# Patient Record
Sex: MALE | Race: WHITE | NOT HISPANIC OR LATINO | Employment: OTHER | URBAN - NONMETROPOLITAN AREA
[De-identification: names, ages, dates, MRNs, and addresses within clinical notes are randomized per-mention and may not be internally consistent; named-entity substitution may affect disease eponyms.]

---

## 2017-10-04 ENCOUNTER — OFFICE VISIT (OUTPATIENT)
Dept: UROLOGY | Facility: CLINIC | Age: 82
End: 2017-10-04

## 2017-10-04 VITALS
TEMPERATURE: 96.8 F | SYSTOLIC BLOOD PRESSURE: 110 MMHG | DIASTOLIC BLOOD PRESSURE: 86 MMHG | BODY MASS INDEX: 27.28 KG/M2 | WEIGHT: 159.8 LBS | HEIGHT: 64 IN

## 2017-10-04 DIAGNOSIS — N40.1 BPH WITH URINARY OBSTRUCTION: ICD-10-CM

## 2017-10-04 DIAGNOSIS — N13.8 BPH WITH URINARY OBSTRUCTION: ICD-10-CM

## 2017-10-04 DIAGNOSIS — C67.9 MALIGNANT NEOPLASM OF URINARY BLADDER, UNSPECIFIED SITE (HCC): Primary | ICD-10-CM

## 2017-10-04 LAB
BILIRUB BLD-MCNC: NEGATIVE MG/DL
CLARITY, POC: CLEAR
COLOR UR: YELLOW
GLUCOSE UR STRIP-MCNC: NEGATIVE MG/DL
KETONES UR QL: NEGATIVE
LEUKOCYTE EST, POC: NEGATIVE
NITRITE UR-MCNC: NEGATIVE MG/ML
PH UR: 6 [PH] (ref 5–8)
PROT UR STRIP-MCNC: NEGATIVE MG/DL
RBC # UR STRIP: NEGATIVE /UL
SP GR UR: 1.01 (ref 1–1.03)
UROBILINOGEN UR QL: NORMAL

## 2017-10-04 PROCEDURE — 81003 URINALYSIS AUTO W/O SCOPE: CPT | Performed by: UROLOGY

## 2017-10-04 PROCEDURE — 88112 CYTOPATH CELL ENHANCE TECH: CPT | Performed by: UROLOGY

## 2017-10-04 PROCEDURE — 99204 OFFICE O/P NEW MOD 45 MIN: CPT | Performed by: UROLOGY

## 2017-10-04 RX ORDER — ATORVASTATIN CALCIUM 20 MG/1
20 TABLET, FILM COATED ORAL DAILY
COMMUNITY
End: 2018-05-25 | Stop reason: SDUPTHER

## 2017-10-04 RX ORDER — IRBESARTAN 300 MG/1
300 TABLET ORAL NIGHTLY
COMMUNITY
End: 2017-11-22 | Stop reason: SDUPTHER

## 2017-10-04 RX ORDER — MELATONIN
1000 DAILY
COMMUNITY
End: 2022-11-15

## 2017-10-04 RX ORDER — PANTOPRAZOLE SODIUM 40 MG/1
40 TABLET, DELAYED RELEASE ORAL 2 TIMES DAILY
COMMUNITY
End: 2022-11-15

## 2017-10-04 RX ORDER — AMLODIPINE BESYLATE 5 MG/1
5 TABLET ORAL DAILY
COMMUNITY
End: 2017-11-22 | Stop reason: DRUGHIGH

## 2017-10-04 RX ORDER — ASPIRIN 81 MG/1
81 TABLET ORAL DAILY
COMMUNITY
End: 2017-11-22 | Stop reason: SDUPTHER

## 2017-10-04 RX ORDER — CLOPIDOGREL BISULFATE 75 MG/1
75 TABLET ORAL DAILY
COMMUNITY
End: 2017-11-22 | Stop reason: SDUPTHER

## 2017-10-04 RX ORDER — ACETAMINOPHEN 500 MG
500 TABLET ORAL EVERY 6 HOURS PRN
COMMUNITY

## 2017-10-04 NOTE — PROGRESS NOTES
Subjective    Mr. Curry is 87 y.o. male    Chief Complaint: Bladder Cancer    History of Present Illness     Bladder Cancer  Patient is here for evaluation and treatment of persistent bladder cancer. The tumor was diagnosed incidentally. TURBT done January 1978 .Pathology revealed unknown UNK - TX, Any N, Any M<br>UNK - Any T, NX, Any M<br>UNK - Any T, Any N, MX  .  Adjuvant treatment does includes Induction.  Risk factors for bladder cancer include smoking. Current urinary symptoms include irritative symptoms including frequency, nocturia. Patient denies obstructive symptoms including incomplete emptying, intermittency, weak stream, straining, postvoid dribbling.   Last surveillance cystoscopy done 1  year.  Last upper tract imaging done 1year.      The following portions of the patient's history were reviewed and updated as appropriate: allergies, current medications, past family history, past medical history, past social history, past surgical history and problem list.    Review of Systems   Constitutional: Negative for appetite change and fever.   HENT: Negative for hearing loss and sore throat.    Eyes: Negative for pain and redness.   Respiratory: Negative for cough and shortness of breath.    Cardiovascular: Negative for chest pain and leg swelling.   Gastrointestinal: Negative for anal bleeding, nausea and vomiting.   Endocrine: Negative for cold intolerance and heat intolerance.   Genitourinary: Negative for dysuria, flank pain and hematuria.   Musculoskeletal: Negative for joint swelling and myalgias.   Skin: Negative for color change and rash.   Allergic/Immunologic: Negative for immunocompromised state.   Neurological: Negative for dizziness and speech difficulty.   Hematological: Negative for adenopathy. Does not bruise/bleed easily.   Psychiatric/Behavioral: Negative for dysphoric mood and suicidal ideas.         Current Outpatient Prescriptions:   •  acetaminophen (TYLENOL) 500 MG tablet, Take 500 mg  "by mouth Every 6 (Six) Hours As Needed for Mild Pain ., Disp: , Rfl:   •  amLODIPine (NORVASC) 5 MG tablet, Take 5 mg by mouth Daily., Disp: , Rfl:   •  aspirin 81 MG EC tablet, Take 81 mg by mouth Daily., Disp: , Rfl:   •  atorvastatin (LIPITOR) 20 MG tablet, Take 20 mg by mouth Daily., Disp: , Rfl:   •  cholecalciferol (VITAMIN D3) 1000 units tablet, Take 1,000 Units by mouth Daily., Disp: , Rfl:   •  clopidogrel (PLAVIX) 75 MG tablet, Take 75 mg by mouth Daily., Disp: , Rfl:   •  irbesartan (AVAPRO) 300 MG tablet, Take 300 mg by mouth Every Night., Disp: , Rfl:   •  pantoprazole (PROTONIX) 40 MG EC tablet, Take 40 mg by mouth Daily., Disp: , Rfl:     Past Medical History:   Diagnosis Date   • Cancer     bladder cancer   • Hyperlipidemia    • Hypertension        Past Surgical History:   Procedure Laterality Date   • BLADDER SURGERY     • CARDIAC CATHETERIZATION     • CYSTOSCOPY     • HERNIA REPAIR         Social History     Social History   • Marital status:      Spouse name: N/A   • Number of children: N/A   • Years of education: N/A     Social History Main Topics   • Smoking status: Former Smoker   • Smokeless tobacco: Never Used   • Alcohol use Yes      Comment: occ wine   • Drug use: None   • Sexual activity: Not Asked     Other Topics Concern   • None     Social History Narrative   • None       Family History   Problem Relation Age of Onset   • No Known Problems Father    • No Known Problems Mother        Objective    /86  Temp 96.8 °F (36 °C)  Ht 64\" (162.6 cm)  Wt 159 lb 12.8 oz (72.5 kg)  BMI 27.43 kg/m2    Physical Exam   Constitutional: He is oriented to person, place, and time. He appears well-developed and well-nourished. No distress.   HENT:   Nose: Nose normal.   Neck: Normal range of motion. Neck supple. No tracheal deviation present. No thyromegaly present.   Cardiovascular: Normal rate, regular rhythm and intact distal pulses.    No significant edema or tenderness  "   Pulmonary/Chest: Breath sounds normal. No accessory muscle usage. No respiratory distress.   Abdominal: Soft. Bowel sounds are normal. He exhibits no distension, no ascites and no mass. There is no hepatosplenomegaly. There is no tenderness. There is no rebound, no guarding and no CVA tenderness. No hernia.   Stool specimen is not indicated for my portion of the exam   Genitourinary: Testes normal and penis normal. Rectal exam shows no mass, no tenderness, anal tone normal and guaiac negative stool. Tender: no nodules. Right testis shows no mass, no swelling and no tenderness. Left testis shows no mass, no swelling and no tenderness. No penile tenderness (no lesion or deformities).   Genitourinary Comments:  The urethral meatus normal in position without evidence of stricture. Epididymis without mass or tenderness. Vas Deferens is palpably normal.Anus and perineum without mass or tenderness. The seminal vesicle are without mass or enlargement. The prostate is approximately 20 ml. It is Symmetric, with a Soft consistency. There are no nodules present. . The seminal vesicles are Not palpable due to the size of the prostate.     Lymphadenopathy:     He has no cervical adenopathy. No inguinal adenopathy noted on the right or left side.        Right: No inguinal adenopathy present.        Left: No inguinal adenopathy present.   Neurological: He is alert and oriented to person, place, and time.   Skin: Skin is warm and dry. No rash noted. He is not diaphoretic. No pallor.   Psychiatric: He has a normal mood and affect. His behavior is normal.   Vitals reviewed.          Results for orders placed or performed in visit on 10/04/17   POC Urinalysis Dipstick, Automated   Result Value Ref Range    Color Yellow Yellow, Straw, Dark Yellow, Leslie    Clarity, UA Clear Clear    Glucose, UA Negative Negative, 1000 mg/dL (3+) mg/dL    Bilirubin Negative Negative    Ketones, UA Negative Negative    Specific Gravity  1.015 1.005 -  1.030    Blood, UA Negative Negative    pH, Urine 6.0 5.0 - 8.0    Protein, POC Negative Negative mg/dL    Urobilinogen, UA Normal Normal    Leukocytes Negative Negative    Nitrite, UA Negative Negative     Assessment and Plan    Diagnoses and all orders for this visit:    Malignant neoplasm of urinary bladder, unspecified site  -     POC Urinalysis Dipstick, Automated  -     Non-gynecologic Cytology - Body Fluid, Urine, Clean Catch  -     US Renal Bilateral; Future    BPH with urinary obstruction        Reviewed records from Dr. Bush.  Patient has a history of bladder cancer initially diagnosed back in 1978 by Dr. Hunter.  Most recently had a recurrence in 2007 and with induction BCG.  He been without evidence of disease since then.  He has minimal voiding symptoms.  I will have him return for cystoscopic examination as well as renal ultrasound.  We will send today's urine for cytology.

## 2017-10-08 LAB
CYTO UR: NORMAL
LAB AP CASE REPORT: NORMAL
Lab: NORMAL
PATH REPORT.FINAL DX SPEC: NORMAL
PATH REPORT.GROSS SPEC: NORMAL

## 2017-10-18 ENCOUNTER — PROCEDURE VISIT (OUTPATIENT)
Dept: UROLOGY | Facility: CLINIC | Age: 82
End: 2017-10-18

## 2017-10-18 ENCOUNTER — HOSPITAL ENCOUNTER (OUTPATIENT)
Dept: ULTRASOUND IMAGING | Facility: HOSPITAL | Age: 82
Discharge: HOME | End: 2017-10-18
Attending: UROLOGY | Admitting: UROLOGY

## 2017-10-18 DIAGNOSIS — C67.9 MALIGNANT NEOPLASM OF URINARY BLADDER, UNSPECIFIED SITE (HCC): Primary | ICD-10-CM

## 2017-10-18 DIAGNOSIS — C67.9 MALIGNANT NEOPLASM OF URINARY BLADDER, UNSPECIFIED SITE (HCC): ICD-10-CM

## 2017-10-18 LAB
BILIRUB BLD-MCNC: NEGATIVE MG/DL
CLARITY, POC: CLEAR
COLOR UR: YELLOW
GLUCOSE UR STRIP-MCNC: NEGATIVE MG/DL
KETONES UR QL: NEGATIVE
LEUKOCYTE EST, POC: NEGATIVE
NITRITE UR-MCNC: NEGATIVE MG/ML
PH UR: 6 [PH] (ref 5–8)
PROT UR STRIP-MCNC: NEGATIVE MG/DL
RBC # UR STRIP: NEGATIVE /UL
SP GR UR: 1.02 (ref 1–1.03)
UROBILINOGEN UR QL: NORMAL

## 2017-10-18 PROCEDURE — 76775 US EXAM ABDO BACK WALL LIM: CPT

## 2017-10-18 PROCEDURE — 52000 CYSTOURETHROSCOPY: CPT | Performed by: UROLOGY

## 2017-10-18 PROCEDURE — 81003 URINALYSIS AUTO W/O SCOPE: CPT | Performed by: UROLOGY

## 2017-10-18 NOTE — PROGRESS NOTES
Pre- operative diagnosis:  Bladder cancer surveillance    Post operative diagnosis:  Same    Procedure:  The patient was prepped and draped in a normal sterile fashion.  The urethra was anesthetized with 2% lidocaine jelly.  A flexible cystoscope was introduced per urethra.      Urethra:  Normal    Bladder:  There is no evidence of a stone, foreign body or mass within the bladder.  The bladder is minimally trabeculated.  The bladder neck is without contracture.    Ureteral orifices:  Normal position bilaterally and Clear efflux bilaterally    Prostate:  lateral lobe hypertrophy    Patient tolerated the procedure well    Complications: none    Blood loss: minimal    Follow up:    Routine follow up    Repeat renal ultrasound reviewed and negative.  Cystoscopic examination negative.  He will follow-up next year for his bladder cancer surveillance.

## 2017-11-22 RX ORDER — IRBESARTAN 300 MG/1
300 TABLET ORAL DAILY
COMMUNITY

## 2017-11-22 RX ORDER — AMLODIPINE BESYLATE 5 MG/1
5 TABLET ORAL DAILY
COMMUNITY
End: 2017-11-22 | Stop reason: SDUPTHER

## 2017-11-22 RX ORDER — ASPIRIN 81 MG/1
81 TABLET, CHEWABLE ORAL DAILY
COMMUNITY
Start: 2015-12-30 | End: 2021-11-10

## 2017-11-22 RX ORDER — OXYCODONE AND ACETAMINOPHEN 10; 325 MG/1; MG/1
1 TABLET ORAL EVERY 6 HOURS PRN
COMMUNITY
Start: 2015-12-30 | End: 2018-09-11

## 2017-11-22 RX ORDER — LANSOPRAZOLE 15 MG/1
15 CAPSULE, DELAYED RELEASE ORAL DAILY
COMMUNITY
Start: 2016-04-21 | End: 2018-09-11

## 2017-11-22 RX ORDER — AMLODIPINE BESYLATE 10 MG/1
10 TABLET ORAL DAILY
Refills: 3 | COMMUNITY
Start: 2017-10-02

## 2017-11-22 RX ORDER — ATORVASTATIN CALCIUM 20 MG/1
20 TABLET, FILM COATED ORAL DAILY
COMMUNITY

## 2017-11-22 RX ORDER — M-VIT,TX,IRON,MINS/CALC/FOLIC 27MG-0.4MG
1 TABLET ORAL DAILY
COMMUNITY
End: 2018-09-11

## 2017-11-22 RX ORDER — GABAPENTIN 100 MG/1
100 CAPSULE ORAL NIGHTLY
COMMUNITY
End: 2018-09-11

## 2017-11-22 RX ORDER — CLOPIDOGREL BISULFATE 75 MG/1
75 TABLET ORAL DAILY
COMMUNITY
Start: 2016-04-18

## 2017-11-27 ENCOUNTER — OFFICE VISIT (OUTPATIENT)
Dept: CARDIOLOGY | Facility: CLINIC | Age: 82
End: 2017-11-27

## 2017-11-27 VITALS
HEART RATE: 74 BPM | WEIGHT: 159 LBS | BODY MASS INDEX: 27.14 KG/M2 | DIASTOLIC BLOOD PRESSURE: 76 MMHG | HEIGHT: 64 IN | SYSTOLIC BLOOD PRESSURE: 140 MMHG

## 2017-11-27 DIAGNOSIS — I50.22 CHRONIC SYSTOLIC CONGESTIVE HEART FAILURE (HCC): ICD-10-CM

## 2017-11-27 DIAGNOSIS — E78.2 MIXED HYPERLIPIDEMIA: ICD-10-CM

## 2017-11-27 DIAGNOSIS — I25.119 CORONARY ARTERY DISEASE INVOLVING NATIVE CORONARY ARTERY OF NATIVE HEART WITH ANGINA PECTORIS (HCC): Primary | ICD-10-CM

## 2017-11-27 DIAGNOSIS — I10 ESSENTIAL HYPERTENSION: ICD-10-CM

## 2017-11-27 PROCEDURE — 99214 OFFICE O/P EST MOD 30 MIN: CPT | Performed by: INTERNAL MEDICINE

## 2017-11-27 PROCEDURE — 93000 ELECTROCARDIOGRAM COMPLETE: CPT | Performed by: INTERNAL MEDICINE

## 2017-11-27 RX ORDER — CLONIDINE HYDROCHLORIDE 0.1 MG/1
0.1 TABLET ORAL 2 TIMES DAILY
COMMUNITY
End: 2018-09-11

## 2017-11-27 RX ORDER — SPIRONOLACTONE AND HYDROCHLOROTHIAZIDE 25; 25 MG/1; MG/1
1 TABLET ORAL DAILY
COMMUNITY
End: 2018-09-11

## 2017-11-27 NOTE — PROGRESS NOTES
Referring Provider: Everardo Jackson MD    Reason for Follow-up Visit: CAD    Subjective .   Chief Complaint:   Chief Complaint   Patient presents with   • Follow-up     yearly Dr. Rinney wanted him rechecked because he had complained of some problems with irregular heart beat and sob with some chest pressure.   • Coronary Artery Disease     had some pressure in chest when went out to get mail and had sob with it also.   • Shortness of Breath     with exertion   • Palpitations     felt irregular and could not breath.   • Hypertension     goes up and down.       History of present illness:  Wyatt Curry is a 87 y.o. yo male with history of CAD, s/p stent placement about 10 yrs ago. Was admitted last yr with chest pain but workup was negative. Had one episode of CP last month.       History  Past Medical History:   Diagnosis Date   • Bladder cancer    • CAD (coronary artery disease)    • Cancer     bladder cancer   • GERD (gastroesophageal reflux disease)    • Hyperlipidemia    • Hypertension    • Irregular heart beat    ,   Past Surgical History:   Procedure Laterality Date   • BLADDER SURGERY     • CARDIAC CATHETERIZATION     • CORONARY ANGIOPLASTY WITH STENT PLACEMENT     • CYSTOSCOPY     • HERNIA REPAIR     • SHOULDER SURGERY     • TRANSURETHRAL RESECTION OF BLADDER TUMOR     ,   Family History   Problem Relation Age of Onset   • Heart disease Father    • Heart attack Father    • Tuberculosis Mother    • Heart disease Brother    • Heart attack Brother    • Heart disease Brother    • Diabetes Sister    • Hypertension Sister    • Hyperlipidemia Sister    • Heart disease Sister    • Alcohol abuse Sister    ,   Social History   Substance Use Topics   • Smoking status: Former Smoker     Types: Cigarettes     Start date: 1948     Quit date: 1975   • Smokeless tobacco: Never Used   • Alcohol use 0.6 oz/week     1 Glasses of wine per week      Comment: occ wine   ,     Medications  Current Outpatient Prescriptions    Medication Sig Dispense Refill   • acetaminophen (TYLENOL) 500 MG tablet Take 500 mg by mouth Every 6 (Six) Hours As Needed for Mild Pain .     • amLODIPine (NORVASC) 10 MG tablet Take 10 mg by mouth Daily.  3   • aspirin 81 MG chewable tablet Chew 81 mg Daily.     • atorvastatin (LIPITOR) 20 MG tablet Take 20 mg by mouth Daily.     • atorvastatin (LIPITOR) 20 MG tablet Take 20 mg by mouth Daily.     • cholecalciferol (VITAMIN D3) 1000 units tablet Take 1,000 Units by mouth Daily.     • CloNIDine (CATAPRES) 0.1 MG tablet Take 0.1 mg by mouth 2 (Two) Times a Day.     • clopidogrel (PLAVIX) 75 MG tablet Take 75 mg by mouth Daily.     • irbesartan (AVAPRO) 300 MG tablet Take 300 mg by mouth Every Night.     • pantoprazole (PROTONIX) 40 MG EC tablet Take 40 mg by mouth Daily.     • spironolactone-hydrochlorothiazide (ALDACTAZIDE) 25-25 MG tablet Take 1 tablet by mouth Daily.     • therapeutic multivitamin-minerals (THERAGRAN-M) tablet Take 1 tablet by mouth Daily.     • gabapentin (NEURONTIN) 100 MG capsule Take 100 mg by mouth Every Night.     • lansoprazole (PREVACID) 15 MG capsule Take 15 mg by mouth Daily.     • oxyCODONE-acetaminophen (PERCOCET)  MG per tablet Take 1 tablet by mouth Every 6 (Six) Hours As Needed.       No current facility-administered medications for this visit.        Allergies:  Review of patient's allergies indicates no known allergies.    Review of Systems  Review of Systems   HENT: Negative for nosebleeds.    Cardiovascular: Negative for chest pain, claudication, dyspnea on exertion, irregular heartbeat, leg swelling, near-syncope, orthopnea, palpitations, paroxysmal nocturnal dyspnea and syncope.   Respiratory: Negative for cough, hemoptysis and shortness of breath.    Gastrointestinal: Negative for dysphagia, hematemesis and melena.   Genitourinary: Negative for hematuria.   All other systems reviewed and are negative.      Objective     Physical Exam:  /76 (BP Location: Left  "arm, Patient Position: Sitting, Cuff Size: Adult)  Pulse 74  Ht 64\" (162.6 cm)  Wt 159 lb (72.1 kg)  BMI 27.29 kg/m2  Physical Exam   Constitutional: He is oriented to person, place, and time. He appears well-nourished. No distress.   HENT:   Head: Normocephalic.   Eyes: No scleral icterus.   Neck: Normal range of motion. Neck supple.   Cardiovascular: Normal rate and regular rhythm.  Exam reveals gallop and S3. Exam reveals no friction rub.    No murmur heard.  Pulmonary/Chest: Effort normal and breath sounds normal. No respiratory distress. He has no wheezes. He has no rales.   Abdominal: Soft. Bowel sounds are normal. He exhibits no distension. There is no tenderness.   Musculoskeletal: He exhibits no edema.   Neurological: He is alert and oriented to person, place, and time.   Skin: Skin is warm and dry. He is not diaphoretic. No erythema.   Psychiatric: He has a normal mood and affect. His behavior is normal.       Results Review:    ECG 12 Lead  Date/Time: 11/27/2017 11:28 AM  Performed by: MARY PAYTON  Authorized by: MARY PAYTON   Comparison: compared with previous ECG   Similar to previous ECG  Rhythm: sinus rhythm  Conduction: left bundle branch block  Clinical impression: abnormal ECG            Procedure visit on 10/18/2017   Component Date Value Ref Range Status   • Color 10/18/2017 Yellow  Yellow, Straw, Dark Yellow, Leslie Final   • Clarity, UA 10/18/2017 Clear  Clear Final   • Glucose, UA 10/18/2017 Negative  Negative, 1000 mg/dL (3+) mg/dL Final   • Bilirubin 10/18/2017 Negative  Negative Final   • Ketones, UA 10/18/2017 Negative  Negative Final   • Specific Gravity  10/18/2017 1.020  1.005 - 1.030 Final   • Blood, UA 10/18/2017 Negative  Negative Final   • pH, Urine 10/18/2017 6.0  5.0 - 8.0 Final   • Protein, POC 10/18/2017 Negative  Negative mg/dL Final   • Urobilinogen, UA 10/18/2017 Normal  Normal Final   • Leukocytes 10/18/2017 Negative  Negative Final   • Nitrite, UA 10/18/2017 Negative "  Negative Final       Assessment/Plan   Wyatt was seen today for follow-up, coronary artery disease, shortness of breath, palpitations and hypertension.    Diagnoses and all orders for this visit:    Coronary artery disease involving native coronary artery of native heart with angina pectoris, has chronic stable angina    Essential hypertension, adequate control    Mixed hyperlipidemia, Patient's statin therapy is followed by PCP.    Chronic systolic congestive heart failure, due to LBBB. Well compensated    Other orders  -     ECG 12 Lead

## 2018-05-25 ENCOUNTER — OFFICE VISIT (OUTPATIENT)
Dept: CARDIOLOGY | Facility: CLINIC | Age: 83
End: 2018-05-25

## 2018-05-25 VITALS
OXYGEN SATURATION: 96 % | WEIGHT: 157.6 LBS | DIASTOLIC BLOOD PRESSURE: 69 MMHG | HEART RATE: 73 BPM | HEIGHT: 64 IN | BODY MASS INDEX: 26.91 KG/M2 | SYSTOLIC BLOOD PRESSURE: 140 MMHG

## 2018-05-25 DIAGNOSIS — E78.2 MIXED HYPERLIPIDEMIA: ICD-10-CM

## 2018-05-25 DIAGNOSIS — I10 ESSENTIAL HYPERTENSION: ICD-10-CM

## 2018-05-25 DIAGNOSIS — I25.118 CORONARY ARTERY DISEASE OF NATIVE ARTERY OF NATIVE HEART WITH STABLE ANGINA PECTORIS (HCC): Primary | ICD-10-CM

## 2018-05-25 DIAGNOSIS — I44.7 LEFT BUNDLE BRANCH BLOCK: ICD-10-CM

## 2018-05-25 PROCEDURE — 93000 ELECTROCARDIOGRAM COMPLETE: CPT | Performed by: NURSE PRACTITIONER

## 2018-05-25 PROCEDURE — 99213 OFFICE O/P EST LOW 20 MIN: CPT | Performed by: NURSE PRACTITIONER

## 2018-05-25 NOTE — PROGRESS NOTES
"    Subjective:     Encounter Date:05/25/2018      Patient ID: Wyatt Curry is a 88 y.o. male.    Chief Complaint:  The patient reports he is feeling well overall. He remains active- he walks approximately 25 minutes on the treadmill several days per week. He admits some increase in fatigue and states he has had to slow down some, but he denies any drastic changes in this stamina. He denies chest pain, shortness of breath, weight gain, syncope, or pre syncope. He had one instance of abdominal burning while on the treadmill and states it was relieved with rest and antacids. He occasionally has bilateral hand numbness while walking on the treadmill- this is relieved with rest and is similar to his previous angina- it is not increasing significantly in frequency or duration. He admits very mild lower extremity edema at times.         The following portions of the patient's history were reviewed and updated as appropriate: allergies, current medications, past family history, past medical history, past social history, past surgical history and problem list.  /69 (BP Location: Right arm, Patient Position: Sitting, Cuff Size: Adult)   Pulse 73   Ht 162.6 cm (64\")   Wt 71.5 kg (157 lb 9.6 oz)   SpO2 96%   BMI 27.05 kg/m²   No Known Allergies    Current Outpatient Prescriptions:   •  acetaminophen (TYLENOL) 500 MG tablet, Take 500 mg by mouth Every 6 (Six) Hours As Needed for Mild Pain ., Disp: , Rfl:   •  amLODIPine (NORVASC) 10 MG tablet, Take 10 mg by mouth Daily., Disp: , Rfl: 3  •  aspirin 81 MG chewable tablet, Chew 81 mg Daily., Disp: , Rfl:   •  atorvastatin (LIPITOR) 20 MG tablet, Take 20 mg by mouth Daily., Disp: , Rfl:   •  cholecalciferol (VITAMIN D3) 1000 units tablet, Take 1,000 Units by mouth Daily., Disp: , Rfl:   •  clopidogrel (PLAVIX) 75 MG tablet, Take 75 mg by mouth Daily., Disp: , Rfl:   •  irbesartan (AVAPRO) 300 MG tablet, Take 300 mg by mouth Every Night., Disp: , Rfl:   •  pantoprazole " (PROTONIX) 40 MG EC tablet, Take 40 mg by mouth Daily., Disp: , Rfl:   •  CloNIDine (CATAPRES) 0.1 MG tablet, Take 0.1 mg by mouth 2 (Two) Times a Day., Disp: , Rfl:   •  gabapentin (NEURONTIN) 100 MG capsule, Take 100 mg by mouth Every Night., Disp: , Rfl:   •  lansoprazole (PREVACID) 15 MG capsule, Take 15 mg by mouth Daily., Disp: , Rfl:   •  oxyCODONE-acetaminophen (PERCOCET)  MG per tablet, Take 1 tablet by mouth Every 6 (Six) Hours As Needed., Disp: , Rfl:   •  spironolactone-hydrochlorothiazide (ALDACTAZIDE) 25-25 MG tablet, Take 1 tablet by mouth Daily., Disp: , Rfl:   •  therapeutic multivitamin-minerals (THERAGRAN-M) tablet, Take 1 tablet by mouth Daily., Disp: , Rfl:   Past Medical History:   Diagnosis Date   • Bladder cancer    • CAD (coronary artery disease)    • Cancer     bladder cancer   • GERD (gastroesophageal reflux disease)    • Hyperlipidemia    • Hypertension    • Irregular heart beat      Past Surgical History:   Procedure Laterality Date   • BLADDER SURGERY     • CARDIAC CATHETERIZATION     • CORONARY ANGIOPLASTY WITH STENT PLACEMENT     • CYSTOSCOPY     • HERNIA REPAIR     • SHOULDER SURGERY     • TRANSURETHRAL RESECTION OF BLADDER TUMOR       Social History     Social History   • Marital status:      Spouse name: N/A   • Number of children: N/A   • Years of education: N/A     Occupational History   • Not on file.     Social History Main Topics   • Smoking status: Former Smoker     Types: Cigarettes     Start date: 1948     Quit date: 1975   • Smokeless tobacco: Never Used   • Alcohol use 0.6 oz/week     1 Glasses of wine per week      Comment: occ wine   • Drug use: No   • Sexual activity: Defer     Other Topics Concern   • Not on file     Social History Narrative   • No narrative on file     Family History   Problem Relation Age of Onset   • Heart disease Father    • Heart attack Father    • Tuberculosis Mother    • Heart disease Brother    • Heart attack Brother    • Heart  disease Brother    • Diabetes Sister    • Hypertension Sister    • Hyperlipidemia Sister    • Heart disease Sister    • Alcohol abuse Sister        Review of Systems   Constitution: Positive for malaise/fatigue. Negative for chills, diaphoresis, fever and weakness.   HENT: Negative for nosebleeds.    Eyes: Negative for visual disturbance.   Cardiovascular: Positive for leg swelling (occasional, mild ). Negative for chest pain, claudication, cyanosis, dyspnea on exertion, irregular heartbeat, near-syncope, orthopnea, palpitations, paroxysmal nocturnal dyspnea and syncope.        Exertional hand numbness    Respiratory: Negative for cough, hemoptysis, shortness of breath, sputum production and wheezing.    Hematologic/Lymphatic: Negative for bleeding problem.   Skin: Negative for color change and flushing.   Musculoskeletal: Negative for falls.   Gastrointestinal: Positive for heartburn. Negative for bloating, abdominal pain, hematemesis, hematochezia, melena, nausea and vomiting.   Genitourinary: Negative for hematuria.   Neurological: Negative for dizziness and light-headedness.   Psychiatric/Behavioral: Negative for altered mental status.         ECG 12 Lead  Date/Time: 5/25/2018 1:38 PM  Performed by: PERLA SANDS  Authorized by: PERLA SANDS   Comparison: compared with previous ECG from 11/27/2017  Similar to previous ECG  Rhythm: sinus rhythm  Conduction: complete LBBB               Objective:     Physical Exam   Constitutional: He is oriented to person, place, and time. He appears well-developed and well-nourished. No distress.   HENT:   Head: Normocephalic and atraumatic.   Eyes: Pupils are equal, round, and reactive to light.   Neck: Normal range of motion. Neck supple. No JVD present. No thyromegaly present.   Cardiovascular: Normal rate, regular rhythm, normal heart sounds and intact distal pulses.  Exam reveals no gallop and no friction rub.    No murmur heard.  Pulmonary/Chest: Effort normal and breath  sounds normal. No respiratory distress. He has no wheezes. He has no rales. He exhibits no tenderness.   Abdominal: Soft. Bowel sounds are normal. He exhibits no distension. There is no tenderness.   Musculoskeletal: Normal range of motion. He exhibits no edema.   Neurological: He is alert and oriented to person, place, and time. No cranial nerve deficit.   Skin: Skin is warm and dry. He is not diaphoretic.   Psychiatric: He has a normal mood and affect. His behavior is normal.       Lab Review:       Assessment:          Diagnosis Plan   1. Coronary artery disease of native artery of native heart with stable angina pectoris  Stable. See notes in HPI. Continue current medical therapy as listed above.   Most recent cath 2008- non obstructive CAD and NL LVEF. Hx of PCI approx. 15 yr ago.   9/2016 negative nuclear stress, and LVEF 45-50% by echo. No s/s of CHF.  Stamina is good.    2. Left bundle branch block  Chronic     3. Essential hypertension  Controlled     4. Mixed hyperlipidemia  Followed by pcp, continue statin           Plan:       As noted above  Follow up 6 months, sooner with new or worsening symptoms or concerns.

## 2018-08-10 ENCOUNTER — TELEPHONE (OUTPATIENT)
Dept: CARDIOLOGY | Facility: CLINIC | Age: 83
End: 2018-08-10

## 2018-08-10 NOTE — TELEPHONE ENCOUNTER
Dr Jackson office called to see if patient has upcoming heart cath scheduled because he has been having recurring angina. Does not have appt to see you until 11/26/18. Do you want him seen sooner? Please advise.

## 2018-08-13 ENCOUNTER — OFFICE VISIT (OUTPATIENT)
Dept: CARDIOLOGY | Facility: CLINIC | Age: 83
End: 2018-08-13

## 2018-08-13 VITALS
OXYGEN SATURATION: 98 % | HEIGHT: 64 IN | SYSTOLIC BLOOD PRESSURE: 135 MMHG | DIASTOLIC BLOOD PRESSURE: 70 MMHG | BODY MASS INDEX: 26.29 KG/M2 | HEART RATE: 62 BPM | WEIGHT: 154 LBS | RESPIRATION RATE: 18 BRPM

## 2018-08-13 DIAGNOSIS — I44.7 LEFT BUNDLE BRANCH BLOCK: ICD-10-CM

## 2018-08-13 DIAGNOSIS — I25.118 CORONARY ARTERY DISEASE OF NATIVE ARTERY OF NATIVE HEART WITH STABLE ANGINA PECTORIS (HCC): Primary | ICD-10-CM

## 2018-08-13 DIAGNOSIS — I10 ESSENTIAL HYPERTENSION: ICD-10-CM

## 2018-08-13 DIAGNOSIS — I51.9 LEFT VENTRICULAR SYSTOLIC DYSFUNCTION: ICD-10-CM

## 2018-08-13 DIAGNOSIS — I34.0 NON-RHEUMATIC MITRAL REGURGITATION: ICD-10-CM

## 2018-08-13 DIAGNOSIS — E78.2 MIXED HYPERLIPIDEMIA: ICD-10-CM

## 2018-08-13 DIAGNOSIS — R06.02 SHORTNESS OF BREATH: ICD-10-CM

## 2018-08-13 PROBLEM — I51.89 LEFT VENTRICULAR SYSTOLIC DYSFUNCTION: Status: ACTIVE | Noted: 2018-08-13

## 2018-08-13 PROBLEM — R53.83 OTHER FATIGUE: Status: ACTIVE | Noted: 2018-08-13

## 2018-08-13 PROCEDURE — 93000 ELECTROCARDIOGRAM COMPLETE: CPT | Performed by: NURSE PRACTITIONER

## 2018-08-13 PROCEDURE — 99214 OFFICE O/P EST MOD 30 MIN: CPT | Performed by: NURSE PRACTITIONER

## 2018-08-13 RX ORDER — ISOSORBIDE MONONITRATE 60 MG/1
60 TABLET, EXTENDED RELEASE ORAL EVERY MORNING
Qty: 90 TABLET | Refills: 3 | Status: SHIPPED | OUTPATIENT
Start: 2018-08-13 | End: 2018-09-11 | Stop reason: SINTOL

## 2018-08-13 NOTE — PATIENT INSTRUCTIONS

## 2018-08-13 NOTE — PROGRESS NOTES
"    Subjective:     Encounter Date:08/13/2018      Patient ID: Wyatt Curry is a 88 y.o. male with a history of coronary artery disease with remote stents to LCx system.  Most recent PCI was 2006 \"Y\" crush stent to LCx and OM. He also has a history of mitral valve regurgitation, hypertension, hyperlipidemia, GERD, and chronic left bundle branch block.  He presents today for follow up of symptoms left arm numbness and fatigue.    Chief Complaint: Fatigue  Fatigue   This is a new problem. The current episode started more than 1 month ago. The problem occurs daily. The problem has been gradually worsening. Associated symptoms include fatigue. Pertinent negatives include no abdominal pain, chest pain, coughing, diaphoresis, headaches, joint swelling, myalgias, nausea or vomiting. The symptoms are aggravated by exertion. He has tried rest for the symptoms. The treatment provided moderate relief.   Coronary Artery Disease   Presents for follow-up visit. Symptoms include shortness of breath. Pertinent negatives include no chest pain, chest pressure, chest tightness, dizziness, leg swelling, muscle weakness, palpitations or weight gain. The symptoms have been stable (with new onset fatigue of concern). Compliance with diet is good. Compliance with exercise is good. Compliance with medications is good.   Hypertension   This is a chronic problem. The current episode started more than 1 year ago. The problem is unchanged. The problem is controlled. Associated symptoms include malaise/fatigue and shortness of breath. Pertinent negatives include no chest pain, headaches, orthopnea, palpitations, peripheral edema, PND or sweats. Risk factors for coronary artery disease include dyslipidemia and male gender. Current antihypertension treatment includes diuretics, angiotensin blockers, central alpha agonists and calcium channel blockers.       Has been walking on a treadmill and been having to slow his pace quite a bit as well as " "his length of exercise by half.  He also notes that he has been having numbness in his hands and arms that he \"can shake out\" as well as neck pain and pain between shoulder blades. He reports previous anginal equivalent with heavy arms and some chest tightness. This isn't exactly the same as previous but was concerning to his PCP and he was advised to get a sooner follow up.  He denies shortness of breath, edema, palpitation, syncope, or presyncope.    The following portions of the patient's history were reviewed and updated as appropriate: allergies, current medications, past family history, past medical history, past social history and past surgical history.     No Known Allergies      Current Outpatient Prescriptions:   •  acetaminophen (TYLENOL) 500 MG tablet, Take 500 mg by mouth Every 6 (Six) Hours As Needed for Mild Pain ., Disp: , Rfl:   •  amLODIPine (NORVASC) 10 MG tablet, Take 10 mg by mouth Daily., Disp: , Rfl: 3  •  aspirin 81 MG chewable tablet, Chew 81 mg Daily., Disp: , Rfl:   •  atorvastatin (LIPITOR) 20 MG tablet, Take 20 mg by mouth Daily., Disp: , Rfl:   •  cholecalciferol (VITAMIN D3) 1000 units tablet, Take 1,000 Units by mouth Daily., Disp: , Rfl:   •  CloNIDine (CATAPRES) 0.1 MG tablet, Take 0.1 mg by mouth 2 (Two) Times a Day., Disp: , Rfl:   •  clopidogrel (PLAVIX) 75 MG tablet, Take 75 mg by mouth Daily., Disp: , Rfl:   •  gabapentin (NEURONTIN) 100 MG capsule, Take 100 mg by mouth Every Night., Disp: , Rfl:   •  irbesartan (AVAPRO) 300 MG tablet, Take 300 mg by mouth Every Night., Disp: , Rfl:   •  lansoprazole (PREVACID) 15 MG capsule, Take 15 mg by mouth Daily., Disp: , Rfl:   •  oxyCODONE-acetaminophen (PERCOCET)  MG per tablet, Take 1 tablet by mouth Every 6 (Six) Hours As Needed., Disp: , Rfl:   •  pantoprazole (PROTONIX) 40 MG EC tablet, Take 40 mg by mouth Daily., Disp: , Rfl:   •  spironolactone-hydrochlorothiazide (ALDACTAZIDE) 25-25 MG tablet, Take 1 tablet by mouth " Daily., Disp: , Rfl:   •  therapeutic multivitamin-minerals (THERAGRAN-M) tablet, Take 1 tablet by mouth Daily., Disp: , Rfl:   •  isosorbide mononitrate (IMDUR) 60 MG 24 hr tablet, Take 1 tablet by mouth Every Morning., Disp: 90 tablet, Rfl: 3    Past Medical History:   Diagnosis Date   • Bladder cancer (CMS/HCC)    • CAD (coronary artery disease)    • Cancer (CMS/HCC)     bladder cancer   • GERD (gastroesophageal reflux disease)    • Hyperlipidemia    • Hypertension    • Irregular heart beat      Family History   Problem Relation Age of Onset   • Heart disease Father    • Heart attack Father    • Tuberculosis Mother    • Heart disease Brother    • Heart attack Brother    • Heart disease Brother    • Diabetes Sister    • Hypertension Sister    • Hyperlipidemia Sister    • Heart disease Sister    • Alcohol abuse Sister      Social History     Social History   • Marital status:      Spouse name: N/A   • Number of children: N/A   • Years of education: N/A     Occupational History   • Not on file.     Social History Main Topics   • Smoking status: Former Smoker     Types: Cigarettes     Start date: 1948     Quit date: 1975   • Smokeless tobacco: Never Used   • Alcohol use 0.6 oz/week     1 Glasses of wine per week      Comment: occ wine   • Drug use: No   • Sexual activity: Defer     Other Topics Concern   • Not on file     Social History Narrative   • No narrative on file     Past Surgical History:   Procedure Laterality Date   • BLADDER SURGERY     • CARDIAC CATHETERIZATION     • CORONARY ANGIOPLASTY WITH STENT PLACEMENT     • CYSTOSCOPY     • HERNIA REPAIR     • SHOULDER SURGERY     • TRANSURETHRAL RESECTION OF BLADDER TUMOR         Review of Systems   Constitution: Positive for fatigue and malaise/fatigue. Negative for diaphoresis and weight gain.   Eyes: Negative for visual disturbance.   Cardiovascular: Positive for dyspnea on exertion. Negative for chest pain, leg swelling, orthopnea, palpitations and  paroxysmal nocturnal dyspnea.   Respiratory: Positive for shortness of breath. Negative for chest tightness, cough and wheezing.    Hematologic/Lymphatic: Negative for bleeding problem. Bruises/bleeds easily.   Skin: Negative for suspicious lesions.   Musculoskeletal: Negative for falls, joint swelling, muscle weakness and myalgias.   Gastrointestinal: Negative for abdominal pain, constipation, diarrhea, nausea and vomiting.   Neurological: Negative for dizziness, headaches and light-headedness.   Psychiatric/Behavioral: The patient is not nervous/anxious.    All other systems reviewed and are negative.        ECG 12 Lead  Date/Time: 8/13/2018 2:13 PM  Performed by: DELISA MATIAS  Authorized by: DELISA MATIAS   Comparison: compared with previous ECG from 5/25/2018  Similar to previous ECG  Rhythm: sinus rhythm  Rate: normal  BPM: 62  Conduction: left bundle branch block  ST Segments: ST segments normal  Clinical impression: abnormal ECG          Vitals:    08/13/18 1332   BP: 135/70   Pulse: 62   Resp: 18   SpO2: 98%     1    08/13/18  1332   Weight: 69.9 kg (154 lb)          Objective:     Physical Exam   Constitutional: He is oriented to person, place, and time. Vital signs are normal. He appears well-developed and well-nourished. He is cooperative. No distress.   HENT:   Head: Normocephalic and atraumatic.   Mouth/Throat: No oropharyngeal exudate.   Eyes: Conjunctivae are normal. Right eye exhibits no discharge. Left eye exhibits no discharge.   Neck: Normal range of motion. Neck supple. No thyromegaly present.   Cardiovascular: Normal rate, regular rhythm, normal heart sounds and intact distal pulses.  Exam reveals no gallop and no friction rub.    No murmur heard.  Pulmonary/Chest: Effort normal and breath sounds normal. No respiratory distress. He has no wheezes. He has no rhonchi. He has no rales. He exhibits no tenderness.   Abdominal: Soft. Normal appearance. He exhibits no distension. There is no  tenderness.   Musculoskeletal: Normal range of motion. He exhibits no edema, tenderness or deformity.   Neurological: He is alert and oriented to person, place, and time.   Skin: Skin is warm, dry and intact. No rash noted. He is not diaphoretic. No erythema. No pallor.   Psychiatric: He has a normal mood and affect. His speech is normal and behavior is normal. His mood appears not anxious. His affect is not angry. He does not exhibit a depressed mood.   Vitals reviewed.      Lab Review:       Assessment:          Diagnosis Plan   1. Coronary artery disease of native artery of native heart with stable angina pectoris (CMS/HCC)  Adult Transthoracic Echo Complete W/ Cont if Necessary Per Protocol   2. Essential hypertension     3. Mixed hyperlipidemia     4. Left bundle branch block     5. Left ventricular systolic dysfunction  Adult Transthoracic Echo Complete W/ Cont if Necessary Per Protocol   6. Non-rheumatic mitral regurgitation  Adult Transthoracic Echo Complete W/ Cont if Necessary Per Protocol   7. BMI 27.0-27.9,adult     8. Shortness of breath  Adult Transthoracic Echo Complete W/ Cont if Necessary Per Protocol          Plan:         - CAD: no chest pain at present. Notes to have increase in fatigue decrease in exercise tolerance and stamina during his usual treadmill routine. Hx of stents last PCI 2006. On DAPT    - HTN: controlled.     - HLD: on statin therapy; managed per PCP.    - LBBB: chronic    - Systolic dysfunction: mildly decreased on echo in 2016 45-50%    - MR: noted on previous echo.    - SOB: dyspnea with exercise as well as decreased exercise tolerance. Check 2 D Echo start Imdur 60 mg daily.     Follow up in 4 weeks after initiation of Imdur and echo is complete.

## 2018-08-20 ENCOUNTER — HOSPITAL ENCOUNTER (OUTPATIENT)
Dept: CARDIOLOGY | Facility: HOSPITAL | Age: 83
Discharge: HOME | End: 2018-08-20
Admitting: NURSE PRACTITIONER

## 2018-08-20 VITALS
HEIGHT: 64 IN | SYSTOLIC BLOOD PRESSURE: 135 MMHG | DIASTOLIC BLOOD PRESSURE: 69 MMHG | BODY MASS INDEX: 26.31 KG/M2 | WEIGHT: 154.1 LBS

## 2018-08-20 DIAGNOSIS — I25.118 CORONARY ARTERY DISEASE OF NATIVE ARTERY OF NATIVE HEART WITH STABLE ANGINA PECTORIS (HCC): ICD-10-CM

## 2018-08-20 DIAGNOSIS — R06.02 SHORTNESS OF BREATH: ICD-10-CM

## 2018-08-20 DIAGNOSIS — I51.9 LEFT VENTRICULAR SYSTOLIC DYSFUNCTION: ICD-10-CM

## 2018-08-20 DIAGNOSIS — I34.0 NON-RHEUMATIC MITRAL REGURGITATION: ICD-10-CM

## 2018-08-20 LAB
BH CV ECHO MEAS - AO MAX PG (FULL): 6.9 MMHG
BH CV ECHO MEAS - AO MAX PG: 9.2 MMHG
BH CV ECHO MEAS - AO MEAN PG (FULL): 4 MMHG
BH CV ECHO MEAS - AO MEAN PG: 5 MMHG
BH CV ECHO MEAS - AO ROOT AREA (BSA CORRECTED): 1.8
BH CV ECHO MEAS - AO ROOT AREA: 7.5 CM^2
BH CV ECHO MEAS - AO ROOT DIAM: 3.1 CM
BH CV ECHO MEAS - AO V2 MAX: 152 CM/SEC
BH CV ECHO MEAS - AO V2 MEAN: 99.6 CM/SEC
BH CV ECHO MEAS - AO V2 VTI: 29 CM
BH CV ECHO MEAS - AVA(I,A): 1.7 CM^2
BH CV ECHO MEAS - AVA(I,D): 1.7 CM^2
BH CV ECHO MEAS - AVA(V,A): 1.3 CM^2
BH CV ECHO MEAS - AVA(V,D): 1.3 CM^2
BH CV ECHO MEAS - BSA(HAYCOCK): 1.8 M^2
BH CV ECHO MEAS - BSA: 1.8 M^2
BH CV ECHO MEAS - BZI_BMI: 26.4 KILOGRAMS/M^2
BH CV ECHO MEAS - BZI_METRIC_HEIGHT: 162.6 CM
BH CV ECHO MEAS - BZI_METRIC_WEIGHT: 69.9 KG
BH CV ECHO MEAS - EDV(CUBED): 74.1 ML
BH CV ECHO MEAS - EDV(MOD-SP4): 94 ML
BH CV ECHO MEAS - EDV(TEICH): 78.6 ML
BH CV ECHO MEAS - EF(CUBED): 62.1 %
BH CV ECHO MEAS - EF(MOD-SP4): 52.2 %
BH CV ECHO MEAS - EF(TEICH): 54 %
BH CV ECHO MEAS - ESV(CUBED): 28.1 ML
BH CV ECHO MEAS - ESV(MOD-SP4): 44.9 ML
BH CV ECHO MEAS - ESV(TEICH): 36.2 ML
BH CV ECHO MEAS - FS: 27.6 %
BH CV ECHO MEAS - IVS/LVPW: 1.3
BH CV ECHO MEAS - IVSD: 1.3 CM
BH CV ECHO MEAS - LA DIMENSION: 3.8 CM
BH CV ECHO MEAS - LA/AO: 1.2
BH CV ECHO MEAS - LAT PEAK E' VEL: 3.5 CM/SEC
BH CV ECHO MEAS - LV DIASTOLIC VOL/BSA (35-75): 53.7 ML/M^2
BH CV ECHO MEAS - LV MASS(C)D: 161.4 GRAMS
BH CV ECHO MEAS - LV MASS(C)DI: 92.2 GRAMS/M^2
BH CV ECHO MEAS - LV MAX PG: 2.3 MMHG
BH CV ECHO MEAS - LV MEAN PG: 1 MMHG
BH CV ECHO MEAS - LV SYSTOLIC VOL/BSA (12-30): 25.7 ML/M^2
BH CV ECHO MEAS - LV V1 MAX: 75.8 CM/SEC
BH CV ECHO MEAS - LV V1 MEAN: 46.7 CM/SEC
BH CV ECHO MEAS - LV V1 VTI: 19.7 CM
BH CV ECHO MEAS - LVIDD: 4.2 CM
BH CV ECHO MEAS - LVIDS: 3 CM
BH CV ECHO MEAS - LVLD AP4: 7.1 CM
BH CV ECHO MEAS - LVLS AP4: 6.2 CM
BH CV ECHO MEAS - LVOT AREA (M): 2.5 CM^2
BH CV ECHO MEAS - LVOT AREA: 2.5 CM^2
BH CV ECHO MEAS - LVOT DIAM: 1.8 CM
BH CV ECHO MEAS - LVPWD: 0.99 CM
BH CV ECHO MEAS - MED PEAK E' VEL: 4.21 CM/SEC
BH CV ECHO MEAS - MR MAX PG: 135.5 MMHG
BH CV ECHO MEAS - MR MAX VEL: 582 CM/SEC
BH CV ECHO MEAS - MV A MAX VEL: 134 CM/SEC
BH CV ECHO MEAS - MV DEC TIME: 0.47 SEC
BH CV ECHO MEAS - MV E MAX VEL: 63.1 CM/SEC
BH CV ECHO MEAS - MV E/A: 0.47
BH CV ECHO MEAS - RAP SYSTOLE: 5 MMHG
BH CV ECHO MEAS - RVSP: 31.4 MMHG
BH CV ECHO MEAS - SI(AO): 125 ML/M^2
BH CV ECHO MEAS - SI(CUBED): 26.3 ML/M^2
BH CV ECHO MEAS - SI(LVOT): 28.6 ML/M^2
BH CV ECHO MEAS - SI(MOD-SP4): 28.1 ML/M^2
BH CV ECHO MEAS - SI(TEICH): 24.2 ML/M^2
BH CV ECHO MEAS - SV(AO): 218.9 ML
BH CV ECHO MEAS - SV(CUBED): 46 ML
BH CV ECHO MEAS - SV(LVOT): 50.1 ML
BH CV ECHO MEAS - SV(MOD-SP4): 49.1 ML
BH CV ECHO MEAS - SV(TEICH): 42.4 ML
BH CV ECHO MEAS - TR MAX VEL: 257 CM/SEC
BH CV ECHO MEASUREMENTS AVERAGE E/E' RATIO: 16.37
LEFT ATRIUM VOLUME INDEX: 27.2 ML/M2
LEFT ATRIUM VOLUME: 47.6 CM3
LV EF 2D ECHO EST: 55 %

## 2018-08-20 PROCEDURE — 93306 TTE W/DOPPLER COMPLETE: CPT | Performed by: INTERNAL MEDICINE

## 2018-08-20 PROCEDURE — 93306 TTE W/DOPPLER COMPLETE: CPT

## 2018-08-20 PROCEDURE — 25010000002 PERFLUTREN 6.52 MG/ML SUSPENSION: Performed by: NURSE PRACTITIONER

## 2018-08-20 RX ADMIN — PERFLUTREN: 6.52 INJECTION, SUSPENSION INTRAVENOUS at 08:51

## 2018-09-11 ENCOUNTER — OFFICE VISIT (OUTPATIENT)
Dept: CARDIOLOGY | Facility: CLINIC | Age: 83
End: 2018-09-11

## 2018-09-11 VITALS
DIASTOLIC BLOOD PRESSURE: 58 MMHG | BODY MASS INDEX: 26.53 KG/M2 | HEART RATE: 58 BPM | OXYGEN SATURATION: 99 % | HEIGHT: 64 IN | WEIGHT: 155.4 LBS | SYSTOLIC BLOOD PRESSURE: 124 MMHG

## 2018-09-11 DIAGNOSIS — E78.2 MIXED HYPERLIPIDEMIA: ICD-10-CM

## 2018-09-11 DIAGNOSIS — I10 ESSENTIAL HYPERTENSION: ICD-10-CM

## 2018-09-11 DIAGNOSIS — I51.9 LEFT VENTRICULAR DIASTOLIC DYSFUNCTION: ICD-10-CM

## 2018-09-11 DIAGNOSIS — I25.110 CORONARY ARTERY DISEASE INVOLVING NATIVE CORONARY ARTERY OF NATIVE HEART WITH UNSTABLE ANGINA PECTORIS (HCC): Primary | ICD-10-CM

## 2018-09-11 DIAGNOSIS — E66.3 OVERWEIGHT (BMI 25.0-29.9): ICD-10-CM

## 2018-09-11 DIAGNOSIS — I44.7 LEFT BUNDLE BRANCH BLOCK: ICD-10-CM

## 2018-09-11 DIAGNOSIS — Z95.5 HISTORY OF CORONARY ARTERY STENT PLACEMENT: ICD-10-CM

## 2018-09-11 DIAGNOSIS — I34.0 NON-RHEUMATIC MITRAL REGURGITATION: ICD-10-CM

## 2018-09-11 PROCEDURE — 99214 OFFICE O/P EST MOD 30 MIN: CPT | Performed by: NURSE PRACTITIONER

## 2018-09-11 RX ORDER — RANOLAZINE 500 MG/1
500 TABLET, EXTENDED RELEASE ORAL 2 TIMES DAILY
Qty: 60 TABLET | Refills: 0 | COMMUNITY
Start: 2018-09-11 | End: 2018-10-09 | Stop reason: SDUPTHER

## 2018-09-11 NOTE — PROGRESS NOTES
Subjective:     Encounter Date:09/11/2018      Patient ID: Wyatt Curry is a 88 y.o. male. He presents today for one month follow up of unstable angina. He has a history of coronary artery disease s/p multiple stents remotely with the most recent being to the left circumflex artery in 2006, hypertension and hyperlipidemia. He continues to complain of decreased stamina over the last six months, reporting that he could previously walk 3 miles on the treadmill and now can only walk for 30 minutes. He reports exertional bilateral arm numbness and tingling and bilateral shoulder tightness when on the treadmill. This is relieved with rest. He denies shortness of breath, palpitations, dizziness, syncope, orthopnea, PND or swelling. 2D echo 8/20/18 reviewed, revealing normal left ventricular systolic function with ejection fraction of 55%, grade 1 diastolic dysfunction, mild to moderate mitral valve regurgitation, mild concentric hypertrophy and apical hypokinesis. He was started on Imdur at last visit but was unable to tolerate due to weakness and hypotension.     Chief Complaint: Decreased stamina, bilateral arm numbness/tingling/weakness and bilateral shoulder tightness  Coronary Artery Disease   Presents for follow-up visit. Pertinent negatives include no chest pain, chest pressure, chest tightness, dizziness, leg swelling, muscle weakness, palpitations, shortness of breath or weight gain. Risk factors include hyperlipidemia. The symptoms have been stable. Compliance with diet is variable. Compliance with exercise is variable. Compliance with medications is good.   Hypertension   This is a chronic problem. The current episode started more than 1 year ago. The problem is controlled. Associated symptoms include malaise/fatigue. Pertinent negatives include no anxiety, blurred vision, chest pain, headaches, neck pain, orthopnea, palpitations, peripheral edema, PND, shortness of breath or sweats. Current  antihypertension treatment includes calcium channel blockers and angiotensin blockers. The current treatment provides significant improvement. Hypertensive end-organ damage includes CAD/MI.   Hyperlipidemia   This is a chronic problem. The current episode started more than 1 year ago. The problem is controlled. Pertinent negatives include no chest pain or shortness of breath. Risk factors for coronary artery disease include dyslipidemia, hypertension and male sex.       The following portions of the patient's history were reviewed and updated as appropriate: allergies, current medications, past family history, past medical history, past social history, past surgical history and problem list.     No Known Allergies    Current Outpatient Prescriptions:   •  acetaminophen (TYLENOL) 500 MG tablet, Take 500 mg by mouth Every 6 (Six) Hours As Needed for Mild Pain ., Disp: , Rfl:   •  amLODIPine (NORVASC) 10 MG tablet, Take 10 mg by mouth Daily., Disp: , Rfl: 3  •  atorvastatin (LIPITOR) 20 MG tablet, Take 20 mg by mouth Daily., Disp: , Rfl:   •  cholecalciferol (VITAMIN D3) 1000 units tablet, Take 1,000 Units by mouth Daily., Disp: , Rfl:   •  clopidogrel (PLAVIX) 75 MG tablet, Take 75 mg by mouth Daily., Disp: , Rfl:   •  irbesartan (AVAPRO) 300 MG tablet, Take 300 mg by mouth Every Night., Disp: , Rfl:   •  pantoprazole (PROTONIX) 40 MG EC tablet, Take 40 mg by mouth Daily., Disp: , Rfl:   •  aspirin 81 MG chewable tablet, Chew 81 mg Daily., Disp: , Rfl:   •  ranolazine (RANEXA) 500 MG 12 hr tablet, Take 1 tablet by mouth 2 (Two) Times a Day., Disp: 60 tablet, Rfl: 0  Past Medical History:   Diagnosis Date   • Bladder cancer (CMS/HCC)    • CAD (coronary artery disease)    • Cancer (CMS/HCC)     bladder cancer   • GERD (gastroesophageal reflux disease)    • Hyperlipidemia    • Hypertension    • Irregular heart beat      Past Surgical History:   Procedure Laterality Date   • BLADDER SURGERY     • CARDIAC CATHETERIZATION      • CORONARY ANGIOPLASTY WITH STENT PLACEMENT     • CYSTOSCOPY     • HERNIA REPAIR     • SHOULDER SURGERY     • TRANSURETHRAL RESECTION OF BLADDER TUMOR       Family History   Problem Relation Age of Onset   • Heart disease Father    • Heart attack Father    • Tuberculosis Mother    • Heart disease Brother    • Heart attack Brother    • Heart disease Brother    • Diabetes Sister    • Hypertension Sister    • Hyperlipidemia Sister    • Heart disease Sister    • Alcohol abuse Sister      Social History     Social History   • Marital status:      Spouse name: N/A   • Number of children: N/A   • Years of education: N/A     Occupational History   • Not on file.     Social History Main Topics   • Smoking status: Former Smoker     Types: Cigarettes     Start date: 1948     Quit date: 1975   • Smokeless tobacco: Never Used   • Alcohol use 0.6 oz/week     1 Glasses of wine per week      Comment: occ wine   • Drug use: No   • Sexual activity: Defer     Other Topics Concern   • Not on file     Social History Narrative   • No narrative on file         Review of Systems   Constitution: Positive for malaise/fatigue. Negative for chills, decreased appetite, fever, weakness, night sweats, weight gain and weight loss.   HENT: Negative for nosebleeds.    Eyes: Negative for blurred vision and visual disturbance.   Cardiovascular: Negative for chest pain, dyspnea on exertion, leg swelling, near-syncope, orthopnea, palpitations, paroxysmal nocturnal dyspnea and syncope.   Respiratory: Negative for chest tightness, cough, hemoptysis, shortness of breath, snoring and wheezing.    Endocrine: Negative for cold intolerance and heat intolerance.   Hematologic/Lymphatic: Does not bruise/bleed easily.   Skin: Negative for rash.   Musculoskeletal: Negative for back pain, falls, muscle weakness and neck pain.   Gastrointestinal: Negative for abdominal pain, change in bowel habit, constipation, diarrhea, dysphagia, heartburn, nausea and  "vomiting.   Genitourinary: Negative for hematuria.   Neurological: Negative for dizziness, headaches and light-headedness.   Psychiatric/Behavioral: Negative for altered mental status.   Allergic/Immunologic: Negative for persistent infections.       Procedures  /58   Pulse 58   Ht 162.6 cm (64\")   Wt 70.5 kg (155 lb 6.4 oz)   SpO2 99%   BMI 26.67 kg/m²        Objective:     Physical Exam   Constitutional: He is oriented to person, place, and time. Vital signs are normal. He appears well-developed and well-nourished. No distress.   HENT:   Head: Normocephalic and atraumatic.   Right Ear: External ear normal.   Left Ear: External ear normal.   Eyes: Pupils are equal, round, and reactive to light. Conjunctivae are normal. Right eye exhibits no discharge. Left eye exhibits no discharge.   Neck: Normal range of motion. Neck supple. No JVD present. Carotid bruit is not present. No thyromegaly present.   Cardiovascular: Normal rate, regular rhythm and intact distal pulses.  PMI is not displaced.  Exam reveals no gallop, no friction rub and no decreased pulses.    Murmur heard.  High-pitched blowing holosystolic murmur is present with a grade of 1/6  at the apex  Pulses:       Radial pulses are 2+ on the right side, and 2+ on the left side.        Dorsalis pedis pulses are 2+ on the right side, and 2+ on the left side.        Posterior tibial pulses are 2+ on the right side, and 2+ on the left side.   Pulmonary/Chest: Effort normal and breath sounds normal. No respiratory distress. He has no decreased breath sounds. He has no wheezes. He has no rhonchi. He has no rales. He exhibits no tenderness.   Abdominal: Soft. Bowel sounds are normal. He exhibits no distension. There is no tenderness.   Musculoskeletal: Normal range of motion. He exhibits no edema.   Neurological: He is alert and oriented to person, place, and time.   Skin: Skin is warm and dry. No rash noted. He is not diaphoretic. No erythema. No pallor. "   Psychiatric: He has a normal mood and affect. His behavior is normal. Judgment and thought content normal.   Vitals reviewed.        Assessment:          Diagnosis Plan   1. Coronary artery disease involving native coronary artery of native heart with unstable angina pectoris (CMS/[facility])  Stress Test With Myocardial Perfusion (1 Day)   2. History of coronary artery stent placement     3. Essential hypertension     4. Mixed hyperlipidemia     5. Left ventricular diastolic dysfunction     6. Left bundle branch block     7. Non-rheumatic mitral regurgitation     8. Overweight (BMI 25.0-29.9)            Plan:       1. Start Ranexa 500 mg by mouth twice daily. Continue other medications as previously prescribed.  2. Report any worsening symptoms.  3. Report any signs of bleeding.  4. Continue heart healthy diet and regular exercise as tolerated.   5. Follow up with PCP for blood pressure and cholesterol management and routine lab work.  6. Follow up with mid-level provider in one month, or sooner if needed. Consider increasing Ranexa if tolerated. Will need prescription of maintenance dose sent to pharmacy.   7. Lexiscan.

## 2018-09-11 NOTE — PATIENT INSTRUCTIONS
BMI for Adults  Body mass index (BMI) is a number that is calculated from a person's weight and height. In most adults, the number is used to find how much of an adult's weight is made up of fat. BMI is not as accurate as a direct measure of body fat.  How is BMI calculated?  BMI is calculated by dividing weight in kilograms by height in meters squared. It can also be calculated by dividing weight in pounds by height in inches squared, then multiplying the resulting number by 703. Charts are available to help you find your BMI quickly and easily without doing this calculation.  How is BMI interpreted?  Health care professionals use BMI charts to identify whether an adult is underweight, at a normal weight, or overweight based on the following guidelines:  · Underweight: BMI less than 18.5.  · Normal weight: BMI between 18.5 and 24.9.  · Overweight: BMI between 25 and 29.9.  · Obese: BMI of 30 and above.    BMI is usually interpreted the same for males and females.  Weight includes both fat and muscle, so someone with a muscular build, such as an athlete, may have a BMI that is higher than 24.9. In cases like these, BMI may not accurately depict body fat. To determine if excess body fat is the cause of a BMI of 25 or higher, further assessments may need to be done by a health care provider.  Why is BMI a useful tool?  BMI is used to identify a possible weight problem that may be related to a medical problem or may increase the risk for medical problems. BMI can also be used to promote changes to reach a healthy weight.  This information is not intended to replace advice given to you by your health care provider. Make sure you discuss any questions you have with your health care provider.  Document Released: 08/29/2005 Document Revised: 04/27/2017 Document Reviewed: 05/15/2015  [employer] Interactive Patient Education © 2018 [employer]    Heart-Healthy Eating Plan  Many factors influence your heart health, including  "eating and exercise habits. Heart (coronary) risk increases with abnormal blood fat (lipid) levels. Heart-healthy meal planning includes limiting unhealthy fats, increasing healthy fats, and making other small dietary changes. This includes maintaining a healthy body weight to help keep lipid levels within a normal range.  What is my plan?  Your health care provider recommends that you:  · Get no more than _________% of the total calories in your daily diet from fat.  · Limit your intake of saturated fat to less than _________% of your total calories each day.  · Limit the amount of cholesterol in your diet to less than _________ mg per day.    What types of fat should I choose?  · Choose healthy fats more often. Choose monounsaturated and polyunsaturated fats, such as olive oil and canola oil, flaxseeds, walnuts, almonds, and seeds.  · Eat more omega-3 fats. Good choices include salmon, mackerel, sardines, tuna, flaxseed oil, and ground flaxseeds. Aim to eat fish at least two times each week.  · Limit saturated fats. Saturated fats are primarily found in animal products, such as meats, butter, and cream. Plant sources of saturated fats include palm oil, palm kernel oil, and coconut oil.  · Avoid foods with partially hydrogenated oils in them. These contain trans fats. Examples of foods that contain trans fats are stick margarine, some tub margarines, cookies, crackers, and other baked goods.  What general guidelines do I need to follow?  · Check food labels carefully to identify foods with trans fats or high amounts of saturated fat.  · Fill one half of your plate with vegetables and green salads. Eat 4-5 servings of vegetables per day. A serving of vegetables equals 1 cup of raw leafy vegetables, ½ cup of raw or cooked cut-up vegetables, or ½ cup of vegetable juice.  · Fill one fourth of your plate with whole grains. Look for the word \"whole\" as the first word in the ingredient list.  · Fill one fourth of your " plate with lean protein foods.  · Eat 4-5 servings of fruit per day. A serving of fruit equals one medium whole fruit, ¼ cup of dried fruit, ½ cup of fresh, frozen, or canned fruit, or ½ cup of 100% fruit juice.  · Eat more foods that contain soluble fiber. Examples of foods that contain this type of fiber are apples, broccoli, carrots, beans, peas, and barley. Aim to get 20-30 g of fiber per day.  · Eat more home-cooked food and less restaurant, buffet, and fast food.  · Limit or avoid alcohol.  · Limit foods that are high in starch and sugar.  · Avoid fried foods.  · Cook foods by using methods other than frying. Baking, boiling, grilling, and broiling are all great options. Other fat-reducing suggestions include:  ? Removing the skin from poultry.  ? Removing all visible fats from meats.  ? Skimming the fat off of stews, soups, and gravies before serving them.  ? Steaming vegetables in water or broth.  · Lose weight if you are overweight. Losing just 5-10% of your initial body weight can help your overall health and prevent diseases such as diabetes and heart disease.  · Increase your consumption of nuts, legumes, and seeds to 4-5 servings per week. One serving of dried beans or legumes equals ½ cup after being cooked, one serving of nuts equals 1½ ounces, and one serving of seeds equals ½ ounce or 1 tablespoon.  · You may need to monitor your salt (sodium) intake, especially if you have high blood pressure. Talk with your health care provider or dietitian to get more information about reducing sodium.  What foods can I eat?  Grains    Breads, including Ukrainian, white, bismark, wheat, raisin, rye, oatmeal, and Italian. Tortillas that are neither fried nor made with lard or trans fat. Low-fat rolls, including hotdog and hamburger buns and English muffins. Biscuits. Muffins. Waffles. Pancakes. Light popcorn. Whole-grain cereals. Flatbread. [place] toast. Pretzels. Breadsticks. Rusks. Low-fat snacks and crackers,  including oyster, saltine, matzo, tristin, animal, and rye. Rice and pasta, including brown rice and those that are made with whole wheat.  Vegetables  All vegetables.  Fruits  All fruits, but limit coconut.  Meats and Other Protein Sources  Lean, well-trimmed beef, veal, pork, and lamb. Chicken and turkey without skin. All fish and shellfish. Wild duck, rabbit, pheasant, and venison. Egg whites or low-cholesterol egg substitutes. Dried beans, peas, lentils, and tofu. Seeds and most nuts.  Dairy  Low-fat or nonfat cheeses, including ricotta, string, and mozzarella. Skim or 1% milk that is liquid, powdered, or evaporated. Buttermilk that is made with low-fat milk. Nonfat or low-fat yogurt.  Beverages  Mineral water. Diet carbonated beverages.  Sweets and Desserts  Sherbets and fruit ices. Honey, jam, marmalade, jelly, and syrups. Meringues and gelatins. Pure sugar candy, such as hard candy, jelly beans, gumdrops, mints, marshmallows, and small amounts of dark chocolate. Eddie food cake.  Eat all sweets and desserts in moderation.  Fats and Oils  Nonhydrogenated (trans-free) margarines. Vegetable oils, including soybean, sesame, sunflower, olive, peanut, safflower, corn, canola, and cottonseed. Salad dressings or mayonnaise that are made with a vegetable oil. Limit added fats and oils that you use for cooking, baking, salads, and as spreads.  Other  Cocoa powder. Coffee and tea. All seasonings and condiments.  The items listed above may not be a complete list of recommended foods or beverages. Contact your dietitian for more options.  What foods are not recommended?  Grains  Breads that are made with saturated or trans fats, oils, or whole milk. Croissants. Butter rolls. Cheese breads. Sweet rolls. Donuts. Buttered popcorn. Chow mein noodles. High-fat crackers, such as cheese or butter crackers.  Meats and Other Protein Sources  Fatty meats, such as hotdogs, short ribs, sausage, spareribs, lopez, ribeye roast or steak,  and mutton. High-fat deli meats, such as salami and bologna. Caviar. Domestic duck and goose. Organ meats, such as kidney, liver, sweetbreads, brains, gizzard, chitterlings, and heart.  Dairy  Cream, sour cream, cream cheese, and creamed cottage cheese. Whole milk cheeses, including blue (leif), Bernard Dave, Brie, Tor, American, Havarti, Swiss, cheddar, Camembert, and [place]. Whole or 2% milk that is liquid, evaporated, or condensed. Whole buttermilk. Cream sauce or high-fat cheese sauce. Yogurt that is made from whole milk.  Beverages  Regular sodas and drinks with added sugar.  Sweets and Desserts  Frosting. Pudding. Cookies. Cakes other than jhoan food cake. Candy that has milk chocolate or white chocolate, hydrogenated fat, butter, coconut, or unknown ingredients. Buttered syrups. Full-fat ice cream or ice cream drinks.  Fats and Oils  Gravy that has suet, meat fat, or shortening. Cocoa butter, hydrogenated oils, palm oil, coconut oil, palm kernel oil. These can often be found in baked products, candy, fried foods, nondairy creamers, and whipped toppings. Solid fats and shortenings, including lopez fat, salt pork, lard, and butter. Nondairy cream substitutes, such as coffee creamers and sour cream substitutes. Salad dressings that are made of unknown oils, cheese, or sour cream.  The items listed above may not be a complete list of foods and beverages to avoid. Contact your dietitian for more information.  This information is not intended to replace advice given to you by your health care provider. Make sure you discuss any questions you have with your health care provider.  Document Released: 09/26/2009 Document Revised: 07/07/2017 Document Reviewed: 06/11/2015  [employer] Interactive Patient Education © 2018 [employer]    Exercising to Lose Weight  Exercising can help you to lose weight. In order to lose weight through exercise, you need to do vigorous-intensity exercise. You can tell that you are  exercising with vigorous intensity if you are breathing very hard and fast and cannot hold a conversation while exercising.  Moderate-intensity exercise helps to maintain your current weight. You can tell that you are exercising at a moderate level if you have a higher heart rate and faster breathing, but you are still able to hold a conversation.  How often should I exercise?  Choose an activity that you enjoy and set realistic goals. Your health care provider can help you to make an activity plan that works for you. Exercise regularly as directed by your health care provider. This may include:  · Doing resistance training twice each week, such as:  ? Push-ups.  ? Sit-ups.  ? Lifting weights.  ? Using resistance bands.  · Doing a given intensity of exercise for a given amount of time. Choose from these options:  ? 150 minutes of moderate-intensity exercise every week.  ? 75 minutes of vigorous-intensity exercise every week.  ? A mix of moderate-intensity and vigorous-intensity exercise every week.    Children, pregnant women, people who are out of shape, people who are overweight, and older adults may need to consult a health care provider for individual recommendations. If you have any sort of medical condition, be sure to consult your health care provider before starting a new exercise program.  What are some activities that can help me to lose weight?  · Walking at a rate of at least 4.5 miles an hour.  · Jogging or running at a rate of 5 miles per hour.  · Biking at a rate of at least 10 miles per hour.  · Lap swimming.  · Roller-skating or in-line skating.  · Cross-country skiing.  · Vigorous competitive sports, such as football, basketball, and soccer.  · Jumping rope.  · Aerobic dancing.  How can I be more active in my day-to-day activities?  · Use the stairs instead of the elevator.  · Take a walk during your lunch break.  · If you drive, park your car farther away from work or school.  · If you take public  transportation, get off one stop early and walk the rest of the way.  · Make all of your phone calls while standing up and walking around.  · Get up, stretch, and walk around every 30 minutes throughout the day.  What guidelines should I follow while exercising?  · Do not exercise so much that you hurt yourself, feel dizzy, or get very short of breath.  · Consult your health care provider prior to starting a new exercise program.  · Wear comfortable clothes and shoes with good support.  · Drink plenty of water while you exercise to prevent dehydration or heat stroke. Body water is lost during exercise and must be replaced.  · Work out until you breathe faster and your heart beats faster.  This information is not intended to replace advice given to you by your health care provider. Make sure you discuss any questions you have with your health care provider.  Document Released: 01/20/2012 Document Revised: 05/25/2017 Document Reviewed: 05/21/2015  [employer] Interactive Patient Education © 2018 [employer]

## 2018-09-13 ENCOUNTER — HOSPITAL ENCOUNTER (OUTPATIENT)
Dept: CARDIOLOGY | Facility: HOSPITAL | Age: 83
Discharge: HOME | End: 2018-09-13

## 2018-09-13 VITALS
HEART RATE: 60 BPM | SYSTOLIC BLOOD PRESSURE: 153 MMHG | DIASTOLIC BLOOD PRESSURE: 60 MMHG | WEIGHT: 155.42 LBS | HEIGHT: 64 IN | BODY MASS INDEX: 26.53 KG/M2

## 2018-09-13 DIAGNOSIS — I25.110 CORONARY ARTERY DISEASE INVOLVING NATIVE CORONARY ARTERY OF NATIVE HEART WITH UNSTABLE ANGINA PECTORIS (HCC): ICD-10-CM

## 2018-09-13 LAB
BH CV STRESS BP STAGE 1: NORMAL
BH CV STRESS COMMENTS STAGE 1: NORMAL
BH CV STRESS DOSE REGADENOSON STAGE 1: 0.4
BH CV STRESS DURATION MIN STAGE 1: 0
BH CV STRESS DURATION SEC STAGE 1: 10
BH CV STRESS HR STAGE 1: 72
BH CV STRESS PROTOCOL 1: NORMAL
BH CV STRESS RECOVERY BP: NORMAL MMHG
BH CV STRESS RECOVERY HR: 66 BPM
BH CV STRESS STAGE 1: 1
LV EF NUC BP: 42 %
MAXIMAL PREDICTED HEART RATE: 132 BPM
PERCENT MAX PREDICTED HR: 54.55 %
STRESS BASELINE BP: NORMAL MMHG
STRESS BASELINE HR: 60 BPM
STRESS PERCENT HR: 64 %
STRESS POST EXERCISE DUR SEC: 10 SEC
STRESS POST PEAK BP: NORMAL MMHG
STRESS POST PEAK HR: 72 BPM
STRESS TARGET HR: 112 BPM

## 2018-09-13 PROCEDURE — 78452 HT MUSCLE IMAGE SPECT MULT: CPT

## 2018-09-13 PROCEDURE — 0 TECHNETIUM SESTAMIBI: Performed by: NURSE PRACTITIONER

## 2018-09-13 PROCEDURE — 93018 CV STRESS TEST I&R ONLY: CPT | Performed by: INTERNAL MEDICINE

## 2018-09-13 PROCEDURE — 93017 CV STRESS TEST TRACING ONLY: CPT

## 2018-09-13 PROCEDURE — A9500 TC99M SESTAMIBI: HCPCS | Performed by: NURSE PRACTITIONER

## 2018-09-13 PROCEDURE — 25010000002 REGADENOSON 0.4 MG/5ML SOLUTION: Performed by: INTERNAL MEDICINE

## 2018-09-13 PROCEDURE — 78452 HT MUSCLE IMAGE SPECT MULT: CPT | Performed by: INTERNAL MEDICINE

## 2018-09-13 RX ADMIN — TECHNETIUM TC 99M SESTAMIBI 1 DOSE: 1 INJECTION INTRAVENOUS at 07:10

## 2018-09-13 RX ADMIN — TECHNETIUM TC 99M SESTAMIBI 1 DOSE: 1 INJECTION INTRAVENOUS at 08:20

## 2018-09-13 RX ADMIN — REGADENOSON 0.4 MG: 0.08 INJECTION, SOLUTION INTRAVENOUS at 08:17

## 2018-10-09 ENCOUNTER — OFFICE VISIT (OUTPATIENT)
Dept: CARDIOLOGY | Facility: CLINIC | Age: 83
End: 2018-10-09

## 2018-10-09 VITALS
SYSTOLIC BLOOD PRESSURE: 124 MMHG | DIASTOLIC BLOOD PRESSURE: 56 MMHG | WEIGHT: 153 LBS | HEART RATE: 60 BPM | OXYGEN SATURATION: 98 % | HEIGHT: 64 IN | BODY MASS INDEX: 26.12 KG/M2

## 2018-10-09 DIAGNOSIS — I10 ESSENTIAL HYPERTENSION: ICD-10-CM

## 2018-10-09 DIAGNOSIS — I34.0 NON-RHEUMATIC MITRAL REGURGITATION: ICD-10-CM

## 2018-10-09 DIAGNOSIS — I44.7 LEFT BUNDLE BRANCH BLOCK: ICD-10-CM

## 2018-10-09 DIAGNOSIS — I25.10 CORONARY ARTERY DISEASE INVOLVING NATIVE CORONARY ARTERY OF NATIVE HEART WITHOUT ANGINA PECTORIS: Primary | ICD-10-CM

## 2018-10-09 DIAGNOSIS — E78.2 MIXED HYPERLIPIDEMIA: ICD-10-CM

## 2018-10-09 PROCEDURE — 99214 OFFICE O/P EST MOD 30 MIN: CPT | Performed by: NURSE PRACTITIONER

## 2018-10-09 RX ORDER — RANOLAZINE 1000 MG/1
1000 TABLET, EXTENDED RELEASE ORAL 2 TIMES DAILY
Qty: 90 TABLET | Refills: 3 | COMMUNITY
Start: 2018-10-09 | End: 2018-11-26

## 2018-10-09 NOTE — PATIENT INSTRUCTIONS
"DASH Eating Plan  DASH stands for \"Dietary Approaches to Stop Hypertension.\" The DASH eating plan is a healthy eating plan that has been shown to reduce high blood pressure (hypertension). It may also reduce your risk for type 2 diabetes, heart disease, and stroke. The DASH eating plan may also help with weight loss.  What are tips for following this plan?  General guidelines  · Avoid eating more than 2,300 mg (milligrams) of salt (sodium) a day. If you have hypertension, you may need to reduce your sodium intake to 1,500 mg a day.  · Limit alcohol intake to no more than 1 drink a day for nonpregnant women and 2 drinks a day for men. One drink equals 12 oz of beer, 5 oz of wine, or 1½ oz of hard liquor.  · Work with your health care provider to maintain a healthy body weight or to lose weight. Ask what an ideal weight is for you.  · Get at least 30 minutes of exercise that causes your heart to beat faster (aerobic exercise) most days of the week. Activities may include walking, swimming, or biking.  · Work with your health care provider or diet and nutrition specialist (dietitian) to adjust your eating plan to your individual calorie needs.  Reading food labels  · Check food labels for the amount of sodium per serving. Choose foods with less than 5 percent of the Daily Value of sodium. Generally, foods with less than 300 mg of sodium per serving fit into this eating plan.  · To find whole grains, look for the word \"whole\" as the first word in the ingredient list.  Shopping  · Buy products labeled as \"low-sodium\" or \"no salt added.\"  · Buy fresh foods. Avoid canned foods and premade or frozen meals.  Cooking  · Avoid adding salt when cooking. Use salt-free seasonings or herbs instead of table salt or sea salt. Check with your health care provider or pharmacist before using salt substitutes.  · Do not bradley foods. Cook foods using healthy methods such as baking, boiling, grilling, and broiling instead.  · Cook with " heart-healthy oils, such as olive, canola, soybean, or sunflower oil.  Meal planning    · Eat a balanced diet that includes:  ? 5 or more servings of fruits and vegetables each day. At each meal, try to fill half of your plate with fruits and vegetables.  ? Up to 6-8 servings of whole grains each day.  ? Less than 6 oz of lean meat, poultry, or fish each day. A 3-oz serving of meat is about the same size as a deck of cards. One egg equals 1 oz.  ? 2 servings of low-fat dairy each day.  ? A serving of nuts, seeds, or beans 5 times each week.  ? Heart-healthy fats. Healthy fats called Omega-3 fatty acids are found in foods such as flaxseeds and coldwater fish, like sardines, salmon, and mackerel.  · Limit how much you eat of the following:  ? Canned or prepackaged foods.  ? Food that is high in trans fat, such as fried foods.  ? Food that is high in saturated fat, such as fatty meat.  ? Sweets, desserts, sugary drinks, and other foods with added sugar.  ? Full-fat dairy products.  · Do not salt foods before eating.  · Try to eat at least 2 vegetarian meals each week.  · Eat more home-cooked food and less restaurant, buffet, and fast food.  · When eating at a restaurant, ask that your food be prepared with less salt or no salt, if possible.  What foods are recommended?  The items listed may not be a complete list. Talk with your dietitian about what dietary choices are best for you.  Grains  Whole-grain or whole-wheat bread. Whole-grain or whole-wheat pasta. Brown rice. Oatmeal. Quinoa. Bulgur. Whole-grain and low-sodium cereals. Geno bread. Low-fat, low-sodium crackers. Whole-wheat flour tortillas.  Vegetables  Fresh or frozen vegetables (raw, steamed, roasted, or grilled). Low-sodium or reduced-sodium tomato and vegetable juice. Low-sodium or reduced-sodium tomato sauce and tomato paste. Low-sodium or reduced-sodium canned vegetables.  Fruits  All fresh, dried, or frozen fruit. Canned fruit in natural juice (without  added sugar).  Meat and other protein foods  Skinless chicken or turkey. Ground chicken or turkey. Pork with fat trimmed off. Fish and seafood. Egg whites. Dried beans, peas, or lentils. Unsalted nuts, nut butters, and seeds. Unsalted canned beans. Lean cuts of beef with fat trimmed off. Low-sodium, lean deli meat.  Dairy  Low-fat (1%) or fat-free (skim) milk. Fat-free, low-fat, or reduced-fat cheeses. Nonfat, low-sodium ricotta or cottage cheese. Low-fat or nonfat yogurt. Low-fat, low-sodium cheese.  Fats and oils  Soft margarine without trans fats. Vegetable oil. Low-fat, reduced-fat, or light mayonnaise and salad dressings (reduced-sodium). Canola, safflower, olive, soybean, and sunflower oils. Avocado.  Seasoning and other foods  Herbs. Spices. Seasoning mixes without salt. Unsalted popcorn and pretzels. Fat-free sweets.  What foods are not recommended?  The items listed may not be a complete list. Talk with your dietitian about what dietary choices are best for you.  Grains  Baked goods made with fat, such as croissants, muffins, or some breads. Dry pasta or rice meal packs.  Vegetables  Creamed or fried vegetables. Vegetables in a cheese sauce. Regular canned vegetables (not low-sodium or reduced-sodium). Regular canned tomato sauce and paste (not low-sodium or reduced-sodium). Regular tomato and vegetable juice (not low-sodium or reduced-sodium). Pickles. Olives.  Fruits  Canned fruit in a light or heavy syrup. Fried fruit. Fruit in cream or butter sauce.  Meat and other protein foods  Fatty cuts of meat. Ribs. Fried meat. Kelly. Sausage. Bologna and other processed lunch meats. Salami. Fatback. Hotdogs. Bratwurst. Salted nuts and seeds. Canned beans with added salt. Canned or smoked fish. Whole eggs or egg yolks. Chicken or turkey with skin.  Dairy  Whole or 2% milk, cream, and half-and-half. Whole or full-fat cream cheese. Whole-fat or sweetened yogurt. Full-fat cheese. Nondairy creamers. Whipped toppings.  Processed cheese and cheese spreads.  Fats and oils  Butter. Stick margarine. Lard. Shortening. Ghee. Kelly fat. Tropical oils, such as coconut, palm kernel, or palm oil.  Seasoning and other foods  Salted popcorn and pretzels. Onion salt, garlic salt, seasoned salt, table salt, and sea salt. Worcestershire sauce. Tartar sauce. Barbecue sauce. Teriyaki sauce. Soy sauce, including reduced-sodium. Steak sauce. Canned and packaged gravies. Fish sauce. Oyster sauce. Cocktail sauce. Horseradish that you find on the shelf. Ketchup. Mustard. Meat flavorings and tenderizers. Bouillon cubes. Hot sauce and Tabasco sauce. Premade or packaged marinades. Premade or packaged taco seasonings. Relishes. Regular salad dressings.  Where to find more information:  · National Heart, Lung, and [employer] [place]: www.nhlbi.nih.gov  · American Heart Association: www.heart.org  Summary  · The DASH eating plan is a healthy eating plan that has been shown to reduce high blood pressure (hypertension). It may also reduce your risk for type 2 diabetes, heart disease, and stroke.  · With the DASH eating plan, you should limit salt (sodium) intake to 2,300 mg a day. If you have hypertension, you may need to reduce your sodium intake to 1,500 mg a day.  · When on the DASH eating plan, aim to eat more fresh fruits and vegetables, whole grains, lean proteins, low-fat dairy, and heart-healthy fats.  · Work with your health care provider or diet and nutrition specialist (dietitian) to adjust your eating plan to your individual calorie needs.  This information is not intended to replace advice given to you by your health care provider. Make sure you discuss any questions you have with your health care provider.  Document Released: 12/06/2012 Document Revised: 12/11/2017 Document Reviewed: 12/11/2017  [employer] Interactive Patient Education © 2018 [employer]

## 2018-10-09 NOTE — PROGRESS NOTES
"    Subjective:     Encounter Date:10/09/2018      Patient ID: Wyatt Curry is a 88 y.o. male.    Chief Complaint:  The patient reports he is feeling well overall. He states he had one episode 4 days ago while he was working outside with his arms above his head; he states he became weak, diaphoretic, and short of breath. The episode lasted for about 1 hour and resolved with rest. He states this is not similar to his previous angina. He had not had any episodes prior to or since that time. He denies chest pain, edema, palpitations, orthopnea, PND, syncope or pre syncope. Stamina is good. He remains active. He states he walks 2-3 miles or 30-60 minutes at least 3 times per week without symptoms. He is compliant with his medications and doing well since starting Ranexa last month. He reports he had intolerance to imdur- low blood pressure and headaches.         The following portions of the patient's history were reviewed and updated as appropriate: allergies, current medications, past family history, past medical history, past social history, past surgical history and problem list.  /56   Pulse 60   Ht 162.6 cm (64\")   Wt 69.4 kg (153 lb)   SpO2 98%   BMI 26.26 kg/m²   No Known Allergies    Current Outpatient Prescriptions:   •  acetaminophen (TYLENOL) 500 MG tablet, Take 500 mg by mouth Every 6 (Six) Hours As Needed for Mild Pain ., Disp: , Rfl:   •  amLODIPine (NORVASC) 10 MG tablet, Take 10 mg by mouth Daily., Disp: , Rfl: 3  •  aspirin 81 MG chewable tablet, Chew 81 mg Daily., Disp: , Rfl:   •  atorvastatin (LIPITOR) 20 MG tablet, Take 20 mg by mouth Daily., Disp: , Rfl:   •  cholecalciferol (VITAMIN D3) 1000 units tablet, Take 1,000 Units by mouth Daily., Disp: , Rfl:   •  clopidogrel (PLAVIX) 75 MG tablet, Take 75 mg by mouth Daily., Disp: , Rfl:   •  irbesartan (AVAPRO) 300 MG tablet, Take 300 mg by mouth Every Night., Disp: , Rfl:   •  pantoprazole (PROTONIX) 40 MG EC tablet, Take 40 mg by mouth " Daily., Disp: , Rfl:   •  ranolazine (RANEXA) 1000 MG 12 hr tablet, Take 1 tablet by mouth 2 (Two) Times a Day., Disp: 90 tablet, Rfl: 3  Past Medical History:   Diagnosis Date   • Bladder cancer (CMS/HCC)    • CAD (coronary artery disease)    • Cancer (CMS/HCC)     bladder cancer   • GERD (gastroesophageal reflux disease)    • Hyperlipidemia    • Hypertension    • Irregular heart beat      Past Surgical History:   Procedure Laterality Date   • BLADDER SURGERY     • CARDIAC CATHETERIZATION     • CORONARY ANGIOPLASTY WITH STENT PLACEMENT     • CYSTOSCOPY     • HERNIA REPAIR     • SHOULDER SURGERY     • TRANSURETHRAL RESECTION OF BLADDER TUMOR       Social History     Social History   • Marital status:      Spouse name: N/A   • Number of children: N/A   • Years of education: N/A     Occupational History   • Not on file.     Social History Main Topics   • Smoking status: Former Smoker     Types: Cigarettes     Start date: 1948     Quit date: 1975   • Smokeless tobacco: Never Used   • Alcohol use 0.6 oz/week     1 Glasses of wine per week      Comment: occ wine   • Drug use: No   • Sexual activity: Defer     Other Topics Concern   • Not on file     Social History Narrative   • No narrative on file     Family History   Problem Relation Age of Onset   • Heart disease Father    • Heart attack Father    • Tuberculosis Mother    • Heart disease Brother    • Heart attack Brother    • Heart disease Brother    • Diabetes Sister    • Hypertension Sister    • Hyperlipidemia Sister    • Heart disease Sister    • Alcohol abuse Sister        Review of Systems   Constitution: Negative for chills, diaphoresis, fever and weakness.   HENT: Negative for nosebleeds.    Eyes: Negative for visual disturbance.   Cardiovascular: Negative for chest pain, claudication, cyanosis, dyspnea on exertion, irregular heartbeat, leg swelling, near-syncope, orthopnea, palpitations, paroxysmal nocturnal dyspnea and syncope.        See notes in HPI  regarding episode 4 days ago   Respiratory: Negative for cough, hemoptysis, shortness of breath, sputum production and wheezing.    Hematologic/Lymphatic: Negative for bleeding problem.   Skin: Negative for color change and flushing.   Musculoskeletal: Negative for falls.   Gastrointestinal: Negative for bloating, abdominal pain, hematemesis, hematochezia, melena, nausea and vomiting.   Genitourinary: Negative for hematuria.   Neurological: Negative for dizziness and light-headedness.   Psychiatric/Behavioral: Negative for altered mental status.       Procedures       Objective:     Physical Exam   Constitutional: He is oriented to person, place, and time. He appears well-developed and well-nourished. No distress.   HENT:   Head: Normocephalic and atraumatic.   Eyes: Pupils are equal, round, and reactive to light.   Neck: Normal range of motion. Neck supple. No JVD present. No thyromegaly present.   Cardiovascular: Normal rate, regular rhythm, normal heart sounds and intact distal pulses.  Exam reveals no gallop and no friction rub.    No murmur heard.  Pulmonary/Chest: Effort normal and breath sounds normal. No respiratory distress. He has no wheezes. He has no rales. He exhibits no tenderness.   Abdominal: Soft. Bowel sounds are normal. He exhibits no distension. There is no tenderness.   Musculoskeletal: Normal range of motion. He exhibits no edema.   Neurological: He is alert and oriented to person, place, and time. No cranial nerve deficit.   Skin: Skin is warm and dry. He is not diaphoretic.   Psychiatric: He has a normal mood and affect. His behavior is normal.       Lab Review:       Assessment:          Diagnosis Plan   1. Coronary artery disease involving native coronary artery of native heart without angina pectoris    Stable. He is doing well on Ranexa- up titrate to 1000 mg BID. Intolerant to imdur  9/2018 nuclear stress negative for ischemia  8/2018 echo- LVEF 55%, mild LVH, apical hypokinesis, mild to  moderate MR   Most recent cath 2008- non obstructive CAD and NL LVEF. Hx of PCI approx. 15 yr ago.   Stamina is good      2. Left bundle branch block    Chronic    3. Essential hypertension    Controlled    4. Mixed hyperlipidemia    Controlled/LDL 58 per lipid panel 1 year ago, obtain more recent copy of lipid panel     5. Non-rheumatic mitral regurgitation  Mild to moderate per 8/2018 echo      Patient's Body mass index is 26.26 kg/m². BMI is above normal parameters. Recommendations include: exercise counseling and nutrition counseling.     Plan:       As noted above  Increase Ranexa to 1000 mg BID  Continue ASA, plavix, atorvastatin, avapro, norvasc, PRN SL NTG   Keep appt in 6 weeks to assess response to increased dose of Ranexa

## 2018-11-12 ENCOUNTER — PROCEDURE VISIT (OUTPATIENT)
Dept: UROLOGY | Facility: CLINIC | Age: 83
End: 2018-11-12

## 2018-11-12 DIAGNOSIS — C67.9 MALIGNANT NEOPLASM OF URINARY BLADDER, UNSPECIFIED SITE (HCC): Primary | ICD-10-CM

## 2018-11-12 DIAGNOSIS — N40.1 BPH WITH URINARY OBSTRUCTION: ICD-10-CM

## 2018-11-12 DIAGNOSIS — N13.8 BPH WITH URINARY OBSTRUCTION: ICD-10-CM

## 2018-11-12 LAB
BILIRUB BLD-MCNC: NEGATIVE MG/DL
CLARITY, POC: CLEAR
COLOR UR: YELLOW
GLUCOSE UR STRIP-MCNC: NEGATIVE MG/DL
KETONES UR QL: NEGATIVE
LEUKOCYTE EST, POC: NEGATIVE
NITRITE UR-MCNC: NEGATIVE MG/ML
PH UR: 7 [PH] (ref 5–8)
PROT UR STRIP-MCNC: NEGATIVE MG/DL
RBC # UR STRIP: NEGATIVE /UL
SP GR UR: 1.01 (ref 1–1.03)
UROBILINOGEN UR QL: NORMAL

## 2018-11-12 PROCEDURE — 52000 CYSTOURETHROSCOPY: CPT | Performed by: UROLOGY

## 2018-11-12 PROCEDURE — 81003 URINALYSIS AUTO W/O SCOPE: CPT | Performed by: UROLOGY

## 2018-11-12 PROCEDURE — 88112 CYTOPATH CELL ENHANCE TECH: CPT | Performed by: UROLOGY

## 2018-11-12 NOTE — PROGRESS NOTES
Pre- operative diagnosis:  Bladder cancer surveillance    Post operative diagnosis:  No recurrent bladder tumor    Procedure:  The patient was prepped and draped in a normal sterile fashion.  The urethra was anesthetized with 2% lidocaine jelly.  A flexible cystoscope was introduced per urethra.      Urethra:  Normal    Bladder:  heavy trabeculation    Ureteral orifices:  Normal position bilaterally and Clear efflux bilaterally    Prostate:  lateral lobe hypertrophy    Patient tolerated the procedure well    Complications: none    Blood loss: minimal    Follow up:    Routine follow up      Patient has a history of bladder cancer initially diagnosed back in 1978 by Dr. Hunter.  Most recently had a recurrence in 2007 and with induction BCG for carcinoma in situ..  He been without evidence of disease since then.  Surveillance cystoscopy negative today.  Send urine for cytology follow-up in 1 year with repeat cystoscopy as well as renal ultrasound.

## 2018-11-18 LAB
CYTO UR: NORMAL
LAB AP CASE REPORT: NORMAL
PATH REPORT.FINAL DX SPEC: NORMAL
PATH REPORT.GROSS SPEC: NORMAL

## 2018-11-26 ENCOUNTER — OFFICE VISIT (OUTPATIENT)
Dept: CARDIOLOGY | Facility: CLINIC | Age: 83
End: 2018-11-26

## 2018-11-26 VITALS
WEIGHT: 156 LBS | SYSTOLIC BLOOD PRESSURE: 140 MMHG | DIASTOLIC BLOOD PRESSURE: 68 MMHG | BODY MASS INDEX: 26.63 KG/M2 | OXYGEN SATURATION: 98 % | HEART RATE: 64 BPM | HEIGHT: 64 IN

## 2018-11-26 DIAGNOSIS — I25.10 CORONARY ARTERY DISEASE INVOLVING NATIVE CORONARY ARTERY OF NATIVE HEART WITHOUT ANGINA PECTORIS: Primary | ICD-10-CM

## 2018-11-26 DIAGNOSIS — E78.2 MIXED HYPERLIPIDEMIA: ICD-10-CM

## 2018-11-26 DIAGNOSIS — I44.7 LEFT BUNDLE BRANCH BLOCK: ICD-10-CM

## 2018-11-26 DIAGNOSIS — I10 ESSENTIAL HYPERTENSION: ICD-10-CM

## 2018-11-26 PROCEDURE — 99213 OFFICE O/P EST LOW 20 MIN: CPT | Performed by: INTERNAL MEDICINE

## 2018-11-26 NOTE — PROGRESS NOTES
Referring Provider: Everardo Jackson MD    Reason for Follow-up Visit: CAD    Subjective .   Chief Complaint:   Chief Complaint   Patient presents with   • Follow-up     6 week fu from NP appt.  pt started Renexa.   • Coronary Artery Disease     pt has no complaints today.  he walks on a tredmill for 30 min and does good.  he is complaining that the Renexa caused dizziness so he discontinued it and tried to resatart it and it made him dizzy again so he stopped it the last couple of days.   • Hyperlipidemia     pt had labs done  at PCP 10/2018 LDL 68       History of present illness:  Wyatt Curry is a 88 y.o. yo male with history of CAD, s/p stent placement in the past. Had a negative stress test in September. Has not tolerated IMDUR or Ranexa due to perceived side effects. Denies any CP or STEVENSON.        History  Past Medical History:   Diagnosis Date   • Bladder cancer (CMS/HCC)    • CAD (coronary artery disease)    • Cancer (CMS/HCC)     bladder cancer   • GERD (gastroesophageal reflux disease)    • Hyperlipidemia    • Hypertension    • Irregular heart beat    ,   Past Surgical History:   Procedure Laterality Date   • BLADDER SURGERY     • CARDIAC CATHETERIZATION     • CORONARY ANGIOPLASTY WITH STENT PLACEMENT     • CYSTOSCOPY     • HERNIA REPAIR     • SHOULDER SURGERY     • TRANSURETHRAL RESECTION OF BLADDER TUMOR     ,   Family History   Problem Relation Age of Onset   • Heart disease Father    • Heart attack Father    • Tuberculosis Mother    • Heart disease Brother    • Heart attack Brother    • Heart disease Brother    • Diabetes Sister    • Hypertension Sister    • Hyperlipidemia Sister    • Heart disease Sister    • Alcohol abuse Sister    ,   Social History     Tobacco Use   • Smoking status: Former Smoker     Types: Cigarettes     Start date:      Last attempt to quit: 1975     Years since quittin.9   • Smokeless tobacco: Never Used   Substance Use Topics   • Alcohol use: Yes      "Alcohol/week: 0.6 oz     Types: 1 Glasses of wine per week     Comment: occ wine   • Drug use: No   ,     Medications  Current Outpatient Medications   Medication Sig Dispense Refill   • acetaminophen (TYLENOL) 500 MG tablet Take 500 mg by mouth Every 6 (Six) Hours As Needed for Mild Pain .     • amLODIPine (NORVASC) 10 MG tablet Take 10 mg by mouth Daily.  3   • aspirin 81 MG chewable tablet Chew 81 mg Daily.     • atorvastatin (LIPITOR) 20 MG tablet Take 20 mg by mouth Daily.     • cholecalciferol (VITAMIN D3) 1000 units tablet Take 1,000 Units by mouth Daily.     • clopidogrel (PLAVIX) 75 MG tablet Take 75 mg by mouth Daily.     • irbesartan (AVAPRO) 300 MG tablet Take 300 mg by mouth Every Night.     • Magnesium 400 MG capsule Take  by mouth Daily.     • pantoprazole (PROTONIX) 40 MG EC tablet Take 40 mg by mouth Daily.     • ranolazine (RANEXA) 1000 MG 12 hr tablet Take 1 tablet by mouth 2 (Two) Times a Day. 90 tablet 3     No current facility-administered medications for this visit.        Allergies:  Patient has no known allergies.    Review of Systems  Review of Systems   HENT: Negative for nosebleeds.    Cardiovascular: Negative for chest pain, claudication, dyspnea on exertion, irregular heartbeat, leg swelling, near-syncope, orthopnea, palpitations, paroxysmal nocturnal dyspnea and syncope.   Respiratory: Negative for cough, hemoptysis and shortness of breath.    Gastrointestinal: Negative for dysphagia, hematemesis and melena.   Genitourinary: Negative for hematuria.   All other systems reviewed and are negative.      Objective     Physical Exam:  /68 (BP Location: Left arm, Patient Position: Sitting, Cuff Size: Adult)   Pulse 64   Ht 162.6 cm (64\")   Wt 70.8 kg (156 lb)   SpO2 98%   BMI 26.78 kg/m²   Physical Exam   Constitutional: He is oriented to person, place, and time. He appears well-nourished. No distress.   HENT:   Head: Normocephalic.   Eyes: No scleral icterus.   Neck: Normal range " of motion. Neck supple.   Cardiovascular: Normal rate, regular rhythm and normal heart sounds. Exam reveals no gallop and no friction rub.   No murmur heard.  Pulmonary/Chest: Effort normal and breath sounds normal. No respiratory distress. He has no wheezes. He has no rales.   Abdominal: Soft. Bowel sounds are normal. He exhibits no distension. There is no tenderness.   Musculoskeletal: He exhibits no edema.   Neurological: He is alert and oriented to person, place, and time.   Skin: Skin is warm and dry. He is not diaphoretic. No erythema.   Psychiatric: He has a normal mood and affect. His behavior is normal.       Results Review:  Procedures    Procedure visit on 11/12/2018   Component Date Value Ref Range Status   • Color 11/12/2018 Yellow  Yellow, Straw, Dark Yellow, Leslie Final   • Clarity, UA 11/12/2018 Clear  Clear Final   • Glucose, UA 11/12/2018 Negative  Negative, 1000 mg/dL (3+) mg/dL Final   • Bilirubin 11/12/2018 Negative  Negative Final   • Ketones, UA 11/12/2018 Negative  Negative Final   • Specific Gravity  11/12/2018 1.015  1.005 - 1.030 Final   • Blood, UA 11/12/2018 Negative  Negative Final   • pH, Urine 11/12/2018 7.0  5.0 - 8.0 Final   • Protein, POC 11/12/2018 Negative  Negative mg/dL Final   • Urobilinogen, UA 11/12/2018 Normal  Normal Final   • Nitrite, UA 11/12/2018 Negative  Negative Final   • Leukocytes 11/12/2018 Negative  Negative Final   • Case Report 11/12/2018    Final                    Value:Non-gynecologic Cytology                          Case: [id number]                                  Authorizing Provider:  Justin Howard MD  Collected:           11/12/2018 10:15 AM          Ordering Location:     [facility]     Received:            11/13/2018 09:57 AM                                 [facility]                                                                Pathologist:           Katya Castaneda MD                                                          Specimen:    Urine, Clean Catch, Voided urine                                                          • Final Diagnosis 11/12/2018    Final                    Value:This result contains rich text formatting which cannot be displayed here.   • Gross Description 11/12/2018    Final                    Value:This result contains rich text formatting which cannot be displayed here.   • Microscopic Description 11/12/2018    Final                    Value:This result contains rich text formatting which cannot be displayed here.       Assessment/Plan   Wyatt was seen today for follow-up, coronary artery disease and hyperlipidemia.    Diagnoses and all orders for this visit:    Coronary artery disease involving native coronary artery of native heart without angina pectoris, The patient denies chest pain, shortness of breath, dyspnea on exertion, orthopnea, PND, edema. There is no evidence of ongoing ischemia. No indication for imdur or ranexa at present    Left bundle branch block, chronic    Essential hypertension, borderline control    Mixed hyperlipidemia, Patient's statin therapy is followed by PCP.        Patient's Body mass index is 26.78 kg/m². BMI is within normal parameters. No follow-up required.

## 2019-02-05 ENCOUNTER — OFFICE VISIT (OUTPATIENT)
Dept: PULMONOLOGY | Facility: CLINIC | Age: 84
End: 2019-02-05

## 2019-02-05 VITALS
OXYGEN SATURATION: 97 % | HEIGHT: 64 IN | SYSTOLIC BLOOD PRESSURE: 146 MMHG | HEART RATE: 56 BPM | BODY MASS INDEX: 26.63 KG/M2 | WEIGHT: 156 LBS | DIASTOLIC BLOOD PRESSURE: 70 MMHG

## 2019-02-05 DIAGNOSIS — J40 BRONCHITIS: Primary | ICD-10-CM

## 2019-02-05 PROCEDURE — 99213 OFFICE O/P EST LOW 20 MIN: CPT | Performed by: INTERNAL MEDICINE

## 2019-02-05 NOTE — PROGRESS NOTES
Subjective   Wyatt Curry is a 88 y.o. male.     Chief Complaint   Patient presents with   • Cough     Bronchitis      No My Sticky Note on file.    History of Present Illness   The patient is doing well from the standpoint of his bronchitis.  He is having no acute respiratory tract complaints at this time.  He and his wife have made a point of trying to stay in as much as possible this winter to avoid ruy a viral illness such as the flu.  He is having no significant problems with cough at this time.    Medical/Family/Social History   has a past medical history of Bladder cancer (CMS/HCC), CAD (coronary artery disease), Cancer (CMS/HCC), GERD (gastroesophageal reflux disease), Hyperlipidemia, Hypertension, and Irregular heart beat.   has a past surgical history that includes Hernia repair; Cystoscopy; Bladder surgery; Cardiac catheterization; Coronary angioplasty with stent; Transurethral resection of bladder tumor; and Shoulder surgery.  family history includes Alcohol abuse in his sister; Diabetes in his sister; Heart attack in his brother and father; Heart disease in his brother, brother, father, and sister; Hyperlipidemia in his sister; Hypertension in his sister; Tuberculosis in his mother.   reports that he quit smoking about 44 years ago. His smoking use included cigarettes. He started smoking about 71 years ago. he has never used smokeless tobacco. He reports that he drinks about 0.6 oz of alcohol per week. He reports that he does not use drugs.  No Known Allergies  Medications    Current Outpatient Medications:   •  acetaminophen (TYLENOL) 500 MG tablet, Take 500 mg by mouth Every 6 (Six) Hours As Needed for Mild Pain ., Disp: , Rfl:   •  amLODIPine (NORVASC) 10 MG tablet, Take 10 mg by mouth Daily., Disp: , Rfl: 3  •  aspirin 81 MG chewable tablet, Chew 81 mg Daily., Disp: , Rfl:   •  atorvastatin (LIPITOR) 20 MG tablet, Take 20 mg by mouth Daily., Disp: , Rfl:   •  cholecalciferol (VITAMIN D3)  "1000 units tablet, Take 1,000 Units by mouth Daily., Disp: , Rfl:   •  clopidogrel (PLAVIX) 75 MG tablet, Take 75 mg by mouth Daily., Disp: , Rfl:   •  irbesartan (AVAPRO) 300 MG tablet, Take 300 mg by mouth Every Night., Disp: , Rfl:   •  LYRICA 50 MG capsule, Take 50 mg by mouth every night at bedtime., Disp: , Rfl: 5  •  Magnesium 400 MG capsule, Take  by mouth Daily., Disp: , Rfl:   •  pantoprazole (PROTONIX) 40 MG EC tablet, Take 40 mg by mouth Daily., Disp: , Rfl:     Review of Systems   Constitutional: Negative for chills and fever.   HENT: Negative for congestion.    Eyes: Negative for visual disturbance.   Respiratory: Negative for cough and shortness of breath.    Cardiovascular: Negative for chest pain.   Gastrointestinal: Negative for diarrhea, nausea and vomiting.   Genitourinary: Negative for difficulty urinating.   Musculoskeletal: Negative for arthralgias.   Skin: Negative for rash.   Neurological: Negative for dizziness and speech difficulty.   Hematological: Negative for adenopathy.   Psychiatric/Behavioral: The patient is not nervous/anxious.      ------------------------------------  Objective   /70   Pulse 56   Ht 162.6 cm (64\")   Wt 70.8 kg (156 lb)   SpO2 97% Comment: RA  BMI 26.78 kg/m²   Physical Exam   Constitutional: He is oriented to person, place, and time. He appears well-developed and well-nourished.   HENT:   Head: Normocephalic.   Eyes: Pupils are equal, round, and reactive to light.   Neck: Normal range of motion.   Cardiovascular: Normal rate and regular rhythm.   Pulmonary/Chest: Effort normal and breath sounds normal.   Abdominal: Soft.   Musculoskeletal: Normal range of motion.   Lymphadenopathy:   No adenopathy is palpated.   Neurological: He is alert and oriented to person, place, and time.   Skin: Skin is warm and dry.   Psychiatric: He has a normal mood and affect.           Pulmonary Functions Testing Results:  No results found for: FEV1, FVC, ULL1GOH, TLC, DLCO "     Assessment/Plan   Wyatt was seen today for cough.    Diagnoses and all orders for this visit:    Bronchitis      Patient's Body mass index is 26.78 kg/m². BMI is within normal parameters. No follow-up required..      Patient is doing quite well from a pulmonary standpoint.  We will get a flow volume loop on his return visit in 6 months and if that remains stable we probably do not need to do any follow-up pulmonary functions in the future unless he has new respiratory symptomatology.

## 2019-02-05 NOTE — PATIENT INSTRUCTIONS
The patient will continue his current regimen.  He will return in 6 months for a follow-up with a flow volume loop.

## 2019-08-06 ENCOUNTER — OFFICE VISIT (OUTPATIENT)
Dept: PULMONOLOGY | Facility: CLINIC | Age: 84
End: 2019-08-06

## 2019-08-06 VITALS
HEIGHT: 64 IN | OXYGEN SATURATION: 98 % | WEIGHT: 156 LBS | SYSTOLIC BLOOD PRESSURE: 130 MMHG | HEART RATE: 68 BPM | DIASTOLIC BLOOD PRESSURE: 80 MMHG | BODY MASS INDEX: 26.63 KG/M2

## 2019-08-06 DIAGNOSIS — J40 BRONCHITIS: Primary | ICD-10-CM

## 2019-08-06 LAB
FEV1/FVC: 76.3 %
FEV1: NORMAL LITERS
FVC VOL RESPIRATORY: NORMAL LITERS

## 2019-08-06 PROCEDURE — 99212 OFFICE O/P EST SF 10 MIN: CPT | Performed by: INTERNAL MEDICINE

## 2019-08-06 PROCEDURE — 94010 BREATHING CAPACITY TEST: CPT | Performed by: INTERNAL MEDICINE

## 2019-08-06 NOTE — PROGRESS NOTES
Subjective   Wyatt Curry is a 89 y.o. male.     Chief Complaint   Patient presents with   • F/u of Bronchitis      No My Sticky Note on file.    History of Present Illness   The patient returns today for follow-up to include a flow volume loop.  He is doing well from a pulmonary standpoint with no acute respiratory tract complaints.    Medical/Family/Social History   has a past medical history of Bladder cancer (CMS/HCC), CAD (coronary artery disease), Cancer (CMS/HCC), GERD (gastroesophageal reflux disease), Hyperlipidemia, Hypertension, and Irregular heart beat.   has a past surgical history that includes Hernia repair; Cystoscopy; Bladder surgery; Cardiac catheterization; Coronary angioplasty with stent; Transurethral resection of bladder tumor; and Shoulder surgery.  family history includes Alcohol abuse in his sister; Diabetes in his sister; Heart attack in his brother and father; Heart disease in his brother, brother, father, and sister; Hyperlipidemia in his sister; Hypertension in his sister; Tuberculosis in his mother.   reports that he quit smoking about 44 years ago. His smoking use included cigarettes. He started smoking about 71 years ago. He has a 30.00 pack-year smoking history. He has never used smokeless tobacco. He reports that he drinks about 0.6 oz of alcohol per week. He reports that he does not use drugs.  No Known Allergies  Medications    Current Outpatient Medications:   •  acetaminophen (TYLENOL) 500 MG tablet, Take 500 mg by mouth Every 6 (Six) Hours As Needed for Mild Pain ., Disp: , Rfl:   •  amLODIPine (NORVASC) 10 MG tablet, Take 10 mg by mouth Daily., Disp: , Rfl: 3  •  aspirin 81 MG chewable tablet, Chew 81 mg Daily., Disp: , Rfl:   •  atorvastatin (LIPITOR) 20 MG tablet, Take 20 mg by mouth Daily., Disp: , Rfl:   •  cholecalciferol (VITAMIN D3) 1000 units tablet, Take 1,000 Units by mouth Daily., Disp: , Rfl:   •  clopidogrel (PLAVIX) 75 MG tablet, Take 75 mg by mouth Daily.,  "Disp: , Rfl:   •  irbesartan (AVAPRO) 300 MG tablet, Take 300 mg by mouth Every Night., Disp: , Rfl:   •  LYRICA 50 MG capsule, Take 50 mg by mouth every night at bedtime., Disp: , Rfl: 5  •  Magnesium 400 MG capsule, Take  by mouth Daily., Disp: , Rfl:   •  pantoprazole (PROTONIX) 40 MG EC tablet, Take 40 mg by mouth Daily., Disp: , Rfl:     Review of Systems  Constitutional: Negative for chills and fever.   HENT: Negative for congestion.    Eyes: Negative for visual disturbance.   Respiratory: Negative for cough and shortness of breath.    Cardiovascular: Negative for chest pain.   Gastrointestinal: Negative for diarrhea, nausea and vomiting.   Genitourinary: Negative for difficulty urinating.   Musculoskeletal: Negative for arthralgias.   Skin: Negative for rash.   Neurological: Negative for dizziness and speech difficulty.   Hematological: Negative for adenopathy.   Psychiatric/Behavioral: The patient is not nervous/anxious.    Objective   /80   Pulse 68   Ht 162.6 cm (64\")   Wt 70.8 kg (156 lb)   SpO2 98% Comment: Ra  BMI 26.78 kg/m²   Physical Exam  Constitutional: He is oriented to person, place, and time. He appears well-developed and well-nourished.   HENT:   Head: Normocephalic.   Eyes: Pupils are equal, round, and reactive to light.   Neck: Normal range of motion.   Cardiovascular: Normal rate and regular rhythm.   Pulmonary/Chest: Effort normal and breath sounds normal.   Abdominal: Soft.   Musculoskeletal: Normal range of motion.   Lymphadenopathy:   No adenopathy is palpated.   Neurological: He is alert and oriented to person, place, and time.   Skin: Skin is warm and dry.   Psychiatric: He has a normal mood and affect.     Pulmonary Functions Testing Results:  FEV1   Date Value Ref Range Status   08/06/2019 112% liters Final     FVC   Date Value Ref Range Status   08/06/2019 109% liters Final     FEV1/FVC   Date Value Ref Range Status   08/06/2019 76.30 % Final      My interpretation of " PFT:  1.  Spirometry is within normal limits.  2.  There has been slight worsening of spirometry compared to studies done in August of last year but the studies remain within normal limits.  Assessment/Plan   Wyatt was seen today for f/u of bronchitis.    Diagnoses and all orders for this visit:    Bronchitis  -     Pulmonary Function Test      Patient's Body mass index is 26.78 kg/m². BMI is within normal parameters. No follow-up required..    I will see the patient back in 6 months.  Again he is stable from a pulmonary standpoint clinically and his pulmonary function studies although showing a slight decline remain within normal limits.

## 2019-08-06 NOTE — PATIENT INSTRUCTIONS
The patient pulmonary function study today remain normal although it dropped slightly but not significant from previous.  He is stable clinically from a pulmonary standpoint I will see him back in 6 months.

## 2019-11-05 ENCOUNTER — HOSPITAL ENCOUNTER (OUTPATIENT)
Dept: ULTRASOUND IMAGING | Facility: HOSPITAL | Age: 84
Discharge: HOME | End: 2019-11-05
Admitting: UROLOGY

## 2019-11-05 DIAGNOSIS — C67.9 MALIGNANT NEOPLASM OF URINARY BLADDER, UNSPECIFIED SITE (HCC): ICD-10-CM

## 2019-11-05 PROCEDURE — 76775 US EXAM ABDO BACK WALL LIM: CPT

## 2019-12-09 ENCOUNTER — HOSPITAL ENCOUNTER (EMERGENCY)
Facility: HOSPITAL | Age: 84
Discharge: HOME | End: 2019-12-09
Attending: EMERGENCY MEDICINE | Admitting: EMERGENCY MEDICINE

## 2019-12-09 ENCOUNTER — APPOINTMENT (OUTPATIENT)
Dept: CT IMAGING | Facility: HOSPITAL | Age: 84
End: 2019-12-09

## 2019-12-09 ENCOUNTER — APPOINTMENT (OUTPATIENT)
Dept: GENERAL RADIOLOGY | Facility: HOSPITAL | Age: 84
End: 2019-12-09

## 2019-12-09 VITALS
RESPIRATION RATE: 20 BRPM | WEIGHT: 155 LBS | HEIGHT: 64 IN | SYSTOLIC BLOOD PRESSURE: 145 MMHG | HEART RATE: 95 BPM | DIASTOLIC BLOOD PRESSURE: 53 MMHG | TEMPERATURE: 97.8 F | BODY MASS INDEX: 26.46 KG/M2 | OXYGEN SATURATION: 98 %

## 2019-12-09 DIAGNOSIS — R59.0 MEDIASTINAL LYMPHADENOPATHY: ICD-10-CM

## 2019-12-09 DIAGNOSIS — R07.9 CHEST PAIN, UNSPECIFIED TYPE: Primary | ICD-10-CM

## 2019-12-09 DIAGNOSIS — K55.1 SUPERIOR MESENTERIC ARTERY STENOSIS (HCC): ICD-10-CM

## 2019-12-09 DIAGNOSIS — R91.1 PULMONARY NODULE: ICD-10-CM

## 2019-12-09 DIAGNOSIS — R06.00 DYSPNEA, UNSPECIFIED TYPE: ICD-10-CM

## 2019-12-09 LAB
ALBUMIN SERPL-MCNC: 4.3 G/DL (ref 3.5–5.2)
ALBUMIN/GLOB SERPL: 1.3 G/DL
ALP SERPL-CCNC: 71 U/L (ref 39–117)
ALT SERPL W P-5'-P-CCNC: 11 U/L (ref 1–41)
ANION GAP SERPL CALCULATED.3IONS-SCNC: 13 MMOL/L (ref 5–15)
AST SERPL-CCNC: 21 U/L (ref 1–40)
BASOPHILS # BLD AUTO: 0.02 10*3/MM3 (ref 0–0.2)
BASOPHILS NFR BLD AUTO: 0.2 % (ref 0–1.5)
BILIRUB SERPL-MCNC: 0.5 MG/DL (ref 0.2–1.2)
BUN BLD-MCNC: 16 MG/DL (ref 8–23)
BUN/CREAT SERPL: 27.1 (ref 7–25)
CALCIUM SPEC-SCNC: 10 MG/DL (ref 8.6–10.5)
CHLORIDE SERPL-SCNC: 100 MMOL/L (ref 98–107)
CO2 SERPL-SCNC: 27 MMOL/L (ref 22–29)
CREAT BLD-MCNC: 0.59 MG/DL (ref 0.76–1.27)
DEPRECATED RDW RBC AUTO: 43.8 FL (ref 37–54)
EOSINOPHIL # BLD AUTO: 0.08 10*3/MM3 (ref 0–0.4)
EOSINOPHIL NFR BLD AUTO: 0.8 % (ref 0.3–6.2)
ERYTHROCYTE [DISTWIDTH] IN BLOOD BY AUTOMATED COUNT: 13.3 % (ref 12.3–15.4)
GFR SERPL CREATININE-BSD FRML MDRD: 129 ML/MIN/1.73
GLOBULIN UR ELPH-MCNC: 3.2 GM/DL
GLUCOSE BLD-MCNC: 131 MG/DL (ref 65–99)
HCT VFR BLD AUTO: 41 % (ref 37.5–51)
HGB BLD-MCNC: 13.5 G/DL (ref 13–17.7)
HOLD SPECIMEN: NORMAL
HOLD SPECIMEN: NORMAL
IMM GRANULOCYTES # BLD AUTO: 0.03 10*3/MM3 (ref 0–0.05)
IMM GRANULOCYTES NFR BLD AUTO: 0.3 % (ref 0–0.5)
LYMPHOCYTES # BLD AUTO: 0.81 10*3/MM3 (ref 0.7–3.1)
LYMPHOCYTES NFR BLD AUTO: 7.8 % (ref 19.6–45.3)
MCH RBC QN AUTO: 29.7 PG (ref 26.6–33)
MCHC RBC AUTO-ENTMCNC: 32.9 G/DL (ref 31.5–35.7)
MCV RBC AUTO: 90.1 FL (ref 79–97)
MONOCYTES # BLD AUTO: 0.77 10*3/MM3 (ref 0.1–0.9)
MONOCYTES NFR BLD AUTO: 7.4 % (ref 5–12)
NEUTROPHILS # BLD AUTO: 8.72 10*3/MM3 (ref 1.7–7)
NEUTROPHILS NFR BLD AUTO: 83.5 % (ref 42.7–76)
NRBC BLD AUTO-RTO: 0 /100 WBC (ref 0–0.2)
NT-PROBNP SERPL-MCNC: 662.7 PG/ML (ref 5–1800)
PLATELET # BLD AUTO: 174 10*3/MM3 (ref 140–450)
PMV BLD AUTO: 10.5 FL (ref 6–12)
POTASSIUM BLD-SCNC: 4 MMOL/L (ref 3.5–5.2)
PROT SERPL-MCNC: 7.5 G/DL (ref 6–8.5)
RBC # BLD AUTO: 4.55 10*6/MM3 (ref 4.14–5.8)
SODIUM BLD-SCNC: 140 MMOL/L (ref 136–145)
TROPONIN T SERPL-MCNC: <0.01 NG/ML (ref 0–0.03)
TROPONIN T SERPL-MCNC: <0.01 NG/ML (ref 0–0.03)
WBC NRBC COR # BLD: 10.43 10*3/MM3 (ref 3.4–10.8)
WHOLE BLOOD HOLD SPECIMEN: NORMAL
WHOLE BLOOD HOLD SPECIMEN: NORMAL

## 2019-12-09 PROCEDURE — 36415 COLL VENOUS BLD VENIPUNCTURE: CPT

## 2019-12-09 PROCEDURE — 0 IOPAMIDOL PER 1 ML: Performed by: EMERGENCY MEDICINE

## 2019-12-09 PROCEDURE — 93005 ELECTROCARDIOGRAM TRACING: CPT

## 2019-12-09 PROCEDURE — 71275 CT ANGIOGRAPHY CHEST: CPT

## 2019-12-09 PROCEDURE — 83880 ASSAY OF NATRIURETIC PEPTIDE: CPT | Performed by: EMERGENCY MEDICINE

## 2019-12-09 PROCEDURE — 96374 THER/PROPH/DIAG INJ IV PUSH: CPT

## 2019-12-09 PROCEDURE — 85025 COMPLETE CBC W/AUTO DIFF WBC: CPT | Performed by: EMERGENCY MEDICINE

## 2019-12-09 PROCEDURE — 93010 ELECTROCARDIOGRAM REPORT: CPT | Performed by: INTERNAL MEDICINE

## 2019-12-09 PROCEDURE — 71045 X-RAY EXAM CHEST 1 VIEW: CPT

## 2019-12-09 PROCEDURE — 99283 EMERGENCY DEPT VISIT LOW MDM: CPT | Performed by: NURSE PRACTITIONER

## 2019-12-09 PROCEDURE — 80053 COMPREHEN METABOLIC PANEL: CPT | Performed by: EMERGENCY MEDICINE

## 2019-12-09 PROCEDURE — 25010000002 ONDANSETRON PER 1 MG: Performed by: EMERGENCY MEDICINE

## 2019-12-09 PROCEDURE — 93005 ELECTROCARDIOGRAM TRACING: CPT | Performed by: EMERGENCY MEDICINE

## 2019-12-09 PROCEDURE — 99284 EMERGENCY DEPT VISIT MOD MDM: CPT

## 2019-12-09 PROCEDURE — 84484 ASSAY OF TROPONIN QUANT: CPT | Performed by: EMERGENCY MEDICINE

## 2019-12-09 RX ORDER — SODIUM CHLORIDE 0.9 % (FLUSH) 0.9 %
10 SYRINGE (ML) INJECTION AS NEEDED
Status: DISCONTINUED | OUTPATIENT
Start: 2019-12-09 | End: 2019-12-09 | Stop reason: HOSPADM

## 2019-12-09 RX ORDER — ONDANSETRON 2 MG/ML
4 INJECTION INTRAMUSCULAR; INTRAVENOUS ONCE
Status: COMPLETED | OUTPATIENT
Start: 2019-12-09 | End: 2019-12-09

## 2019-12-09 RX ORDER — NITROGLYCERIN 0.4 MG/1
0.4 TABLET SUBLINGUAL
Status: DISCONTINUED | OUTPATIENT
Start: 2019-12-09 | End: 2019-12-09 | Stop reason: HOSPADM

## 2019-12-09 RX ORDER — CARVEDILOL 3.12 MG/1
3.12 TABLET ORAL 2 TIMES DAILY
Qty: 60 TABLET | Refills: 11 | Status: SHIPPED | OUTPATIENT
Start: 2019-12-09 | End: 2020-01-10 | Stop reason: SDUPTHER

## 2019-12-09 RX ORDER — ASPIRIN 81 MG/1
324 TABLET, CHEWABLE ORAL ONCE
Status: COMPLETED | OUTPATIENT
Start: 2019-12-09 | End: 2019-12-09

## 2019-12-09 RX ADMIN — NITROGLYCERIN 0.4 MG: 0.4 TABLET SUBLINGUAL at 05:44

## 2019-12-09 RX ADMIN — ASPIRIN 81 MG 324 MG: 81 TABLET ORAL at 05:44

## 2019-12-09 RX ADMIN — ONDANSETRON HYDROCHLORIDE 4 MG: 2 SOLUTION INTRAMUSCULAR; INTRAVENOUS at 10:11

## 2019-12-09 RX ADMIN — IOPAMIDOL 86 ML: 755 INJECTION, SOLUTION INTRAVENOUS at 08:02

## 2019-12-09 NOTE — ED PROVIDER NOTES
Subjective   History of Present Illness    Review of Systems    Past Medical History:   Diagnosis Date   • Bladder cancer (CMS/HCC)    • CAD (coronary artery disease)    • Cancer (CMS/HCC)     bladder cancer   • GERD (gastroesophageal reflux disease)    • Hyperlipidemia    • Hypertension    • Irregular heart beat        No Known Allergies    Past Surgical History:   Procedure Laterality Date   • BLADDER SURGERY     • CARDIAC CATHETERIZATION     • CORONARY ANGIOPLASTY WITH STENT PLACEMENT     • CYSTOSCOPY     • HERNIA REPAIR     • SHOULDER SURGERY     • TRANSURETHRAL RESECTION OF BLADDER TUMOR         Family History   Problem Relation Age of Onset   • Heart disease Father    • Heart attack Father    • Tuberculosis Mother    • Heart disease Brother    • Heart attack Brother    • Heart disease Brother    • Diabetes Sister    • Hypertension Sister    • Hyperlipidemia Sister    • Heart disease Sister    • Alcohol abuse Sister        Social History     Socioeconomic History   • Marital status:      Spouse name: Not on file   • Number of children: Not on file   • Years of education: Not on file   • Highest education level: Not on file   Tobacco Use   • Smoking status: Former Smoker     Packs/day: 1.00     Years: 30.00     Pack years: 30.00     Types: Cigarettes     Start date:      Last attempt to quit:      Years since quittin.9   • Smokeless tobacco: Never Used   Substance and Sexual Activity   • Alcohol use: Yes     Alcohol/week: 1.0 standard drinks     Types: 1 Glasses of wine per week     Frequency: Monthly or less     Comment: occ wine   • Drug use: No   • Sexual activity: Defer           Objective   Physical Exam    Procedures           ED Course  ED Course as of Dec 09 1101   Mon Dec 09, 2019   0746 Endorsed to Dr. Daniel    []   0842 1. No CT evidence of pulmonary embolus. Dilated pulmonary arteries.  2. Atheromatous calcification of the thoracic aorta and coronary  arteries.  Cardiomegaly.  3. Mediastinal lymphadenopathy. A right paratracheal lymph node is  enlarged by size criteria measuring 2.1 cm in short axis diameter. There  is a slightly enlarged left paratracheal lymph node.  4. There is a new 3 mm right upper lobe nodule on image 33 of series 5.  A stable 4 mm right upper lobe nodule is also noted. Follow-up for the  new nodule recommended in 12 months and high risk patients according to  Fleischner Society.  5. Dependent atelectasis.  6. Small liver cyst. High-grade stenosis at the origin of the superior  mesenteric artery.  This CT findings were discussed with the patient at length and needs follow-up and all of these pulmonary nodules lymphadenopathy and stenosis of the mesenteric artery    [TS]   0842 Not having abdominal pain at this time given the chest pain have called cardiology come and consult on him    [TS]   1056 I have discussed the CT scan findings with the patient and she needs to follow-up with the primary MD for further evaluation of these including mediastinal lymphadenopathy the pulmonary nodules and the mesenteric artery stenosis case discussed cardiology came and saw the patient adjusted her medications and they were discharging the patient home with a follow-up with a cardiologist down the road the patient at this time is awake and alert denies any distress denies shortness of breath is will be discharged home    [TS]   1057  The patient's symptoms are now better.  Patient is not having pain.  No chest pain, difficulty breathing, nausea, vomiting or palpitations.  No anxiety or dizziness.  Vital signs have been reviewed and appear to be correct.  Physical exam findings are improved.  Alert.  Oriented X3 .  No acute distress.  Breath sounds normal.  No respiratory distress, decreased breath sounds or wheezes.  Normal heart rate and rhythm.  Heart sounds normal.  .  Abdomen soft and nontender.  No abdominal tenderness or guarding or rebound tenderness.  Skin  warm and dry.  No cyanosis or diaphoresis.  Oriented X 3.  Not anxious.  No alteration in mental status or weakness.            [TS]   1057 Risks and benefits of treatments given and alternative treatment options discussed with patient/family. I answered all the questions in simple, plain language, and there was voiced understanding and agreement with plan of care. There were no further questions. Differential diagnosis discussed. Patient/family was advised that the practice of medicine is not always an exact science, and sometimes tests, physical exam, or history may not show the underlying conditions with certainty. Additionally, the condition may change or show itself later after initial presentation. There was also expressed understanding and agreement with this limitation of emergency medicine practice. Patient/family was asked to return to ED if any problem or issues or if condition worsens or does not improved. Patient/family agreed to follow up with PCP/specialist as advised, or return to ED if unable to see a provider in a timely fashion for continued symptoms.     [TS]      ED Course User Index  [JH] Kevin Bell MD  [TS] Adolph Daniel MD                      No data recorded                        MDM    Final diagnoses:   Chest pain, unspecified type   Dyspnea, unspecified type   Pulmonary nodule   Mediastinal lymphadenopathy   Superior mesenteric artery stenosis (CMS/HCC)              Adolph Daniel MD  12/09/19 1105

## 2019-12-09 NOTE — CONSULTS
[facility] HEART GROUP CONSULT NOTE    Referring Provider: No ref. provider found    Reason for Consultation: chest pain    Chief complaint:   Chief Complaint   Patient presents with   • Chest Pain   • Shortness of Breath       Subjective      History of present illness:  Wyatt Curry is a 89 y.o. male with history of coronary artery disease s/p stent placement approximately 15 yrs ago, hypertension, hyperlipidemia, LBBB, and non-rheumatic mitral regurgitation. He presented to the ER with complaints of chest pressure that woke him from a sleep around 2am. He checked his BP and noted it to be 189/105. He took a nitro and noted moderate relief of his chest pressure. He had a low risk lexiscan in September of 2018. He had non-obstructive CAD on his most recent cath in '08. He states he had associated nausea and shortness of breath. He has remained nauseated since arrival to the ER with emesis x3. He no longer has chest pressure. His BP has ranged from 150-180. EKG is unchanged from previous. Cardiac enzymes were negative. BNP is normal.     History  Past Medical History:   Diagnosis Date   • Bladder cancer (CMS/HCC)    • CAD (coronary artery disease)    • Cancer (CMS/HCC)     bladder cancer   • GERD (gastroesophageal reflux disease)    • Hyperlipidemia    • Hypertension    • Irregular heart beat    ,   Past Surgical History:   Procedure Laterality Date   • BLADDER SURGERY     • CARDIAC CATHETERIZATION     • CORONARY ANGIOPLASTY WITH STENT PLACEMENT     • CYSTOSCOPY     • HERNIA REPAIR     • SHOULDER SURGERY     • TRANSURETHRAL RESECTION OF BLADDER TUMOR     ,   Family History   Problem Relation Age of Onset   • Heart disease Father    • Heart attack Father    • Tuberculosis Mother    • Heart disease Brother    • Heart attack Brother    • Heart disease Brother    • Diabetes Sister    • Hypertension Sister    • Hyperlipidemia Sister    • Heart disease Sister    • Alcohol abuse Sister    ,   Social History      Tobacco Use   • Smoking status: Former Smoker     Packs/day: 1.00     Years: 30.00     Pack years: 30.00     Types: Cigarettes     Start date:      Last attempt to quit: 1975     Years since quittin.9   • Smokeless tobacco: Never Used   Substance Use Topics   • Alcohol use: Yes     Alcohol/week: 1.0 standard drinks     Types: 1 Glasses of wine per week     Frequency: Monthly or less     Comment: occ wine   • Drug use: No   ,     Medications    Prior to Admission medications    Medication Sig Start Date End Date Taking? Authorizing Provider   acetaminophen (TYLENOL) 500 MG tablet Take 500 mg by mouth Every 6 (Six) Hours As Needed for Mild Pain .    Manan Govea MD   amLODIPine (NORVASC) 10 MG tablet Take 10 mg by mouth Daily. 10/2/17   Manan Govea MD   aspirin 81 MG chewable tablet Chew 81 mg Daily. 12/30/15   aMnan Govea MD   atorvastatin (LIPITOR) 20 MG tablet Take 20 mg by mouth Daily.    Manan Govea MD   carvedilol (COREG) 3.125 MG tablet Take 1 tablet by mouth 2 (Two) Times a Day. 19   Bell Lawson APRN   cholecalciferol (VITAMIN D3) 1000 units tablet Take 1,000 Units by mouth Daily.    Manan Govea MD   clopidogrel (PLAVIX) 75 MG tablet Take 75 mg by mouth Daily. 16   Manan Govea MD   irbesartan (AVAPRO) 300 MG tablet Take 300 mg by mouth Every Night.    Manan Govea MD   LYRICA 50 MG capsule Take 50 mg by mouth every night at bedtime. 19   Manan Govea MD   Magnesium 400 MG capsule Take  by mouth Daily.    Manan Govea MD   pantoprazole (PROTONIX) 40 MG EC tablet Take 40 mg by mouth Daily.    Manan Govea MD       Current Facility-Administered Medications   Medication Dose Route Frequency Provider Last Rate Last Dose   • nitroglycerin (NITROSTAT) SL tablet 0.4 mg  0.4 mg Sublingual Q5 Min PRN Kevin Bell MD   0.4 mg at 19 0544   • ondansetron (ZOFRAN) injection 4 mg  4  mg Intravenous Once Adolph Daniel MD       • sodium chloride 0.9 % flush 10 mL  10 mL Intravenous PRN Kevin Bell MD         Current Outpatient Medications   Medication Sig Dispense Refill   • acetaminophen (TYLENOL) 500 MG tablet Take 500 mg by mouth Every 6 (Six) Hours As Needed for Mild Pain .     • amLODIPine (NORVASC) 10 MG tablet Take 10 mg by mouth Daily.  3   • aspirin 81 MG chewable tablet Chew 81 mg Daily.     • atorvastatin (LIPITOR) 20 MG tablet Take 20 mg by mouth Daily.     • carvedilol (COREG) 3.125 MG tablet Take 1 tablet by mouth 2 (Two) Times a Day. 60 tablet 11   • cholecalciferol (VITAMIN D3) 1000 units tablet Take 1,000 Units by mouth Daily.     • clopidogrel (PLAVIX) 75 MG tablet Take 75 mg by mouth Daily.     • irbesartan (AVAPRO) 300 MG tablet Take 300 mg by mouth Every Night.     • LYRICA 50 MG capsule Take 50 mg by mouth every night at bedtime.  5   • Magnesium 400 MG capsule Take  by mouth Daily.     • pantoprazole (PROTONIX) 40 MG EC tablet Take 40 mg by mouth Daily.         Allergies:  Patient has no known allergies.    Review of Systems  Review of Systems   Constitutional: Negative for chills, diaphoresis, fatigue and unexpected weight change.   Respiratory: Positive for chest tightness. Negative for cough, shortness of breath and wheezing.    Cardiovascular: Negative for chest pain, palpitations and leg swelling.   Gastrointestinal: Positive for nausea and vomiting. Negative for diarrhea.   Neurological: Negative for dizziness, syncope and light-headedness.       Objective     Physical Exam:  Patient Vitals for the past 24 hrs:   BP Temp Temp src Pulse Resp SpO2 Height Weight   12/09/19 0823 156/66 -- -- 84 16 95 % -- --   12/09/19 0745 152/70 -- -- 83 -- 94 % -- --   12/09/19 0716 -- -- -- 89 -- 96 % -- --   12/09/19 0701 -- -- -- 75 -- 95 % -- --   12/09/19 0700 145/67 -- -- -- -- -- -- --   12/09/19 0658 -- -- -- 75 -- 95 % -- --   12/09/19 0648 141/68 -- -- 78 -- 96 % --  "--   12/09/19 0630 154/70 -- -- 79 -- 94 % -- --   12/09/19 0618 -- -- -- 81 -- 94 % -- --   12/09/19 0615 144/74 -- -- -- -- -- -- --   12/09/19 0602 -- -- -- 83 -- 96 % -- --   12/09/19 0600 151/77 -- -- -- -- -- -- --   12/09/19 0545 (!) 181/78 -- -- -- -- -- -- --   12/09/19 0544 146/72 -- -- 86 -- -- -- --   12/09/19 0530 146/72 -- -- 83 -- 96 % -- --   12/09/19 0519 -- 97.7 °F (36.5 °C) Oral -- -- -- -- --   12/09/19 0515 178/71 -- -- 89 -- 96 % -- --   12/09/19 0511 167/73 -- -- 77 19 95 % 162.6 cm (64\") 70.3 kg (155 lb)     Physical Exam   Constitutional: He is oriented to person, place, and time. He appears well-developed and well-nourished. No distress.   HENT:   Head: Normocephalic.   Mouth/Throat: No oropharyngeal exudate.   Eyes: Pupils are equal, round, and reactive to light. Conjunctivae and EOM are normal. No scleral icterus.   Neck: Normal range of motion. Neck supple.   Cardiovascular: Normal rate, regular rhythm, normal heart sounds and intact distal pulses.   No murmur heard.  Pulmonary/Chest: Effort normal and breath sounds normal. No respiratory distress. He has no wheezes. He has no rales. He exhibits no tenderness.   Abdominal: Soft. Bowel sounds are normal. He exhibits no distension. There is no tenderness.   Musculoskeletal: Normal range of motion. He exhibits no edema.   Neurological: He is alert and oriented to person, place, and time. He has normal reflexes.   Skin: Skin is warm and dry. No rash noted. He is not diaphoretic. No erythema. No pallor.   Psychiatric: He has a normal mood and affect. His behavior is normal.   Vitals reviewed.      Results Review:   I reviewed the patient's new clinical results.    Lab Results (last 72 hours)     Procedure Component Value Units Date/Time    Troponin [[id number]]  (Normal) Collected:  12/09/19 0824    Specimen:  Blood Updated:  12/09/19 0900     Troponin T <0.010 ng/mL     Narrative:       Troponin T Reference Range:  <= 0.03 ng/mL-   Negative " for AMI  >0.03 ng/mL-     Abnormal for myocardial necrosis.  Clinicians would have to utilize clinical acumen, EKG, Troponin and serial changes to determine if it is an Acute Myocardial Infarction or myocardial injury due to an underlying chronic condition.     BNP [[id number]]  (Normal) Collected:  12/09/19 0514    Specimen:  Blood Updated:  12/09/19 0617     proBNP 662.7 pg/mL     Narrative:       Among patients with dyspnea, NT-proBNP is highly sensitive for the detection of acute congestive heart failure. In addition NT-proBNP of <300 pg/ml effectively rules out acute congestive heart failure with 99% negative predictive value.      [place] Draw [[id number]] Collected:  12/09/19 0514    Specimen:  Blood Updated:  12/09/19 0615    Narrative:       The following orders were created for panel order [place] Draw.  Procedure                               Abnormality         Status                     ---------                               -----------         ------                     Light Blue Top[[id number]]                                   Final result               Green Top (Gel)[[id number]]                                  Final result               Lavender Top[[id number]]                                     Final result               Red Top[[id number]]                                          Final result                 Please view results for these tests on the individual orders.    Light Blue Top [[id number]] Collected:  12/09/19 0514    Specimen:  Blood Updated:  12/09/19 0615     Extra Tube hold for add-on     Comment: Auto resulted       Green Top (Gel) [[id number]] Collected:  12/09/19 0514    Specimen:  Blood Updated:  12/09/19 0615     Extra Tube Hold for add-ons.     Comment: Auto resulted.       Lavender Top [[id number]] Collected:  12/09/19 0514    Specimen:  Blood Updated:  12/09/19 0615     Extra Tube hold for add-on     Comment: Auto resulted       Red Top [[id number]] Collected:  12/09/19 0514     Specimen:  Blood Updated:  12/09/19 0615     Extra Tube Hold for add-ons.     Comment: Auto resulted.       Comprehensive Metabolic Panel [[id number]]  (Abnormal) Collected:  12/09/19 0514    Specimen:  Blood Updated:  12/09/19 0549     Glucose 131 mg/dL      BUN 16 mg/dL      Creatinine 0.59 mg/dL      Sodium 140 mmol/L      Potassium 4.0 mmol/L      Chloride 100 mmol/L      CO2 27.0 mmol/L      Calcium 10.0 mg/dL      Total Protein 7.5 g/dL      Albumin 4.30 g/dL      ALT (SGPT) 11 U/L      AST (SGOT) 21 U/L      Comment: Specimen hemolyzed.  Results may be affected.        Alkaline Phosphatase 71 U/L      Total Bilirubin 0.5 mg/dL      eGFR Non African Amer 129 mL/min/1.73      Globulin 3.2 gm/dL      A/G Ratio 1.3 g/dL      BUN/Creatinine Ratio 27.1     Anion Gap 13.0 mmol/L     Narrative:       GFR Normal >60  Chronic Kidney Disease <60  Kidney Failure <15      Troponin [[id number]]  (Normal) Collected:  12/09/19 0514    Specimen:  Blood Updated:  12/09/19 0548     Troponin T <0.010 ng/mL     Narrative:       Troponin T Reference Range:  <= 0.03 ng/mL-   Negative for AMI  >0.03 ng/mL-     Abnormal for myocardial necrosis.  Clinicians would have to utilize clinical acumen, EKG, Troponin and serial changes to determine if it is an Acute Myocardial Infarction or myocardial injury due to an underlying chronic condition.     CBC & Differential [[id number]] Collected:  12/09/19 0514    Specimen:  Blood Updated:  12/09/19 0527    Narrative:       The following orders were created for panel order CBC & Differential.  Procedure                               Abnormality         Status                     ---------                               -----------         ------                     CBC Auto Differential[[id number]]        Abnormal            Final result                 Please view results for these tests on the individual orders.    CBC Auto Differential [[id number]]  (Abnormal) Collected:  12/09/19 0514    Specimen:   Blood Updated:  12/09/19 0527     WBC 10.43 10*3/mm3      RBC 4.55 10*6/mm3      Hemoglobin 13.5 g/dL      Hematocrit 41.0 %      MCV 90.1 fL      MCH 29.7 pg      MCHC 32.9 g/dL      RDW 13.3 %      RDW-SD 43.8 fl      MPV 10.5 fL      Platelets 174 10*3/mm3      Neutrophil % 83.5 %      Lymphocyte % 7.8 %      Monocyte % 7.4 %      Eosinophil % 0.8 %      Basophil % 0.2 %      Immature Grans % 0.3 %      Neutrophils, Absolute 8.72 10*3/mm3      Lymphocytes, Absolute 0.81 10*3/mm3      Monocytes, Absolute 0.77 10*3/mm3      Eosinophils, Absolute 0.08 10*3/mm3      Basophils, Absolute 0.02 10*3/mm3      Immature Grans, Absolute 0.03 10*3/mm3      nRBC 0.0 /100 WBC           Lab Results   Component Value Date    ECHOEFEST 55 08/20/2018       Imaging Results (Last 72 Hours)     Procedure Component Value Units Date/Time    CT Angiogram Chest [[id number]] Collected:  12/09/19 0826     Updated:  12/09/19 0840    Narrative:       EXAMINATION:  CT ANGIOGRAM CHEST-  12/9/2019 7:59 AM CST     HISTORY: Evaluate for pulmonary embolus. There is a history of cancer.  R07.9-Chest pain, unspecified.     COMPARISON : 09/03/2008.     DLP: 244 mGy-cm. Automated dosage control was utilized.     TECHNIQUE: CT angio was performed of the chest with contrast. Coronal,  sagittal and 3-D reconstruction were performed.     MEDIASTINUM, HEART AND VASCULAR STRUCTURES: There is atheromatous  calcification of the thoracic aorta and coronary arteries. The main  pulmonary artery segment is dilated measuring 3.8 cm. There is no CT  evidence of pulmonary embolus. There is mild cardiomegaly. There is no  pericardial effusion. A right paratracheal lymph node has a short axis  diameter of 2.1 cm. In the left paratracheal lymph node has a short axis  diameter of 1.1 cm. There is a small hiatal hernia.     LUNGS: There is apical scarring. There is a 4 mm nodule in the right  lung apex on image 23 of series 5 that is stable compared to the prior  study.  There is a new 3 mm right upper lobe nodule on image 33 of series  5. No other noncalcified nodules are appreciated. There is no dense  consolidation. There is dependent atelectasis.     UPPER ABDOMEN: There is an 8 mm cyst in the left hepatic lobe of the  liver on image 140 of series 4. There is high-grade stenosis at the  origin of the superior mesenteric artery. There is plaque without  significant stenosis at the origin of the celiac plexus.     BONES: There are degenerative changes of the spine. There is scoliosis.  There is mild T12 compression deformity that appears chronic in age.       Impression:       1. No CT evidence of pulmonary embolus. Dilated pulmonary arteries.  2. Atheromatous calcification of the thoracic aorta and coronary  arteries. Cardiomegaly.  3. Mediastinal lymphadenopathy. A right paratracheal lymph node is  enlarged by size criteria measuring 2.1 cm in short axis diameter. There  is a slightly enlarged left paratracheal lymph node.  4. There is a new 3 mm right upper lobe nodule on image 33 of series 5.  A stable 4 mm right upper lobe nodule is also noted. Follow-up for the  new nodule recommended in 12 months and high risk patients according to  Fleischner Society.  5. Dependent atelectasis.  6. Small liver cyst. High-grade stenosis at the origin of the superior  mesenteric artery.     The full report of this exam was immediately signed and available to the  emergency room. The patient is currently in the emergency room.    This report was finalized on 12/09/2019 08:37 by Dr. Vic Mcneal MD.    XR Chest 1 View [[id number]] Collected:  12/09/19 0737     Updated:  12/09/19 0741    Narrative:       Frontal supine radiograph of the chest 12/9/2019 5:19 AM CST     History: Chest Pain Triage Protocol     Comparison: None a 2016      Findings:   Patient is rotated. Scattered fibrotic changes throughout both lungs.  Small calcified left basilar granuloma. No consolidation. No  pneumothorax or  pleural effusion. Cardiac mediastinal silhouette and  pulmonary vasculature are unremarkable. No displaced fracture  identified. Prior left reverse total shoulder arthroplasty.         Impression:       Impression:   1. After accounting for the patient rotation, the chest radiograph  appears stable. No visualized infiltrate or pulmonary edema.        This report was finalized on 12/09/2019 07:38 by Dr Dionicio Tang, .        Assessment/Plan       Coronary artery disease of native artery of native heart with stable angina pectoris (CMS/HCC)    Essential hypertension      Plan:   -ok to d/c home.   -start Coreg 3.125mg BID for HTN control. Likely the cause of chest pressure. Will control BP prior to any further ischemic workup.   -continue home medications including Norvasc 10mg, Avapro 300mg. Monitor BP and bring log in at follow up.   -keep f/u with Dr. Moseley in January.     Further orders per Dr. Moseley    Thank you for asking us to follow this patient with you.     LARRY Simon  12/09/19  9:26 AM    Please note this cardiology consultation note is the result of a face to face consultation with the patient, in addition to reviewing medical records at length by myself, LARRY Garcia      Time: approximately 45 minutes

## 2019-12-09 NOTE — ED PROVIDER NOTES
Subjective   88 y/o male with history of CAD patient of Dr. 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 He denies fevers or chills, falls, trauma, nausea, vomiting or diarrhea. He denies cough, LE pain or swelling or history of PE/DVT. He arrives in NAD.         Family, social and past history reviewed as below, prior documentation of H and Ps and other documentation are reviewed:    Past Medical History:  No date: Bladder cancer (CMS/HCC)  No date: CAD (coronary artery disease)  No date: Cancer (CMS/HCC)      Comment:  bladder cancer  No date: GERD (gastroesophageal reflux disease)  No date: Hyperlipidemia  No date: Hypertension  No date: Irregular heart beat    Past Surgical History:  No date: BLADDER SURGERY  No date: CARDIAC CATHETERIZATION  No date: CORONARY ANGIOPLASTY WITH STENT PLACEMENT  No date: CYSTOSCOPY  No date: HERNIA REPAIR  No date: SHOULDER SURGERY  No date: TRANSURETHRAL RESECTION OF BLADDER TUMOR    Social History    Socioeconomic History      Marital status:       Spouse name: Not on file      Number of children: Not on file      Years of education: Not on file      Highest education level: Not on file    Tobacco Use      Smoking status: Former Smoker        Packs/day: 1.00        Years: 30.00        Pack years: 30        Types: Cigarettes        Start date:         Quit date:         Years since quittin.9      Smokeless tobacco: Never Used    Substance and Sexual Activity      Alcohol use: Yes        Alcohol/week: 1.0 standard drinks        Types: 1 Glasses of wine per week        Frequency: Monthly or less        Comment: occ wine      Drug use: No      Sexual activity: Defer      Family history: reviewed and noncontributory           Review of Systems   All other systems reviewed and are  negative.      Past Medical History:   Diagnosis Date   • Bladder cancer (CMS/HCC)    • CAD (coronary artery disease)    • Cancer (CMS/HCC)     bladder cancer   • GERD (gastroesophageal reflux disease)    • Hyperlipidemia    • Hypertension    • Irregular heart beat        No Known Allergies    Past Surgical History:   Procedure Laterality Date   • BLADDER SURGERY     • CARDIAC CATHETERIZATION     • CORONARY ANGIOPLASTY WITH STENT PLACEMENT     • CYSTOSCOPY     • HERNIA REPAIR     • SHOULDER SURGERY     • TRANSURETHRAL RESECTION OF BLADDER TUMOR         Family History   Problem Relation Age of Onset   • Heart disease Father    • Heart attack Father    • Tuberculosis Mother    • Heart disease Brother    • Heart attack Brother    • Heart disease Brother    • Diabetes Sister    • Hypertension Sister    • Hyperlipidemia Sister    • Heart disease Sister    • Alcohol abuse Sister        Social History     Socioeconomic History   • Marital status:      Spouse name: Not on file   • Number of children: Not on file   • Years of education: Not on file   • Highest education level: Not on file   Tobacco Use   • Smoking status: Former Smoker     Packs/day: 1.00     Years: 30.00     Pack years: 30.00     Types: Cigarettes     Start date:      Last attempt to quit:      Years since quittin.9   • Smokeless tobacco: Never Used   Substance and Sexual Activity   • Alcohol use: Yes     Alcohol/week: 1.0 standard drinks     Types: 1 Glasses of wine per week     Frequency: Monthly or less     Comment: occ wine   • Drug use: No   • Sexual activity: Defer           Objective   Physical Exam   Constitutional: He appears well-developed and well-nourished.   HENT:   Head: Normocephalic.   Eyes: Pupils are equal, round, and reactive to light.   Neck: Normal range of motion. Neck supple. No JVD present.   Cardiovascular: Normal rate, regular rhythm, intact distal pulses and normal pulses.   Pulmonary/Chest: Effort normal and  breath sounds normal. He has no decreased breath sounds. He has no wheezes. He has no rhonchi. He has no rales.   Abdominal: Soft. Bowel sounds are normal. He exhibits no distension and no mass. There is no guarding.   Musculoskeletal:        Right lower leg: Normal. He exhibits no edema.        Left lower leg: Normal. He exhibits no edema.   Neurological: He is alert.   Skin: Skin is warm and dry. Capillary refill takes less than 2 seconds.   Psychiatric: He has a normal mood and affect.   Vitals reviewed.      ECG 12 Lead    Date/Time: 12/9/2019 5:10 AM  Performed by: Kevin Bell MD  Authorized by: Kevin Bell MD   Interpreted by physician  Rhythm: sinus rhythm  Rate: normal  BPM: 90  QRS axis: normal  Conduction: left bundle branch block                 ED Course  ED Course as of Dec 10 0621   Mon Dec 09, 2019   0746 Endorsed to Dr. Daniel    [JH]   0842 1. No CT evidence of pulmonary embolus. Dilated pulmonary arteries.  2. Atheromatous calcification of the thoracic aorta and coronary  arteries. Cardiomegaly.  3. Mediastinal lymphadenopathy. A right paratracheal lymph node is  enlarged by size criteria measuring 2.1 cm in short axis diameter. There  is a slightly enlarged left paratracheal lymph node.  4. There is a new 3 mm right upper lobe nodule on image 33 of series 5.  A stable 4 mm right upper lobe nodule is also noted. Follow-up for the  new nodule recommended in 12 months and high risk patients according to  Fleischner Society.  5. Dependent atelectasis.  6. Small liver cyst. High-grade stenosis at the origin of the superior  mesenteric artery.  This CT findings were discussed with the patient at length and needs follow-up and all of these pulmonary nodules lymphadenopathy and stenosis of the mesenteric artery    [TS]   0842 Not having abdominal pain at this time given the chest pain have called cardiology come and consult on him    [TS]   1056 I have discussed the CT scan findings  with the patient and she needs to follow-up with the primary MD for further evaluation of these including mediastinal lymphadenopathy the pulmonary nodules and the mesenteric artery stenosis case discussed cardiology came and saw the patient adjusted her medications and they were discharging the patient home with a follow-up with a cardiologist down the road the patient at this time is awake and alert denies any distress denies shortness of breath is will be discharged home    [TS]   1057  The patient's symptoms are now better.  Patient is not having pain.  No chest pain, difficulty breathing, nausea, vomiting or palpitations.  No anxiety or dizziness.  Vital signs have been reviewed and appear to be correct.  Physical exam findings are improved.  Alert.  Oriented X3 .  No acute distress.  Breath sounds normal.  No respiratory distress, decreased breath sounds or wheezes.  Normal heart rate and rhythm.  Heart sounds normal.  .  Abdomen soft and nontender.  No abdominal tenderness or guarding or rebound tenderness.  Skin warm and dry.  No cyanosis or diaphoresis.  Oriented X 3.  Not anxious.  No alteration in mental status or weakness.            [TS]   1057 Risks and benefits of treatments given and alternative treatment options discussed with patient/family. I answered all the questions in simple, plain language, and there was voiced understanding and agreement with plan of care. There were no further questions. Differential diagnosis discussed. Patient/family was advised that the practice of medicine is not always an exact science, and sometimes tests, physical exam, or history may not show the underlying conditions with certainty. Additionally, the condition may change or show itself later after initial presentation. There was also expressed understanding and agreement with this limitation of emergency medicine practice. Patient/family was asked to return to ED if any problem or issues or if condition worsens or  does not improved. Patient/family agreed to follow up with PCP/specialist as advised, or return to ED if unable to see a provider in a timely fashion for continued symptoms.     [TS]      ED Course User Index  [JH] Kevin Bell MD  [TS] Adolph Daniel MD      CT Angiogram Chest   Final Result   1. No CT evidence of pulmonary embolus. Dilated pulmonary arteries.   2. Atheromatous calcification of the thoracic aorta and coronary   arteries. Cardiomegaly.   3. Mediastinal lymphadenopathy. A right paratracheal lymph node is   enlarged by size criteria measuring 2.1 cm in short axis diameter. There   is a slightly enlarged left paratracheal lymph node.   4. There is a new 3 mm right upper lobe nodule on image 33 of series 5.   A stable 4 mm right upper lobe nodule is also noted. Follow-up for the   new nodule recommended in 12 months and high risk patients according to   Fleischner Society.   5. Dependent atelectasis.   6. Small liver cyst. High-grade stenosis at the origin of the superior   mesenteric artery.       The full report of this exam was immediately signed and available to the   emergency room. The patient is currently in the emergency room.     This report was finalized on 12/09/2019 08:37 by Dr. Vic Mcneal MD.      XR Chest 1 View   Final Result   Impression:    1. After accounting for the patient rotation, the chest radiograph   appears stable. No visualized infiltrate or pulmonary edema.           This report was finalized on 12/09/2019 07:38 by Dr Dionicio Tang, .        Labs Reviewed   COMPREHENSIVE METABOLIC PANEL - Abnormal; Notable for the following components:       Result Value    Glucose 131 (*)     Creatinine 0.59 (*)     BUN/Creatinine Ratio 27.1 (*)     All other components within normal limits    Narrative:     GFR Normal >60  Chronic Kidney Disease <60  Kidney Failure <15     CBC WITH AUTO DIFFERENTIAL - Abnormal; Notable for the following components:    Neutrophil % 83.5 (*)      Lymphocyte % 7.8 (*)     Neutrophils, Absolute 8.72 (*)     All other components within normal limits   TROPONIN (IN-HOUSE) - Normal    Narrative:     Troponin T Reference Range:  <= 0.03 ng/mL-   Negative for AMI  >0.03 ng/mL-     Abnormal for myocardial necrosis.  Clinicians would have to utilize clinical acumen, EKG, Troponin and serial changes to determine if it is an Acute Myocardial Infarction or myocardial injury due to an underlying chronic condition.    TROPONIN (IN-HOUSE) - Normal    Narrative:     Troponin T Reference Range:  <= 0.03 ng/mL-   Negative for AMI  >0.03 ng/mL-     Abnormal for myocardial necrosis.  Clinicians would have to utilize clinical acumen, EKG, Troponin and serial changes to determine if it is an Acute Myocardial Infarction or myocardial injury due to an underlying chronic condition.    BNP (IN-HOUSE) - Normal    Narrative:     Among patients with dyspnea, NT-proBNP is highly sensitive for the detection of acute congestive heart failure. In addition NT-proBNP of <300 pg/ml effectively rules out acute congestive heart failure with 99% negative predictive value.     RAINBOW DRAW    Narrative:     The following orders were created for panel order [place] Draw.  Procedure                               Abnormality         Status                     ---------                               -----------         ------                     Light Blue Top[[id number]]                                   Final result               Green Top (Gel)[[id number]]                                  Final result               Lavender Top[[id number]]                                     Final result               Red Top[[id number]]                                          Final result                 Please view results for these tests on the individual orders.   CBC AND DIFFERENTIAL    Narrative:     The following orders were created for panel order CBC & Differential.  Procedure                                Abnormality         Status                     ---------                               -----------         ------                     CBC Auto Differential[[id number]]        Abnormal            Final result                 Please view results for these tests on the individual orders.   LIGHT BLUE TOP   GREEN TOP   LAVENDER TOP   RED TOP                     No data recorded                        MDM    Final diagnoses:   Chest pain, unspecified type   Dyspnea, unspecified type   Pulmonary nodule   Mediastinal lymphadenopathy   Superior mesenteric artery stenosis (CMS/HCC)              Kevin Bell MD  12/09/19 0746       Kevin Bell MD  12/10/19 0621

## 2020-01-10 ENCOUNTER — OFFICE VISIT (OUTPATIENT)
Dept: CARDIOLOGY | Facility: CLINIC | Age: 85
End: 2020-01-10

## 2020-01-10 VITALS
HEART RATE: 67 BPM | HEIGHT: 64 IN | DIASTOLIC BLOOD PRESSURE: 80 MMHG | SYSTOLIC BLOOD PRESSURE: 150 MMHG | WEIGHT: 156 LBS | OXYGEN SATURATION: 96 % | BODY MASS INDEX: 26.63 KG/M2

## 2020-01-10 DIAGNOSIS — I10 ESSENTIAL HYPERTENSION: ICD-10-CM

## 2020-01-10 DIAGNOSIS — I44.7 LEFT BUNDLE BRANCH BLOCK: ICD-10-CM

## 2020-01-10 DIAGNOSIS — E78.2 MIXED HYPERLIPIDEMIA: ICD-10-CM

## 2020-01-10 DIAGNOSIS — I25.118 CORONARY ARTERY DISEASE OF NATIVE ARTERY OF NATIVE HEART WITH STABLE ANGINA PECTORIS (HCC): Primary | ICD-10-CM

## 2020-01-10 PROCEDURE — 99214 OFFICE O/P EST MOD 30 MIN: CPT | Performed by: INTERNAL MEDICINE

## 2020-01-10 PROCEDURE — 93000 ELECTROCARDIOGRAM COMPLETE: CPT | Performed by: INTERNAL MEDICINE

## 2020-01-10 RX ORDER — CARVEDILOL 3.12 MG/1
6.25 TABLET ORAL 2 TIMES DAILY
Qty: 180 TABLET | Refills: 3 | Status: SHIPPED | OUTPATIENT
Start: 2020-01-10 | End: 2020-03-02 | Stop reason: SDUPTHER

## 2020-01-10 NOTE — PROGRESS NOTES
Referring Provider: Everardo Jackson MD    Reason for Follow-up Visit: CAD    Subjective .   Chief Complaint:   Chief Complaint   Patient presents with   • Follow-up     yearly   • Coronary Artery Disease     pt had to go to the ER on 12/9/19 with chest pain.  pt states he woke up around 2 am and went to the ER.  the notes state it was BP related and added Coreg.   • Hypertension     BP is checked at home and pt brought a list of readings which show his BP goes up and down.   • Hyperlipidemia     last lab done at PCP LDL was 59 pt is on Lipitor 20.  pt also exercises at the gym 3 days a week.   • Fatigue     pt states his stamina has gotten lower since the episode on 12/9.  he complains of coughing and belching more.       History of present illness:  Wyatt Curry is a 89 y.o. yo male with history of CAD, s/p stent placement in the past, recently had an episode of chest pain that was felt to be noncardiac. He has had no further chest pain.       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   • GERD (gastroesophageal reflux disease)    • Hyperlipidemia    • Hypertension    • Irregular heart beat    ,   Past Surgical History:   Procedure Laterality Date   • BLADDER SURGERY     • CARDIAC CATHETERIZATION     • CORONARY ANGIOPLASTY WITH STENT PLACEMENT     • CYSTOSCOPY     • HERNIA REPAIR     • SHOULDER SURGERY     • SKIN BIOPSY      lip biopsy   • TRANSURETHRAL RESECTION OF BLADDER TUMOR     ,   Family History   Problem Relation Age of Onset   • Heart disease Father    • Heart attack Father    • Tuberculosis Mother    • Heart disease Brother    • Heart attack Brother    • Heart disease Brother    • Diabetes Sister    • Hypertension Sister    • Hyperlipidemia Sister    • Heart disease Sister    • Alcohol abuse Sister    ,   Social History     Tobacco Use   • Smoking  status: Former Smoker     Packs/day: 1.00     Years: 30.00     Pack years: 30.00     Types: Cigarettes     Start date:      Last attempt to quit: 1975     Years since quittin.0   • Smokeless tobacco: Never Used   Substance Use Topics   • Alcohol use: Yes     Alcohol/week: 1.0 standard drinks     Types: 1 Glasses of wine per week     Frequency: Monthly or less     Comment: occ wine   • Drug use: Never   ,     Medications  Current Outpatient Medications   Medication Sig Dispense Refill   • acetaminophen (TYLENOL) 500 MG tablet Take 500 mg by mouth Every 6 (Six) Hours As Needed for Mild Pain .     • amLODIPine (NORVASC) 10 MG tablet Take 10 mg by mouth Daily.  3   • aspirin 81 MG chewable tablet Chew 81 mg Daily.     • atorvastatin (LIPITOR) 20 MG tablet Take 20 mg by mouth Daily.     • carvedilol (COREG) 3.125 MG tablet Take 1 tablet by mouth 2 (Two) Times a Day. 60 tablet 11   • cholecalciferol (VITAMIN D3) 1000 units tablet Take 1,000 Units by mouth Daily.     • clopidogrel (PLAVIX) 75 MG tablet Take 75 mg by mouth Daily.     • irbesartan (AVAPRO) 300 MG tablet Take 300 mg by mouth Every Night.     • Magnesium 400 MG capsule Take  by mouth Daily.     • pantoprazole (PROTONIX) 40 MG EC tablet Take 40 mg by mouth 2 (Two) Times a Day.     • LYRICA 50 MG capsule Take 50 mg by mouth every night at bedtime.  5     No current facility-administered medications for this visit.        Allergies:  Lyrica [pregabalin]    Review of Systems  Review of Systems   HENT: Negative for nosebleeds.    Cardiovascular: Negative for chest pain, claudication, dyspnea on exertion, irregular heartbeat, leg swelling, near-syncope, orthopnea, palpitations, paroxysmal nocturnal dyspnea and syncope.   Respiratory: Negative for cough, hemoptysis and shortness of breath.    Gastrointestinal: Negative for dysphagia, hematemesis and melena.   Genitourinary: Negative for hematuria.   All other systems reviewed and are negative.      Objective  "    Physical Exam:  /80   Pulse 67   Ht 162.6 cm (64\")   Wt 70.8 kg (156 lb)   SpO2 96%   BMI 26.78 kg/m²   Physical Exam    Results Review:    ECG 12 Lead  Date/Time: 1/10/2020 8:40 AM  Performed by: Emigdio Moseley MD  Authorized by: Emigdio Moseley MD   Comparison: compared with previous ECG   Similar to previous ECG  Rhythm: sinus rhythm  Rate: normal  Conduction: left bundle branch block  QRS axis: normal    Clinical impression: abnormal EKG            Admission on 12/09/2019, Discharged on 12/09/2019   Component Date Value Ref Range Status   • Glucose 12/09/2019 131* 65 - 99 mg/dL Final   • BUN 12/09/2019 16  8 - 23 mg/dL Final   • Creatinine 12/09/2019 0.59* 0.76 - 1.27 mg/dL Final   • Sodium 12/09/2019 140  136 - 145 mmol/L Final   • Potassium 12/09/2019 4.0  3.5 - 5.2 mmol/L Final   • Chloride 12/09/2019 100  98 - 107 mmol/L Final   • CO2 12/09/2019 27.0  22.0 - 29.0 mmol/L Final   • Calcium 12/09/2019 10.0  8.6 - 10.5 mg/dL Final   • Total Protein 12/09/2019 7.5  6.0 - 8.5 g/dL Final   • Albumin 12/09/2019 4.30  3.50 - 5.20 g/dL Final   • ALT (SGPT) 12/09/2019 11  1 - 41 U/L Final   • AST (SGOT) 12/09/2019 21  1 - 40 U/L Final    Specimen hemolyzed.  Results may be affected.   • Alkaline Phosphatase 12/09/2019 71  39 - 117 U/L Final   • Total Bilirubin 12/09/2019 0.5  0.2 - 1.2 mg/dL Final   • eGFR Non African Amer 12/09/2019 129  >60 mL/min/1.73 Final   • Globulin 12/09/2019 3.2  gm/dL Final   • A/G Ratio 12/09/2019 1.3  g/dL Final   • BUN/Creatinine Ratio 12/09/2019 27.1* 7.0 - 25.0 Final   • Anion Gap 12/09/2019 13.0  5.0 - 15.0 mmol/L Final   • Troponin T 12/09/2019 <0.010  0.000 - 0.030 ng/mL Final   • Troponin T 12/09/2019 <0.010  0.000 - 0.030 ng/mL Final   • Extra Tube 12/09/2019 hold for add-on   Final    Auto resulted   • Extra Tube 12/09/2019 Hold for add-ons.   Final    Auto resulted.   • Extra Tube 12/09/2019 hold for add-on   Final    Auto resulted   • Extra Tube 12/09/2019 Hold for " add-ons.   Final    Auto resulted.   • WBC 12/09/2019 10.43  3.40 - 10.80 10*3/mm3 Final   • RBC 12/09/2019 4.55  4.14 - 5.80 10*6/mm3 Final   • Hemoglobin 12/09/2019 13.5  13.0 - 17.7 g/dL Final   • Hematocrit 12/09/2019 41.0  37.5 - 51.0 % Final   • MCV 12/09/2019 90.1  79.0 - 97.0 fL Final   • MCH 12/09/2019 29.7  26.6 - 33.0 pg Final   • MCHC 12/09/2019 32.9  31.5 - 35.7 g/dL Final   • RDW 12/09/2019 13.3  12.3 - 15.4 % Final   • RDW-SD 12/09/2019 43.8  37.0 - 54.0 fl Final   • MPV 12/09/2019 10.5  6.0 - 12.0 fL Final   • Platelets 12/09/2019 174  140 - 450 10*3/mm3 Final   • Neutrophil % 12/09/2019 83.5* 42.7 - 76.0 % Final   • Lymphocyte % 12/09/2019 7.8* 19.6 - 45.3 % Final   • Monocyte % 12/09/2019 7.4  5.0 - 12.0 % Final   • Eosinophil % 12/09/2019 0.8  0.3 - 6.2 % Final   • Basophil % 12/09/2019 0.2  0.0 - 1.5 % Final   • Immature Grans % 12/09/2019 0.3  0.0 - 0.5 % Final   • Neutrophils, Absolute 12/09/2019 8.72* 1.70 - 7.00 10*3/mm3 Final   • Lymphocytes, Absolute 12/09/2019 0.81  0.70 - 3.10 10*3/mm3 Final   • Monocytes, Absolute 12/09/2019 0.77  0.10 - 0.90 10*3/mm3 Final   • Eosinophils, Absolute 12/09/2019 0.08  0.00 - 0.40 10*3/mm3 Final   • Basophils, Absolute 12/09/2019 0.02  0.00 - 0.20 10*3/mm3 Final   • Immature Grans, Absolute 12/09/2019 0.03  0.00 - 0.05 10*3/mm3 Final   • nRBC 12/09/2019 0.0  0.0 - 0.2 /100 WBC Final   • proBNP 12/09/2019 662.7  5.0-1,800.0 pg/mL Final       Assessment/Plan   Wyatt was seen today for follow-up, coronary artery disease, hypertension, hyperlipidemia and fatigue.    Diagnoses and all orders for this visit:    Chest pain, resolved but complains of a cough. Symptoms may be due to reflux. May need a GI evaluation    Coronary artery disease of native artery of native heart with stable angina pectoris (CMS/HCC), The patient denies chest pain, shortness of breath, dyspnea on exertion, orthopnea, PND, edema. There is no evidence of ongoing ischemia    Left bundle  branch block, chronic and unchanged    Essential hypertension, running high. Will increase coreg to 6.25 mg bid    Mixed hyperlipidemia, Patient's statin therapy is followed by PCP.      Other orders  -     ECG 12 Lead        Patient's Body mass index is 26.78 kg/m². BMI is within normal parameters. No follow-up required..

## 2020-01-27 ENCOUNTER — PROCEDURE VISIT (OUTPATIENT)
Dept: UROLOGY | Facility: CLINIC | Age: 85
End: 2020-01-27

## 2020-01-27 DIAGNOSIS — C67.9 MALIGNANT NEOPLASM OF URINARY BLADDER, UNSPECIFIED SITE (HCC): Primary | ICD-10-CM

## 2020-01-27 PROCEDURE — 52000 CYSTOURETHROSCOPY: CPT | Performed by: UROLOGY

## 2020-01-27 PROCEDURE — 81003 URINALYSIS AUTO W/O SCOPE: CPT | Performed by: UROLOGY

## 2020-01-27 NOTE — PROGRESS NOTES
Pre- operative diagnosis:  Bladder cancer surveillance    Post operative diagnosis:  No recurrent bladder tumor    Procedure:  The patient was prepped and draped in a normal sterile fashion.  The urethra was anesthetized with 2% lidocaine jelly.  A flexible cystoscope was introduced per urethra.      Urethra:  Normal    Bladder:  heavy trabeculation    Ureteral orifices:  Normal position bilaterally and Clear efflux bilaterally    Prostate:  lateral lobe hypertrophy    Patient tolerated the procedure well    Complications: none    Blood loss: minimal    Follow up:    Routine follow up      Patient has a history of bladder cancer initially diagnosed back in 1978 by Dr. Hunter.  Most recently had a recurrence in 2007 and with induction BCG for carcinoma in situ..  He been without evidence of disease since then.  Surveillance cystoscopy negative today.    He did not have renal ultrasound today we will obtain that at next appointment.

## 2020-02-04 ENCOUNTER — OFFICE VISIT (OUTPATIENT)
Dept: PULMONOLOGY | Facility: CLINIC | Age: 85
End: 2020-02-04

## 2020-02-04 VITALS
DIASTOLIC BLOOD PRESSURE: 70 MMHG | HEIGHT: 64 IN | HEART RATE: 61 BPM | OXYGEN SATURATION: 96 % | WEIGHT: 155 LBS | BODY MASS INDEX: 26.46 KG/M2 | SYSTOLIC BLOOD PRESSURE: 128 MMHG

## 2020-02-04 DIAGNOSIS — J40 BRONCHITIS: ICD-10-CM

## 2020-02-04 DIAGNOSIS — R91.1 LUNG NODULE: Primary | ICD-10-CM

## 2020-02-04 DIAGNOSIS — R59.0 MEDIASTINAL ADENOPATHY: ICD-10-CM

## 2020-02-04 PROCEDURE — 99214 OFFICE O/P EST MOD 30 MIN: CPT | Performed by: INTERNAL MEDICINE

## 2020-02-04 NOTE — PATIENT INSTRUCTIONS
The patient did have recent evaluation regarding chest pain with a negative cardiac work-up.  I suspect back there may been a GI etiology but he did have a CT pulmonary angiogram which did show some small lung nodules but also some adenopathy particular in the right paratracheal location.  I am going to get a PET scan and then see him back in a few weeks to go over the results the above.  If the PET scan is not covered we will try to get a short-term follow-up CT next month which would be about 3 months from the prior CT.

## 2020-02-05 NOTE — PROGRESS NOTES
Subjective   Wyatt Curry is a 89 y.o. male.     Chief Complaint   Patient presents with   • Bronchitis      No My Sticky Note on file.    History of Present Illness   The patient had been seen in the emergency room at [facility] in early December with chest pain.  He describes a substernal pain that also went up into his neck and the back of his head and down his arms.  He also had a lot of problems with belching at the time.  He has had cardiac evaluation Dr. Moseley and is not felt that there was a cardiac etiology for his problems and is quite possible these symptoms may be GI in origin.  As part of his work-up however in the emergency room he had a CT pulmonary angiogram which was negative for pulmonary embolus but did show some lung nodules including a new lung nodule that was not present on scans done in 2008.  This was a fairly small nodule but the concern was that there is also an associated right paratracheal lymph node.  In this regard I think a PET scan would be reasonable schedule at [facility] [place] and see him back in a few weeks.  If for any reason is not covered we will try to do a short-term follow-up CT.  Again he had a lot of GI symptoms with his pain.  These have significantly improved.  He does have some back pain however and does have evidence of a mild compression deformity at T12 which is chronic.    Medical/Family/Social History   has a past medical history of Bladder cancer (CMS/HCC), CAD (coronary artery disease), Cancer (CMS/HCC), Carcinoma in situ of lip, GERD (gastroesophageal reflux disease), Hyperlipidemia, Hypertension, and Irregular heart beat.   has a past surgical history that includes Hernia repair; Cystoscopy; Bladder surgery; Cardiac catheterization; Coronary angioplasty with stent; Transurethral resection of bladder tumor; Shoulder surgery; and Skin biopsy.  family history includes Alcohol abuse in his sister; Diabetes in his sister; Heart attack in his brother and father; Heart  "disease in his brother, brother, father, and sister; Hyperlipidemia in his sister; Hypertension in his sister; Tuberculosis in his mother.   reports that he quit smoking about 45 years ago. His smoking use included cigarettes. He started smoking about 72 years ago. He has a 30.00 pack-year smoking history. He has never used smokeless tobacco. He reports that he drinks about 1.0 standard drinks of alcohol per week. He reports that he does not use drugs.  Allergies   Allergen Reactions   • Lyrica [Pregabalin] Mental Status Change     Made pt feel \"crazy\"     Medications    Current Outpatient Medications:   •  acetaminophen (TYLENOL) 500 MG tablet, Take 500 mg by mouth Every 6 (Six) Hours As Needed for Mild Pain ., Disp: , Rfl:   •  amLODIPine (NORVASC) 10 MG tablet, Take 10 mg by mouth Daily., Disp: , Rfl: 3  •  aspirin 81 MG chewable tablet, Chew 81 mg Daily., Disp: , Rfl:   •  atorvastatin (LIPITOR) 20 MG tablet, Take 20 mg by mouth Daily., Disp: , Rfl:   •  carvedilol (COREG) 3.125 MG tablet, Take 2 tablets by mouth 2 (Two) Times a Day., Disp: 180 tablet, Rfl: 3  •  cholecalciferol (VITAMIN D3) 1000 units tablet, Take 1,000 Units by mouth Daily., Disp: , Rfl:   •  clopidogrel (PLAVIX) 75 MG tablet, Take 75 mg by mouth Daily., Disp: , Rfl:   •  irbesartan (AVAPRO) 300 MG tablet, Take 300 mg by mouth Every Night., Disp: , Rfl:   •  Magnesium 400 MG capsule, Take  by mouth Daily., Disp: , Rfl:   •  pantoprazole (PROTONIX) 40 MG EC tablet, Take 40 mg by mouth 2 (Two) Times a Day., Disp: , Rfl:     Review of Systems   Constitutional: Negative for chills and fever.   HENT: Negative for congestion.    Eyes: Negative for visual disturbance.   Respiratory: Negative for cough and shortness of breath.    Cardiovascular: Positive for chest pain.   Gastrointestinal:        Again he had a lot of problems with belching when he had his substernal chest pain.   Genitourinary: Negative for difficulty urinating.   Musculoskeletal: " "Negative for arthralgias.   Skin: Negative for rash.   Neurological: Negative for dizziness and speech difficulty.   Hematological: Negative for adenopathy.   Psychiatric/Behavioral: The patient is not nervous/anxious.      ------------------------------------  Objective   /70   Pulse 61   Ht 162.6 cm (64\")   Wt 70.3 kg (155 lb)   SpO2 96% Comment: RA  BMI 26.61 kg/m²   Physical Exam   Constitutional: He is oriented to person, place, and time. He appears well-developed and well-nourished.   HENT:   Head: Normocephalic and atraumatic.   Eyes: Pupils are equal, round, and reactive to light. EOM are normal.   Neck: Normal range of motion. Neck supple.   Cardiovascular: Normal rate, regular rhythm and normal heart sounds.   Pulmonary/Chest: Effort normal and breath sounds normal.   Abdominal: Soft.   Musculoskeletal: Normal range of motion.   Neurological: He is alert and oriented to person, place, and time.   Skin: Skin is warm and dry.   He has a small excoriation on the right side of his forehead.   Psychiatric: He has a normal mood and affect.   Nursing note and vitals reviewed.              Pulmonary Functions Testing Results:  PFT Values        Some values may be hidden. Unless noted otherwise, only the newest values recorded on each date are displayed.         Old Values PFT Results 8/6/19   %   FEV1 112%   FEV1/FVC 76.30      Pre Drug PFT Results 8/6/19   No data to display.      Post Drug PFT Results 8/6/19   No data to display.      Other Tests PFT Results 8/6/19   No data to display.               Assessment/Plan   Wyatt was seen today for bronchitis.    Diagnoses and all orders for this visit:    Lung nodule  -     NM Pet Skull Base To Mid Thigh; Future    Mediastinal adenopathy    Bronchitis      Patient's Body mass index is 26.61 kg/m². BMI is within normal parameters. No follow-up required..      We will again try to get an outpatient PET scan and then see him back in about 2 weeks ago " results the above and determine need for any further work-up versus just serial imaging studies.

## 2020-02-10 ENCOUNTER — HOSPITAL ENCOUNTER (OUTPATIENT)
Dept: CT IMAGING | Facility: HOSPITAL | Age: 85
Discharge: HOME | End: 2020-02-10
Admitting: INTERNAL MEDICINE

## 2020-02-10 ENCOUNTER — HOSPITAL ENCOUNTER (OUTPATIENT)
Dept: CT IMAGING | Facility: HOSPITAL | Age: 85
Discharge: HOME | End: 2020-02-10

## 2020-02-10 DIAGNOSIS — R91.1 LUNG NODULE: ICD-10-CM

## 2020-02-10 PROCEDURE — A9552 F18 FDG: HCPCS | Performed by: INTERNAL MEDICINE

## 2020-02-10 PROCEDURE — 78815 PET IMAGE W/CT SKULL-THIGH: CPT

## 2020-02-10 PROCEDURE — 0 FLUDEOXYGLUCOSE F18 SOLUTION: Performed by: INTERNAL MEDICINE

## 2020-02-10 RX ADMIN — FLUDEOXYGLUCOSE F18 1 DOSE: 300 INJECTION INTRAVENOUS at 09:29

## 2020-02-20 ENCOUNTER — OFFICE VISIT (OUTPATIENT)
Dept: PULMONOLOGY | Facility: CLINIC | Age: 85
End: 2020-02-20

## 2020-02-20 VITALS
BODY MASS INDEX: 25.78 KG/M2 | HEART RATE: 62 BPM | HEIGHT: 64 IN | SYSTOLIC BLOOD PRESSURE: 120 MMHG | WEIGHT: 151 LBS | OXYGEN SATURATION: 97 % | DIASTOLIC BLOOD PRESSURE: 60 MMHG

## 2020-02-20 DIAGNOSIS — R94.8 ABNORMAL POSITRON EMISSION TOMOGRAPHY (PET) SCAN: ICD-10-CM

## 2020-02-20 DIAGNOSIS — R59.0 MEDIASTINAL ADENOPATHY: ICD-10-CM

## 2020-02-20 DIAGNOSIS — R05.9 COUGH: ICD-10-CM

## 2020-02-20 DIAGNOSIS — R91.1 LUNG NODULE: Primary | ICD-10-CM

## 2020-02-20 PROCEDURE — 99214 OFFICE O/P EST MOD 30 MIN: CPT | Performed by: INTERNAL MEDICINE

## 2020-02-20 NOTE — PATIENT INSTRUCTIONS
I did go over the patient's PET scan results with him.  We had called him the results by phone last week.  Because the activity in the pituitary he may need further evaluation but I told him I will call Dr. Jackson and let him coordinate this.  He is scheduled see Dr. Hurt for an eye exam early next week and certainly he could assess the patient regarding any possible visual field issues if there is a pituitary abnormality.  Otherwise I will see him back in 6 months with a flow volume loop.

## 2020-02-20 NOTE — PROGRESS NOTES
Subjective   Wyatt Curry is a 89 y.o. male.     Chief Complaint   Patient presents with   • Lung Nodule      No My Sticky Note on file.    History of Present Illness   The patient's PET scan did not show any significant activity within the thorax.  He did have some activity in the pituitary area.  I am unsure of the significance of this but I will contact Dr. Jackson and defer further evaluation of this to him possibly to include neuroimaging studies or possibly even referral to neurology or neurosurgery.  The patient is scheduled to see Dr. Max regarding his ophthalmologic status early next week and certainly Dr. Max can see if there is any evidence of any visual field defects that could be associated with a pituitary lesion.  The patient does not complain of anything regarding his vision except sometimes it takes him a bit to focus which she attributes to previous cataract surgery.  He does have some cough and associated belching.  He is on acid blockers and just recently increased his dose from his once a day to twice a day.  I told him I think his cough is very likely related to reflux.  Otherwise I told him we will just see him back in several months but I will defer any additional work-up regarding his possible pituitary abnormalities to Dr. Jackson.    Medical/Family/Social History   has a past medical history of Bladder cancer (CMS/HCC), CAD (coronary artery disease), Cancer (CMS/HCC) (1975), Carcinoma in situ of lip, GERD (gastroesophageal reflux disease), Hyperlipidemia, Hypertension, and Irregular heart beat.   has a past surgical history that includes Hernia repair; Cystoscopy; Bladder surgery; Cardiac catheterization; Coronary angioplasty with stent; Transurethral resection of bladder tumor; Shoulder surgery; and Skin biopsy.  family history includes Alcohol abuse in his sister; Diabetes in his sister; Heart attack in his brother and father; Heart disease in his brother, brother, father, and sister;  "Hyperlipidemia in his sister; Hypertension in his sister; Tuberculosis in his mother.   reports that he quit smoking about 45 years ago. His smoking use included cigarettes. He started smoking about 72 years ago. He has a 30.00 pack-year smoking history. He has never used smokeless tobacco. He reports that he drinks about 1.0 standard drinks of alcohol per week. He reports that he does not use drugs.  Allergies   Allergen Reactions   • Lyrica [Pregabalin] Mental Status Change     Made pt feel \"crazy\"     Medications    Current Outpatient Medications:   •  acetaminophen (TYLENOL) 500 MG tablet, Take 500 mg by mouth Every 6 (Six) Hours As Needed for Mild Pain ., Disp: , Rfl:   •  amLODIPine (NORVASC) 10 MG tablet, Take 10 mg by mouth Daily., Disp: , Rfl: 3  •  aspirin 81 MG chewable tablet, Chew 81 mg Daily., Disp: , Rfl:   •  atorvastatin (LIPITOR) 20 MG tablet, Take 20 mg by mouth Daily., Disp: , Rfl:   •  carvedilol (COREG) 3.125 MG tablet, Take 2 tablets by mouth 2 (Two) Times a Day., Disp: 180 tablet, Rfl: 3  •  cholecalciferol (VITAMIN D3) 1000 units tablet, Take 1,000 Units by mouth Daily., Disp: , Rfl:   •  clopidogrel (PLAVIX) 75 MG tablet, Take 75 mg by mouth Daily., Disp: , Rfl:   •  irbesartan (AVAPRO) 300 MG tablet, Take 300 mg by mouth Every Night., Disp: , Rfl:   •  Magnesium 400 MG capsule, Take  by mouth Daily., Disp: , Rfl:   •  pantoprazole (PROTONIX) 40 MG EC tablet, Take 40 mg by mouth 2 (Two) Times a Day., Disp: , Rfl:     Review of Systems   Constitutional: Negative for chills and fever.   HENT: Negative for congestion.    Eyes: Negative for visual disturbance.        His only ophthalmologic complaint is that sometimes it takes him a bit of time to focus and he attributes a lot of this to his previous cataract surgery.   Respiratory: Positive for cough. Negative for shortness of breath.    Cardiovascular: Positive for chest pain.   Gastrointestinal: Positive for GERD and indigestion. Negative " "for diarrhea, nausea and vomiting.        He does complain of belching.   Genitourinary: Negative for difficulty urinating.   Musculoskeletal: Negative for arthralgias.   Skin: Negative for rash.   Neurological: Negative for dizziness and speech difficulty.   Psychiatric/Behavioral: The patient is not nervous/anxious.      ------------------------------------  Objective   /60   Pulse 62   Ht 162.6 cm (64\")   Wt 68.5 kg (151 lb)   SpO2 97% Comment: RA  BMI 25.92 kg/m²   Physical Exam   Constitutional: He is oriented to person, place, and time. He appears well-developed and well-nourished.   HENT:   Head: Normocephalic and atraumatic.   Eyes: Pupils are equal, round, and reactive to light. EOM are normal.   Neck: Normal range of motion. Neck supple.   Cardiovascular: Normal rate, regular rhythm and normal heart sounds.   Pulmonary/Chest: Effort normal and breath sounds normal.   Abdominal: Soft.   Musculoskeletal: Normal range of motion.   Neurological: He is alert and oriented to person, place, and time.   Skin: Skin is warm and dry.   Psychiatric: He has a normal mood and affect.   Nursing note and vitals reviewed.    Nm Pet Skull Base To Mid Thigh    Result Date: 2/10/2020  Impression: 1. Focal uptake in the pituitary fossa with an SUV max of 4.7. Pituitary tumor would be in the differential. Consider follow-up pituitary protocol MRI. 2. Mild uptake in hilar and mediastinal lymph nodes is felt to be postinflammatory/granulomatous. 3. No abnormal uptake in the 3 mm right upper lobe. New pulmonary nodule. This is below the limits of resolution for PET imaging. Follow-up CT in December 2020 recommended. 4. Atheromatous disease of the aortoiliac vessels and coronary arteries. Cardiomegaly. Dilated pulmonary arteries. 5. Bladder wall thickening is probably an artifact of underdistention. Acute and chronic inflammation are in the differential. 6. Diverticulosis of the colon. Small fat-containing right inguinal " hernia. 7. Uptake in the shoulder joints bilaterally is likely inflammatory. There has been prior left shoulder arthroplasty. There are degenerative changes of the right shoulder. This report was finalized on 02/10/2020 11:50 by Dr. Vic Mcneal MD.           Pulmonary Functions Testing Results:  PFT Values        Some values may be hidden. Unless noted otherwise, only the newest values recorded on each date are displayed.         Old Values PFT Results 8/6/19   %   FEV1 112%   FEV1/FVC 76.30      Pre Drug PFT Results 8/6/19   No data to display.      Post Drug PFT Results 8/6/19   No data to display.      Other Tests PFT Results 8/6/19   No data to display.                 Assessment/Plan   Wyatt was seen today for lung nodule.    Diagnoses and all orders for this visit:    Lung nodule    Mediastinal adenopathy    Cough    Abnormal positron emission tomography (PET) scan      Patient's Body mass index is 25.92 kg/m². BMI is within normal parameters. No follow-up required..      Again, based on the patient's PET scan the lung nodule and adenopathy are most likely to old granulomatous disease and we will just follow those with serial imaging studies.  I do think his cough relates to acid reflux and he is treating this.  Again will defer further evaluation of his abnormalities on his PET scan involving the pituitary fossa to his primary care physician.  I will obtain a flow volume loop on return visit in 6 months.  I did provide him a copy of his PET scan report to take to his ophthalmology appointment.

## 2020-02-25 ENCOUNTER — TRANSCRIBE ORDERS (OUTPATIENT)
Dept: ADMINISTRATIVE | Facility: HOSPITAL | Age: 85
End: 2020-02-25

## 2020-02-26 ENCOUNTER — TRANSCRIBE ORDERS (OUTPATIENT)
Dept: ADMINISTRATIVE | Facility: HOSPITAL | Age: 85
End: 2020-02-26

## 2020-02-26 DIAGNOSIS — R93.0 ABNORMAL FINDINGS ON DIAGNOSTIC IMAGING OF SKULL AND HEAD, NOT ELSEWHERE CLASSIFIED: Primary | ICD-10-CM

## 2020-02-26 NOTE — PROGRESS NOTES
Name:  Wyatt Curry  YOB: 1930  Location: [facility]  Location Address: [address] [place] [address], [facility] [address], [address] [place] [address]  Location Phone: [phone]    Chief Complaint  Chief Complaint   Patient presents with   • Skin Lesion     left upper lip       History of Present Illness  The patient  is a 89 y.o. male who is referred by Yessica Nickerson MD for preoperative evaluation. He complains of a lesion of the left upper cutaneous lip present for >5 month(s). It has been  biopsied.  Pathology demonstrated a basal cell carcinoma     I have personally reviewed the information imported into the chart during this visit.      I have personally reviewed the review of systems.                       Past Medical History:   Diagnosis Date   • Bladder cancer (CMS/HCC)    • CAD (coronary artery disease)    • Cancer (CMS/HCC)     bladder cancer   • 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   • GERD (gastroesophageal reflux disease)    • Hyperlipidemia    • Hypertension    • Irregular heart beat        Past Surgical History:   Procedure Laterality Date   • BLADDER SURGERY     • CARDIAC CATHETERIZATION     • CORONARY ANGIOPLASTY WITH STENT PLACEMENT     • CYSTOSCOPY     • HERNIA REPAIR     • SHOULDER SURGERY     • SKIN BIOPSY      lip biopsy   • TRANSURETHRAL RESECTION OF BLADDER TUMOR           Current Outpatient Medications:   •  acetaminophen (TYLENOL) 500 MG tablet, Take 500 mg by mouth Every 6 (Six) Hours As Needed for Mild Pain ., Disp: , Rfl:   •  amLODIPine (NORVASC) 10 MG tablet, Take 10 mg by mouth Daily., Disp: , Rfl: 3  •  aspirin 81 MG chewable tablet, Chew 81 mg Daily., Disp: , Rfl:   •  atorvastatin (LIPITOR) 20 MG tablet, Take 20 mg by mouth Daily., Disp: , Rfl:   •  carvedilol (COREG) 3.125 MG tablet, Take 2 tablets by mouth 2 (Two) Times a Day., Disp: 180 tablet, Rfl: 3  •  cholecalciferol (VITAMIN D3) 1000 units tablet, Take 1,000 Units by mouth Daily., Disp: , Rfl:   •   clopidogrel (PLAVIX) 75 MG tablet, Take 75 mg by mouth Daily., Disp: , Rfl:   •  irbesartan (AVAPRO) 300 MG tablet, Take 300 mg by mouth Every Night., Disp: , Rfl:   •  Magnesium 400 MG capsule, Take  by mouth Daily., Disp: , Rfl:   •  pantoprazole (PROTONIX) 40 MG EC tablet, Take 40 mg by mouth 2 (Two) Times a Day., Disp: , Rfl:     Lyrica [pregabalin]    Family History   Problem Relation Age of Onset   • Heart disease Father    • Heart attack Father    • Tuberculosis Mother    • Heart disease Brother    • Heart attack Brother    • Heart disease Brother    • Diabetes Sister    • Hypertension Sister    • Hyperlipidemia Sister    • Heart disease Sister    • Alcohol abuse Sister        Social History     Socioeconomic History   • Marital status:      Spouse name: Not on file   • Number of children: Not on file   • Years of education: Not on file   • Highest education level: Not on file   Tobacco Use   • Smoking status: Former Smoker     Packs/day: 1.00     Years: 30.00     Pack years: 30.00     Types: Cigarettes     Start date:      Last attempt to quit:      Years since quittin.1   • Smokeless tobacco: Never Used   Substance and Sexual Activity   • Alcohol use: Yes     Alcohol/week: 1.0 standard drinks     Types: 1 Glasses of wine per week     Frequency: Monthly or less     Comment: occ wine   • Drug use: Never   • Sexual activity: Defer       Review of Systems   Constitutional: Negative.    HENT: Negative.    Eyes: Negative.    Respiratory: Negative.    Cardiovascular: Negative.    Gastrointestinal: Negative.    Endocrine: Negative.    Genitourinary: Negative.    Musculoskeletal: Negative.    Skin:         lesion of the left upper cutaneous lip    Allergic/Immunologic: Negative.    Neurological: Negative.    Hematological: Negative.    Psychiatric/Behavioral: Negative.        Vitals:    20 1335   BP: 136/61   Pulse: 65   Temp: 96.3 °F (35.7 °C)       Objective     Physical  Exam  CONSTITUTIONAL: well nourished, well-developed, alert, oriented, in no acute distress     COMMUNICATION AND VOICE: able to communicate normally, normal voice quality    HEAD: normocephalic, no lesions, atraumatic, no tenderness, no masses     FACE: appearance normal, no lesions, no tenderness, no deformities, facial motion symmetric    EYES: ocular motility normal, eyelids normal, orbits normal, no proptosis, conjunctiva normal , pupils equal, round     EARS:  Hearing: hearing to conversational voice intact bilaterally   External Ears: normal bilaterally, no lesions    NOSE:  External Nose: external nasal structure normal, no tenderness on palpation, no nasal discharge, no lesions, no evidence of trauma, nostrils patent     ORAL:  Lips: Lower lip without lesion, 6 mm RPP left oral commissure lateral aspect of upper lip near nasolabial fold    NECK:  Inspection and Palpation: neck appearance normal, no masses or tenderness    CHEST/RESPIRATORY: normal respiratory effort     CARDIOVASCULAR: no cyanosis or edema     NEUROLOGICAL/PSYCHIATRIC: oriented to time, place and person, mood normal, affect appropriate, CN II-XII intact grossly      Assessment/Plan     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 with frozen section and possible flap or graft (Left)  Orders Placed This Encounter   Procedures   • XR Chest 2 View     Standing Status:   Future     Standing Expiration Date:   2/26/2021     Order Specific Question:   Reason for Exam:     Answer:   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 with frozen section and possible flap or graft   • Comprehensive Metabolic Panel     Standing Status:   Future     Standing Expiration Date:   2/26/2021   • Follow Anesthesia Guidelines / Standing Orders     Standing Status:   Future   • Obtain Informed Consent     EXCISION LESION with possible flap or graft-     Order Specific Question:   Informed Consent Given For      "Answer:   EXCISION LESION with possible flap or graft-   • ECG 12 Lead     Standing Status:   Future     Standing Expiration Date:   2/26/2021     Order Specific Question:   Reason for Exam:     Answer:   EXCISION LESION   • CBC and Differential     Standing Status:   Future     Standing Expiration Date:   2/26/2021     Order Specific Question:   Manual Differential     Answer:   No     Return for postop.       Patient Instructions   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 with frozen section and possible flap or graft      The risks, benefits and options of the procedure were explained to the patient. The possiblity of a persistent cosmetic defect as well as a \"flap\" or a graft to close the defect was discussed. The patient was instructed to stop all aspirin, NSAIDs and VIT E etc.  If appropriate, clearance with primary MD or specialist will be obtained preoperatively.  The patient was scheduled for a LOCAL/MAC Anesthesia with a frozen section for margin control.    For instructions on the proper use, care and disposal of controled substances, ask you doctor or refer to the [place] Board of Medicine website: http://kbml.[place].gov/hb1/Pages/Considerations-For-Patient-Education.aspx            "

## 2020-02-27 ENCOUNTER — OFFICE VISIT (OUTPATIENT)
Dept: OTOLARYNGOLOGY | Facility: CLINIC | Age: 85
End: 2020-02-27

## 2020-02-27 ENCOUNTER — TELEPHONE (OUTPATIENT)
Dept: OTOLARYNGOLOGY | Facility: CLINIC | Age: 85
End: 2020-02-27

## 2020-02-27 VITALS
WEIGHT: 157 LBS | SYSTOLIC BLOOD PRESSURE: 136 MMHG | HEIGHT: 64 IN | BODY MASS INDEX: 26.8 KG/M2 | TEMPERATURE: 96.3 F | DIASTOLIC BLOOD PRESSURE: 61 MMHG | HEART RATE: 65 BPM

## 2020-02-27 DIAGNOSIS — C44.319 BASAL CELL CARCINOMA (BCC) OF SKIN OF OTHER PART OF FACE: Primary | ICD-10-CM

## 2020-02-27 PROCEDURE — 99202 OFFICE O/P NEW SF 15 MIN: CPT | Performed by: OTOLARYNGOLOGY

## 2020-02-27 NOTE — PATIENT INSTRUCTIONS
"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 with frozen section and possible flap or graft      The risks, benefits and options of the procedure were explained to the patient. The possiblity of a persistent cosmetic defect as well as a \"flap\" or a graft to close the defect was discussed. The patient was instructed to stop all aspirin, NSAIDs and VIT E etc.  If appropriate, clearance with primary MD or specialist will be obtained preoperatively.  The patient was scheduled for a LOCAL/MAC Anesthesia with a frozen section for margin control.    For instructions on the proper use, care and disposal of controled substances, ask you doctor or refer to the [place] Board of Medicine website: http://kbml.[place].gov/hb1/Pages/Considerations-For-Patient-Education.aspx  "

## 2020-02-28 PROBLEM — C44.91 BASAL CELL CARCINOMA: Status: ACTIVE | Noted: 2020-02-28

## 2020-03-02 ENCOUNTER — APPOINTMENT (OUTPATIENT)
Dept: PREADMISSION TESTING | Facility: HOSPITAL | Age: 85
End: 2020-03-02

## 2020-03-02 ENCOUNTER — HOSPITAL ENCOUNTER (OUTPATIENT)
Dept: MRI IMAGING | Facility: HOSPITAL | Age: 85
Discharge: HOME | End: 2020-03-02
Admitting: FAMILY MEDICINE

## 2020-03-02 ENCOUNTER — HOSPITAL ENCOUNTER (OUTPATIENT)
Dept: GENERAL RADIOLOGY | Facility: HOSPITAL | Age: 85
Discharge: HOME | End: 2020-03-02

## 2020-03-02 VITALS
RESPIRATION RATE: 16 BRPM | BODY MASS INDEX: 27.89 KG/M2 | OXYGEN SATURATION: 94 % | SYSTOLIC BLOOD PRESSURE: 161 MMHG | HEIGHT: 63 IN | HEART RATE: 76 BPM | DIASTOLIC BLOOD PRESSURE: 66 MMHG | WEIGHT: 157.41 LBS

## 2020-03-02 DIAGNOSIS — C44.319 BASAL CELL CARCINOMA (BCC) OF SKIN OF OTHER PART OF FACE: ICD-10-CM

## 2020-03-02 DIAGNOSIS — R93.0 ABNORMAL FINDINGS ON DIAGNOSTIC IMAGING OF SKULL AND HEAD, NOT ELSEWHERE CLASSIFIED: ICD-10-CM

## 2020-03-02 LAB
ALBUMIN SERPL-MCNC: 4.5 G/DL (ref 3.5–5.2)
ALBUMIN/GLOB SERPL: 1.4 G/DL
ALP SERPL-CCNC: 64 U/L (ref 39–117)
ALT SERPL W P-5'-P-CCNC: 11 U/L (ref 1–41)
ANION GAP SERPL CALCULATED.3IONS-SCNC: 11 MMOL/L (ref 5–15)
AST SERPL-CCNC: 19 U/L (ref 1–40)
BASOPHILS # BLD AUTO: 0.03 10*3/MM3 (ref 0–0.2)
BASOPHILS NFR BLD AUTO: 0.7 % (ref 0–1.5)
BILIRUB SERPL-MCNC: 0.5 MG/DL (ref 0.2–1.2)
BUN BLD-MCNC: 13 MG/DL (ref 8–23)
BUN/CREAT SERPL: 21.3 (ref 7–25)
CALCIUM SPEC-SCNC: 9.8 MG/DL (ref 8.6–10.5)
CHLORIDE SERPL-SCNC: 101 MMOL/L (ref 98–107)
CO2 SERPL-SCNC: 30 MMOL/L (ref 22–29)
CREAT BLD-MCNC: 0.61 MG/DL (ref 0.76–1.27)
CREAT BLDA-MCNC: 0.8 MG/DL (ref 0.6–1.3)
DEPRECATED RDW RBC AUTO: 46.1 FL (ref 37–54)
EOSINOPHIL # BLD AUTO: 0.09 10*3/MM3 (ref 0–0.4)
EOSINOPHIL NFR BLD AUTO: 2 % (ref 0.3–6.2)
ERYTHROCYTE [DISTWIDTH] IN BLOOD BY AUTOMATED COUNT: 13.7 % (ref 12.3–15.4)
GFR SERPL CREATININE-BSD FRML MDRD: 124 ML/MIN/1.73
GLOBULIN UR ELPH-MCNC: 3.2 GM/DL
GLUCOSE BLD-MCNC: 113 MG/DL (ref 65–99)
HCT VFR BLD AUTO: 43.5 % (ref 37.5–51)
HGB BLD-MCNC: 14.2 G/DL (ref 13–17.7)
IMM GRANULOCYTES # BLD AUTO: 0.01 10*3/MM3 (ref 0–0.05)
IMM GRANULOCYTES NFR BLD AUTO: 0.2 % (ref 0–0.5)
LYMPHOCYTES # BLD AUTO: 1.38 10*3/MM3 (ref 0.7–3.1)
LYMPHOCYTES NFR BLD AUTO: 30.9 % (ref 19.6–45.3)
MCH RBC QN AUTO: 30.7 PG (ref 26.6–33)
MCHC RBC AUTO-ENTMCNC: 32.6 G/DL (ref 31.5–35.7)
MCV RBC AUTO: 94 FL (ref 79–97)
MONOCYTES # BLD AUTO: 0.57 10*3/MM3 (ref 0.1–0.9)
MONOCYTES NFR BLD AUTO: 12.8 % (ref 5–12)
NEUTROPHILS # BLD AUTO: 2.38 10*3/MM3 (ref 1.7–7)
NEUTROPHILS NFR BLD AUTO: 53.4 % (ref 42.7–76)
NRBC BLD AUTO-RTO: 0 /100 WBC (ref 0–0.2)
PLATELET # BLD AUTO: 142 10*3/MM3 (ref 140–450)
PMV BLD AUTO: 10.1 FL (ref 6–12)
POTASSIUM BLD-SCNC: 4 MMOL/L (ref 3.5–5.2)
PROT SERPL-MCNC: 7.7 G/DL (ref 6–8.5)
RBC # BLD AUTO: 4.63 10*6/MM3 (ref 4.14–5.8)
SODIUM BLD-SCNC: 142 MMOL/L (ref 136–145)
WBC NRBC COR # BLD: 4.46 10*3/MM3 (ref 3.4–10.8)

## 2020-03-02 PROCEDURE — 70553 MRI BRAIN STEM W/O & W/DYE: CPT

## 2020-03-02 PROCEDURE — A9577 INJ MULTIHANCE: HCPCS | Performed by: FAMILY MEDICINE

## 2020-03-02 PROCEDURE — 0 GADOBENATE DIMEGLUMINE 529 MG/ML SOLUTION: Performed by: FAMILY MEDICINE

## 2020-03-02 PROCEDURE — 80053 COMPREHEN METABOLIC PANEL: CPT | Performed by: OTOLARYNGOLOGY

## 2020-03-02 PROCEDURE — 93010 ELECTROCARDIOGRAM REPORT: CPT | Performed by: INTERNAL MEDICINE

## 2020-03-02 PROCEDURE — 85025 COMPLETE CBC W/AUTO DIFF WBC: CPT | Performed by: OTOLARYNGOLOGY

## 2020-03-02 PROCEDURE — 82565 ASSAY OF CREATININE: CPT

## 2020-03-02 PROCEDURE — 93005 ELECTROCARDIOGRAM TRACING: CPT

## 2020-03-02 PROCEDURE — 71046 X-RAY EXAM CHEST 2 VIEWS: CPT

## 2020-03-02 PROCEDURE — 36415 COLL VENOUS BLD VENIPUNCTURE: CPT

## 2020-03-02 RX ORDER — CARVEDILOL 3.12 MG/1
6.25 TABLET ORAL 2 TIMES DAILY WITH MEALS
Status: ON HOLD | COMMUNITY
End: 2020-03-09

## 2020-03-02 RX ORDER — CARVEDILOL 6.25 MG/1
6.25 TABLET ORAL 2 TIMES DAILY
Qty: 180 TABLET | Refills: 4 | Status: SHIPPED | OUTPATIENT
Start: 2020-03-02 | End: 2021-11-10

## 2020-03-02 RX ADMIN — GADOBENATE DIMEGLUMINE 14 ML: 529 INJECTION, SOLUTION INTRAVENOUS at 08:49

## 2020-03-02 NOTE — DISCHARGE INSTRUCTIONS
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  Following surgery it is unrealistic to expect you will not have pain.   Pain is how our bodies let us know that something is wrong or cautions us to be careful.  That said, our goal is to make your pain tolerable.    Methods we may use to treat your pain include (oral or IV medications, PCAs, epidurals, nerve blocks, etc.)   While some procedures require IV pain medications for a short time after surgery, transitioning to pain medications by mouth allows for better management of pain.   Your nurse will encourage you to take oral pain medications whenever possible.  IV medications work almost immediately, but only last a short while.  Taking medications by mouth allows for a more constant level of medication in your blood stream for a longer period of time.      Once your pain is out of control it is harder to get back under control.  It is important you are aware when your next dose of pain medication is due.  If you are admitted, your nurse may write the time of your next dose on the white board in your room to help you remember.      We are interested in your pain and encourage you to inform us about aggravating factors during your visit.   Many times a simple repositioning every few hours can make a big difference.    If your physician says it is okay, do not let your  pain prevent you from getting out of bed. Be sure to call your nurse for assistance prior to getting up so you do not fall.      Before surgery, please decide your tolerable pain goal.  These faces help describe the pain ratings we use on a 0-10 scale.   Be prepared to tell us your goal and whether or not you take pain or anxiety medications at home.      BEFORE YOU COME TO THE HOSPITAL  (Pre-op instructions)  • Do not eat, drink, smoke or chew gum after midnight the night before surgery.  This also includes no mints.  • Morning of surgery take only the medicines you have been instructed with a sip of water unless otherwise instructed  by your physician.  • Do not shave, wear makeup or dark nail polish.  • Remove all jewelry including rings.  • Leave anything you consider valuable at home.  • Leave your suitcase in the car until after your surgery.  • Bring the following with you if applicable:  o Picture ID and insurance, Medicare or Medicaid cards  o Co-pay/deductible required by insurance (cash, check, credit card)  o Copy of advance directive, living will or power-of- documents if not brought to PAT  o CPAP or BIPAP mask and tubing  o Relaxation aids ( book, magazine), etc.  o Hearing aids                                 ON THE DAY OF SURGERY  · On the day of surgery check in at registration located at the main entrance of the hospital.   ? You will be registered and given a beeper with instructions where to wait in the main lobby.  ? When your beeper lights up and vibrates a member of the Outpatient Surgery staff will meet you at the double doors under the stair steps and escort you to your preoperative room.   · You may have cloth compression devices placed on your legs. These help to prevent blood clots and reduce swelling in your legs.  · An IV may be inserted into one of your veins.  · In the operating room, you may be given one or more of the following:  ? A medicine to help you relax  "(sedative).  ? A medicine to numb the area (local anesthetic).  ? A medicine to make you fall asleep (general anesthetic).  ? A medicine that is injected into an area of your body to numb everything below the injection site (regional anesthetic).  · Your surgical site will be marked or identified.  · You may be given an antibiotic through your IV to help prevent infection.  Contact a health care provider if you:  · Develop a fever of more than 100.4°F (38°C) or other feelings of illness during the 48 hours before your surgery.  · Have symptoms that get worse.  Have questions or concerns about your surgery    General Anesthesia/Surgery, Adult  General anesthesia is the use of medicines to make a person \"go to sleep\" (unconscious) for a medical procedure. General anesthesia must be used for certain procedures, and is often recommended for procedures that:  · Last a long time.  · Require you to be still or in an unusual position.  · Are major and can cause blood loss.  The medicines used for general anesthesia are called general anesthetics. As well as making you unconscious for a certain amount of time, these medicines:  · Prevent pain.  · Control your blood pressure.  · Relax your muscles.  Tell a health care provider about:  · Any allergies you have.  · All medicines you are taking, including vitamins, herbs, eye drops, creams, and over-the-counter medicines.  · Any problems you or family members have had with anesthetic medicines.  · Types of anesthetics you have had in the past.  · Any blood disorders you have.  · Any surgeries you have had.  · Any medical conditions you have.  · Any recent upper respiratory, chest, or ear infections.  · Any history of:  ? Heart or lung conditions, such as heart failure, sleep apnea, asthma, or chronic obstructive pulmonary disease (COPD).  ?  service.  ? Depression or anxiety.  · Any tobacco or drug use, including marijuana or alcohol use.  · Whether you are pregnant or " may be pregnant.  What are the risks?  Generally, this is a safe procedure. However, problems may occur, including:  · Allergic reaction.  · Lung and heart problems.  · Inhaling food or liquid from the stomach into the lungs (aspiration).  · Nerve injury.  · Air in the bloodstream, which can lead to stroke.  · Extreme agitation or confusion (delirium) when you wake up from the anesthetic.  · Waking up during your procedure and being unable to move. This is rare.  These problems are more likely to develop if you are having a major surgery or if you have an advanced or serious medical condition. You can prevent some of these complications by answering all of your health care provider's questions thoroughly and by following all instructions before your procedure.  General anesthesia can cause side effects, including:  · Nausea or vomiting.  · A sore throat from the breathing tube.  · Hoarseness.  · Wheezing or coughing.  · Shaking chills.  · Tiredness.  · Body aches.  · Anxiety.  · Sleepiness or drowsiness.  · Confusion or agitation.  RISKS AND COMPLICATIONS OF SURGERY  Your health care provider will discuss possible risks and complications with you before surgery. Common risks and complications include:    · Problems due to the use of anesthetics.  · Blood loss and replacement (does not apply to minor surgical procedures).  · Temporary increase in pain due to surgery.  · Uncorrected pain or problems that the surgery was meant to correct.  · Infection.  · New damage.    What happens before the procedure?    Medicines  Ask your health care provider about:  · Changing or stopping your regular medicines. This is especially important if you are taking diabetes medicines or blood thinners.  · Taking medicines such as aspirin and ibuprofen. These medicines can thin your blood. Do not take these medicines unless your health care provider tells you to take them.  · Taking over-the-counter medicines, vitamins, herbs, and  supplements. Do not take these during the week before your procedure unless your health care provider approves them.  General instructions  · Starting 3-6 weeks before the procedure, do not use any products that contain nicotine or tobacco, such as cigarettes and e-cigarettes. If you need help quitting, ask your health care provider.  · If you brush your teeth on the morning of the procedure, make sure to spit out all of the toothpaste.  · Tell your health care provider if you become ill or develop a cold, cough, or fever.  · If instructed by your health care provider, bring your sleep apnea device with you on the day of your surgery (if applicable).  · Ask your health care provider if you will be going home the same day, the following day, or after a longer hospital stay.  ? Plan to have someone take you home from the hospital or clinic.  ? Plan to have a responsible adult care for you for at least 24 hours after you leave the hospital or clinic. This is important.  What happens during the procedure?  · You will be given anesthetics through both of the following:  ? A mask placed over your nose and mouth.  ? An IV in one of your veins.  · You may receive a medicine to help you relax (sedative).  · After you are unconscious, a breathing tube may be inserted down your throat to help you breathe. This will be removed before you wake up.  · An anesthesia specialist will stay with you throughout your procedure. He or she will:  ? Keep you comfortable and safe by continuing to give you medicines and adjusting the amount of medicine that you get.  ? Monitor your blood pressure, pulse, and oxygen levels to make sure that the anesthetics do not cause any problems.  The procedure may vary among health care providers and hospitals.  What happens after the procedure?  · Your blood pressure, temperature, heart rate, breathing rate, and blood oxygen level will be monitored until the medicines you were given have worn off.  · You  will wake up in a recovery area. You may wake up slowly.  · If you feel anxious or agitated, you may be given medicine to help you calm down.  · If you will be going home the same day, your health care provider may check to make sure you can walk, drink, and urinate.  · Your health care provider will treat any pain or side effects you have before you go home.  · Do not drive for 24 hours if you were given a sedative.  Summary  · General anesthesia is used to keep you still and prevent pain during a procedure.  · It is important to tell your healthcare provider about your medical history and any surgeries you have had, and previous experience with anesthesia.  · Follow your healthcare provider’s instructions about when to stop eating, drinking, or taking certain medicines before your procedure.  · Plan to have someone take you home from the hospital or clinic.  This information is not intended to replace advice given to you by your health care provider. Make sure you discuss any questions you have with your health care provider.  Document Released: 03/26/2009 Document Revised: 08/03/2018 Document Reviewed: 08/03/2018  [employer] Interactive Patient Education © 2019 [employer]      Fall Prevention in Hospitals, Adult  As a hospital patient, your condition and the treatments you receive can increase your risk for falls. Some additional risk factors for falls in a hospital include:  · Being in an unfamiliar environment.  · Being on bed rest.  · Your surgery.  · Taking certain medicines.  · Your tubing requirements, such as intravenous (IV) therapy or catheters.  It is important that you learn how to decrease fall risks while at the hospital. Below are important tips that can help prevent falls.  SAFETY TIPS FOR PREVENTING FALLS  Talk about your risk of falling.  · Ask your health care provider why you are at risk for falling. Is it your medicine, illness, tubing placement, or something else?  · Make a plan with your  health care provider to keep you safe from falls.  · Ask your health care provider or pharmacist about side effects of your medicines. Some medicines can make you dizzy or affect your coordination.  Ask for help.  · Ask for help before getting out of bed. You may need to press your call button.  · Ask for assistance in getting safely to the toilet.  · Ask for a walker or cane to be put at your bedside. Ask that most of the side rails on your bed be placed up before your health care provider leaves the room.  · Ask family or friends to sit with you.  · Ask for things that are out of your reach, such as your glasses, hearing aids, telephone, bedside table, or call button.  Follow these tips to avoid falling:  · Stay lying or seated, rather than standing, while waiting for help.  · Wear rubber-soled slippers or shoes whenever you walk in the hospital.  · Avoid quick, sudden movements.  ¨ Change positions slowly.  ¨ Sit on the side of your bed before standing.  ¨ Stand up slowly and wait before you start to walk.  · Let your health care provider know if there is a spill on the floor.  · Pay careful attention to the medical equipment, electrical cords, and tubes around you.  · When you need help, use your call button by your bed or in the bathroom. Wait for one of your health care providers to help you.  · If you feel dizzy or unsure of your footing, return to bed and wait for assistance.  · Avoid being distracted by the TV, telephone, or another person in your room.  · Do not lean or support yourself on rolling objects, such as IV poles or bedside tables.     This information is not intended to replace advice given to you by your health care provider. Make sure you discuss any questions you have with your health care provider.     Document Released: 12/15/2001 Document Revised: 01/08/2016 Document Reviewed: 08/25/2013  [employer] Interactive Patient Education ©2016 [employer]            PATIENT/FAMILY/RESPONSIBLE PARTY  VERBALIZES UNDERSTANDING OF ABOVE EDUCATION.  COPY OF PAIN SCALE GIVEN AND REVIEWED WITH VERBALIZED UNDERSTANDING.

## 2020-03-09 ENCOUNTER — ANESTHESIA EVENT (OUTPATIENT)
Dept: PERIOP | Facility: HOSPITAL | Age: 85
End: 2020-03-09

## 2020-03-09 ENCOUNTER — HOSPITAL ENCOUNTER (OUTPATIENT)
Facility: HOSPITAL | Age: 85
Setting detail: HOSPITAL OUTPATIENT SURGERY
Discharge: HOME | End: 2020-03-09
Attending: OTOLARYNGOLOGY | Admitting: OTOLARYNGOLOGY

## 2020-03-09 ENCOUNTER — ANESTHESIA (OUTPATIENT)
Dept: PERIOP | Facility: HOSPITAL | Age: 85
End: 2020-03-09

## 2020-03-09 VITALS
DIASTOLIC BLOOD PRESSURE: 64 MMHG | HEART RATE: 75 BPM | TEMPERATURE: 97.2 F | RESPIRATION RATE: 18 BRPM | SYSTOLIC BLOOD PRESSURE: 137 MMHG | OXYGEN SATURATION: 93 %

## 2020-03-09 DIAGNOSIS — C44.310 BASAL CELL CARCINOMA (BCC) OF SKIN OF FACE, UNSPECIFIED PART OF FACE: Primary | ICD-10-CM

## 2020-03-09 DIAGNOSIS — C44.319 BASAL CELL CARCINOMA (BCC) OF SKIN OF OTHER PART OF FACE: ICD-10-CM

## 2020-03-09 PROCEDURE — 25010000002 FENTANYL CITRATE (PF) 100 MCG/2ML SOLUTION: Performed by: NURSE ANESTHETIST, CERTIFIED REGISTERED

## 2020-03-09 PROCEDURE — 25010000002 PROPOFOL 10 MG/ML EMULSION: Performed by: NURSE ANESTHETIST, CERTIFIED REGISTERED

## 2020-03-09 PROCEDURE — 88305 TISSUE EXAM BY PATHOLOGIST: CPT | Performed by: OTOLARYNGOLOGY

## 2020-03-09 PROCEDURE — 11642 EXC F/E/E/N/L MAL+MRG 1.1-2: CPT | Performed by: OTOLARYNGOLOGY

## 2020-03-09 PROCEDURE — 88331 PATH CONSLTJ SURG 1 BLK 1SPC: CPT | Performed by: PATHOLOGY

## 2020-03-09 PROCEDURE — 13152 CMPLX RPR E/N/E/L 2.6-7.5 CM: CPT | Performed by: OTOLARYNGOLOGY

## 2020-03-09 PROCEDURE — 88332 PATH CONSLTJ SURG EA ADD BLK: CPT | Performed by: PATHOLOGY

## 2020-03-09 RX ORDER — SODIUM CHLORIDE, SODIUM LACTATE, POTASSIUM CHLORIDE, CALCIUM CHLORIDE 600; 310; 30; 20 MG/100ML; MG/100ML; MG/100ML; MG/100ML
9 INJECTION, SOLUTION INTRAVENOUS CONTINUOUS
Status: DISCONTINUED | OUTPATIENT
Start: 2020-03-09 | End: 2020-03-09 | Stop reason: HOSPADM

## 2020-03-09 RX ORDER — SODIUM CHLORIDE, SODIUM LACTATE, POTASSIUM CHLORIDE, CALCIUM CHLORIDE 600; 310; 30; 20 MG/100ML; MG/100ML; MG/100ML; MG/100ML
1000 INJECTION, SOLUTION INTRAVENOUS CONTINUOUS
Status: DISCONTINUED | OUTPATIENT
Start: 2020-03-09 | End: 2020-03-09 | Stop reason: HOSPADM

## 2020-03-09 RX ORDER — LIDOCAINE HYDROCHLORIDE AND EPINEPHRINE 10; 10 MG/ML; UG/ML
INJECTION, SOLUTION INFILTRATION; PERINEURAL AS NEEDED
Status: DISCONTINUED | OUTPATIENT
Start: 2020-03-09 | End: 2020-03-09 | Stop reason: HOSPADM

## 2020-03-09 RX ORDER — SODIUM CHLORIDE 0.9 % (FLUSH) 0.9 %
10 SYRINGE (ML) INJECTION AS NEEDED
Status: DISCONTINUED | OUTPATIENT
Start: 2020-03-09 | End: 2020-03-09 | Stop reason: HOSPADM

## 2020-03-09 RX ORDER — LABETALOL HYDROCHLORIDE 5 MG/ML
5 INJECTION, SOLUTION INTRAVENOUS
Status: DISCONTINUED | OUTPATIENT
Start: 2020-03-09 | End: 2020-03-09 | Stop reason: HOSPADM

## 2020-03-09 RX ORDER — LIDOCAINE HYDROCHLORIDE 10 MG/ML
0.5 INJECTION, SOLUTION EPIDURAL; INFILTRATION; INTRACAUDAL; PERINEURAL ONCE AS NEEDED
Status: DISCONTINUED | OUTPATIENT
Start: 2020-03-09 | End: 2020-03-09 | Stop reason: HOSPADM

## 2020-03-09 RX ORDER — HYDROCODONE BITARTRATE AND ACETAMINOPHEN 5; 325 MG/1; MG/1
1 TABLET ORAL EVERY 4 HOURS PRN
Qty: 4 TABLET | Refills: 0 | Status: SHIPPED | OUTPATIENT
Start: 2020-03-09 | End: 2020-11-06

## 2020-03-09 RX ORDER — SODIUM CHLORIDE 0.9 % (FLUSH) 0.9 %
3 SYRINGE (ML) INJECTION AS NEEDED
Status: DISCONTINUED | OUTPATIENT
Start: 2020-03-09 | End: 2020-03-09 | Stop reason: HOSPADM

## 2020-03-09 RX ORDER — NALOXONE HCL 0.4 MG/ML
0.4 VIAL (ML) INJECTION AS NEEDED
Status: DISCONTINUED | OUTPATIENT
Start: 2020-03-09 | End: 2020-03-09 | Stop reason: HOSPADM

## 2020-03-09 RX ORDER — DEXTROSE MONOHYDRATE 25 G/50ML
12.5 INJECTION, SOLUTION INTRAVENOUS AS NEEDED
Status: DISCONTINUED | OUTPATIENT
Start: 2020-03-09 | End: 2020-03-09 | Stop reason: HOSPADM

## 2020-03-09 RX ORDER — FENTANYL CITRATE 50 UG/ML
INJECTION, SOLUTION INTRAMUSCULAR; INTRAVENOUS AS NEEDED
Status: DISCONTINUED | OUTPATIENT
Start: 2020-03-09 | End: 2020-03-09 | Stop reason: SURG

## 2020-03-09 RX ORDER — HYDROCODONE BITARTRATE AND ACETAMINOPHEN 5; 325 MG/1; MG/1
1 TABLET ORAL ONCE AS NEEDED
Status: DISCONTINUED | OUTPATIENT
Start: 2020-03-09 | End: 2020-03-09 | Stop reason: HOSPADM

## 2020-03-09 RX ORDER — IBUPROFEN 600 MG/1
600 TABLET ORAL ONCE AS NEEDED
Status: DISCONTINUED | OUTPATIENT
Start: 2020-03-09 | End: 2020-03-09 | Stop reason: HOSPADM

## 2020-03-09 RX ORDER — SODIUM CHLORIDE 0.9 % (FLUSH) 0.9 %
10 SYRINGE (ML) INJECTION EVERY 12 HOURS SCHEDULED
Status: DISCONTINUED | OUTPATIENT
Start: 2020-03-09 | End: 2020-03-09 | Stop reason: HOSPADM

## 2020-03-09 RX ORDER — FENTANYL CITRATE 50 UG/ML
25 INJECTION, SOLUTION INTRAMUSCULAR; INTRAVENOUS
Status: DISCONTINUED | OUTPATIENT
Start: 2020-03-09 | End: 2020-03-09 | Stop reason: HOSPADM

## 2020-03-09 RX ORDER — OXYCODONE AND ACETAMINOPHEN 7.5; 325 MG/1; MG/1
2 TABLET ORAL EVERY 4 HOURS PRN
Status: DISCONTINUED | OUTPATIENT
Start: 2020-03-09 | End: 2020-03-09 | Stop reason: HOSPADM

## 2020-03-09 RX ORDER — MAGNESIUM HYDROXIDE 1200 MG/15ML
LIQUID ORAL AS NEEDED
Status: DISCONTINUED | OUTPATIENT
Start: 2020-03-09 | End: 2020-03-09 | Stop reason: HOSPADM

## 2020-03-09 RX ORDER — FLUMAZENIL 0.1 MG/ML
0.2 INJECTION INTRAVENOUS AS NEEDED
Status: DISCONTINUED | OUTPATIENT
Start: 2020-03-09 | End: 2020-03-09 | Stop reason: HOSPADM

## 2020-03-09 RX ORDER — ONDANSETRON 4 MG/1
4 TABLET, FILM COATED ORAL ONCE AS NEEDED
Status: DISCONTINUED | OUTPATIENT
Start: 2020-03-09 | End: 2020-03-09 | Stop reason: HOSPADM

## 2020-03-09 RX ORDER — PROPOFOL 10 MG/ML
VIAL (ML) INTRAVENOUS AS NEEDED
Status: DISCONTINUED | OUTPATIENT
Start: 2020-03-09 | End: 2020-03-09 | Stop reason: SURG

## 2020-03-09 RX ORDER — IPRATROPIUM BROMIDE AND ALBUTEROL SULFATE 2.5; .5 MG/3ML; MG/3ML
3 SOLUTION RESPIRATORY (INHALATION) ONCE AS NEEDED
Status: DISCONTINUED | OUTPATIENT
Start: 2020-03-09 | End: 2020-03-09 | Stop reason: HOSPADM

## 2020-03-09 RX ORDER — ONDANSETRON 2 MG/ML
4 INJECTION INTRAMUSCULAR; INTRAVENOUS ONCE AS NEEDED
Status: DISCONTINUED | OUTPATIENT
Start: 2020-03-09 | End: 2020-03-09 | Stop reason: HOSPADM

## 2020-03-09 RX ORDER — OXYCODONE AND ACETAMINOPHEN 10; 325 MG/1; MG/1
1 TABLET ORAL ONCE AS NEEDED
Status: DISCONTINUED | OUTPATIENT
Start: 2020-03-09 | End: 2020-03-09 | Stop reason: HOSPADM

## 2020-03-09 RX ADMIN — FENTANYL CITRATE 100 MCG: 50 INJECTION, SOLUTION INTRAMUSCULAR; INTRAVENOUS at 12:34

## 2020-03-09 RX ADMIN — PROPOFOL 50 MG: 10 INJECTION, EMULSION INTRAVENOUS at 12:34

## 2020-03-09 RX ADMIN — SODIUM CHLORIDE, POTASSIUM CHLORIDE, SODIUM LACTATE AND CALCIUM CHLORIDE 1000 ML: 600; 310; 30; 20 INJECTION, SOLUTION INTRAVENOUS at 08:53

## 2020-03-09 NOTE — ANESTHESIA POSTPROCEDURE EVALUATION
Patient: Wyatt Curry    Procedure Summary     Date:  03/09/20 Room / Location:   [facility]  [facility]    Anesthesia Start:  1230 Anesthesia Stop:  1333    Procedures:       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 with frozen section and possible flap or graft (Left )      POSSIBLE FLAP (Left )      OR GRAFT (Left ) Diagnosis:       Basal cell carcinoma (BCC) of skin of other part of face      (Basal cell carcinoma (BCC) of skin of other part of face [C44.319])    Surgeon:  Brijesh Nickerson MD Provider:  Shana Payan CRNA    Anesthesia Type:  MAC ASA Status:  3          Anesthesia Type: MAC    Vitals  No vitals data found for the desired time range.          Post Anesthesia Care and Evaluation    Patient location during evaluation: PHASE II  Patient participation: complete - patient participated  Level of consciousness: awake and alert  Pain management: adequate  Airway patency: patent  Anesthetic complications: No anesthetic complications    Cardiovascular status: acceptable  Respiratory status: acceptable  Hydration status: acceptable    Comments: Blood pressure 159/75, pulse 65, temperature 96.8 °F (36 °C), temperature source Temporal, resp. rate 16, SpO2 94 %.    Pt discharged from PACU based on juanpablo score >8

## 2020-03-09 NOTE — OP NOTE
OPERATIVE NOTE  3/9/2020    NAME: Wyatt Curry    YOB: 1930  MRN: [id number]    PRE-OPERATIVE DIAGNOSIS:    Basal cell carcinoma (BCC) of skin of other part of face [C44.319]    POST-OPERATIVE DIAGNOSIS:   Post-Op Diagnosis Codes:     * Basal cell carcinoma (BCC) of skin of other part of face [C44.319]    PROCEDURE PERFORMED:   Excision of basal cell carcinoma of the left upper lip/nasolabial fold with complex closure    SURGEON:   Brijesh Nickerson MD    ASSISTANT(S):   None    ANESTHESIA:   MAC and Local Anesthesia with 1% Xylocaine with Epinephrine 1:100,000    INDICATIONS: The patient is a 89 y.o. male with Basal cell carcinoma (BCC) of skin of other part of face [C44.319]    PROCEDURE:  The patient was brought to the operating room, given MAC and Local Anesthesia with 1% Xylocaine with Epinephrine 1:100,000, and prepped and draped in the usual manner.     Approximately 8mL 1% Xylocaine with epinephrine was injected in the planned excision site in the left nasolabial fold.  Excision was accomplished with a #15 blade in an elliptical fashion without difficulty.  The excision was approximately 3.6 cm  x 1.3 cm.  The lesion was approximately 1.0 cm x 0.8 cm.  The margin was 2.5 mm. 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    Extensive and wide undermining was performed with curved iris scissors and double prong skin hooks.  This was performed in order to facilitate closure and to preserve normal anatomic relationships.  Minimal bleeding was encountered which was controlled with electrocautery and low settings.  The incision was reapproximated utilizing interrupted 4-0 Monocryl subcutaneously and interrupted 5-0 nylon to reapproximate the epidermis.  Bactroban ointment was applied and the procedure terminated.    The patient tolerated the  procedure well without complications and was transported to the postanesthesia care unit in stable condition.    SPECIMENS:  A: Basal cell carcinoma of the left upper lip/nasolabial fold    COMPLICATIONS: NONE    ESTIMATED BLOOD LOSS:  Minimal    Brijesh Nickerson MD  3/9/2020

## 2020-03-09 NOTE — DISCHARGE INSTRUCTIONS
YOUR NEXT PAIN MEDICATION IS DUE Anytime__________        Moderate Conscious Sedation, Adult, Care After  Refer to this sheet in the next few weeks. These instructions provide you with information on caring for yourself after your procedure. Your health care provider may also give you more specific instructions. Your treatment has been planned according to current medical practices, but problems sometimes occur. Call your health care provider if you have any problems or questions after your procedure.  WHAT TO EXPECT AFTER THE PROCEDURE    After your procedure:  · You may feel sleepy, clumsy, and have poor balance for several hours.  · Vomiting may occur if you eat too soon after the procedure.  HOME CARE INSTRUCTIONS  · Do not participate in any activities where you could become injured for at least 24 hours. Do not:  ¨ Drive.  ¨ Swim.  ¨ Ride a bicycle.  ¨ Operate heavy machinery.  ¨ Cook.  ¨ Use power tools.  ¨ Climb ladders.  ¨ Work from a high place.  · Do not make important decisions or sign legal documents until you are improved.  · If you vomit, drink water, juice, or soup when you can drink without vomiting. Make sure you have little or no nausea before eating solid foods.  · Only take over-the-counter or prescription medicines for pain, discomfort, or fever as directed by your health care provider.  · Make sure you and your family fully understand everything about the medicines given to you, including what side effects may occur.  · You should not drink alcohol, take sleeping pills, or take medicines that cause drowsiness for at least 24 hours.  · If you smoke, do not smoke without supervision.  · If you are feeling better, you may resume normal activities 24 hours after you were sedated.  · Keep all appointments with your health care provider.  SEEK MEDICAL CARE IF:  · Your skin is pale or bluish in color.  · You continue to feel nauseous or vomit.  · Your pain is getting worse and is not helped by  medicine.  · You have bleeding or swelling.  · You are still sleepy or feeling clumsy after 24 hours.  SEEK IMMEDIATE MEDICAL CARE IF:  · You develop a rash.  · You have difficulty breathing.  · You develop any type of allergic problem.  · You have a fever.  MAKE SURE YOU:  · Understand these instructions.  · Will watch your condition.  · Will get help right away if you are not doing well or get worse.     This information is not intended to replace advice given to you by your health care provider. Make sure you discuss any questions you have with your health care provider.     Document Released: 10/08/2014 Document Revised: 01/08/2016 Document Reviewed: 10/08/2014  [employer] Interactive Patient Education ©2016 [employer]         CALL YOUR PHYSICIAN IF YOU EXPERIENCE  INCREASED PAIN NOT HELPED BY YOUR PAIN MEDICATION.        Fall Prevention in the Home      Falls can cause injuries. They can happen to people of all ages. There are many things you can do to make your home safe and to help prevent falls.    WHAT CAN I DO ON THE OUTSIDE OF MY HOME?  · Regularly fix the edges of walkways and driveways and fix any cracks.  · Remove anything that might make you trip as you walk through a door, such as a raised step or threshold.  · Trim any bushes or trees on the path to your home.  · Use bright outdoor lighting.  · Clear any walking paths of anything that might make someone trip, such as rocks or tools.  · Regularly check to see if handrails are loose or broken. Make sure that both sides of any steps have handrails.  · Any raised decks and porches should have guardrails on the edges.  · Have any leaves, snow, or ice cleared regularly.  · Use sand or salt on walking paths during winter.  · Clean up any spills in your garage right away. This includes oil or grease spills.  WHAT CAN I DO IN THE BATHROOM?    · Use night lights.  · Install grab bars by the toilet and in the tub and shower. Do not use towel bars as grab  bars.  · Use non-skid mats or decals in the tub or shower.  · If you need to sit down in the shower, use a plastic, non-slip stool.  · Keep the floor dry. Clean up any water that spills on the floor as soon as it happens.  · Remove soap buildup in the tub or shower regularly.  · Attach bath mats securely with double-sided non-slip rug tape.  · Do not have throw rugs and other things on the floor that can make you trip.  WHAT CAN I DO IN THE BEDROOM?  · Use night lights.  · Make sure that you have a light by your bed that is easy to reach.  · Do not use any sheets or blankets that are too big for your bed. They should not hang down onto the floor.  · Have a firm chair that has side arms. You can use this for support while you get dressed.  · Do not have throw rugs and other things on the floor that can make you trip.  WHAT CAN I DO IN THE KITCHEN?  · Clean up any spills right away.  · Avoid walking on wet floors.  · Keep items that you use a lot in easy-to-reach places.  · If you need to reach something above you, use a strong step stool that has a grab bar.  · Keep electrical cords out of the way.  · Do not use floor polish or wax that makes floors slippery. If you must use wax, use non-skid floor wax.  · Do not have throw rugs and other things on the floor that can make you trip.  WHAT CAN I DO WITH MY STAIRS?  · Do not leave any items on the stairs.  · Make sure that there are handrails on both sides of the stairs and use them. Fix handrails that are broken or loose. Make sure that handrails are as long as the stairways.  · Check any carpeting to make sure that it is firmly attached to the stairs. Fix any carpet that is loose or worn.  · Avoid having throw rugs at the top or bottom of the stairs. If you do have throw rugs, attach them to the floor with carpet tape.  · Make sure that you have a light switch at the top of the stairs and the bottom of the stairs. If you do not have them, ask someone to add them for  you.  WHAT ELSE CAN I DO TO HELP PREVENT FALLS?  · Wear shoes that:  ¨ Do not have high heels.  ¨ Have rubber bottoms.  ¨ Are comfortable and fit you well.  ¨ Are closed at the toe. Do not wear sandals.  · If you use a stepladder:  ¨ Make sure that it is fully opened. Do not climb a closed stepladder.  ¨ Make sure that both sides of the stepladder are locked into place.  ¨ Ask someone to hold it for you, if possible.  · Clearly lonnie and make sure that you can see:  ¨ Any grab bars or handrails.  ¨ First and last steps.  ¨ Where the edge of each step is.  · Use tools that help you move around (mobility aids) if they are needed. These include:  ¨ Canes.  ¨ Walkers.  ¨ Scooters.  ¨ Crutches.  · Turn on the lights when you go into a dark area. Replace any light bulbs as soon as they burn out.  · Set up your furniture so you have a clear path. Avoid moving your furniture around.  · If any of your floors are uneven, fix them.  · If there are any pets around you, be aware of where they are.  · Review your medicines with your doctor. Some medicines can make you feel dizzy. This can increase your chance of falling.  Ask your doctor what other things that you can do to help prevent falls.     This information is not intended to replace advice given to you by your health care provider. Make sure you discuss any questions you have with your health care provider.     Document Released: 10/14/2010 Document Revised: 05/03/2016 Document Reviewed: 01/22/2016  [employer] Interactive Patient Education ©2016 [employer]

## 2020-03-09 NOTE — ANESTHESIA PREPROCEDURE EVALUATION
Anesthesia Evaluation     Patient summary reviewed   no history of anesthetic complications:  NPO Solid Status: > 8 hours             Airway   Mallampati: II  TM distance: >3 FB  Neck ROM: full  Dental    (+) upper dentures and lower dentures    Pulmonary    (-) COPD, asthma, sleep apnea, not a smoker  Cardiovascular   Exercise tolerance: good (4-7 METS)    ECG reviewed  Patient on routine beta blocker and Beta blocker given within 24 hours of surgery    (+) hypertension, CAD, cardiac stents (2006) hyperlipidemia,   (-) pacemaker, past MI, angina      Neuro/Psych  (-) seizures, TIA, CVA  GI/Hepatic/Renal/Endo    (+)  GERD,    (-) liver disease, no renal disease, diabetes    Musculoskeletal     Abdominal    Substance History      OB/GYN          Other                        Anesthesia Plan    ASA 3     MAC       Anesthetic plan, all risks, benefits, and alternatives have been provided, discussed and informed consent has been obtained with: patient.

## 2020-03-19 ENCOUNTER — OFFICE VISIT (OUTPATIENT)
Dept: OTOLARYNGOLOGY | Facility: CLINIC | Age: 85
End: 2020-03-19

## 2020-03-19 DIAGNOSIS — Z48.02 VISIT FOR SUTURE REMOVAL: Primary | ICD-10-CM

## 2020-03-19 PROCEDURE — 99024 POSTOP FOLLOW-UP VISIT: CPT | Performed by: OTOLARYNGOLOGY

## 2020-03-19 NOTE — PROGRESS NOTES
Arik Harris MA   History Of Present Illness:  Wyatt Curry is a 89 y.o. male who presents for suture/staple removal after surgery.    Physical exam:  Sutures/staples removed, wound healing appropriately without overt infection or inflammation        Assessment:  Postoperative Examination Following Surgery V67.00    Plan:  call for wound redness, swelling or breakdown   Wound care instructions given     Arik Harris MA  3/19/2020   10:24

## 2020-11-05 ENCOUNTER — TELEPHONE (OUTPATIENT)
Dept: CARDIOLOGY | Facility: CLINIC | Age: 85
End: 2020-11-05

## 2020-11-05 NOTE — PROGRESS NOTES
"    Subjective:     Encounter Date:11/06/2020      Patient ID: Wyatt Curry is a 89 y.o. male with a history of CAD s/p THAIS in remote past, chronic LBBB, MR, HTN, and HLD.    Chief Complaint: follow up  Coronary Artery Disease  Presents for follow-up visit. Pertinent negatives include no chest pain, dizziness, leg swelling, palpitations, shortness of breath or weight gain. Risk factors include hyperlipidemia. The symptoms have been stable.   Hypertension  This is a chronic problem. The current episode started more than 1 year ago. The problem has been rapidly improving since onset. The problem is controlled. Pertinent negatives include no chest pain, malaise/fatigue, orthopnea, palpitations, PND or shortness of breath.   Hyperlipidemia  This is a chronic problem. The current episode started more than 1 year ago. Pertinent negatives include no chest pain or shortness of breath.     Patient presents today for a routine follow up. Patient has a history of CAD with stent placement in the remote past. Most recent cath in 2008 revealed non-obstructive CAD with normal LVEF. He had a lexiscan completed in 2018 that was noted to be low risk for ischemia. He reports he has been well. He notes occasional ankle swelling if he sits too long. He monitors his BP at home with reported SBP 130s. He denies chest pain, dyspnea, and palpitations.     The following portions of the patient's history were reviewed and updated as appropriate: allergies, current medications, past family history, past medical history, past social history, past surgical history and problem list.    Allergies   Allergen Reactions   • Lyrica [Pregabalin] Mental Status Change     Made pt feel \"crazy\"       Current Outpatient Medications:   •  acetaminophen (TYLENOL) 500 MG tablet, Take 500 mg by mouth Every 6 (Six) Hours As Needed for Mild Pain ., Disp: , Rfl:   •  amLODIPine (NORVASC) 10 MG tablet, Take 10 mg by mouth Daily., Disp: , Rfl: 3  •  aspirin 81 MG " chewable tablet, Chew 81 mg Daily., Disp: , Rfl:   •  atorvastatin (LIPITOR) 20 MG tablet, Take 20 mg by mouth Daily., Disp: , Rfl:   •  cholecalciferol (VITAMIN D3) 1000 units tablet, Take 1,000 Units by mouth Daily., Disp: , Rfl:   •  clopidogrel (PLAVIX) 75 MG tablet, Take 75 mg by mouth Daily., Disp: , Rfl:   •  irbesartan (AVAPRO) 300 MG tablet, Take 300 mg by mouth Daily., Disp: , Rfl:   •  Magnesium 400 MG capsule, Take  by mouth Daily., Disp: , Rfl:   •  pantoprazole (PROTONIX) 40 MG EC tablet, Take 40 mg by mouth 2 (Two) Times a Day., Disp: , Rfl:   •  carvedilol (COREG) 6.25 MG tablet, Take 1 tablet by mouth 2 (Two) Times a Day., Disp: 180 tablet, Rfl: 4  Past Medical History:   Diagnosis Date   • Arthritis    • Bladder cancer (CMS/HCC)    • Bronchitis    • CAD (coronary artery disease)    • Cancer (CMS/HCC)     bladder cancer   • 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   • GERD (gastroesophageal reflux disease)    • Hyperlipidemia    • Hypertension    • Irregular heart beat    • Lung nodules        Social History     Socioeconomic History   • Marital status:      Spouse name: Not on file   • Number of children: Not on file   • Years of education: Not on file   • Highest education level: Not on file   Tobacco Use   • Smoking status: Former Smoker     Packs/day: 1.00     Years: 30.00     Pack years: 30.00     Types: Cigarettes     Start date:      Quit date:      Years since quittin.8   • Smokeless tobacco: Never Used   Substance and Sexual Activity   • Alcohol use: Yes     Alcohol/week: 1.0 standard drinks     Types: 1 Glasses of wine per week     Frequency: Monthly or less     Comment: occ wine   • Drug use: Not Currently   • Sexual activity: Defer       Review of Systems   Constitution: Negative for malaise/fatigue, weight gain and weight loss.   Cardiovascular: Negative for chest pain, dyspnea on exertion, irregular heartbeat, leg swelling, near-syncope, orthopnea, palpitations,  paroxysmal nocturnal dyspnea and syncope.   Respiratory: Negative for cough, shortness of breath, sleep disturbances due to breathing, sputum production and wheezing.    Skin: Negative for dry skin, flushing, itching and rash.   Gastrointestinal: Negative for hematemesis and hematochezia.   Neurological: Negative for dizziness, light-headedness, loss of balance and weakness.   All other systems reviewed and are negative.         Objective:     Vitals signs reviewed.   Constitutional:       General: Not in acute distress.     Appearance: Well-developed. Not diaphoretic.   Eyes:      General: No scleral icterus.     Conjunctiva/sclera: Conjunctivae normal.      Pupils: Pupils are equal, round, and reactive to light.   HENT:      Head: Normocephalic.    Mouth/Throat:      Pharynx: No oropharyngeal exudate.   Neck:      Musculoskeletal: Normal range of motion and neck supple.   Pulmonary:      Effort: Pulmonary effort is normal. No respiratory distress.      Breath sounds: Normal breath sounds. No wheezing. No rales.   Chest:      Chest wall: Not tender to palpatation.   Cardiovascular:      Bradycardia present. Regular rhythm.   Pulses:     Intact distal pulses.   Edema:     Peripheral edema absent.   Abdominal:      General: Bowel sounds are normal. There is no distension.      Palpations: Abdomen is soft.      Tenderness: There is no abdominal tenderness.   Musculoskeletal: Normal range of motion.   Skin:     General: Skin is warm and dry.      Coloration: Skin is not pale.      Findings: No erythema or rash.   Neurological:      Mental Status: Alert and oriented to person, place, and time.      Deep Tendon Reflexes: Reflexes are normal and symmetric.   Psychiatric:         Behavior: Behavior normal.             ECG 12 Lead    Date/Time: 11/6/2020 1:18 PM  Performed by: Bell Lawson APRN  Authorized by: Bell Lawson APRN   Comparison: compared with previous ECG from 3/2/2020  Similar to previous ECG  Rhythm:  "sinus bradycardia  Rate: bradycardic  BPM: 57  Conduction: left bundle branch block  T inversion: I and II  QRS axis: normal  Other findings: T wave abnormality    Clinical impression: abnormal EKG          /56   Pulse 57   Ht 160 cm (63\")   Wt 69.4 kg (153 lb)   SpO2 97%   BMI 27.10 kg/m²     Lab Review:   I have reviewed previous office notes, recent labs and recent cardiac testing.       Interpretation Summary    · Left ventricular ejection fraction is moderately reduced (Calculated EF = 42%).  · Impressions are consistent with a low risk study.     Results for orders placed during the hospital encounter of 08/20/18   Adult Transthoracic Echo Complete W/ Cont if Necessary Per Protocol    Narrative · Left ventricular diastolic dysfunction (grade I a) consistent with   impaired relaxation.  · There is calcification of the aortic valve.  · Mild-to-moderate mitral valve regurgitation is present  · Left ventricular systolic function is normal. Estimated EF = 55%.  · Left ventricular wall thickness is consistent with mild concentric   hypertrophy.  · The following left ventricular wall segments are hypokinetic: apex   hypokinetic.  · Apical hypokinesis              Assessment:          Diagnosis Plan   1. Coronary artery disease of native artery of native heart with stable angina pectoris (CMS/HCC)     2. History of coronary artery stent placement     3. Left bundle branch block     4. Essential hypertension     5. Mixed hyperlipidemia            Plan:       1. CAD- stable. No clinical signs of ischemia. low risk stress echo in 2018. Continue ASA, plavix, Coreg, norvasc and statin  2. Hx coronary artery stent- remote. Continue ASA and plavix  3. LBBB- chronic  4. HTN- controlled followed by PCP   5. HLD- controlled with LDL in July at 63. Continue statin. I have discussed the importance of cholesterol control with a LDL goal of <70 as recommended by the AHA.          Follow up in 6 months or sooner if " symptoms worsen.

## 2020-11-06 ENCOUNTER — OFFICE VISIT (OUTPATIENT)
Dept: CARDIOLOGY | Facility: CLINIC | Age: 85
End: 2020-11-06

## 2020-11-06 VITALS
WEIGHT: 153 LBS | BODY MASS INDEX: 27.11 KG/M2 | DIASTOLIC BLOOD PRESSURE: 56 MMHG | OXYGEN SATURATION: 97 % | HEIGHT: 63 IN | SYSTOLIC BLOOD PRESSURE: 144 MMHG | HEART RATE: 57 BPM

## 2020-11-06 DIAGNOSIS — E78.2 MIXED HYPERLIPIDEMIA: ICD-10-CM

## 2020-11-06 DIAGNOSIS — Z95.5 HISTORY OF CORONARY ARTERY STENT PLACEMENT: ICD-10-CM

## 2020-11-06 DIAGNOSIS — I44.7 LEFT BUNDLE BRANCH BLOCK: ICD-10-CM

## 2020-11-06 DIAGNOSIS — I25.118 CORONARY ARTERY DISEASE OF NATIVE ARTERY OF NATIVE HEART WITH STABLE ANGINA PECTORIS (HCC): Primary | ICD-10-CM

## 2020-11-06 DIAGNOSIS — I10 ESSENTIAL HYPERTENSION: ICD-10-CM

## 2020-11-06 PROCEDURE — 93000 ELECTROCARDIOGRAM COMPLETE: CPT | Performed by: NURSE PRACTITIONER

## 2020-11-06 PROCEDURE — 99213 OFFICE O/P EST LOW 20 MIN: CPT | Performed by: NURSE PRACTITIONER

## 2021-01-29 ENCOUNTER — IMMUNIZATION (OUTPATIENT)
Age: 86
End: 2021-01-29
Payer: MEDICARE

## 2021-01-29 PROCEDURE — 0001A COVID-19, PFIZER VACCINE 30MCG/0.3ML DOSE: CPT | Performed by: FAMILY MEDICINE

## 2021-01-29 PROCEDURE — 91300 COVID-19, PFIZER VACCINE 30MCG/0.3ML DOSE: CPT | Performed by: FAMILY MEDICINE

## 2021-02-19 ENCOUNTER — IMMUNIZATION (OUTPATIENT)
Age: 86
End: 2021-02-19
Payer: MEDICARE

## 2021-02-19 PROCEDURE — 91300 COVID-19, PFIZER VACCINE 30MCG/0.3ML DOSE: CPT | Performed by: FAMILY MEDICINE

## 2021-02-19 PROCEDURE — 0002A COVID-19, PFIZER VACCINE 30MCG/0.3ML DOSE: CPT | Performed by: FAMILY MEDICINE

## 2021-05-10 ENCOUNTER — OFFICE VISIT (OUTPATIENT)
Dept: CARDIOLOGY | Facility: CLINIC | Age: 86
End: 2021-05-10

## 2021-05-10 VITALS
BODY MASS INDEX: 27.11 KG/M2 | WEIGHT: 153 LBS | HEART RATE: 69 BPM | SYSTOLIC BLOOD PRESSURE: 142 MMHG | HEIGHT: 63 IN | OXYGEN SATURATION: 98 % | DIASTOLIC BLOOD PRESSURE: 60 MMHG

## 2021-05-10 DIAGNOSIS — I10 ESSENTIAL HYPERTENSION: ICD-10-CM

## 2021-05-10 DIAGNOSIS — I34.0 NON-RHEUMATIC MITRAL REGURGITATION: ICD-10-CM

## 2021-05-10 DIAGNOSIS — E78.2 MIXED HYPERLIPIDEMIA: ICD-10-CM

## 2021-05-10 DIAGNOSIS — I25.10 CORONARY ARTERY DISEASE INVOLVING NATIVE CORONARY ARTERY OF NATIVE HEART WITHOUT ANGINA PECTORIS: Primary | ICD-10-CM

## 2021-05-10 DIAGNOSIS — Z95.5 HISTORY OF CORONARY ARTERY STENT PLACEMENT: ICD-10-CM

## 2021-05-10 DIAGNOSIS — I44.7 LEFT BUNDLE BRANCH BLOCK: ICD-10-CM

## 2021-05-10 PROCEDURE — 93000 ELECTROCARDIOGRAM COMPLETE: CPT | Performed by: NURSE PRACTITIONER

## 2021-05-10 PROCEDURE — 99214 OFFICE O/P EST MOD 30 MIN: CPT | Performed by: NURSE PRACTITIONER

## 2021-05-10 NOTE — PROGRESS NOTES
"    Subjective:     Encounter Date:05/10/2021      Patient ID: Wyatt Curry is a 89 y.o. male with a history of CAD s/p stenting, chronic LBBB, HTN, HLD .    Chief Complaint: follow up  Coronary Artery Disease  Presents for follow-up visit. Pertinent negatives include no chest pain, dizziness, leg swelling, palpitations, shortness of breath or weight gain. Risk factors include hyperlipidemia. The symptoms have been stable.   Hypertension  This is a chronic problem. The current episode started more than 1 year ago. The problem has been rapidly improving since onset. The problem is controlled. Pertinent negatives include no chest pain, malaise/fatigue, orthopnea, palpitations, PND or shortness of breath.   Hyperlipidemia  This is a chronic problem. The current episode started more than 1 year ago. The problem is controlled. Recent lipid tests were reviewed and are low. Pertinent negatives include no chest pain or shortness of breath.     Patient presents today for a routine follow up. Patient has a history of CAD s/p stenting x2 in approximately 2006. He reports he has been well. He notes his wife was diagnosed with cancer and he notes he has been taking care of her. He has occasional swelling that resolves after taking arthritis medication. He denies chest pain, dyspnea, palpitations, orthopnea and PND.     The following portions of the patient's history were reviewed and updated as appropriate: allergies, current medications, past family history, past medical history, past social history, past surgical history and problem list.    Allergies   Allergen Reactions   • Lyrica [Pregabalin] Mental Status Change     Made pt feel \"crazy\"       Current Outpatient Medications:   •  acetaminophen (TYLENOL) 500 MG tablet, Take 500 mg by mouth Every 6 (Six) Hours As Needed for Mild Pain ., Disp: , Rfl:   •  amLODIPine (NORVASC) 10 MG tablet, Take 10 mg by mouth Daily., Disp: , Rfl: 3  •  aspirin 81 MG chewable tablet, Chew 81 " mg Daily., Disp: , Rfl:   •  atorvastatin (LIPITOR) 20 MG tablet, Take 20 mg by mouth Daily., Disp: , Rfl:   •  carvedilol (COREG) 6.25 MG tablet, Take 1 tablet by mouth 2 (Two) Times a Day., Disp: 180 tablet, Rfl: 4  •  cholecalciferol (VITAMIN D3) 1000 units tablet, Take 1,000 Units by mouth Daily., Disp: , Rfl:   •  clopidogrel (PLAVIX) 75 MG tablet, Take 75 mg by mouth Daily., Disp: , Rfl:   •  irbesartan (AVAPRO) 300 MG tablet, Take 300 mg by mouth Daily., Disp: , Rfl:   •  Magnesium 400 MG capsule, Take  by mouth Daily., Disp: , Rfl:   •  pantoprazole (PROTONIX) 40 MG EC tablet, Take 40 mg by mouth 2 (Two) Times a Day., Disp: , Rfl:   Past Medical History:   Diagnosis Date   • Arthritis    • Bladder cancer (CMS/HCC)    • Bronchitis    • CAD (coronary artery disease)    • Cancer (CMS/HCC)     bladder cancer   • 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   • GERD (gastroesophageal reflux disease)    • Hyperlipidemia    • Hypertension    • Irregular heart beat    • Lung nodules        Social History     Socioeconomic History   • Marital status:      Spouse name: Not on file   • Number of children: Not on file   • Years of education: Not on file   • Highest education level: Not on file   Tobacco Use   • Smoking status: Former Smoker     Packs/day: 1.00     Years: 30.00     Pack years: 30.00     Types: Cigarettes     Start date:      Quit date:      Years since quittin.3   • Smokeless tobacco: Never Used   Vaping Use   • Vaping Use: Never used   Substance and Sexual Activity   • Alcohol use: Yes     Alcohol/week: 1.0 standard drinks     Types: 1 Glasses of wine per week     Comment: occ wine   • Drug use: Not Currently   • Sexual activity: Defer       Review of Systems   Constitutional: Negative for malaise/fatigue, weight gain and weight loss.   Cardiovascular: Negative for chest pain, dyspnea on exertion, irregular heartbeat, leg swelling, near-syncope, orthopnea, palpitations, paroxysmal  nocturnal dyspnea and syncope.   Respiratory: Negative for cough, shortness of breath, sleep disturbances due to breathing, sputum production and wheezing.    Skin: Negative for dry skin, flushing, itching and rash.   Gastrointestinal: Negative for hematemesis and hematochezia.   Neurological: Negative for dizziness, light-headedness, loss of balance and weakness.   All other systems reviewed and are negative.         Objective:     Vitals reviewed.   Constitutional:       General: Not in acute distress.     Appearance: Well-developed. Not diaphoretic.   Eyes:      General: No scleral icterus.     Conjunctiva/sclera: Conjunctivae normal.      Pupils: Pupils are equal, round, and reactive to light.   HENT:      Head: Normocephalic.    Mouth/Throat:      Pharynx: No oropharyngeal exudate.   Pulmonary:      Effort: Pulmonary effort is normal. No respiratory distress.      Breath sounds: Normal breath sounds. No wheezing. No rales.   Chest:      Chest wall: Not tender to palpatation.   Cardiovascular:      Normal rate. Regular rhythm.   Pulses:     Intact distal pulses.   Edema:     Peripheral edema absent.   Abdominal:      General: Bowel sounds are normal. There is no distension.      Palpations: Abdomen is soft.      Tenderness: There is no abdominal tenderness.   Musculoskeletal: Normal range of motion.      Cervical back: Normal range of motion and neck supple. Skin:     General: Skin is warm and dry.      Coloration: Skin is not pale.      Findings: No erythema or rash.   Neurological:      Mental Status: Alert and oriented to person, place, and time.      Deep Tendon Reflexes: Reflexes are normal and symmetric.   Psychiatric:         Behavior: Behavior normal.             ECG 12 Lead    Date/Time: 5/10/2021 12:15 PM  Performed by: Bell Lawson APRN  Authorized by: Bell Lwason APRN   Comparison: compared with previous ECG from 11/6/2020  Similar to previous ECG  Rhythm: sinus rhythm  Rate: normal  BPM:  "61  Conduction: left bundle branch block  T inversion: I and II  QRS axis: normal    Clinical impression: abnormal EKG          /60   Pulse 69   Ht 160 cm (62.99\")   Wt 69.4 kg (153 lb)   SpO2 98%   BMI 27.11 kg/m²     Lab Review:   I have reviewed previous office notes, recent labs and recent cardiac testing.     Lab Results   Component Value Date    CHLPL 104 (L) 09/09/2016    TRIG 74 09/09/2016    HDL 38 09/09/2016    LDL 49 09/09/2016     Results for orders placed during the hospital encounter of 08/20/18    Adult Transthoracic Echo Complete W/ Cont if Necessary Per Protocol    Interpretation Summary  · Left ventricular diastolic dysfunction (grade I a) consistent with impaired relaxation.  · There is calcification of the aortic valve.  · Mild-to-moderate mitral valve regurgitation is present  · Left ventricular systolic function is normal. Estimated EF = 55%.  · Left ventricular wall thickness is consistent with mild concentric hypertrophy.  · The following left ventricular wall segments are hypokinetic: apex hypokinetic.  · Apical hypokinesis          Assessment:          Diagnosis Plan   1. Coronary artery disease involving native coronary artery of native heart without angina pectoris     2. History of coronary artery stent placement     3. Essential hypertension     4. Mixed hyperlipidemia     5. Left bundle branch block     6. Non-rheumatic mitral regurgitation            Plan:       1. CAD- stable. No clinical signs of ongoing ischemia. Low risk nuclear stress in 2018. Continue ASA, Plavix, statin, BB, CCB and ARB.   2. Hx coronary artery stent- x2 in approximately 2006. Continue ASA and plavix.   3. HTN- controlled. Followed by PCP.   4. HLD- most recent LDL on file from 7/2020 controlled at 62. Continue statin.   5. LBBB- chronic.   6. MR- mild-moderate on echo in 2018. Will continue to monitor.       Follow up in 6 months or sooner if symptoms worsen.     I spent 30 minutes caring for Wyatt " on this date of service. This time includes time spent by me in the following activities:preparing for the visit, reviewing tests, obtaining and/or reviewing a separately obtained history, performing a medically appropriate examination and/or evaluation , documenting information in the medical record, independently interpreting results and communicating that information with the patient/family/caregiver and care coordination     Advance Care Planning   ACP discussion was held with the patient during this visit. Patient has an advance directive in EMR which is still valid.

## 2021-11-10 ENCOUNTER — OFFICE VISIT (OUTPATIENT)
Dept: CARDIOLOGY | Facility: CLINIC | Age: 86
End: 2021-11-10

## 2021-11-10 VITALS
OXYGEN SATURATION: 95 % | BODY MASS INDEX: 26.22 KG/M2 | SYSTOLIC BLOOD PRESSURE: 140 MMHG | HEART RATE: 60 BPM | DIASTOLIC BLOOD PRESSURE: 60 MMHG | WEIGHT: 148 LBS | HEIGHT: 63 IN

## 2021-11-10 DIAGNOSIS — I10 ESSENTIAL HYPERTENSION: ICD-10-CM

## 2021-11-10 DIAGNOSIS — Z95.5 HISTORY OF CORONARY ARTERY STENT PLACEMENT: ICD-10-CM

## 2021-11-10 DIAGNOSIS — I34.0 NON-RHEUMATIC MITRAL REGURGITATION: ICD-10-CM

## 2021-11-10 DIAGNOSIS — I44.7 LEFT BUNDLE BRANCH BLOCK: ICD-10-CM

## 2021-11-10 DIAGNOSIS — E78.2 MIXED HYPERLIPIDEMIA: ICD-10-CM

## 2021-11-10 DIAGNOSIS — I25.10 CORONARY ARTERY DISEASE INVOLVING NATIVE CORONARY ARTERY OF NATIVE HEART WITHOUT ANGINA PECTORIS: Primary | ICD-10-CM

## 2021-11-10 PROCEDURE — 99214 OFFICE O/P EST MOD 30 MIN: CPT | Performed by: NURSE PRACTITIONER

## 2021-11-10 PROCEDURE — 93000 ELECTROCARDIOGRAM COMPLETE: CPT | Performed by: NURSE PRACTITIONER

## 2021-11-10 RX ORDER — BENZONATATE 100 MG/1
CAPSULE ORAL
COMMUNITY

## 2021-11-10 NOTE — PROGRESS NOTES
"    Subjective:     Encounter Date:11/10/2021      Patient ID: Wyatt Curry is a 89 y.o. male with CAD s/p stenting, chronic LBBB, HTN, HLD .    Chief Complaint: \"no complaints except I cough a lot\"  Coronary Artery Disease  Presents for follow-up visit. Pertinent negatives include no chest pain, dizziness, leg swelling, palpitations, shortness of breath or weight gain. Risk factors include hyperlipidemia. The symptoms have been stable.   Hypertension  This is a chronic problem. The current episode started more than 1 year ago. The problem has been rapidly improving since onset. The problem is controlled. Pertinent negatives include no chest pain, malaise/fatigue, orthopnea, palpitations, PND or shortness of breath.   Hyperlipidemia  This is a chronic problem. The current episode started more than 1 year ago. The problem is controlled. Recent lipid tests were reviewed and are low. Pertinent negatives include no chest pain or shortness of breath.     Patient presents today for a routine follow up. Patient is followed for CAD s/p stenting x2 in approximately 2006. He reports he has been well. He notes his wife is not doing well and he continues to care for her the best he is able. He notes he has a cough that he has had for many years and is unchanged from previous. He denies chest pain, dyspnea, palpitations, orthopnea and PND.     The following portions of the patient's history were reviewed and updated as appropriate: allergies, current medications, past family history, past medical history, past social history, past surgical history and problem list.    Allergies   Allergen Reactions   • Lyrica [Pregabalin] Mental Status Change     Made pt feel \"crazy\"       Current Outpatient Medications:   •  acetaminophen (TYLENOL) 500 MG tablet, Take 500 mg by mouth Every 6 (Six) Hours As Needed for Mild Pain ., Disp: , Rfl:   •  amLODIPine (NORVASC) 10 MG tablet, Take 10 mg by mouth Daily., Disp: , Rfl: 3  •  atorvastatin " (LIPITOR) 20 MG tablet, Take 20 mg by mouth Daily., Disp: , Rfl:   •  benzonatate (TESSALON) 100 MG capsule, benzonatate 100 mg capsule  Take 1 capsule 3 times a day by oral route as needed., Disp: , Rfl:   •  cholecalciferol (VITAMIN D3) 1000 units tablet, Take 1,000 Units by mouth Daily., Disp: , Rfl:   •  clopidogrel (PLAVIX) 75 MG tablet, Take 75 mg by mouth Daily., Disp: , Rfl:   •  irbesartan (AVAPRO) 300 MG tablet, Take 300 mg by mouth Daily., Disp: , Rfl:   •  Magnesium 400 MG capsule, Take  by mouth Daily., Disp: , Rfl:   •  pantoprazole (PROTONIX) 40 MG EC tablet, Take 40 mg by mouth 2 (Two) Times a Day., Disp: , Rfl:   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   • GERD (gastroesophageal reflux disease)    • Hyperlipidemia    • Hypertension    • Irregular heart beat    • Lung nodules        Social History     Socioeconomic History   • Marital status:    Tobacco Use   • Smoking status: Former Smoker     Packs/day: 1.00     Years: 30.00     Pack years: 30.00     Types: Cigarettes     Start date:      Quit date:      Years since quittin.8   • Smokeless tobacco: Never Used   Vaping Use   • Vaping Use: Never used   Substance and Sexual Activity   • Alcohol use: Yes     Alcohol/week: 1.0 standard drink     Types: 1 Glasses of wine per week     Comment: occ wine   • Drug use: Not Currently   • Sexual activity: Defer       Review of Systems   Constitutional: Negative for malaise/fatigue, weight gain and weight loss.   Cardiovascular: Negative for chest pain, dyspnea on exertion, irregular heartbeat, leg swelling, near-syncope, orthopnea, palpitations, paroxysmal nocturnal dyspnea and syncope.   Respiratory: Negative for cough, shortness of breath, sleep disturbances due to breathing, sputum production and wheezing.    Skin:  Negative for dry skin, flushing, itching and rash.   Gastrointestinal: Negative for hematemesis and hematochezia.   Neurological: Negative for dizziness, light-headedness, loss of balance and weakness.   All other systems reviewed and are negative.         Objective:     Vitals reviewed.   Constitutional:       General: Not in acute distress.     Appearance: Well-developed. Not diaphoretic.   Eyes:      General: No scleral icterus.     Conjunctiva/sclera: Conjunctivae normal.      Pupils: Pupils are equal, round, and reactive to light.   HENT:      Head: Normocephalic.    Mouth/Throat:      Pharynx: No oropharyngeal exudate.   Pulmonary:      Effort: Pulmonary effort is normal. No respiratory distress.      Breath sounds: Normal breath sounds. No wheezing. No rales.   Chest:      Chest wall: Not tender to palpatation.   Cardiovascular:      Normal rate. Regular rhythm.   Pulses:     Intact distal pulses.   Edema:     Peripheral edema absent.   Abdominal:      General: Bowel sounds are normal. There is no distension.      Palpations: Abdomen is soft.      Tenderness: There is no abdominal tenderness.   Musculoskeletal: Normal range of motion.      Cervical back: Normal range of motion and neck supple. Skin:     General: Skin is warm and dry.      Coloration: Skin is not pale.      Findings: No erythema or rash.   Neurological:      Mental Status: Alert and oriented to person, place, and time.      Deep Tendon Reflexes: Reflexes are normal and symmetric.   Psychiatric:         Behavior: Behavior normal.             ECG 12 Lead    Date/Time: 11/10/2021 11:27 AM  Performed by: Bell Lawson APRN  Authorized by: Bell Lawson APRN   Comparison: compared with previous ECG from 5/10/2021  Similar to previous ECG  Rhythm: sinus rhythm  Rate: normal  BPM: 60  Conduction: left bundle branch block  ST Segments: ST segments normal  T Waves: T waves normal  QRS axis: normal  Other findings: T wave abnormality    Clinical  "impression: abnormal EKG          /60   Pulse 60   Ht 160 cm (63\")   Wt 67.1 kg (148 lb)   SpO2 95%   BMI 26.22 kg/m²     Lab Review:   I have reviewed previous office notes, recent labs and recent cardiac testing.     Lab Results   Component Value Date    CHLPL 104 (L) 09/09/2016    TRIG 74 09/09/2016    HDL 38 09/09/2016    LDL 49 09/09/2016     Results for orders placed during the hospital encounter of 08/20/18    Adult Transthoracic Echo Complete W/ Cont if Necessary Per Protocol    Interpretation Summary  · Left ventricular diastolic dysfunction (grade I a) consistent with impaired relaxation.  · There is calcification of the aortic valve.  · Mild-to-moderate mitral valve regurgitation is present  · Left ventricular systolic function is normal. Estimated EF = 55%.  · Left ventricular wall thickness is consistent with mild concentric hypertrophy.  · The following left ventricular wall segments are hypokinetic: apex hypokinetic.  · Apical hypokinesis          Assessment:          Diagnosis Plan   1. Coronary artery disease involving native coronary artery of native heart without angina pectoris     2. History of coronary artery stent placement     3. Essential hypertension     4. Mixed hyperlipidemia     5. Left bundle branch block     6. Non-rheumatic mitral regurgitation            Plan:       1. CAD- stable. No clinical signs of ongoing ischemia. Low risk nuclear stress in 2018. Continue Plavix, statin, BB, CCB and ARB. Ok to stop ASA as he complains of easily bruising.   2. Hx coronary artery stent- x2 in approximately 2006. Continue plavix.   3. HTN- controlled. Followed by PCP.   4. HLD- most recent LDL on file from 7/2020 controlled at 62. Continue statin. Will obtain more recent copy.   5. LBBB- chronic.   6. MR- mild-moderate on echo in 2018. Will continue to monitor.       Follow up in 6 months or sooner if symptoms worsen.     I spent 30 minutes caring for Wyatt on this date of service. " This time includes time spent by me in the following activities:preparing for the visit, reviewing tests, obtaining and/or reviewing a separately obtained history, performing a medically appropriate examination and/or evaluation , documenting information in the medical record, independently interpreting results and communicating that information with the patient/family/caregiver and care coordination

## 2022-01-12 ENCOUNTER — HOSPITAL ENCOUNTER (OUTPATIENT)
Dept: INFUSION THERAPY | Facility: HOSPITAL | Age: 87
Discharge: HOME | End: 2022-01-12
Admitting: FAMILY MEDICINE

## 2022-01-12 VITALS
SYSTOLIC BLOOD PRESSURE: 133 MMHG | RESPIRATION RATE: 18 BRPM | BODY MASS INDEX: 24.75 KG/M2 | HEIGHT: 64 IN | DIASTOLIC BLOOD PRESSURE: 52 MMHG | WEIGHT: 145 LBS | HEART RATE: 68 BPM | TEMPERATURE: 97.6 F | OXYGEN SATURATION: 100 %

## 2022-01-12 DIAGNOSIS — I25.10 CORONARY ARTERY DISEASE INVOLVING NATIVE CORONARY ARTERY OF NATIVE HEART WITHOUT ANGINA PECTORIS: ICD-10-CM

## 2022-01-12 PROCEDURE — 25010000002 SOTROVIMAB 500 MG/8ML SOLUTION 8 ML VIAL: Performed by: FAMILY MEDICINE

## 2022-01-12 PROCEDURE — M0247 HC INTRAVENOUS INFUSION SOTROVIMAB: HCPCS | Performed by: FAMILY MEDICINE

## 2022-01-12 PROCEDURE — 96413 CHEMO IV INFUSION 1 HR: CPT

## 2022-01-12 RX ORDER — METHYLPREDNISOLONE SODIUM SUCCINATE 125 MG/2ML
125 INJECTION, POWDER, LYOPHILIZED, FOR SOLUTION INTRAMUSCULAR; INTRAVENOUS ONCE AS NEEDED
Status: DISCONTINUED | OUTPATIENT
Start: 2022-01-12 | End: 2022-01-14 | Stop reason: HOSPADM

## 2022-01-12 RX ORDER — DIPHENHYDRAMINE HYDROCHLORIDE 50 MG/ML
50 INJECTION INTRAMUSCULAR; INTRAVENOUS ONCE AS NEEDED
Status: DISCONTINUED | OUTPATIENT
Start: 2022-01-12 | End: 2022-01-14 | Stop reason: HOSPADM

## 2022-01-12 RX ORDER — SODIUM CHLORIDE 9 MG/ML
30 INJECTION, SOLUTION INTRAVENOUS ONCE
Status: DISCONTINUED | OUTPATIENT
Start: 2022-01-12 | End: 2022-01-14 | Stop reason: HOSPADM

## 2022-01-12 RX ORDER — DIPHENHYDRAMINE HCL 50 MG
50 CAPSULE ORAL ONCE AS NEEDED
Status: DISCONTINUED | OUTPATIENT
Start: 2022-01-12 | End: 2022-01-14 | Stop reason: HOSPADM

## 2022-01-12 RX ORDER — EPINEPHRINE 0.3 MG/.3ML
0.3 INJECTION SUBCUTANEOUS ONCE AS NEEDED
Status: DISCONTINUED | OUTPATIENT
Start: 2022-01-12 | End: 2022-01-14 | Stop reason: HOSPADM

## 2022-01-12 RX ADMIN — SODIUM CHLORIDE 500 MG: 9 INJECTION, SOLUTION INTRAVENOUS at 13:23

## 2022-04-26 ENCOUNTER — OFFICE VISIT (OUTPATIENT)
Dept: ENT CLINIC | Age: 87
End: 2022-04-26
Payer: MEDICARE

## 2022-04-26 VITALS
SYSTOLIC BLOOD PRESSURE: 132 MMHG | DIASTOLIC BLOOD PRESSURE: 64 MMHG | BODY MASS INDEX: 25.1 KG/M2 | WEIGHT: 147 LBS | HEIGHT: 64 IN

## 2022-04-26 DIAGNOSIS — H72.92 PERFORATED TYMPANIC MEMBRANE ON EXAMINATION, LEFT: Primary | ICD-10-CM

## 2022-04-26 PROCEDURE — 99203 OFFICE O/P NEW LOW 30 MIN: CPT | Performed by: PHYSICIAN ASSISTANT

## 2022-04-26 PROCEDURE — G8427 DOCREV CUR MEDS BY ELIG CLIN: HCPCS | Performed by: PHYSICIAN ASSISTANT

## 2022-04-26 PROCEDURE — 4040F PNEUMOC VAC/ADMIN/RCVD: CPT | Performed by: PHYSICIAN ASSISTANT

## 2022-04-26 PROCEDURE — 1123F ACP DISCUSS/DSCN MKR DOCD: CPT | Performed by: PHYSICIAN ASSISTANT

## 2022-04-26 PROCEDURE — G8417 CALC BMI ABV UP PARAM F/U: HCPCS | Performed by: PHYSICIAN ASSISTANT

## 2022-04-26 PROCEDURE — 1036F TOBACCO NON-USER: CPT | Performed by: PHYSICIAN ASSISTANT

## 2022-04-26 RX ORDER — BENZONATATE 100 MG/1
100 CAPSULE ORAL 3 TIMES DAILY PRN
COMMUNITY

## 2022-04-26 RX ORDER — CEFUROXIME AXETIL 500 MG/1
500 TABLET ORAL 2 TIMES DAILY
Qty: 20 TABLET | Refills: 0 | Status: SHIPPED | OUTPATIENT
Start: 2022-04-26 | End: 2022-05-06

## 2022-04-26 RX ORDER — LANOLIN ALCOHOL/MO/W.PET/CERES
1000 CREAM (GRAM) TOPICAL DAILY
COMMUNITY

## 2022-04-26 RX ORDER — OFLOXACIN 3 MG/ML
3 SOLUTION/ DROPS OPHTHALMIC 3 TIMES DAILY
Qty: 10 ML | Refills: 1 | Status: SHIPPED | OUTPATIENT
Start: 2022-04-26

## 2022-04-26 RX ORDER — ACETAMINOPHEN 160 MG
TABLET,DISINTEGRATING ORAL
COMMUNITY

## 2022-04-26 RX ORDER — DEXLANSOPRAZOLE 60 MG/1
60 CAPSULE, DELAYED RELEASE ORAL DAILY
COMMUNITY

## 2022-04-26 RX ORDER — CARVEDILOL 6.25 MG/1
6.25 TABLET ORAL 2 TIMES DAILY WITH MEALS
COMMUNITY

## 2022-04-26 ASSESSMENT — ENCOUNTER SYMPTOMS
SINUS PAIN: 0
VOICE CHANGE: 0
SORE THROAT: 0
TROUBLE SWALLOWING: 0
FACIAL SWELLING: 0
SINUS PRESSURE: 0
RHINORRHEA: 0
PHOTOPHOBIA: 0
EYE PAIN: 0

## 2022-04-26 NOTE — PROGRESS NOTES
[facility] OTOLARYNGOLOGY/ENT  Mr. Edward Jefferson is a pleasant 80-year-old  male that was referred by Dr. Shikha Dave due to problems with a perforated left TM. Patient reported that on  he was noted to have a change in hearing to the left ear and thought that his hearing aid had . He replaced the hearing aid on a  and then noticed that the hearing was still unchanged. He also reported noticing some blood from the ear canal warranting him to see Dr. Beena Segundo. She denies any issues with dizziness or any additional problems. Allergies: Patient has no known allergies.       Current Outpatient Medications   Medication Sig Dispense Refill    carvedilol (COREG) 6.25 MG tablet Take 6.25 mg by mouth 2 times daily (with meals)      dexlansoprazole (DEXILANT) 60 MG CPDR delayed release capsule Take 60 mg by mouth daily      Magnesium 400 MG CAPS Take by mouth      benzonatate (TESSALON) 100 MG capsule Take 100 mg by mouth 3 times daily as needed for Cough      zinc 50 MG CAPS Take by mouth      Cholecalciferol (VITAMIN D3) 50 MCG (2000 UT) CAPS Take by mouth      vitamin B-12 (CYANOCOBALAMIN) 1000 MCG tablet Take 1,000 mcg by mouth daily      ofloxacin (OCUFLOX) 0.3 % solution Place 3 drops in ear(s) 3 times daily Right ear 10 mL 1    cefUROXime (CEFTIN) 500 MG tablet Take 1 tablet by mouth 2 times daily for 10 days 20 tablet 0    clopidogrel (PLAVIX) 75 MG tablet TAKE 1 TABLET BY MOUTH EVERY DAY  3    aspirin 81 MG chewable tablet Take 1 tablet by mouth daily Indications: Disease of the Arteries of the Heart 30 tablet 3    oxyCODONE-acetaminophen (PERCOCET)  MG per tablet 1-2 tabs po q4-6 hrs prn pain 60 tablet 0    ondansetron (ZOFRAN) 4 MG tablet Take 1 tablet by mouth every 8 hours as needed for Nausea or Vomiting 10 tablet 0    gabapentin (NEURONTIN) 100 MG capsule Take 100 mg by mouth nightly      amLODIPine (NORVASC) 5 MG tablet Take 5 mg by mouth daily Indications: High Blood Pressure      irbesartan (AVAPRO) 300 MG tablet Take 300 mg by mouth nightly Indications: High Blood Pressure      atorvastatin (LIPITOR) 20 MG tablet Take 20 mg by mouth nightly Indications: High Amount of Cholesterol in the Blood      Multiple Vitamins-Minerals (THERAPEUTIC MULTIVITAMIN-MINERALS) tablet Take 1 tablet by mouth nightly       No current facility-administered medications for this visit. Past Surgical History:   Procedure Laterality Date    BLADDER SURGERY      tumors, numerous surgs    CARDIAC [facility]      [facility]    HERNIA REPAIR Right     inguinal    HERNIA REPAIR Left     inguinal    REVISION OF TOTAL SHOULDER Left 2015    SHOULDER TOTAL ARTHROPLASTY REVERSE performed by Tyson Reilly MD at [facility] Left     rotator cuff    SKIN BIOPSY Left     ear       Past Medical History:   Diagnosis Date    Arthritis     CAD (coronary artery disease)     2 stents    Cancer ([address]     bladder    GERD (gastroesophageal reflux disease)     Hyperlipidemia     Hypertension     Macular degeneration disease     left eye    Neuropathy        Family History   Problem Relation Age of Onset    Early Death Mother     Tuberculosis Mother     Heart Disease Father     Diabetes Father     Heart Disease Brother     Heart Disease Other        Social History     Tobacco Use    Smoking status: Former Smoker     Packs/day: 1.00     Years: 20.00     Pack years: 20.00     Quit date: 1974     Years since quittin.3    Smokeless tobacco: Never Used   Substance Use Topics    Alcohol use: Yes     Alcohol/week: 7.0 standard drinks     Types: 7 Glasses of wine per week           REVIEW OF SYSTEMS:  all other systems reviewed and are negative  Review of Systems   Constitutional: Negative for chills and fever. HENT: Positive for hearing loss (requires Hearing aids).  Negative for congestion, dental problem, ear discharge, ear pain, facial swelling, postnasal drip, rhinorrhea, sinus pressure, sinus pain, sore throat, tinnitus, trouble swallowing and voice change. Eyes: Negative for photophobia and pain. Neurological: Negative for dizziness and headaches. Comments:     PHYSICAL EXAM:    /64   Ht 5' 4\" (1.626 m)   Wt 147 lb (66.7 kg)   BMI 25.23 kg/m²   Body mass index is 25.23 kg/m². General Appearance: well developed  and well nourished  Head/ Face: normocephalic and atraumatic  Vocal Quality: good/ normal  Ears: Right Ear: External: external ears normal Otoscopy Ear Canal: canal clear Otoscopy TM: TM's normal and TM's mobile Left Ear: External: external ears normal Otoscopy Ear Canal: Blood was started in the canal with no active bleeding noted Otoscopy TM: Patient was noted with a dull TM with a small pinpoint perforation at the 12 o'clock position of the tympanic membrane. There was no purulent drainage however clear drainage was noted from the perforation site  Hearing: diminished  Nose: nares normal and septum midline  Neck: supple and adenopathy none palpable  Thyroid: normal and nodules No    Assessment & Plan:    Problem List Items Addressed This Visit     Perforated tympanic membrane on examination, left - Primary     Perforated left TMpinpoint - with evidence of low-grade otitis media  Plan: I will place the patient on ofloxacin eardrops as well as Ceftin for 10 days. He is to follow-up in 2 weeks for reevaluation. No orders of the defined types were placed in this encounter.       Orders Placed This Encounter   Medications    ofloxacin (OCUFLOX) 0.3 % solution     Sig: Place 3 drops in ear(s) 3 times daily Right ear     Dispense:  10 mL     Refill:  1    cefUROXime (CEFTIN) 500 MG tablet     Sig: Take 1 tablet by mouth 2 times daily for 10 days     Dispense:  20 tablet     Refill:  0       Electronically signed by Gurdeep Tam PA-C on 4/26/22 at 2:53 PM CDT          Please note that this chart was generated using dragon dictation software. Although every effort was made to ensure the accuracy of this automated transcription, some errors in transcription may have occurred.

## 2022-04-26 NOTE — ASSESSMENT & PLAN NOTE
Perforated left TMpinpoint - with evidence of low-grade otitis media  Plan: I will place the patient on ofloxacin eardrops as well as Ceftin for 10 days. He is to follow-up in 2 weeks for reevaluation.

## 2022-05-10 ENCOUNTER — OFFICE VISIT (OUTPATIENT)
Dept: CARDIOLOGY | Facility: CLINIC | Age: 87
End: 2022-05-10

## 2022-05-10 VITALS
HEART RATE: 60 BPM | DIASTOLIC BLOOD PRESSURE: 64 MMHG | WEIGHT: 148 LBS | SYSTOLIC BLOOD PRESSURE: 128 MMHG | HEIGHT: 64 IN | BODY MASS INDEX: 25.27 KG/M2 | OXYGEN SATURATION: 96 %

## 2022-05-10 DIAGNOSIS — I25.10 CORONARY ARTERY DISEASE INVOLVING NATIVE CORONARY ARTERY OF NATIVE HEART WITHOUT ANGINA PECTORIS: Primary | ICD-10-CM

## 2022-05-10 DIAGNOSIS — I10 ESSENTIAL HYPERTENSION: ICD-10-CM

## 2022-05-10 DIAGNOSIS — Z95.5 HISTORY OF CORONARY ARTERY STENT PLACEMENT: ICD-10-CM

## 2022-05-10 DIAGNOSIS — E78.2 MIXED HYPERLIPIDEMIA: ICD-10-CM

## 2022-05-10 DIAGNOSIS — I34.0 NON-RHEUMATIC MITRAL REGURGITATION: ICD-10-CM

## 2022-05-10 DIAGNOSIS — I44.7 LEFT BUNDLE BRANCH BLOCK: ICD-10-CM

## 2022-05-10 PROCEDURE — 99214 OFFICE O/P EST MOD 30 MIN: CPT | Performed by: NURSE PRACTITIONER

## 2022-05-10 PROCEDURE — 93000 ELECTROCARDIOGRAM COMPLETE: CPT | Performed by: NURSE PRACTITIONER

## 2022-05-10 RX ORDER — NITROGLYCERIN 0.4 MG/1
TABLET SUBLINGUAL
COMMUNITY

## 2022-05-10 RX ORDER — DEXLANSOPRAZOLE 60 MG/1
CAPSULE, DELAYED RELEASE ORAL
COMMUNITY
End: 2023-03-16

## 2022-05-10 NOTE — PROGRESS NOTES
"    Subjective:     Encounter Date:05/10/2022      Patient ID: Wyatt Curry is a 89 y.o. male with CAD s/p stenting, chronic LBBB, HTN, HLD.    Chief Complaint: \"no complaints\"  Coronary Artery Disease  Presents for follow-up visit. Pertinent negatives include no chest pain, dizziness, leg swelling, palpitations, shortness of breath or weight gain. Risk factors include hyperlipidemia. The symptoms have been stable.   Hypertension  This is a chronic problem. The current episode started more than 1 year ago. The problem has been rapidly improving since onset. The problem is controlled. Pertinent negatives include no chest pain, malaise/fatigue, orthopnea, palpitations, PND or shortness of breath.   Hyperlipidemia  This is a chronic problem. The current episode started more than 1 year ago. The problem is controlled. Recent lipid tests were reviewed and are low. Pertinent negatives include no chest pain or shortness of breath.   Fatigue  Associated symptoms include coughing and fatigue. Pertinent negatives include no chest pain, rash or weakness.     Patient presents today for a routine follow up. Patient is followed for CAD s/p stenting x2 in approximately 2006. He had a low risk nuclear stress in 9/2018. He has a chronic LBBB. Most recent 2D echo in 8/2018 with normal EF.     Today he reports he has been well. His ASA was stopped at his last appointment due to complaints of bruising easily which he notes has improved. He notes he had issues with his BP on Sunday with  which left him feeling fatigued but also notes he had a \"big day\" that day. He does not typically have issues with his BP. He continues to complain of a nonproductive cough which he has had for many years and is unchanged from previous. He denies chest pain, dyspnea, palpitations, orthopnea and PND.     The following portions of the patient's history were reviewed and updated as appropriate: allergies, current medications, past family history, " "past medical history, past social history, past surgical history and problem list.    Allergies   Allergen Reactions   • Lyrica [Pregabalin] Mental Status Change     Made pt feel \"crazy\"       Current Outpatient Medications:   •  acetaminophen (TYLENOL) 500 MG tablet, Take 500 mg by mouth Every 6 (Six) Hours As Needed for Mild Pain ., Disp: , Rfl:   •  amLODIPine (NORVASC) 10 MG tablet, Take 10 mg by mouth Daily., Disp: , Rfl: 3  •  atorvastatin (LIPITOR) 20 MG tablet, Take 20 mg by mouth Daily., Disp: , Rfl:   •  benzonatate (TESSALON) 100 MG capsule, benzonatate 100 mg capsule  Take 1 capsule 3 times a day by oral route as needed., Disp: , Rfl:   •  cholecalciferol (VITAMIN D3) 1000 units tablet, Take 1,000 Units by mouth Daily., Disp: , Rfl:   •  clopidogrel (PLAVIX) 75 MG tablet, Take 75 mg by mouth Daily., Disp: , Rfl:   •  dexlansoprazole (DEXILANT) 60 MG capsule, Dexilant 60 mg capsule, delayed release  TAKE 1 CAPSULE BY MOUTH EVERY DAY, Disp: , Rfl:   •  irbesartan (AVAPRO) 300 MG tablet, Take 300 mg by mouth Daily., Disp: , Rfl:   •  Magnesium 400 MG capsule, Take  by mouth Daily., Disp: , Rfl:   •  nitroglycerin (NITROSTAT) 0.4 MG SL tablet, nitroglycerin 0.4 mg sublingual tablet, Disp: , Rfl:   •  pantoprazole (PROTONIX) 40 MG EC tablet, Take 40 mg by mouth 2 (Two) Times a Day., Disp: , Rfl:   •  Zinc 50 MG capsule, Take  by mouth., Disp: , Rfl:   Past Medical History:   Diagnosis Date   • Arthritis    • Bladder cancer (HCC)    • Bronchitis    • CAD (coronary artery disease)    • Cancer (HCC) 1975    bladder cancer   • 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   • GERD (gastroesophageal reflux disease)    • Hyperlipidemia    • Hypertension    • Irregular heart beat    • Lung nodules        Social History     Socioeconomic History   • Marital status:    Tobacco Use   • Smoking status: Former Smoker     Packs/day: 1.00     Years: 30.00     Pack years: 30.00     Types: Cigarettes     Start date: 1948    "  Quit date:      Years since quittin.3   • Smokeless tobacco: Never Used   Vaping Use   • Vaping Use: Never used   Substance and Sexual Activity   • Alcohol use: Yes     Alcohol/week: 1.0 standard drink     Types: 1 Glasses of wine per week     Comment: occ wine   • Drug use: Not Currently   • Sexual activity: Defer       Review of Systems   Constitutional: Positive for fatigue. Negative for malaise/fatigue, weight gain and weight loss.   Cardiovascular: Negative for chest pain, dyspnea on exertion, irregular heartbeat, leg swelling, near-syncope, orthopnea, palpitations, paroxysmal nocturnal dyspnea and syncope.   Respiratory: Positive for cough. Negative for shortness of breath, sleep disturbances due to breathing, sputum production and wheezing.    Hematologic/Lymphatic: Does not bruise/bleed easily.   Skin: Negative for dry skin, flushing, itching and rash.   Neurological: Negative for dizziness, light-headedness, loss of balance and weakness.   All other systems reviewed and are negative.         Objective:     Vitals reviewed.   Constitutional:       General: Not in acute distress.     Appearance: Well-developed. Not diaphoretic.   Eyes:      General: No scleral icterus.     Conjunctiva/sclera: Conjunctivae normal.      Pupils: Pupils are equal, round, and reactive to light.   HENT:      Head: Normocephalic.    Mouth/Throat:      Pharynx: No oropharyngeal exudate.   Pulmonary:      Effort: Pulmonary effort is normal. No respiratory distress.      Breath sounds: Normal breath sounds. No wheezing. No rales.   Chest:      Chest wall: Not tender to palpatation.   Cardiovascular:      Normal rate. Regular rhythm.      Murmurs: There is a grade 1/6 high frequency blowing holosystolic murmur at the apex.   Pulses:     Intact distal pulses.   Edema:     Peripheral edema absent.   Abdominal:      General: Bowel sounds are normal. There is no distension.      Palpations: Abdomen is soft.      Tenderness: There  "is no abdominal tenderness.   Musculoskeletal: Normal range of motion.      Cervical back: Normal range of motion and neck supple. Skin:     General: Skin is warm and dry.      Coloration: Skin is not pale.      Findings: No erythema or rash.   Neurological:      Mental Status: Alert and oriented to person, place, and time.      Deep Tendon Reflexes: Reflexes are normal and symmetric.   Psychiatric:         Behavior: Behavior normal.             ECG 12 Lead    Date/Time: 5/10/2022 9:55 AM  Performed by: Bell Lawson APRN  Authorized by: Bell Lawson APRN   Comparison: compared with previous ECG from 11/10/2021  Similar to previous ECG  Rhythm: sinus rhythm  Rate: normal  BPM: 60  Conduction: left bundle branch block  T inversion: I, II, V5, V6 and aVF  QRS axis: normal  Other findings: T wave abnormality    Clinical impression: abnormal EKG          /64   Pulse 60   Ht 162.6 cm (64\")   Wt 67.1 kg (148 lb)   SpO2 96%   BMI 25.40 kg/m²     Lab Review:   I have reviewed previous office notes, recent labs and recent cardiac testing.       Results for orders placed during the hospital encounter of 08/20/18    Adult Transthoracic Echo Complete W/ Cont if Necessary Per Protocol    Interpretation Summary  · Left ventricular diastolic dysfunction (grade I a) consistent with impaired relaxation.  · There is calcification of the aortic valve.  · Mild-to-moderate mitral valve regurgitation is present  · Left ventricular systolic function is normal. Estimated EF = 55%.  · Left ventricular wall thickness is consistent with mild concentric hypertrophy.  · The following left ventricular wall segments are hypokinetic: apex hypokinetic.  · Apical hypokinesis    Nuclear stress echo 9/2018:  Interpretation Summary    · Left ventricular ejection fraction is moderately reduced (Calculated EF = 42%).  · Impressions are consistent with a low risk study.          Assessment:          Diagnosis Plan   1. Coronary artery " "disease involving native coronary artery of native heart without angina pectoris     2. History of coronary artery stent placement     3. Essential hypertension     4. Mixed hyperlipidemia     5. Left bundle branch block     6. Non-rheumatic mitral regurgitation          Plan:       1. CAD- stable. No clinical signs of ongoing ischemia. Low risk nuclear stress in 2018. Continue Plavix, statin, BB, CCB and ARB.   2. Hx coronary artery stent- x2 in approximately 2006. Continue plavix.   3. HTN- controlled. Intermittent issues with \"hypotension\". educated if issues become more frequent then to monitor BP prior to taking medications and let either his PCP or our office know.  4. HLD- controlled with LDL of 67 on 4/21. Continue statin.  5. LBBB- chronic.   6. MR- mild-moderate on echo in 2018. Murmur present. Will possibly  repeat 2D echo next year depending on symptoms.       Follow up in 6 months or sooner if symptoms worsen.     I spent 30 minutes caring for Wyatt on this date of service. This time includes time spent by me in the following activities:preparing for the visit, reviewing tests, obtaining and/or reviewing a separately obtained history, performing a medically appropriate examination and/or evaluation , documenting information in the medical record, independently interpreting results and communicating that information with the patient/family/caregiver and care coordination         "

## 2022-07-26 ENCOUNTER — TRANSCRIBE ORDERS (OUTPATIENT)
Dept: ADMINISTRATIVE | Facility: HOSPITAL | Age: 87
End: 2022-07-26

## 2022-07-26 DIAGNOSIS — R91.8 OTHER NONSPECIFIC ABNORMAL FINDING OF LUNG FIELD: Primary | ICD-10-CM

## 2022-08-02 ENCOUNTER — HOSPITAL ENCOUNTER (OUTPATIENT)
Dept: CT IMAGING | Facility: HOSPITAL | Age: 87
Discharge: HOME | End: 2022-08-02
Admitting: FAMILY MEDICINE

## 2022-08-02 PROCEDURE — 71250 CT THORAX DX C-: CPT

## 2022-11-15 ENCOUNTER — OFFICE VISIT (OUTPATIENT)
Dept: CARDIOLOGY | Facility: CLINIC | Age: 87
End: 2022-11-15

## 2022-11-15 VITALS
OXYGEN SATURATION: 96 % | HEART RATE: 59 BPM | HEIGHT: 63 IN | DIASTOLIC BLOOD PRESSURE: 60 MMHG | WEIGHT: 147 LBS | SYSTOLIC BLOOD PRESSURE: 142 MMHG | BODY MASS INDEX: 26.05 KG/M2

## 2022-11-15 DIAGNOSIS — I34.0 NON-RHEUMATIC MITRAL REGURGITATION: ICD-10-CM

## 2022-11-15 DIAGNOSIS — I10 ESSENTIAL HYPERTENSION: ICD-10-CM

## 2022-11-15 DIAGNOSIS — E78.2 MIXED HYPERLIPIDEMIA: ICD-10-CM

## 2022-11-15 DIAGNOSIS — I25.10 CORONARY ARTERY DISEASE INVOLVING NATIVE CORONARY ARTERY OF NATIVE HEART WITHOUT ANGINA PECTORIS: Primary | ICD-10-CM

## 2022-11-15 DIAGNOSIS — I44.7 LEFT BUNDLE BRANCH BLOCK: ICD-10-CM

## 2022-11-15 PROCEDURE — 99214 OFFICE O/P EST MOD 30 MIN: CPT | Performed by: NURSE PRACTITIONER

## 2022-11-15 PROCEDURE — 93000 ELECTROCARDIOGRAM COMPLETE: CPT | Performed by: NURSE PRACTITIONER

## 2022-11-15 RX ORDER — MAGNESIUM OXIDE 400 MG/1
1 TABLET ORAL DAILY
COMMUNITY
Start: 2022-08-23

## 2022-11-15 RX ORDER — OFLOXACIN 3 MG/ML
SOLUTION/ DROPS OPHTHALMIC
COMMUNITY
End: 2023-03-31

## 2022-11-15 RX ORDER — LANOLIN ALCOHOL/MO/W.PET/CERES
1000 CREAM (GRAM) TOPICAL DAILY
COMMUNITY

## 2022-11-15 NOTE — PROGRESS NOTES
"    Subjective:     Encounter Date:11/15/2022      Patient ID: Wyatt Curry is a 89 y.o. male     Chief Complaint: \"no complaints\"  Coronary Artery Disease  Presents for follow-up visit. Pertinent negatives include no chest pain, dizziness, leg swelling, palpitations, shortness of breath or weight gain. Risk factors include hyperlipidemia. The symptoms have been stable.   Fatigue  Associated symptoms include coughing and fatigue. Pertinent negatives include no chest pain, rash or weakness.   Hypertension  This is a chronic problem. The current episode started more than 1 year ago. The problem has been rapidly improving since onset. The problem is controlled. Associated symptoms include malaise/fatigue. Pertinent negatives include no chest pain, orthopnea, palpitations, PND or shortness of breath.   Hyperlipidemia  This is a chronic problem. The current episode started more than 1 year ago. The problem is controlled. Recent lipid tests were reviewed and are low. Pertinent negatives include no chest pain or shortness of breath.     Patient presents today for a routine follow up. Patient is followed for CAD s/p stenting x2 in approximately 2006. He had a low risk nuclear stress in 9/2018 which was ordered due to concerns for stable angina with complaints of arm numbness and pain between his shoulder blades. He has a chronic LBBB. Most recent 2D echo in 8/2018 with normal EF and mild-moderate MR.     Today he reports he has been well but remains fatigued. He notes his fatigue remains unchanged. He is caring for his 87 yr old wife who is in remission with her lung cancer but continues to require care. His chronic, daytime cough, remains unchanged and occurs only when sitting in his recliner, after he takes his medications in the morning. He goes to the 2-3x times weekly without difficulty.  He denies chest pain, dyspnea, palpitations, orthopnea and PND.     The following portions of the patient's history were reviewed " "and updated as appropriate: allergies, current medications, past family history, past medical history, past social history, past surgical history and problem list.    Allergies   Allergen Reactions   • Lyrica [Pregabalin] Mental Status Change     Made pt feel \"crazy\"       Current Outpatient Medications:   •  acetaminophen (TYLENOL) 500 MG tablet, Take 500 mg by mouth Every 6 (Six) Hours As Needed for Mild Pain ., Disp: , Rfl:   •  amLODIPine (NORVASC) 10 MG tablet, Take 10 mg by mouth Daily., Disp: , Rfl: 3  •  atorvastatin (LIPITOR) 20 MG tablet, Take 20 mg by mouth Daily., Disp: , Rfl:   •  benzonatate (TESSALON) 100 MG capsule, benzonatate 100 mg capsule  Take 1 capsule 3 times a day by oral route as needed., Disp: , Rfl:   •  clopidogrel (PLAVIX) 75 MG tablet, Take 75 mg by mouth Daily., Disp: , Rfl:   •  dexlansoprazole (DEXILANT) 60 MG capsule, Dexilant 60 mg capsule, delayed release  TAKE 1 CAPSULE BY MOUTH EVERY DAY, Disp: , Rfl:   •  irbesartan (AVAPRO) 300 MG tablet, Take 300 mg by mouth Daily., Disp: , Rfl:   •  magnesium oxide (MAG-OX) 400 MG tablet, Take 1 tablet by mouth Daily., Disp: , Rfl:   •  nitroglycerin (NITROSTAT) 0.4 MG SL tablet, nitroglycerin 0.4 mg sublingual tablet, Disp: , Rfl:   •  ofloxacin (OCUFLOX) 0.3 % ophthalmic solution, ofloxacin 0.3 % eye drops  PLACE 3 DROPS IN EAR(S) 3 TIMES DAILY, Disp: , Rfl:   •  vitamin B-12 (CYANOCOBALAMIN) 500 MCG tablet, Take 1 tablet by mouth Daily., Disp: , Rfl:   Past Medical History:   Diagnosis Date   • Arthritis    • Bladder cancer (HCC)    • Bronchitis    • CAD (coronary artery disease)    • Cancer (HCC) 1975    bladder cancer   • 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   • GERD (gastroesophageal reflux disease)    • Hyperlipidemia    • Hypertension    • Irregular heart beat    • Lung nodules        Social History     Socioeconomic History   • Marital status:    Tobacco Use   • Smoking status: Former     Packs/day: 1.00     Years: 30.00 "     Pack years: 30.00     Types: Cigarettes     Start date:      Quit date:      Years since quittin.9   • Smokeless tobacco: Never   Vaping Use   • Vaping Use: Never used   Substance and Sexual Activity   • Alcohol use: Yes     Alcohol/week: 1.0 standard drink     Types: 1 Glasses of wine per week     Comment: occ wine   • Drug use: Not Currently   • Sexual activity: Defer       Review of Systems   Constitutional: Positive for fatigue and malaise/fatigue. Negative for weight gain and weight loss.   Cardiovascular: Negative for chest pain, dyspnea on exertion, irregular heartbeat, leg swelling, near-syncope, orthopnea, palpitations, paroxysmal nocturnal dyspnea and syncope.   Respiratory: Positive for cough. Negative for shortness of breath, sleep disturbances due to breathing, sputum production and wheezing.    Hematologic/Lymphatic: Does not bruise/bleed easily.   Skin: Negative for dry skin, flushing, itching and rash.   Neurological: Negative for dizziness, light-headedness, loss of balance and weakness.   All other systems reviewed and are negative.         Objective:     Vitals reviewed.   Constitutional:       General: Not in acute distress.     Appearance: Well-developed. Not diaphoretic.   Eyes:      General: No scleral icterus.     Conjunctiva/sclera: Conjunctivae normal.      Pupils: Pupils are equal, round, and reactive to light.   HENT:      Head: Normocephalic.    Mouth/Throat:      Pharynx: No oropharyngeal exudate.   Pulmonary:      Effort: Pulmonary effort is normal. No respiratory distress.      Breath sounds: Normal breath sounds. No wheezing. No rales.   Chest:      Chest wall: Not tender to palpatation.   Cardiovascular:      Normal rate. Regular rhythm.      Murmurs: There is a grade 1/6 high frequency blowing holosystolic murmur at the apex.   Pulses:     Intact distal pulses.   Edema:     Peripheral edema absent.   Abdominal:      General: Bowel sounds are normal. There is no  "distension.      Palpations: Abdomen is soft.      Tenderness: There is no abdominal tenderness.   Musculoskeletal: Normal range of motion.      Cervical back: Normal range of motion and neck supple. Skin:     General: Skin is warm and dry.      Coloration: Skin is not pale.      Findings: No erythema or rash.   Neurological:      Mental Status: Alert and oriented to person, place, and time.      Deep Tendon Reflexes: Reflexes are normal and symmetric.   Psychiatric:         Behavior: Behavior normal.           ECG 12 Lead    Date/Time: 11/15/2022 9:43 AM  Performed by: Bell Lawson APRN  Authorized by: Bell Lawson APRN   Comparison: compared with previous ECG from 5/10/2022  Similar to previous ECG  Rhythm: sinus bradycardia  Rate: bradycardic  BPM: 59  Conduction: left bundle branch block  QRS axis: normal  Other findings: T wave abnormality    Clinical impression: abnormal EKG          /60 (BP Location: Left arm, Patient Position: Sitting, Cuff Size: Adult)   Pulse 59   Ht 160 cm (63\")   Wt 66.7 kg (147 lb)   SpO2 96%   BMI 26.04 kg/m²     Lab Review:   I have reviewed previous office notes, recent labs and recent cardiac testing.       Results for orders placed during the hospital encounter of 08/20/18    Adult Transthoracic Echo Complete W/ Cont if Necessary Per Protocol    Interpretation Summary  · Left ventricular diastolic dysfunction (grade I a) consistent with impaired relaxation.  · There is calcification of the aortic valve.  · Mild-to-moderate mitral valve regurgitation is present  · Left ventricular systolic function is normal. Estimated EF = 55%.  · Left ventricular wall thickness is consistent with mild concentric hypertrophy.  · The following left ventricular wall segments are hypokinetic: apex hypokinetic.  · Apical hypokinesis    Nuclear stress echo 9/2018:  Interpretation Summary    · Left ventricular ejection fraction is moderately reduced (Calculated EF = " 42%).  · Impressions are consistent with a low risk study.          Assessment:          Diagnosis Plan   1. Coronary artery disease involving native coronary artery of native heart without angina pectoris        2. Essential hypertension        3. Mixed hyperlipidemia        4. Left bundle branch block        5. Non-rheumatic mitral regurgitation  Adult Transthoracic Echo Complete w/ Color, Spectral and Contrast if necessary per protocol           Plan:       1. CAD- stable. Hx coronary artery stent x2 with most recent being in approximately 2006. No clinical signs of ongoing ischemia. Low risk nuclear stress in 2018. Continue Plavix, statin, BB, CCB and ARB. ASA was stopped in 11/2021 due to complaints of bruising.  2. HTN- controlled. Followed by PCP  3. HLD- controlled with LDL of 67 in April. Continue statin.  4. LBBB- chronic.   5. MR- mild-moderate on echo in 2018. Murmur present. Will repeat 2D echo.       Follow up in 6 months or sooner if symptoms worsen.     I spent 30 minutes caring for Wyatt on this date of service. This time includes time spent by me in the following activities:preparing for the visit, reviewing tests, obtaining and/or reviewing a separately obtained history, performing a medically appropriate examination and/or evaluation , documenting information in the medical record, independently interpreting results and communicating that information with the patient/family/caregiver and care coordination

## 2022-12-20 ENCOUNTER — HOSPITAL ENCOUNTER (OUTPATIENT)
Dept: CARDIOLOGY | Facility: HOSPITAL | Age: 87
Discharge: HOME | End: 2022-12-20
Admitting: NURSE PRACTITIONER

## 2022-12-20 DIAGNOSIS — I34.0 NON-RHEUMATIC MITRAL REGURGITATION: ICD-10-CM

## 2022-12-20 PROCEDURE — 93306 TTE W/DOPPLER COMPLETE: CPT

## 2022-12-20 PROCEDURE — 93306 TTE W/DOPPLER COMPLETE: CPT | Performed by: INTERNAL MEDICINE

## 2022-12-20 PROCEDURE — 25010000002 PERFLUTREN PROTEIN A MICROSPH SUSPENSION: Performed by: INTERNAL MEDICINE

## 2022-12-20 RX ADMIN — HUMAN ALBUMIN MICROSPHERES AND PERFLUTREN 0.66 MG: 10; .22 INJECTION, SOLUTION INTRAVENOUS at 09:52

## 2022-12-22 LAB
BH CV ECHO MEAS - AO MAX PG: 6.6 MMHG
BH CV ECHO MEAS - AO MEAN PG: 3 MMHG
BH CV ECHO MEAS - AO ROOT DIAM: 2.7 CM
BH CV ECHO MEAS - AO V2 MAX: 128 CM/SEC
BH CV ECHO MEAS - AO V2 VTI: 24 CM
BH CV ECHO MEAS - AVA(I,D): 1.39 CM2
BH CV ECHO MEAS - EDV(CUBED): 140.6 ML
BH CV ECHO MEAS - EDV(MOD-SP2): 51 ML
BH CV ECHO MEAS - EDV(MOD-SP4): 84 ML
BH CV ECHO MEAS - EF(MOD-BP): 51 %
BH CV ECHO MEAS - EF(MOD-SP2): 52.9 %
BH CV ECHO MEAS - EF(MOD-SP4): 51.2 %
BH CV ECHO MEAS - ESV(CUBED): 68.9 ML
BH CV ECHO MEAS - ESV(MOD-SP2): 24 ML
BH CV ECHO MEAS - ESV(MOD-SP4): 41 ML
BH CV ECHO MEAS - FS: 21.2 %
BH CV ECHO MEAS - IVS/LVPW: 1.1 CM
BH CV ECHO MEAS - IVSD: 1.1 CM
BH CV ECHO MEAS - LA DIMENSION: 3.9 CM
BH CV ECHO MEAS - LAT PEAK E' VEL: 4.4 CM/SEC
BH CV ECHO MEAS - LV MASS(C)D: 207.3 GRAMS
BH CV ECHO MEAS - LV MAX PG: 1.33 MMHG
BH CV ECHO MEAS - LV MEAN PG: 1 MMHG
BH CV ECHO MEAS - LV V1 MAX: 57.6 CM/SEC
BH CV ECHO MEAS - LV V1 VTI: 14.7 CM
BH CV ECHO MEAS - LVIDD: 5.2 CM
BH CV ECHO MEAS - LVIDS: 4.1 CM
BH CV ECHO MEAS - LVOT AREA: 2.27 CM2
BH CV ECHO MEAS - LVOT DIAM: 1.7 CM
BH CV ECHO MEAS - LVPWD: 1 CM
BH CV ECHO MEAS - MED PEAK E' VEL: 10.1 CM/SEC
BH CV ECHO MEAS - MR MAX PG: 41.5 MMHG
BH CV ECHO MEAS - MR MAX VEL: 318 CM/SEC
BH CV ECHO MEAS - MV A MAX VEL: 120 CM/SEC
BH CV ECHO MEAS - MV DEC TIME: 0.43 MSEC
BH CV ECHO MEAS - MV E MAX VEL: 44.9 CM/SEC
BH CV ECHO MEAS - MV E/A: 0.37
BH CV ECHO MEAS - SV(LVOT): 33.4 ML
BH CV ECHO MEAS - SV(MOD-SP2): 27 ML
BH CV ECHO MEAS - SV(MOD-SP4): 43 ML
BH CV ECHO MEAS - TR MAX PG: 22.8 MMHG
BH CV ECHO MEAS - TR MAX VEL: 239 CM/SEC
BH CV ECHO MEASUREMENTS AVERAGE E/E' RATIO: 6.19
LEFT ATRIUM VOLUME INDEX: 30 ML/M2
LEFT ATRIUM VOLUME: 51 ML
LV EF 2D ECHO EST: 50 %
MAXIMAL PREDICTED HEART RATE: 128 BPM
STRESS TARGET HR: 109 BPM

## 2022-12-27 ENCOUNTER — TELEPHONE (OUTPATIENT)
Dept: CARDIOLOGY | Facility: CLINIC | Age: 87
End: 2022-12-27

## 2022-12-27 NOTE — TELEPHONE ENCOUNTER
----- Message from LARRY Simon sent at 12/27/2022  8:06 AM CST -----  Please let patient know his mitral valve is unchanged from previous. No change in medication or treatment at this time.

## 2023-03-15 NOTE — H&P (VIEW-ONLY)
Name:  Wyatt Curry  YOB: 1930  Location: [facility]  Location Address: [address] [place] [address], [facility], [address] [place] [address]  Location Phone: [phone]    Chief Complaint  Chief Complaint   Patient presents with   • Skin Lesion       History of Present Illness  The patient  is a 89 y.o. male who is referred by Yessica Nickerson MD for preoperative evaluation. He complains of a right temple lesion present for 6 month(s). It has not been  biopsied. Patient has a lesion on the left temple/cheek that cryotherapy was performed on but he states the lesion is still present. Patient is on Plavix.                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   • GERD (gastroesophageal reflux disease)    • Hyperlipidemia    • Hypertension    • Irregular heart beat    • Lung nodules        Past Surgical History:   Procedure Laterality Date   • BLADDER SURGERY     • CARDIAC CATHETERIZATION     • COLONOSCOPY     • CORONARY ANGIOPLASTY WITH STENT PLACEMENT      STENT X 2   • CYSTOSCOPY     • FLAP HEAD/NECK Left 3/9/2020    Procedure: POSSIBLE FLAP;  Surgeon: Brijesh Nickerson MD;  Location:  [facility];  Service: ENT;  Laterality: Left;   • HEAD/NECK LESION/CYST EXCISION Left 3/9/2020    Procedure: 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 with frozen section and possible flap or graft;  Surgeon: Brijesh Nickerson MD;  Location:  [facility];  Service: ENT;  Laterality: Left;   • HERNIA REPAIR     • SHOULDER SURGERY     • SKIN BIOPSY      lip biopsy   • SKIN FULL THICKNESS GRAFT Left 3/9/2020    Procedure: OR GRAFT;  Surgeon: Brijesh Nickerson MD;  Location:  [facility];  Service: ENT;  Laterality: Left;   • TRANSURETHRAL RESECTION OF BLADDER TUMOR           Current Outpatient Medications:    •  acetaminophen (TYLENOL) 500 MG tablet, Take 1 tablet by mouth Every 6 (Six) Hours As Needed for Mild Pain., Disp: , Rfl:   •  amLODIPine (NORVASC) 10 MG tablet, Take 1 tablet by mouth Daily., Disp: , Rfl: 3  •  atorvastatin (LIPITOR) 20 MG tablet, Take 1 tablet by mouth Daily., Disp: , Rfl:   •  benzonatate (TESSALON) 100 MG capsule, benzonatate 100 mg capsule  Take 1 capsule 3 times a day by oral route as needed., Disp: , Rfl:   •  clopidogrel (PLAVIX) 75 MG tablet, Take 1 tablet by mouth Daily., Disp: , Rfl:   •  irbesartan (AVAPRO) 300 MG tablet, Take 1 tablet by mouth Daily., Disp: , Rfl:   •  lansoprazole (PREVACID) 30 MG capsule, Take 1 capsule by mouth Daily., Disp: , Rfl:   •  magnesium oxide (MAG-OX) 400 MG tablet, Take 1 tablet by mouth Daily., Disp: , Rfl:   •  nitroglycerin (NITROSTAT) 0.4 MG SL tablet, nitroglycerin 0.4 mg sublingual tablet, Disp: , Rfl:   •  vitamin B-12 (CYANOCOBALAMIN) 500 MCG tablet, Take 1 tablet by mouth Daily., Disp: , Rfl:   •  ofloxacin (OCUFLOX) 0.3 % ophthalmic solution, ofloxacin 0.3 % eye drops  PLACE 3 DROPS IN EAR(S) 3 TIMES DAILY (Patient not taking: Reported on 3/16/2023), Disp: , Rfl:     Lyrica [pregabalin]    Family History   Problem Relation Age of Onset   • Tuberculosis Mother    • Heart disease Father    • Heart attack Father    • Diabetes Sister    • Hypertension Sister    • Hyperlipidemia Sister    • Heart disease Sister    • Alcohol abuse Sister    • Heart disease Brother    • Heart attack Brother    • Heart disease Brother        Social History     Socioeconomic History   • Marital status:    Tobacco Use   • Smoking status: Former     Packs/day: 1.00     Years: 30.00     Pack years: 30.00     Types: Cigarettes     Start date:      Quit date:      Years since quittin.2   • Smokeless tobacco: Never   Vaping Use   • Vaping Use: Never used   Substance and Sexual Activity   • Alcohol use: Yes     Alcohol/week: 1.0 standard drink     Types:  1 Glasses of wine per week     Comment: occ wine   • Drug use: Not Currently   • Sexual activity: Defer       Review of Systems   Constitutional: Negative.    HENT: Negative.    Eyes: Negative.    Respiratory: Negative.    Cardiovascular: Negative.    Gastrointestinal: Negative.    Endocrine: Negative.    Genitourinary: Negative.    Musculoskeletal: Negative.    Skin:        Right temple lesion    Left temple lesion   Allergic/Immunologic: Negative.    Neurological: Negative.    Hematological: Negative.    Psychiatric/Behavioral: Negative.        Vitals:    03/16/23 1437   BP: 140/64   Pulse: 72   Resp: 16   Temp: 97.1 °F (36.2 °C)       Objective     Physical Exam  Vitals reviewed.   Constitutional:       Appearance: Normal appearance. He is normal weight.   HENT:      Head: Normocephalic.     Neurological:      Mental Status: He is alert.       Dr. Nickerson has examined and assessed the patient and agrees with current treatment plan        Assessment & Plan   Diagnoses and all orders for this visit:    1. Neoplasm of uncertain behavior (Primary)  -     Case Request; Standing  -     Comprehensive Metabolic Panel; Future  -     CBC and Differential; Future  -     Protime-INR; Future  -     APTT; Future  -     ECG 12 Lead; Future  -     XR Chest 2 View; Future  -     Case Request    2. Anticoagulated  -     Case Request; Standing  -     Comprehensive Metabolic Panel; Future  -     CBC and Differential; Future  -     Protime-INR; Future  -     APTT; Future  -     ECG 12 Lead; Future  -     XR Chest 2 View; Future  -     Case Request    3. H/O squamous cell carcinoma of skin    4. H/O basal cell carcinoma excision    Other orders  -     Follow Anesthesia Guidelines / Protocol; Future  -     Obtain Informed Consent  -     Follow Anesthesia Guidelines / Protocol; Standing  -     Verify NPO Status; Standing  -     Obtain Informed Consent; Standing  -     SCD (Sequential Compression Device) - To Be Placed on Patient in  Pre-Op; Standing      EXCISION LESION OF RIGHT TEMPLE AND LEFT TEMPLE WITH FROZEN SECTION WITH POSSIBLE FLAP OR GRAFT (N/A)  Orders Placed This Encounter   Procedures   • XR Chest 2 View     Standing Status:   Future     Standing Expiration Date:   3/15/2024     Order Specific Question:   Reason for Exam:     Answer:   PRE OP   • Comprehensive Metabolic Panel     Standing Status:   Future     Standing Expiration Date:   3/15/2024     Order Specific Question:   Release to patient     Answer:   Routine Release   • Protime-INR     Standing Status:   Future     Standing Expiration Date:   3/15/2024   • APTT     Standing Status:   Future     Standing Expiration Date:   3/15/2024   • Obtain Informed Consent     EXCISION LESION with possible flap or graft-     Order Specific Question:   Informed Consent Given For     Answer:   EXCISION LESION with possible flap or graft-   • ECG 12 Lead     Standing Status:   Future     Standing Expiration Date:   3/15/2024     Order Specific Question:   Reason for Exam:     Answer:   EXCISION LESION   • CBC and Differential     Standing Status:   Future     Standing Expiration Date:   3/15/2024     Order Specific Question:   Manual Differential     Answer:   No     Return for post op.     Risks vs benefits of skin lesion excision discussed   Patient wishes to proceed     Patient Instructions   Discussion of skin lesion. Discussed risks, benefits, alternatives, and possible complications of excision of the skin lesion with reconstruction utilizing local tissue rearrangement, full-thickness skin grafting, or local interpolated flaps. Risks include, but are not limited too: bleeding, infection, hematoma, recurrence, need for additional procedures, flap failure, cosmetic deformity. Patient understands risks and would like to proceed with surgery.  Alternatives include doing nothing.

## 2023-03-15 NOTE — PROGRESS NOTES
Name:  Wyatt Curry  YOB: 1930  Location: [facility]  Location Address: [address] [place] [address], [facility], [address] [place] [address]  Location Phone: [phone]    Chief Complaint  Chief Complaint   Patient presents with   • Skin Lesion       History of Present Illness  The patient  is a 89 y.o. male who is referred by Yessica Nickerson MD for preoperative evaluation. He complains of a right temple lesion present for 6 month(s). It has not been  biopsied. Patient has a lesion on the left temple/cheek that cryotherapy was performed on but he states the lesion is still present. Patient is on Plavix.                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   • GERD (gastroesophageal reflux disease)    • Hyperlipidemia    • Hypertension    • Irregular heart beat    • Lung nodules        Past Surgical History:   Procedure Laterality Date   • BLADDER SURGERY     • CARDIAC CATHETERIZATION     • COLONOSCOPY     • CORONARY ANGIOPLASTY WITH STENT PLACEMENT      STENT X 2   • CYSTOSCOPY     • FLAP HEAD/NECK Left 3/9/2020    Procedure: POSSIBLE FLAP;  Surgeon: Brijesh Nickerson MD;  Location:  [facility];  Service: ENT;  Laterality: Left;   • HEAD/NECK LESION/CYST EXCISION Left 3/9/2020    Procedure: 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 with frozen section and possible flap or graft;  Surgeon: Brijesh Nickerson MD;  Location:  [facility];  Service: ENT;  Laterality: Left;   • HERNIA REPAIR     • SHOULDER SURGERY     • SKIN BIOPSY      lip biopsy   • SKIN FULL THICKNESS GRAFT Left 3/9/2020    Procedure: OR GRAFT;  Surgeon: Brijesh Nickerson MD;  Location:  [facility];  Service: ENT;  Laterality: Left;   • TRANSURETHRAL RESECTION OF BLADDER TUMOR           Current Outpatient Medications:    •  acetaminophen (TYLENOL) 500 MG tablet, Take 1 tablet by mouth Every 6 (Six) Hours As Needed for Mild Pain., Disp: , Rfl:   •  amLODIPine (NORVASC) 10 MG tablet, Take 1 tablet by mouth Daily., Disp: , Rfl: 3  •  atorvastatin (LIPITOR) 20 MG tablet, Take 1 tablet by mouth Daily., Disp: , Rfl:   •  benzonatate (TESSALON) 100 MG capsule, benzonatate 100 mg capsule  Take 1 capsule 3 times a day by oral route as needed., Disp: , Rfl:   •  clopidogrel (PLAVIX) 75 MG tablet, Take 1 tablet by mouth Daily., Disp: , Rfl:   •  irbesartan (AVAPRO) 300 MG tablet, Take 1 tablet by mouth Daily., Disp: , Rfl:   •  lansoprazole (PREVACID) 30 MG capsule, Take 1 capsule by mouth Daily., Disp: , Rfl:   •  magnesium oxide (MAG-OX) 400 MG tablet, Take 1 tablet by mouth Daily., Disp: , Rfl:   •  nitroglycerin (NITROSTAT) 0.4 MG SL tablet, nitroglycerin 0.4 mg sublingual tablet, Disp: , Rfl:   •  vitamin B-12 (CYANOCOBALAMIN) 500 MCG tablet, Take 1 tablet by mouth Daily., Disp: , Rfl:   •  ofloxacin (OCUFLOX) 0.3 % ophthalmic solution, ofloxacin 0.3 % eye drops  PLACE 3 DROPS IN EAR(S) 3 TIMES DAILY (Patient not taking: Reported on 3/16/2023), Disp: , Rfl:     Lyrica [pregabalin]    Family History   Problem Relation Age of Onset   • Tuberculosis Mother    • Heart disease Father    • Heart attack Father    • Diabetes Sister    • Hypertension Sister    • Hyperlipidemia Sister    • Heart disease Sister    • Alcohol abuse Sister    • Heart disease Brother    • Heart attack Brother    • Heart disease Brother        Social History     Socioeconomic History   • Marital status:    Tobacco Use   • Smoking status: Former     Packs/day: 1.00     Years: 30.00     Pack years: 30.00     Types: Cigarettes     Start date:      Quit date:      Years since quittin.2   • Smokeless tobacco: Never   Vaping Use   • Vaping Use: Never used   Substance and Sexual Activity   • Alcohol use: Yes     Alcohol/week: 1.0 standard drink     Types:  1 Glasses of wine per week     Comment: occ wine   • Drug use: Not Currently   • Sexual activity: Defer       Review of Systems   Constitutional: Negative.    HENT: Negative.    Eyes: Negative.    Respiratory: Negative.    Cardiovascular: Negative.    Gastrointestinal: Negative.    Endocrine: Negative.    Genitourinary: Negative.    Musculoskeletal: Negative.    Skin:        Right temple lesion    Left temple lesion   Allergic/Immunologic: Negative.    Neurological: Negative.    Hematological: Negative.    Psychiatric/Behavioral: Negative.        Vitals:    03/16/23 1437   BP: 140/64   Pulse: 72   Resp: 16   Temp: 97.1 °F (36.2 °C)       Objective     Physical Exam  Vitals reviewed.   Constitutional:       Appearance: Normal appearance. He is normal weight.   HENT:      Head: Normocephalic.     Neurological:      Mental Status: He is alert.       Dr. Nickerson has examined and assessed the patient and agrees with current treatment plan        Assessment & Plan   Diagnoses and all orders for this visit:    1. Neoplasm of uncertain behavior (Primary)  -     Case Request; Standing  -     Comprehensive Metabolic Panel; Future  -     CBC and Differential; Future  -     Protime-INR; Future  -     APTT; Future  -     ECG 12 Lead; Future  -     XR Chest 2 View; Future  -     Case Request    2. Anticoagulated  -     Case Request; Standing  -     Comprehensive Metabolic Panel; Future  -     CBC and Differential; Future  -     Protime-INR; Future  -     APTT; Future  -     ECG 12 Lead; Future  -     XR Chest 2 View; Future  -     Case Request    3. H/O squamous cell carcinoma of skin    4. H/O basal cell carcinoma excision    Other orders  -     Follow Anesthesia Guidelines / Protocol; Future  -     Obtain Informed Consent  -     Follow Anesthesia Guidelines / Protocol; Standing  -     Verify NPO Status; Standing  -     Obtain Informed Consent; Standing  -     SCD (Sequential Compression Device) - To Be Placed on Patient in  Pre-Op; Standing      EXCISION LESION OF RIGHT TEMPLE AND LEFT TEMPLE WITH FROZEN SECTION WITH POSSIBLE FLAP OR GRAFT (N/A)  Orders Placed This Encounter   Procedures   • XR Chest 2 View     Standing Status:   Future     Standing Expiration Date:   3/15/2024     Order Specific Question:   Reason for Exam:     Answer:   PRE OP   • Comprehensive Metabolic Panel     Standing Status:   Future     Standing Expiration Date:   3/15/2024     Order Specific Question:   Release to patient     Answer:   Routine Release   • Protime-INR     Standing Status:   Future     Standing Expiration Date:   3/15/2024   • APTT     Standing Status:   Future     Standing Expiration Date:   3/15/2024   • Obtain Informed Consent     EXCISION LESION with possible flap or graft-     Order Specific Question:   Informed Consent Given For     Answer:   EXCISION LESION with possible flap or graft-   • ECG 12 Lead     Standing Status:   Future     Standing Expiration Date:   3/15/2024     Order Specific Question:   Reason for Exam:     Answer:   EXCISION LESION   • CBC and Differential     Standing Status:   Future     Standing Expiration Date:   3/15/2024     Order Specific Question:   Manual Differential     Answer:   No     Return for post op.     Risks vs benefits of skin lesion excision discussed   Patient wishes to proceed     Patient Instructions   Discussion of skin lesion. Discussed risks, benefits, alternatives, and possible complications of excision of the skin lesion with reconstruction utilizing local tissue rearrangement, full-thickness skin grafting, or local interpolated flaps. Risks include, but are not limited too: bleeding, infection, hematoma, recurrence, need for additional procedures, flap failure, cosmetic deformity. Patient understands risks and would like to proceed with surgery.  Alternatives include doing nothing.

## 2023-03-16 ENCOUNTER — OFFICE VISIT (OUTPATIENT)
Dept: OTOLARYNGOLOGY | Facility: CLINIC | Age: 88
End: 2023-03-16
Payer: MEDICARE

## 2023-03-16 VITALS
WEIGHT: 153 LBS | SYSTOLIC BLOOD PRESSURE: 140 MMHG | HEIGHT: 63 IN | HEART RATE: 72 BPM | TEMPERATURE: 97.1 F | RESPIRATION RATE: 16 BRPM | BODY MASS INDEX: 27.11 KG/M2 | DIASTOLIC BLOOD PRESSURE: 64 MMHG

## 2023-03-16 DIAGNOSIS — Z85.828 H/O SQUAMOUS CELL CARCINOMA OF SKIN: ICD-10-CM

## 2023-03-16 DIAGNOSIS — D48.9 NEOPLASM OF UNCERTAIN BEHAVIOR: Primary | ICD-10-CM

## 2023-03-16 DIAGNOSIS — Z85.828 H/O BASAL CELL CARCINOMA EXCISION: ICD-10-CM

## 2023-03-16 DIAGNOSIS — Z79.01 ANTICOAGULATED: ICD-10-CM

## 2023-03-16 DIAGNOSIS — Z98.890 H/O BASAL CELL CARCINOMA EXCISION: ICD-10-CM

## 2023-03-16 PROCEDURE — 1159F MED LIST DOCD IN RCRD: CPT | Performed by: NURSE PRACTITIONER

## 2023-03-16 PROCEDURE — 1160F RVW MEDS BY RX/DR IN RCRD: CPT | Performed by: NURSE PRACTITIONER

## 2023-03-16 PROCEDURE — 99214 OFFICE O/P EST MOD 30 MIN: CPT | Performed by: NURSE PRACTITIONER

## 2023-03-16 RX ORDER — LANSOPRAZOLE 30 MG/1
30 CAPSULE, DELAYED RELEASE ORAL DAILY
Status: ON HOLD | COMMUNITY

## 2023-03-17 PROBLEM — Z79.01 ANTICOAGULATED: Status: ACTIVE | Noted: 2023-03-17

## 2023-03-17 PROBLEM — D48.9 NEOPLASM OF UNCERTAIN BEHAVIOR: Status: ACTIVE | Noted: 2023-03-17

## 2023-03-20 ENCOUNTER — TELEPHONE (OUTPATIENT)
Dept: OTOLARYNGOLOGY | Facility: CLINIC | Age: 88
End: 2023-03-20
Payer: MEDICARE

## 2023-03-20 NOTE — TELEPHONE ENCOUNTER
Patient states Dr. Jackson takes care of plavix. Katie with Dr. Jackson states patient can come off plavix 5 days prior to surgery. Patient notified.

## 2023-03-31 ENCOUNTER — PRE-ADMISSION TESTING (OUTPATIENT)
Dept: PREADMISSION TESTING | Facility: HOSPITAL | Age: 88
End: 2023-03-31
Payer: MEDICARE

## 2023-03-31 ENCOUNTER — HOSPITAL ENCOUNTER (OUTPATIENT)
Dept: GENERAL RADIOLOGY | Facility: HOSPITAL | Age: 88
Discharge: HOME | End: 2023-03-31
Payer: MEDICARE

## 2023-03-31 VITALS
RESPIRATION RATE: 16 BRPM | OXYGEN SATURATION: 94 % | HEART RATE: 66 BPM | DIASTOLIC BLOOD PRESSURE: 58 MMHG | HEIGHT: 62 IN | SYSTOLIC BLOOD PRESSURE: 155 MMHG | WEIGHT: 152.12 LBS | BODY MASS INDEX: 27.99 KG/M2

## 2023-03-31 DIAGNOSIS — Z79.01 ANTICOAGULATED: ICD-10-CM

## 2023-03-31 DIAGNOSIS — D48.9 NEOPLASM OF UNCERTAIN BEHAVIOR: ICD-10-CM

## 2023-03-31 LAB
ALBUMIN SERPL-MCNC: 4.8 G/DL (ref 3.5–5.2)
ALBUMIN/GLOB SERPL: 1.5 G/DL
ALP SERPL-CCNC: 86 U/L (ref 39–117)
ALT SERPL W P-5'-P-CCNC: 14 U/L (ref 1–41)
ANION GAP SERPL CALCULATED.3IONS-SCNC: 10 MMOL/L (ref 5–15)
APTT PPP: 28.3 SECONDS (ref 24.1–35)
AST SERPL-CCNC: 24 U/L (ref 1–40)
BASOPHILS # BLD AUTO: 0.03 10*3/MM3 (ref 0–0.2)
BASOPHILS NFR BLD AUTO: 0.7 % (ref 0–1.5)
BILIRUB SERPL-MCNC: 0.6 MG/DL (ref 0–1.2)
BUN SERPL-MCNC: 16 MG/DL (ref 8–23)
BUN/CREAT SERPL: 28.6 (ref 7–25)
CALCIUM SPEC-SCNC: 10 MG/DL (ref 8.2–9.6)
CHLORIDE SERPL-SCNC: 100 MMOL/L (ref 98–107)
CO2 SERPL-SCNC: 28 MMOL/L (ref 22–29)
CREAT SERPL-MCNC: 0.56 MG/DL (ref 0.76–1.27)
DEPRECATED RDW RBC AUTO: 48.4 FL (ref 37–54)
EGFRCR SERPLBLD CKD-EPI 2021: 92.5 ML/MIN/1.73
EOSINOPHIL # BLD AUTO: 0.1 10*3/MM3 (ref 0–0.4)
EOSINOPHIL NFR BLD AUTO: 2.4 % (ref 0.3–6.2)
ERYTHROCYTE [DISTWIDTH] IN BLOOD BY AUTOMATED COUNT: 13.7 % (ref 12.3–15.4)
GLOBULIN UR ELPH-MCNC: 3.2 GM/DL
GLUCOSE SERPL-MCNC: 115 MG/DL (ref 65–99)
HCT VFR BLD AUTO: 44.3 % (ref 37.5–51)
HGB BLD-MCNC: 13.7 G/DL (ref 13–17.7)
IMM GRANULOCYTES # BLD AUTO: 0.02 10*3/MM3 (ref 0–0.05)
IMM GRANULOCYTES NFR BLD AUTO: 0.5 % (ref 0–0.5)
INR PPP: 0.98 (ref 0.91–1.09)
LYMPHOCYTES # BLD AUTO: 1.15 10*3/MM3 (ref 0.7–3.1)
LYMPHOCYTES NFR BLD AUTO: 27.4 % (ref 19.6–45.3)
MCH RBC QN AUTO: 29.5 PG (ref 26.6–33)
MCHC RBC AUTO-ENTMCNC: 30.9 G/DL (ref 31.5–35.7)
MCV RBC AUTO: 95.5 FL (ref 79–97)
MONOCYTES # BLD AUTO: 0.69 10*3/MM3 (ref 0.1–0.9)
MONOCYTES NFR BLD AUTO: 16.4 % (ref 5–12)
NEUTROPHILS NFR BLD AUTO: 2.21 10*3/MM3 (ref 1.7–7)
NEUTROPHILS NFR BLD AUTO: 52.6 % (ref 42.7–76)
NRBC BLD AUTO-RTO: 0 /100 WBC (ref 0–0.2)
PLATELET # BLD AUTO: 160 10*3/MM3 (ref 140–450)
PMV BLD AUTO: 10.2 FL (ref 6–12)
POTASSIUM SERPL-SCNC: 4.4 MMOL/L (ref 3.5–5.2)
PROT SERPL-MCNC: 8 G/DL (ref 6–8.5)
PROTHROMBIN TIME: 13.1 SECONDS (ref 11.8–14.8)
RBC # BLD AUTO: 4.64 10*6/MM3 (ref 4.14–5.8)
SODIUM SERPL-SCNC: 138 MMOL/L (ref 136–145)
WBC NRBC COR # BLD: 4.2 10*3/MM3 (ref 3.4–10.8)

## 2023-03-31 PROCEDURE — 71046 X-RAY EXAM CHEST 2 VIEWS: CPT

## 2023-03-31 PROCEDURE — 85025 COMPLETE CBC W/AUTO DIFF WBC: CPT

## 2023-03-31 PROCEDURE — 93005 ELECTROCARDIOGRAM TRACING: CPT

## 2023-03-31 PROCEDURE — 80053 COMPREHEN METABOLIC PANEL: CPT

## 2023-03-31 PROCEDURE — 85610 PROTHROMBIN TIME: CPT

## 2023-03-31 PROCEDURE — 85730 THROMBOPLASTIN TIME PARTIAL: CPT

## 2023-03-31 PROCEDURE — 36415 COLL VENOUS BLD VENIPUNCTURE: CPT

## 2023-03-31 PROCEDURE — 93010 ELECTROCARDIOGRAM REPORT: CPT | Performed by: HOSPITALIST

## 2023-03-31 NOTE — DISCHARGE INSTRUCTIONS
Before you come to the hospital        Arrival time: AS DIRECTED BY OFFICE     YOU MAY TAKE THE FOLLOWING MEDICATION(S) THE MORNING OF SURGERY WITH A SIP OF WATER: NONE    DO NOT TAKE AVAPRO 24 HOURS PRIOR TO SURGERY           ALL OTHER HOME MEDICATION CHECK WITH YOUR PHYSICIAN (especially if   you are taking diabetes medicines or blood thinners)    Do not take any Erectile Dysfunction medications (EX: CIALIS, VIAGRA) 24 hours prior to surgery.      If you were given and instructed to use a germ- killing soap, use as directed the night before surgery and again the morning of surgery or as directed by your surgeon. (Use one-half of the bottle with each shower.)   See attached information for How to Use Chlorhexidine for Bathing if applicable.            Eating and drinking restrictions prior to scheduled arrival time    2 Hours before arrival time STOP   Drinking Clear liquids (water, apple juice-no pulp)     6 Hours before arrival time STOP   Milk or drinks that contain milk, full liquids    6 Hours before arrival time STOP   Light meals or foods, such as toast or cereal    8 Hours before arrival time STOP   Heavy foods, such as meat, fried foods, or fatty foods    (It is extremely important that you follow these guidelines to prevent delay or cancelation of your procedure)     Clear Liquids  Water and flavored water                                                                      Clear Fruit juices, such as cranberry juice and apple juice.  Black coffee (NO cream of any kind, including powdered).  Plain tea  Clear bouillon or broth.  Flavored gelatin.  Soda.  Gatorade or Powerade.  Full liquid examples  Juices that have pulp.  Frozen ice pops that contain fruit pieces.  Coffee with creamer  Milk.  Yogurt.                MANAGING PAIN AFTER SURGERY    We know you are probably wondering what your pain will be like after surgery.  Following surgery it is unrealistic to expect you will not have pain.   Pain is how  our bodies let us know that something is wrong or cautions us to be careful.  That said, our goal is to make your pain tolerable.    Methods we may use to treat your pain include (oral or IV medications, PCAs, epidurals, nerve blocks, etc.)   While some procedures require IV pain medications for a short time after surgery, transitioning to pain medications by mouth allows for better management of pain.   Your nurse will encourage you to take oral pain medications whenever possible.  IV medications work almost immediately, but only last a short while.  Taking medications by mouth allows for a more constant level of medication in your blood stream for a longer period of time.      Once your pain is out of control it is harder to get back under control.  It is important you are aware when your next dose of pain medication is due.  If you are admitted, your nurse may write the time of your next dose on the white board in your room to help you remember.      We are interested in your pain and encourage you to inform us about aggravating factors during your visit.   Many times a simple repositioning every few hours can make a big difference.    If your physician says it is okay, do not let your pain prevent you from getting out of bed. Be sure to call your nurse for assistance prior to getting up so you do not fall.      Before surgery, please decide your tolerable pain goal.  These faces help describe the pain ratings we use on a 0-10 scale.   Be prepared to tell us your goal and whether or not you take pain or anxiety medications at home.          Preparing for Surgery  Preparing for surgery is an important part of your care. It can make things go more smoothly and help you avoid complications. The steps leading up to surgery may vary among hospitals. Follow all instructions given to you by your health care providers. Ask questions if you do not understand something. Talk about any concerns that you have.  Here are some  questions to consider asking before your surgery:  If my surgery is not an emergency (is elective), when would be the best time to have the surgery?  What arrangements do I need to make for work, home, or school?  What will my recovery be like? How long will it be before I can return to normal activities?  Will I need to prepare my home? Will I need to arrange care for me or my children?  Should I expect to have pain after surgery? What are my pain management options? Are there nonmedical options that I can try for pain?  Tell a health care provider about:  Any allergies you have.  All medicines you are taking, including vitamins, herbs, eye drops, creams, and over-the-counter medicines.  Any problems you or family members have had with anesthetic medicines.  Any blood disorders you have.  Any surgeries you have had.  Any medical conditions you have.  Whether you are pregnant or may be pregnant.  What are the risks?  The risks and complications of surgery depend on the specific procedure that you have. Discuss all the risks with your health care providers before your surgery. Ask about common surgical complications, which may include:  Infection.  Bleeding or a need for blood replacement (transfusion).  Allergic reactions to medicines.  Damage to surrounding nerves, tissues, or structures.  A blood clot.  Scarring.  Failure of the surgery to correct the problem.  Follow these instructions before the procedure:  Several days or weeks before your procedure  You may have a physical exam by your primary health care provider to make sure it is safe for you to have surgery.  You may have testing. This may include a chest X-ray, blood and urine tests, electrocardiogram (ECG), or other testing.  Ask your health care provider about:  Changing or stopping your regular medicines. This is especially important if you are taking diabetes medicines or blood thinners.  Taking medicines such as aspirin and ibuprofen. These medicines  can thin your blood. Do not take these medicines unless your health care provider tells you to take them.  Taking over-the-counter medicines, vitamins, herbs, and supplements.  Do not use any products that contain nicotine or tobacco, such as cigarettes and e-cigarettes. If you need help quitting, ask your health care provider.  Avoid alcohol.  Ask your health care provider if there are exercises you can do to prepare for surgery.  Eat a healthy diet.   Plan to have someone 18 years of age or older to take you home from the hospital. We will need to verify your ride on the morning of surgery if you are being discharged home on the same day. Tell your ride to be expecting a call from the hospital prior to your procedure.   Plan to have a responsible adult care for you for at least 24 hours after you leave the hospital or clinic. This is important.  The day before your procedure  You may be given antibiotic medicine to take by mouth to help prevent infection. Take it as told by your health care provider.  You may be asked to shower with a germ-killing soap.  Follow instructions from your health care provider about eating and drinking restrictions. This includes gum, mints and hard candy.  Pack comfortable clothes according to your procedure.   The day of your procedure  You may need to take another shower with a germ-killing soap before you leave home in the morning.  With a small sip of water, take only the medicines that you are told to take.  Remove all jewelry including rings.   Leave anything you consider valuable at home except hearing aids if needed.  You do not need to bring your home medications into the hospital.   Do not wear any makeup, nail polish, powder, deodorant, lotion, hair accessories, or anything on your skin or body except your clothes.  If you will be staying in the hospital, bring a case to hold your glasses, contacts, or dentures. You may also want to bring your robe and non-skid footwear.        (Do not use denture adhesives since you will be asked to remove them during  surgery).   If you wear oxygen at home, bring it with you the day of surgery.  If instructed by your health care provider, bring your sleep apnea device with you on the day of your surgery (if this applies to you).  You may want to leave your suitcase and sleep apnea device in the car until after surgery.   Arrive at the hospital as scheduled.  Bring a friend or family member with you who can help to answer questions and be present while you meet with your health care provider.  At the hospital  When you arrive at the hospital:  Go to registration located at the main entrance of the hospital. You will be registered and given a beeper and a sticker sheet. Take the stickers to the Outpatient nurses desk and place in the black tray. This is to notify staff that you have arrived. Then return to the lobby to wait.   When your beeper lights up and vibrates proceed through the double doors, under the stairs, and a member of the Outpatient Surgery staff will escort you to your preoperative room.  You may have to wear compression sleeves. These help to prevent blood clots and reduce swelling in your legs.  An IV may be inserted into one of your veins.              In the operating room, you may be given one or more of the following:        A medicine to help you relax (sedative).        A medicine to numb the area (local anesthetic).        A medicine to make you fall asleep (general anesthetic).        A medicine that is injected into an area of your body to numb everything below the                      injection site (regional anesthetic).  You may be given an antibiotic through your IV to help prevent infection.  Your surgical site will be marked or identified.    Contact a health care provider if you:  Develop a fever of more than 100.4°F (38°C) or other feelings of illness during the 48 hours before your surgery.  Have symptoms that get  worse.  Have questions or concerns about your surgery.  Summary  Preparing for surgery can make the procedure go more smoothly and lower your risk of complications.  Before surgery, make a list of questions and concerns to discuss with your surgeon. Ask about the risks and possible complications.  In the days or weeks before your surgery, follow all instructions from your health care provider. You may need to stop smoking, avoid alcohol, follow eating restrictions, and change or stop your regular medicines.  Contact your surgeon if you develop a fever or other signs of illness during the few days before your surgery.  This information is not intended to replace advice given to you by your health care provider. Make sure you discuss any questions you have with your health care provider.  Document Revised: 12/21/2018 Document Reviewed: 10/23/2018  [employer]vier Patient Education © 2021 [employer]

## 2023-04-01 LAB
QT INTERVAL: 472 MS
QTC INTERVAL: 472 MS

## 2023-04-06 ENCOUNTER — ANESTHESIA EVENT (OUTPATIENT)
Dept: PERIOP | Facility: HOSPITAL | Age: 88
DRG: 246 | End: 2023-04-06
Payer: MEDICARE

## 2023-04-07 ENCOUNTER — ANESTHESIA (OUTPATIENT)
Dept: PERIOP | Facility: HOSPITAL | Age: 88
DRG: 246 | End: 2023-04-07
Payer: MEDICARE

## 2023-04-07 ENCOUNTER — HOSPITAL ENCOUNTER (INPATIENT)
Facility: HOSPITAL | Age: 88
LOS: 3 days | Discharge: HOME | DRG: 246 | End: 2023-04-10
Attending: OTOLARYNGOLOGY | Admitting: HOSPITALIST
Payer: MEDICARE

## 2023-04-07 DIAGNOSIS — Z79.01 ANTICOAGULATED: ICD-10-CM

## 2023-04-07 DIAGNOSIS — R77.8 ELEVATED TROPONIN: Primary | ICD-10-CM

## 2023-04-07 DIAGNOSIS — D48.9 NEOPLASM OF UNCERTAIN BEHAVIOR: ICD-10-CM

## 2023-04-07 PROBLEM — R79.89 ELEVATED TROPONIN: Status: ACTIVE | Noted: 2023-04-07

## 2023-04-07 LAB
ANION GAP SERPL CALCULATED.3IONS-SCNC: 14 MMOL/L (ref 5–15)
BASOPHILS # BLD AUTO: 0.01 10*3/MM3 (ref 0–0.2)
BASOPHILS NFR BLD AUTO: 0.2 % (ref 0–1.5)
BUN SERPL-MCNC: 14 MG/DL (ref 8–23)
BUN/CREAT SERPL: 26.9 (ref 7–25)
CALCIUM SPEC-SCNC: 9.9 MG/DL (ref 8.2–9.6)
CHLORIDE SERPL-SCNC: 101 MMOL/L (ref 98–107)
CO2 SERPL-SCNC: 24 MMOL/L (ref 22–29)
CREAT SERPL-MCNC: 0.52 MG/DL (ref 0.76–1.27)
DEPRECATED RDW RBC AUTO: 47.8 FL (ref 37–54)
EGFRCR SERPLBLD CKD-EPI 2021: 94.6 ML/MIN/1.73
EOSINOPHIL # BLD AUTO: 0 10*3/MM3 (ref 0–0.4)
EOSINOPHIL NFR BLD AUTO: 0 % (ref 0.3–6.2)
ERYTHROCYTE [DISTWIDTH] IN BLOOD BY AUTOMATED COUNT: 13.6 % (ref 12.3–15.4)
GEN 5 2HR TROPONIN T REFLEX: 40 NG/L
GLUCOSE SERPL-MCNC: 153 MG/DL (ref 65–99)
HCT VFR BLD AUTO: 43.1 % (ref 37.5–51)
HGB BLD-MCNC: 13.5 G/DL (ref 13–17.7)
IMM GRANULOCYTES # BLD AUTO: 0.02 10*3/MM3 (ref 0–0.05)
IMM GRANULOCYTES NFR BLD AUTO: 0.3 % (ref 0–0.5)
LYMPHOCYTES # BLD AUTO: 0.54 10*3/MM3 (ref 0.7–3.1)
LYMPHOCYTES NFR BLD AUTO: 9.3 % (ref 19.6–45.3)
MCH RBC QN AUTO: 29.7 PG (ref 26.6–33)
MCHC RBC AUTO-ENTMCNC: 31.3 G/DL (ref 31.5–35.7)
MCV RBC AUTO: 94.9 FL (ref 79–97)
MONOCYTES # BLD AUTO: 0.09 10*3/MM3 (ref 0.1–0.9)
MONOCYTES NFR BLD AUTO: 1.6 % (ref 5–12)
NEUTROPHILS NFR BLD AUTO: 5.13 10*3/MM3 (ref 1.7–7)
NEUTROPHILS NFR BLD AUTO: 88.6 % (ref 42.7–76)
NRBC BLD AUTO-RTO: 0 /100 WBC (ref 0–0.2)
PLATELET # BLD AUTO: 173 10*3/MM3 (ref 140–450)
PMV BLD AUTO: 10.5 FL (ref 6–12)
POTASSIUM SERPL-SCNC: 4.1 MMOL/L (ref 3.5–5.2)
RBC # BLD AUTO: 4.54 10*6/MM3 (ref 4.14–5.8)
SODIUM SERPL-SCNC: 139 MMOL/L (ref 136–145)
TROPONIN T DELTA: 18 NG/L
TROPONIN T SERPL HS-MCNC: 22 NG/L
TROPONIN T SERPL HS-MCNC: 72 NG/L
WBC NRBC COR # BLD: 5.79 10*3/MM3 (ref 3.4–10.8)

## 2023-04-07 PROCEDURE — 11441 EXC FACE-MM B9+MARG 0.6-1 CM: CPT | Performed by: OTOLARYNGOLOGY

## 2023-04-07 PROCEDURE — 0JX10ZB TRANSFER FACE SUBCUTANEOUS TISSUE AND FASCIA WITH SKIN AND SUBCUTANEOUS TISSUE, OPEN APPROACH: ICD-10-PCS | Performed by: OTOLARYNGOLOGY

## 2023-04-07 PROCEDURE — 13131 CMPLX RPR F/C/C/M/N/AX/G/H/F: CPT | Performed by: OTOLARYNGOLOGY

## 2023-04-07 PROCEDURE — 93005 ELECTROCARDIOGRAM TRACING: CPT | Performed by: HOSPITALIST

## 2023-04-07 PROCEDURE — 80048 BASIC METABOLIC PNL TOTAL CA: CPT

## 2023-04-07 PROCEDURE — 84484 ASSAY OF TROPONIN QUANT: CPT

## 2023-04-07 PROCEDURE — 93005 ELECTROCARDIOGRAM TRACING: CPT | Performed by: ANESTHESIOLOGY

## 2023-04-07 PROCEDURE — 0JB10ZZ EXCISION OF FACE SUBCUTANEOUS TISSUE AND FASCIA, OPEN APPROACH: ICD-10-PCS | Performed by: OTOLARYNGOLOGY

## 2023-04-07 PROCEDURE — 25010000002 DEXAMETHASONE PER 1 MG: Performed by: NURSE ANESTHETIST, CERTIFIED REGISTERED

## 2023-04-07 PROCEDURE — 88305 TISSUE EXAM BY PATHOLOGIST: CPT | Performed by: OTOLARYNGOLOGY

## 2023-04-07 PROCEDURE — 93010 ELECTROCARDIOGRAM REPORT: CPT | Performed by: EMERGENCY MEDICINE

## 2023-04-07 PROCEDURE — 85025 COMPLETE CBC W/AUTO DIFF WBC: CPT

## 2023-04-07 PROCEDURE — 25010000002 ONDANSETRON PER 1 MG: Performed by: NURSE ANESTHETIST, CERTIFIED REGISTERED

## 2023-04-07 PROCEDURE — 99222 1ST HOSP IP/OBS MODERATE 55: CPT | Performed by: HOSPITALIST

## 2023-04-07 PROCEDURE — 84484 ASSAY OF TROPONIN QUANT: CPT | Performed by: OTOLARYNGOLOGY

## 2023-04-07 PROCEDURE — 88331 PATH CONSLTJ SURG 1 BLK 1SPC: CPT | Performed by: PATHOLOGY

## 2023-04-07 PROCEDURE — 25010000002 DROPERIDOL PER 5 MG: Performed by: ANESTHESIOLOGY

## 2023-04-07 PROCEDURE — 93005 ELECTROCARDIOGRAM TRACING: CPT | Performed by: NURSE ANESTHETIST, CERTIFIED REGISTERED

## 2023-04-07 PROCEDURE — 14040 TIS TRNFR F/C/C/M/N/A/G/H/F: CPT | Performed by: OTOLARYNGOLOGY

## 2023-04-07 PROCEDURE — 25010000002 PROPOFOL 10 MG/ML EMULSION: Performed by: NURSE ANESTHETIST, CERTIFIED REGISTERED

## 2023-04-07 RX ORDER — PANTOPRAZOLE SODIUM 40 MG/1
40 TABLET, DELAYED RELEASE ORAL
Status: DISCONTINUED | OUTPATIENT
Start: 2023-04-08 | End: 2023-04-10 | Stop reason: HOSPADM

## 2023-04-07 RX ORDER — DROPERIDOL 2.5 MG/ML
0.62 INJECTION, SOLUTION INTRAMUSCULAR; INTRAVENOUS ONCE AS NEEDED
Status: COMPLETED | OUTPATIENT
Start: 2023-04-07 | End: 2023-04-07

## 2023-04-07 RX ORDER — DEXAMETHASONE SODIUM PHOSPHATE 4 MG/ML
INJECTION, SOLUTION INTRA-ARTICULAR; INTRALESIONAL; INTRAMUSCULAR; INTRAVENOUS; SOFT TISSUE AS NEEDED
Status: DISCONTINUED | OUTPATIENT
Start: 2023-04-07 | End: 2023-04-07 | Stop reason: SURG

## 2023-04-07 RX ORDER — SODIUM CHLORIDE 9 MG/ML
40 INJECTION, SOLUTION INTRAVENOUS AS NEEDED
Status: DISCONTINUED | OUTPATIENT
Start: 2023-04-07 | End: 2023-04-07 | Stop reason: HOSPADM

## 2023-04-07 RX ORDER — SODIUM CHLORIDE 0.9 % (FLUSH) 0.9 %
3-10 SYRINGE (ML) INJECTION AS NEEDED
Status: DISCONTINUED | OUTPATIENT
Start: 2023-04-07 | End: 2023-04-07 | Stop reason: HOSPADM

## 2023-04-07 RX ORDER — SODIUM CHLORIDE, SODIUM LACTATE, POTASSIUM CHLORIDE, CALCIUM CHLORIDE 600; 310; 30; 20 MG/100ML; MG/100ML; MG/100ML; MG/100ML
1000 INJECTION, SOLUTION INTRAVENOUS CONTINUOUS
Status: DISCONTINUED | OUTPATIENT
Start: 2023-04-07 | End: 2023-04-08

## 2023-04-07 RX ORDER — ONDANSETRON 2 MG/ML
4 INJECTION INTRAMUSCULAR; INTRAVENOUS ONCE AS NEEDED
Status: DISCONTINUED | OUTPATIENT
Start: 2023-04-07 | End: 2023-04-07 | Stop reason: HOSPADM

## 2023-04-07 RX ORDER — ASPIRIN 81 MG/1
81 TABLET, CHEWABLE ORAL ONCE
Status: COMPLETED | OUTPATIENT
Start: 2023-04-07 | End: 2023-04-07

## 2023-04-07 RX ORDER — LABETALOL HYDROCHLORIDE 5 MG/ML
5 INJECTION, SOLUTION INTRAVENOUS
Status: DISCONTINUED | OUTPATIENT
Start: 2023-04-07 | End: 2023-04-07

## 2023-04-07 RX ORDER — CLOPIDOGREL BISULFATE 75 MG/1
75 TABLET ORAL DAILY
Status: DISCONTINUED | OUTPATIENT
Start: 2023-04-08 | End: 2023-04-08 | Stop reason: SDUPTHER

## 2023-04-07 RX ORDER — CLOPIDOGREL BISULFATE 75 MG/1
75 TABLET ORAL DAILY
Status: COMPLETED | OUTPATIENT
Start: 2023-04-07 | End: 2023-04-07

## 2023-04-07 RX ORDER — SODIUM CHLORIDE 0.9 % (FLUSH) 0.9 %
10 SYRINGE (ML) INJECTION EVERY 12 HOURS SCHEDULED
Status: DISCONTINUED | OUTPATIENT
Start: 2023-04-07 | End: 2023-04-10 | Stop reason: HOSPADM

## 2023-04-07 RX ORDER — ACETAMINOPHEN 500 MG
500 TABLET ORAL EVERY 6 HOURS PRN
Status: DISCONTINUED | OUTPATIENT
Start: 2023-04-07 | End: 2023-04-10 | Stop reason: HOSPADM

## 2023-04-07 RX ORDER — HYDROCODONE BITARTRATE AND ACETAMINOPHEN 5; 325 MG/1; MG/1
1 TABLET ORAL ONCE AS NEEDED
Status: DISCONTINUED | OUTPATIENT
Start: 2023-04-07 | End: 2023-04-10 | Stop reason: HOSPADM

## 2023-04-07 RX ORDER — NALOXONE HCL 0.4 MG/ML
0.4 VIAL (ML) INJECTION AS NEEDED
Status: DISCONTINUED | OUTPATIENT
Start: 2023-04-07 | End: 2023-04-07 | Stop reason: HOSPADM

## 2023-04-07 RX ORDER — MAGNESIUM HYDROXIDE 1200 MG/15ML
LIQUID ORAL AS NEEDED
Status: DISCONTINUED | OUTPATIENT
Start: 2023-04-07 | End: 2023-04-07 | Stop reason: HOSPADM

## 2023-04-07 RX ORDER — SODIUM CHLORIDE 0.9 % (FLUSH) 0.9 %
10 SYRINGE (ML) INJECTION AS NEEDED
Status: DISCONTINUED | OUTPATIENT
Start: 2023-04-07 | End: 2023-04-10 | Stop reason: HOSPADM

## 2023-04-07 RX ORDER — SODIUM CHLORIDE, SODIUM LACTATE, POTASSIUM CHLORIDE, CALCIUM CHLORIDE 600; 310; 30; 20 MG/100ML; MG/100ML; MG/100ML; MG/100ML
50 INJECTION, SOLUTION INTRAVENOUS CONTINUOUS
Status: DISPENSED | OUTPATIENT
Start: 2023-04-07 | End: 2023-04-08

## 2023-04-07 RX ORDER — ONDANSETRON 2 MG/ML
INJECTION INTRAMUSCULAR; INTRAVENOUS AS NEEDED
Status: DISCONTINUED | OUTPATIENT
Start: 2023-04-07 | End: 2023-04-07 | Stop reason: SURG

## 2023-04-07 RX ORDER — ONDANSETRON 4 MG/1
4 TABLET, FILM COATED ORAL ONCE AS NEEDED
Status: COMPLETED | OUTPATIENT
Start: 2023-04-07 | End: 2023-04-08

## 2023-04-07 RX ORDER — FENTANYL CITRATE 50 UG/ML
25 INJECTION, SOLUTION INTRAMUSCULAR; INTRAVENOUS
Status: DISCONTINUED | OUTPATIENT
Start: 2023-04-07 | End: 2023-04-07 | Stop reason: HOSPADM

## 2023-04-07 RX ORDER — MUPIROCIN CALCIUM 20 MG/G
1 CREAM TOPICAL EVERY 12 HOURS SCHEDULED
Status: DISCONTINUED | OUTPATIENT
Start: 2023-04-07 | End: 2023-04-07 | Stop reason: SDUPTHER

## 2023-04-07 RX ORDER — LOSARTAN POTASSIUM 50 MG/1
100 TABLET ORAL
Status: DISCONTINUED | OUTPATIENT
Start: 2023-04-07 | End: 2023-04-10 | Stop reason: HOSPADM

## 2023-04-07 RX ORDER — CLOPIDOGREL BISULFATE 75 MG/1
150 TABLET ORAL DAILY
Status: COMPLETED | OUTPATIENT
Start: 2023-04-07 | End: 2023-04-07

## 2023-04-07 RX ORDER — LIDOCAINE HYDROCHLORIDE 10 MG/ML
0.5 INJECTION, SOLUTION EPIDURAL; INFILTRATION; INTRACAUDAL; PERINEURAL ONCE AS NEEDED
Status: DISCONTINUED | OUTPATIENT
Start: 2023-04-07 | End: 2023-04-07 | Stop reason: HOSPADM

## 2023-04-07 RX ORDER — CHOLECALCIFEROL (VITAMIN D3) 125 MCG
1000 CAPSULE ORAL DAILY
Status: DISCONTINUED | OUTPATIENT
Start: 2023-04-07 | End: 2023-04-10 | Stop reason: HOSPADM

## 2023-04-07 RX ORDER — FAMOTIDINE 10 MG/ML
20 INJECTION, SOLUTION INTRAVENOUS ONCE
Status: COMPLETED | OUTPATIENT
Start: 2023-04-07 | End: 2023-04-07

## 2023-04-07 RX ORDER — NITROGLYCERIN 0.4 MG/1
0.4 TABLET SUBLINGUAL
Status: DISCONTINUED | OUTPATIENT
Start: 2023-04-07 | End: 2023-04-10 | Stop reason: HOSPADM

## 2023-04-07 RX ORDER — NITROGLYCERIN 0.4 MG/1
0.4 TABLET SUBLINGUAL
Status: DISCONTINUED | OUTPATIENT
Start: 2023-04-07 | End: 2023-04-07

## 2023-04-07 RX ORDER — NITROGLYCERIN 0.4 MG/1
TABLET SUBLINGUAL
Status: COMPLETED
Start: 2023-04-07 | End: 2023-04-07

## 2023-04-07 RX ORDER — FLUMAZENIL 0.1 MG/ML
0.2 INJECTION INTRAVENOUS AS NEEDED
Status: DISCONTINUED | OUTPATIENT
Start: 2023-04-07 | End: 2023-04-07 | Stop reason: HOSPADM

## 2023-04-07 RX ORDER — SODIUM CHLORIDE 0.9 % (FLUSH) 0.9 %
3 SYRINGE (ML) INJECTION AS NEEDED
Status: DISCONTINUED | OUTPATIENT
Start: 2023-04-07 | End: 2023-04-07 | Stop reason: HOSPADM

## 2023-04-07 RX ORDER — SODIUM CHLORIDE, SODIUM LACTATE, POTASSIUM CHLORIDE, CALCIUM CHLORIDE 600; 310; 30; 20 MG/100ML; MG/100ML; MG/100ML; MG/100ML
100 INJECTION, SOLUTION INTRAVENOUS CONTINUOUS
Status: DISCONTINUED | OUTPATIENT
Start: 2023-04-07 | End: 2023-04-07

## 2023-04-07 RX ORDER — LIDOCAINE HYDROCHLORIDE AND EPINEPHRINE 10; 10 MG/ML; UG/ML
INJECTION, SOLUTION INFILTRATION; PERINEURAL AS NEEDED
Status: DISCONTINUED | OUTPATIENT
Start: 2023-04-07 | End: 2023-04-07 | Stop reason: HOSPADM

## 2023-04-07 RX ORDER — PROPOFOL 10 MG/ML
VIAL (ML) INTRAVENOUS AS NEEDED
Status: DISCONTINUED | OUTPATIENT
Start: 2023-04-07 | End: 2023-04-07 | Stop reason: SURG

## 2023-04-07 RX ORDER — SODIUM CHLORIDE 0.9 % (FLUSH) 0.9 %
3 SYRINGE (ML) INJECTION EVERY 12 HOURS SCHEDULED
Status: DISCONTINUED | OUTPATIENT
Start: 2023-04-07 | End: 2023-04-07 | Stop reason: HOSPADM

## 2023-04-07 RX ORDER — ENOXAPARIN SODIUM 100 MG/ML
40 INJECTION SUBCUTANEOUS EVERY 24 HOURS
Status: DISCONTINUED | OUTPATIENT
Start: 2023-04-07 | End: 2023-04-10 | Stop reason: HOSPADM

## 2023-04-07 RX ORDER — OXYCODONE HYDROCHLORIDE AND ACETAMINOPHEN 5; 325 MG/1; MG/1
1 TABLET ORAL ONCE AS NEEDED
Status: DISCONTINUED | OUTPATIENT
Start: 2023-04-07 | End: 2023-04-07 | Stop reason: HOSPADM

## 2023-04-07 RX ORDER — ATORVASTATIN CALCIUM 10 MG/1
20 TABLET, FILM COATED ORAL DAILY
Status: DISCONTINUED | OUTPATIENT
Start: 2023-04-07 | End: 2023-04-10

## 2023-04-07 RX ORDER — BENZONATATE 100 MG/1
100 CAPSULE ORAL 3 TIMES DAILY PRN
Status: DISCONTINUED | OUTPATIENT
Start: 2023-04-07 | End: 2023-04-10 | Stop reason: HOSPADM

## 2023-04-07 RX ORDER — AMLODIPINE BESYLATE 10 MG/1
10 TABLET ORAL DAILY
Status: DISCONTINUED | OUTPATIENT
Start: 2023-04-07 | End: 2023-04-10 | Stop reason: HOSPADM

## 2023-04-07 RX ORDER — LIDOCAINE HYDROCHLORIDE 20 MG/ML
INJECTION, SOLUTION EPIDURAL; INFILTRATION; INTRACAUDAL; PERINEURAL AS NEEDED
Status: DISCONTINUED | OUTPATIENT
Start: 2023-04-07 | End: 2023-04-07 | Stop reason: SURG

## 2023-04-07 RX ORDER — SODIUM CHLORIDE 9 MG/ML
40 INJECTION, SOLUTION INTRAVENOUS AS NEEDED
Status: DISCONTINUED | OUTPATIENT
Start: 2023-04-07 | End: 2023-04-08

## 2023-04-07 RX ADMIN — SODIUM CHLORIDE, POTASSIUM CHLORIDE, SODIUM LACTATE AND CALCIUM CHLORIDE 1000 ML: 600; 310; 30; 20 INJECTION, SOLUTION INTRAVENOUS at 09:13

## 2023-04-07 RX ADMIN — LOSARTAN POTASSIUM 100 MG: 50 TABLET, FILM COATED ORAL at 17:50

## 2023-04-07 RX ADMIN — ATORVASTATIN CALCIUM 20 MG: 10 TABLET, FILM COATED ORAL at 17:50

## 2023-04-07 RX ADMIN — DROPERIDOL 0.62 MG: 2.5 INJECTION, SOLUTION INTRAMUSCULAR; INTRAVENOUS at 13:07

## 2023-04-07 RX ADMIN — LIDOCAINE HYDROCHLORIDE 100 MG: 20 INJECTION, SOLUTION EPIDURAL; INFILTRATION; INTRACAUDAL; PERINEURAL at 10:48

## 2023-04-07 RX ADMIN — NITROGLYCERIN 0.4 MG: 0.4 TABLET SUBLINGUAL at 23:24

## 2023-04-07 RX ADMIN — MAGNESIUM GLUCONATE 500 MG ORAL TABLET 400 MG: 500 TABLET ORAL at 17:50

## 2023-04-07 RX ADMIN — Medication 10 ML: at 22:14

## 2023-04-07 RX ADMIN — PROPOFOL INJECTABLE EMULSION 130 MG: 10 INJECTION, EMULSION INTRAVENOUS at 10:48

## 2023-04-07 RX ADMIN — AMLODIPINE BESYLATE 10 MG: 10 TABLET ORAL at 17:50

## 2023-04-07 RX ADMIN — SODIUM CHLORIDE, POTASSIUM CHLORIDE, SODIUM LACTATE AND CALCIUM CHLORIDE 50 ML/HR: 600; 310; 30; 20 INJECTION, SOLUTION INTRAVENOUS at 17:52

## 2023-04-07 RX ADMIN — Medication 1000 MCG: at 17:50

## 2023-04-07 RX ADMIN — DEXAMETHASONE SODIUM PHOSPHATE 4 MG: 4 INJECTION, SOLUTION INTRA-ARTICULAR; INTRALESIONAL; INTRAMUSCULAR; INTRAVENOUS; SOFT TISSUE at 10:53

## 2023-04-07 RX ADMIN — ASPIRIN 81 MG: 81 TABLET, CHEWABLE ORAL at 12:41

## 2023-04-07 RX ADMIN — MUPIROCIN: 20 OINTMENT TOPICAL at 17:51

## 2023-04-07 RX ADMIN — CLOPIDOGREL BISULFATE 75 MG: 75 TABLET ORAL at 13:07

## 2023-04-07 RX ADMIN — CLOPIDOGREL BISULFATE 150 MG: 75 TABLET ORAL at 16:33

## 2023-04-07 RX ADMIN — FAMOTIDINE 20 MG: 10 INJECTION INTRAVENOUS at 12:42

## 2023-04-07 RX ADMIN — MUPIROCIN: 20 OINTMENT TOPICAL at 22:15

## 2023-04-07 RX ADMIN — ONDANSETRON 4 MG: 2 INJECTION INTRAMUSCULAR; INTRAVENOUS at 10:53

## 2023-04-07 RX ADMIN — CLOPIDOGREL BISULFATE 75 MG: 75 TABLET ORAL at 13:36

## 2023-04-07 RX ADMIN — NITROGLYCERIN 0.4 MG: 0.4 TABLET SUBLINGUAL at 12:29

## 2023-04-07 NOTE — NURSING NOTE
Dr. Jennings notified of return of pt chest pressure. She states she will return to PACU to evaluate pt. Pt most recent cuff pressure 157/72, heart rate 71 bpm, 12 respirations/min, and oxygen sat is 99% on 2 L per nasal cannula.

## 2023-04-07 NOTE — NURSING NOTE
Pt rating pain in chest at a 2/10 as a burning pressure. Pt given dentures to chew aspirin. Labs being drawn at this time.

## 2023-04-07 NOTE — ANESTHESIA POSTPROCEDURE EVALUATION
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        Post Anesthesia Care and Evaluation    Patient location during evaluation: PACU  Patient participation: complete - patient participated  Level of consciousness: awake and alert  Pain management: adequate  PONV Status: controlled  Cardiovascular status: acceptable and hemodynamically stable  Respiratory status: acceptable    Comments: Blood pressure 144/67, pulse 73, temperature 98.5 °F (36.9 °C), temperature source Temporal, resp. rate 19, SpO2 95 %.    Patient taken to phase II by PACU staff after meeting criteria for transfer based on Katherine score, on transfer the patient described painful burning in his chest and right arm and had an episode of emesis. PACU RNs brought patient back upstairs to PACU and alerted me of patient's complaint.     Dr. Nickerson made aware of chest pain and updated family. EKG obtained and reviewed. SL nitro improved pain. Chewable ASA given as well as loaded with plavix (two separate 75 mg doses for total 150 mg load). Supplemental 02 applied. IV pepcid given with initial resolution of pain from 5/10 to 0/10.     Patient remained HD stable and non-toxic appearing during entirety of PACU stay. Patient continued to complain  "of burning in his stomach and chest and did experience intermittent pain up to a 3/10 but resolved spontaneously without need for additional nitro. Incentive spirometry given at bedside.    Troponin resulted was mildly elevated. Called and spoke with Dr. Moseley (patient's cardiologist) and explained presentation and agreed with hospitalist admission for chest pain rule out. Review of chart showed patient had discontinued plavix as per Dr. Jackson, but had not been placed on aspirin in the interim.     Prior to floor admission, second troponin requested by cardiology and resulted at 40 with a delta of 18. The patient explained he had complete resolution of his pain and symptoms. Denied nausea or burning sensations and patient stated \"I feel like I could just go run around now.\" Bedside RN consulted hospitalist for admission to floor and subsequent care and monitoring will be managed by hospitalist team.     Appreciate cardiology and hospitalist assistance in care of patient. Further recommendations for additional anticoagulation and workup per consultants and admitting teams      "

## 2023-04-07 NOTE — NURSING NOTE
Pt denies any pain or nausea at this time. Pt denies needs. Pt timed troponin drawn and sent to lab. Pt instructed to notify nursing staff immediately if any chest pain or nausea occurs.

## 2023-04-07 NOTE — ADDENDUM NOTE
Addendum  created 04/07/23 1853 by Efrain Graham CRNA    Flowsheet accepted, Intraprocedure Event edited, Intraprocedure Staff edited

## 2023-04-07 NOTE — NURSING NOTE
"Pt states chest pain has become intermittent. Pt rates chest pain 0/10 at this time. Pt states \"it feels like I need to cough something up.\" Dr. Jenninsg and Dr. Nickerson at bedside to evaluate pt. Dr. Jennings orders incentive spirometer.  "

## 2023-04-07 NOTE — NURSING NOTE
"Pt has belched some and has HOB elevated. States he feels relief. Pt states \"I think I'm going to be okay.\" Pt verbalizes understanding to care and precautions with testing, monitoring, and admission. Pt agreeable to care. Dr. Jennings states Dr. Nickerson to update pt family.  "

## 2023-04-07 NOTE — H&P
[facility] -  History and Physical     LOS: 0 days   Patient Care Team:  Everardo Jackson MD as PCP - General (Family Medicine)  Justin Howard MD as Consulting Physician (Urology)  Emigdio Moseley MD as Cardiologist (Cardiology)  Everardo Noriega MD as Consulting Physician (Pulmonary Disease)  Brijesh Nickerson MD as Consulting Physician (Otolaryngology)  Kaitlin Del Angel PA as Referring Physician (Physician Assistant)    Chief Complaint:  chest pain    Subjective     Interval History:   Patient presented on/7/2023 for an outpatient surgical procedure.  She underwent excision of lesion of right temple and left temple.  Patient was discharged from PACU and was in postop area about to go home when he started to have chest pain.  This chest pain was a burning in the center of his chest and he felt numbness in bilateral arms.  It was reported that he then became ashen colored and vomited.  Given this he was brought back up to the PACU.  Cardiology was initially asked to evaluate the patient's chest pain.  High-sensitivity troponin was obtained which revealed a reading of 22.  EKG reveals no acute findings.  At the time my evaluation the patient is chest pain-free.  He says he has GERD and that sometimes he can have burning but nothing like the sensation he felt after surgery.  He has a history of coronary artery disease status post stents.  He is typically on Plavix daily.  His Plavix has been held 5 days in anticipation of this surgery.      Review of Systems:     Review of Systems   All other systems reviewed and are negative.      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   • GERD (gastroesophageal reflux disease)    • Hyperlipidemia    • Hypertension    • Irregular heart beat    • Lung nodules      Past  Surgical History:   Procedure Laterality Date   • BLADDER SURGERY     • CARDIAC CATHETERIZATION     • COLONOSCOPY     • CORONARY ANGIOPLASTY WITH STENT PLACEMENT  2006    STENT X 2   • CYSTOSCOPY     • ENDOSCOPY     • FLAP HEAD/NECK Left 2020    Procedure: POSSIBLE FLAP;  Surgeon: Brijesh Nickerson MD;  Location:  [facility] OR;  Service: ENT;  Laterality: Left;   • HEAD/NECK LESION/CYST EXCISION Left 2020    Procedure: 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 with frozen section and possible flap or graft;  Surgeon: Brijesh Nickerson MD;  Location:  [facility] OR;  Service: ENT;  Laterality: Left;   • HERNIA REPAIR     • SHOULDER SURGERY     • SKIN BIOPSY      lip biopsy   • SKIN FULL THICKNESS GRAFT Left 2020    Procedure: OR GRAFT;  Surgeon: Brijesh Nickerson MD;  Location:  [facility] OR;  Service: ENT;  Laterality: Left;   • TRANSURETHRAL RESECTION OF BLADDER TUMOR       Family History   Problem Relation Age of Onset   • Tuberculosis Mother    • Heart disease Father    • Heart attack Father    • Diabetes Sister    • Hypertension Sister    • Hyperlipidemia Sister    • Heart disease Sister    • Alcohol abuse Sister    • Heart disease Brother    • Heart attack Brother    • Heart disease Brother      Social History     Tobacco Use   • Smoking status: Former     Packs/day: 1.00     Years: 30.00     Pack years: 30.00     Types: Cigarettes     Start date:      Quit date:      Years since quittin.2   • Smokeless tobacco: Never   Vaping Use   • Vaping Use: Never used   Substance Use Topics   • Alcohol use: Yes     Alcohol/week: 1.0 standard drink     Types: 1 Glasses of wine per week     Comment: occ wine   • Drug use: Not Currently     Medications Prior to Admission   Medication Sig Dispense Refill Last Dose   • acetaminophen (TYLENOL) 500 MG tablet Take 1 tablet by mouth Every 6 (Six) Hours As Needed for Mild Pain.   2023   • amLODIPine (NORVASC) 10 MG tablet  Take 1 tablet by mouth Daily.  3 4/6/2023   • atorvastatin (LIPITOR) 20 MG tablet Take 1 tablet by mouth Daily.   4/6/2023   • irbesartan (AVAPRO) 300 MG tablet Take 1 tablet by mouth Daily.   4/4/2023   • lansoprazole (PREVACID) 30 MG capsule Take 1 capsule by mouth Daily.   4/6/2023   • magnesium oxide (MAG-OX) 400 MG tablet Take 1 tablet by mouth Daily.   4/6/2023   • vitamin B-12 (CYANOCOBALAMIN) 1000 MCG tablet Take 1 tablet by mouth Daily.   4/6/2023   • Zinc 50 MG capsule Take 50 mg by mouth Daily.   4/6/2023   • benzonatate (TESSALON) 100 MG capsule benzonatate 100 mg capsule   Take 1 capsule 3 times a day by oral route as needed.   More than a month   • clopidogrel (PLAVIX) 75 MG tablet Take 1 tablet by mouth Daily.   4/1/2023   • nitroglycerin (NITROSTAT) 0.4 MG SL tablet nitroglycerin 0.4 mg sublingual tablet        Allergies:  Lyrica [pregabalin]    Objective     Vital Sign Min/Max for last 24 hours  Temp  Min: 97.1 °F (36.2 °C)  Max: 98.5 °F (36.9 °C)   BP  Min: 99/76  Max: 169/77   Pulse  Min: 64  Max: 91   Resp  Min: 11  Max: 20   SpO2  Min: 91 %  Max: 100 %   Flow (L/min)  Min: 2  Max: 8   No data recorded     There were no vitals filed for this visit.    Physical Exam:    Vitals and nursing note reviewed.   Constitutional:       Appearance: Healthy appearance. Not in distress.   HENT:      Head:      Comments: Bilateral temporal incisions from surgery today  Neck:      Vascular: JVD normal.   Pulmonary:      Effort: Pulmonary effort is normal.      Breath sounds: Normal breath sounds. No wheezing. No rhonchi. No rales.   Cardiovascular:      PMI at left midclavicular line. Normal rate. Regular rhythm. Normal S1. Normal S2.      Murmurs: There is no murmur.      No gallop. No click. No rub.   Edema:     Peripheral edema absent.   Musculoskeletal: Normal range of motion.      Cervical back: Normal range of motion. Skin:     General: Skin is warm and dry.   Neurological:      Mental Status: Alert and  oriented to person, place and time.       Results Review:   Lab Results (last 72 hours)     Procedure Component Value Units Date/Time    High Sensitivity Troponin T 2Hr [[id number]]  (Abnormal) Collected: 04/07/23 1507    Specimen: Blood from Arm, Right Updated: 04/07/23 1539     HS Troponin T 40 ng/L      Troponin T Delta 18 ng/L     Narrative:      High Sensitive Troponin T Reference Range:  <10.0 ng/L- Negative Female for AMI  <15.0 ng/L- Negative Male for AMI  >=10 - Abnormal Female indicating possible myocardial injury.  >=15 - Abnormal Male indicating possible myocardial injury.   Clinicians would have to utilize clinical acumen, EKG, Troponin, and serial changes to determine if it is an Acute Myocardial Infarction or myocardial injury due to an underlying chronic condition.         High Sensitivity Troponin T [[id number]]  (Abnormal) Collected: 04/07/23 1251    Specimen: Blood Updated: 04/07/23 1323     HS Troponin T 22 ng/L     Narrative:      High Sensitive Troponin T Reference Range:  <10.0 ng/L- Negative Female for AMI  <15.0 ng/L- Negative Male for AMI  >=10 - Abnormal Female indicating possible myocardial injury.  >=15 - Abnormal Male indicating possible myocardial injury.   Clinicians would have to utilize clinical acumen, EKG, Troponin, and serial changes to determine if it is an Acute Myocardial Infarction or myocardial injury due to an underlying chronic condition.         Tissue Pathology Exam [[id number]] Collected: 04/07/23 1038    Specimen: Tissue from Face, Tissue from Face Updated: 04/07/23 1216     Case Report --     Surgical Pathology Report                         Case: [id number]                                  Authorizing Provider:  Brijesh Nickerson MD    Collected:           04/07/2023 10:38 AM          Ordering Location:     [facility] OR  Received:            04/07/2023 11:19 AM          Pathologist:           Anabella Armenta MD                                                         Intraop:               Anabella Armenta MD                                                        Specimens:   1) - Face, right temple - superior suture short, right lateral suture long                          2) - Face, left temple  - superior suture short, posterior lateral suture long              Intraoperative Consultation --       Specimen skin, right temple lesion, excision  FROZEN SECTION DIAGNOSIS: Favor metatypical basal cell carcinoma.  No malignancy seen at inked surgical margins.  Reported to Dr. Brijesh Nickerson on 4/7/2023 at 16:30 CDT by LARRY Arshad.    Specimen skin, left temple lesion, excision  FROZEN SECTION DIAGNOSIS: Seborrheic keratosis.  Reported to Dr. Brijesh Nickerson on 4/7/2023 at 16:30 CDT by LARRY Arshad.                Echo EF Estimated  Lab Results   Component Value Date    ECHOEFEST 50.0 12/20/2022         Cath Ejection Fraction Quantitative  No results found for: CATHEF        Medication Review: yes  Current Facility-Administered Medications   Medication Dose Route Frequency Provider Last Rate Last Admin   • acetaminophen (TYLENOL) tablet 500 mg  500 mg Oral Q6H PRN Travis Staley APRN       • amLODIPine (NORVASC) tablet 10 mg  10 mg Oral Daily Travis Staley APRN       • atorvastatin (LIPITOR) tablet 20 mg  20 mg Oral Daily Travis Staley APRN       • atropine sulfate injection 0.5 mg  0.5 mg Intravenous Once PRN Beatrice Jennings MD       • benzonatate (TESSALON) capsule 100 mg  100 mg Oral TID PRN Travis Staley APRN       • clopidogrel (PLAVIX) tablet 150 mg  150 mg Oral Daily Travis Staley APRN       • [START ON 4/8/2023] clopidogrel (PLAVIX) tablet 75 mg  75 mg Oral Daily Travis Staley APRN       • fentaNYL citrate (PF) (SUBLIMAZE) injection 25 mcg  25 mcg Intravenous Q5 Min PRN Beatrice Jennings MD       • flumazenil (ROMAZICON) injection 0.2 mg  0.2 mg Intravenous PRN Beatrice Jennings MD       •  HYDROcodone-acetaminophen (NORCO) 5-325 MG per tablet 1 tablet  1 tablet Oral Once PRN Brijesh Nickerson MD       • labetalol (NORMODYNE,TRANDATE) injection 5 mg  5 mg Intravenous Q5 Min PRN Beatrice Jennings MD       • lactated ringers infusion 1,000 mL  1,000 mL Intravenous Continuous Brijesh Nickerson MD 25 mL/hr at 04/07/23 1045 Restarted at 04/07/23 1053   • lactated ringers infusion  100 mL/hr Intravenous Continuous Beatrice Jennings MD       • losartan (COZAAR) tablet 100 mg  100 mg Oral Q24H Travis Staley APRN       • magnesium oxide (MAG-OX) tablet 400 mg  400 mg Oral Daily Travis Staley APRN       • mupirocin (BACTROBAN) 2 % cream 1 application  1 application Topical Q12H Travis Staley APRN       • mupirocin (BACTROBAN) 2 % ointment   Topical Q8H Brijesh Nickerson MD       • naloxone (NARCAN) injection 0.4 mg  0.4 mg Intravenous PRN Beatrice Jennings MD       • nitroglycerin (NITROSTAT) SL tablet 0.4 mg  0.4 mg Sublingual Q5 Min PRN Travis Staley APRN       • ondansetron (ZOFRAN) injection 4 mg  4 mg Intravenous Once PRN Beatrice Jennings MD       • ondansetron (ZOFRAN) tablet 4 mg  4 mg Oral Once PRN Brijesh Nickerson MD       • oxyCODONE-acetaminophen (PERCOCET) 5-325 MG per tablet 1 tablet  1 tablet Oral Once PRN Beatrice Jennings MD       • [START ON 4/8/2023] pantoprazole (PROTONIX) EC tablet 40 mg  40 mg Oral Q AM Travis Staley APRN       • sodium chloride 0.9 % flush 10 mL  10 mL Intravenous Q12H Travis Staley APRN       • sodium chloride 0.9 % flush 10 mL  10 mL Intravenous PRN Travis Staley APRN       • sodium chloride 0.9 % infusion 40 mL  40 mL Intravenous PRN Reasor, Travis S, APRN       • vitamin B-12 (CYANOCOBALAMIN) tablet 1,000 mcg  1,000 mcg Oral Daily Travis Staley, APRN             Assessment & Plan       Elevated troponin: Likely secondary to demand ischemia with general anesthesia.  Will trend troponin overnight.   No anginal symptoms at this time.  -Continue Plavix with full loading dose  -Resume home Plavix dose tomorrow  -We will stop aspirin at this time  -Resume home meds  -Telemetry floor overnight  -Possible stress test in the morning if patient has any recurrent symptoms.    Hypertension: Hypertensive in PACU.  -We will resume home regimen.  -Continue Norvasc 10, substitute Cozaar for Avapro    GERD: Potential causation of chest pain and vomiting.  -Will be initiated on Protonix 40 mg daily.      Electronically signed by LARRY Arshad, 04/07/23, 4:21 PM CDT.

## 2023-04-07 NOTE — OP NOTE
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     Approximately 5mL 1% Xylocaine with epinephrine was injected in the planned excision site in the right temple.  Excision was accomplished with a #15 blade in a rhomboid fashion without difficulty.  The excision was approximately 1.6 cm  x 1.5 cm.  The lesion was approximately 1.3 cm x 1.1 cm.  The margin was 1 mm. The excision extended to deep subcutaneous tissue.    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    An inferiorly based transposition flap was fashioned and wide undermining performed with curved iris scissors and double prong skin hooks.  Minimal bleeding was encountered which was controlled with electrocautery and low settings.  The flap was created to preserve normal anatomic relationships and to facilitate closure.  The flap was transposed and the donor site as well as the flap sutured in place utilizing interrupted 4-0 Monocryl subcutaneously and interrupted 5-0  nylon to reapproximate the epidermis.  Bactroban ointment was applied.  Attention was then turned to the left temple.    Approximately 5mL 1% Xylocaine with epinephrine was injected in the planned excision site in the left temple.  Excision was accomplished with a #15 blade in a horizontal elliptical fashion without difficulty.  The excision was approximately 1.8 cm  x 0.7 cm.  The lesion was approximately 0.9 cm x 0.6 cm.  The margin was 0.5 mm. The excision extended to subcutaneous tissue.    Upon excision the specimen was marked and submitted for frozen section examination which demonstrated findings consistent with a seborrheic keratosis.    Extensive and wide undermining was performed with curved iris scissors and double prong skin hooks.  This was performed in order to facilitate closure and to preserve normal anatomic relationships.  Minimal bleeding was encountered which was controlled with electrocautery on low settings.  The incision was reapproximated utilizing interrupted 4-0 Monocryl subcutaneously and interrupted 5-0 nylon to reapproximate the epidermis.  Bactroban ointment was applied and the procedure terminated.     The patient was transported upon extubation to the postanesthesia care unit in stable condition.    SPECIMENS:  A: Neoplasm uncertain behavior of the right temple  B: Neoplasm of uncertain behavior of the left temple    COMPLICATIONS: NONE    ESTIMATED BLOOD LOSS:  Minimal    Brijesh Nickerson MD  4/7/2023

## 2023-04-07 NOTE — NURSING NOTE
Travis with Dr. Osorio at bedside at this time. States pt will be admitted due to elevated troponin. Family at bedside to see pt at this time and were updated by Travis. States he will place order for more plavix and aspirin along with consult note. Awaiting further orders. Pt pleasantly speaking with family at this time. Pt continues to deny pain or nausea.

## 2023-04-07 NOTE — NURSING NOTE
GAUDENCIO Aldana received pt at bedside. Pt vangie Aragon updated at bedside in room 403. Provided with unit phone number to call for updates. Pt transferred self from stretcher to bed.

## 2023-04-07 NOTE — Clinical Note
First balloon inflation max pressure = 8 luiza. First balloon inflation duration = 25 seconds. Second inflation of balloon - Max pressure = 8 luiza. 2nd Inflation of balloon - Duration = 25 seconds. 2nd inflation was done at 12:01 CDT. Third inflation of balloon - Max pressure = 12 luiza. 3rd Inflation of balloon - Duration = 25 seconds. 3rd inflation was done at 12:03 CDT. Fourth inflation of balloon - Max pressure = 12 luiza. 4th Inflation of  balloon - Duration = 25 seconds. 4th inflation was done at 12:03 CDT.

## 2023-04-07 NOTE — NURSING NOTE
Dr. Jennings and Dr. Nickerson have evaluated pt at bedside and discussed care. Pt calm and cooperative.

## 2023-04-07 NOTE — NURSING NOTE
Dr. Jennings at bedside explaining care to pt. She is aware of pt troponin level of 22. Dr. Jennings has called pt cardiologist Dr. Moseley and notified him of pt condition post op. Will administer 2nd 75 mg Plavix per Dr. Moseley recommendation. Dr. Jennings states pt to be admitted and evaluated by hospitalist service.

## 2023-04-07 NOTE — ANESTHESIA PREPROCEDURE EVALUATION
Anesthesia Evaluation     Patient summary reviewed   no history of anesthetic complications:  NPO Solid Status: > 8 hours  NPO Liquid Status: > 2 hours           Airway   Mallampati: II  TM distance: >3 FB  Neck ROM: full  Dental    (+) upper dentures and lower dentures    Pulmonary    (-) COPD, asthma, sleep apnea, not a smoker  Cardiovascular   Exercise tolerance: good (4-7 METS)    ECG reviewed  Patient on routine beta blocker and Beta blocker given within 24 hours of surgery    (+) hypertension, valvular problems/murmurs MR, CAD, cardiac stents (2006) hyperlipidemia,   (-) pacemaker, past MI, angina      Neuro/Psych  (-) seizures, TIA, CVA  GI/Hepatic/Renal/Endo    (+)  GERD,    (-) liver disease, no renal disease, diabetes    Musculoskeletal     Abdominal    Substance History      OB/GYN          Other                          Anesthesia Plan    ASA 3     general       Anesthetic plan, risks, benefits, and alternatives have been provided, discussed and informed consent has been obtained with: patient.

## 2023-04-07 NOTE — ANESTHESIA PROCEDURE NOTES
Airway  Urgency: elective    Date/Time: 4/7/2023 10:49 AM  Airway not difficult    General Information and Staff    Patient location during procedure: OR  CRNA/CAA: Layo Stearns CRNA    Indications and Patient Condition  Indications for airway management: airway protection    Preoxygenated: yes  Mask difficulty assessment: 0 - not attempted    Final Airway Details  Final airway type: supraglottic airway      Successful airway: unique  Size 4     Number of attempts at approach: 1  Assessment: lips, teeth, and gum same as pre-op

## 2023-04-07 NOTE — NURSING NOTE
Pt transferred to outpatient surgery and hooked up to monitors. Pt begins to complain of a burning in his chest. Pt states he has acid reflux. Pt then begins to complain of burning in his right arm. Pt returned to PACU. Pt begins to vomit on the way back to the PACU. Anesthesia at bedside ordered 2 L of oxygen per nasal cannula, 12 lead EKG, sublingual nitro, and aspirin. Pt complaining of chest pressure pain at this time rated at 5.

## 2023-04-07 NOTE — NURSING NOTE
Dr. Jennings at bedside speaking with pt. EKG elevated by Dr. Jennings at bedside evaluated EKG at bedside.

## 2023-04-07 NOTE — NURSING NOTE
Dr. Pena called back to PACU. Given update of pt condition. States that he will call and speak with Dr. Nickerson about pt admission and further care.

## 2023-04-07 NOTE — NURSING NOTE
STAT labs obtained at this time. Pt rates chest pain now as a burning at 1/10. Pt states the feeling of pressure as been alleviated.

## 2023-04-07 NOTE — PLAN OF CARE
Goal Outcome Evaluation:  Plan of Care Reviewed With: patient        Progress: no change  Outcome Evaluation: Admitted from PACU after removal of a mole with chest pain. No c/o chest pain since arrival. NPO at MN for possible stress. LR infusing. Bactroban applied to both incisions. Safety maintained, no falls.

## 2023-04-07 NOTE — NURSING NOTE
Dr. Jennings at bedside. Pt reports to her he feels as if he needs to belch. Dr. Jennings orders to give pt daily dose of plavix and prn droperidol at this time. Dr. Jennings orders to reevaluate pt in 15 minutes after administration of droperidol. If chest pain is not improving then second sublingual nitro is to be administered at that time.

## 2023-04-07 NOTE — NURSING NOTE
Pt continues to deny any more chest pain. Orders have been placed for hospitalist consult. Consult called and hospitalist is being paged at this time. Call back number left as department number of [id number].

## 2023-04-07 NOTE — Clinical Note
First balloon inflation max pressure = 12 luiza. First balloon inflation duration = 25 seconds. Second inflation of balloon - Max pressure = 12 luiza. 2nd Inflation of balloon - Duration = 25 seconds.

## 2023-04-07 NOTE — NURSING NOTE
Dr. Jennings at bedside to evaluate pt. Pt denies any pain or nausea at this time. Orders received to leave pt on 2 L of oxygen per nasal cannula to be weaned off on floor during pt admission as care team sees acceptable. Pt requests head of bed be lowered. Pt has rolled onto left side and states he wishes to take a nap. Pt currently resting with eyes closed, respirations even. Pt oxygen sat 99% on 2 L of oxygen per nasal cannula.

## 2023-04-08 LAB
ANION GAP SERPL CALCULATED.3IONS-SCNC: 9 MMOL/L (ref 5–15)
BASOPHILS # BLD AUTO: 0 10*3/MM3 (ref 0–0.2)
BASOPHILS NFR BLD AUTO: 0 % (ref 0–1.5)
BUN SERPL-MCNC: 15 MG/DL (ref 8–23)
BUN/CREAT SERPL: 22.7 (ref 7–25)
CALCIUM SPEC-SCNC: 9.3 MG/DL (ref 8.2–9.6)
CHLORIDE SERPL-SCNC: 101 MMOL/L (ref 98–107)
CO2 SERPL-SCNC: 27 MMOL/L (ref 22–29)
CREAT SERPL-MCNC: 0.66 MG/DL (ref 0.76–1.27)
DEPRECATED RDW RBC AUTO: 48.1 FL (ref 37–54)
EGFRCR SERPLBLD CKD-EPI 2021: 88 ML/MIN/1.73
EOSINOPHIL # BLD AUTO: 0 10*3/MM3 (ref 0–0.4)
EOSINOPHIL NFR BLD AUTO: 0 % (ref 0.3–6.2)
ERYTHROCYTE [DISTWIDTH] IN BLOOD BY AUTOMATED COUNT: 13.8 % (ref 12.3–15.4)
GLUCOSE SERPL-MCNC: 168 MG/DL (ref 65–99)
HCT VFR BLD AUTO: 38.2 % (ref 37.5–51)
HGB BLD-MCNC: 11.8 G/DL (ref 13–17.7)
IMM GRANULOCYTES # BLD AUTO: 0.01 10*3/MM3 (ref 0–0.05)
IMM GRANULOCYTES NFR BLD AUTO: 0.2 % (ref 0–0.5)
LYMPHOCYTES # BLD AUTO: 0.89 10*3/MM3 (ref 0.7–3.1)
LYMPHOCYTES NFR BLD AUTO: 18.2 % (ref 19.6–45.3)
MCH RBC QN AUTO: 29.4 PG (ref 26.6–33)
MCHC RBC AUTO-ENTMCNC: 30.9 G/DL (ref 31.5–35.7)
MCV RBC AUTO: 95.3 FL (ref 79–97)
MONOCYTES # BLD AUTO: 0.6 10*3/MM3 (ref 0.1–0.9)
MONOCYTES NFR BLD AUTO: 12.2 % (ref 5–12)
NEUTROPHILS NFR BLD AUTO: 3.4 10*3/MM3 (ref 1.7–7)
NEUTROPHILS NFR BLD AUTO: 69.4 % (ref 42.7–76)
NRBC BLD AUTO-RTO: 0 /100 WBC (ref 0–0.2)
PLATELET # BLD AUTO: 155 10*3/MM3 (ref 140–450)
PMV BLD AUTO: 10 FL (ref 6–12)
POTASSIUM SERPL-SCNC: 4 MMOL/L (ref 3.5–5.2)
RBC # BLD AUTO: 4.01 10*6/MM3 (ref 4.14–5.8)
SODIUM SERPL-SCNC: 137 MMOL/L (ref 136–145)
TROPONIN T SERPL HS-MCNC: 101 NG/L
TROPONIN T SERPL HS-MCNC: 94 NG/L
WBC NRBC COR # BLD: 4.9 10*3/MM3 (ref 3.4–10.8)

## 2023-04-08 PROCEDURE — 99153 MOD SED SAME PHYS/QHP EA: CPT | Performed by: INTERNAL MEDICINE

## 2023-04-08 PROCEDURE — 85025 COMPLETE CBC W/AUTO DIFF WBC: CPT

## 2023-04-08 PROCEDURE — 25010000002 DIPHENHYDRAMINE PER 50 MG: Performed by: INTERNAL MEDICINE

## 2023-04-08 PROCEDURE — 25010000002 HEPARIN (PORCINE) 2000-0.9 UNIT/L-% SOLUTION: Performed by: INTERNAL MEDICINE

## 2023-04-08 PROCEDURE — B2151ZZ FLUOROSCOPY OF LEFT HEART USING LOW OSMOLAR CONTRAST: ICD-10-PCS | Performed by: INTERNAL MEDICINE

## 2023-04-08 PROCEDURE — 25010000002 BIVALIRUDIN TRIFLUOROACETATE 250 MG RECONSTITUTED SOLUTION: Performed by: INTERNAL MEDICINE

## 2023-04-08 PROCEDURE — B2111ZZ FLUOROSCOPY OF MULTIPLE CORONARY ARTERIES USING LOW OSMOLAR CONTRAST: ICD-10-PCS | Performed by: INTERNAL MEDICINE

## 2023-04-08 PROCEDURE — 99152 MOD SED SAME PHYS/QHP 5/>YRS: CPT | Performed by: INTERNAL MEDICINE

## 2023-04-08 PROCEDURE — 93458 L HRT ARTERY/VENTRICLE ANGIO: CPT | Performed by: INTERNAL MEDICINE

## 2023-04-08 PROCEDURE — C1887 CATHETER, GUIDING: HCPCS | Performed by: INTERNAL MEDICINE

## 2023-04-08 PROCEDURE — 25010000002 ENOXAPARIN PER 10 MG: Performed by: INTERNAL MEDICINE

## 2023-04-08 PROCEDURE — 99232 SBSQ HOSP IP/OBS MODERATE 35: CPT | Performed by: HOSPITALIST

## 2023-04-08 PROCEDURE — C1769 GUIDE WIRE: HCPCS | Performed by: INTERNAL MEDICINE

## 2023-04-08 PROCEDURE — 84484 ASSAY OF TROPONIN QUANT: CPT | Performed by: HOSPITALIST

## 2023-04-08 PROCEDURE — C9600 PERC DRUG-EL COR STENT SING: HCPCS | Performed by: INTERNAL MEDICINE

## 2023-04-08 PROCEDURE — C1874 STENT, COATED/COV W/DEL SYS: HCPCS | Performed by: INTERNAL MEDICINE

## 2023-04-08 PROCEDURE — 80048 BASIC METABOLIC PNL TOTAL CA: CPT

## 2023-04-08 PROCEDURE — 25010000002 FENTANYL CITRATE (PF) 50 MCG/ML SOLUTION: Performed by: INTERNAL MEDICINE

## 2023-04-08 PROCEDURE — C1725 CATH, TRANSLUMIN NON-LASER: HCPCS | Performed by: INTERNAL MEDICINE

## 2023-04-08 PROCEDURE — 4A023N7 MEASUREMENT OF CARDIAC SAMPLING AND PRESSURE, LEFT HEART, PERCUTANEOUS APPROACH: ICD-10-PCS | Performed by: INTERNAL MEDICINE

## 2023-04-08 PROCEDURE — 92928 PRQ TCAT PLMT NTRAC ST 1 LES: CPT | Performed by: INTERNAL MEDICINE

## 2023-04-08 PROCEDURE — 99024 POSTOP FOLLOW-UP VISIT: CPT | Performed by: EMERGENCY MEDICINE

## 2023-04-08 PROCEDURE — 93010 ELECTROCARDIOGRAM REPORT: CPT | Performed by: EMERGENCY MEDICINE

## 2023-04-08 PROCEDURE — 25510000001 IOPAMIDOL PER 1 ML: Performed by: INTERNAL MEDICINE

## 2023-04-08 PROCEDURE — C1894 INTRO/SHEATH, NON-LASER: HCPCS | Performed by: INTERNAL MEDICINE

## 2023-04-08 PROCEDURE — 027034Z DILATION OF CORONARY ARTERY, ONE ARTERY WITH DRUG-ELUTING INTRALUMINAL DEVICE, PERCUTANEOUS APPROACH: ICD-10-PCS | Performed by: INTERNAL MEDICINE

## 2023-04-08 PROCEDURE — 93005 ELECTROCARDIOGRAM TRACING: CPT | Performed by: INTERNAL MEDICINE

## 2023-04-08 PROCEDURE — 63710000001 ONDANSETRON PER 8 MG: Performed by: OTOLARYNGOLOGY

## 2023-04-08 PROCEDURE — 25010000002 MIDAZOLAM PER 1 MG: Performed by: INTERNAL MEDICINE

## 2023-04-08 PROCEDURE — 25010000002 HEPARIN (PORCINE) 1000-0.9 UT/500ML-% SOLUTION: Performed by: INTERNAL MEDICINE

## 2023-04-08 PROCEDURE — C1760 CLOSURE DEV, VASC: HCPCS | Performed by: INTERNAL MEDICINE

## 2023-04-08 DEVICE — EVEROLIMUS-ELUTING PLATINUM CHROMIUM CORONARY STENT SYSTEM
Type: IMPLANTABLE DEVICE | Status: FUNCTIONAL
Brand: SYNERGY™ XD

## 2023-04-08 RX ORDER — ALPRAZOLAM 0.5 MG/1
0.5 TABLET ORAL 3 TIMES DAILY PRN
Status: DISCONTINUED | OUTPATIENT
Start: 2023-04-08 | End: 2023-04-10 | Stop reason: HOSPADM

## 2023-04-08 RX ORDER — SODIUM CHLORIDE 9 MG/ML
75 INJECTION, SOLUTION INTRAVENOUS CONTINUOUS
Status: DISCONTINUED | OUTPATIENT
Start: 2023-04-08 | End: 2023-04-10 | Stop reason: HOSPADM

## 2023-04-08 RX ORDER — ASPIRIN 81 MG/1
324 TABLET, CHEWABLE ORAL ONCE
Status: COMPLETED | OUTPATIENT
Start: 2023-04-08 | End: 2023-04-08

## 2023-04-08 RX ORDER — ONDANSETRON 4 MG/1
4 TABLET, FILM COATED ORAL EVERY 6 HOURS PRN
Status: DISCONTINUED | OUTPATIENT
Start: 2023-04-08 | End: 2023-04-10 | Stop reason: HOSPADM

## 2023-04-08 RX ORDER — DIPHENHYDRAMINE HYDROCHLORIDE 50 MG/ML
INJECTION INTRAMUSCULAR; INTRAVENOUS
Status: DISCONTINUED | OUTPATIENT
Start: 2023-04-08 | End: 2023-04-08 | Stop reason: HOSPADM

## 2023-04-08 RX ORDER — MIDAZOLAM HYDROCHLORIDE 1 MG/ML
INJECTION INTRAMUSCULAR; INTRAVENOUS
Status: DISCONTINUED | OUTPATIENT
Start: 2023-04-08 | End: 2023-04-08 | Stop reason: HOSPADM

## 2023-04-08 RX ORDER — CLOPIDOGREL BISULFATE 75 MG/1
TABLET ORAL
Status: DISCONTINUED | OUTPATIENT
Start: 2023-04-08 | End: 2023-04-08 | Stop reason: HOSPADM

## 2023-04-08 RX ORDER — ONDANSETRON 2 MG/ML
4 INJECTION INTRAMUSCULAR; INTRAVENOUS EVERY 6 HOURS PRN
Status: DISCONTINUED | OUTPATIENT
Start: 2023-04-08 | End: 2023-04-08 | Stop reason: SDUPTHER

## 2023-04-08 RX ORDER — DIPHENHYDRAMINE HCL 25 MG
25 CAPSULE ORAL EVERY 6 HOURS PRN
Status: DISCONTINUED | OUTPATIENT
Start: 2023-04-08 | End: 2023-04-10 | Stop reason: HOSPADM

## 2023-04-08 RX ORDER — ASPIRIN 81 MG/1
81 TABLET ORAL DAILY
Status: DISCONTINUED | OUTPATIENT
Start: 2023-04-08 | End: 2023-04-08

## 2023-04-08 RX ORDER — FENTANYL CITRATE 50 UG/ML
INJECTION, SOLUTION INTRAMUSCULAR; INTRAVENOUS
Status: DISCONTINUED | OUTPATIENT
Start: 2023-04-08 | End: 2023-04-08 | Stop reason: HOSPADM

## 2023-04-08 RX ORDER — CLOPIDOGREL BISULFATE 75 MG/1
75 TABLET ORAL DAILY
Status: DISCONTINUED | OUTPATIENT
Start: 2023-04-09 | End: 2023-04-10 | Stop reason: HOSPADM

## 2023-04-08 RX ORDER — ASPIRIN 81 MG/1
81 TABLET ORAL DAILY
Status: DISCONTINUED | OUTPATIENT
Start: 2023-04-09 | End: 2023-04-08 | Stop reason: SDUPTHER

## 2023-04-08 RX ORDER — CLOPIDOGREL BISULFATE 75 MG/1
225 TABLET ORAL ONCE
Status: DISCONTINUED | OUTPATIENT
Start: 2023-04-08 | End: 2023-04-10

## 2023-04-08 RX ORDER — HEPARIN SODIUM 200 [USP'U]/100ML
INJECTION, SOLUTION INTRAVENOUS
Status: DISCONTINUED | OUTPATIENT
Start: 2023-04-08 | End: 2023-04-08 | Stop reason: HOSPADM

## 2023-04-08 RX ORDER — ASPIRIN 325 MG
TABLET ORAL
Status: DISCONTINUED | OUTPATIENT
Start: 2023-04-08 | End: 2023-04-08 | Stop reason: HOSPADM

## 2023-04-08 RX ORDER — CALCIUM CARBONATE 200(500)MG
2 TABLET,CHEWABLE ORAL 2 TIMES DAILY PRN
Status: DISCONTINUED | OUTPATIENT
Start: 2023-04-08 | End: 2023-04-10 | Stop reason: HOSPADM

## 2023-04-08 RX ORDER — SODIUM CHLORIDE 9 MG/ML
100 INJECTION, SOLUTION INTRAVENOUS CONTINUOUS
Status: DISCONTINUED | OUTPATIENT
Start: 2023-04-08 | End: 2023-04-10 | Stop reason: HOSPADM

## 2023-04-08 RX ORDER — ACETAMINOPHEN 325 MG/1
650 TABLET ORAL EVERY 4 HOURS PRN
Status: DISCONTINUED | OUTPATIENT
Start: 2023-04-08 | End: 2023-04-10 | Stop reason: HOSPADM

## 2023-04-08 RX ORDER — ASPIRIN 81 MG/1
81 TABLET ORAL DAILY
Status: DISCONTINUED | OUTPATIENT
Start: 2023-04-09 | End: 2023-04-10 | Stop reason: HOSPADM

## 2023-04-08 RX ORDER — LIDOCAINE HYDROCHLORIDE 20 MG/ML
INJECTION, SOLUTION INFILTRATION; PERINEURAL
Status: DISCONTINUED | OUTPATIENT
Start: 2023-04-08 | End: 2023-04-08 | Stop reason: HOSPADM

## 2023-04-08 RX ORDER — TEMAZEPAM 7.5 MG/1
7.5 CAPSULE ORAL NIGHTLY PRN
Status: DISCONTINUED | OUTPATIENT
Start: 2023-04-08 | End: 2023-04-10 | Stop reason: HOSPADM

## 2023-04-08 RX ORDER — ONDANSETRON 2 MG/ML
4 INJECTION INTRAMUSCULAR; INTRAVENOUS EVERY 6 HOURS PRN
Status: DISCONTINUED | OUTPATIENT
Start: 2023-04-08 | End: 2023-04-10 | Stop reason: HOSPADM

## 2023-04-08 RX ORDER — CHLORHEXIDINE GLUCONATE 500 MG/1
1 CLOTH TOPICAL ONCE
Status: COMPLETED | OUTPATIENT
Start: 2023-04-08 | End: 2023-04-08

## 2023-04-08 RX ORDER — CHLORHEXIDINE GLUCONATE 500 MG/1
1 CLOTH TOPICAL EVERY 24 HOURS
Status: DISCONTINUED | OUTPATIENT
Start: 2023-04-09 | End: 2023-04-09 | Stop reason: HOSPADM

## 2023-04-08 RX ORDER — ASPIRIN 81 MG/1
324 TABLET, CHEWABLE ORAL DAILY
Status: DISCONTINUED | OUTPATIENT
Start: 2023-04-08 | End: 2023-04-08

## 2023-04-08 RX ADMIN — METOPROLOL TARTRATE 12.5 MG: 25 TABLET, FILM COATED ORAL at 09:36

## 2023-04-08 RX ADMIN — MUPIROCIN: 20 OINTMENT TOPICAL at 21:05

## 2023-04-08 RX ADMIN — METOPROLOL TARTRATE 12.5 MG: 25 TABLET, FILM COATED ORAL at 22:14

## 2023-04-08 RX ADMIN — MUPIROCIN: 20 OINTMENT TOPICAL at 14:47

## 2023-04-08 RX ADMIN — ATORVASTATIN CALCIUM 20 MG: 10 TABLET, FILM COATED ORAL at 09:37

## 2023-04-08 RX ADMIN — ACETAMINOPHEN 500 MG: 500 TABLET, FILM COATED ORAL at 03:30

## 2023-04-08 RX ADMIN — ASPIRIN 324 MG: 81 TABLET, CHEWABLE ORAL at 10:32

## 2023-04-08 RX ADMIN — SODIUM CHLORIDE 100 ML/HR: 9 INJECTION, SOLUTION INTRAVENOUS at 10:33

## 2023-04-08 RX ADMIN — AMLODIPINE BESYLATE 10 MG: 10 TABLET ORAL at 09:37

## 2023-04-08 RX ADMIN — Medication 10 ML: at 22:35

## 2023-04-08 RX ADMIN — CLOPIDOGREL BISULFATE 75 MG: 75 TABLET, FILM COATED ORAL at 09:36

## 2023-04-08 RX ADMIN — CHLORHEXIDINE GLUCONATE 1 APPLICATION: 500 CLOTH TOPICAL at 17:59

## 2023-04-08 RX ADMIN — ENOXAPARIN SODIUM 40 MG: 100 INJECTION SUBCUTANEOUS at 17:49

## 2023-04-08 RX ADMIN — LOSARTAN POTASSIUM 100 MG: 50 TABLET, FILM COATED ORAL at 09:36

## 2023-04-08 RX ADMIN — PANTOPRAZOLE SODIUM 40 MG: 40 TABLET, DELAYED RELEASE ORAL at 05:44

## 2023-04-08 RX ADMIN — MAGNESIUM GLUCONATE 500 MG ORAL TABLET 400 MG: 500 TABLET ORAL at 09:36

## 2023-04-08 RX ADMIN — Medication 1000 MCG: at 09:37

## 2023-04-08 RX ADMIN — ONDANSETRON 4 MG: 4 TABLET, FILM COATED ORAL at 02:08

## 2023-04-08 NOTE — NURSING NOTE
Patient voiced to RN he is still having burning sensation in his chest and 2/10 pain. EKG performed this morning, nitro prn was given by Nightshift RN overnight. Waiting for Cardiologist to round, patient denies medication at this time.

## 2023-04-08 NOTE — PROGRESS NOTES
[facility]   Surgical Progress Note    Patient Name: Wyatt Curry  : 1930  MRN: [id number]  Date of admission: 2023  Surgical Procedures Since Admission:  Procedure(s):  EXCISION LESION OF RIGHT TEMPLE AND LEFT TEMPLE WITH FROZEN SECTION WITH POSSIBLE FLAP OR GRAFT  Surgeon:  Brijesh Nickerson MD  Status:  1 Day Post-Op  -------------------    Subjective   Subjective     Chief Complaint: bleeding    History of Present Illness   Mr. Curry is a 89 year old male who underwent excision of bilateral temporal lesions by Dr. Brijesh Nickerson yesterday.  Last night, nursing staff called to report the right temporal wound was having significant bleeding. Advised to place ABD pads and turbin wrap the head. Patient had to be admitted post operatively for cardiac complaints and was given a loading dose of plavix and aspirin.  This morning, dressing was removed.  No further bleeding    Review of Systems   Constitutional: Negative.    HENT: Negative.    Eyes: Negative.    Respiratory: Positive for chest tightness.    Cardiovascular: Positive for chest pain.   Skin: Positive for wound.   Neurological: Negative.    Hematological: Bruises/bleeds easily.       Objective   Objective     Vitals:   Temp:  [97.1 °F (36.2 °C)-98.8 °F (37.1 °C)] 98.7 °F (37.1 °C)  Heart Rate:  [64-91] 76  Resp:  [11-20] 16  BP: ()/(54-79) 144/71  Flow (L/min):  [1-8] 1    Physical Exam  Constitutional:       Appearance: Normal appearance.   HENT:      Head: Normocephalic.      Right Ear: External ear normal.      Left Ear: External ear normal.      Nose: Nose normal.      Mouth/Throat:      Mouth: Mucous membranes are moist.   Eyes:      Pupils: Pupils are equal, round, and reactive to light.   Cardiovascular:      Rate and Rhythm: Normal rate.   Musculoskeletal:         General: Normal range of motion.      Cervical back: Normal range of motion.   Skin:     General: Skin is warm and dry.      Capillary Refill: Capillary refill  takes less than 2 seconds.      Comments: Bilateral temporal surgical incisions.  Sutures intact.  There is dried blood around right excision. No active bleeding.   Neurological:      General: No focal deficit present.      Mental Status: He is alert.   Psychiatric:         Mood and Affect: Mood normal.           Result Review    Result Review:  I have personally reviewed the results from the time of this admission to 4/8/2023 07:34 CDT and agree with these findings:  []  Laboratory list / accordion  []  Microbiology  []  Radiology  []  EKG/Telemetry   []  Cardiology/Vascular   []  Pathology  []  Old records  []  Other:  Most notable findings include: none    Assessment & Plan   Assessment / Plan     Brief Patient Summary:  Wyatt Curry is a 89 y.o. male who had bleeding from his right surgical incision site after receiving loading dose of plavix and aspirin for cardiac complaints.  This morning bleeding has ceased.  Incision sites are well approximated.    Active Hospital Problems:  Active Hospital Problems    Diagnosis    • **Elevated troponin    • Neoplasm of uncertain behavior    • Anticoagulated      Plan:   Turbin wrap site if bleeding resumes    LARRY Hernández  Electronically signed by LARRY Hernández, 04/08/23, 7:35 AM CDT.

## 2023-04-08 NOTE — PROGRESS NOTES
"[facility] -  Progress Note     LOS: 1 day   Patient Care Team:  Everardo Jackson MD as PCP - General (Family Medicine)  Justin Howard MD as Consulting Physician (Urology)  Emigdio Moseley MD as Cardiologist (Cardiology)  Everardo Noriega MD as Consulting Physician (Pulmonary Disease)  Brijesh Nickerson MD as Consulting Physician (Otolaryngology)  Kaitlin Del Angel PA as Referring Physician (Physician Assistant)    Chief Complaint: Follow-up chest pain    Subjective     Interval History:   Overnight patient had issues with significant bleeding from the surgical site.  ENT consulted on patient.  Patient had ongoing intermittent chest pain throughout the night.  Patient was given 1 nitroglycerin with subsequent resolution of symptoms, however headache developed.  Patient had ongoing episodes of chest pain but refused nitroglycerin due to ongoing headache.  Troponins continue to rise.  Patient slightly hypertensive with systolics in the 140s.  At the time my examination patient sitting up comfortably in bed.  The patient's niece is at bedside reports that over the last month or so the patient has had worsening of his \" reflux\" with ongoing burning in the center of his chest occurring more frequently over the last month.  The patient attributed this to GERD but now he is unsure giving everything that is went on during this hospitalization      Review of Systems:     Review of Systems   Constitutional: Negative.    Eyes: Negative.    Respiratory: Negative.    Cardiovascular: Positive for chest pain.   Endocrine: Negative.    Genitourinary: Negative.      Objective     Vital Sign Min/Max for last 24 hours  Temp  Min: 97.4 °F (36.3 °C)  Max: 98.8 °F (37.1 °C)   BP  Min: 99/76  Max: 169/77   Pulse  Min: 64  Max: 91   Resp  Min: 11  Max: 20   SpO2  Min: 91 %  Max: 100 %   No data recorded   Weight  Min: 67.9 kg (149 lb 9.6 oz)  Max: 67.9 kg (149 lb 9.6 oz)         04/07/23  1713 "   Weight: 67.9 kg (149 lb 9.6 oz)       Telemetry: Normal sinus rhythm with rates in the 60s and 70      Physical Exam:    Constitutional:       Appearance: Healthy appearance. Not in distress.   HENT:      Head:      Comments: Bilateral temporal incisions.  Clean dry intact at this time  Neck:      Vascular: JVD normal.   Pulmonary:      Effort: Pulmonary effort is normal.      Breath sounds: Normal breath sounds. No wheezing. No rhonchi. No rales.   Cardiovascular:      PMI at left midclavicular line. Normal rate. Regular rhythm. Normal S1. Normal S2.      Murmurs: There is no murmur.      No gallop. No click. No rub.   Edema:     Peripheral edema absent.   Musculoskeletal: Normal range of motion.      Cervical back: Normal range of motion. Skin:     General: Skin is warm and dry.   Neurological:      Mental Status: Alert and oriented to person, place and time.       Results Review:   Lab Results (last 72 hours)     Procedure Component Value Units Date/Time    High Sensitivity Troponin T [[id number]]  (Abnormal) Collected: 04/08/23 0602    Specimen: Blood Updated: 04/08/23 0635     HS Troponin T 101 ng/L     Narrative:      High Sensitive Troponin T Reference Range:  <10.0 ng/L- Negative Female for AMI  <15.0 ng/L- Negative Male for AMI  >=10 - Abnormal Female indicating possible myocardial injury.  >=15 - Abnormal Male indicating possible myocardial injury.   Clinicians would have to utilize clinical acumen, EKG, Troponin, and serial changes to determine if it is an Acute Myocardial Infarction or myocardial injury due to an underlying chronic condition.         High Sensitivity Troponin T [[id number]]  (Abnormal) Collected: 04/08/23 0133    Specimen: Blood Updated: 04/08/23 0204     HS Troponin T 94 ng/L     Narrative:      High Sensitive Troponin T Reference Range:  <10.0 ng/L- Negative Female for AMI  <15.0 ng/L- Negative Male for AMI  >=10 - Abnormal Female indicating possible myocardial injury.  >=15 - Abnormal  Male indicating possible myocardial injury.   Clinicians would have to utilize clinical acumen, EKG, Troponin, and serial changes to determine if it is an Acute Myocardial Infarction or myocardial injury due to an underlying chronic condition.         Basic Metabolic Panel [[id number]]  (Abnormal) Collected: 04/08/23 0133    Specimen: Blood Updated: 04/08/23 0202     Glucose 168 mg/dL      BUN 15 mg/dL      Creatinine 0.66 mg/dL      Sodium 137 mmol/L      Potassium 4.0 mmol/L      Chloride 101 mmol/L      CO2 27.0 mmol/L      Calcium 9.3 mg/dL      BUN/Creatinine Ratio 22.7     Anion Gap 9.0 mmol/L      eGFR 88.0 mL/min/1.73     Narrative:      GFR Normal >60  Chronic Kidney Disease <60  Kidney Failure <15    The GFR formula is only valid for adults with stable renal function between ages 18 and 70.    CBC & Differential [[id number]]  (Abnormal) Collected: 04/08/23 0133    Specimen: Blood Updated: 04/08/23 0143    Narrative:      The following orders were created for panel order CBC & Differential.  Procedure                               Abnormality         Status                     ---------                               -----------         ------                     CBC Auto Differential[[id number]]        Abnormal            Final result                 Please view results for these tests on the individual orders.    CBC Auto Differential [[id number]]  (Abnormal) Collected: 04/08/23 0133    Specimen: Blood Updated: 04/08/23 0143     WBC 4.90 10*3/mm3      RBC 4.01 10*6/mm3      Hemoglobin 11.8 g/dL      Hematocrit 38.2 %      MCV 95.3 fL      MCH 29.4 pg      MCHC 30.9 g/dL      RDW 13.8 %      RDW-SD 48.1 fl      MPV 10.0 fL      Platelets 155 10*3/mm3      Neutrophil % 69.4 %      Lymphocyte % 18.2 %      Monocyte % 12.2 %      Eosinophil % 0.0 %      Basophil % 0.0 %      Immature Grans % 0.2 %      Neutrophils, Absolute 3.40 10*3/mm3      Lymphocytes, Absolute 0.89 10*3/mm3      Monocytes, Absolute 0.60  10*3/mm3      Eosinophils, Absolute 0.00 10*3/mm3      Basophils, Absolute 0.00 10*3/mm3      Immature Grans, Absolute 0.01 10*3/mm3      nRBC 0.0 /100 WBC     High Sensitivity Troponin T [[id number]]  (Abnormal) Collected: 04/07/23 2057    Specimen: Blood Updated: 04/07/23 2126     HS Troponin T 72 ng/L     Narrative:      High Sensitive Troponin T Reference Range:  <10.0 ng/L- Negative Female for AMI  <15.0 ng/L- Negative Male for AMI  >=10 - Abnormal Female indicating possible myocardial injury.  >=15 - Abnormal Male indicating possible myocardial injury.   Clinicians would have to utilize clinical acumen, EKG, Troponin, and serial changes to determine if it is an Acute Myocardial Infarction or myocardial injury due to an underlying chronic condition.         Basic Metabolic Panel [[id number]]  (Abnormal) Collected: 04/07/23 1723    Specimen: Blood Updated: 04/07/23 1847     Glucose 153 mg/dL      BUN 14 mg/dL      Creatinine 0.52 mg/dL      Sodium 139 mmol/L      Potassium 4.1 mmol/L      Comment: Slight hemolysis detected by analyzer. Results may be affected.        Chloride 101 mmol/L      CO2 24.0 mmol/L      Calcium 9.9 mg/dL      BUN/Creatinine Ratio 26.9     Anion Gap 14.0 mmol/L      eGFR 94.6 mL/min/1.73     Narrative:      GFR Normal >60  Chronic Kidney Disease <60  Kidney Failure <15    The GFR formula is only valid for adults with stable renal function between ages 18 and 70.    CBC & Differential [[id number]]  (Abnormal) Collected: 04/07/23 1723    Specimen: Blood Updated: 04/07/23 1823    Narrative:      The following orders were created for panel order CBC & Differential.  Procedure                               Abnormality         Status                     ---------                               -----------         ------                     CBC Auto Differential[[id number]]        Abnormal            Final result                 Please view results for these tests on the individual orders.    CBC  Auto Differential [[id number]]  (Abnormal) Collected: 04/07/23 1723    Specimen: Blood Updated: 04/07/23 1823     WBC 5.79 10*3/mm3      RBC 4.54 10*6/mm3      Hemoglobin 13.5 g/dL      Hematocrit 43.1 %      MCV 94.9 fL      MCH 29.7 pg      MCHC 31.3 g/dL      RDW 13.6 %      RDW-SD 47.8 fl      MPV 10.5 fL      Platelets 173 10*3/mm3      Neutrophil % 88.6 %      Lymphocyte % 9.3 %      Monocyte % 1.6 %      Eosinophil % 0.0 %      Basophil % 0.2 %      Immature Grans % 0.3 %      Neutrophils, Absolute 5.13 10*3/mm3      Lymphocytes, Absolute 0.54 10*3/mm3      Monocytes, Absolute 0.09 10*3/mm3      Eosinophils, Absolute 0.00 10*3/mm3      Basophils, Absolute 0.01 10*3/mm3      Immature Grans, Absolute 0.02 10*3/mm3      nRBC 0.0 /100 WBC     High Sensitivity Troponin T 2Hr [[id number]]  (Abnormal) Collected: 04/07/23 1507    Specimen: Blood from Arm, Right Updated: 04/07/23 1539     HS Troponin T 40 ng/L      Troponin T Delta 18 ng/L     Narrative:      High Sensitive Troponin T Reference Range:  <10.0 ng/L- Negative Female for AMI  <15.0 ng/L- Negative Male for AMI  >=10 - Abnormal Female indicating possible myocardial injury.  >=15 - Abnormal Male indicating possible myocardial injury.   Clinicians would have to utilize clinical acumen, EKG, Troponin, and serial changes to determine if it is an Acute Myocardial Infarction or myocardial injury due to an underlying chronic condition.         High Sensitivity Troponin T [[id number]]  (Abnormal) Collected: 04/07/23 1251    Specimen: Blood Updated: 04/07/23 1323     HS Troponin T 22 ng/L     Narrative:      High Sensitive Troponin T Reference Range:  <10.0 ng/L- Negative Female for AMI  <15.0 ng/L- Negative Male for AMI  >=10 - Abnormal Female indicating possible myocardial injury.  >=15 - Abnormal Male indicating possible myocardial injury.   Clinicians would have to utilize clinical acumen, EKG, Troponin, and serial changes to determine if it is an Acute Myocardial  Infarction or myocardial injury due to an underlying chronic condition.         Tissue Pathology Exam [[id number]] Collected: 04/07/23 1038    Specimen: Tissue from Face, Tissue from Face Updated: 04/07/23 1216     Case Report --     Surgical Pathology Report                         Case: [id number]                                  Authorizing Provider:  Brijesh Nickerson MD    Collected:           04/07/2023 10:38 AM          Ordering Location:     [facility] OR  Received:            04/07/2023 11:19 AM          Pathologist:           Anabella Armenta MD                                                        Intraop:               Anabella Armenta MD                                                        Specimens:   1) - Face, right temple - superior suture short, right lateral suture long                          2) - Face, left temple  - superior suture short, posterior lateral suture long              Intraoperative Consultation --       Specimen skin, right temple lesion, excision  FROZEN SECTION DIAGNOSIS: Favor metatypical basal cell carcinoma.  No malignancy seen at inked surgical margins.  Reported to Dr. Brijesh Nickerson on 4/8/2023 at 10:18 CDT by LARRY Arshad.    Specimen skin, left temple lesion, excision  FROZEN SECTION DIAGNOSIS: Seborrheic keratosis.  Reported to Dr. Brijesh Nickerson on 4/8/2023 at 10:18 CDT by LARRY Arshad.                Echo EF Estimated  Lab Results   Component Value Date    ECHOEFEST 50.0 12/20/2022         Cath Ejection Fraction Quantitative  No results found for: CATHEF        Medication Review: yes  Current Facility-Administered Medications   Medication Dose Route Frequency Provider Last Rate Last Admin   • acetaminophen (TYLENOL) tablet 500 mg  500 mg Oral Q6H PRN Travis Staley APRN   500 mg at 04/08/23 0330   • amLODIPine (NORVASC) tablet 10 mg  10 mg Oral Daily Travis Staley APRN   10 mg at 04/08/23 0937   • atorvastatin (LIPITOR) tablet  20 mg  20 mg Oral Daily Travis Staley APRN   20 mg at 04/08/23 0937   • benzonatate (TESSALON) capsule 100 mg  100 mg Oral TID PRN Travis Staley APRN       • clopidogrel (PLAVIX) tablet 75 mg  75 mg Oral Daily Travis Staley APRN   75 mg at 04/08/23 0936   • Enoxaparin Sodium (LOVENOX) syringe 40 mg  40 mg Subcutaneous Q24H Pratik Osorio MD       • HYDROcodone-acetaminophen (NORCO) 5-325 MG per tablet 1 tablet  1 tablet Oral Once PRN Brijesh Nickerson MD       • lactated ringers infusion 1,000 mL  1,000 mL Intravenous Continuous Brijesh Nickerson MD 25 mL/hr at 04/07/23 1045 Restarted at 04/07/23 1053   • losartan (COZAAR) tablet 100 mg  100 mg Oral Q24H Travis Staley APRN   100 mg at 04/08/23 0936   • magnesium oxide (MAG-OX) tablet 400 mg  400 mg Oral Daily Travis Staley APRN   400 mg at 04/08/23 0936   • metoprolol tartrate (LOPRESSOR) tablet 12.5 mg  12.5 mg Oral Q12H Pratik Osorio MD   12.5 mg at 04/08/23 0936   • mupirocin (BACTROBAN) 2 % ointment   Topical Q8H Brijesh Nickerson MD   Given at 04/07/23 2215   • nitroglycerin (NITROSTAT) SL tablet 0.4 mg  0.4 mg Sublingual Q5 Min PRN Travis Staley APRN   0.4 mg at 04/07/23 2324   • ondansetron (ZOFRAN) injection 4 mg  4 mg Intravenous Q6H PRN Pratik Osorio MD       • pantoprazole (PROTONIX) EC tablet 40 mg  40 mg Oral Q AM Travis Staley APRN   40 mg at 04/08/23 0544   • sodium chloride 0.9 % flush 10 mL  10 mL Intravenous Q12H Travis Staley APRN   10 mL at 04/07/23 2214   • sodium chloride 0.9 % flush 10 mL  10 mL Intravenous PRN Travis Staley APRN       • sodium chloride 0.9 % infusion 40 mL  40 mL Intravenous PRN Travis Staley APRN       • vitamin B-12 (CYANOCOBALAMIN) tablet 1,000 mcg  1,000 mcg Oral Daily Travis Staley APRN   1,000 mcg at 04/08/23 0937         Assessment & Plan       Chest pain: Patient has ongoing intermittent burning chest pain in center of his chest.  Troponin continues to  rise.  -N.p.o. for left heart catheterization  -Patient apprised of plan and is in agreement.    Hypertension: Slightly hypertensive today  -Continue amlodipine and losartan  -Pending left heart catheterization will consider initiation of beta-blocker    GERD:  -Protonix 40 mg daily      Excision of left and right temple: ENT following.  Patient had issues with bleeding overnight.  Wrap was placed and bleeding seems to stop this morning.      Electronically signed by LARRY Arshad, 04/08/23, 10:16 AM CDT.

## 2023-04-08 NOTE — PROGRESS NOTES
Discussed with Dr. Osorio who is on-call  No significant bleeding from scalp  Earlier had some scalp bleeding now this is resolved  Patient wants cardiac catheterization  This will be a high risk procedure  Loading dose of Plavix a total of 600 mg  Aspirin 324 mg chewable  Plans for cardiac catheterization with aim for percutaneous coronary intervention

## 2023-04-08 NOTE — PROGRESS NOTES
Recommend cardiac catheterization, selective coronary angiography, left ventriculography and percutaneous coronary intervention with application of arteriotomy hemostatic closure device.    I discussed cardiac catheterization, the procedure, risks (including bleeding, infection, vascular damage [including minor oozing, bruising, bleeding, and up to and including but not limited to the need for vascular surgery, emergency cardiothoracic surgery, contrast reaction, renal failure, respiratory failure, heart attack, stroke, arrhythmia and even death), benefits, and alternatives and the patient has voiced understanding and is willing to proceed.    Adequate pre-hydration and post cardiac catheterization hydration.  Premedications as required and indicated for cardiac catheterization.    No contraindication to drug eluting stent placement if required  Further recommendations pending results of cardiac catheterization

## 2023-04-08 NOTE — PLAN OF CARE
Problem: Adult Inpatient Plan of Care  Goal: Plan of Care Review  Outcome: Ongoing, Progressing  Flowsheets (Taken 4/8/2023 0519)  Progress: no change  Plan of Care Reviewed With: patient  Outcome Evaluation: At the beginning of the shift, around 2115, pt called out due to bleeding from his incision site on the R side. This RN & nursing student immediately went to bedside. Pressure was applied to R side & pt was cleaned up. After 5 minutes, pressure stopped. The site had stopped bleeding. The R side started bleeding again shortly after. Pressure was applied again for 5 minutes. Charge nurse notified & at bedside. Surgicell applied. Dr. Osorio notified of elevated troponin & bleeding from incision site. Dr. Osorio was okay with holding PM lovenox dose. Kassi Christy with ENT notified of bleeding. Dressing applied (abd pad, wrapped tightly with coban) as ordered. Pt c/o SOB & chest tightness/pressure around 2310. Pt rated his chest tightness a 1/10. PRN Nitro given as ordered with good relief. VSS. EKG ordered & completed, see results in chart. Troponin elevated at 94, MD notified no new orders except to notify MD if patient's chest pain is worse. Pt currently chest pain free. Pt desats with exertion.  in place. Pt was put on 2L of O2 due to SOB but sats remained 90's. Pt currently on room air.  Sinus HR: 67-83 on tele. Pt has been NPO for possible tests today. IV fluids infusing. Bed alarm on. Up x1. Safety maintained. Will continue to monitor throughout shift & notify MD with any changes.

## 2023-04-08 NOTE — NURSING NOTE
Anticipate plan for heart cath according to Cardiology. NPO since midnight, medications still given.   negative

## 2023-04-08 NOTE — PLAN OF CARE
Goal Outcome Evaluation:  Plan of Care Reviewed With: patient        Progress: improving  Outcome Evaluation: Pt received from cath lab s/p stent placement to LAD, VSS and WNL for pt . Pt OOB for meal with standby assist. Pt safety maintained throughout shift

## 2023-04-09 PROBLEM — R58 BLEEDING: Status: ACTIVE | Noted: 2023-04-09

## 2023-04-09 LAB
ANION GAP SERPL CALCULATED.3IONS-SCNC: 7 MMOL/L (ref 5–15)
BUN SERPL-MCNC: 15 MG/DL (ref 8–23)
BUN/CREAT SERPL: 20.5 (ref 7–25)
CALCIUM SPEC-SCNC: 8.8 MG/DL (ref 8.2–9.6)
CHLORIDE SERPL-SCNC: 105 MMOL/L (ref 98–107)
CHOLEST SERPL-MCNC: 101 MG/DL (ref 0–200)
CO2 SERPL-SCNC: 28 MMOL/L (ref 22–29)
CREAT SERPL-MCNC: 0.73 MG/DL (ref 0.76–1.27)
DEPRECATED RDW RBC AUTO: 51.3 FL (ref 37–54)
EGFRCR SERPLBLD CKD-EPI 2021: 85.4 ML/MIN/1.73
ERYTHROCYTE [DISTWIDTH] IN BLOOD BY AUTOMATED COUNT: 14.2 % (ref 12.3–15.4)
GLUCOSE SERPL-MCNC: 104 MG/DL (ref 65–99)
HBA1C MFR BLD: 5.6 % (ref 4.8–5.6)
HCT VFR BLD AUTO: 36.1 % (ref 37.5–51)
HDLC SERPL-MCNC: 45 MG/DL (ref 40–60)
HGB BLD-MCNC: 10.9 G/DL (ref 13–17.7)
LDLC SERPL CALC-MCNC: 40 MG/DL (ref 0–100)
LDLC/HDLC SERPL: 0.91 {RATIO}
MCH RBC QN AUTO: 29.7 PG (ref 26.6–33)
MCHC RBC AUTO-ENTMCNC: 30.2 G/DL (ref 31.5–35.7)
MCV RBC AUTO: 98.4 FL (ref 79–97)
PLATELET # BLD AUTO: 139 10*3/MM3 (ref 140–450)
PMV BLD AUTO: 10.5 FL (ref 6–12)
POTASSIUM SERPL-SCNC: 3.8 MMOL/L (ref 3.5–5.2)
QT INTERVAL: 462 MS
QT INTERVAL: 468 MS
QT INTERVAL: 472 MS
QT INTERVAL: 476 MS
QT INTERVAL: 496 MS
QTC INTERVAL: 505 MS
QTC INTERVAL: 505 MS
QTC INTERVAL: 507 MS
QTC INTERVAL: 508 MS
QTC INTERVAL: 514 MS
RBC # BLD AUTO: 3.67 10*6/MM3 (ref 4.14–5.8)
SODIUM SERPL-SCNC: 140 MMOL/L (ref 136–145)
TRIGL SERPL-MCNC: 76 MG/DL (ref 0–150)
VLDLC SERPL-MCNC: 16 MG/DL (ref 5–40)
WBC NRBC COR # BLD: 7.11 10*3/MM3 (ref 3.4–10.8)

## 2023-04-09 PROCEDURE — 93010 ELECTROCARDIOGRAM REPORT: CPT | Performed by: EMERGENCY MEDICINE

## 2023-04-09 PROCEDURE — 80048 BASIC METABOLIC PNL TOTAL CA: CPT | Performed by: INTERNAL MEDICINE

## 2023-04-09 PROCEDURE — 25010000002 ENOXAPARIN PER 10 MG: Performed by: INTERNAL MEDICINE

## 2023-04-09 PROCEDURE — 99232 SBSQ HOSP IP/OBS MODERATE 35: CPT

## 2023-04-09 PROCEDURE — 93005 ELECTROCARDIOGRAM TRACING: CPT | Performed by: INTERNAL MEDICINE

## 2023-04-09 PROCEDURE — 99024 POSTOP FOLLOW-UP VISIT: CPT | Performed by: OTOLARYNGOLOGY

## 2023-04-09 PROCEDURE — 83036 HEMOGLOBIN GLYCOSYLATED A1C: CPT | Performed by: INTERNAL MEDICINE

## 2023-04-09 PROCEDURE — 85027 COMPLETE CBC AUTOMATED: CPT | Performed by: INTERNAL MEDICINE

## 2023-04-09 PROCEDURE — 80061 LIPID PANEL: CPT | Performed by: INTERNAL MEDICINE

## 2023-04-09 RX ADMIN — MUPIROCIN: 20 OINTMENT TOPICAL at 21:00

## 2023-04-09 RX ADMIN — Medication 10 ML: at 20:58

## 2023-04-09 RX ADMIN — CHLORHEXIDINE GLUCONATE 1 APPLICATION: 500 CLOTH TOPICAL at 05:10

## 2023-04-09 RX ADMIN — Medication 10 ML: at 09:00

## 2023-04-09 RX ADMIN — ENOXAPARIN SODIUM 40 MG: 100 INJECTION SUBCUTANEOUS at 17:25

## 2023-04-09 RX ADMIN — MUPIROCIN: 20 OINTMENT TOPICAL at 15:27

## 2023-04-09 RX ADMIN — METOPROLOL TARTRATE 12.5 MG: 25 TABLET, FILM COATED ORAL at 08:57

## 2023-04-09 RX ADMIN — PANTOPRAZOLE SODIUM 40 MG: 40 TABLET, DELAYED RELEASE ORAL at 05:10

## 2023-04-09 RX ADMIN — LOSARTAN POTASSIUM 100 MG: 50 TABLET, FILM COATED ORAL at 08:57

## 2023-04-09 RX ADMIN — CLOPIDOGREL BISULFATE 75 MG: 75 TABLET, FILM COATED ORAL at 08:57

## 2023-04-09 RX ADMIN — MAGNESIUM GLUCONATE 500 MG ORAL TABLET 400 MG: 500 TABLET ORAL at 08:57

## 2023-04-09 RX ADMIN — Medication 1000 MCG: at 08:57

## 2023-04-09 RX ADMIN — ATORVASTATIN CALCIUM 20 MG: 10 TABLET, FILM COATED ORAL at 08:57

## 2023-04-09 RX ADMIN — MINERAL OIL, PETROLATUM: 425; 568 OINTMENT OPHTHALMIC at 17:25

## 2023-04-09 RX ADMIN — METOPROLOL TARTRATE 12.5 MG: 25 TABLET, FILM COATED ORAL at 20:58

## 2023-04-09 RX ADMIN — Medication 1 APPLICATION: at 08:57

## 2023-04-09 RX ADMIN — AMLODIPINE BESYLATE 10 MG: 10 TABLET ORAL at 08:57

## 2023-04-09 RX ADMIN — MUPIROCIN: 20 OINTMENT TOPICAL at 05:31

## 2023-04-09 RX ADMIN — ASPIRIN 81 MG: 81 TABLET, COATED ORAL at 08:57

## 2023-04-09 NOTE — PROGRESS NOTES
[facility] -  Progress Note     LOS: 2 days   Patient Care Team:  Everardo Jackson MD as PCP - General (Family Medicine)  Justin Howard MD as Consulting Physician (Urology)  Emigdio Moseley MD as Cardiologist (Cardiology)  Everardo Noriega MD as Consulting Physician (Pulmonary Disease)  Brijesh Nickerson MD as Consulting Physician (Otolaryngology)  Kaitlin Del Angel PA as Referring Physician (Physician Assistant)    Chief Complaint: Follow-up chest    Subjective     Interval History:   Patient was taken to the cardiac catheterization lab yesterday by Dr. Hartley.  It was noted that he had patent stents in his obtuse marginal as well as mid and mid to distal left circumflex.  There is noted to be 90% stenosis in the proximal LAD.  There is 90% stenosis in the mid LAD.  Attempts were made to advance coronary stents but this could not be performed.  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  Placement of a 2.5 x 16 mm Synergy drug-eluting stent was made.        Review of Systems:     Review of Systems   All other systems reviewed and are negative.    Objective     Vital Sign Min/Max for last 24 hours  Temp  Min: 97.4 °F (36.3 °C)  Max: 97.7 °F (36.5 °C)   BP  Min: 103/82  Max: 156/57   Pulse  Min: 55  Max: 87   Resp  Min: 15  Max: 18   SpO2  Min: 85 %  Max: 99 %   No data recorded   Weight  Min: 60.8 kg (134 lb 1.6 oz)  Max: 60.8 kg (134 lb 1.6 oz)         04/09/23  0600   Weight: 60.8 kg (134 lb 1.6 oz)       Telemetry: Normal sinus rhythm with left bundle branch with rates in the 60s and 70s.      Physical Exam:    Constitutional:       Appearance: Healthy appearance. Not in distress.   Neck:      Vascular: JVD normal.   Pulmonary:      Effort: Pulmonary effort is normal.      Breath sounds: Normal breath sounds. No wheezing. No rhonchi. No rales.   Cardiovascular:      PMI at left midclavicular line. Normal rate. Regular rhythm. Normal  S1. Normal S2.      Murmurs: There is no murmur.      No gallop. No click. No rub.      Comments: Slightly oozing noting at the right femoral groin site.  Edema:     Peripheral edema absent.   Musculoskeletal: Normal range of motion.      Cervical back: Normal range of motion. Skin:     General: Skin is warm and dry.   Neurological:      Mental Status: Alert and oriented to person, place and time.       Results Review:   Lab Results (last 72 hours)     Procedure Component Value Units Date/Time    Basic Metabolic Panel [[id number]]  (Abnormal) Collected: 04/09/23 0302    Specimen: Blood Updated: 04/09/23 0407     Glucose 104 mg/dL      BUN 15 mg/dL      Creatinine 0.73 mg/dL      Sodium 140 mmol/L      Potassium 3.8 mmol/L      Chloride 105 mmol/L      CO2 28.0 mmol/L      Calcium 8.8 mg/dL      BUN/Creatinine Ratio 20.5     Anion Gap 7.0 mmol/L      eGFR 85.4 mL/min/1.73     Narrative:      GFR Normal >60  Chronic Kidney Disease <60  Kidney Failure <15    The GFR formula is only valid for adults with stable renal function between ages 18 and 70.    Lipid Panel [[id number]] Collected: 04/09/23 0302    Specimen: Blood Updated: 04/09/23 0407     Total Cholesterol 101 mg/dL      Triglycerides 76 mg/dL      HDL Cholesterol 45 mg/dL      LDL Cholesterol  40 mg/dL      VLDL Cholesterol 16 mg/dL      LDL/HDL Ratio 0.91    Narrative:      Cholesterol Reference Ranges  (U.S. Department of Health and Human Services ATP III Classifications)    Desirable          <200 mg/dL  Borderline High    200-239 mg/dL  High Risk          >240 mg/dL      Triglyceride Reference Ranges  (U.S. Department of Health and Human Services ATP III Classifications)    Normal           <150 mg/dL  Borderline High  150-199 mg/dL  High             200-499 mg/dL  Very High        >500 mg/dL    HDL Reference Ranges  (U.S. Department of Health and Human Services ATP III Classifications)    Low     <40 mg/dl (major risk factor for CHD)  High    >60 mg/dl  ('negative' risk factor for CHD)        LDL Reference Ranges  (U.S. Department of Health and Human Services ATP III Classifications)    Optimal          <100 mg/dL  Near Optimal     100-129 mg/dL  Borderline High  130-159 mg/dL  High             160-189 mg/dL  Very High        >189 mg/dL    Hemoglobin A1c [[id number]]  (Normal) Collected: 04/09/23 0302    Specimen: Blood Updated: 04/09/23 0407     Hemoglobin A1C 5.60 %     Narrative:      Hemoglobin A1C Ranges:    Increased Risk for Diabetes  5.7% to 6.4%  Diabetes                     >= 6.5%  Diabetic Goal                < 7.0%    CBC (No Diff) [[id number]]  (Abnormal) Collected: 04/09/23 0302    Specimen: Blood Updated: 04/09/23 0345     WBC 7.11 10*3/mm3      RBC 3.67 10*6/mm3      Hemoglobin 10.9 g/dL      Hematocrit 36.1 %      MCV 98.4 fL      MCH 29.7 pg      MCHC 30.2 g/dL      RDW 14.2 %      RDW-SD 51.3 fl      MPV 10.5 fL      Platelets 139 10*3/mm3     High Sensitivity Troponin T [[id number]]  (Abnormal) Collected: 04/08/23 0602    Specimen: Blood Updated: 04/08/23 0635     HS Troponin T 101 ng/L     Narrative:      High Sensitive Troponin T Reference Range:  <10.0 ng/L- Negative Female for AMI  <15.0 ng/L- Negative Male for AMI  >=10 - Abnormal Female indicating possible myocardial injury.  >=15 - Abnormal Male indicating possible myocardial injury.   Clinicians would have to utilize clinical acumen, EKG, Troponin, and serial changes to determine if it is an Acute Myocardial Infarction or myocardial injury due to an underlying chronic condition.         High Sensitivity Troponin T [[id number]]  (Abnormal) Collected: 04/08/23 0133    Specimen: Blood Updated: 04/08/23 0204     HS Troponin T 94 ng/L     Narrative:      High Sensitive Troponin T Reference Range:  <10.0 ng/L- Negative Female for AMI  <15.0 ng/L- Negative Male for AMI  >=10 - Abnormal Female indicating possible myocardial injury.  >=15 - Abnormal Male indicating possible myocardial injury.    Clinicians would have to utilize clinical acumen, EKG, Troponin, and serial changes to determine if it is an Acute Myocardial Infarction or myocardial injury due to an underlying chronic condition.         Basic Metabolic Panel [[id number]]  (Abnormal) Collected: 04/08/23 0133    Specimen: Blood Updated: 04/08/23 0202     Glucose 168 mg/dL      BUN 15 mg/dL      Creatinine 0.66 mg/dL      Sodium 137 mmol/L      Potassium 4.0 mmol/L      Chloride 101 mmol/L      CO2 27.0 mmol/L      Calcium 9.3 mg/dL      BUN/Creatinine Ratio 22.7     Anion Gap 9.0 mmol/L      eGFR 88.0 mL/min/1.73     Narrative:      GFR Normal >60  Chronic Kidney Disease <60  Kidney Failure <15    The GFR formula is only valid for adults with stable renal function between ages 18 and 70.    CBC & Differential [[id number]]  (Abnormal) Collected: 04/08/23 0133    Specimen: Blood Updated: 04/08/23 0143    Narrative:      The following orders were created for panel order CBC & Differential.  Procedure                               Abnormality         Status                     ---------                               -----------         ------                     CBC Auto Differential[[id number]]        Abnormal            Final result                 Please view results for these tests on the individual orders.    CBC Auto Differential [[id number]]  (Abnormal) Collected: 04/08/23 0133    Specimen: Blood Updated: 04/08/23 0143     WBC 4.90 10*3/mm3      RBC 4.01 10*6/mm3      Hemoglobin 11.8 g/dL      Hematocrit 38.2 %      MCV 95.3 fL      MCH 29.4 pg      MCHC 30.9 g/dL      RDW 13.8 %      RDW-SD 48.1 fl      MPV 10.0 fL      Platelets 155 10*3/mm3      Neutrophil % 69.4 %      Lymphocyte % 18.2 %      Monocyte % 12.2 %      Eosinophil % 0.0 %      Basophil % 0.0 %      Immature Grans % 0.2 %      Neutrophils, Absolute 3.40 10*3/mm3      Lymphocytes, Absolute 0.89 10*3/mm3      Monocytes, Absolute 0.60 10*3/mm3      Eosinophils, Absolute 0.00 10*3/mm3       Basophils, Absolute 0.00 10*3/mm3      Immature Grans, Absolute 0.01 10*3/mm3      nRBC 0.0 /100 WBC     High Sensitivity Troponin T [[id number]]  (Abnormal) Collected: 04/07/23 2057    Specimen: Blood Updated: 04/07/23 2126     HS Troponin T 72 ng/L     Narrative:      High Sensitive Troponin T Reference Range:  <10.0 ng/L- Negative Female for AMI  <15.0 ng/L- Negative Male for AMI  >=10 - Abnormal Female indicating possible myocardial injury.  >=15 - Abnormal Male indicating possible myocardial injury.   Clinicians would have to utilize clinical acumen, EKG, Troponin, and serial changes to determine if it is an Acute Myocardial Infarction or myocardial injury due to an underlying chronic condition.         Basic Metabolic Panel [[id number]]  (Abnormal) Collected: 04/07/23 1723    Specimen: Blood Updated: 04/07/23 1847     Glucose 153 mg/dL      BUN 14 mg/dL      Creatinine 0.52 mg/dL      Sodium 139 mmol/L      Potassium 4.1 mmol/L      Comment: Slight hemolysis detected by analyzer. Results may be affected.        Chloride 101 mmol/L      CO2 24.0 mmol/L      Calcium 9.9 mg/dL      BUN/Creatinine Ratio 26.9     Anion Gap 14.0 mmol/L      eGFR 94.6 mL/min/1.73     Narrative:      GFR Normal >60  Chronic Kidney Disease <60  Kidney Failure <15    The GFR formula is only valid for adults with stable renal function between ages 18 and 70.    CBC & Differential [[id number]]  (Abnormal) Collected: 04/07/23 1723    Specimen: Blood Updated: 04/07/23 1823    Narrative:      The following orders were created for panel order CBC & Differential.  Procedure                               Abnormality         Status                     ---------                               -----------         ------                     CBC Auto Differential[[id number]]        Abnormal            Final result                 Please view results for these tests on the individual orders.    CBC Auto Differential [[id number]]  (Abnormal) Collected:  04/07/23 1723    Specimen: Blood Updated: 04/07/23 1823     WBC 5.79 10*3/mm3      RBC 4.54 10*6/mm3      Hemoglobin 13.5 g/dL      Hematocrit 43.1 %      MCV 94.9 fL      MCH 29.7 pg      MCHC 31.3 g/dL      RDW 13.6 %      RDW-SD 47.8 fl      MPV 10.5 fL      Platelets 173 10*3/mm3      Neutrophil % 88.6 %      Lymphocyte % 9.3 %      Monocyte % 1.6 %      Eosinophil % 0.0 %      Basophil % 0.2 %      Immature Grans % 0.3 %      Neutrophils, Absolute 5.13 10*3/mm3      Lymphocytes, Absolute 0.54 10*3/mm3      Monocytes, Absolute 0.09 10*3/mm3      Eosinophils, Absolute 0.00 10*3/mm3      Basophils, Absolute 0.01 10*3/mm3      Immature Grans, Absolute 0.02 10*3/mm3      nRBC 0.0 /100 WBC     High Sensitivity Troponin T 2Hr [[id number]]  (Abnormal) Collected: 04/07/23 1507    Specimen: Blood from Arm, Right Updated: 04/07/23 1539     HS Troponin T 40 ng/L      Troponin T Delta 18 ng/L     Narrative:      High Sensitive Troponin T Reference Range:  <10.0 ng/L- Negative Female for AMI  <15.0 ng/L- Negative Male for AMI  >=10 - Abnormal Female indicating possible myocardial injury.  >=15 - Abnormal Male indicating possible myocardial injury.   Clinicians would have to utilize clinical acumen, EKG, Troponin, and serial changes to determine if it is an Acute Myocardial Infarction or myocardial injury due to an underlying chronic condition.         High Sensitivity Troponin T [[id number]]  (Abnormal) Collected: 04/07/23 1251    Specimen: Blood Updated: 04/07/23 1323     HS Troponin T 22 ng/L     Narrative:      High Sensitive Troponin T Reference Range:  <10.0 ng/L- Negative Female for AMI  <15.0 ng/L- Negative Male for AMI  >=10 - Abnormal Female indicating possible myocardial injury.  >=15 - Abnormal Male indicating possible myocardial injury.   Clinicians would have to utilize clinical acumen, EKG, Troponin, and serial changes to determine if it is an Acute Myocardial Infarction or myocardial injury due to an underlying  chronic condition.         Tissue Pathology Exam [[id number]] Collected: 04/07/23 1038    Specimen: Tissue from Face, Tissue from Face Updated: 04/07/23 1216     Case Report --     Surgical Pathology Report                         Case: [id number]                                  Authorizing Provider:  Brijesh Nickerson MD    Collected:           04/07/2023 10:38 AM          Ordering Location:     [facility] OR  Received:            04/07/2023 11:19 AM          Pathologist:           Anabella Armenta MD                                                        Intraop:               Anabella Armenta MD                                                        Specimens:   1) - Face, right temple - superior suture short, right lateral suture long                          2) - Face, left temple  - superior suture short, posterior lateral suture long              Intraoperative Consultation --       Specimen skin, right temple lesion, excision  FROZEN SECTION DIAGNOSIS: Favor metatypical basal cell carcinoma.  No malignancy seen at inked surgical margins.  Reported to Dr. Brijesh Nickerson on 4/7/2023 at 11:47 CDT by Anabella Armenta MD.    Specimen skin, left temple lesion, excision  FROZEN SECTION DIAGNOSIS: Seborrheic keratosis.  Reported to Dr. Brijesh Nickerson on 4/7/2023 at 12:15 CDT by Anabella Armenta MD.                Results for orders placed during the hospital encounter of 12/20/22    Adult Transthoracic Echo Complete w/ Color, Spectral and Contrast if necessary per protocol    Interpretation Summary  •  Left ventricular systolic function is normal. Estimated left ventricular EF = 50%  •  Left ventricular diastolic function is consistent with (grade I) impaired relaxation and age.  •  The right atrial cavity is borderline dilated.        Medication Review: yes  Current Facility-Administered Medications   Medication Dose Route Frequency Provider Last Rate Last Admin   • acetaminophen (TYLENOL) tablet  500 mg  500 mg Oral Q6H PRN Sukhi Hartley MD   500 mg at 04/08/23 0330   • acetaminophen (TYLENOL) tablet 650 mg  650 mg Oral Q4H PRN Sukhi Hartley MD       • ALPRAZolam (XANAX) tablet 0.5 mg  0.5 mg Oral TID PRN Sukhi Hartley MD       • amLODIPine (NORVASC) tablet 10 mg  10 mg Oral Daily Sukhi Hartley MD   10 mg at 04/08/23 0937   • aspirin EC tablet 81 mg  81 mg Oral Daily Sukhi Hartley MD       • atorvastatin (LIPITOR) tablet 20 mg  20 mg Oral Daily Sukhi Hartley MD   20 mg at 04/08/23 0937   • benzonatate (TESSALON) capsule 100 mg  100 mg Oral TID PRN Sukhi Hartley MD       • calcium carbonate (TUMS) chewable tablet 500 mg (200 mg elemental)  2 tablet Oral BID PRN Sukhi Hartley MD       • Chlorhexidine Gluconate Cloth 2 % pads 1 application  1 application Topical Q24H Pratik Osorio MD   1 application at 04/09/23 0510   • clopidogrel (PLAVIX) tablet 225 mg  225 mg Oral Once Sukhi Hartley MD       • clopidogrel (PLAVIX) tablet 75 mg  75 mg Oral Daily Sukhi Hartley MD       • diphenhydrAMINE (BENADRYL) capsule 25 mg  25 mg Oral Q6H PRN Sukhi Hartley MD       • Enoxaparin Sodium (LOVENOX) syringe 40 mg  40 mg Subcutaneous Q24H Sukhi Hartley MD   40 mg at 04/08/23 1749   • HYDROcodone-acetaminophen (NORCO) 5-325 MG per tablet 1 tablet  1 tablet Oral Once PRN Sukhi Hartley MD       • losartan (COZAAR) tablet 100 mg  100 mg Oral Q24H Sukhi Hartley MD   100 mg at 04/08/23 0936   • magnesium oxide (MAG-OX) tablet 400 mg  400 mg Oral Daily Sukhi Hartley MD   400 mg at 04/08/23 0936   • metoprolol tartrate (LOPRESSOR) tablet 12.5 mg  12.5 mg Oral Q12H Sukhi Hartley MD   12.5 mg at 04/08/23 2214   • mupirocin (BACTROBAN) 2 % nasal ointment 1 application  1 application Each Nare BID Pratik Osorio MD       • mupirocin (BACTROBAN) 2 % ointment   Topical Q8H Sukhi Hartley MD   Given at 04/09/23 0535   • nitroglycerin (NITROSTAT) SL tablet 0.4 mg  0.4 mg Sublingual Q5 Min PRN Sukhi Hartley MD   0.4 mg at 04/07/23 6545   •  ondansetron (ZOFRAN) tablet 4 mg  4 mg Oral Q6H PRN Sukhi Hartley MD        Or   • ondansetron (ZOFRAN) injection 4 mg  4 mg Intravenous Q6H PRN Sukhi Hartley MD       • pantoprazole (PROTONIX) EC tablet 40 mg  40 mg Oral Q AM Sukhi Hartley MD   40 mg at 04/09/23 0510   • sodium chloride 0.9 % flush 10 mL  10 mL Intravenous Q12H Sukhi Hartley MD   10 mL at 04/08/23 2235   • sodium chloride 0.9 % flush 10 mL  10 mL Intravenous PRN Sukhi Hartley MD       • sodium chloride 0.9 % infusion  100 mL/hr Intravenous Continuous Sukhi Hartley MD   Stopped at 04/08/23 1259   • sodium chloride 0.9 % infusion  75 mL/hr Intravenous Continuous Sukhi Hartley MD 75 mL/hr at 04/08/23 2031 75 mL/hr at 04/08/23 2031   • temazepam (RESTORIL) capsule 7.5 mg  7.5 mg Oral Nightly PRN Sukhi Hartley MD       • vitamin B-12 (CYANOCOBALAMIN) tablet 1,000 mcg  1,000 mcg Oral Daily Sukhi Hartley MD   1,000 mcg at 04/08/23 0937         Assessment & Plan       Non-STEMI: Chest pain-free.  Transfer to floor today.  Slightly oozing at groin site, will have this stopped prior to going to floor.  Wean oxygen as tolerated  -Continue aspirin and Plavix for 1 year  -Started Lopressor 12.5 mg twice daily and tolerating this well.  -Continue atorvastatin 20 mg at bedtime.    Hypertension: Slightly hypotensive this morning, however has not received morning medication  -Monitor overnight.  -Continue on amlodipine and losartan  -We will potentially increase metoprolol.    GERD:  -Protonix 40 mg      Electronically signed by LARRY Arshad, 04/09/23, 8:43 AM CDT.

## 2023-04-09 NOTE — PLAN OF CARE
Goal Outcome Evaluation:      Alert and oriented x4. Normal sinus rhythm throughout shift except for periods of behzad while sleeping. Pedal pulses +2, no bleeding at right femoral site. Titrated O2 from 4L to 1L, unable to tolerate room air. Uses urinal, UOP >1000 mL for shift. Standby assist. No c/o pain or discomfort this shift. BP WNL.

## 2023-04-09 NOTE — PROGRESS NOTES
[facility] Otolaryngology Head and Neck Surgery  INPATIENT PROGRESS NOTE    Patient Name: Wyatt Curry  : 1930   MRN: [id number]  Date of Admission: 2023  Today's Date: 23  Length of Stay: 2  [unfilled]    Pratik Osorio MD   Primary Care Physician: Everardo Jackson MD  Surgical Procedures Since Admission:  Procedure(s):  EXCISION LESION OF RIGHT TEMPLE AND LEFT TEMPLE WITH FROZEN SECTION WITH POSSIBLE FLAP OR GRAFT  Surgeon:  Brijesh Nickerson MD  Status:  2 Days Post-Op  -------------------    Procedure(s):  Cardiac Catheterization/Vascular Study  Surgeon:  Sukhi Hartley MD  Status:  1 Day Post-Op  -------------------    Subjective   Subjective   Chief Complaint: Bleeding from surgical wound  HPI   Accompanied by: No one  Since last examined, Wyatt Curry has remained stable overnight.  The patient is being evaluated for MI.  His bleeding from his surgical wounds has stopped.  He has had no bleeding over the last 24 hours.  He wishes to go home.  Patient is seen, chart is reviewed    Review of Systems   No change from prior inquiry  All pertinent negatives and positives are as above. All other systems have been reviewed and are negative unless otherwise stated.   Objective   Objective   Vitals:   Temp:  [97.4 °F (36.3 °C)-97.7 °F (36.5 °C)] 97.6 °F (36.4 °C)  Heart Rate:  [55-87] 64  Resp:  [15-18] 18  BP: (103-156)/() 154/64  Flow (L/min):  [1-4] 2    Physical Exam  Constitutional:       General: He is not in acute distress.     Appearance: Normal appearance. He is well-developed.      Comments: Sitting up in chair, very awake and alert   HENT:      Head: Normocephalic.        Comments: No active bleeding     Right Ear: Hearing and external ear normal.      Left Ear: Hearing and external ear normal.      Nose: Nose normal.      Mouth/Throat:      Lips: Pink.      Mouth: Mucous membranes are moist.      Tongue: No lesions. Tongue does not deviate from midline.    Eyes:      General: Lids are normal. Gaze aligned appropriately.      Extraocular Movements: Extraocular movements intact.      Conjunctiva/sclera: Conjunctivae normal.      Pupils: Pupils are equal, round, and reactive to light.   Neck:      Thyroid: No thyroid mass or thyromegaly.   Cardiovascular:      Rate and Rhythm: Normal rate.   Pulmonary:      Effort: No tachypnea, respiratory distress or retractions.      Breath sounds: No stridor.   Musculoskeletal:      Cervical back: No rigidity or crepitus. Decreased range of motion.   Lymphadenopathy:      Cervical: No cervical adenopathy.   Skin:     General: Skin is warm and dry.      Capillary Refill: Capillary refill takes less than 2 seconds.      Comments: Bilateral temporal surgical incisions.  Sutures intact.  There is dried blood around right excision. No active bleeding.   Neurological:      General: No focal deficit present.      Mental Status: He is alert and oriented to person, place, and time.      Cranial Nerves: Cranial nerves 2-12 are intact.   Psychiatric:         Attention and Perception: Attention and perception normal.         Mood and Affect: Mood and affect normal.         Behavior: Behavior is cooperative.         Cognition and Memory: Cognition normal.           Result Review    Result Review:  I have personally reviewed the results from the time of this admission to 4/9/2023 09:58 CDT and agree with these findings:  [x]  Laboratory  []  Microbiology  []  Radiology  []  EKG/Telemetry   []  Cardiology/Vascular   []  Pathology  [x]  Old records  []  Other:  Most notable findings include: Anemia  Progress Notes by Travis Staley APRN (04/08/2023 10:15)   Progress Notes by Travis Staley APRN (04/09/2023 08:42)   CBC (No Diff) (04/09/2023 03:02)   Basic Metabolic Panel (04/09/2023 03:02)     Assessment & Plan   Assessment / Plan   Brief Patient Summary:  Wyatt Curry is a 89 y.o. male who has remained stable overnight from an ENT  standpoint.  He has no further bleeding from his surgical wounds.  I will continue wound care to the areas.      Active Hospital Problems:   Active Hospital Problems    Diagnosis    • **Elevated troponin    • Bleeding    • Neoplasm of uncertain behavior    • Anticoagulated      Plan:   • Postoperative bleeding-postoperative bleeding has resolved.  I will continue wound care.  • Surgical wounds-healing appropriately at this time.  Continue wound care.  Discussed Plan with patient  Following patient as in-patient. Further recommendations will be made based on serial examinations.    Medications/Orders for this encounter: No new medications ordered.  Orders-  Wound care     Discharge Planning: Per primary team        DVT prophylaxis:  Medical and mechanical DVT prophylaxis orders are present.     Electronically signed by Jules Barclay Jr, MD, 04/09/23, 9:27 AM CDT.

## 2023-04-10 ENCOUNTER — READMISSION MANAGEMENT (OUTPATIENT)
Dept: CALL CENTER | Facility: HOSPITAL | Age: 88
End: 2023-04-10
Payer: MEDICARE

## 2023-04-10 VITALS
SYSTOLIC BLOOD PRESSURE: 158 MMHG | OXYGEN SATURATION: 93 % | HEART RATE: 63 BPM | BODY MASS INDEX: 23.76 KG/M2 | TEMPERATURE: 98 F | HEIGHT: 63 IN | WEIGHT: 134.1 LBS | DIASTOLIC BLOOD PRESSURE: 72 MMHG | RESPIRATION RATE: 16 BRPM

## 2023-04-10 DIAGNOSIS — Z95.5 S/P DRUG ELUTING CORONARY STENT PLACEMENT: Primary | ICD-10-CM

## 2023-04-10 LAB
ANION GAP SERPL CALCULATED.3IONS-SCNC: 8 MMOL/L (ref 5–15)
BASOPHILS # BLD AUTO: 0.02 10*3/MM3 (ref 0–0.2)
BASOPHILS NFR BLD AUTO: 0.3 % (ref 0–1.5)
BUN SERPL-MCNC: 17 MG/DL (ref 8–23)
BUN/CREAT SERPL: 27.4 (ref 7–25)
CALCIUM SPEC-SCNC: 8.9 MG/DL (ref 8.2–9.6)
CHLORIDE SERPL-SCNC: 103 MMOL/L (ref 98–107)
CO2 SERPL-SCNC: 29 MMOL/L (ref 22–29)
CREAT SERPL-MCNC: 0.62 MG/DL (ref 0.76–1.27)
DEPRECATED RDW RBC AUTO: 50 FL (ref 37–54)
EGFRCR SERPLBLD CKD-EPI 2021: 89.7 ML/MIN/1.73
EOSINOPHIL # BLD AUTO: 0.22 10*3/MM3 (ref 0–0.4)
EOSINOPHIL NFR BLD AUTO: 3.1 % (ref 0.3–6.2)
ERYTHROCYTE [DISTWIDTH] IN BLOOD BY AUTOMATED COUNT: 14 % (ref 12.3–15.4)
GLUCOSE SERPL-MCNC: 109 MG/DL (ref 65–99)
HCT VFR BLD AUTO: 34.8 % (ref 37.5–51)
HGB BLD-MCNC: 10.7 G/DL (ref 13–17.7)
IMM GRANULOCYTES # BLD AUTO: 0.03 10*3/MM3 (ref 0–0.05)
IMM GRANULOCYTES NFR BLD AUTO: 0.4 % (ref 0–0.5)
LYMPHOCYTES # BLD AUTO: 1.47 10*3/MM3 (ref 0.7–3.1)
LYMPHOCYTES NFR BLD AUTO: 20.5 % (ref 19.6–45.3)
MCH RBC QN AUTO: 29.7 PG (ref 26.6–33)
MCHC RBC AUTO-ENTMCNC: 30.7 G/DL (ref 31.5–35.7)
MCV RBC AUTO: 96.7 FL (ref 79–97)
MONOCYTES # BLD AUTO: 0.83 10*3/MM3 (ref 0.1–0.9)
MONOCYTES NFR BLD AUTO: 11.6 % (ref 5–12)
NEUTROPHILS NFR BLD AUTO: 4.6 10*3/MM3 (ref 1.7–7)
NEUTROPHILS NFR BLD AUTO: 64.1 % (ref 42.7–76)
NRBC BLD AUTO-RTO: 0 /100 WBC (ref 0–0.2)
PLATELET # BLD AUTO: 130 10*3/MM3 (ref 140–450)
PMV BLD AUTO: 10.7 FL (ref 6–12)
POTASSIUM SERPL-SCNC: 3.9 MMOL/L (ref 3.5–5.2)
RBC # BLD AUTO: 3.6 10*6/MM3 (ref 4.14–5.8)
SODIUM SERPL-SCNC: 140 MMOL/L (ref 136–145)
WBC NRBC COR # BLD: 7.17 10*3/MM3 (ref 3.4–10.8)

## 2023-04-10 PROCEDURE — 85025 COMPLETE CBC W/AUTO DIFF WBC: CPT

## 2023-04-10 PROCEDURE — 99239 HOSP IP/OBS DSCHRG MGMT >30: CPT | Performed by: NURSE PRACTITIONER

## 2023-04-10 PROCEDURE — 80048 BASIC METABOLIC PNL TOTAL CA: CPT

## 2023-04-10 RX ORDER — ATORVASTATIN CALCIUM 40 MG/1
40 TABLET, FILM COATED ORAL DAILY
Qty: 90 TABLET | Refills: 3 | Status: SHIPPED | OUTPATIENT
Start: 2023-04-11

## 2023-04-10 RX ORDER — METOPROLOL SUCCINATE 25 MG/1
25 TABLET, EXTENDED RELEASE ORAL
Status: DISCONTINUED | OUTPATIENT
Start: 2023-04-11 | End: 2023-04-10 | Stop reason: HOSPADM

## 2023-04-10 RX ORDER — ATORVASTATIN CALCIUM 40 MG/1
40 TABLET, FILM COATED ORAL DAILY
Status: DISCONTINUED | OUTPATIENT
Start: 2023-04-11 | End: 2023-04-10 | Stop reason: HOSPADM

## 2023-04-10 RX ORDER — METOPROLOL SUCCINATE 25 MG/1
25 TABLET, EXTENDED RELEASE ORAL
Qty: 90 TABLET | Refills: 3 | Status: SHIPPED | OUTPATIENT
Start: 2023-04-11

## 2023-04-10 RX ORDER — ASPIRIN 81 MG/1
81 TABLET ORAL DAILY
Qty: 100 TABLET | Refills: 3 | Status: SHIPPED | OUTPATIENT
Start: 2023-04-11

## 2023-04-10 RX ADMIN — CLOPIDOGREL BISULFATE 75 MG: 75 TABLET, FILM COATED ORAL at 09:04

## 2023-04-10 RX ADMIN — ATORVASTATIN CALCIUM 20 MG: 10 TABLET, FILM COATED ORAL at 09:03

## 2023-04-10 RX ADMIN — PANTOPRAZOLE SODIUM 40 MG: 40 TABLET, DELAYED RELEASE ORAL at 05:37

## 2023-04-10 RX ADMIN — MUPIROCIN: 20 OINTMENT TOPICAL at 05:37

## 2023-04-10 RX ADMIN — LOSARTAN POTASSIUM 100 MG: 50 TABLET, FILM COATED ORAL at 09:03

## 2023-04-10 RX ADMIN — ASPIRIN 81 MG: 81 TABLET, COATED ORAL at 09:03

## 2023-04-10 RX ADMIN — MAGNESIUM GLUCONATE 500 MG ORAL TABLET 400 MG: 500 TABLET ORAL at 09:04

## 2023-04-10 RX ADMIN — Medication 10 ML: at 09:04

## 2023-04-10 RX ADMIN — AMLODIPINE BESYLATE 10 MG: 10 TABLET ORAL at 09:03

## 2023-04-10 RX ADMIN — METOPROLOL TARTRATE 12.5 MG: 25 TABLET, FILM COATED ORAL at 09:03

## 2023-04-10 RX ADMIN — Medication 1000 MCG: at 09:03

## 2023-04-10 NOTE — DISCHARGE INSTRUCTIONS
No heavy lifting for 5-7 days greater than 10 pounds.  No heavy or strenuous pushing or pulling.  No tub baths, hot tubs, swimming pools, or submerging groin or wrist underwater for 1 week.  Wash groin or wrist site daily with antibacterial soap and water. Rinse and keep clean and dry.  No lotions, powders, or topical ointments to site. Keep open to air and dry.  Call our office with any concerns or if you notice redness, swelling, drainage, warmth, or pain at the site.

## 2023-04-10 NOTE — PAYOR COMM NOTE
"4/10/23. [facility] [phone]  [phone]    PATIENT ADMITTED ON 4/7/23 POST PROCEDURE. NON ST ELEVATION MI TO CATH LAB FOR HEART CATH. INPATIENT ORDER.          Maggy Solo (89 y.o. Male)     Date of Birth   05/13/1930    Social Security Number       Address   [address] [address]    Home Phone   [phone]    MRN   [id number]       Holiness   Congregation    Marital Status                               Admission Date   4/7/23    Admission Type   Elective    Admitting Provider   Pratik Osorio MD    Attending Provider       Department, Room/Bed   [facility] 4B, 401/1       Discharge Date   4/10/2023    Discharge Disposition   Home or Self Care    Discharge Destination   Home                            Attending Provider: (none)   Allergies: Lyrica [Pregabalin]    Isolation: None   Infection: None   Code Status: CPR    Ht: 160 cm (62.99\")   Wt: 60.8 kg (134 lb 1.6 oz)    Admission Cmt: None   Principal Problem: Elevated troponin [R77.8]                 Active Insurance as of 4/7/2023     Primary Coverage     Payor Plan Insurance Group Employer/Plan Group    HUMANA MEDICARE REPLACEMENT HUMANA MEDICARE REPLACEMENT [id number]     Payor Plan Address Payor Plan Phone Number Payor Plan Fax Number Effective Dates    [address] [address] [phone]  1/1/2018 - None Entered    [facility] [id number]       Subscriber Name Subscriber Birth Date Member ID       MAGGY SOLO 5/13/1930 [id number]                 Emergency Contacts      (Rel.) Home Phone Work Phone Mobile Phone    Glory Solo (Spouse) [phone] -- --    Sariah arauz (Relative) -- -- [phone]        Brijesh Nickerson MD   Physician  Otolaryngology/ENT  Op Note     Signed  Date of Service:  04/07/23 1017  Creation Time:  04/07/23 1017  Case Time:  Procedures:  Surgeons:    04/07/23 1111 EXCISION LESION OF RIGHT TEMPLE AND LEFT TEMPLE WITH FROZEN SECTION WITH " POSSIBLE FLAP OR GRAFT    Brijesh Nickerson MD               Signed                                                                                                                                                                                                                   OPERATIVE NOTE  4/7/2023     NAME: Wyatt Curry     YOB: 1930  MRN:   [id number]     PRE-OPERATIVE DIAGNOSIS:    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     Approximately 5mL 1% Xylocaine with epinephrine was injected in the planned excision site in the right temple.  Excision was accomplished with a #15 blade in a rhomboid fashion without difficulty.  The excision was approximately 1.6 cm  x 1.5 cm.  The lesion was approximately 1.3 cm x 1.1 cm.  The margin was 1 mm. The excision extended to deep subcutaneous tissue.     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     An inferiorly based transposition flap was fashioned and wide undermining performed with curved iris scissors and double prong skin hooks.  Minimal bleeding was encountered which  was controlled with electrocautery and low settings.  The flap was created to preserve normal anatomic relationships and to facilitate closure.  The flap was transposed and the donor site as well as the flap sutured in place utilizing interrupted 4-0 Monocryl subcutaneously and interrupted 5-0 nylon to reapproximate the epidermis.  Bactroban ointment was applied.  Attention was then turned to the left temple.     Approximately 5mL 1% Xylocaine with epinephrine was injected in the planned excision site in the left temple.  Excision was accomplished with a #15 blade in a horizontal elliptical fashion without difficulty.  The excision was approximately 1.8 cm  x 0.7 cm.  The lesion was approximately 0.9 cm x 0.6 cm.  The margin was 0.5 mm. The excision extended to subcutaneous tissue.     Upon excision the specimen was marked and submitted for frozen section examination which demonstrated findings consistent with a seborrheic keratosis.     Extensive and wide undermining was performed with curved iris scissors and double prong skin hooks.  This was performed in order to facilitate closure and to preserve normal anatomic relationships.  Minimal bleeding was encountered which was controlled with electrocautery on low settings.  The incision was reapproximated utilizing interrupted 4-0 Monocryl subcutaneously and interrupted 5-0 nylon to reapproximate the epidermis.  Bactroban ointment was applied and the procedure terminated.     The patient was transported upon extubation to the postanesthesia care unit in stable condition.     SPECIMENS:  A: Neoplasm uncertain behavior of the right temple  B: Neoplasm of uncertain behavior of the left temple     COMPLICATIONS: NONE     ESTIMATED BLOOD LOSS:  Minimal     Brijesh Nickerson MD  4/7/2023                    Kajal Brown, RN   Registered Nurse  Nursing Note     Signed  Date of Service:  04/07/23 1224  Creation Time:  04/07/23 1224     Signed          Pt transferred to  outpatient surgery and hooked up to monitors. Pt begins to complain of a burning in his chest. Pt states he has acid reflux. Pt then begins to complain of burning in his right arm. Pt returned to PACU. Pt begins to vomit on the way back to the PACU. Anesthesia at bedside ordered 2 L of oxygen per nasal cannula, 12 lead EKG, sublingual nitro, and aspirin. Pt complaining of chest pressure pain at this time rated at 5.                 Kajal Brown, RN   Registered Nurse  Nursing Note     Signed  Date of Service:  04/07/23 1300  Creation Time:  04/07/23 1300     Signed          Dr. Jennings notified of return of pt chest pressure. She states she will return to PACU to evaluate pt. Pt most recent cuff pressure 157/72, heart rate 71 bpm, 12 respirations/min, and oxygen sat is 99% on 2 L per nasal cannula.              Kajal Brown, RN   Registered Nurse  Nursing Note     Signed  Date of Service:  04/07/23 1333  Creation Time:  04/07/23 1333     Signed           Dr. Jennings at bedside explaining care to pt. She is aware of pt troponin level of 22. Dr. Jennings has called pt cardiologist Dr. Moseley and notified him of pt condition post op. Will administer 2nd 75 mg Plavix per Dr. Moseley recommendation. Dr. Jennings states pt to be admitted and evaluated by hospitalist service.               Kajal Brown, RN   Registered Nurse  Nursing Note     Signed  Date of Service:  04/07/23 1617  Creation Time:  04/07/23 1617     Signed           Dr. Osorio at bedside to assess pt.               Travis Staley APRN   Nurse Practitioner  Cardiology  H&P     Attested  Date of Service:  04/07/23 1621  Creation Time:  04/07/23 1621     Attested            Attestation signed by Pratik Osorio MD at 04/07/23 1800     I have seen and evaluated this patient personally.  I agree with the findings, assessment and plan as outlined by LARRY Ledezma.  In addition, I have the following to add.     HPI:  Patient has history of CAD with  remote PCI in 2006.  He underwent excision of left and right temple lesions today, and Plavix has been held for 5 days leading up to the procedure.  The procedure reportedly was uncomplicated, and the patient was discharged afterward.  While leaving, he did develop an episode of burning chest discomfort radiating to his right shoulder associated with very mild shortness of breath.  He subsequently briefly came nauseous and had an episode of emesis with resolution of nausea.  Chest discomfort peaked at 5/10 and eased up to 1/10 before ultimately resolving after 45 minutes without intervention.  He did receive 162 mg of aspirin and 150 mg of Plavix.  ECG revealed stable LBBB.  Initial troponin was 22 and increased to 40 on repeat 2 hours later.  He continued to remain chest pain-free.  He reports to me that he has not had any anginal symptoms leading up to today.  He overall has been feeling quite well.  And at the time of my evaluation, he reports feeling at his baseline.    On my exam, the patient is resting comfortably in bed in no acute distress.  He has sequelae of his surgery today.  Heart is RRR with a systolic murmur at the LSB.  Lungs are CTAB.  He has no peripheral edema.     A/P:  #Chest pain  #Myocardial injury  #CAD s/p PCI in 2006  Patient had a somewhat atypical episode of chest discomfort postoperatively.  He did describe the chest discomfort as a burning sensation, and it is possible that he may have had some GERD with a vagal episode associated with N/V and troponin elevation due to demand ischemia postoperatively.  Still, it is reasonable to also further evaluate for possible ACS given his CAD history and recent holding of antiplatelet therapy.  We will complete 324 mg aspirin load and 300 mg Plavix load and continue to trend troponin overnight.  He is continued on atorvastatin 20 mg daily.  Patient will be monitored on telemetry.  Fortunately, he remains chest pain-free.  Further cardiac plans  pending clinical course.    #HTN  - Amlodipine 10 mg now (did not take home dose this morning)  - Losartan 100 mg daily in place of home irbesartan  - May consider initiation of beta-blocker prior to discharge    #GERD  - Protonix 40 mg daily    #VTE prophylaxis  - Lovenox            Expand AllCollapse All                                                                                                                                                                                                                                                                                                                                                                                                                                                                                                [facility] -  History and Physical      LOS: 0 days   Patient Care Team:  Everardo Jackson MD as PCP - General (Family Medicine)  Justin Howard MD as Consulting Physician (Urology)  Emigdio Moseley MD as Cardiologist (Cardiology)  Everardo Noriega MD as Consulting Physician (Pulmonary Disease)  Brijesh Nickerson MD as Consulting Physician (Otolaryngology)  Kaitlin Del Angel PA as Referring Physician (Physician Assistant)     Chief Complaint:  chest pain                      Subjective         Interval History:   Patient presented on/7/2023 for an outpatient surgical procedure.  She underwent excision of lesion of right temple and left temple.  Patient was discharged from PACU and was in postop area about to go home when he started to have chest pain.  This chest pain was a burning in the center of his chest and he felt numbness in bilateral arms.  It was reported that he then became ashen colored and vomited.  Given this he was brought back up to the PACU.  Cardiology was initially asked to evaluate the patient's chest pain.  High-sensitivity troponin was obtained which revealed a reading of 22.  EKG  reveals no acute findings.  At the time my evaluation the patient is chest pain-free.  He says he has GERD and that sometimes he can have burning but nothing like the sensation he felt after surgery.  He has a history of coronary artery disease status post stents.  He is typically on Plavix daily.  His Plavix has been held 5 days in anticipation of this surgery.        Review of Systems:      Review of Systems   All other systems reviewed and are negative.     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        Physical Exam:     Vitals and nursing note reviewed.   Constitutional:       Appearance: Healthy appearance. Not in distress.   HENT:      Head:      Comments: Bilateral temporal incisions from surgery today  Neck:      Vascular: JVD normal.   Pulmonary:      Effort: Pulmonary effort is normal.      Breath sounds: Normal breath sounds. No wheezing. No rhonchi. No rales.   Cardiovascular:      PMI at left midclavicular line. Normal rate. Regular rhythm. Normal S1. Normal S2.      Murmurs: There is no murmur.      No gallop. No click. No rub.   Edema:     Peripheral edema absent.   Musculoskeletal: Normal range of motion.      Cervical back: Normal range of motion. Skin:     General: Skin is warm and dry.   Neurological:      Mental Status: Alert and oriented to person, place and time.       Results Review:               Lab Results (last 72 hours)                  Procedure Component Value Units Date/Time      High Sensitivity Troponin T 2Hr [[id number]]  (Abnormal) Collected: 04/07/23 1507      Specimen: Blood from Arm, Right Updated: 04/07/23 1539        HS Troponin T 40 ng/L          Troponin T Delta 18 ng/L        Narrative:        High Sensitive Troponin T Reference Range:  <10.0 ng/L- Negative Female for AMI  <15.0 ng/L- Negative Male for AMI  >=10 - Abnormal Female indicating possible myocardial injury.  >=15 - Abnormal Male indicating possible myocardial injury.   Clinicians would have to utilize clinical acumen, EKG, Troponin, and serial changes to determine if it is an Acute Myocardial Infarction or myocardial injury due to an underlying chronic condition.            High Sensitivity Troponin T [[id number]]  (Abnormal) Collected: 04/07/23 1251      Specimen: Blood Updated: 04/07/23 1323        HS Troponin T 22 ng/L        Narrative:        High Sensitive Troponin T Reference Range:  <10.0 ng/L- Negative Female for AMI  <15.0 ng/L- Negative Male for AMI  >=10 - Abnormal Female indicating possible myocardial injury.  >=15 - Abnormal Male indicating possible myocardial injury.   Clinicians would have to utilize clinical acumen, EKG, Troponin, and serial changes to determine if it is an Acute Myocardial Infarction or myocardial injury due to an underlying chronic condition.            Tissue Pathology Exam [[id number]] Collected: 04/07/23 1038      Specimen: Tissue from Face, Tissue from Face Updated: 04/07/23 1216        Case Report --        Surgical Pathology Report                         Case: [id number]                                  Authorizing Provider:  Brijesh Nickerson MD    Collected:           04/07/2023 10:38 AM          Ordering Location:     [facility] OR  Received:            04/07/2023 11:19 AM          Pathologist:           Anabella Armenta MD                                                         Intraop:               Anabella Armenta MD                                                        Specimens:   1) - Face, right temple - superior suture short, right lateral suture long                          2) - Face, left temple  - superior suture short, posterior lateral suture long                  Intraoperative Consultation --           Specimen skin, right temple lesion, excision  FROZEN SECTION DIAGNOSIS: Favor metatypical basal cell carcinoma.  No malignancy seen at inked surgical margins.  Reported to Dr. Brijesh Nickerson on 4/7/2023 at 16:30 CDT by LARRY Arshad.     Specimen skin, left temple lesion, excision  FROZEN SECTION DIAGNOSIS: Seborrheic keratosis.  Reported to Dr. Brijesh Nickerson on 4/7/2023 at 16:30 CDT by LARRY Arshad.                   Echo EF Estimated        Lab Results   Component Value Date     ECHOEFEST 50.0 12/20/2022            Cath Ejection Fraction Quantitative  No results found for: CATHEF           Medication Review: yes  Current Medications             Current Facility-Administered Medications   Medication Dose Route Frequency Provider Last Rate Last Admin   • acetaminophen (TYLENOL) tablet 500 mg  500 mg Oral Q6H PRN Travis Staley APRN       • amLODIPine (NORVASC) tablet 10 mg  10 mg Oral Daily Travis Staley APRN       • atorvastatin (LIPITOR) tablet 20 mg  20 mg Oral Daily Travis Staley APRN       • atropine sulfate injection 0.5 mg  0.5 mg Intravenous Once PRN Beatrice Jennings MD       • benzonatate (TESSALON) capsule 100 mg  100 mg Oral TID PRN Travis Staley APRN       • clopidogrel (PLAVIX) tablet 150 mg  150 mg Oral Daily Travis Staley APRN       • [START ON 4/8/2023] clopidogrel (PLAVIX) tablet 75 mg  75 mg Oral Daily Travis Staley APRN       • fentaNYL citrate (PF) (SUBLIMAZE) injection 25 mcg  25 mcg Intravenous Q5 Min PRN Beatrice Jennings MD       • flumazenil (ROMAZICON) injection 0.2 mg  0.2 mg Intravenous  PRN Beatrice Jennings MD       • HYDROcodone-acetaminophen (NORCO) 5-325 MG per tablet 1 tablet  1 tablet Oral Once PRN Brijesh Nickerson MD       • labetalol (NORMODYNE,TRANDATE) injection 5 mg  5 mg Intravenous Q5 Min PRN Beatrice Jennings MD       • lactated ringers infusion 1,000 mL  1,000 mL Intravenous Continuous Brijesh Nickerson MD 25 mL/hr at 04/07/23 1045 Restarted at 04/07/23 1053   • lactated ringers infusion  100 mL/hr Intravenous Continuous Beatrice Jennings MD       • losartan (COZAAR) tablet 100 mg  100 mg Oral Q24H Travis Staley APRN       • magnesium oxide (MAG-OX) tablet 400 mg  400 mg Oral Daily Travis Staley APRN       • mupirocin (BACTROBAN) 2 % cream 1 application  1 application Topical Q12H Travis Staley APRN       • mupirocin (BACTROBAN) 2 % ointment   Topical Q8H Brijesh Nickerson MD       • naloxone (NARCAN) injection 0.4 mg  0.4 mg Intravenous PRN Beatrice Jennings MD       • nitroglycerin (NITROSTAT) SL tablet 0.4 mg  0.4 mg Sublingual Q5 Min PRN Travis Staley APRN       • ondansetron (ZOFRAN) injection 4 mg  4 mg Intravenous Once PRN Beatrice Jennings MD       • ondansetron (ZOFRAN) tablet 4 mg  4 mg Oral Once PRN Brijesh Nickerson MD       • oxyCODONE-acetaminophen (PERCOCET) 5-325 MG per tablet 1 tablet  1 tablet Oral Once PRN Beatrice Jennings MD       • [START ON 4/8/2023] pantoprazole (PROTONIX) EC tablet 40 mg  40 mg Oral Q AM Travis Staley APRN       • sodium chloride 0.9 % flush 10 mL  10 mL Intravenous Q12H Travis Staley APRN       • sodium chloride 0.9 % flush 10 mL  10 mL Intravenous PRN Travis Staley APRN       • sodium chloride 0.9 % infusion 40 mL  40 mL Intravenous PRN Travis Staley, APRN       • vitamin B-12 (CYANOCOBALAMIN) tablet 1,000 mcg  1,000 mcg Oral Daily Travis Staley APRN                         Assessment & Plan           Elevated troponin: Likely secondary to demand ischemia  with general anesthesia.  Will trend troponin overnight.  No anginal symptoms at this time.  -Continue Plavix with full loading dose  -Resume home Plavix dose tomorrow  -We will stop aspirin at this time  -Resume home meds  -Telemetry floor overnight  -Possible stress test in the morning   if patient has any recurrent symptoms.     Hypertension: Hypertensive in PACU.  -We will resume home regimen.  -Continue Norvasc 10, substitute Cozaar for Avapro     GERD: Potential causation of chest pain and vomiting.  -Will be initiated on Protonix 40 mg daily.        Electronically signed by LARRY Arshad, 04/07/23, 4:21 PM CDT.                   Cosigned by: Pratik Osorio MD at 04/07/23 1800          Sukhi Hartley MD   Physician  Cardiology  Progress Notes     Signed  Date of Service:  04/08/23 1119  Creation Time:  04/08/23 1119     Signed           Recommend cardiac catheterization, selective coronary angiography, left ventriculography and percutaneous coronary intervention with application of arteriotomy hemostatic closure device.     I discussed cardiac catheterization, the procedure, risks (including bleeding, infection, vascular damage [including minor oozing, bruising, bleeding, and up to and including but not limited to the need for vascular surgery, emergency cardiothoracic surgery, contrast reaction, renal failure, respiratory failure, heart attack, stroke, arrhythmia and even death), benefits, and alternatives and the patient has voiced understanding and is willing to proceed.     Adequate pre-hydration and post cardiac catheterization hydration.  Premedications as required and indicated for cardiac catheterization.     No contraindication to drug eluting stent placement if required  Further recommendations pending results of cardiac catheterization                    [facility]  [address] [place] [id number] [phone]          Wyatt Curry  Cardiac  Catheterization/Vascular Study  Order# [id number]  Reading physician: Sukhi Hartley MD Ordering physician: Sukhi Hartley MD Study date: 23      Procedures    Cardiac Catheterization/Vascular Study        Patient Information    Patient Name   Wyatt Curry MRN   [id number] Legal Sex   Male  (Age)   1930 (89 y.o.)   Race Ethnicity Encounter Category   White or  Not  or  Elective        ISCV PACS Images     Show images for Cardiac Catheterization/Vascular Study     Printable Vessel Diagram    Printable Vessel Diagram     Physicians    Panel Physicians Referring Physician Case Authorizing Physician   Sukhi Hartley MD (Primary) Brijesh Nickerson MD Bose, Sanjay, MD     Indications    Elevated troponin [R77.8 (ICD-10-CM)]         Conclusion    Cardiac Catheterization Operative Report     Wyatt Curry  [id number]  2023     Patient was referred for cardiac catheterization      Indications for the procedure include:      Symptoms suggestive of angina with high suspicion for significant obstructive coronary artery disease   Non-ST elevation myocardial       Procedure performed  Left heart cath  Coronary angiography  Right femoral arteriography  Insertion of 7Fr Mynx Perclose closure device with effective hemostasis and preserved lower extremity pulses  Left ventriculography  Supervision of the administration of moderate sedation  Ultrasound guidance to assist access of right femoral artery and to confirm placement of catheter [CPT code 97217]  Moderate sedation administered under supervision of  with monitoring [CPT code 41093]  PTCA of mid to distal left anterior descending coronary artery  PTCA of proximal left anterior descending coronary artery  Placement of 2.5 x 16 mm Synergy drug-eluting stent at high pressure and proximal left anterior descending coronary artery     Procedure Details  The risks, benefits, complications, treatment options, and expected outcomes were  discussed with the patient. The patient and/or family concurred with the proposed plan, giving informed consent.      Patient was brought to the cath lab after IV hydration was begun and oral premedication was given. He was further sedated with fentanyl and midazolam.T  The skin overlying the patient's right femoral artery was prepped and draped in the usual sterile fashion.      Using the modified Seldinger access technique, a 6Fr sheath was placed in the femoral artery.        Cardiac Catheterization Operative Report        Patient was referred for cardiac catheterization .       Procedure Details     Diagnostic coronary angiography was performed with 5 Japanese JL4 and JR 4 coronary catheters.    Pigtail catheter for left ventriculography and left heart pressure measurements  Coronary angiogram were performed in Kyr[place]z and FINCH projection to evaluate the coronary arterial systems.       Before all coronary angiograms were obtained, a femoral angiogram was performed and the arteriotomy was assessed for suitability for a closure device.    Closure device as described above was used to achieve hemostasis.  The patient tolerated the procedure well, and there were no immediate complications.     ____________________________________________________________________________________________________________________________________________   Selective coronary angiography:     Left main coronary artery:  The left main coronary artery arises from the left coronary cusp and bifurcates into the  left anterior descending coronary artery  and left circumflex arteries.       Left anterior descending artery:  The  left anterior descending coronary artery   arises normally from the left main coronary artery and courses in the anterior interventricular groove and terminates at the apex. .     Left circumflex:  The left circumflex arises form the left man artery and supplies obtuse marginal branches.     Right coronary artery:  The RCA  arises normally from the right coronary cusp and is dominant for the posterior circulation.  The RCA is dominant .     Coronary angiography:     Normal left main coronary artery  Mid left anterior descending coronary artery has a 90% stenosis  No obstructive disease of diagonal branches  Mid to distal left anterior descending coronary artery overall is a much smaller vessel compared to the second diagonal branch which is approximately 4 times as large  At this location has 90% stenosis that was treated with PTCA  Proximal left anterior descending coronary artery has a 90% stenosis  Had guide induced dissection prior to this stenosis that was intervened upon by placement of drug-eluting stent with complete restoration of flow  Refer to the description in the interventional section  Patent stents noted in the obtuse marginal branch as well as in the mid and mid to distal left circumflex coronary artery  First obtuse marginal branch is a small vessels approximately half millimeter in diameter  Second obtuse marginal branch is approximately 2.5 mm in diameter which has the stent as mentioned  Moderate atherosclerotic changes of the right coronary artery with sequential 3 to 50% stenoses in the mid and distal portion without obstructive disease  Right coronary artery is a large vessel         Left heart cath: LVEDP 14 mm Hg with no gradient across aortic valve on pullback.      LV Gram in Normal LVEF 40-45% with what appears to be global hypokinesis with moderate to severe hypokinesis noted in the apical lateral region  Moderate mitral regurgitation     Interventions: JL 4 7 Maldivian guide was used  Advanced coronary wire into the distal left anterior descending coronary artery as well as into the relevant diagonal branch  Then performed PTCA of the distal left anterior descending coronary artery  Performed PTCA of the proximal left anterior descending coronary artery  Could not advance coronary stents despite effort  Then  used a guide liner to advance stent and could not  Went back and ballooned the area where the stent was getting hung up multiple times  Then noticed what looked like a guideline induced dissection of the proximal left anterior descending coronary artery where there is heavy coronary calcification  Treated this with immediate placement of 2.5 x 16 mm Synergy drug-eluting stent with complete obliteration of this dissection with SHYAM-3 flow  Patient was completely asymptomatic during the entire procedure including when he had this guideline induced dissection  Postprocedure went and talked to his wife and 3 other adult family members in the waiting room  Patient left the Cath Lab essentially asymptomatic  Anticoagulation with Angiomax bolus infusion  Patient was loaded with 600 mg of Plavix prior to starting procedure as well as 324 mg of chewable aspirin  He recently had surgery of his scalp and he was on monotherapy with Plavix which was discontinued for 5 days prior to surgery  Notably not on aspirin  Non-ST elevation myocardial infarction likely due to discontinuation of monotherapy with clopidogrel perioperatively for scalp surgery  Noted to have chest pain and elevated cardiac biomarkers consistent with acute coronary syndrome/non-ST elevation myocardial infarction postoperatively requiring cardiac catheterization and PCI as described above  ____________________________________________________________________________________________________________________________________________  Estimated Blood Loss:  Minimal         Complications:  None; patient tolerated the procedure well.     Specimens: None           Disposition: Cardiovascular observation unit              Condition: stable            I supervised the administration of conscious sedation by nursing staff throughout the case.       Immediately prior to the procedure the patient was re-examined and subsequently the  first dose was given at  1122 Hours  and  the end of my face-to-face encounter was at  1237   Hours.          During the case, continuous pulse oximetry, heart rate, blood pressure, and patient status were monitored.         ____________________________________________________________________________________________________________________________________________     Conclusion of cardiac catheterization    4/8/2023     Coronary angiography:     Normal left main coronary artery  Mid left anterior descending coronary artery has a 90% stenosis  No obstructive disease of diagonal branches  Mid to distal left anterior descending coronary artery overall is a much smaller vessel compared to the second diagonal branch which is approximately 4 times as large  At this location has 90% stenosis that was treated with PTCA  Proximal left anterior descending coronary artery has a 90% stenosis  Had guide induced dissection prior to this stenosis that was intervened upon by placement of drug-eluting stent with complete restoration of flow  Refer to the description in the interventional section  Patent stents noted in the obtuse marginal branch as well as in the mid and mid to distal left circumflex coronary artery  First obtuse marginal branch is a small vessels approximately half millimeter in diameter  Second obtuse marginal branch is approximately 2.5 mm in diameter which has the stent as mentioned  Moderate atherosclerotic changes of the right coronary artery with sequential 3 to 50% stenoses in the mid and distal portion without obstructive disease  Right coronary artery is a large vessel         Left heart cath: LVEDP 14 mm Hg with no gradient across aortic valve on pullback.      LV Gram in Normal LVEF 40-45% with what appears to be global hypokinesis with moderate to severe hypokinesis noted in the apical lateral region  Moderate mitral regurgitation     Interventions: JL 4 7 Japanese guide was used  Advanced coronary wire into the distal left anterior descending  coronary artery as well as into the relevant diagonal branch  Then performed PTCA of the distal left anterior descending coronary artery  Performed PTCA of the proximal left anterior descending coronary artery  Could not advance coronary stents despite effort  Then used a guide liner to advance stent and could not  Went back and ballooned the area where the stent was getting hung up multiple times  Then noticed what looked like a guideline induced dissection of the proximal left anterior descending coronary artery where there is heavy coronary calcification  Treated this with immediate placement of 2.5 x 16 mm Synergy drug-eluting stent with complete obliteration of this dissection with SHYAM-3 flow  Patient was completely asymptomatic during the entire procedure including when he had this guideline induced dissection  Postprocedure went and talked to his wife and 3 other adult family members in the waiting room  Patient left the Cath Lab essentially asymptomatic  Anticoagulation with Angiomax bolus infusion  Patient was loaded with 600 mg of Plavix prior to starting procedure as well as 324 mg of chewable aspirin  He recently had surgery of his scalp and he was on monotherapy with Plavix which was discontinued for 5 days prior to surgery  Notably not on aspirin  Non-ST elevation myocardial infarction likely due to discontinuation of monotherapy with clopidogrel perioperatively for scalp surgery  Noted to have chest pain and elevated cardiac biomarkers consistent with acute coronary syndrome/non-ST elevation myocardial infarction postoperatively requiring cardiac catheterization and PCI as described above  ____________________________________________________________________________________________________________________________________________           ____________________________________________________________________________________________________________________________________________     Plan after cardiac  catheterization     Never stop aspirin  Plavix 600 mg loading dose given with plans for 75 mg p.o. daily for a minimum of 1 year preferably longer  Observed in CCU  Monitor for any complications given above findings and treatment  Discussed also with Dr. Osorio who was present in the operative area  Usual post procedure precautions after arteriotomy including monitoring for signs both local and systemic side effects.  On ultimate discharge will receive printed instructions  Will be observed carefully  He has much higher risk of perioperative complications which was discussed prior to cardiac cath and PCI  Intensive risk factor modifications for both primary and secondary prevention if applicable  Hydration   Observation                  Recommendations    •     Dual antiplatelet therapy for one year.  •     Aspirin therapy indefinitely.     Procedure Narrative    Risks, benefits and alternatives were discussed with the patient and/or family. Plan is for moderate sedation.  An immediate assessment was done prior to the administration of moderate sedation. Less than 20 mL of estimated blood loss during the case. No specimen was collected during the case.     Time Under Physician Observation    Name Panel Role Time Period   Sukhi Hartley MD Panel 1 Primary 4/8/2023 1121 - 4/8/2023 1248      Total Sedation Time    Moderate sedation event time was not documented.       Vessel Access    A 6 fr sheath was  inserted with ultrasound guidance into the right femoral artery.       Sheath Disposition    Hemostasis started on the right femoral artery. Perclose was used in achieving hemostasis. Closure device deployed in the vessel. Hemostasis achieved successfully.       Stent Inflated     Event Details User   12:33 PM Stent Inflated Sys Stnt Cornry Synergy Xd Plat 2.5x16mm - First balloon inflation max pressure = 12 luiza. First balloon inflation duration = 25 seconds. KM     Balloon Inflated     Event Details User   12:00 PM  Balloon Inflated Baln Trek Mini Rx 1.5x12mm - First balloon inflation max pressure = 8 luiza. First balloon inflation duration = 25 seconds. Second inflation of balloon - Max pressure = 8 luiza. 2nd Inflation of balloon - Duration = 25 seconds. 2nd inflation was done at 12:01 CDT. Third inflation of balloon - Max pressure = 12 luiza. 3rd Inflation of balloon - Duration = 25 seconds. 3rd inflation was done at 12:03 CDT. Fourth inflation of balloon - Max pressure = 12 luiza. 4th Inflation of  balloon - Duration = 25 seconds. 4th inflation was done at 12:03 CDT. KM   12:09 PM Balloon Inflated Baln Trek Mini Rx 2x12mm - First balloon inflation max pressure = 12 luiza. First balloon inflation duration = 25 seconds. Second inflation of balloon - Max pressure = 12 luiza. 2nd Inflation of balloon - Duration = 25 seconds.       Radiation     Event Details User   12:39 PM Radiation Tracking Panel 1: Sukhi Hartley MD   Cumulative Air Kerma (mGy) = 655.000; Fluoro Time (min) = 20.6; DAP (mGy-cm2) = 56.000 KM        Total Contrast    Medication Name Total Dose   iopamidol (ISOVUE-370) 76 % injection 334 mL       Patient Hx Of Height, Weight, and Vitals    Height Weight BSA (Calculated - sq m) BMI (Calculated) Retired BMI (kg/m2) Pulse BP        72 136/67     Medications  (Filter: Administrations occurring from 0000 to 1307 on 04/08/23)   important  Continuous medications are totaled by the amount administered until 04/08/23 1307.       heparin infusion 2 units/mL in 0.9% NaCl (mL)  Total volume:  1,000 mL    Date/Time Rate/Dose/Volume Action    04/08/23 1113 1,000 mL Given      heparin infusion 2 units/mL in 0.9% NaCl (mL)  Total volume:  500 mL    Date/Time Rate/Dose/Volume Action    04/08/23 1113 500 mL Given      clopidogrel (PLAVIX) tablet (mg)  Total dose:  225 mg    Date/Time Rate/Dose/Volume Action    04/08/23 1114 225 mg Given      aspirin tablet (mg)  Total dose:  325 mg    Date/Time Rate/Dose/Volume Action    04/08/23 1114 325  mg Given      diphenhydrAMINE (BENADRYL) injection (mg)  Total dose:  25 mg    Date/Time Rate/Dose/Volume Action    04/08/23 1119 25 mg Given      midazolam (VERSED) injection (mg)  Total dose:  1 mg    Date/Time Rate/Dose/Volume Action    04/08/23 1122 1 mg Given      fentaNYL citrate (PF) (SUBLIMAZE) injection (mcg)  Total dose:  25 mcg    Date/Time Rate/Dose/Volume Action    04/08/23 1122 25 mcg Given      lidocaine (XYLOCAINE) 2% injection (mL)  Total volume:  10 mL    Date/Time Rate/Dose/Volume Action    04/08/23 1124 10 mL Given      bivalirudin (ANGIOMAX) bolus from bag (mg/kg)  Total dose:  50.925 mg Dosing weight:  67.9    Date/Time Rate/Dose/Volume Action    04/08/23 1146 50.925 mg Given      bivalirudin (ANGIOMAX) 250 mg in 50 mL NS infusion (mg/kg/hr)  Total dose:  Cannot be calculated* Dosing weight:  67.9    *Continuous medication not stopped within the calculation time range.  Date/Time Rate/Dose/Volume Action    04/08/23 1146 1.75 mg/kg/hr - 23.8 mL/hr New Bag      iopamidol (ISOVUE-370) 76 % injection (mL)  Total volume:  334 mL    Date/Time Rate/Dose/Volume Action    04/08/23 1238 334 mL Given      acetaminophen (TYLENOL) tablet 500 mg (mg)  Total dose:  500 mg Dosing weight:  69    Date/Time Rate/Dose/Volume Action    04/08/23 0330 500 mg Given    1112 *Not included in total MAR Hold    1258 *Not included in total MAR Unhold      amLODIPine (NORVASC) tablet 10 mg (mg)  Total dose:  10 mg    Date/Time Rate/Dose/Volume Action    04/08/23 0937 10 mg Given      benzonatate (TESSALON) capsule 100 mg (mg)  Total dose:  Cannot be calculated*    *Administration dose not documented  Date/Time Rate/Dose/Volume Action    04/08/23 1112 *Not included in total MAR Hold    1258 *Not included in total MAR Unhold      Enoxaparin Sodium (LOVENOX) syringe 40 mg (mg)  Total dose:  Cannot be calculated* Dosing weight:  67.9    *Administration dose not documented  Date/Time Rate/Dose/Volume Action    04/08/23 1112 *Not  included in total MAR Hold    1258 *Not included in total MAR Unhold      HYDROcodone-acetaminophen (NORCO) 5-325 MG per tablet 1 tablet (tablet)  Total dose:  Cannot be calculated* Dosing weight:  69    *Administration dose not documented  Date/Time Rate/Dose/Volume Action    04/08/23 1112 *Not included in total MAR Hold    1258 *Not included in total MAR Unhold      losartan (COZAAR) tablet 100 mg (mg)  Total dose:  100 mg Dosing weight:  69    Date/Time Rate/Dose/Volume Action    04/08/23 0936 100 mg Given      magnesium oxide (MAG-OX) tablet 400 mg (mg)  Total dose:  400 mg Dosing weight:  69    Date/Time Rate/Dose/Volume Action    04/08/23 0936 400 mg Given    1112 *Not included in total MAR Hold    1258 *Not included in total MAR Unhold      mupirocin (BACTROBAN) 2 % ointment  Total dose:  Cannot be calculated* Dosing weight:  69    *Administration dose not documented  Date/Time Rate/Dose/Volume Action    04/08/23 0943 *Not included in total Not Given    1112 *Not included in total MAR Hold    1258 *Not included in total MAR Unhold      nitroglycerin (NITROSTAT) SL tablet 0.4 mg (mg)  Total dose:  Cannot be calculated*    *Administration dose not documented  Date/Time Rate/Dose/Volume Action    04/08/23 1112 *Not included in total MAR Hold    1258 *Not included in total MAR Unhold      pantoprazole (PROTONIX) EC tablet 40 mg (mg)  Total dose:  40 mg Dosing weight:  69    Date/Time Rate/Dose/Volume Action    04/08/23 0544 40 mg Given    1112 *Not included in total MAR Hold    1258 *Not included in total MAR Unhold      sodium chloride 0.9 % flush 10 mL (mL)  Total dose:  Cannot be calculated* Dosing weight:  69    *Administration dose not documented  Date/Time Rate/Dose/Volume Action    04/08/23 0909 *10 mL Not Given    1112 *Not included in total MAR Hold    1258 *Not included in total MAR Unhold      sodium chloride 0.9 % flush 10 mL (mL)  Total dose:  Cannot be calculated* Dosing weight:   69    *Administration dose not documented  Date/Time Rate/Dose/Volume Action    04/08/23 1112 *Not included in total MAR Hold    1258 *Not included in total MAR Unhold      sodium chloride 0.9 % infusion (mL/hr)  Total volume:  243.33 mL Dosing weight:  67.9    Date/Time Rate/Dose/Volume Action    04/08/23 1033 100 mL/hr New Bag    1259  Stopped      sodium chloride 0.9 % infusion (mL/hr)  Total volume:  0 mL* Dosing weight:  67.9    *Administration not included in total  Date/Time Rate/Dose/Volume Action    04/08/23 1259 *75 mL/hr Currently Infusing      vitamin B-12 (CYANOCOBALAMIN) tablet 1,000 mcg (mcg)  Total dose:  1,000 mcg Dosing weight:  69    Date/Time Rate/Dose/Volume Action    04/08/23 0937 1,000 mcg Given    1112 *Not included in total MAR Hold    1258 *Not included in total MAR Unhold      aspirin EC tablet 81 mg (mg)  Total dose:  Cannot be calculated* Dosing weight:  67.9    *Administration dose not documented  Date/Time Rate/Dose/Volume Action    04/08/23 1112 *Not included in total MAR Hold    1258 *Not included in total MAR Unhold      atorvastatin (LIPITOR) tablet 20 mg (mg)  Total dose:  20 mg    Date/Time Rate/Dose/Volume Action    04/08/23 0937 20 mg Given    1112 *Not included in total MAR Hold    1258 *Not included in total MAR Unhold      clopidogrel (PLAVIX) tablet 225 mg (mg)  Total dose:  Cannot be calculated* Dosing weight:  67.9    *Administration dose not documented  Date/Time Rate/Dose/Volume Action    04/08/23 1100 *225 mg Not Given    1112 *Not included in total MAR Hold    1258 *Not included in total MAR Unhold      clopidogrel (PLAVIX) tablet 75 mg (mg)  Total dose:  75 mg Dosing weight:  69    Date/Time Rate/Dose/Volume Action    04/08/23 0936 75 mg Given    1112 *Not included in total MAR Hold    1258 *Not included in total MAR Unhold      metoprolol tartrate (LOPRESSOR) tablet 12.5 mg (mg)  Total dose:  12.5 mg Dosing weight:  67.9    Date/Time Rate/Dose/Volume Action     04/08/23 0936 12.5 mg Given      ondansetron (ZOFRAN) injection 4 mg (mg)  Total dose:  Cannot be calculated* Dosing weight:  67.9    *Administration dose not documented  Date/Time Rate/Dose/Volume Action    04/08/23 1112 *Not included in total MAR Hold    1258 *Not included in total MAR Unhold      Supplies    Name ID Temporary Type Charge Code Description Charge Code Quantity   KT CONTRST INJ ISOL/MANFLD W/TRANSDCR 641 No Supply HC OR SUPPLY SHELL 270/NO CPT 160508 1   KT CONTRST INJ ISOL/RESVR 100ML REUS 642 No Supply   1   SOL IRR NACL 0.9PCT BT 1000ML 650 No Supply   1   DEV TORQ GW HOT/PINK 3509 No Supply HC OR SUPPLY SHELL 272/ 269068 1   TOOL INSRT GW MTL OR PLSTC 4281 No Supply HC OR SUPPLY SHELL 272/NO CPT 483073 1   KT CONTRL HND ACIST CVI SIDRA HIPRESS 65 4375 No Supply   1   SOLIDIFIER LIQUI LOC PLUS 2000CC 4423 No Supply   1   GUIDE CATH EXT GUIDEZILLA2 .057 6F 150CM 25183 No Guide Catheter HC OR SUPPLY SHELL 278/ 166072 1   GW HITORQ/POWERTURN HC FLX STR .014 190CM 22171 No Guidewire HC OR SUPPLY SHELL 272/ 322022 2   VLV CONTRL HEMO BLEEDBACK COPILOT 33294 No Supply HC OR SUPPLY SHELL 272/NO CPT 058671 1   BALN TREK MINI RX 1.5X12MM 97337 No Balloon HC OR SUPPLY SHELL 278/ 214550 1   BALN TREK MINI RX 2X12MM 93511 No Balloon HC OR SUPPLY SHELL 278/ 305259 1   CATH GUIDE VISTABRITE JL4 7F .078 100 LT 95577 No Guide Catheter HC OR SUPPLY SHELL 278/ 703559 1   SHEATH INTRO PINN CORNRY .038 6F10CM 60738 No Supply HC OR SUPPLY SHELL 272/ 541215 1   SHEATH INTRO PINN CORNRY .038 7F10CM 43533 No Supply HC OR SUPPLY SHELL 272/ 268164 1   CATH DIAG INFINITI M/PK RVC141 JL4/JR4 5SH 5F 98807 No Diagnostic Catheter HC OR SUPPLY SHELL 272/NO CPT 020242 1   DEV INFL ENCORE 54376 No Supply HC OR SUPPLY SHELL 272/NO CPT 579468 1   GW STARTER FXD CORE J .035 2R445LP 3MM 08662 No Guidewire HC OR SUPPLY SHELL 272/ 689998 1   NDL ART SECUR LK 18G 2 3/4IN 962649 No Supply   1    PK CATH CARD 30 CA/4 372560 No Supply   1   ELECTRD PAD DEFIB RADOLCNT M/FUNC PHYSIOCONTRL CONN/LD/IN A/ 580075 No Supply   1   SYS CLS/REPR VESL PERCLOSEPROSTYLE W/SUT/TRIM SNAR/KNOT/PUSH 402290 No Hemostasis Device HC OR SUPPLY SHELL 278/ 110177 1   COLTEN NASL ETCO2 LO/MITA A/ 262159 No Supply   1     Signed    Electronically signed by Sukhi Hartley MD on 4/8/23 at 1307 CDT      Link to Procedure Log    Procedure Log        Order-Level Documents:    Scan on 4/8/2023 by [employer], [place]: CARDIAC CATH HEMO DATA - SCAN               Non-ST elevation myocardial infarction likely due to discontinuation of monotherapy with clopidogrel perioperatively for scalp surgery  Noted to have chest pain and elevated cardiac biomarkers consistent with acute coronary syndrome/non-ST elevation myocardial infarction postoperatively.               Travis Staley APRN   Nurse Practitioner  Cardiology  Progress Notes     Attested  Date of Service:  04/09/23 0842  Creation Time:  04/09/23 0842     Attested            Attestation signed by Pratik Osorio MD at 04/09/23 9202     I have reviewed this documentation and agree.               [facility] -  Progress Note      LOS: 2 days   Patient Care Team:  Everardo Jackson MD as PCP - General (Family Medicine)  Justin Howard MD as Consulting Physician (Urology)  Emigdio Moseley MD as Cardiologist (Cardiology)  Everardo Noriega MD as Consulting Physician (Pulmonary Disease)  Brijesh Nickreson MD as Consulting Physician (Otolaryngology)  Kaitlin Del Angel PA as Referring Physician (Physician Assistant)     Chief Complaint: Follow-up chest        Subjective         Interval History:   Patient was taken to the cardiac catheterization lab yesterday by Dr. Hartley.  It was noted that he had patent stents in his obtuse marginal as well as mid and mid to distal left circumflex.  There is noted to be 90% stenosis in the  proximal LAD.  There is 90% stenosis in the mid LAD.  Attempts were made to advance coronary stents but this could not be performed.  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  Placement of a 2.5 x 16 mm Synergy drug-eluting stent was made.                           Non-STEMI: Chest pain-free.  Transfer to floor today.  Slightly oozing at groin site, will have this stopped prior to going to floor.  Wean oxygen as tolerated  -Continue aspirin and Plavix for 1 year  -Started Lopressor 12.5 mg twice daily and tolerating this well.  -Continue atorvastatin 20 mg at bedtime.     Hypertension: Slightly hypotensive this morning, however has not received morning medication  -Monitor overnight.  -Continue on amlodipine and losartan  -We will potentially increase metoprolol.     GERD:  -Protonix 40 mg        Electronically signed by LARRY Arshad, 04/09/23, 8:43 AM CDT.            /09/23 2114 98.8 (37.1) 72 18 144/56 Lying room air -- 90   04/09/23 2000 -- -- -- -- -- room air -- --   04/09/23 1537 98.5 (36.9) 64 16 118/46 Lying nasal cannula 2 99   04/09/23 1039 97.6 (36.4) 61 16 149/77 Sitting nasal cannula 2 97   04/09/23 1000 -- 62 -- 139/57 -- -- -- 97   04/09/23 0800 -- 64 -- 154/64 -- -- -- 96   04/09/23 0700 97.6 (36.4) 63 -- 150/62 -- nasal cannula 2 91   04                         Brief Patient Summary:  Wyatt Curry is a 89 y.o. male who has remained stable overnight from an ENT standpoint.  He has no further bleeding from his surgical wounds.  I will continue wound care to the areas.        Active Hospital Problems:        Active Hospital Problems     Diagnosis     • **Elevated troponin     • Bleeding     • Neoplasm of uncertain behavior     • Anticoagulated        Plan:   • Postoperative bleeding-postoperative bleeding has resolved.  I will continue wound care.  • Surgical wounds-healing appropriately at this time.  Continue wound care.  Discussed Plan with  patient  Following patient as in-patient. Further recommendations will be made based on serial examinations.     Medications/Orders for this encounter: No new medications ordered.  Orders-  Wound care      Discharge Planning: Per primary team           DVT prophylaxis:  Medical and mechanical DVT prophylaxis orders are present.      Electronically signed by Jules Barclay Jr, MD, 04/09/23, 9:27 AM CDT            Current Facility-Administered Medications   Medication Dose Route Frequency Provider Last Rate Last Admin   • acetaminophen (TYLENOL) tablet 500 mg  500 mg Oral Q6H PRN Sukhi Hartley MD   500 mg at 04/08/23 0330   • acetaminophen (TYLENOL) tablet 650 mg  650 mg Oral Q4H PRN Skuhi Hartley MD       • ALPRAZolam (XANAX) tablet 0.5 mg  0.5 mg Oral TID PRN Sukhi Hartley MD       • amLODIPine (NORVASC) tablet 10 mg  10 mg Oral Daily Sukhi Hartley MD   10 mg at 04/10/23 0903   • artificial tears ophthalmic ointment   Both Eyes Q1H PRN Pratik Osorio MD   Given at 04/09/23 1725   • aspirin EC tablet 81 mg  81 mg Oral Daily Sukhi Hartley MD   81 mg at 04/10/23 0903   • [START ON 4/11/2023] atorvastatin (LIPITOR) tablet 40 mg  40 mg Oral Daily Neda Marin APRN       • benzonatate (TESSALON) capsule 100 mg  100 mg Oral TID PRN Sukhi Hartley MD       • calcium carbonate (TUMS) chewable tablet 500 mg (200 mg elemental)  2 tablet Oral BID PRN Sukhi Hartley MD       • clopidogrel (PLAVIX) tablet 75 mg  75 mg Oral Daily Sukhi Hartley MD   75 mg at 04/10/23 0904   • diphenhydrAMINE (BENADRYL) capsule 25 mg  25 mg Oral Q6H PRN Sukhi Hartley MD       • Enoxaparin Sodium (LOVENOX) syringe 40 mg  40 mg Subcutaneous Q24H Sukhi Hartley MD   40 mg at 04/09/23 1725   • HYDROcodone-acetaminophen (NORCO) 5-325 MG per tablet 1 tablet  1 tablet Oral Once PRN Sukhi Hartley MD       • losartan (COZAAR) tablet 100 mg  100 mg Oral Q24H Sukhi Hartley MD   100 mg at 04/10/23 0903   • magnesium oxide (MAG-OX) tablet 400 mg  400 mg Oral  Daily Sukhi Hartley MD   400 mg at 04/10/23 0904   • [START ON 4/11/2023] metoprolol succinate XL (TOPROL-XL) 24 hr tablet 25 mg  25 mg Oral Q24H Neda Marin APRN       • mupirocin (BACTROBAN) 2 % ointment   Topical Q8H Sukhi Hartley MD   Given at 04/10/23 0537   • nitroglycerin (NITROSTAT) SL tablet 0.4 mg  0.4 mg Sublingual Q5 Min PRN Sukhi Hartley MD   0.4 mg at 04/07/23 2324   • ondansetron (ZOFRAN) tablet 4 mg  4 mg Oral Q6H PRN Sukhi Hartley MD        Or   • ondansetron (ZOFRAN) injection 4 mg  4 mg Intravenous Q6H PRN Sukhi Hartley MD       • pantoprazole (PROTONIX) EC tablet 40 mg  40 mg Oral Q AM Sukhi Hartley MD   40 mg at 04/10/23 0537   • sodium chloride 0.9 % flush 10 mL  10 mL Intravenous Q12H Sukhi Hartley MD   10 mL at 04/10/23 0904   • sodium chloride 0.9 % flush 10 mL  10 mL Intravenous PRN Sukhi Hartley MD       • sodium chloride 0.9 % infusion  100 mL/hr Intravenous Continuous Sukhi Hartley MD   Stopped at 04/08/23 1259   • sodium chloride 0.9 % infusion  75 mL/hr Intravenous Continuous Sukhi Hartley MD 75 mL/hr at 04/08/23 2031 75 mL/hr at 04/08/23 2031   • temazepam (RESTORIL) capsule 7.5 mg  7.5 mg Oral Nightly PRN Sukhi Hartley MD       • vitamin B-12 (CYANOCOBALAMIN) tablet 1,000 mcg  1,000 mcg Oral Daily Sukhi Hartley MD   1,000 mcg at 04/10/23 0903     Current Outpatient Medications   Medication Sig Dispense Refill   • acetaminophen (TYLENOL) 500 MG tablet Take 1 tablet by mouth Every 6 (Six) Hours As Needed for Mild Pain.     • amLODIPine (NORVASC) 10 MG tablet Take 1 tablet by mouth Daily.  3   • [START ON 4/11/2023] aspirin 81 MG EC tablet Take 1 tablet by mouth Daily--DO NOT BREAK/CRUSH TABLET 100 tablet 3   • [START ON 4/11/2023] atorvastatin (LIPITOR) 40 MG tablet Take 1 tablet by mouth Daily. 90 tablet 3   • irbesartan (AVAPRO) 300 MG tablet Take 1 tablet by mouth Daily.     • lansoprazole (PREVACID) 30 MG capsule Take 1 capsule by mouth Daily.     • magnesium oxide (MAG-OX)  400 MG tablet Take 1 tablet by mouth Daily.     • [START ON 4/11/2023] metoprolol succinate XL (TOPROL-XL) 25 MG 24 hr tablet Take 1 tablet by mouth Daily. 90 tablet 3   • vitamin B-12 (CYANOCOBALAMIN) 1000 MCG tablet Take 1 tablet by mouth Daily.     • Zinc 50 MG capsule Take 50 mg by mouth Daily.     • benzonatate (TESSALON) 200 MG capsule Take 1 capsule by mouth 3 (Three) Times a Day As Needed for Cough.     • clopidogrel (PLAVIX) 75 MG tablet Take 1 tablet by mouth Daily.     • nitroglycerin (NITROSTAT) 0.4 MG SL tablet Place 1 tablet under the tongue Every 5 (Five) Minutes As Needed.

## 2023-04-10 NOTE — DISCHARGE SUMMARY
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 male initially presented for outpatient surgical procedure on 4/7/2023.  He was being evaluated for right and left temple lesions and underwent excision of those by Dr. Brijesh Nickerson.  Following his procedure went in the PACU complained of chest burning.  He was evaluated by anesthesiology.  ECG and labs were drawn.  He was found to have an elevated troponin and cardiology was called.  Cardiology admitted the patient to the hospital for ongoing observation.    He has a coronary artery disease history.  He had previously had stents, remotely.  Clopidogrel, at home medication, had been held for his procedure.  He was not on aspirin.  Subsequently, troponin continued to rise.  Coronary angiography was discussed with the patient by interventional cardiology.  He was taken for cardiac catheterization on 4/8/2023 by Dr. Sukhi Hartley.    Coronary angiography revealed patent stents in the obtuse marginal branch and left circumflex coronary artery.  He was noted to have stenoses in the proximal LAD and what was described as a diagonal branch.  He had PTCA of the proximal LAD.  Stents could not be advanced.  A guide catheter was used and follow-up imaging appeared to note an induced dissection prior to the stenosis.  Drug-eluting stent was placed with complete restoration of flow to that area.  The patient remained hemodynamically stable throughout his procedure.    He was treated with a loading dose of Plavix.  Aspirin 81 mg daily was resumed.  Left  ventriculography revealed LVEF of 40 to 45% with what appeared to be global hypokinesis with moderate to severe hypokinesis noted in the apical lateral region.  He is on guideline directed medical therapy with ARB, and beta blocker was added this admission.     He is on statin therapy at home.  His dose of atorvastatin was increased during his hospital stay from 20 mg to 40 mg daily.  He was followed throughout his hospital course by ENT for postoperative management.    He reports no further chest discomfort as what he had earlier this admission.  He reports to be at his baseline.  He is eager for discharge home.    Discharge instructions have been discussed with the patient including post cath site care, post cath restrictions, cardiac rehab referral, medication adjustments, follow-up appointments, ENT recommendations, and dual antiplatelet therapy without interruption.  He verbalizes understanding.    He is felt to be stable for discharge home today.  He will have a 2-week follow-up with his primary cardiologist, Dr. Moseley.  He will follow-up with the ENT after discharge.  He will follow-up with his PCP after discharge.        Procedures Performed  Procedure(s):  Cardiac Catheterization/Vascular Study       Consults:   Consults     No orders found from 3/9/2023 to 4/8/2023.          Pertinent Test Results:   Cardiac catheterization 4/8/2023 (Dr. Hartley)  Coronary angiography:     Normal left main coronary artery  Mid left anterior descending coronary artery has a 90% stenosis  No obstructive disease of diagonal branches  Mid to distal left anterior descending coronary artery overall is a much smaller vessel compared to the second diagonal branch which is approximately 4 times as large  At this location has 90% stenosis that was treated with PTCA  Proximal left anterior descending coronary artery has a 90% stenosis  Had guide induced dissection prior to this stenosis that was intervened upon by placement of  drug-eluting stent with complete restoration of flow  Refer to the description in the interventional section  Patent stents noted in the obtuse marginal branch as well as in the mid and mid to distal left circumflex coronary artery  First obtuse marginal branch is a small vessels approximately half millimeter in diameter  Second obtuse marginal branch is approximately 2.5 mm in diameter which has the stent as mentioned  Moderate atherosclerotic changes of the right coronary artery with sequential 3 to 50% stenoses in the mid and distal portion without obstructive disease  Right coronary artery is a large vessel         Left heart cath: LVEDP 14 mm Hg with no gradient across aortic valve on pullback.      LV Gram in Normal LVEF 40-45% with what appears to be global hypokinesis with moderate to severe hypokinesis noted in the apical lateral region  Moderate mitral regurgitation     Interventions: JL 4 7 Syriac guide was used  Advanced coronary wire into the distal left anterior descending coronary artery as well as into the relevant diagonal branch  Then performed PTCA of the distal left anterior descending coronary artery  Performed PTCA of the proximal left anterior descending coronary artery  Could not advance coronary stents despite effort  Then used a guide liner to advance stent and could not  Went back and ballooned the area where the stent was getting hung up multiple times  Then noticed what looked like a guideline induced dissection of the proximal left anterior descending coronary artery where there is heavy coronary calcification  Treated this with immediate placement of 2.5 x 16 mm Synergy drug-eluting stent with complete obliteration of this dissection with SHYAM-3 flow  Patient was completely asymptomatic during the entire procedure including when he had this guideline induced dissection  Postprocedure went and talked to his wife and 3 other adult family members in the waiting room  Patient left the Cath  Lab essentially asymptomatic  Anticoagulation with Angiomax bolus infusion  Patient was loaded with 600 mg of Plavix prior to starting procedure as well as 324 mg of chewable aspirin  He recently had surgery of his scalp and he was on monotherapy with Plavix which was discontinued for 5 days prior to surgery  Notably not on aspirin  Non-ST elevation myocardial infarction likely due to discontinuation of monotherapy with clopidogrel perioperatively for scalp surgery  Noted to have chest pain and elevated cardiac biomarkers consistent with acute coronary syndrome/non-ST elevation myocardial infarction postoperatively requiring cardiac catheterization and PCI as described above    Condition on Discharge: Stable    Physical Exam at Discharge  General: Alert and oriented, no acute distress  Card: RRR, no mrg  Resp: CTAB  Extremities: no swelling noted, palpable pulses  Access site: Right femoral artery: Soft, without hematoma.  Mild bruising noted.    Vital Signs  Temp:  [98 °F (36.7 °C)-98.8 °F (37.1 °C)] 98 °F (36.7 °C)  Heart Rate:  [63-78] 63  Resp:  [16-18] 16  BP: (118-158)/(46-72) 158/72    Discharge Disposition  Home or Self Care    Discharge Medications     Discharge Medications      New Medications      Instructions Start Date   aspirin 81 MG EC tablet   81 mg, Oral, Daily   Start Date: April 11, 2023     metoprolol succinate XL 25 MG 24 hr tablet  Commonly known as: TOPROL-XL   25 mg, Oral, Every 24 Hours Scheduled   Start Date: April 11, 2023        Changes to Medications      Instructions Start Date   atorvastatin 40 MG tablet  Commonly known as: LIPITOR  What changed:   · medication strength  · how much to take   40 mg, Oral, Daily   Start Date: April 11, 2023        Continue These Medications      Instructions Start Date   acetaminophen 500 MG tablet  Commonly known as: TYLENOL   500 mg, Oral, Every 6 Hours PRN      amLODIPine 10 MG tablet  Commonly known as: NORVASC   10 mg, Oral, Daily      benzonatate  200 MG capsule  Commonly known as: TESSALON   Take 1 capsule by mouth 3 (Three) Times a Day As Needed for Cough.      clopidogrel 75 MG tablet  Commonly known as: PLAVIX   75 mg, Oral, Daily      irbesartan 300 MG tablet  Commonly known as: AVAPRO   300 mg, Oral, Daily      lansoprazole 30 MG capsule  Commonly known as: PREVACID   30 mg, Oral, Daily      magnesium oxide 400 MG tablet  Commonly known as: MAG-OX   1 tablet, Oral, Daily      nitroglycerin 0.4 MG SL tablet  Commonly known as: NITROSTAT   Place 1 tablet under the tongue Every 5 (Five) Minutes As Needed.      vitamin B-12 1000 MCG tablet  Commonly known as: CYANOCOBALAMIN   1,000 mcg, Oral, Daily      Zinc 50 MG capsule   50 mg, Oral, Daily             Discharge Diet:    Cardiac Diet    Activity at Discharge:   No heavy lifting for 5-7 days greater than 10 pounds.  No heavy or strenuous pushing or pulling.  No tub baths, hot tubs, swimming pools, or submerging groin or wrist underwater for 1 week.  Wash groin or wrist site daily with antibacterial soap and water. Rinse and keep clean and dry.  No lotions, powders, or topical ointments to site. Keep open to air and dry.  Call our office with any concerns or if you notice redness, swelling, drainage, warmth, or pain at the site.    Follow-up Appointments  PCP as scheduled  Future Appointments   Date Time Provider Department Center   4/17/2023  2:30 PM NURSE [facility]   4/24/2023  2:15 PM Bell Lawson APRN [facility]     Additional Instructions for the Follow-ups that You Need to Schedule     Discharge Follow-up with Specialty: ENT with PA or ARNP in 10-12 days   As directed      Specialty: ENT with PA or ARNP in 10-12 days         Notify Physician or Go To The ED For the Following Conditions   As directed      Bleeding or any swelling of concern: Fever > 101.5 degrees that does not respond to Tylenol or Motrin    Order Comments: Bleeding or any swelling of concern: Fever > 101.5  degrees that does not respond to Tylenol or Motrin                Test Results Pending at Discharge  Pending Labs     Order Current Status    Tissue Pathology Exam Preliminary result           Electronically signed by LARRY Machado, 04/10/23, 10:01 AM CDT.    Time:45 minutes

## 2023-04-11 LAB
CYTO UR: NORMAL
LAB AP CASE REPORT: NORMAL
Lab: NORMAL
Lab: NORMAL
PATH REPORT.FINAL DX SPEC: NORMAL
PATH REPORT.GROSS SPEC: NORMAL

## 2023-04-11 NOTE — OUTREACH NOTE
Prep Survey    Flowsheet Row Responses   [facility] facility patient discharged from? [facility]   Is LACE score < 7 ? No   Eligibility Readm Mgmt   Discharge diagnosis Non-ST elevation myocardial infarction   Does the patient have one of the following disease processes/diagnoses(primary or secondary)? Acute MI (STEMI,NSTEMI)   Does the patient have Home health ordered? No   Is there a DME ordered? No   Medication alerts for this patient ASA    General alerts for this patient Heart Cath   Prep survey completed? Yes          Itzel PICKETT - Registered Nurse

## 2023-04-11 NOTE — PAYOR COMM NOTE
"REF: [id number]    [facility]  ANDREA  [phone]  OR  FAX  [phone]       Maggy Solo (89 y.o. Male)     Date of Birth   05/13/1930    Social Security Number       Address   [address] [facility] [address]    Home Phone   [phone]    MRN   [id number]       [employer]   [facility]    Marital Status                               Admission Date   4/7/23    Admission Type   Elective    Admitting Provider   Pratik Osorio MD    Attending Provider       Department, Room/Bed   [facility] 4B, 401/1       Discharge Date   4/10/2023    Discharge Disposition   Home or Self Care    Discharge Destination   Home                            Attending Provider: (none)   Allergies: Lyrica [Pregabalin]    Isolation: None   Infection: None   Code Status: Prior    Ht: 160 cm (62.99\")   Wt: 60.8 kg (134 lb 1.6 oz)    Admission Cmt: None   Principal Problem: Elevated troponin [R77.8]                 Active Insurance as of 4/7/2023     Primary Coverage     Payor Plan Insurance Group Employer/Plan Group    HUMANA MEDICARE REPLACEMENT HUMANA MEDICARE REPLACEMENT [id number]     Payor Plan Address Payor Plan Phone Number Payor Plan Fax Number Effective Dates    [address] [address] [phone]  1/1/2018 - None Entered    [facility] [phone]       Subscriber Name Subscriber Birth Date Member ID       MAGGY SOLO 5/13/1930 [id number]                 Emergency Contacts      (Rel.) Home Phone Work Phone Mobile Phone    Glory Solo (Spouse) [phone] -- --    Sariah arauz (Relative) -- -- [phone]               Discharge Summary      Neda Marin, LARRY at 04/10/23 1001     Attestation signed by Justin Murrell MD at 04/10/23 1531    I have reviewed this documentation and agree.                  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 male initially presented for outpatient surgical procedure on 4/7/2023.  He was being evaluated for right and left temple lesions and underwent excision of those by Dr. Brijesh Nickerson.  Following his procedure went in the PACU complained of chest burning.  He was evaluated by anesthesiology.  ECG and labs were drawn.  He was found to have an elevated troponin and cardiology was called.  Cardiology admitted the patient to the hospital for ongoing observation.    He has a coronary artery disease history.  He had previously had stents, remotely.  Clopidogrel, at home medication, had been held for his procedure.  He was not on aspirin.  Subsequently, troponin continued to rise.  Coronary angiography was discussed with the patient by interventional cardiology.  He was taken for cardiac catheterization on 4/8/2023 by Dr. Sukhi Hartley.    Coronary angiography revealed patent stents in the obtuse marginal branch and left circumflex coronary artery.  He was noted to have stenoses in the proximal LAD and what was described as a diagonal branch.  He had PTCA of the proximal LAD.  Stents could not be advanced.  A guide catheter was used and follow-up imaging appeared to note an induced dissection prior to the stenosis.  Drug-eluting stent was placed with complete restoration of flow to that area.  The patient remained hemodynamically stable throughout his procedure.    He was treated with a loading dose of Plavix.  Aspirin 81 mg daily was resumed.  Left ventriculography revealed LVEF of 40 to 45% with what appeared to be global hypokinesis with moderate to severe hypokinesis noted in the apical lateral region.   He is on guideline directed medical therapy with ARB, and beta blocker was added this admission.     He is on statin therapy at home.  His dose of atorvastatin was increased during his hospital stay from 20 mg to 40 mg daily.  He was followed throughout his hospital course by ENT for postoperative management.    He reports no further chest discomfort as what he had earlier this admission.  He reports to be at his baseline.  He is eager for discharge home.    Discharge instructions have been discussed with the patient including post cath site care, post cath restrictions, cardiac rehab referral, medication adjustments, follow-up appointments, ENT recommendations, and dual antiplatelet therapy without interruption.  He verbalizes understanding.    He is felt to be stable for discharge home today.  He will have a 2-week follow-up with his primary cardiologist, Dr. Moseley.  He will follow-up with the ENT after discharge.  He will follow-up with his PCP after discharge.        Procedures Performed  Procedure(s):  Cardiac Catheterization/Vascular Study       Consults:   Consults     No orders found from 3/9/2023 to 4/8/2023.          Pertinent Test Results:   Cardiac catheterization 4/8/2023 (Dr. Hartley)  Coronary angiography:     Normal left main coronary artery  Mid left anterior descending coronary artery has a 90% stenosis  No obstructive disease of diagonal branches  Mid to distal left anterior descending coronary artery overall is a much smaller vessel compared to the second diagonal branch which is approximately 4 times as large  At this location has 90% stenosis that was treated with PTCA  Proximal left anterior descending coronary artery has a 90% stenosis  Had guide induced dissection prior to this stenosis that was intervened upon by placement of drug-eluting stent with complete restoration of flow  Refer to the description in the interventional section  Patent stents noted in the obtuse marginal branch as well as  in the mid and mid to distal left circumflex coronary artery  First obtuse marginal branch is a small vessels approximately half millimeter in diameter  Second obtuse marginal branch is approximately 2.5 mm in diameter which has the stent as mentioned  Moderate atherosclerotic changes of the right coronary artery with sequential 3 to 50% stenoses in the mid and distal portion without obstructive disease  Right coronary artery is a large vessel         Left heart cath: LVEDP 14 mm Hg with no gradient across aortic valve on pullback.      LV Gram in Normal LVEF 40-45% with what appears to be global hypokinesis with moderate to severe hypokinesis noted in the apical lateral region  Moderate mitral regurgitation     Interventions: JL 4 7 [place] guide was used  Advanced coronary wire into the distal left anterior descending coronary artery as well as into the relevant diagonal branch  Then performed PTCA of the distal left anterior descending coronary artery  Performed PTCA of the proximal left anterior descending coronary artery  Could not advance coronary stents despite effort  Then used a guide liner to advance stent and could not  Went back and ballooned the area where the stent was getting hung up multiple times  Then noticed what looked like a guideline induced dissection of the proximal left anterior descending coronary artery where there is heavy coronary calcification  Treated this with immediate placement of 2.5 x 16 mm Synergy drug-eluting stent with complete obliteration of this dissection with SHYAM-3 flow  Patient was completely asymptomatic during the entire procedure including when he had this guideline induced dissection  Postprocedure went and talked to his wife and 3 other adult family members in the waiting room  Patient left the Cath Lab essentially asymptomatic  Anticoagulation with Angiomax bolus infusion  Patient was loaded with 600 mg of Plavix prior to starting procedure as well as 324 mg of  chewable aspirin  He recently had surgery of his scalp and he was on monotherapy with Plavix which was discontinued for 5 days prior to surgery  Notably not on aspirin  Non-ST elevation myocardial infarction likely due to discontinuation of monotherapy with clopidogrel perioperatively for scalp surgery  Noted to have chest pain and elevated cardiac biomarkers consistent with acute coronary syndrome/non-ST elevation myocardial infarction postoperatively requiring cardiac catheterization and PCI as described above    Condition on Discharge: Stable    Physical Exam at Discharge  General: Alert and oriented, no acute distress  Card: RRR, no mrg  Resp: CTAB  Extremities: no swelling noted, palpable pulses  Access site: Right femoral artery: Soft, without hematoma.  Mild bruising noted.    Vital Signs  Temp:  [98 °F (36.7 °C)-98.8 °F (37.1 °C)] 98 °F (36.7 °C)  Heart Rate:  [63-78] 63  Resp:  [16-18] 16  BP: (118-158)/(46-72) 158/72    Discharge Disposition  Home or Self Care    Discharge Medications     Discharge Medications      New Medications      Instructions Start Date   aspirin 81 MG EC tablet   81 mg, Oral, Daily   Start Date: April 11, 2023     metoprolol succinate XL 25 MG 24 hr tablet  Commonly known as: TOPROL-XL   25 mg, Oral, Every 24 Hours Scheduled   Start Date: April 11, 2023        Changes to Medications      Instructions Start Date   atorvastatin 40 MG tablet  Commonly known as: LIPITOR  What changed:   · medication strength  · how much to take   40 mg, Oral, Daily   Start Date: April 11, 2023        Continue These Medications      Instructions Start Date   acetaminophen 500 MG tablet  Commonly known as: TYLENOL   500 mg, Oral, Every 6 Hours PRN      amLODIPine 10 MG tablet  Commonly known as: NORVASC   10 mg, Oral, Daily      benzonatate 200 MG capsule  Commonly known as: TESSALON   Take 1 capsule by mouth 3 (Three) Times a Day As Needed for Cough.      clopidogrel 75 MG tablet  Commonly known as:  PLAVIX   75 mg, Oral, Daily      irbesartan 300 MG tablet  Commonly known as: AVAPRO   300 mg, Oral, Daily      lansoprazole 30 MG capsule  Commonly known as: PREVACID   30 mg, Oral, Daily      magnesium oxide 400 MG tablet  Commonly known as: MAG-OX   1 tablet, Oral, Daily      nitroglycerin 0.4 MG SL tablet  Commonly known as: NITROSTAT   Place 1 tablet under the tongue Every 5 (Five) Minutes As Needed.      vitamin B-12 1000 MCG tablet  Commonly known as: CYANOCOBALAMIN   1,000 mcg, Oral, Daily      Zinc 50 MG capsule   50 mg, Oral, Daily             Discharge Diet:    Cardiac Diet    Activity at Discharge:   No heavy lifting for 5-7 days greater than 10 pounds.  No heavy or strenuous pushing or pulling.  No tub baths, hot tubs, swimming pools, or submerging groin or wrist underwater for 1 week.  Wash groin or wrist site daily with antibacterial soap and water. Rinse and keep clean and dry.  No lotions, powders, or topical ointments to site. Keep open to air and dry.  Call our office with any concerns or if you notice redness, swelling, drainage, warmth, or pain at the site.    Follow-up Appointments  PCP as scheduled  Future Appointments   Date Time Provider Department Center   4/17/2023  2:30 PM NURSE [facility]   4/24/2023  2:15 PM Bell Lawson APRN [facility]     Additional Instructions for the Follow-ups that You Need to Schedule     Discharge Follow-up with Specialty: ENT with PA or ARNP in 10-12 days   As directed      Specialty: ENT with PA or ARNP in 10-12 days         Notify Physician or Go To The ED For the Following Conditions   As directed      Bleeding or any swelling of concern: Fever > 101.5 degrees that does not respond to Tylenol or Motrin    Order Comments: Bleeding or any swelling of concern: Fever > 101.5 degrees that does not respond to Tylenol or Motrin                Test Results Pending at Discharge  Pending Labs     Order Current Status    Tissue Pathology Exam  Preliminary result           Electronically signed by LARRY Machado, 04/10/23, 10:01 AM CDT.    Time:45 minutes                  Electronically signed by Justin Murrell MD at 04/10/23 1531       Discharge Order (From admission, onward)     Start     Ordered    04/10/23 1018  Discharge patient  Once        Expected Discharge Date: 04/10/23    Discharge Disposition: Home or Self Care    Physician of Record for Attribution - Please select from Treatment Team: JUSTIN MURRELL [[id number]]    Review needed by CMO to determine Physician of Record: No       Question Answer Comment   Physician of Record for Attribution - Please select from Treatment Team JUSTIN MURRELL    Review needed by CMO to determine Physician of Record No        04/10/23 1017    04/07/23 1017  Discharge patient  Once,   Status:  Canceled        Expected Discharge Date: 04/07/23    Expected Discharge Time: Midday    Discharge Disposition: Home or Self Care    Review needed by CMO to determine Physician of Record: No       Question:  Review needed by CMO to determine Physician of Record  Answer:  No    04/07/23 1016

## 2023-04-12 ENCOUNTER — READMISSION MANAGEMENT (OUTPATIENT)
Dept: CALL CENTER | Facility: HOSPITAL | Age: 88
End: 2023-04-12
Payer: MEDICARE

## 2023-04-12 NOTE — OUTREACH NOTE
AMI Week 1 Survey    Flowsheet Row Responses   [facility] patient discharged from? [place]   Does the patient have one of the following disease processes/diagnoses(primary or secondary)? Acute MI (STEMI,NSTEMI)   Week 1 attempt successful? Yes   Call start time 1317   Call end time 1322   General alerts for this patient Heart Cath   Discharge diagnosis Non-ST elevation myocardial infarction   Person spoke with today (if not patient) and relationship wife   Meds reviewed with patient/caregiver? Yes   Is the patient having any side effects they believe may be caused by any medication additions or changes? No   Does the patient have all prescriptions related to this admission filled (includes statins,anticoagulants,HTN meds,anti-arrhythmia meds) Yes   Is the patient taking all medications as directed (includes completed medication regime)? Yes   Does the patient have a primary care provider?  Yes   Does the patient have an appointment with their PCP,cardiologist,or clinic within 7 days of discharge? Yes   Has the patient kept scheduled appointments due by today? N/A   Psychosocial issues? No   Comments Per wife the pt is walking hourly, no issues when walking. LHC site- right groin site is very bruised over to right hip.   Did the patient receive a copy of their discharge instructions? Yes   Nursing interventions Reviewed instructions with patient   What is the patient's perception of their health status since discharge? Returned to baseline/stable   Nursing interventions Nurse provided patient education   Is the patient/caregiver able to teach back signs and symptoms of when to call for help immediately: Sudden chest discomfort, Dizziness or lightheadedness, Shortness of breath at any time   Nursing interventions Nurse provided patient education   Is the patient/caregiver able to teach back the hierarchy of who to call/visit for symptoms/problems? PCP, Specialist, Home health nurse, Urgent Care, ED, 911 Yes    Week 1 call completed? Yes          Kenya ROGEL - Registered Nurse

## 2023-04-17 ENCOUNTER — OFFICE VISIT (OUTPATIENT)
Dept: OTOLARYNGOLOGY | Facility: CLINIC | Age: 88
End: 2023-04-17
Payer: MEDICARE

## 2023-04-17 DIAGNOSIS — C44.310 BASAL CELL CARCINOMA (BCC) OF SKIN OF FACE, UNSPECIFIED PART OF FACE: Primary | ICD-10-CM

## 2023-04-17 PROCEDURE — 99024 POSTOP FOLLOW-UP VISIT: CPT | Performed by: OTOLARYNGOLOGY

## 2023-04-17 NOTE — PROGRESS NOTES
Procedure   Suture Removal    Date/Time: 4/17/2023 2:53 PM  Performed by: Ann Summers RN  Authorized by: Brijesh Nickerson MD   Consent: Verbal consent obtained.  Consent given by: patient  Patient identity confirmed: verbally with patient  Body area: head/neck  Location details: scalp  Comments: Patient presents for suture removal. The incisions are well-approximated with no redness or edema noted. Path discussed with patient.

## 2023-04-21 ENCOUNTER — READMISSION MANAGEMENT (OUTPATIENT)
Dept: CALL CENTER | Facility: HOSPITAL | Age: 88
End: 2023-04-21
Payer: MEDICARE

## 2023-04-21 NOTE — OUTREACH NOTE
AMI Week 2 Survey    Flowsheet Row Responses   [facility] patient discharged from? [place]   Does the patient have one of the following disease processes/diagnoses(primary or secondary)? Acute MI (STEMI,NSTEMI)   Week 2 attempt successful? Yes   Call start time 1421   Call end time 1428   Discharge diagnosis Non-ST elevation myocardial infarction   Person spoke with today (if not patient) and relationship patient   Meds reviewed with patient/caregiver? Yes   Is the patient taking all medications as directed (includes completed medication regime)? Yes   Comments regarding appointments Pt will see cardiology   Does the patient have a primary care provider?  Yes   Does the patient have an appointment with their PCP,cardiologist,or clinic within 7 days of discharge? Yes   Comments regarding PCP Dr. Jackson   Has the patient kept scheduled appointments due by today? Yes   Psychosocial issues? No   Comments BP has been elevated this week, 163/69   Did the patient receive a copy of their discharge instructions? Yes   Nursing interventions Reviewed instructions with patient   What is the patient's perception of their health status since discharge? Improving   Is the pateint /caregiver able to teach back the importance of cardiac rehab? Yes   Is the patient/caregiver able to teach back ways to prevent a second heart attack: Take medications, Follow up with MD   If the patient is a current smoker, are they able to teach back resources for cessation? Not a smoker   Is the patient/caregiver able to teach back the hierarchy of who to call/visit for symptoms/problems? PCP, Specialist, Home health nurse, Urgent Care, ED, 911 Yes   Week 2 call completed? Yes   Is the patient interested in additional calls from an ambulatory ?  NOTE:  applies to high risk patients requiring additional follow-up. No   Wrap up additional comments Pt is calling cardiology to discuss elevated BP.  He states he feels good, no symptoms.   He was encouraged to monitor for worsening symptoms, watch diet/sodium.  Verbalized understanding.          REG GOTTLIEB - Registered Nurse

## 2023-04-24 ENCOUNTER — OFFICE VISIT (OUTPATIENT)
Dept: CARDIOLOGY | Facility: CLINIC | Age: 88
End: 2023-04-24
Payer: MEDICARE

## 2023-04-24 VITALS
HEART RATE: 62 BPM | BODY MASS INDEX: 27.23 KG/M2 | WEIGHT: 148 LBS | DIASTOLIC BLOOD PRESSURE: 60 MMHG | OXYGEN SATURATION: 96 % | HEIGHT: 62 IN | SYSTOLIC BLOOD PRESSURE: 142 MMHG

## 2023-04-24 DIAGNOSIS — E78.2 MIXED HYPERLIPIDEMIA: ICD-10-CM

## 2023-04-24 DIAGNOSIS — I25.5 ISCHEMIC CARDIOMYOPATHY: ICD-10-CM

## 2023-04-24 DIAGNOSIS — I10 ESSENTIAL HYPERTENSION: ICD-10-CM

## 2023-04-24 DIAGNOSIS — I34.0 NON-RHEUMATIC MITRAL REGURGITATION: ICD-10-CM

## 2023-04-24 DIAGNOSIS — I25.10 CORONARY ARTERY DISEASE INVOLVING NATIVE CORONARY ARTERY OF NATIVE HEART WITHOUT ANGINA PECTORIS: Primary | ICD-10-CM

## 2023-04-24 DIAGNOSIS — I44.7 LEFT BUNDLE BRANCH BLOCK: ICD-10-CM

## 2023-04-24 RX ORDER — IRON,CARBONYL/ASCORBIC ACID 65MG-125MG
1 TABLET, DELAYED RELEASE (ENTERIC COATED) ORAL DAILY
COMMUNITY
Start: 2023-04-13

## 2023-04-24 NOTE — PROGRESS NOTES
"    Subjective:     Encounter Date:04/24/2023      Patient ID: Wyatt Curry is a 89 y.o. male     Chief Complaint: \"no complaints\"  Coronary Artery Disease  Presents for follow-up visit. Pertinent negatives include no chest pain, dizziness, leg swelling, palpitations, shortness of breath or weight gain. Risk factors include hyperlipidemia. The symptoms have been stable.   Fatigue  This is a new problem. The current episode started 1 to 4 weeks ago. The problem occurs daily. The problem has been unchanged. Associated symptoms include coughing (when lying down) and fatigue. Pertinent negatives include no chest pain, rash or weakness.   Hypertension  This is a chronic problem. The current episode started more than 1 year ago. The problem has been rapidly improving since onset. The problem is controlled. Associated symptoms include malaise/fatigue. Pertinent negatives include no chest pain, orthopnea, palpitations, PND or shortness of breath.   Hyperlipidemia  This is a chronic problem. The current episode started more than 1 year ago. The problem is controlled. Recent lipid tests were reviewed and are low. Pertinent negatives include no chest pain or shortness of breath.     Patient presents today for a hospital follow up. He initially presented for an outpatient surgical procedure on 4/7. While in PACU, he complained of chest burning. Anethesia evaluated and obtained an EKG and troponins. Troponins were elevated and continued to rise. His home dose of plavix was held prior to his procedure and he did not start ASA in the interim. He was subsequently taken for a [facility] with angiography revealing patent stents in the OM and LCX with noted stenosis in the proximal LAD into the relevant diagonal branch. This was treated with PCTA as a THAIS could not be advanced. He developed a guideline induced dissection in the proximal LAD which was treated with a THAIS. LVEF per cath was noted to be 40-45% with moderate MR.     Right " "after discharge he notes he felt pretty good. He saw his PCP and was started on oral iron and notes a return of epigastric burning that is similar however less intense than his previous MI pain. Prior to his surgery he was having what sounds like stable angina with pain radiation into the right arm and would take Nitro on occasion for relief. He has not experienced radiation of the pain nor has he required nitro since discharge. He has developed a cough when laying down since discharge. He has been monitoring his BP at home with readings 130-150. He notes prior to his hospitalization his BP was in the 120s. He does complain of increase in fatigue since discharge however he has been less active since discharge as he was unsure what he can and cannot do. He is inquiring about returning to the gym, riding his , and lifting restrictions.     Patient is followed for CAD s/p stenting x2 in approximately 2006. He had a low risk nuclear stress in 9/2018 which was ordered due to concerns for stable angina with complaints of arm numbness and pain between his shoulder blades. He has a chronic LBBB.       The following portions of the patient's history were reviewed and updated as appropriate: allergies, current medications, past family history, past medical history, past social history, past surgical history and problem list.    Allergies   Allergen Reactions   • Lyrica [Pregabalin] Mental Status Change     Made pt feel \"crazy\"       Current Outpatient Medications:   •  acetaminophen (TYLENOL) 500 MG tablet, Take 1 tablet by mouth Every 6 (Six) Hours As Needed for Mild Pain., Disp: , Rfl:   •  amLODIPine (NORVASC) 10 MG tablet, Take 1 tablet by mouth Daily., Disp: , Rfl: 3  •  aspirin 81 MG EC tablet, Take 1 tablet by mouth Daily--DO NOT BREAK/CRUSH TABLET, Disp: 100 tablet, Rfl: 3  •  atorvastatin (LIPITOR) 40 MG tablet, Take 1 tablet by mouth Daily., Disp: 90 tablet, Rfl: 3  •  benzonatate (TESSALON) 200 MG " capsule, Take 1 capsule by mouth 3 (Three) Times a Day As Needed for Cough., Disp: , Rfl:   •  clopidogrel (PLAVIX) 75 MG tablet, Take 1 tablet by mouth Daily., Disp: , Rfl:   •  irbesartan (AVAPRO) 300 MG tablet, Take 1 tablet by mouth Every Night., Disp: , Rfl:   •  lansoprazole (PREVACID) 30 MG capsule, Take 1 capsule by mouth Daily., Disp: , Rfl:   •  magnesium oxide (MAG-OX) 400 MG tablet, Take 1 tablet by mouth Daily., Disp: , Rfl:   •  metoprolol succinate XL (TOPROL-XL) 25 MG 24 hr tablet, Take 1 tablet by mouth Daily. (Patient taking differently: Take 1 tablet by mouth Every Night.), Disp: 90 tablet, Rfl: 3  •  nitroglycerin (NITROSTAT) 0.4 MG SL tablet, Place 1 tablet under the tongue Every 5 (Five) Minutes As Needed., Disp: , Rfl:   •  vitamin B-12 (CYANOCOBALAMIN) 1000 MCG tablet, Take 1 tablet by mouth Daily., Disp: , Rfl:   •  Vitron-C  MG tablet, Take 1 tablet by mouth Daily., Disp: , Rfl:   •  Zinc 50 MG capsule, Take 50 mg by mouth Daily., Disp: , Rfl:   Past Medical History:   Diagnosis Date   • Arthritis    • Bladder cancer    • Bronchitis    • CAD (coronary artery disease)    • Cancer     bladder cancer   • 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   • GERD (gastroesophageal reflux disease)    • Hyperlipidemia    • Hypertension    • Irregular heart beat    • Lung nodules        Social History     Socioeconomic History   • Marital status:    Tobacco Use   • Smoking status: Former     Packs/day: 1.00     Years: 30.00     Pack years: 30.00     Types: Cigarettes     Start date:      Quit date:      Years since quittin.3     Passive exposure: Past   • Smokeless tobacco: Never   Vaping Use   • Vaping Use: Never used   Substance and Sexual Activity   • Alcohol use: Yes     Alcohol/week: 1.0 standard drink     Types: 1 Glasses of wine per week     Comment: occ wine   • Drug use: Not Currently   • Sexual activity: Defer       Review of Systems   Constitutional: Positive for  fatigue and malaise/fatigue. Negative for weight gain and weight loss.   Cardiovascular: Negative for chest pain, dyspnea on exertion, irregular heartbeat, leg swelling, near-syncope, orthopnea, palpitations, paroxysmal nocturnal dyspnea and syncope.   Respiratory: Positive for cough (when lying down). Negative for shortness of breath, sleep disturbances due to breathing, sputum production and wheezing.    Hematologic/Lymphatic: Does not bruise/bleed easily.   Skin: Negative for dry skin, flushing, itching and rash.   Gastrointestinal: Negative for hematemesis and hematochezia.   Neurological: Negative for dizziness, light-headedness, loss of balance and weakness.   All other systems reviewed and are negative.         Objective:     Vitals reviewed.   Constitutional:       General: Not in acute distress.     Appearance: Well-developed. Not diaphoretic.   Eyes:      General: No scleral icterus.     Conjunctiva/sclera: Conjunctivae normal.      Pupils: Pupils are equal, round, and reactive to light.   HENT:      Head: Normocephalic.    Mouth/Throat:      Pharynx: No oropharyngeal exudate.   Pulmonary:      Effort: Pulmonary effort is normal. No respiratory distress.      Breath sounds: Normal breath sounds. No wheezing. No rales.   Chest:      Chest wall: Not tender to palpatation.   Cardiovascular:      Normal rate. Regular rhythm.      Murmurs: There is a grade 1/6 high frequency blowing holosystolic murmur at the apex.   Pulses:     Intact distal pulses.   Edema:     Peripheral edema absent.   Abdominal:      General: Bowel sounds are normal. There is no distension.      Palpations: Abdomen is soft.      Tenderness: There is no abdominal tenderness.   Musculoskeletal: Normal range of motion.      Cervical back: Normal range of motion and neck supple. Skin:     General: Skin is warm and dry.      Coloration: Skin is not pale.      Findings: No erythema or rash.   Neurological:      Mental Status: Alert and oriented  "to person, place, and time.      Deep Tendon Reflexes: Reflexes are normal and symmetric.   Psychiatric:         Behavior: Behavior normal.           ECG 12 Lead    Date/Time: 4/24/2023 3:04 PM  Performed by: Bell Lawson APRN  Authorized by: Bell Lawson APRN   Comparison: compared with previous ECG from 4/9/2023  Similar to previous ECG  Rhythm: sinus rhythm  Rate: normal  BPM: 62  Conduction: left bundle branch block  T inversion: I, II, III, V4, V5, V6 and aVF  QRS axis: normal  Other findings: T wave abnormality    Clinical impression: abnormal EKG          /60 (BP Location: Right arm, Patient Position: Sitting, Cuff Size: Adult)   Pulse 62   Ht 157.5 cm (62\")   Wt 67.1 kg (148 lb)   SpO2 96%   BMI 27.07 kg/m²     Lab Review:   I have reviewed previous office notes, recent labs and recent cardiac testing.     Cath 4/8/2023:  Procedure performed  Left heart cath  Coronary angiography  Right femoral arteriography  Insertion of 7Fr Mynx Perclose closure device with effective hemostasis and preserved lower extremity pulses  Left ventriculography  Supervision of the administration of moderate sedation  Ultrasound guidance to assist access of right femoral artery and to confirm placement of catheter [CPT code 78856]  Moderate sedation administered under supervision of  with monitoring [CPT code 39610]  PTCA of mid to distal left anterior descending coronary artery  PTCA of proximal left anterior descending coronary artery  Placement of 2.5 x 16 mm Synergy drug-eluting stent at high pressure and proximal left anterior descending coronary artery  LV Gram in Normal LVEF 40-45% with what appears to be global hypokinesis with moderate to severe hypokinesis noted in the apical lateral region  Moderate mitral regurgitation      Lab Results   Component Value Date    CHOL 101 04/09/2023    CHLPL 104 (L) 09/09/2016    TRIG 76 04/09/2023    HDL 45 04/09/2023    LDL 40 04/09/2023       Results for " "orders placed during the hospital encounter of 12/20/22    Adult Transthoracic Echo Complete w/ Color, Spectral and Contrast if necessary per protocol    Interpretation Summary  •  Left ventricular systolic function is normal. Estimated left ventricular EF = 50%  •  Left ventricular diastolic function is consistent with (grade I) impaired relaxation and age.  •  The right atrial cavity is borderline dilated.        Assessment:          Diagnosis Plan   1. Coronary artery disease involving native coronary artery of native heart without angina pectoris        2. Ischemic cardiomyopathy        3. Non-rheumatic mitral regurgitation        4. Essential hypertension        5. Mixed hyperlipidemia        6. Left bundle branch block             Plan:       1. CAD- stable. Recent stenting to LAD on 4/8 with previously placed stents noted to be patent. No clinical signs of ongoing ischemia. His current symptoms may be related to GI upset secondary to chronic reflux vs acute refulx due to ASA or PO iron use. Continue ASA, Plavix, statin, BB, CCB and ARB.   2. ICMO- EF 40-45% per LV gram at time of cath. No evidence of CHF. Continue ARB and Toprol.   3. MR- moderate per cath. Grade 1 Murmur present. Will continue to monitor.   4. HTN- mildly elevated today. Home readings of 130-150. Goal /80 or less. He is cleared to go back to the gym. Will contact him in 2 weeks regarding BP readings, allowing him time to \"get back to normal\". If his BP remains elevated will consider changing metoprolol to Coreg or starting Imdur. controlled. Followed by PCP  5. HLD- controlled with LDL of 40 at time of cath. Continue statin.  6. LBBB- chronic.     Follow up in 3 months or sooner if symptoms worsen.     I spent 30 minutes caring for Wyatt on this date of service. This time includes time spent by me in the following activities:preparing for the visit, reviewing tests, obtaining and/or reviewing a separately obtained history, performing " a medically appropriate examination and/or evaluation , documenting information in the medical record, independently interpreting results and communicating that information with the patient/family/caregiver and care coordination     I spent 2 minutes on the separately reported service of EKG interpretation. This time is not included in the time used to support the E/M service also reported today.    Current outpatient and discharge medications have been reconciled for the patient.  Reviewed by: LARRY Simon

## 2023-05-08 ENCOUNTER — TELEPHONE (OUTPATIENT)
Dept: CARDIOLOGY | Facility: CLINIC | Age: 88
End: 2023-05-08
Payer: MEDICARE

## 2023-05-08 NOTE — TELEPHONE ENCOUNTER
----- Message from LARRY Simon sent at 5/8/2023  1:05 PM CDT -----  Regarding: RE: BP  For the most part, those are pretty good. I'm going to leave him alone. No medication changes right now.   ----- Message -----  From: Ann Marie Mcfarland MA  Sent: 5/8/2023  12:49 PM CDT  To: LARRY Simon  Subject: RE: BP                                           Just now    142/60 HR 66  5/7/2023    131/52 HR 68  5/6/2023    142/62 HR 72  5/4/2023    123/51 HR 68  5/2/2023    150/62 HR 66  5/2/2023    138/57 HR 72  5/1/2023    141/68 HR 68  4/30/2023  126/53 HR 68  4/29/2023  130/60 HR 72  4/28/2023  140/58 HR 78  4/27/2023  146/60 HR 98  4/26/2023  145/58 HR 69  4/25/2023  140/59 HR 80  4/24/2023  127/51 HR 70  ----- Message -----  From: Bell Lawson APRN  Sent: 5/8/2023  12:09 PM CDT  To: Ann Marie Mcfarland MA  Subject: BP                                               Can you call and see what his BP has been running.

## 2023-05-08 NOTE — TELEPHONE ENCOUNTER
----- Message from LARRY Simon sent at 5/8/2023 12:08 PM CDT -----  Regarding: BP  Can you call and see what his BP has been running.

## 2023-05-08 NOTE — TELEPHONE ENCOUNTER
Just now    142/60 HR 66  5/7/2023    131/52 HR 68  5/6/2023    142/62 HR 72  5/4/2023    123/51 HR 68  5/2/2023    150/62 HR 66  5/2/2023    138/57 HR 72  5/1/2023    141/68 HR 68  4/30/2023  126/53 HR 68  4/29/2023  130/60 HR 72  4/28/2023  140/58 HR 78  4/27/2023  146/60 HR 98  4/26/2023  145/58 HR 69  4/25/2023  140/59 HR 80  4/24/2023  127/51 HR 70   SENT TO [place]. Ann Marie Mcfarland MA

## 2023-05-13 ENCOUNTER — APPOINTMENT (OUTPATIENT)
Dept: CT IMAGING | Facility: HOSPITAL | Age: 88
End: 2023-05-13
Payer: MEDICARE

## 2023-05-13 ENCOUNTER — HOSPITAL ENCOUNTER (INPATIENT)
Facility: HOSPITAL | Age: 88
LOS: 9 days | Discharge: HOME | End: 2023-05-22
Attending: FAMILY MEDICINE | Admitting: FAMILY MEDICINE
Payer: MEDICARE

## 2023-05-13 ENCOUNTER — APPOINTMENT (OUTPATIENT)
Dept: GENERAL RADIOLOGY | Facility: HOSPITAL | Age: 88
End: 2023-05-13
Payer: MEDICARE

## 2023-05-13 DIAGNOSIS — Z74.09 IMPAIRED MOBILITY: ICD-10-CM

## 2023-05-13 DIAGNOSIS — D50.8 OTHER IRON DEFICIENCY ANEMIA: ICD-10-CM

## 2023-05-13 DIAGNOSIS — R09.02 HYPOXIA: ICD-10-CM

## 2023-05-13 DIAGNOSIS — J18.9 PNEUMONIA OF RIGHT LOWER LOBE DUE TO INFECTIOUS ORGANISM: Primary | ICD-10-CM

## 2023-05-13 LAB
ALBUMIN SERPL-MCNC: 3.4 G/DL (ref 3.5–5.2)
ALBUMIN/GLOB SERPL: 1.1 G/DL
ALP SERPL-CCNC: 51 U/L (ref 39–117)
ALT SERPL W P-5'-P-CCNC: 21 U/L (ref 1–41)
ANION GAP SERPL CALCULATED.3IONS-SCNC: 14 MMOL/L (ref 5–15)
ARTERIAL PATENCY WRIST A: ABNORMAL
AST SERPL-CCNC: 38 U/L (ref 1–40)
ATMOSPHERIC PRESS: 752 MMHG
B PARAPERT DNA SPEC QL NAA+PROBE: NOT DETECTED
B PERT DNA SPEC QL NAA+PROBE: NOT DETECTED
BACTERIA UR QL AUTO: ABNORMAL /HPF
BASE EXCESS BLDA CALC-SCNC: 2.8 MMOL/L (ref 0–2)
BDY SITE: ABNORMAL
BILIRUB SERPL-MCNC: 0.5 MG/DL (ref 0–1.2)
BILIRUB UR QL STRIP: ABNORMAL
BODY TEMPERATURE: 37 C
BUN SERPL-MCNC: 20 MG/DL (ref 8–23)
BUN/CREAT SERPL: 27.8 (ref 7–25)
C PNEUM DNA NPH QL NAA+NON-PROBE: NOT DETECTED
CALCIUM SPEC-SCNC: 8.3 MG/DL (ref 8.2–9.6)
CHLORIDE SERPL-SCNC: 98 MMOL/L (ref 98–107)
CLARITY UR: CLEAR
CO2 SERPL-SCNC: 23 MMOL/L (ref 22–29)
COARSE GRAN CASTS URNS QL MICRO: ABNORMAL /LPF
COLOR UR: ABNORMAL
CREAT SERPL-MCNC: 0.72 MG/DL (ref 0.76–1.27)
D DIMER PPP FEU-MCNC: 1.78 MCGFEU/ML (ref 0–0.92)
D-LACTATE SERPL-SCNC: 1.3 MMOL/L (ref 0.5–2)
DEPRECATED RDW RBC AUTO: 48 FL (ref 37–54)
EGFRCR SERPLBLD CKD-EPI 2021: 85.2 ML/MIN/1.73
ERYTHROCYTE [DISTWIDTH] IN BLOOD BY AUTOMATED COUNT: 14.1 % (ref 12.3–15.4)
FLUAV SUBTYP SPEC NAA+PROBE: NOT DETECTED
FLUBV RNA ISLT QL NAA+PROBE: NOT DETECTED
GLOBULIN UR ELPH-MCNC: 3.1 GM/DL
GLUCOSE SERPL-MCNC: 134 MG/DL (ref 65–99)
GLUCOSE UR STRIP-MCNC: NEGATIVE MG/DL
HADV DNA SPEC NAA+PROBE: NOT DETECTED
HCO3 BLDA-SCNC: 26.9 MMOL/L (ref 20–26)
HCOV 229E RNA SPEC QL NAA+PROBE: NOT DETECTED
HCOV HKU1 RNA SPEC QL NAA+PROBE: NOT DETECTED
HCOV NL63 RNA SPEC QL NAA+PROBE: NOT DETECTED
HCOV OC43 RNA SPEC QL NAA+PROBE: NOT DETECTED
HCT VFR BLD AUTO: 31.8 % (ref 37.5–51)
HGB BLD-MCNC: 10 G/DL (ref 13–17.7)
HGB UR QL STRIP.AUTO: NEGATIVE
HMPV RNA NPH QL NAA+NON-PROBE: NOT DETECTED
HPIV1 RNA ISLT QL NAA+PROBE: NOT DETECTED
HPIV2 RNA SPEC QL NAA+PROBE: NOT DETECTED
HPIV3 RNA NPH QL NAA+PROBE: NOT DETECTED
HPIV4 P GENE NPH QL NAA+PROBE: NOT DETECTED
HYALINE CASTS UR QL AUTO: ABNORMAL /LPF
KETONES UR QL STRIP: NEGATIVE
L PNEUMO1 AG UR QL IA: NEGATIVE
LEUKOCYTE ESTERASE UR QL STRIP.AUTO: NEGATIVE
LYMPHOCYTES # BLD MANUAL: 0.66 10*3/MM3 (ref 0.7–3.1)
Lab: ABNORMAL
M PNEUMO IGG SER IA-ACNC: NOT DETECTED
MAGNESIUM SERPL-MCNC: 1.2 MG/DL (ref 1.7–2.3)
MCH RBC QN AUTO: 29.4 PG (ref 26.6–33)
MCHC RBC AUTO-ENTMCNC: 31.4 G/DL (ref 31.5–35.7)
MCV RBC AUTO: 93.5 FL (ref 79–97)
MODALITY: ABNORMAL
MRSA DNA SPEC QL NAA+PROBE: NORMAL
NEUTROPHILS # BLD AUTO: 7.57 10*3/MM3 (ref 1.7–7)
NEUTROPHILS NFR BLD MANUAL: 83 % (ref 42.7–76)
NEUTS BAND NFR BLD MANUAL: 9 % (ref 0–5)
NITRITE UR QL STRIP: NEGATIVE
NT-PROBNP SERPL-MCNC: 1826 PG/ML (ref 0–1800)
PCO2 BLDA: 38.6 MM HG (ref 35–45)
PCO2 TEMP ADJ BLD: 38.6 MM HG (ref 35–45)
PH BLDA: 7.45 PH UNITS (ref 7.35–7.45)
PH UR STRIP.AUTO: 5.5 [PH] (ref 5–8)
PH, TEMP CORRECTED: 7.45 PH UNITS (ref 7.35–7.45)
PLATELET # BLD AUTO: 131 10*3/MM3 (ref 140–450)
PMV BLD AUTO: 10.7 FL (ref 6–12)
PO2 BLDA: 58.5 MM HG (ref 83–108)
PO2 TEMP ADJ BLD: 58.5 MM HG (ref 83–108)
POLYCHROMASIA BLD QL SMEAR: ABNORMAL
POTASSIUM SERPL-SCNC: 3.3 MMOL/L (ref 3.5–5.2)
PROCALCITONIN SERPL-MCNC: 2.58 NG/ML (ref 0–0.25)
PROT SERPL-MCNC: 6.5 G/DL (ref 6–8.5)
PROT UR QL STRIP: ABNORMAL
RBC # BLD AUTO: 3.4 10*6/MM3 (ref 4.14–5.8)
RBC # UR STRIP: ABNORMAL /HPF
REF LAB TEST METHOD: ABNORMAL
RHINOVIRUS RNA SPEC NAA+PROBE: NOT DETECTED
RSV RNA NPH QL NAA+NON-PROBE: NOT DETECTED
S PNEUM AG SPEC QL LA: NEGATIVE
SAO2 % BLDCOA: 92.6 % (ref 94–99)
SARS-COV-2 RNA NPH QL NAA+NON-PROBE: NOT DETECTED
SMALL PLATELETS BLD QL SMEAR: ABNORMAL
SODIUM SERPL-SCNC: 135 MMOL/L (ref 136–145)
SP GR UR STRIP: 1.03 (ref 1–1.03)
SQUAMOUS #/AREA URNS HPF: ABNORMAL /HPF
TROPONIN T SERPL HS-MCNC: 50 NG/L
UROBILINOGEN UR QL STRIP: ABNORMAL
VARIANT LYMPHS NFR BLD MANUAL: 8 % (ref 19.6–45.3)
VENTILATOR MODE: ABNORMAL
WBC # UR STRIP: ABNORMAL /HPF
WBC MORPH BLD: NORMAL
WBC NRBC COR # BLD: 8.23 10*3/MM3 (ref 3.4–10.8)

## 2023-05-13 PROCEDURE — 25010000002 PIPERACILLIN SOD-TAZOBACTAM PER 1 G: Performed by: FAMILY MEDICINE

## 2023-05-13 PROCEDURE — 93005 ELECTROCARDIOGRAM TRACING: CPT

## 2023-05-13 PROCEDURE — 93010 ELECTROCARDIOGRAM REPORT: CPT | Performed by: EMERGENCY MEDICINE

## 2023-05-13 PROCEDURE — 71275 CT ANGIOGRAPHY CHEST: CPT

## 2023-05-13 PROCEDURE — 99285 EMERGENCY DEPT VISIT HI MDM: CPT

## 2023-05-13 PROCEDURE — 25010000002 VANCOMYCIN 10 G RECONSTITUTED SOLUTION: Performed by: FAMILY MEDICINE

## 2023-05-13 PROCEDURE — 85007 BL SMEAR W/DIFF WBC COUNT: CPT | Performed by: PHYSICIAN ASSISTANT

## 2023-05-13 PROCEDURE — 85025 COMPLETE CBC W/AUTO DIFF WBC: CPT | Performed by: PHYSICIAN ASSISTANT

## 2023-05-13 PROCEDURE — 87641 MR-STAPH DNA AMP PROBE: CPT | Performed by: FAMILY MEDICINE

## 2023-05-13 PROCEDURE — 93010 ELECTROCARDIOGRAM REPORT: CPT | Performed by: HOSPITALIST

## 2023-05-13 PROCEDURE — 94640 AIRWAY INHALATION TREATMENT: CPT

## 2023-05-13 PROCEDURE — 87040 BLOOD CULTURE FOR BACTERIA: CPT | Performed by: FAMILY MEDICINE

## 2023-05-13 PROCEDURE — 25010000002 PIPERACILLIN SOD-TAZOBACTAM PER 1 G: Performed by: PHYSICIAN ASSISTANT

## 2023-05-13 PROCEDURE — 85379 FIBRIN DEGRADATION QUANT: CPT | Performed by: PHYSICIAN ASSISTANT

## 2023-05-13 PROCEDURE — 87581 M.PNEUMON DNA AMP PROBE: CPT | Performed by: FAMILY MEDICINE

## 2023-05-13 PROCEDURE — 36600 WITHDRAWAL OF ARTERIAL BLOOD: CPT

## 2023-05-13 PROCEDURE — 36415 COLL VENOUS BLD VENIPUNCTURE: CPT | Performed by: FAMILY MEDICINE

## 2023-05-13 PROCEDURE — 93005 ELECTROCARDIOGRAM TRACING: CPT | Performed by: PHYSICIAN ASSISTANT

## 2023-05-13 PROCEDURE — 25010000002 VANCOMYCIN 10 G RECONSTITUTED SOLUTION: Performed by: PHYSICIAN ASSISTANT

## 2023-05-13 PROCEDURE — 80053 COMPREHEN METABOLIC PANEL: CPT | Performed by: PHYSICIAN ASSISTANT

## 2023-05-13 PROCEDURE — 84145 PROCALCITONIN (PCT): CPT | Performed by: PHYSICIAN ASSISTANT

## 2023-05-13 PROCEDURE — 81001 URINALYSIS AUTO W/SCOPE: CPT | Performed by: PHYSICIAN ASSISTANT

## 2023-05-13 PROCEDURE — 84484 ASSAY OF TROPONIN QUANT: CPT | Performed by: PHYSICIAN ASSISTANT

## 2023-05-13 PROCEDURE — 83605 ASSAY OF LACTIC ACID: CPT | Performed by: PHYSICIAN ASSISTANT

## 2023-05-13 PROCEDURE — 83735 ASSAY OF MAGNESIUM: CPT | Performed by: FAMILY MEDICINE

## 2023-05-13 PROCEDURE — 94761 N-INVAS EAR/PLS OXIMETRY MLT: CPT

## 2023-05-13 PROCEDURE — 94799 UNLISTED PULMONARY SVC/PX: CPT

## 2023-05-13 PROCEDURE — 25510000001 IOPAMIDOL PER 1 ML: Performed by: PHYSICIAN ASSISTANT

## 2023-05-13 PROCEDURE — 71045 X-RAY EXAM CHEST 1 VIEW: CPT

## 2023-05-13 PROCEDURE — 83880 ASSAY OF NATRIURETIC PEPTIDE: CPT | Performed by: PHYSICIAN ASSISTANT

## 2023-05-13 PROCEDURE — 0202U NFCT DS 22 TRGT SARS-COV-2: CPT | Performed by: PHYSICIAN ASSISTANT

## 2023-05-13 PROCEDURE — 87040 BLOOD CULTURE FOR BACTERIA: CPT | Performed by: PHYSICIAN ASSISTANT

## 2023-05-13 PROCEDURE — 87449 NOS EACH ORGANISM AG IA: CPT | Performed by: FAMILY MEDICINE

## 2023-05-13 PROCEDURE — 82803 BLOOD GASES ANY COMBINATION: CPT

## 2023-05-13 RX ORDER — LOSARTAN POTASSIUM 50 MG/1
50 TABLET ORAL
Status: DISCONTINUED | OUTPATIENT
Start: 2023-05-13 | End: 2023-05-22 | Stop reason: HOSPADM

## 2023-05-13 RX ORDER — SODIUM CHLORIDE 0.9 % (FLUSH) 0.9 %
10 SYRINGE (ML) INJECTION AS NEEDED
Status: DISCONTINUED | OUTPATIENT
Start: 2023-05-13 | End: 2023-05-17 | Stop reason: SDUPTHER

## 2023-05-13 RX ORDER — CLOPIDOGREL BISULFATE 75 MG/1
75 TABLET ORAL DAILY
Status: DISCONTINUED | OUTPATIENT
Start: 2023-05-13 | End: 2023-05-22 | Stop reason: HOSPADM

## 2023-05-13 RX ORDER — AMLODIPINE BESYLATE 5 MG/1
5 TABLET ORAL DAILY
Status: DISCONTINUED | OUTPATIENT
Start: 2023-05-13 | End: 2023-05-14

## 2023-05-13 RX ORDER — FUROSEMIDE 10 MG/ML
60 INJECTION INTRAMUSCULAR; INTRAVENOUS ONCE
Status: DISCONTINUED | OUTPATIENT
Start: 2023-05-13 | End: 2023-05-13

## 2023-05-13 RX ORDER — SODIUM CHLORIDE 0.9 % (FLUSH) 0.9 %
10 SYRINGE (ML) INJECTION EVERY 12 HOURS SCHEDULED
Status: DISCONTINUED | OUTPATIENT
Start: 2023-05-13 | End: 2023-05-22 | Stop reason: HOSPADM

## 2023-05-13 RX ORDER — SODIUM CHLORIDE 9 MG/ML
40 INJECTION, SOLUTION INTRAVENOUS AS NEEDED
Status: DISCONTINUED | OUTPATIENT
Start: 2023-05-13 | End: 2023-05-22 | Stop reason: HOSPADM

## 2023-05-13 RX ORDER — POTASSIUM CHLORIDE 750 MG/1
20 CAPSULE, EXTENDED RELEASE ORAL ONCE
Status: COMPLETED | OUTPATIENT
Start: 2023-05-13 | End: 2023-05-13

## 2023-05-13 RX ORDER — ATORVASTATIN CALCIUM 40 MG/1
40 TABLET, FILM COATED ORAL DAILY
Status: DISCONTINUED | OUTPATIENT
Start: 2023-05-13 | End: 2023-05-22 | Stop reason: HOSPADM

## 2023-05-13 RX ORDER — CHOLECALCIFEROL (VITAMIN D3) 125 MCG
1000 CAPSULE ORAL DAILY
Status: DISCONTINUED | OUTPATIENT
Start: 2023-05-13 | End: 2023-05-22 | Stop reason: HOSPADM

## 2023-05-13 RX ORDER — POTASSIUM CHLORIDE 750 MG/1
40 CAPSULE, EXTENDED RELEASE ORAL 2 TIMES DAILY WITH MEALS
Status: DISCONTINUED | OUTPATIENT
Start: 2023-05-13 | End: 2023-05-14

## 2023-05-13 RX ORDER — PANTOPRAZOLE SODIUM 40 MG/1
40 TABLET, DELAYED RELEASE ORAL
Status: DISCONTINUED | OUTPATIENT
Start: 2023-05-13 | End: 2023-05-22 | Stop reason: HOSPADM

## 2023-05-13 RX ORDER — BENZONATATE 100 MG/1
200 CAPSULE ORAL 3 TIMES DAILY PRN
Status: DISCONTINUED | OUTPATIENT
Start: 2023-05-13 | End: 2023-05-22 | Stop reason: HOSPADM

## 2023-05-13 RX ORDER — SODIUM CHLORIDE 0.9 % (FLUSH) 0.9 %
10 SYRINGE (ML) INJECTION AS NEEDED
Status: DISCONTINUED | OUTPATIENT
Start: 2023-05-13 | End: 2023-05-22 | Stop reason: HOSPADM

## 2023-05-13 RX ORDER — NITROGLYCERIN 0.4 MG/1
0.4 TABLET SUBLINGUAL
Status: DISCONTINUED | OUTPATIENT
Start: 2023-05-13 | End: 2023-05-22 | Stop reason: HOSPADM

## 2023-05-13 RX ORDER — NITROGLYCERIN 0.4 MG/1
0.4 TABLET SUBLINGUAL
Status: DISCONTINUED | OUTPATIENT
Start: 2023-05-13 | End: 2023-05-13

## 2023-05-13 RX ORDER — ACETAMINOPHEN 500 MG
500 TABLET ORAL EVERY 6 HOURS PRN
Status: DISCONTINUED | OUTPATIENT
Start: 2023-05-13 | End: 2023-05-15

## 2023-05-13 RX ORDER — METOPROLOL SUCCINATE 25 MG/1
25 TABLET, EXTENDED RELEASE ORAL
Status: DISCONTINUED | OUTPATIENT
Start: 2023-05-13 | End: 2023-05-14

## 2023-05-13 RX ORDER — ALBUTEROL SULFATE 2.5 MG/3ML
2.5 SOLUTION RESPIRATORY (INHALATION)
Status: COMPLETED | OUTPATIENT
Start: 2023-05-13 | End: 2023-05-13

## 2023-05-13 RX ADMIN — MAGNESIUM GLUCONATE 500 MG ORAL TABLET 400 MG: 500 TABLET ORAL at 15:28

## 2023-05-13 RX ADMIN — Medication 10 ML: at 21:57

## 2023-05-13 RX ADMIN — LOSARTAN POTASSIUM 50 MG: 50 TABLET, FILM COATED ORAL at 15:28

## 2023-05-13 RX ADMIN — TAZOBACTAM SODIUM AND PIPERACILLIN SODIUM 3.38 G: 375; 3 INJECTION, SOLUTION INTRAVENOUS at 23:17

## 2023-05-13 RX ADMIN — METOPROLOL SUCCINATE 25 MG: 25 TABLET, EXTENDED RELEASE ORAL at 15:28

## 2023-05-13 RX ADMIN — IOPAMIDOL 100 ML: 755 INJECTION, SOLUTION INTRAVENOUS at 10:45

## 2023-05-13 RX ADMIN — POTASSIUM CHLORIDE 40 MEQ: 10 CAPSULE, COATED, EXTENDED RELEASE ORAL at 17:22

## 2023-05-13 RX ADMIN — ALBUTEROL SULFATE 2.5 MG: 2.5 SOLUTION RESPIRATORY (INHALATION) at 08:39

## 2023-05-13 RX ADMIN — VANCOMYCIN HYDROCHLORIDE 1250 MG: 10 INJECTION, POWDER, LYOPHILIZED, FOR SOLUTION INTRAVENOUS at 09:39

## 2023-05-13 RX ADMIN — AMLODIPINE BESYLATE 5 MG: 5 TABLET ORAL at 15:28

## 2023-05-13 RX ADMIN — POTASSIUM CHLORIDE 20 MEQ: 10 CAPSULE, COATED, EXTENDED RELEASE ORAL at 09:57

## 2023-05-13 RX ADMIN — ASPIRIN 81 MG: 81 TABLET, COATED ORAL at 15:28

## 2023-05-13 RX ADMIN — PIPERACILLIN AND TAZOBACTAM 3.38 G: 3; .375 INJECTION, POWDER, LYOPHILIZED, FOR SOLUTION INTRAVENOUS at 08:46

## 2023-05-13 RX ADMIN — CLOPIDOGREL BISULFATE 75 MG: 75 TABLET, FILM COATED ORAL at 15:28

## 2023-05-13 RX ADMIN — NITROGLYCERIN 0.4 MG: 0.4 TABLET SUBLINGUAL at 09:58

## 2023-05-13 RX ADMIN — Medication 1000 MCG: at 15:28

## 2023-05-13 RX ADMIN — VANCOMYCIN HYDROCHLORIDE 750 MG: 10 INJECTION, POWDER, LYOPHILIZED, FOR SOLUTION INTRAVENOUS at 21:57

## 2023-05-13 RX ADMIN — ALBUTEROL SULFATE 2.5 MG: 2.5 SOLUTION RESPIRATORY (INHALATION) at 08:33

## 2023-05-13 RX ADMIN — TAZOBACTAM SODIUM AND PIPERACILLIN SODIUM 3.38 G: 375; 3 INJECTION, SOLUTION INTRAVENOUS at 16:53

## 2023-05-13 RX ADMIN — PANTOPRAZOLE SODIUM 40 MG: 40 TABLET, DELAYED RELEASE ORAL at 17:22

## 2023-05-13 RX ADMIN — ACETAMINOPHEN 500 MG: 500 TABLET, FILM COATED ORAL at 20:16

## 2023-05-13 RX ADMIN — Medication 10 ML: at 15:29

## 2023-05-13 RX ADMIN — ATORVASTATIN CALCIUM 40 MG: 40 TABLET, FILM COATED ORAL at 15:28

## 2023-05-13 NOTE — H&P
[facility] Medicine Services  HISTORY AND PHYSICAL    Date of Admission: 5/13/2023  Primary Care Physician: Everardo Jackson MD    Subjective   Primary Historian: Patient and family    Chief Complaint: Pneumonia.    History of Present Illness    Patient is a 89-year-old  male presented to ER by EMS complaining of fatigue.  Patient's baseline is independent and active.  He usually ambulate without assistant.  For the past 3 days patient had a fever of 103 at time.  Patient also complain of chronic cough, coughing more than usual.  Patient has more shortness of breath specially with exertion.  When patient ambulate he complain of shortness of breath.  He is napping more through the day than usual.  Patient cannot sleep at night and restless all day.  Patient sleeping all the time and sleeping on the recliner which is unusual for him.  There are some edema lower extremities.  Patient saw his primary care physician 2 days ago and was prescribed amoxicillin for diagnosis of acute bronchitis.  Patient has been taking antibiotics and is not any better.  His edema lower extremity persists.  He is spiked a temperature 104 this morning.    Patient had skin surgery about a month ago and has been off on Plavix.  Postoperatively he developed chest discomfort and to monitor him.  Patient had elevated troponin then underwent cardiac catheterization 4/8/23 and had a stent placed distal left anterior ascending coronary artery.  PTCA of the proximal left anterior descending coronary artery.  In addition his chest pain was alleviated.     Patient has a past medical history of coronary artery disease with recent stenting and cardiac catheterization, ischemic cardiomyopathy, nonrheumatic mitral regurgitation, essential hypertension, mixed hyperlipidemia, left bundle branch block, bladder cancer, carcinoma in situ of lip, lung nodules, tuberculosis.    Review of Systems   Constitutional:  Positive for activity change, appetite change and fatigue. Negative for chills and fever.   HENT: Negative for hearing loss, nosebleeds, tinnitus and trouble swallowing.    Eyes: Negative for visual disturbance.   Respiratory: Positive for cough and shortness of breath. Negative for chest tightness and wheezing.    Cardiovascular: Negative for chest pain, palpitations and leg swelling.   Gastrointestinal: Negative for abdominal distention, abdominal pain, blood in stool, constipation, diarrhea, nausea and vomiting.   Endocrine: Negative for cold intolerance, heat intolerance, polydipsia, polyphagia and polyuria.   Genitourinary: Negative for decreased urine volume, difficulty urinating, dysuria, flank pain, frequency and hematuria.   Musculoskeletal: Positive for arthralgias, gait problem and myalgias. Negative for joint swelling.   Skin: Negative for rash.   Allergic/Immunologic: Negative for immunocompromised state.   Neurological: Positive for weakness. Negative for dizziness, syncope, light-headedness and headaches.   Hematological: Negative for adenopathy. Does not bruise/bleed easily.   Psychiatric/Behavioral: Positive for confusion. Negative for sleep disturbance. The patient is not nervous/anxious.         Otherwise complete ROS reviewed and negative except as mentioned in the HPI.    Past Medical History:   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   • GERD (gastroesophageal reflux disease)    • Hyperlipidemia    • Hypertension    • Irregular heart beat    • Lung nodules      Past Surgical History:  Past Surgical History:   Procedure Laterality Date   • BLADDER SURGERY     • CARDIAC CATHETERIZATION     • CARDIAC CATHETERIZATION Left 4/8/2023    Procedure: Cardiac Catheterization/Vascular Study;  Surgeon: Sukhi Hartley MD;  Location: [facility] CATH  "INVASIVE LOCATION;  Service: Cardiology;  Laterality: Left;   • COLONOSCOPY     • CORONARY ANGIOPLASTY WITH STENT PLACEMENT  2006    STENT X 2   • CYSTOSCOPY     • ENDOSCOPY     • EXCISION LESION N/A 4/7/2023    Procedure: EXCISION LESION OF RIGHT TEMPLE AND LEFT TEMPLE WITH FROZEN SECTION WITH POSSIBLE FLAP OR GRAFT;  Surgeon: Brijesh Nickerson MD;  Location:  [facility] OR;  Service: ENT;  Laterality: N/A;   • FLAP HEAD/NECK Left 03/09/2020    Procedure: POSSIBLE FLAP;  Surgeon: Brijesh Nickerson MD;  Location:  [facility] OR;  Service: ENT;  Laterality: Left;   • HEAD/NECK LESION/CYST EXCISION Left 03/09/2020    Procedure: 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 His smoking use included cigarettes. He started smoking about 75 years ago. He has a 30.00 pack-year smoking history. He has been exposed to tobacco smoke. He has never used smokeless tobacco. He reports current alcohol use of about 1.0 standard drink per week. He reports that he does not currently use drugs.    Family History: family history includes Alcohol abuse in his sister; Diabetes in his sister; Heart attack in his brother and father; Heart disease in his brother, brother, father, and sister; Hyperlipidemia in his sister; Hypertension in his sister; Tuberculosis in his mother.       Allergies:  Allergies   Allergen Reactions   • Lyrica [Pregabalin] Mental Status Change     Made pt feel \"crazy\"       Medications:  Prior to Admission " medications    Medication Sig Start Date End Date Taking? Authorizing Provider   acetaminophen (TYLENOL) 500 MG tablet Take 1 tablet by mouth Every 6 (Six) Hours As Needed for Mild Pain.    Manan Govea MD   amLODIPine (NORVASC) 10 MG tablet Take 1 tablet by mouth Daily. 10/2/17   Manan Govea MD   aspirin 81 MG EC tablet Take 1 tablet by mouth Daily--DO NOT BREAK/CRUSH TABLET 4/11/23   Neda Marin APRN   atorvastatin (LIPITOR) 40 MG tablet Take 1 tablet by mouth Daily. 4/11/23   Neda Marin APRN   benzonatate (TESSALON) 200 MG capsule Take 1 capsule by mouth 3 (Three) Times a Day As Needed for Cough.    Manan Govea MD   clopidogrel (PLAVIX) 75 MG tablet Take 1 tablet by mouth Daily. 4/18/16   Manan Govea MD   irbesartan (AVAPRO) 300 MG tablet Take 1 tablet by mouth Every Night.    Manan Govea MD   lansoprazole (PREVACID) 30 MG capsule Take 1 capsule by mouth Daily.    Manan Govea MD   magnesium oxide (MAG-OX) 400 MG tablet Take 1 tablet by mouth Daily. 8/23/22   Manan Govea MD   metoprolol succinate XL (TOPROL-XL) 25 MG 24 hr tablet Take 1 tablet by mouth Daily.  Patient taking differently: Take 1 tablet by mouth Every Night. 4/11/23   Neda Marin APRN   nitroglycerin (NITROSTAT) 0.4 MG SL tablet Place 1 tablet under the tongue Every 5 (Five) Minutes As Needed.    Manan Govae MD   vitamin B-12 (CYANOCOBALAMIN) 1000 MCG tablet Take 1 tablet by mouth Daily.    Manan Govea MD   Vitron-C  MG tablet Take 1 tablet by mouth Daily. 4/13/23   Manan Govea MD   Zinc 50 MG capsule Take 50 mg by mouth Daily.    Manan Govea MD     I have utilized all available immediate resources to obtain, update, or review the patient's current medications (including all prescriptions, over-the-counter products, herbals, cannabis/cannabidiol products, and vitamin/mineral/dietary (nutritional)  "supplements).    Objective     Vital Signs: /48   Pulse 89   Temp 99.5 °F (37.5 °C) (Oral)   Resp 12   Ht 162.6 cm (64\")   Wt 68 kg (150 lb)   SpO2 93%   BMI 25.75 kg/m²   Physical Exam  Vitals and nursing note reviewed.   Constitutional:       Comments: Advanced age .   HENT:      Head: Normocephalic.   Eyes:      Conjunctiva/sclera: Conjunctivae normal.      Pupils: Pupils are equal, round, and reactive to light.   Neck:      Vascular: No JVD.   Cardiovascular:      Rate and Rhythm: Normal rate and regular rhythm.      Heart sounds: Normal heart sounds.   Pulmonary:      Effort: No respiratory distress.      Breath sounds: No wheezing or rales.      Comments: Diminished breath semilateral, clear, on 2 L.  Chest:      Chest wall: No tenderness.   Abdominal:      General: Bowel sounds are normal. There is no distension.      Palpations: Abdomen is soft.      Tenderness: There is no abdominal tenderness.   Musculoskeletal:         General: No tenderness or deformity.      Cervical back: Neck supple.   Skin:     General: Skin is warm and dry.      Capillary Refill: Capillary refill takes 2 to 3 seconds.      Findings: No rash.   Neurological:      Mental Status: He is alert.      Cranial Nerves: No cranial nerve deficit.      Motor: Weakness present. No abnormal muscle tone.      Coordination: Coordination abnormal.      Gait: Gait abnormal.      Deep Tendon Reflexes: Reflexes normal.   Psychiatric:         Mood and Affect: Mood normal.         Behavior: Behavior normal.              Results Reviewed:  Lab Results (last 24 hours)     Procedure Component Value Units Date/Time    Blood Culture With NINA - Blood, Arm, Left [[id number]] Collected: 05/13/23 0835    Specimen: Blood from Arm, Left Updated: 05/13/23 1149    Respiratory Panel PCR w/COVID-19(SARS-CoV-2) [facility]ANNON/[facility] In-House, NP Swab in UTM/VTM, 3-4 HR TAT - Swab, Nasopharynx [[id number]]  (Normal) Collected: 05/13/23 0848    Specimen: " Swab from Nasopharynx Updated: 05/13/23 1026     ADENOVIRUS, PCR Not Detected     Coronavirus 229E Not Detected     Coronavirus HKU1 Not Detected     Coronavirus NL63 Not Detected     Coronavirus OC43 Not Detected     COVID19 Not Detected     Human Metapneumovirus Not Detected     Human Rhinovirus/Enterovirus Not Detected     Influenza A PCR Not Detected     Influenza B PCR Not Detected     Parainfluenza Virus 1 Not Detected     Parainfluenza Virus 2 Not Detected     Parainfluenza Virus 3 Not Detected     Parainfluenza Virus 4 Not Detected     RSV, PCR Not Detected     Bordetella pertussis pcr Not Detected     Bordetella parapertussis PCR Not Detected     Chlamydophila pneumoniae PCR Not Detected     Mycoplasma pneumo by PCR Not Detected    Narrative:      In the setting of a positive respiratory panel with a viral infection PLUS a negative procalcitonin without other underlying concern for bacterial infection, consider observing off antibiotics or discontinuation of antibiotics and continue supportive care. If the respiratory panel is positive for atypical bacterial infection (Bordetella pertussis, Chlamydophila pneumoniae, or Mycoplasma pneumoniae), consider antibiotic de-escalation to target atypical bacterial infection.    Blood Culture With NINA - Blood, Arm, Left [[id number]] Collected: 05/13/23 0834    Specimen: Blood from Arm, Left Updated: 05/13/23 1005    Urinalysis, Microscopic Only - Urine, Clean Catch [[id number]]  (Abnormal) Collected: 05/13/23 0905    Specimen: Urine, Clean Catch Updated: 05/13/23 0931     RBC, UA 0-2 /HPF      WBC, UA 0-2 /HPF      Comment: Urine culture not indicated.        Bacteria, UA Trace /HPF      Squamous Epithelial Cells, UA 0-2 /HPF      Hyaline Casts, UA None Seen /LPF      Coarse Granular Casts, UA 0-2 /LPF      Methodology Manual Light Microscopy    Urinalysis With Culture If Indicated - Urine, Clean Catch [[id number]]  (Abnormal) Collected: 05/13/23 0905    Specimen: Urine,  Clean Catch Updated: 05/13/23 0931     Color, UA Dark Yellow     Appearance, UA Clear     pH, UA 5.5     Specific Gravity, UA 1.027     Glucose, UA Negative     Ketones, UA Negative     Bilirubin, UA Small (1+)     Blood, UA Negative     Protein,  mg/dL (2+)     Leuk Esterase, UA Negative     Nitrite, UA Negative     Urobilinogen, UA 1.0 E.U./dL    Narrative:      In absence of clinical symptoms, the presence of pyuria, bacteria, and/or nitrites on the urinalysis result does not correlate with infection.    Manual Differential [[id number]]  (Abnormal) Collected: 05/13/23 0834    Specimen: Blood Updated: 05/13/23 0926     Neutrophil % 83.0 %      Lymphocyte % 8.0 %      Bands %  9.0 %      Neutrophils Absolute 7.57 10*3/mm3      Lymphocytes Absolute 0.66 10*3/mm3      Polychromasia Slight/1+     WBC Morphology Normal     Platelet Estimate Decreased    CBC & Differential [[id number]]  (Abnormal) Collected: 05/13/23 0834    Specimen: Blood Updated: 05/13/23 0926    Narrative:      The following orders were created for panel order CBC & Differential.  Procedure                               Abnormality         Status                     ---------                               -----------         ------                     CBC Auto Differential[[id number]]        Abnormal            Final result                 Please view results for these tests on the individual orders.    CBC Auto Differential [[id number]]  (Abnormal) Collected: 05/13/23 0834    Specimen: Blood Updated: 05/13/23 0926     WBC 8.23 10*3/mm3      RBC 3.40 10*6/mm3      Hemoglobin 10.0 g/dL      Hematocrit 31.8 %      MCV 93.5 fL      MCH 29.4 pg      MCHC 31.4 g/dL      RDW 14.1 %      RDW-SD 48.0 fl      MPV 10.7 fL      Platelets 131 10*3/mm3     Procalcitonin [[id number]]  (Abnormal) Collected: 05/13/23 0834    Specimen: Blood Updated: 05/13/23 0924     Procalcitonin 2.58 ng/mL     Narrative:      As a Marker for Sepsis (Non-Neonates):    1. <0.5  "ng/mL represents a low risk of severe sepsis and/or septic shock.  2. >2 ng/mL represents a high risk of severe sepsis and/or septic shock.    As a Marker for Lower Respiratory Tract Infections that require antibiotic therapy:    PCT on Admission    Antibiotic Therapy       6-12 Hrs later    >0.5                Strongly Recommended  >0.25 - <0.5        Recommended   0.1 - 0.25          Discouraged              Remeasure/reassess PCT  <0.1                Strongly Discouraged     Remeasure/reassess PCT    As 28 day mortality risk marker: \"Change in Procalcitonin Result\" (>80% or <=80%) if Day 0 (or Day 1) and Day 4 values are available. Refer to http://www.[employer] Navigationpct-calculator.com    Change in PCT <=80%  A decrease of PCT levels below or equal to 80% defines a positive change in PCT test result representing a higher risk for 28-day all-cause mortality of patients diagnosed with severe sepsis for septic shock.    Change in PCT >80%  A decrease of PCT levels of more than 80% defines a negative change in PCT result representing a lower risk for 28-day all-cause mortality of patients diagnosed with severe sepsis or septic shock.       Comprehensive Metabolic Panel [[id number]]  (Abnormal) Collected: 05/13/23 0834    Specimen: Blood Updated: 05/13/23 0920     Glucose 134 mg/dL      BUN 20 mg/dL      Creatinine 0.72 mg/dL      Sodium 135 mmol/L      Potassium 3.3 mmol/L      Chloride 98 mmol/L      CO2 23.0 mmol/L      Calcium 8.3 mg/dL      Total Protein 6.5 g/dL      Albumin 3.4 g/dL      ALT (SGPT) 21 U/L      AST (SGOT) 38 U/L      Alkaline Phosphatase 51 U/L      Total Bilirubin 0.5 mg/dL      Globulin 3.1 gm/dL      A/G Ratio 1.1 g/dL      BUN/Creatinine Ratio 27.8     Anion Gap 14.0 mmol/L      eGFR 85.2 mL/min/1.73     Narrative:      GFR Normal >60  Chronic Kidney Disease <60  Kidney Failure <15    The GFR formula is only valid for adults with stable renal function between ages 18 and 70.    D-dimer, Quantitative " "[[id number]]  (Abnormal) Collected: 05/13/23 0834    Specimen: Blood Updated: 05/13/23 0919     D-Dimer, Quantitative 1.78 MCGFEU/mL     Narrative:      According to the assay 's published package insert, a normal (<0.50 MCGFEU/mL) D-dimer result in conjunction with a non-high clinical probability assessment, excludes deep vein thrombosis (DVT) and pulmonary embolism (PE) with high sensitivity.    D-dimer values increase with age and this can make VTE exclusion of an older population difficult. To address this, the [employer], based on best available evidence and recent guidelines, recommends that clinicians use age-adjusted D-dimer thresholds in patients greater than 50 years of age with: a) a low probability of PE who do not meet all Pulmonary Embolism Rule Out Criteria, or b) in those with intermediate probability of PE.   The formula for an age-adjusted D-dimer cut-off is \"age/100\".  For example, a 60 year old patient would have an age-adjusted cut-off of 0.60 MCGFEU/mL and an 80 year old 0.80 MCGFEU/mL.    BNP [[id number]]  (Abnormal) Collected: 05/13/23 0834    Specimen: Blood Updated: 05/13/23 0918     proBNP 1,826.0 pg/mL     Narrative:      Among patients with dyspnea, NT-proBNP is highly sensitive for the detection of acute congestive heart failure. In addition NT-proBNP of <300 pg/ml effectively rules out acute congestive heart failure with 99% negative predictive value.    Results may be falsely decreased if patient taking Biotin.      Lactic Acid, Plasma [[id number]]  (Normal) Collected: 05/13/23 0839    Specimen: Blood Updated: 05/13/23 0917     Lactate 1.3 mmol/L     Single High Sensitivity Troponin T [[id number]]  (Abnormal) Collected: 05/13/23 0834    Specimen: Blood Updated: 05/13/23 0916     HS Troponin T 50 ng/L     Narrative:      High Sensitive Troponin T Reference Range:  <10.0 ng/L- Negative Female for AMI  <15.0 ng/L- Negative Male for AMI  >=10 - Abnormal Female " indicating possible myocardial injury.  >=15 - Abnormal Male indicating possible myocardial injury.   Clinicians would have to utilize clinical acumen, EKG, Troponin, and serial changes to determine if it is an Acute Myocardial Infarction or myocardial injury due to an underlying chronic condition.         Blood Gas, Arterial - [[id number]]  (Abnormal) Collected: 05/13/23 0839    Specimen: Arterial Blood Updated: 05/13/23 0836     Site Right Brachial     Biju's Test N/A     pH, Arterial 7.451 pH units      Comment: 83 Value above reference range        pCO2, Arterial 38.6 mm Hg      pO2, Arterial 58.5 mm Hg      Comment: 84 Value below reference range        HCO3, Arterial 26.9 mmol/L      Comment: 83 Value above reference range        Base Excess, Arterial 2.8 mmol/L      Comment: 83 Value above reference range        O2 Saturation, Arterial 92.6 %      Comment: 84 Value below reference range        Temperature 37.0 C      Barometric Pressure for Blood Gas 752 mmHg      Modality Room Air     Ventilator Mode NA     Collected by [id number]     Comment: Meter: [id number]     :  [id number]        pCO2, Temperature Corrected 38.6 mm Hg      pH, Temp Corrected 7.451 pH Units      pO2, Temperature Corrected 58.5 mm Hg         Imaging Results (Last 24 Hours)     Procedure Component Value Units Date/Time    CT Angiogram Chest [[id number]] Collected: 05/13/23 1052     Updated: 05/13/23 1058    Narrative:      EXAMINATION: CT ANGIOGRAM CHEST-      5/13/2023 10:26 AM CDT     HISTORY: Recent cardiac catheterization. Shortness of breath with fever  and lower extremity edema.     In order to have a CT radiation dose as low as reasonably achievable  Automated Exposure Control was utilized for adjustment of the mA and/or  KV according to patient size.     DLP in mGycm= 194.     CT angiogram chest with IV contrast.  CT angiography protocol.   CT imaging with bolus IV contrast injection.   Under concurrent supervision axial,  sagittal, coronal,  three-dimensional, and MIP data sets were constructed on an independent  work station.     Comparison is made with 08/02/2022.     Normal heart size.  Normal size thoracic aorta.     Mildly enlarged and well opacified pulmonary arteries.  No pulmonary embolism.     Posterior right lower lobe pneumonia.     Underlying chronic interstitial disease.  No pneumothorax and no significant pleural effusion.  No sign of heart failure.     Prominent diffuse osteopenia.  Severe degenerative spine change with mild chronic anterior wedge  compression of T12.     Summary:  1. No pulmonary embolism.  2. Right lower lobe pneumonia.                                            This report was finalized on 05/13/2023 10:55 by Dr. Mac Lucio MD.    XR Chest 1 View [[id number]] Collected: 05/13/23 0826     Updated: 05/13/23 0830    Narrative:      EXAMINATION: XR CHEST 1 VW-     5/13/2023 8:21 AM CDT     HISTORY: Chest pain.     1 view chest x-ray.     Comparison is made with 03/31/2023.     Stable heart size.     Lower lung volumes with patchy basilar atelectasis.     No pneumothorax or heart failure.     Summary:  1. Hypoaeration with patchy atelectasis.     This report was finalized on 05/13/2023 08:27 by Dr. Mac Lucio MD.        I have personally reviewed and interpreted the radiology studies and ECG obtained at time of admission.     Assessment / Plan   Assessment:   Active Hospital Problems    Diagnosis    • Right lower lobe pneumonia    • Cough    • Mediastinal adenopathy    • Other fatigue    • Non-rheumatic mitral regurgitation    • Coronary artery disease involving native coronary artery of native heart without angina pectoris    • Essential hypertension    • Mixed hyperlipidemia    • History of bladder cancer    • Benign localized prostatic hyperplasia without lower urinary tract symptoms (LUTS)        Treatment Plan  The patient will be admitted to my service here at [facility].      Pneumonia/right lower lobe . Elevated procalcitonin.  Lactate is normal.  Chest x-ray-Hypoaeration with patchy atelectasis.  CTA of the chest-No pulmonary embolism, Right lower lobe pneumonia.    CAD-status post stent placement 4/8/23/hypertension/hyperlipidemia.  BNP 1800.  Troponin 50.  EKG shows sinus rhythm with PAC.   Aspirin.  Plavix.  Cozaar in half.  Norvasc in half.  Toprol.  Nitro as needed.  Echocardiogram 12/20/2022- ejection fraction 50%, diastolic dysfunction grade 1, right atrial cavity borderline dilated.    Hyponatremia.  Will follow.    Hypokalemia.  P.o. potassium.  Magnesium level.    Anemia.    Nutrition.  B12 . Cardiac diet.    Deconditioning.  PT and OT consult.    Blood culture NINA pending.  Respiratory panel-negative.    Medical Decision Making  Number and Complexity of problems: Right lower lobe pneumonia/CAD/hyponatremia/hypokalemia/anemia  Differential Diagnosis: None    Conditions and Status     Evaluation ongoing treatment .     MDM Data  External documents reviewed: Previous notes  Cardiac tracing (EKG, telemetry) interpretation: Sinus with PAC.  Radiology interpretation: X-ray and CT scan.  Labs reviewed: Laboratory .  Any tests that were considered but not ordered: Lab in a.m.     Decision rules/scores evaluated (example [id number], Wells, etc): None     Discussed with: Patient and daughter     Care Planning  Shared decision making: Patient and daughter  Code status and discussions: Full code.    Disposition  Social Determinants of Health that impact treatment or disposition: Unknown  Estimated length of stay is 2 to 5 days.     I confirmed that the patient's advanced care plan is present, code status is documented, and a surrogate decision maker is listed in the patient's medical record.     The patient's surrogate decision maker is daughter, Zamzam Marie.     The patient was seen and examined by me on 5/13/2023 at 11:40 AM.    Electronically signed by Brody Jackson MD,  05/13/23, 11:50 CDT.

## 2023-05-13 NOTE — ED PROVIDER NOTES
Subjective   History of Present Illness    Patient is a pleasant 89-year-old gentleman who arrived by EMS with niece and niece's  present at bedside as historians.    Patient does have the following significant history of coronary artery disease with recent stenting and cardiac catheterization, ischemic cardiomyopathy, nonrheumatic mitral regurgitation, essential hypertension, mixed hyperlipidemia, left bundle branch block, bladder cancer, carcinoma in situ of lip, lung nodules, tuberculosis.    Niece describes that the patient had skin surgery scheduled approximately a month ago and he was off his Plavix prior to surgery.  Postoperatively, the patient did develop chest discomfort and they monitored him.  His troponin elevated and ultimately the patient completed a cardiac catheterization on April 8, 2023.  Patient had a PTCA of the distal left anterior descending coronary artery, PTCA of the proximal left anterior descending coronary artery.  Per notation, his chest pain and elevated cardiac biomarkers were consistent with acute coronary syndrome/non-STEMI requiring cardiac catheterization and PCI.    After this, family describes the patient was expectedly fatigued but he started to gain his strength and was seemingly on the right course of improvement in the past several weeks.  He is rather independent and active.  He ambulates without any assistance.  About 4 days ago, they noticed increased weakness and fatigue.  3 days ago, this patient spiked a temperature of 103.  He has a chronic cough but they noticed that he is coughing more.  He was complaining of feeling more short of breath particularly with exertion.  He is able to ambulate without complaining shortness of breath but with this recent illness, he is short of breath with short durations.  He is napping throughout the day which is unusual.  The patient cannot sleep at night but he is restless because he sleeps all day.  He does not feel smothered  "when he lays back supine though.  His niece today, that he is sleeping in a recliner which is unusual as well.  They noted some edema in his bilateral lower extremities.  She denies any history of DVT, PE, or congestive heart failure.  They sought medical attention 2 days ago and they prescribed amoxicillin for the diagnosis of acute bronchitis.  Patient has been taking his antibiotics.  He did not take his dose this morning.  However, his edema has persisted and his fatigue has worsened.  Family measured O2 at 80% at home.  With his fever spiking to 104 this morning, EMS was notified the patient is brought here to be further evaluated.    Patient also reports a previous history pneumonia that feels similar.  His appetite has diminished.  He denies any nausea or vomiting.  Denies any chest pain, pressure, tightness.  He denies any abdominal pain.  He denies any change in urinary output.    Review of Systems   Constitutional: Positive for activity change, appetite change, chills, fatigue and fever.   Respiratory: Positive for cough, shortness of breath and wheezing. Negative for chest tightness.    Cardiovascular: Positive for leg swelling. Negative for chest pain.   Gastrointestinal: Negative.    Genitourinary: Negative.    Musculoskeletal: Negative.    Skin: Negative.    Neurological: Negative.    Psychiatric/Behavioral: Negative.    All other systems reviewed and are negative.      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   • GERD (gastroesophageal reflux disease)    • Hyperlipidemia    • Hypertension    • Irregular heart beat    • Lung nodules        Allergies   Allergen Reactions   • Lyrica [Pregabalin] Mental Status Change     Made pt feel \"crazy\"       Past Surgical History:   Procedure Laterality Date   • BLADDER SURGERY     • CARDIAC CATHETERIZATION "     • CARDIAC CATHETERIZATION Left 2023    Procedure: Cardiac Catheterization/Vascular Study;  Surgeon: Sukhi Hartley MD;  Location:  [facility];  Service: Cardiology;  Laterality: Left;   • COLONOSCOPY     • CORONARY ANGIOPLASTY WITH STENT PLACEMENT  2006    STENT X 2   • CYSTOSCOPY     • ENDOSCOPY     • EXCISION LESION N/A 2023    Procedure: EXCISION LESION OF RIGHT TEMPLE AND LEFT TEMPLE WITH FROZEN SECTION WITH POSSIBLE FLAP OR GRAFT;  Surgeon: Brijesh Nickerson MD;  Location:  [facility] OR;  Service: ENT;  Laterality: N/A;   • FLAP HEAD/NECK Left 2020    Procedure: POSSIBLE FLAP;  Surgeon: Brijesh Nickerson MD;  Location:  [facility] OR;  Service: ENT;  Laterality: Left;   • HEAD/NECK LESION/CYST EXCISION Left 2020    Procedure: 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 with frozen section and possible flap or graft;  Surgeon: Brijesh Nickerson MD;  Location:  [facility] OR;  Service: ENT;  Laterality: Left;   • HERNIA REPAIR     • SHOULDER SURGERY     • SKIN BIOPSY      lip biopsy   • SKIN FULL THICKNESS GRAFT Left 2020    Procedure: OR GRAFT;  Surgeon: Brijesh Nickerson MD;  Location:  [facility] OR;  Service: ENT;  Laterality: Left;   • TRANSURETHRAL RESECTION OF BLADDER TUMOR         Family History   Problem Relation Age of Onset   • Tuberculosis Mother    • Heart disease Father    • Heart attack Father    • Diabetes Sister    • Hypertension Sister    • Hyperlipidemia Sister    • Heart disease Sister    • Alcohol abuse Sister    • Heart disease Brother    • Heart attack Brother    • Heart disease Brother        Social History     Socioeconomic History   • Marital status:    Tobacco Use   • Smoking status: Former     Packs/day: 1.00     Years: 30.00     Pack years: 30.00     Types: Cigarettes     Start date:      Quit date:      Years since quittin.3     Passive exposure: Past   • Smokeless tobacco: Never   Vaping Use   •  Vaping Use: Never used   Substance and Sexual Activity   • Alcohol use: Yes     Alcohol/week: 1.0 standard drink     Types: 1 Glasses of wine per week     Comment: occ wine   • Drug use: Not Currently   • Sexual activity: Defer       Prior to Admission medications    Medication Sig Start Date End Date Taking? Authorizing Provider   acetaminophen (TYLENOL) 500 MG tablet Take 1 tablet by mouth Every 6 (Six) Hours As Needed for Mild Pain.    Manan Govea MD   amLODIPine (NORVASC) 10 MG tablet Take 1 tablet by mouth Daily. 10/2/17   Manan Govea MD   aspirin 81 MG EC tablet Take 1 tablet by mouth Daily--DO NOT BREAK/CRUSH TABLET 4/11/23   Neda Marin APRN   atorvastatin (LIPITOR) 40 MG tablet Take 1 tablet by mouth Daily. 4/11/23   Neda Marin APRN   benzonatate (TESSALON) 200 MG capsule Take 1 capsule by mouth 3 (Three) Times a Day As Needed for Cough.    Manan Gvoea MD   clopidogrel (PLAVIX) 75 MG tablet Take 1 tablet by mouth Daily. 4/18/16   Manan Govea MD   irbesartan (AVAPRO) 300 MG tablet Take 1 tablet by mouth Every Night.    Manan Govea MD   lansoprazole (PREVACID) 30 MG capsule Take 1 capsule by mouth Daily.    Manan Govea MD   magnesium oxide (MAG-OX) 400 MG tablet Take 1 tablet by mouth Daily. 8/23/22   Manan Govea MD   metoprolol succinate XL (TOPROL-XL) 25 MG 24 hr tablet Take 1 tablet by mouth Daily.  Patient taking differently: Take 1 tablet by mouth Every Night. 4/11/23   Neda Marin APRN   nitroglycerin (NITROSTAT) 0.4 MG SL tablet Place 1 tablet under the tongue Every 5 (Five) Minutes As Needed.    Manan Govea MD   vitamin B-12 (CYANOCOBALAMIN) 1000 MCG tablet Take 1 tablet by mouth Daily.    Manan Govea MD   Vitron-C  MG tablet Take 1 tablet by mouth Daily. 4/13/23   Manan Govea MD   Zinc 50 MG capsule Take 50 mg by mouth Daily.    Manan Govea MD       Medications   sodium  "chloride 0.9 % flush 10 mL (has no administration in time range)   nitroglycerin (NITROSTAT) SL tablet 0.4 mg (0.4 mg Sublingual Given 5/13/23 0958)   furosemide (LASIX) injection 60 mg (0 mg Intravenous Hold 5/13/23 1049)   albuterol (PROVENTIL) nebulizer solution 0.083% 2.5 mg/3mL (2.5 mg Nebulization Given 5/13/23 0839)   piperacillin-tazobactam (ZOSYN) IVPB 3.375 g in 100 mL NS (CD) (0 g Intravenous Stopped 5/13/23 0900)   vancomycin 1250 mg/250 mL 0.9% NS IVPB ([facility]) (1,250 mg Intravenous New Bag 5/13/23 0939)   potassium chloride (MICRO-K) CR capsule 20 mEq (20 mEq Oral Given 5/13/23 0957)   iopamidol (ISOVUE-370) 76 % injection 100 mL (100 mL Intravenous Given 5/13/23 1045)       /48   Pulse 89   Temp 99.5 °F (37.5 °C) (Oral)   Resp 12   Ht 162.6 cm (64\")   Wt 68 kg (150 lb)   SpO2 93%   BMI 25.75 kg/m²       Objective   Physical Exam  Vitals reviewed.   Constitutional:       Appearance: He is well-developed.   HENT:      Head: Normocephalic and atraumatic.      Right Ear: External ear normal.      Left Ear: External ear normal.      Nose: Nose normal.   Eyes:      Conjunctiva/sclera: Conjunctivae normal.      Pupils: Pupils are equal, round, and reactive to light.   Neck:      Trachea: No tracheal deviation.   Cardiovascular:      Rate and Rhythm: Normal rate and regular rhythm.      Heart sounds: Normal heart sounds. No murmur heard.    No friction rub. No gallop.   Pulmonary:      Effort: Pulmonary effort is normal. No respiratory distress.      Breath sounds: Wheezing and rhonchi present. No rales.   Chest:      Chest wall: No tenderness.   Abdominal:      General: Bowel sounds are normal. There is no distension.      Palpations: Abdomen is soft. There is no mass.      Tenderness: There is no abdominal tenderness. There is no guarding or rebound.   Musculoskeletal:         General: No tenderness or deformity. Normal range of motion.      Cervical back: Normal range of motion and neck supple. "      Right lower leg: No tenderness. Edema present.      Left lower leg: No tenderness. Edema present.      Comments: 1+ bilateral lower extremity pitting edema 2+ pedal edema.  Palpable pulses bilaterally.  Warm to the touch.   Skin:     General: Skin is warm and dry.      Coloration: Skin is not pale.      Findings: No erythema or rash.   Neurological:      General: No focal deficit present.      Mental Status: He is alert and oriented to person, place, and time.   Psychiatric:         Mood and Affect: Mood normal.         Behavior: Behavior normal.         Thought Content: Thought content normal.         Judgment: Judgment normal.         Procedures         Lab Results (last 24 hours)     Procedure Component Value Units Date/Time    CBC & Differential [[id number]]  (Abnormal) Collected: 05/13/23 0834    Specimen: Blood Updated: 05/13/23 0926    Narrative:      The following orders were created for panel order CBC & Differential.  Procedure                               Abnormality         Status                     ---------                               -----------         ------                     CBC Auto Differential[[id number]]        Abnormal            Final result                 Please view results for these tests on the individual orders.    Comprehensive Metabolic Panel [[id number]]  (Abnormal) Collected: 05/13/23 0834    Specimen: Blood Updated: 05/13/23 0920     Glucose 134 mg/dL      BUN 20 mg/dL      Creatinine 0.72 mg/dL      Sodium 135 mmol/L      Potassium 3.3 mmol/L      Chloride 98 mmol/L      CO2 23.0 mmol/L      Calcium 8.3 mg/dL      Total Protein 6.5 g/dL      Albumin 3.4 g/dL      ALT (SGPT) 21 U/L      AST (SGOT) 38 U/L      Alkaline Phosphatase 51 U/L      Total Bilirubin 0.5 mg/dL      Globulin 3.1 gm/dL      A/G Ratio 1.1 g/dL      BUN/Creatinine Ratio 27.8     Anion Gap 14.0 mmol/L      eGFR 85.2 mL/min/1.73     Narrative:      GFR Normal >60  Chronic Kidney Disease <60  Kidney Failure  "<15    The GFR formula is only valid for adults with stable renal function between ages 18 and 70.    BNP [[id number]]  (Abnormal) Collected: 05/13/23 0834    Specimen: Blood Updated: 05/13/23 0918     proBNP 1,826.0 pg/mL     Narrative:      Among patients with dyspnea, NT-proBNP is highly sensitive for the detection of acute congestive heart failure. In addition NT-proBNP of <300 pg/ml effectively rules out acute congestive heart failure with 99% negative predictive value.    Results may be falsely decreased if patient taking Biotin.      Single High Sensitivity Troponin T [[id number]]  (Abnormal) Collected: 05/13/23 0834    Specimen: Blood Updated: 05/13/23 0916     HS Troponin T 50 ng/L     Narrative:      High Sensitive Troponin T Reference Range:  <10.0 ng/L- Negative Female for AMI  <15.0 ng/L- Negative Male for AMI  >=10 - Abnormal Female indicating possible myocardial injury.  >=15 - Abnormal Male indicating possible myocardial injury.   Clinicians would have to utilize clinical acumen, EKG, Troponin, and serial changes to determine if it is an Acute Myocardial Infarction or myocardial injury due to an underlying chronic condition.         Procalcitonin [[id number]]  (Abnormal) Collected: 05/13/23 0834    Specimen: Blood Updated: 05/13/23 0924     Procalcitonin 2.58 ng/mL     Narrative:      As a Marker for Sepsis (Non-Neonates):    1. <0.5 ng/mL represents a low risk of severe sepsis and/or septic shock.  2. >2 ng/mL represents a high risk of severe sepsis and/or septic shock.    As a Marker for Lower Respiratory Tract Infections that require antibiotic therapy:    PCT on Admission    Antibiotic Therapy       6-12 Hrs later    >0.5                Strongly Recommended  >0.25 - <0.5        Recommended   0.1 - 0.25          Discouraged              Remeasure/reassess PCT  <0.1                Strongly Discouraged     Remeasure/reassess PCT    As 28 day mortality risk marker: \"Change in Procalcitonin Result\" (>80% " "or <=80%) if Day 0 (or Day 1) and Day 4 values are available. Refer to http://www.[facility]-pct-calculator.com    Change in PCT <=80%  A decrease of PCT levels below or equal to 80% defines a positive change in PCT test result representing a higher risk for 28-day all-cause mortality of patients diagnosed with severe sepsis for septic shock.    Change in PCT >80%  A decrease of PCT levels of more than 80% defines a negative change in PCT result representing a lower risk for 28-day all-cause mortality of patients diagnosed with severe sepsis or septic shock.       Blood Culture With NINA - Blood, Arm, Left [[id number]] Collected: 05/13/23 0834    Specimen: Blood from Arm, Left Updated: 05/13/23 1005    D-dimer, Quantitative [[id number]]  (Abnormal) Collected: 05/13/23 0834    Specimen: Blood Updated: 05/13/23 0919     D-Dimer, Quantitative 1.78 MCGFEU/mL     Narrative:      According to the assay 's published package insert, a normal (<0.50 MCGFEU/mL) D-dimer result in conjunction with a non-high clinical probability assessment, excludes deep vein thrombosis (DVT) and pulmonary embolism (PE) with high sensitivity.    D-dimer values increase with age and this can make VTE exclusion of an older population difficult. To address this, the [employer], based on best available evidence and recent guidelines, recommends that clinicians use age-adjusted D-dimer thresholds in patients greater than 50 years of age with: a) a low probability of PE who do not meet all Pulmonary Embolism Rule Out Criteria, or b) in those with intermediate probability of PE.   The formula for an age-adjusted D-dimer cut-off is \"age/100\".  For example, a 60 year old patient would have an age-adjusted cut-off of 0.60 MCGFEU/mL and an 80 year old 0.80 MCGFEU/mL.    CBC Auto Differential [[id number]]  (Abnormal) Collected: 05/13/23 0834    Specimen: Blood Updated: 05/13/23 0926     WBC 8.23 10*3/mm3      RBC 3.40 10*6/mm3      " Hemoglobin 10.0 g/dL      Hematocrit 31.8 %      MCV 93.5 fL      MCH 29.4 pg      MCHC 31.4 g/dL      RDW 14.1 %      RDW-SD 48.0 fl      MPV 10.7 fL      Platelets 131 10*3/mm3     Manual Differential [[id number]]  (Abnormal) Collected: 05/13/23 0834    Specimen: Blood Updated: 05/13/23 0926     Neutrophil % 83.0 %      Lymphocyte % 8.0 %      Bands %  9.0 %      Neutrophils Absolute 7.57 10*3/mm3      Lymphocytes Absolute 0.66 10*3/mm3      Polychromasia Slight/1+     WBC Morphology Normal     Platelet Estimate Decreased    Blood Gas, Arterial - [[id number]]  (Abnormal) Collected: 05/13/23 0839    Specimen: Arterial Blood Updated: 05/13/23 0836     Site Right Brachial     Biju's Test N/A     pH, Arterial 7.451 pH units      Comment: 83 Value above reference range        pCO2, Arterial 38.6 mm Hg      pO2, Arterial 58.5 mm Hg      Comment: 84 Value below reference range        HCO3, Arterial 26.9 mmol/L      Comment: 83 Value above reference range        Base Excess, Arterial 2.8 mmol/L      Comment: 83 Value above reference range        O2 Saturation, Arterial 92.6 %      Comment: 84 Value below reference range        Temperature 37.0 C      Barometric Pressure for Blood Gas 752 mmHg      Modality Room Air     Ventilator Mode NA     Collected by [id number]     Comment: Meter: [id number]     :  [id number]        pCO2, Temperature Corrected 38.6 mm Hg      pH, Temp Corrected 7.451 pH Units      pO2, Temperature Corrected 58.5 mm Hg     Lactic Acid, Plasma [[id number]]  (Normal) Collected: 05/13/23 0839    Specimen: Blood Updated: 05/13/23 0917     Lactate 1.3 mmol/L     Respiratory Panel PCR w/COVID-19(SARS-CoV-2) [facility] In-House, NP Swab in [facility], [place]/[facility], 3-4 HR TAT - Swab, Nasopharynx [[id number]]  (Normal) Collected: 05/13/23 0848    Specimen: Swab from Nasopharynx Updated: 05/13/23 1026     ADENOVIRUS, PCR Not Detected     Coronavirus 229E Not Detected     Coronavirus HKU1 Not Detected      Coronavirus NL63 Not Detected     Coronavirus OC43 Not Detected     COVID19 Not Detected     Human Metapneumovirus Not Detected     Human Rhinovirus/Enterovirus Not Detected     Influenza A PCR Not Detected     Influenza B PCR Not Detected     Parainfluenza Virus 1 Not Detected     Parainfluenza Virus 2 Not Detected     Parainfluenza Virus 3 Not Detected     Parainfluenza Virus 4 Not Detected     RSV, PCR Not Detected     Bordetella pertussis pcr Not Detected     Bordetella parapertussis PCR Not Detected     Chlamydophila pneumoniae PCR Not Detected     Mycoplasma pneumo by PCR Not Detected    Narrative:      In the setting of a positive respiratory panel with a viral infection PLUS a negative procalcitonin without other underlying concern for bacterial infection, consider observing off antibiotics or discontinuation of antibiotics and continue supportive care. If the respiratory panel is positive for atypical bacterial infection (Bordetella pertussis, Chlamydophila pneumoniae, or Mycoplasma pneumoniae), consider antibiotic de-escalation to target atypical bacterial infection.    Urinalysis With Culture If Indicated - Urine, Clean Catch [[id number]]  (Abnormal) Collected: 05/13/23 0905    Specimen: Urine, Clean Catch Updated: 05/13/23 0931     Color, UA Dark Yellow     Appearance, UA Clear     pH, UA 5.5     Specific Gravity, UA 1.027     Glucose, UA Negative     Ketones, UA Negative     Bilirubin, UA Small (1+)     Blood, UA Negative     Protein,  mg/dL (2+)     Leuk Esterase, UA Negative     Nitrite, UA Negative     Urobilinogen, UA 1.0 E.U./dL    Narrative:      In absence of clinical symptoms, the presence of pyuria, bacteria, and/or nitrites on the urinalysis result does not correlate with infection.    Urinalysis, Microscopic Only - Urine, Clean Catch [[id number]]  (Abnormal) Collected: 05/13/23 0905    Specimen: Urine, Clean Catch Updated: 05/13/23 0931     RBC, UA 0-2 /HPF      WBC, UA 0-2 /HPF       Comment: Urine culture not indicated.        Bacteria, UA Trace /HPF      Squamous Epithelial Cells, UA 0-2 /HPF      Hyaline Casts, UA None Seen /LPF      Coarse Granular Casts, UA 0-2 /LPF      Methodology Manual Light Microscopy          XR Chest 1 View    Result Date: 5/13/2023  Narrative: EXAMINATION: XR CHEST 1 VW-  5/13/2023 8:21 AM CDT  HISTORY: Chest pain.  1 view chest x-ray.  Comparison is made with 03/31/2023.  Stable heart size.  Lower lung volumes with patchy basilar atelectasis.  No pneumothorax or heart failure.  Summary: 1. Hypoaeration with patchy atelectasis.  This report was finalized on 05/13/2023 08:27 by Dr. Mac Lucio MD.    CT Angiogram Chest    Result Date: 5/13/2023  Narrative: EXAMINATION: CT ANGIOGRAM CHEST-   5/13/2023 10:26 AM CDT  HISTORY: Recent cardiac catheterization. Shortness of breath with fever and lower extremity edema.  In order to have a CT radiation dose as low as reasonably achievable Automated Exposure Control was utilized for adjustment of the mA and/or KV according to patient size.  DLP in mGycm= 194.  CT angiogram chest with IV contrast. CT angiography protocol. CT imaging with bolus IV contrast injection. Under concurrent supervision axial, sagittal, coronal, three-dimensional, and MIP data sets were constructed on an independent work station.  Comparison is made with 08/02/2022.  Normal heart size. Normal size thoracic aorta.  Mildly enlarged and well opacified pulmonary arteries. No pulmonary embolism.  Posterior right lower lobe pneumonia.  Underlying chronic interstitial disease. No pneumothorax and no significant pleural effusion. No sign of heart failure.  Prominent diffuse osteopenia. Severe degenerative spine change with mild chronic anterior wedge compression of T12.  Summary: 1. No pulmonary embolism. 2. Right lower lobe pneumonia.               This report was finalized on 05/13/2023 10:55 by Dr. Mac Lucio MD.      ED Course  ED Course as of 05/13/23  1134   Sat May 13, 2023   1126 I have reassessed the patient on several occasions.  He has been placed on oxygen and.  He has been resting comfortably without any respiratory distress.  I have educated the family members numerous occasions with test results.  The CTA chest does show evidence of a right lower lobe pneumonia but no evidence of heart failure.  Initially was concerned that he possibly could have heart failure so the Lasix and nitro were ordered.  He did receive nitroglycerin with blood pressure drop so we withheld the diuretics at this time.  I also spoke with Dr. Jackson, hospitalist on-call.  He is gracious admit the patient under his care. [TK]      ED Course User Index  [TK] Elizabeth Tolentino PA          MDM  Number of Diagnoses or Management Options  Hypoxia: new and requires workup  Other iron deficiency anemia: established and worsening  Pneumonia of right lower lobe due to infectious organism: new and requires workup  Diagnosis management comments: Shortness of breath differential includes but not limited to:  Pneumonia, COPD, CHF, bronchitis, pericardial effusion with or without tamponade, pulmonary embolism, myocardial infarction, pulmonary edema, pulmonary effusion, asthma, allergic reaction, anxiety reaction, etc...       Amount and/or Complexity of Data Reviewed  Decide to obtain previous medical records or to obtain history from someone other than the patient: yes        Final diagnoses:   Pneumonia of right lower lobe due to infectious organism   Hypoxia   Other iron deficiency anemia          Elizabeth Tolentino PA  05/13/23 1402

## 2023-05-13 NOTE — ED NOTES
Pt blood pressure dropped before giving lasix to 118/46. Held lasix spoke with Chitra, verbally stated to continue to hold the lasix at this time.

## 2023-05-13 NOTE — PROGRESS NOTES
Assessment tool to be used for patients with existing breathing treatments ordered by hospitalist                               Respiratory Therapist Driven Protocol - RT to Assess and Treat Algorithm    Item 0 Points 1 Point 2 Points 3 Points 4 Points Subtotal   Mental Status Alert, orientated, cooperative Lethargic, follows commands Confused, not following commands Obtunded or Somnolent Comatose 0   Respiratory Pattern Regular RR  8-16 breaths/minute Increased RR  18-25 breaths/minute Dyspnea on exertion, irregular RR  26-30/minute Shortness of breath,  RR 31-35/minute Accessory muscle use, severe SOB  RR > 35/minute 0   Breath Sounds Clear Decreased unilaterally Decreased bilaterally Basilar crackles Wheezing and/or rhonchi 0   Cough Strong, spontaneous non-productive Strong productive Weak, non-productive Weak productive or weak with rhonchi Absent or may require suctioning 0   Pulmonary Status Nonsmoker or no previous history > 1 year quit < 1 PPD  < 1 year quit >  or = 1 PPD Diagnosed pulmonary disease (severe or chronic) Severe or chronic pulmonary disease with exacerbation 0   Surgical Status None General surgery (non-abdominal or non-thoracic) Lower abdominal Thoracic or upper abdominal Thoracic with pulmonary disease 0   Chest X-ray Clear Chronic changes Infiltrates, atelectasis or pleural effusion Infiltrates > 1 lobe Diffuse infiltrates and atelectasis and/or effusions 2   Activity level Ambulatory Ambulatory with assistance Non-ambulatory Paraplegic Quadriplegic     1                 Total Score 3   Score    Drug Therapy Frequency  20 or >    Q4 Duoneb & Q3 Albuterol PRN 15 - 19     Q6 Duoneb & Q4 Albuterol PRN 10 - 14    QID Duoneb & Q4 Albuterol PRN 5 - 9    TID Duoneb & Q6 Albuterol PRN 0 - 4    Q4 PRN Duoneb or Q4 PRN Albuterol    Incentive Spirometry - Initial RT instruct    Lung Expansion Therapy (PEP) Bronchopulmonary Hygiene (CPT)   Q4 & PRN - Severe atelectasis, poor  oxygenation Q4 - copious secretions, dyspnea, unable to sleep, mucus plugging   QID - High risk for persistent atelectasis, existence of atelectasis QID & Q4 PRN - Moderate secretion production   TID - At risk for developing atelectasis TID - small amounts of secretions with poor cough   BID - prevention of atelectasis BID - unable to breathe deeply and cough spontaneously   *RT Protocol patients will be re-assessed/re-evaluated every 48 hours.    *Patients who are home nebulizer treatments will be protocoled to no less than their home regimen and will remain     on their home regimen with re-evaluations as needed with changes in patient condition.    RT Comments/Recommendations: [id number] DUONEBS

## 2023-05-13 NOTE — ED NOTES
Nursing report ED to floor  Wyatt Curry  89 y.o.  male    HPI:   Chief Complaint   Patient presents with    Shortness of Breath    Fatigue       Admitting doctor:   No admitting provider for patient encounter.    Consulting provider(s):  Consults       No orders found from 4/14/2023 to 5/14/2023.             Admitting diagnosis:   The primary encounter diagnosis was Pneumonia of right lower lobe due to infectious organism. Diagnoses of Hypoxia and Other iron deficiency anemia were also pertinent to this visit.    Code status:   Current Code Status       Date Active Code Status Order ID Comments User Context       5/13/2023 1213 CPR (Attempt to Resuscitate) [id number]  Brody Jackson MD ED        Question Answer    Code Status (Patient has no pulse and is not breathing) CPR (Attempt to Resuscitate)    Medical Interventions (Patient has pulse or is breathing) Full Support    Level Of Support Discussed With Patient                    Allergies:   Lyrica [pregabalin]    Intake and Output    Intake/Output Summary (Last 24 hours) at 5/13/2023 1410  Last data filed at 5/13/2023 1159  Gross per 24 hour   Intake 350 ml   Output --   Net 350 ml       Weight:       05/13/23  0720   Weight: 68 kg (150 lb)       Most recent vitals:   Vitals:    05/13/23 1144 05/13/23 1248 05/13/23 1318 05/13/23 1348   BP: 112/44 121/50 113/46 121/56   BP Location:       Patient Position:       Pulse: 89 86 90 93   Resp:       Temp:       TempSrc:       SpO2: 94% 94% 93% 95%   Weight:       Height:         Oxygen Therapy: .    Active LDAs/IV Access:   Lines, Drains & Airways       Active LDAs       Name Placement date Placement time Site Days    Peripheral IV 05/13/23 0830 Left Antecubital 05/13/23  0830  Antecubital  less than 1                    Labs (abnormal labs have a star):   Labs Reviewed   COMPREHENSIVE METABOLIC PANEL - Abnormal; Notable for the following components:       Result Value    Glucose 134 (*)     Creatinine 0.72 (*)      Sodium 135 (*)     Potassium 3.3 (*)     Albumin 3.4 (*)     BUN/Creatinine Ratio 27.8 (*)     All other components within normal limits    Narrative:     GFR Normal >60  Chronic Kidney Disease <60  Kidney Failure <15    The GFR formula is only valid for adults with stable renal function between ages 18 and 70.   URINALYSIS W/ CULTURE IF INDICATED - Abnormal; Notable for the following components:    Color, UA Dark Yellow (*)     Bilirubin, UA Small (1+) (*)     Protein,  mg/dL (2+) (*)     All other components within normal limits    Narrative:     In absence of clinical symptoms, the presence of pyuria, bacteria, and/or nitrites on the urinalysis result does not correlate with infection.   BNP (IN-HOUSE) - Abnormal; Notable for the following components:    proBNP 1,826.0 (*)     All other components within normal limits    Narrative:     Among patients with dyspnea, NT-proBNP is highly sensitive for the detection of acute congestive heart failure. In addition NT-proBNP of <300 pg/ml effectively rules out acute congestive heart failure with 99% negative predictive value.    Results may be falsely decreased if patient taking Biotin.     SINGLE HSTROPONIN T - Abnormal; Notable for the following components:    HS Troponin T 50 (*)     All other components within normal limits    Narrative:     High Sensitive Troponin T Reference Range:  <10.0 ng/L- Negative Female for AMI  <15.0 ng/L- Negative Male for AMI  >=10 - Abnormal Female indicating possible myocardial injury.  >=15 - Abnormal Male indicating possible myocardial injury.   Clinicians would have to utilize clinical acumen, EKG, Troponin, and serial changes to determine if it is an Acute Myocardial Infarction or myocardial injury due to an underlying chronic condition.        PROCALCITONIN - Abnormal; Notable for the following components:    Procalcitonin 2.58 (*)     All other components within normal limits    Narrative:     As a Marker for Sepsis  "(Non-Neonates):    1. <0.5 ng/mL represents a low risk of severe sepsis and/or septic shock.  2. >2 ng/mL represents a high risk of severe sepsis and/or septic shock.    As a Marker for Lower Respiratory Tract Infections that require antibiotic therapy:    PCT on Admission    Antibiotic Therapy       6-12 Hrs later    >0.5                Strongly Recommended  >0.25 - <0.5        Recommended   0.1 - 0.25          Discouraged              Remeasure/reassess PCT  <0.1                Strongly Discouraged     Remeasure/reassess PCT    As 28 day mortality risk marker: \"Change in Procalcitonin Result\" (>80% or <=80%) if Day 0 (or Day 1) and Day 4 values are available. Refer to http://www.[employer]-pct-calculator.com    Change in PCT <=80%  A decrease of PCT levels below or equal to 80% defines a positive change in PCT test result representing a higher risk for 28-day all-cause mortality of patients diagnosed with severe sepsis for septic shock.    Change in PCT >80%  A decrease of PCT levels of more than 80% defines a negative change in PCT result representing a lower risk for 28-day all-cause mortality of patients diagnosed with severe sepsis or septic shock.      D-DIMER, QUANTITATIVE - Abnormal; Notable for the following components:    D-Dimer, Quantitative 1.78 (*)     All other components within normal limits    Narrative:     According to the assay 's published package insert, a normal (<0.50 MCGFEU/mL) D-dimer result in conjunction with a non-high clinical probability assessment, excludes deep vein thrombosis (DVT) and pulmonary embolism (PE) with high sensitivity.    D-dimer values increase with age and this can make VTE exclusion of an older population difficult. To address this, the [employer], based on best available evidence and recent guidelines, recommends that clinicians use age-adjusted D-dimer thresholds in patients greater than 50 years of age with: a) a low probability of PE " "who do not meet all Pulmonary Embolism Rule Out Criteria, or b) in those with intermediate probability of PE.   The formula for an age-adjusted D-dimer cut-off is \"age/100\".  For example, a 60 year old patient would have an age-adjusted cut-off of 0.60 MCGFEU/mL and an 80 year old 0.80 MCGFEU/mL.   CBC WITH AUTO DIFFERENTIAL - Abnormal; Notable for the following components:    RBC 3.40 (*)     Hemoglobin 10.0 (*)     Hematocrit 31.8 (*)     MCHC 31.4 (*)     Platelets 131 (*)     All other components within normal limits   BLOOD GAS, ARTERIAL - Abnormal; Notable for the following components:    pH, Arterial 7.451 (*)     pO2, Arterial 58.5 (*)     HCO3, Arterial 26.9 (*)     Base Excess, Arterial 2.8 (*)     O2 Saturation, Arterial 92.6 (*)     pH, Temp Corrected 7.451 (*)     pO2, Temperature Corrected 58.5 (*)     All other components within normal limits   MANUAL DIFFERENTIAL - Abnormal; Notable for the following components:    Neutrophil % 83.0 (*)     Lymphocyte % 8.0 (*)     Bands %  9.0 (*)     Neutrophils Absolute 7.57 (*)     Lymphocytes Absolute 0.66 (*)     All other components within normal limits   URINALYSIS, MICROSCOPIC ONLY - Abnormal; Notable for the following components:    RBC, UA 0-2 (*)     WBC, UA 0-2 (*)     Bacteria, UA Trace (*)     All other components within normal limits   RESPIRATORY PANEL PCR W/ COVID-19 (SARS-COV-2) [facility]ANNON/[facility]A/[facility]/TONYA IN-HOUSE, NP [facility]AB IN [facility], 3-4 HR TAT - Normal    Narrative:     In the setting of a positive respiratory panel with a viral infection PLUS a negative procalcitonin without other underlying concern for bacterial infection, consider observing off antibiotics or discontinuation of antibiotics and continue supportive care. If the respiratory panel is positive for atypical bacterial infection (Bordetella pertussis, Chlamydophila pneumoniae, or Mycoplasma pneumoniae), consider antibiotic de-escalation to target atypical bacterial infection. "   LACTIC ACID, PLASMA - Normal   BLOOD CULTURE WITH NINA   BLOOD CULTURE WITH NINA   BLOOD CULTURE   BLOOD CULTURE   RESPIRATORY CULTURE   MYCOPLASMA PNEUMONIAE PCR   LEGIONELLA ANTIGEN, URINE   MRSA SCREEN, PCR   STREP PNEUMO AG, URINE OR CSF   BLOOD GAS, ARTERIAL   MAGNESIUM   CBC AND DIFFERENTIAL    Narrative:     The following orders were created for panel order CBC & Differential.  Procedure                               Abnormality         Status                     ---------                               -----------         ------                     CBC Auto Differential[[id number]]        Abnormal            Final result                 Please view results for these tests on the individual orders.       Meds given in ED:   Medications   sodium chloride 0.9 % flush 10 mL (has no administration in time range)   piperacillin-tazobactam (ZOSYN) IVPB 3.375 g in 100 mL NS (CD) (has no administration in time range)   Pharmacy to dose vancomycin (has no administration in time range)   acetaminophen (TYLENOL) tablet 500 mg (has no administration in time range)   aspirin EC tablet 81 mg (has no administration in time range)   atorvastatin (LIPITOR) tablet 40 mg (has no administration in time range)   benzonatate (TESSALON) capsule 200 mg (has no administration in time range)   clopidogrel (PLAVIX) tablet 75 mg (has no administration in time range)   losartan (COZAAR) tablet 50 mg (has no administration in time range)   amLODIPine (NORVASC) tablet 5 mg (has no administration in time range)   pantoprazole (PROTONIX) EC tablet 40 mg (has no administration in time range)   magnesium oxide (MAG-OX) tablet 400 mg (has no administration in time range)   metoprolol succinate XL (TOPROL-XL) 24 hr tablet 25 mg (has no administration in time range)   nitroglycerin (NITROSTAT) SL tablet 0.4 mg (has no administration in time range)   vitamin B-12 (CYANOCOBALAMIN) tablet 1,000 mcg (has no administration in time range)   sodium  chloride 0.9 % flush 10 mL (has no administration in time range)   sodium chloride 0.9 % flush 10 mL (has no administration in time range)   sodium chloride 0.9 % infusion 40 mL (has no administration in time range)   potassium chloride (MICRO-K) CR capsule 40 mEq (has no administration in time range)   albuterol (PROVENTIL) nebulizer solution 0.083% 2.5 mg/3mL (2.5 mg Nebulization Given 5/13/23 0839)   piperacillin-tazobactam (ZOSYN) IVPB 3.375 g in 100 mL NS (CD) (0 g Intravenous Stopped 5/13/23 0900)   vancomycin 1250 mg/250 mL 0.9% NS IVPB ([facility]) (0 mg Intravenous Stopped 5/13/23 1159)   potassium chloride (MICRO-K) CR capsule 20 mEq (20 mEq Oral Given 5/13/23 0957)   iopamidol (ISOVUE-370) 76 % injection 100 mL (100 mL Intravenous Given 5/13/23 1045)     Pharmacy to dose vancomycin,          NIH Stroke Scale:       Isolation/Infection(s):  No active isolations   COVID (rule out)     COVID Testing  Collected .  Resulted .    Nursing report ED to floor:  Mental status: .  Ambulatory status: .  Precautions: .    ED nurse phone extentsion- .. [phone]  Report given to GAUDENCIO Hanson

## 2023-05-13 NOTE — PROGRESS NOTES
"Pharmacy Dosing Service  Pharmacokinetics  Vancomycin Initial Evaluation  Assessment/Action/Plan:  Loading dose: 1250mg given in ED   Initiated: Vancomycin 750 mg IVPB every 12 hours  Current end date:5/20/23  Additional antimicrobial agent(s): Zosyn    Vancomycin dosage initiated based on population pharmacokinetic parameters. Predicted steady state concentrations below. Pharmacy will continue to follow daily and adjust dose accordingly.    AUC Model Data:  Loading dose: N/A  Regimen: 750 mg IV every 12 hours.  Exposure target: AUC24 (range)400-600 mg/L.hr   AUC24,ss: 544 mg/L.hr  PAUC*: 82 %  Ctrough,ss: 18.2 mg/L  Pconc*: 41 %  Tox.: 14 %       Subjective:  Wyatt Curry is a 89 y.o. male with a Vancomycin \"Pharmacy to Dose\" consult for the treatment of pneumonia , day 1 of 7 of treatment.      Objective:  Ht: 162.6 cm (64\"); Wt: 68 kg (150 lb)  Estimated Creatinine Clearance: 61.7 mL/min (A) (by C-G formula based on SCr of 0.72 mg/dL (L)).   Creatinine   Date Value Ref Range Status   05/13/2023 0.72 (L) 0.76 - 1.27 mg/dL Final      Lab Results   Component Value Date    WBC 8.23 05/13/2023      Baseline culture results:  Microbiology Results (last 10 days)       Procedure Component Value - Date/Time    Respiratory Panel PCR w/COVID-19(SARS-CoV-2) [facility]/SHANNON/MITA/[facility]/M[facility]/TONYA In-House, NP Swab in UTM/VTM, 3-4 HR TAT - Swab, Nasopharynx [[id number]]  (Normal) Collected: 05/13/23 0848    Lab Status: Final result Specimen: Swab from Nasopharynx Updated: 05/13/23 1026     ADENOVIRUS, PCR Not Detected     Coronavirus 229E Not Detected     Coronavirus HKU1 Not Detected     Coronavirus NL63 Not Detected     Coronavirus OC43 Not Detected     COVID19 Not Detected     Human Metapneumovirus Not Detected     Human Rhinovirus/Enterovirus Not Detected     Influenza A PCR Not Detected     Influenza B PCR Not Detected     Parainfluenza Virus 1 Not Detected     Parainfluenza Virus 2 Not Detected     Parainfluenza Virus 3 Not " Detected     Parainfluenza Virus 4 Not Detected     RSV, PCR Not Detected     Bordetella pertussis pcr Not Detected     Bordetella parapertussis PCR Not Detected     Chlamydophila pneumoniae PCR Not Detected     Mycoplasma pneumo by PCR Not Detected    Narrative:      In the setting of a positive respiratory panel with a viral infection PLUS a negative procalcitonin without other underlying concern for bacterial infection, consider observing off antibiotics or discontinuation of antibiotics and continue supportive care. If the respiratory panel is positive for atypical bacterial infection (Bordetella pertussis, Chlamydophila pneumoniae, or Mycoplasma pneumoniae), consider antibiotic de-escalation to target atypical bacterial infection.            ISMA Hickman PharmD  05/13/23 14:26 CDT

## 2023-05-14 LAB
ALBUMIN SERPL-MCNC: 3.3 G/DL (ref 3.5–5.2)
ALBUMIN/GLOB SERPL: 1 G/DL
ALP SERPL-CCNC: 50 U/L (ref 39–117)
ALT SERPL W P-5'-P-CCNC: 26 U/L (ref 1–41)
ANION GAP SERPL CALCULATED.3IONS-SCNC: 12 MMOL/L (ref 5–15)
AST SERPL-CCNC: 56 U/L (ref 1–40)
BACTERIA UR QL AUTO: ABNORMAL /HPF
BILIRUB SERPL-MCNC: 0.5 MG/DL (ref 0–1.2)
BILIRUB UR QL STRIP: NEGATIVE
BUN SERPL-MCNC: 19 MG/DL (ref 8–23)
BUN/CREAT SERPL: 24.4 (ref 7–25)
CALCIUM SPEC-SCNC: 8.2 MG/DL (ref 8.2–9.6)
CHLORIDE SERPL-SCNC: 101 MMOL/L (ref 98–107)
CHOLEST SERPL-MCNC: 67 MG/DL (ref 0–200)
CLARITY UR: CLEAR
CO2 SERPL-SCNC: 24 MMOL/L (ref 22–29)
COLOR UR: YELLOW
CREAT SERPL-MCNC: 0.78 MG/DL (ref 0.76–1.27)
DEPRECATED RDW RBC AUTO: 50.5 FL (ref 37–54)
EGFRCR SERPLBLD CKD-EPI 2021: 83.2 ML/MIN/1.73
ERYTHROCYTE [DISTWIDTH] IN BLOOD BY AUTOMATED COUNT: 14.6 % (ref 12.3–15.4)
GLOBULIN UR ELPH-MCNC: 3.3 GM/DL
GLUCOSE BLDC GLUCOMTR-MCNC: 154 MG/DL (ref 70–130)
GLUCOSE SERPL-MCNC: 110 MG/DL (ref 65–99)
GLUCOSE UR STRIP-MCNC: NEGATIVE MG/DL
HBA1C MFR BLD: 5.5 % (ref 4.8–5.6)
HCT VFR BLD AUTO: 33.9 % (ref 37.5–51)
HDLC SERPL-MCNC: 28 MG/DL (ref 40–60)
HGB BLD-MCNC: 10.5 G/DL (ref 13–17.7)
HGB UR QL STRIP.AUTO: ABNORMAL
INR PPP: 1.06 (ref 0.91–1.09)
KETONES UR QL STRIP: ABNORMAL
LDLC SERPL CALC-MCNC: 26 MG/DL (ref 0–100)
LDLC/HDLC SERPL: 1.02 {RATIO}
LEUKOCYTE ESTERASE UR QL STRIP.AUTO: NEGATIVE
LYMPHOCYTES # BLD MANUAL: 0.52 10*3/MM3 (ref 0.7–3.1)
LYMPHOCYTES NFR BLD MANUAL: 3 % (ref 5–12)
MCH RBC QN AUTO: 29.2 PG (ref 26.6–33)
MCHC RBC AUTO-ENTMCNC: 31 G/DL (ref 31.5–35.7)
MCV RBC AUTO: 94.2 FL (ref 79–97)
MONOCYTES # BLD: 0.26 10*3/MM3 (ref 0.1–0.9)
NEUTROPHILS # BLD AUTO: 7.92 10*3/MM3 (ref 1.7–7)
NEUTROPHILS NFR BLD MANUAL: 75 % (ref 42.7–76)
NEUTS BAND NFR BLD MANUAL: 16 % (ref 0–5)
NITRITE UR QL STRIP: NEGATIVE
NRBC SPEC MANUAL: 1 /100 WBC (ref 0–0.2)
PH UR STRIP.AUTO: 5.5 [PH] (ref 5–8)
PLATELET # BLD AUTO: 121 10*3/MM3 (ref 140–450)
PMV BLD AUTO: 11.3 FL (ref 6–12)
POIKILOCYTOSIS BLD QL SMEAR: ABNORMAL
POLYCHROMASIA BLD QL SMEAR: ABNORMAL
POTASSIUM SERPL-SCNC: 4.1 MMOL/L (ref 3.5–5.2)
PROT SERPL-MCNC: 6.6 G/DL (ref 6–8.5)
PROT UR QL STRIP: ABNORMAL
PROTHROMBIN TIME: 13.9 SECONDS (ref 11.8–14.8)
QT INTERVAL: 386 MS
QTC INTERVAL: 490 MS
RBC # BLD AUTO: 3.6 10*6/MM3 (ref 4.14–5.8)
RBC # UR STRIP: ABNORMAL /HPF
REF LAB TEST METHOD: ABNORMAL
SMALL PLATELETS BLD QL SMEAR: ABNORMAL
SODIUM SERPL-SCNC: 137 MMOL/L (ref 136–145)
SP GR UR STRIP: 1.02 (ref 1–1.03)
SQUAMOUS #/AREA URNS HPF: ABNORMAL /HPF
TRIGL SERPL-MCNC: 52 MG/DL (ref 0–150)
TSH SERPL DL<=0.05 MIU/L-ACNC: 0.81 UIU/ML (ref 0.27–4.2)
UROBILINOGEN UR QL STRIP: ABNORMAL
VARIANT LYMPHS NFR BLD MANUAL: 6 % (ref 19.6–45.3)
VLDLC SERPL-MCNC: 13 MG/DL (ref 5–40)
WBC # UR STRIP: ABNORMAL /HPF
WBC MORPH BLD: NORMAL
WBC NRBC COR # BLD: 8.7 10*3/MM3 (ref 3.4–10.8)

## 2023-05-14 PROCEDURE — 94760 N-INVAS EAR/PLS OXIMETRY 1: CPT

## 2023-05-14 PROCEDURE — 97165 OT EVAL LOW COMPLEX 30 MIN: CPT | Performed by: OCCUPATIONAL THERAPIST

## 2023-05-14 PROCEDURE — 94799 UNLISTED PULMONARY SVC/PX: CPT

## 2023-05-14 PROCEDURE — 25010000002 DIGOXIN PER 500 MCG: Performed by: FAMILY MEDICINE

## 2023-05-14 PROCEDURE — 25010000002 PIPERACILLIN SOD-TAZOBACTAM PER 1 G: Performed by: FAMILY MEDICINE

## 2023-05-14 PROCEDURE — 85007 BL SMEAR W/DIFF WBC COUNT: CPT | Performed by: FAMILY MEDICINE

## 2023-05-14 PROCEDURE — 80061 LIPID PANEL: CPT | Performed by: FAMILY MEDICINE

## 2023-05-14 PROCEDURE — 94664 DEMO&/EVAL PT USE INHALER: CPT

## 2023-05-14 PROCEDURE — 81001 URINALYSIS AUTO W/SCOPE: CPT | Performed by: FAMILY MEDICINE

## 2023-05-14 PROCEDURE — 94761 N-INVAS EAR/PLS OXIMETRY MLT: CPT

## 2023-05-14 PROCEDURE — 85610 PROTHROMBIN TIME: CPT | Performed by: INTERNAL MEDICINE

## 2023-05-14 PROCEDURE — 25010000002 FUROSEMIDE PER 20 MG: Performed by: FAMILY MEDICINE

## 2023-05-14 PROCEDURE — 83036 HEMOGLOBIN GLYCOSYLATED A1C: CPT | Performed by: FAMILY MEDICINE

## 2023-05-14 PROCEDURE — 80053 COMPREHEN METABOLIC PANEL: CPT | Performed by: FAMILY MEDICINE

## 2023-05-14 PROCEDURE — 84443 ASSAY THYROID STIM HORMONE: CPT | Performed by: FAMILY MEDICINE

## 2023-05-14 PROCEDURE — 85025 COMPLETE CBC W/AUTO DIFF WBC: CPT | Performed by: FAMILY MEDICINE

## 2023-05-14 PROCEDURE — 82948 REAGENT STRIP/BLOOD GLUCOSE: CPT

## 2023-05-14 PROCEDURE — 25010000002 MAGNESIUM SULFATE 2 GM/50ML SOLUTION: Performed by: FAMILY MEDICINE

## 2023-05-14 RX ORDER — MAGNESIUM SULFATE HEPTAHYDRATE 40 MG/ML
2 INJECTION, SOLUTION INTRAVENOUS ONCE
Status: COMPLETED | OUTPATIENT
Start: 2023-05-14 | End: 2023-05-14

## 2023-05-14 RX ORDER — FUROSEMIDE 10 MG/ML
20 INJECTION INTRAMUSCULAR; INTRAVENOUS ONCE
Status: COMPLETED | OUTPATIENT
Start: 2023-05-14 | End: 2023-05-14

## 2023-05-14 RX ORDER — AMLODIPINE BESYLATE 10 MG/1
10 TABLET ORAL DAILY
Status: DISCONTINUED | OUTPATIENT
Start: 2023-05-14 | End: 2023-05-14

## 2023-05-14 RX ORDER — DIGOXIN 0.25 MG/ML
250 INJECTION INTRAMUSCULAR; INTRAVENOUS ONCE
Status: COMPLETED | OUTPATIENT
Start: 2023-05-14 | End: 2023-05-14

## 2023-05-14 RX ORDER — AMOXICILLIN 500 MG/1
500 CAPSULE ORAL 3 TIMES DAILY
COMMUNITY
Start: 2023-05-11 | End: 2023-05-22 | Stop reason: HOSPADM

## 2023-05-14 RX ORDER — METOPROLOL TARTRATE 5 MG/5ML
2.5 INJECTION INTRAVENOUS ONCE
Status: COMPLETED | OUTPATIENT
Start: 2023-05-14 | End: 2023-05-14

## 2023-05-14 RX ORDER — IPRATROPIUM BROMIDE AND ALBUTEROL SULFATE 2.5; .5 MG/3ML; MG/3ML
3 SOLUTION RESPIRATORY (INHALATION)
Status: DISCONTINUED | OUTPATIENT
Start: 2023-05-14 | End: 2023-05-15

## 2023-05-14 RX ORDER — DIGOXIN 0.25 MG/ML
250 INJECTION INTRAMUSCULAR; INTRAVENOUS ONCE
Status: DISCONTINUED | OUTPATIENT
Start: 2023-05-14 | End: 2023-05-14

## 2023-05-14 RX ORDER — METOPROLOL TARTRATE 5 MG/5ML
5 INJECTION INTRAVENOUS EVERY 6 HOURS PRN
Status: DISCONTINUED | OUTPATIENT
Start: 2023-05-14 | End: 2023-05-22 | Stop reason: HOSPADM

## 2023-05-14 RX ORDER — IPRATROPIUM BROMIDE AND ALBUTEROL SULFATE 2.5; .5 MG/3ML; MG/3ML
3 SOLUTION RESPIRATORY (INHALATION) EVERY 4 HOURS PRN
Status: DISCONTINUED | OUTPATIENT
Start: 2023-05-14 | End: 2023-05-22 | Stop reason: HOSPADM

## 2023-05-14 RX ORDER — METOPROLOL SUCCINATE 25 MG/1
25 TABLET, EXTENDED RELEASE ORAL
Status: DISCONTINUED | OUTPATIENT
Start: 2023-05-14 | End: 2023-05-15

## 2023-05-14 RX ORDER — AMLODIPINE BESYLATE 5 MG/1
5 TABLET ORAL DAILY
Status: DISCONTINUED | OUTPATIENT
Start: 2023-05-15 | End: 2023-05-16

## 2023-05-14 RX ADMIN — METOPROLOL TARTRATE 2.5 MG: 5 INJECTION INTRAVENOUS at 16:00

## 2023-05-14 RX ADMIN — Medication 10 ML: at 20:10

## 2023-05-14 RX ADMIN — TAZOBACTAM SODIUM AND PIPERACILLIN SODIUM 3.38 G: 375; 3 INJECTION, SOLUTION INTRAVENOUS at 10:14

## 2023-05-14 RX ADMIN — Medication 10 ML: at 10:15

## 2023-05-14 RX ADMIN — ASPIRIN 81 MG: 81 TABLET, COATED ORAL at 10:15

## 2023-05-14 RX ADMIN — IPRATROPIUM BROMIDE AND ALBUTEROL SULFATE 3 ML: .5; 3 SOLUTION RESPIRATORY (INHALATION) at 04:50

## 2023-05-14 RX ADMIN — MAGNESIUM SULFATE HEPTAHYDRATE 2 G: 2 INJECTION, SOLUTION INTRAVENOUS at 13:01

## 2023-05-14 RX ADMIN — DIGOXIN 250 MCG: 0.25 INJECTION INTRAMUSCULAR; INTRAVENOUS at 17:08

## 2023-05-14 RX ADMIN — IPRATROPIUM BROMIDE AND ALBUTEROL SULFATE 3 ML: 2.5; .5 SOLUTION RESPIRATORY (INHALATION) at 10:12

## 2023-05-14 RX ADMIN — PANTOPRAZOLE SODIUM 40 MG: 40 TABLET, DELAYED RELEASE ORAL at 10:15

## 2023-05-14 RX ADMIN — ATORVASTATIN CALCIUM 40 MG: 40 TABLET, FILM COATED ORAL at 10:16

## 2023-05-14 RX ADMIN — CLOPIDOGREL BISULFATE 75 MG: 75 TABLET, FILM COATED ORAL at 10:16

## 2023-05-14 RX ADMIN — ACETAMINOPHEN 500 MG: 500 TABLET, FILM COATED ORAL at 16:36

## 2023-05-14 RX ADMIN — DILTIAZEM HYDROCHLORIDE 5 MG/HR: 5 INJECTION INTRAVENOUS at 20:10

## 2023-05-14 RX ADMIN — METOPROLOL TARTRATE 2.5 MG: 5 INJECTION INTRAVENOUS at 15:07

## 2023-05-14 RX ADMIN — FUROSEMIDE 20 MG: 10 INJECTION, SOLUTION INTRAMUSCULAR; INTRAVENOUS at 10:15

## 2023-05-14 RX ADMIN — TAZOBACTAM SODIUM AND PIPERACILLIN SODIUM 3.38 G: 375; 3 INJECTION, SOLUTION INTRAVENOUS at 16:35

## 2023-05-14 RX ADMIN — LOSARTAN POTASSIUM 50 MG: 50 TABLET, FILM COATED ORAL at 10:17

## 2023-05-14 RX ADMIN — IPRATROPIUM BROMIDE AND ALBUTEROL SULFATE 3 ML: 2.5; .5 SOLUTION RESPIRATORY (INHALATION) at 19:25

## 2023-05-14 RX ADMIN — AMLODIPINE BESYLATE 10 MG: 10 TABLET ORAL at 10:23

## 2023-05-14 RX ADMIN — Medication 1000 MCG: at 10:16

## 2023-05-14 RX ADMIN — SODIUM CHLORIDE, POTASSIUM CHLORIDE, SODIUM LACTATE AND CALCIUM CHLORIDE 250 ML: 600; 310; 30; 20 INJECTION, SOLUTION INTRAVENOUS at 17:48

## 2023-05-14 RX ADMIN — MAGNESIUM GLUCONATE 500 MG ORAL TABLET 400 MG: 500 TABLET ORAL at 10:16

## 2023-05-14 RX ADMIN — PANTOPRAZOLE SODIUM 40 MG: 40 TABLET, DELAYED RELEASE ORAL at 16:38

## 2023-05-14 NOTE — THERAPY EVALUATION
Patient Name: Wyatt Curry  : 1930    MRN: [id number]                              Today's Date: 2023       Admit Date: 2023    Visit Dx:     ICD-10-CM ICD-9-CM   1. Pneumonia of right lower lobe due to infectious organism  J18.9 486   2. Hypoxia  R09.02 799.02   3. Other iron deficiency anemia  D50.8 280.8     Patient Active Problem List   Diagnosis   • Benign localized prostatic hyperplasia without lower urinary tract symptoms (LUTS)   • History of bladder cancer   • Left rotator cuff tear arthropathy   • Coronary artery disease involving native coronary artery of native heart without angina pectoris   • Left bundle branch block   • Essential hypertension   • Mixed hyperlipidemia   • Overweight (BMI 25.0-29.9)   • Non-rheumatic mitral regurgitation   • Other fatigue   • Left ventricular diastolic dysfunction   • History of coronary artery stent placement   • Lung nodule   • Mediastinal adenopathy   • Cough   • Abnormal positron emission tomography (PET) scan   • Basal cell carcinoma   • Neoplasm of uncertain behavior   • Elevated troponin   • Bleeding   • Ischemic cardiomyopathy   • Right lower lobe pneumonia   • Pneumonia of right lower lobe due to infectious organism     Past Medical History:   Diagnosis Date   • Arthritis    • Bladder cancer    • Bronchitis    • CAD (coronary artery disease)    • Cancer     bladder cancer   • 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   • GERD (gastroesophageal reflux disease)    • Hyperlipidemia    • Hypertension    • Irregular heart beat    • Lung nodules      Past Surgical History:   Procedure Laterality Date   • BLADDER SURGERY     • CARDIAC CATHETERIZATION     • CARDIAC CATHETERIZATION Left 2023    Procedure: Cardiac Catheterization/Vascular Study;  Surgeon: Sukhi Hartley MD;  Location: [facility] CATH INVASIVE LOCATION;  Service: Cardiology;  Laterality: Left;   • COLONOSCOPY     • CORONARY ANGIOPLASTY WITH STENT PLACEMENT  2006    STENT X 2   •  CYSTOSCOPY     • ENDOSCOPY     • EXCISION LESION N/A 4/7/2023    Procedure: EXCISION LESION OF RIGHT TEMPLE AND LEFT TEMPLE WITH FROZEN SECTION WITH POSSIBLE FLAP OR GRAFT;  Surgeon: Brijesh Nickerson MD;  Location:  [facility] OR;  Service: ENT;  Laterality: N/A;   • FLAP HEAD/NECK Left 03/09/2020    Procedure: POSSIBLE FLAP;  Surgeon: Brijesh Nickerson MD;  Location:  [facility] OR;  Service: ENT;  Laterality: Left;   • HEAD/NECK LESION/CYST EXCISION Left 03/09/2020    Procedure: 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 with frozen section and possible flap or graft;  Surgeon: Brijesh Nickerson MD;  Location:  [facility] OR;  Service: ENT;  Laterality: Left;   • HERNIA REPAIR     • SHOULDER SURGERY     • SKIN BIOPSY      lip biopsy   • SKIN FULL THICKNESS GRAFT Left 03/09/2020    Procedure: OR GRAFT;  Surgeon: Brijesh Nickerson MD;  Location:  [facility] OR;  Service: ENT;  Laterality: Left;   • TRANSURETHRAL RESECTION OF BLADDER TUMOR        General Information     Row Name 05/14/23 1403          OT Time and Intention    Document Type evaluation  Pt presents to ED with fatgiue, SOA, weakness and LE edema. Dx: RLL pneumonia.  -JJ     Mode of Treatment occupational therapy  -JJ     Row Name 05/14/23 1403          General Information    Patient Profile Reviewed yes  -JJ     Prior Level of Function independent:;all household mobility;transfer;bed mobility;ADL's  -JJ     Existing Precautions/Restrictions fall;oxygen therapy device and L/min  -JJ     Barriers to Rehab medically complex  -JJ     Row Name 05/14/23 1403          Living Environment    People in Home spouse  -JJ     Row Name 05/14/23 1403          Home Main Entrance    Number of Stairs, Main Entrance three  -JJ     Stair Railings, Main Entrance none  -JJ     Row Name 05/14/23 1403          Stairs Within Home, Primary    Number of Stairs, Within Home, Primary none  -JJ     Stair Railings, Within Home, Primary none  -JJ     Row Name  05/14/23 1403          Cognition    Orientation Status (Cognition) oriented x 4  -     Row Name 05/14/23 1403          Safety Issues, Functional Mobility    Impairments Affecting Function (Mobility) balance;endurance/activity tolerance;strength;shortness of breath  -           User Key  (r) = Recorded By, (t) = Taken By, (c) = Cosigned By    Initials Name Provider Type     Kassi Jennings, OTR/L, CSRS Occupational Therapist                 Mobility/ADL's     Row Name 05/14/23 1403          Bed Mobility    Bed Mobility supine-sit;sit-supine;rolling right;rolling left  -     Rolling Left [place] (Bed Mobility) contact guard  -     Rolling Right [place] (Bed Mobility) contact guard  -     Supine-Sit [place] (Bed Mobility) minimum assist (75% patient effort);moderate assist (50% patient effort)  -     Sit-Supine [place] (Bed Mobility) moderate assist (50% patient effort)  -     Bed Mobility, Safety Issues decreased use of arms for pushing/pulling;decreased use of legs for bridging/pushing  -     Assistive Device (Bed Mobility) head of bed elevated;bed rails  -JJ     Row Name 05/14/23 1403          Transfers    Transfers sit-stand transfer;stand-sit transfer;bed-chair transfer  -     Comment, (Transfers) stand/pivot from bed <> w/c. Pt incontinent of bowel upon sitting in chair, therefore t/f back to bed for clean up from BM.  -[facility] Name 05/14/23 1403          Bed-Chair Transfer    Bed-Chair [place] (Transfers) moderate assist (50% patient effort)  -[facility] Name 05/14/23 1403          Sit-Stand Transfer    Sit-Stand [place] (Transfers) moderate assist (50% patient effort)  -[facility] Name 05/14/23 1403          Stand-Sit Transfer    Stand-Sit [place] (Transfers) moderate assist (50% patient effort)  -[facility] Name 05/14/23 1403          Activities of Daily Living    BADL Assessment/Intervention lower body dressing;toileting  -[facility] Name 05/14/23  1403          Lower Body Dressing Assessment/Training    [place] Level (Lower Body Dressing) don;socks;moderate assist (50% patient effort)  -JJ     Position (Lower Body Dressing) edge of bed sitting  -JJ     Row Name 05/14/23 1403          Toileting Assessment/Training    [place] Level (Toileting) adjust/manage clothing;change pad/brief;perform perineal hygiene;maximum assist (25% patient effort)  -JJ     Position (Toileting) supine  -           User Key  (r) = Recorded By, (t) = Taken By, (c) = Cosigned By    Initials Name Provider Type    Kassi Porter OTR/L, CSRS Occupational Therapist               Obj/Interventions     Row Name 05/14/23 1403          Sensory Assessment (Somatosensory)    Sensory Assessment (Somatosensory) UE sensation intact  -     Row Name 05/14/23 1403          Vision Assessment/Intervention    Visual Impairment/Limitations WFL  -     Row Name 05/14/23 1403          Range of Motion Comprehensive    General Range of Motion bilateral upper extremity ROM L  -     Row Name 05/14/23 1403          Strength Comprehensive (MMT)    Comment, General Manual Muscle Testing (MMT) Assessment B UE strength grossly 3+/5  -JJ     Row Name 05/14/23 1403          Balance    Balance Assessment sitting static balance;sitting dynamic balance;standing dynamic balance;standing static balance  -JJ     Static Sitting Balance contact guard;minimal assist  -JJ     Dynamic Sitting Balance minimal assist  -JJ     Position, Sitting Balance supported;sitting edge of bed  -JJ     Static Standing Balance minimal assist;moderate assist  -JJ     Dynamic Standing Balance moderate assist  -JJ     Position/Device Used, Standing Balance supported  -           User Key  (r) = Recorded By, (t) = Taken By, (c) = Cosigned By    Initials Name Provider Type    Kassi Porter OTR/L, CSRS Occupational Therapist               Goals/Plan     Row Name 05/14/23 1403          Dressing Goal 1 (OT)     Activity/Device (Dressing Goal 1, OT) dressing skills, all  -JJ     [place]/Cues Needed (Dressing Goal 1, OT) standby assist  -JJ     Time Frame (Dressing Goal 1, OT) long term goal (LTG);by discharge  -JJ     Progress/Outcome (Dressing Goal 1, OT) new goal  -JJ     Row Name 05/14/23 1403          Toileting Goal 1 (OT)    Activity/Device (Toileting Goal 1, OT) toileting skills, all  -JJ     [place] Level/Cues Needed (Toileting Goal 1, OT) standby assist  -JJ     Time Frame (Toileting Goal 1, OT) long term goal (LTG);by discharge  -JJ     Progress/Outcome (Toileting Goal 1, OT) new goal  -     Row Name 05/14/23 1403          Problem Specific Goal 1 (OT)    Problem Specific Goal 1 (OT) Pt will increase his tolerance for standing by completing adl in standing position sink side for 5 minutes without rest break.  -JJ     Time Frame (Problem Specific Goal 1, OT) long term goal (LTG);by discharge  -JJ     Progress/Outcome (Problem Specific Goal 1, OT) new [facility]     Row Name 05/14/23 1403          Therapy Assessment/Plan (OT)    Planned Therapy Interventions (OT) activity tolerance training;adaptive equipment training;BADL retraining;functional balance retraining;transfer/mobility retraining;strengthening exercise;occupation/activity based interventions;patient/caregiver education/training  -           User Key  (r) = Recorded By, (t) = Taken By, (c) = Cosigned By    Initials Name Provider Type    Kassi Porter OTR/L, CSRS Occupational Therapist               Clinical Impression     Row Name 05/14/23 1403          Pain Assessment    Pretreatment Pain Rating 0/10 - no pain  -     Posttreatment Pain Rating 0/10 - no pain  -     Row Name 05/14/23 1403          Plan of Care Review    Plan of Care Reviewed With patient  -     Outcome Evaluation OT eval completed. Pt presents alert and oriented x4, resting comfortably in bed with family at bedside. He reports at baseline being independent with  all adls and mobility, and is the caretaker for his wife. Today he demonstrates decreased strength, balance, activity tolerance and increased SOA. He was able to bring self to sitting at EOB with Min/Mod A. He required Mod A to don socks while seated at EOB. He was able to complete sit <> stand t/f from EOB with Min/Mod A and Mod A to complete stand/step t/f from EOB <> chair. Upon t/f pt was incontinent of BM and required Mod A to return to bed. Max A for all perineal hgyiene and changing brief in bed. Increased HR into 150s with t/fs. He would benefit from skilled OT services to address these deficits. Recommend d/c home with assist and HH vs. short term rehab, pending pt progress and families ability to provide level of assist that is needed.  -     Row Name 05/14/23 1403          Therapy Assessment/Plan (OT)    Rehab Potential (OT) good, to achieve stated therapy goals  -     Criteria for Skilled Therapeutic Interventions Met (OT) yes;skilled treatment is necessary  -     Therapy Frequency (OT) 5 times/wk  -     Predicted Duration of Therapy Intervention (OT) 10 days  -     Row Name 05/14/23 1403          Therapy Plan Review/Discharge Plan (OT)    Anticipated Discharge Disposition (OT) sub acute care setting;home with assist;home with home health  -     Row Name 05/14/23 1403          Positioning and Restraints    Pre-Treatment Position in bed  -JJ     Post Treatment Position bed  -JJ     In Bed notified nsg;fowlers;call light within reach;encouraged to call for assist;with family/caregiver;with nsg;side rails up x2  -           User Key  (r) = Recorded By, (t) = Taken By, (c) = Cosigned By    Initials Name Provider Type    Kassi Porter, SHIRLEYR/L, CSRS Occupational Therapist               Outcome Measures     Row Name 05/14/23 1403          How much help from another is currently needed...    Putting on and taking off regular lower body clothing? 2  -JJ     Bathing (including washing,  rinsing, and drying) 2  -JJ     Toileting (which includes using toilet bed pan or urinal) 3  -JJ     Putting on and taking off regular upper body clothing 3  -JJ     Taking care of personal grooming (such as brushing teeth) 3  -JJ     Eating meals 4  -JJ     AM-PAC 6 Clicks Score (OT) 17  -J     Row Name 05/14/23 0745          How much help from another person do you currently need...    Turning from your back to your side while in flat bed without using bedrails? 4  -KP     Moving from lying on back to sitting on the side of a flat bed without bedrails? 3  -KP     Moving to and from a bed to a chair (including a wheelchair)? 3  -KP     Standing up from a chair using your arms (e.g., wheelchair, bedside chair)? 3  -KP     Climbing 3-5 steps with a railing? 3  -KP     To walk in hospital room? 2  -KP     AM-PAC 6 Clicks Score (PT) 18  -KP     Row Name 05/14/23 1403          Functional Assessment    Outcome Measure Options AM-PAC 6 Clicks Daily Activity (OT)  -           User Key  (r) = Recorded By, (t) = Taken By, (c) = Cosigned By    Initials Name Provider Type    Aileen Bradley, RN Registered Nurse    Kassi Porter, SHIRLEYR/L, CSRS Occupational Therapist                Occupational Therapy Education     Title: PT OT SLP Therapies (In Progress)     Topic: Occupational Therapy (In Progress)     Point: ADL training (Done)     Description:   Instruct learner(s) on proper safety adaptation and remediation techniques during self care or transfers.   Instruct in proper use of assistive devices.              Learning Progress Summary           Patient Acceptance, E, VU by KAREN at 5/14/2023 2436                   Point: Home exercise program (Not Started)     Description:   Instruct learner(s) on appropriate technique for monitoring, assisting and/or progressing therapeutic exercises/activities.              Learner Progress:  Not documented in this visit.          Point: Precautions (Done)     Description:    Instruct learner(s) on prescribed precautions during self-care and functional transfers.              Learning Progress Summary           Patient Acceptance, VALERIE, VU by KAREN at 5/14/2023 [id number]                   Point: Body mechanics (Not Started)     Description:   Instruct learner(s) on proper positioning and spine alignment during self-care, functional mobility activities and/or exercises.              Learner Progress:  Not documented in this visit.                      User Key     Initials Effective Dates Name Provider Type Discipline    KAREN 11/10/21 -  Kassi Jennings, OTR/L, CSRS Occupational Therapist OT              OT Recommendation and Plan  Planned Therapy Interventions (OT): activity tolerance training, adaptive equipment training, BADL retraining, functional balance retraining, transfer/mobility retraining, strengthening exercise, occupation/activity based interventions, patient/caregiver education/training  Therapy Frequency (OT): 5 times/wk  Plan of Care Review  Plan of Care Reviewed With: patient  Outcome Evaluation: OT eval completed. Pt presents alert and oriented x4, resting comfortably in bed with family at bedside. He reports at baseline being independent with all adls and mobility, and is the caretaker for his wife. Today he demonstrates decreased strength, balance, activity tolerance and increased SOA. He was able to bring self to sitting at EOB with Min/Mod A. He required Mod A to don socks while seated at EOB. He was able to complete sit <> stand t/f from EOB with Min/Mod A and Mod A to complete stand/step t/f from EOB <> chair. Upon t/f pt was incontinent of BM and required Mod A to return to bed. Max A for all perineal hgyiene and changing brief in bed. Increased HR into 150s with t/fs. He would benefit from skilled OT services to address these deficits. Recommend d/c home with assist and HH vs. short term rehab, pending pt progress and families ability to provide level of assist that is  needed.     Time Calculation:    Time Calculation- OT     Row Name 05/14/23 1403             Time Calculation- OT    OT Start Time 1403  -      OT Stop Time 1445  -      OT Time Calculation (min) 42 min  -      OT Received On 05/14/23  -      OT Goal Re-Cert Due Date 05/24/23  -            User Key  (r) = Recorded By, (t) = Taken By, (c) = Cosigned By    Initials Name Provider Type    Kassi Porter OTR/L, RADHIKA Occupational Therapist              Therapy Charges for Today     Code Description Service Date Service Provider Modifiers Qty    [id number]  OT EVAL LOW COMPLEXITY 3 5/14/2023 Kassi Jennings OTR/L, RADHIKA GO 1               Kassi Jennings, OTR/L, RADHIKA  5/14/2023

## 2023-05-14 NOTE — PROGRESS NOTES
[facility] Medicine Services  INPATIENT PROGRESS NOTE    Patient Name: Wyatt Curry  Date of Admission: 5/13/2023  Today's Date: 05/14/23  Length of Stay: 1  Primary Care Physician: Everardo Jackson MD    Subjective   Chief Complaint: Pneumonia.    HPI   Tmax 102.9.  T-current 98.7.  Second pulse earlier this morning, stop Toprol for now.  Long discussion with patient and daughter, recommended also discussed with patient, patient is now DNR and DNI.  Patient currently on 4 L of oxygen.  Hyponatremia-resolved.  Hypokalemia-resolved.  Hypomagnesia, 2 g magnesium, magnesium in AM.  Anemia, hemoglobin stable.  Thrombocytopenia, slight decrease in platelets, no sign of acute bleed.  Patient no acute distress.  Patient required more oxygen today than yesterday.  Patient denied of any chest pain.  Daughter requests wife to come in today at his bedside, requesting for a bigger bed.    Review of Systems   Constitutional: Positive for activity change, appetite change and fatigue. Negative for chills and fever.   HENT: Negative for hearing loss, nosebleeds, tinnitus and trouble swallowing.    Eyes: Negative for visual disturbance.   Respiratory: Positive for cough and shortness of breath. Negative for chest tightness and wheezing.    Cardiovascular: Negative for chest pain, palpitations and leg swelling.   Gastrointestinal: Negative for abdominal distention, abdominal pain, blood in stool, constipation, diarrhea, nausea and vomiting.   Endocrine: Negative for cold intolerance, heat intolerance, polydipsia, polyphagia and polyuria.   Genitourinary: Negative for decreased urine volume, difficulty urinating, dysuria, flank pain, frequency and hematuria.   Musculoskeletal: Positive for arthralgias, gait problem and myalgias. Negative for joint swelling.   Skin: Negative for rash.   Allergic/Immunologic: Negative for immunocompromised state.   Neurological: Positive for weakness. Negative  for dizziness, syncope, light-headedness and headaches.   Hematological: Negative for adenopathy. Does not bruise/bleed easily.   Psychiatric/Behavioral: Negative for confusion and sleep disturbance. The patient is not nervous/anxious.         All pertinent negatives and positives are as above. All other systems have been reviewed and are negative unless otherwise stated.     Objective    Temp:  [97.4 °F (36.3 °C)-102.9 °F (39.4 °C)] 98.7 °F (37.1 °C)  Heart Rate:  [] 90  Resp:  [14-20] 16  BP: ()/(44-60) 149/48  Physical Exam  Vitals and nursing note reviewed.   Constitutional:       Comments: Advanced age .   HENT:      Head: Normocephalic.   Eyes:      Conjunctiva/sclera: Conjunctivae normal.      Pupils: Pupils are equal, round, and reactive to light.   Neck:      Vascular: No JVD.   Cardiovascular:      Rate and Rhythm: Normal rate and regular rhythm.      Heart sounds: Normal heart sounds.   Pulmonary:      Effort: No respiratory distress.      Breath sounds: No wheezing or rales.      Comments: Diminished breath s bilateral, clear, on 4 L.  Chest:      Chest wall: No tenderness.   Abdominal:      General: Bowel sounds are normal. There is no distension.      Palpations: Abdomen is soft.      Tenderness: There is no abdominal tenderness.   Musculoskeletal:         General: No tenderness or deformity.      Cervical back: Neck supple.   Skin:     General: Skin is warm and dry.      Capillary Refill: Capillary refill takes 2 to 3 seconds.      Findings: No rash.   Neurological:      Mental Status: He is alert.      Cranial Nerves: No cranial nerve deficit.      Motor: Weakness present. No abnormal muscle tone.      Coordination: Coordination abnormal.      Gait: Gait abnormal.      Deep Tendon Reflexes: Reflexes normal.   Psychiatric:         Mood and Affect: Mood normal.         Behavior: Behavior normal.       Results Review:  I have reviewed the labs, radiology results, and diagnostic  studies.    Laboratory Data:   Results from last 7 days   Lab Units 05/14/23  0445 05/13/23  0834   WBC 10*3/mm3 8.70 8.23   HEMOGLOBIN g/dL 10.5* 10.0*   HEMATOCRIT % 33.9* 31.8*   PLATELETS 10*3/mm3 121* 131*        Results from last 7 days   Lab Units 05/14/23  0445 05/13/23  0834   SODIUM mmol/L 137 135*   POTASSIUM mmol/L 4.1 3.3*   CHLORIDE mmol/L 101 98   CO2 mmol/L 24.0 23.0   BUN mg/dL 19 20   CREATININE mg/dL 0.78 0.72*   CALCIUM mg/dL 8.2 8.3   BILIRUBIN mg/dL 0.5 0.5   ALK PHOS U/L 50 51   ALT (SGPT) U/L 26 21   AST (SGOT) U/L 56* 38   GLUCOSE mg/dL 110* 134*       Culture Data:   Blood Culture   Date Value Ref Range Status   05/13/2023 No growth at less than 24 hours  Preliminary   05/13/2023 No growth at less than 24 hours  Preliminary       Radiology Data:   Imaging Results (Last 24 Hours)     Procedure Component Value Units Date/Time    CT Angiogram Chest [[id number]] Collected: 05/13/23 1052     Updated: 05/13/23 1058    Narrative:      EXAMINATION: CT ANGIOGRAM CHEST-      5/13/2023 10:26 AM CDT     HISTORY: Recent cardiac catheterization. Shortness of breath with fever  and lower extremity edema.     In order to have a CT radiation dose as low as reasonably achievable  Automated Exposure Control was utilized for adjustment of the mA and/or  KV according to patient size.     DLP in mGycm= 194.     CT angiogram chest with IV contrast.  CT angiography protocol.   CT imaging with bolus IV contrast injection.   Under concurrent supervision axial, sagittal, coronal,  three-dimensional, and MIP data sets were constructed on an independent  work station.     Comparison is made with 08/02/2022.     Normal heart size.  Normal size thoracic aorta.     Mildly enlarged and well opacified pulmonary arteries.  No pulmonary embolism.     Posterior right lower lobe pneumonia.     Underlying chronic interstitial disease.  No pneumothorax and no significant pleural effusion.  No sign of heart failure.     Prominent  diffuse osteopenia.  Severe degenerative spine change with mild chronic anterior wedge  compression of T12.     Summary:  1. No pulmonary embolism.  2. Right lower lobe pneumonia.                                            This report was finalized on 05/13/2023 10:55 by Dr. Mac Lucio MD.          I have reviewed the patient's current medications.     Assessment/Plan   Assessment  Active Hospital Problems    Diagnosis    • **Pneumonia of right lower lobe due to infectious organism    • Right lower lobe pneumonia    • Cough    • Mediastinal adenopathy    • Other fatigue    • Non-rheumatic mitral regurgitation    • Coronary artery disease involving native coronary artery of native heart without angina pectoris    • Essential hypertension    • Mixed hyperlipidemia    • History of bladder cancer    • Benign localized prostatic hyperplasia without lower urinary tract symptoms (LUTS)        Treatment Plan  Pneumonia/right lower lobe . Elevated procalcitonin.  Lactate is normal.  White blood cells normal.  DuoNebs.  Incentive spirometer.  Zosyn antibiotics to cover for aspiration.  MRSA is negative- DC vancomycin for now 5/14/2023.  Chest x-ray-Hypoaeration with patchy atelectasis.  CTA of the chest-No pulmonary embolism, Right lower lobe pneumonia.  Patient is on 4 L of oxygen.      CAD-status post stent placement 4/8/23/hypertension/hyperlipidemia.  BNP 1800. Troponin 50.  EKG shows sinus rhythm with PAC.  20 of Lasix x1.  Aspirin.  Plavix.  Cozaar in half.    Continue Norvasc.  Hold Toprol for now secondary to 12/s pulse. Nitro as needed.  Echocardiogram 12/20/2022- ejection fraction 50%, diastolic dysfunction grade 1, right atrial cavity borderline dilated.     Hyponatremia.  Resolved.     Hypokalemia.    Resolved    Magnesium level- 2 g magnesium.  Continue p.o. magnesium.  Magnesium in a.m.     Anemia.  Hemoglobin stable.  No sign of bleed..    Thrombocytopenia.  Decrease in platelets.  Will  follow.     Nutrition.  B12 . Cardiac diet.     Deconditioning.  PT and OT consult.     Blood culture NINA-no growth less than 24 hours.  Respiratory panel-negative.  Mycoplasma pneumonia pending.  Legionella antigen-negative.  MRSA screen-negative.  Strep pneumo-negative.     DNR. DNI.     Medical Decision Making  Number and Complexity of problems: Right lower lobe pneumonia/CAD/hyponatremia/hypokalemia/anemia  Differential Diagnosis: None     Conditions and Status     Evaluation ongoing treatment .     MDM Data  External documents reviewed: Previous notes  Cardiac tracing (EKG, telemetry) interpretation: Sinus with PAC.  Subsecond pulse yesterday  Radiology interpretation: X-ray and CT scan.  Labs reviewed: Laboratory .  Any tests that were considered but not ordered: Lab in a.m.     Decision rules/scores evaluated (example [id number], Wells, etc): None     Discussed with: Patient and daughter     Care Planning  Shared decision making: Patient and daughter  Code status and discussions:  DNR.  DNI.     Disposition  Social Determinants of Health that impact treatment or disposition: Unknown  Estimated length of stay is 2 to 5 days.   Electronically signed by Brody Jackson MD, 05/14/23, 09:02 CDT.

## 2023-05-15 PROBLEM — I48.91 NEW ONSET ATRIAL FIBRILLATION: Status: ACTIVE | Noted: 2023-05-15

## 2023-05-15 PROBLEM — A41.9 SEPSIS DUE TO PNEUMONIA: Status: ACTIVE | Noted: 2023-05-15

## 2023-05-15 PROBLEM — R09.02 HYPOXIA: Status: ACTIVE | Noted: 2023-05-15

## 2023-05-15 PROBLEM — J18.9 SEPSIS DUE TO PNEUMONIA: Status: ACTIVE | Noted: 2023-05-15

## 2023-05-15 LAB
ANION GAP SERPL CALCULATED.3IONS-SCNC: 16 MMOL/L (ref 5–15)
BUN SERPL-MCNC: 26 MG/DL (ref 8–23)
BUN/CREAT SERPL: 27.7 (ref 7–25)
CALCIUM SPEC-SCNC: 8.1 MG/DL (ref 8.2–9.6)
CHLORIDE SERPL-SCNC: 101 MMOL/L (ref 98–107)
CO2 SERPL-SCNC: 18 MMOL/L (ref 22–29)
CREAT SERPL-MCNC: 0.94 MG/DL (ref 0.76–1.27)
DEPRECATED RDW RBC AUTO: 55.4 FL (ref 37–54)
EGFRCR SERPLBLD CKD-EPI 2021: 75.6 ML/MIN/1.73
ERYTHROCYTE [DISTWIDTH] IN BLOOD BY AUTOMATED COUNT: 15 % (ref 12.3–15.4)
GLUCOSE SERPL-MCNC: 138 MG/DL (ref 65–99)
HCT VFR BLD AUTO: 36.6 % (ref 37.5–51)
HGB BLD-MCNC: 10.7 G/DL (ref 13–17.7)
MAGNESIUM SERPL-MCNC: 1.9 MG/DL (ref 1.7–2.3)
MCH RBC QN AUTO: 29 PG (ref 26.6–33)
MCHC RBC AUTO-ENTMCNC: 29.2 G/DL (ref 31.5–35.7)
MCV RBC AUTO: 99.2 FL (ref 79–97)
PLATELET # BLD AUTO: 127 10*3/MM3 (ref 140–450)
PMV BLD AUTO: 11 FL (ref 6–12)
POTASSIUM SERPL-SCNC: 3.3 MMOL/L (ref 3.5–5.2)
QT INTERVAL: 418 MS
QTC INTERVAL: 485 MS
RBC # BLD AUTO: 3.69 10*6/MM3 (ref 4.14–5.8)
SODIUM SERPL-SCNC: 135 MMOL/L (ref 136–145)
WBC NRBC COR # BLD: 8.8 10*3/MM3 (ref 3.4–10.8)

## 2023-05-15 PROCEDURE — 97162 PT EVAL MOD COMPLEX 30 MIN: CPT

## 2023-05-15 PROCEDURE — 25010000002 PIPERACILLIN SOD-TAZOBACTAM PER 1 G: Performed by: FAMILY MEDICINE

## 2023-05-15 PROCEDURE — 83735 ASSAY OF MAGNESIUM: CPT | Performed by: FAMILY MEDICINE

## 2023-05-15 PROCEDURE — 97110 THERAPEUTIC EXERCISES: CPT

## 2023-05-15 PROCEDURE — 93005 ELECTROCARDIOGRAM TRACING: CPT | Performed by: NURSE PRACTITIONER

## 2023-05-15 PROCEDURE — 94799 UNLISTED PULMONARY SVC/PX: CPT

## 2023-05-15 PROCEDURE — 94761 N-INVAS EAR/PLS OXIMETRY MLT: CPT

## 2023-05-15 PROCEDURE — 97530 THERAPEUTIC ACTIVITIES: CPT

## 2023-05-15 PROCEDURE — 93010 ELECTROCARDIOGRAM REPORT: CPT | Performed by: STUDENT IN AN ORGANIZED HEALTH CARE EDUCATION/TRAINING PROGRAM

## 2023-05-15 PROCEDURE — 94664 DEMO&/EVAL PT USE INHALER: CPT

## 2023-05-15 PROCEDURE — 80048 BASIC METABOLIC PNL TOTAL CA: CPT | Performed by: FAMILY MEDICINE

## 2023-05-15 PROCEDURE — 85027 COMPLETE CBC AUTOMATED: CPT | Performed by: FAMILY MEDICINE

## 2023-05-15 RX ORDER — ACETAMINOPHEN 325 MG/1
650 TABLET ORAL EVERY 6 HOURS PRN
Status: DISCONTINUED | OUTPATIENT
Start: 2023-05-15 | End: 2023-05-22 | Stop reason: HOSPADM

## 2023-05-15 RX ORDER — METOPROLOL SUCCINATE 25 MG/1
25 TABLET, EXTENDED RELEASE ORAL
Status: DISCONTINUED | OUTPATIENT
Start: 2023-05-16 | End: 2023-05-16

## 2023-05-15 RX ORDER — METOPROLOL SUCCINATE 50 MG/1
50 TABLET, EXTENDED RELEASE ORAL
Status: DISCONTINUED | OUTPATIENT
Start: 2023-05-16 | End: 2023-05-15

## 2023-05-15 RX ORDER — POTASSIUM CHLORIDE 750 MG/1
40 CAPSULE, EXTENDED RELEASE ORAL ONCE
Status: COMPLETED | OUTPATIENT
Start: 2023-05-15 | End: 2023-05-15

## 2023-05-15 RX ORDER — METOPROLOL SUCCINATE 25 MG/1
25 TABLET, EXTENDED RELEASE ORAL ONCE
Status: DISCONTINUED | OUTPATIENT
Start: 2023-05-15 | End: 2023-05-15

## 2023-05-15 RX ORDER — GUAIFENESIN 600 MG/1
1200 TABLET, EXTENDED RELEASE ORAL EVERY 12 HOURS SCHEDULED
Status: DISCONTINUED | OUTPATIENT
Start: 2023-05-15 | End: 2023-05-22 | Stop reason: HOSPADM

## 2023-05-15 RX ADMIN — POTASSIUM CHLORIDE 40 MEQ: 10 CAPSULE, COATED, EXTENDED RELEASE ORAL at 11:10

## 2023-05-15 RX ADMIN — ASPIRIN 81 MG: 81 TABLET, COATED ORAL at 08:16

## 2023-05-15 RX ADMIN — METOPROLOL SUCCINATE 25 MG: 25 TABLET, EXTENDED RELEASE ORAL at 08:16

## 2023-05-15 RX ADMIN — LOSARTAN POTASSIUM 50 MG: 50 TABLET, FILM COATED ORAL at 08:16

## 2023-05-15 RX ADMIN — MAGNESIUM GLUCONATE 500 MG ORAL TABLET 400 MG: 500 TABLET ORAL at 08:16

## 2023-05-15 RX ADMIN — TAZOBACTAM SODIUM AND PIPERACILLIN SODIUM 3.38 G: 375; 3 INJECTION, SOLUTION INTRAVENOUS at 01:04

## 2023-05-15 RX ADMIN — AMLODIPINE BESYLATE 5 MG: 5 TABLET ORAL at 08:16

## 2023-05-15 RX ADMIN — IPRATROPIUM BROMIDE 0.5 MG: 0.5 SOLUTION RESPIRATORY (INHALATION) at 14:27

## 2023-05-15 RX ADMIN — TAZOBACTAM SODIUM AND PIPERACILLIN SODIUM 3.38 G: 375; 3 INJECTION, SOLUTION INTRAVENOUS at 17:01

## 2023-05-15 RX ADMIN — ACETAMINOPHEN 650 MG: 325 TABLET, FILM COATED ORAL at 17:01

## 2023-05-15 RX ADMIN — PANTOPRAZOLE SODIUM 40 MG: 40 TABLET, DELAYED RELEASE ORAL at 08:16

## 2023-05-15 RX ADMIN — ATORVASTATIN CALCIUM 40 MG: 40 TABLET, FILM COATED ORAL at 08:16

## 2023-05-15 RX ADMIN — Medication 10 ML: at 21:37

## 2023-05-15 RX ADMIN — GUAIFENESIN 1200 MG: 600 TABLET, EXTENDED RELEASE ORAL at 17:01

## 2023-05-15 RX ADMIN — ACETAMINOPHEN 500 MG: 500 TABLET, FILM COATED ORAL at 01:47

## 2023-05-15 RX ADMIN — IPRATROPIUM BROMIDE 0.5 MG: 0.5 SOLUTION RESPIRATORY (INHALATION) at 20:56

## 2023-05-15 RX ADMIN — TAZOBACTAM SODIUM AND PIPERACILLIN SODIUM 3.38 G: 375; 3 INJECTION, SOLUTION INTRAVENOUS at 08:16

## 2023-05-15 RX ADMIN — PANTOPRAZOLE SODIUM 40 MG: 40 TABLET, DELAYED RELEASE ORAL at 17:06

## 2023-05-15 RX ADMIN — Medication 1000 MCG: at 08:16

## 2023-05-15 RX ADMIN — Medication 10 ML: at 08:16

## 2023-05-15 RX ADMIN — IPRATROPIUM BROMIDE AND ALBUTEROL SULFATE 3 ML: 2.5; .5 SOLUTION RESPIRATORY (INHALATION) at 06:16

## 2023-05-15 RX ADMIN — DILTIAZEM HYDROCHLORIDE 7.5 MG/HR: 5 INJECTION INTRAVENOUS at 12:57

## 2023-05-15 RX ADMIN — CLOPIDOGREL BISULFATE 75 MG: 75 TABLET, FILM COATED ORAL at 08:16

## 2023-05-15 RX ADMIN — IPRATROPIUM BROMIDE 0.5 MG: 0.5 SOLUTION RESPIRATORY (INHALATION) at 10:26

## 2023-05-15 NOTE — PLAN OF CARE
Goal Outcome Evaluation:  Plan of Care Reviewed With: patient        Progress: improving  Outcome Evaluation: Patient showing slight improvement overnight.  Still in AF, rate is now averaging in low 100s.  Cardizem gtt set at 7.5.  BP maintaining.  O2 saturation has been around 94-95% all night at 6L.  C/o hip pain, given Tylenol and repositioned; pain resolved.  Turning patient q2h.  Incontinent of bowel, still having liquid stool.  IV Zosyn infused per order.  Osullivan care provided.  Urine output has dropped under 30ml/hr; Dr. Anand notfied.  Family remains at bedside.  Safety maintained. Patient reports feeling improved this morning, though very weak.

## 2023-05-15 NOTE — THERAPY TREATMENT NOTE
Patient Name: Wyatt Curry  : 1930    MRN: [id number]                              Today's Date: 5/15/2023       Admit Date: 2023    Visit Dx: Therapist utilized gait belt, applied non-slipped socks, provided fall risk education/prevention, & facilitated muscle strengthening PRN to reduce patient falls risk during this session.      ICD-10-CM ICD-9-CM   1. Pneumonia of right lower lobe due to infectious organism  J18.9 486   2. Hypoxia  R09.02 799.02   3. Other iron deficiency anemia  D50.8 280.8   4. Impaired mobility  Z74.09 799.89     Patient Active Problem List   Diagnosis   • Benign localized prostatic hyperplasia without lower urinary tract symptoms (LUTS)   • History of bladder cancer   • Left rotator cuff tear arthropathy   • Coronary artery disease involving native coronary artery of native heart without angina pectoris   • Left bundle branch block   • Essential hypertension   • Mixed hyperlipidemia   • Overweight (BMI 25.0-29.9)   • Non-rheumatic mitral regurgitation   • Other fatigue   • Left ventricular diastolic dysfunction   • History of coronary artery stent placement   • Lung nodule   • Mediastinal adenopathy   • Cough   • Abnormal positron emission tomography (PET) scan   • Basal cell carcinoma   • Neoplasm of uncertain behavior   • Elevated troponin   • Bleeding   • Ischemic cardiomyopathy   • Right lower lobe pneumonia   • Pneumonia of right lower lobe due to infectious organism   • New onset atrial fibrillation with rapid ventricular response     Past Medical History:   Diagnosis Date   • Arthritis    • Bladder cancer    • Bronchitis    • CAD (coronary artery disease)    • Cancer     bladder cancer   • 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   • GERD (gastroesophageal reflux disease)    • Hyperlipidemia    • Hypertension    • Irregular heart beat    • Lung nodules      Past Surgical History:   Procedure Laterality Date   • BLADDER SURGERY     • CARDIAC CATHETERIZATION     •  CARDIAC CATHETERIZATION Left 4/8/2023    Procedure: Cardiac Catheterization/Vascular Study;  Surgeon: Sukhi Hartley MD;  Location:  [facility] CATH INVASIVE LOCATION;  Service: Cardiology;  Laterality: Left;   • COLONOSCOPY     • CORONARY ANGIOPLASTY WITH STENT PLACEMENT  2006    STENT X 2   • CYSTOSCOPY     • ENDOSCOPY     • EXCISION LESION N/A 4/7/2023    Procedure: EXCISION LESION OF RIGHT TEMPLE AND LEFT TEMPLE WITH FROZEN SECTION WITH POSSIBLE FLAP OR GRAFT;  Surgeon: Brijesh Nickerson MD;  Location:  [facility] OR;  Service: ENT;  Laterality: N/A;   • FLAP HEAD/NECK Left 03/09/2020    Procedure: POSSIBLE FLAP;  Surgeon: Brijesh Nickerson MD;  Location:  [facility] OR;  Service: ENT;  Laterality: Left;   • HEAD/NECK LESION/CYST EXCISION Left 03/09/2020    Procedure: 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 with frozen section and possible flap or graft;  Surgeon: Brijesh Nickerson MD;  Location:  [facility] OR;  Service: ENT;  Laterality: Left;   • HERNIA REPAIR     • SHOULDER SURGERY     • SKIN BIOPSY      lip biopsy   • SKIN FULL THICKNESS GRAFT Left 03/09/2020    Procedure: OR GRAFT;  Surgeon: Brijesh Nickerson MD;  Location:  [facility] OR;  Service: ENT;  Laterality: Left;   • TRANSURETHRAL RESECTION OF BLADDER TUMOR        General Information     Row Name 05/15/23 1502          OT Time and Intention    Document Type therapy note (daily note)  -MT     Mode of Treatment occupational therapy  -MT     Row Name 05/15/23 1504          General Information    Patient Profile Reviewed yes  -MT     Existing Precautions/Restrictions fall;oxygen therapy device and L/min  Pt originally on 6L SATs low 80s, NSG called and reported switch to 8L O2 HFNC pt SATs stayed 88 and above on 8L  -MT     Row Name 05/15/23 1508          Cognition    Orientation Status (Cognition) oriented x 4  -MT     Row Name 05/15/23 1508          Safety Issues, Functional Mobility    Impairments Affecting Function (Mobility)  balance;endurance/activity tolerance;strength;shortness of breath  -MT           User Key  (r) = Recorded By, (t) = Taken By, (c) = Cosigned By    Initials Name Provider Type    Jante Andrade COTA Occupational Therapist Assistant                 Mobility/ADL's     Row Name 05/15/23 1609          Bed Mobility    Comment, (Bed Mobility) Adalberto repositioning  -MT           User Key  (r) = Recorded By, (t) = Taken By, (c) = Cosigned By    Initials Name Provider Type    Janet Andrade COTA Occupational Therapist Assistant               Obj/Interventions     Row Name 05/15/23 1508          Motor Skills    Therapeutic Exercise --  view POC for details  -MT           User Key  (r) = Recorded By, (t) = Taken By, (c) = Cosigned By    Initials Name Provider Type    Janet Andrade COTA Occupational Therapist Assistant               Goals/Plan    No documentation.                Clinical Impression     Row Name 05/15/23 1508          Pain Assessment    Pretreatment Pain Rating 0/10 - no pain  -MT     Posttreatment Pain Rating 0/10 - no pain  -MT     Row Name 05/15/23 1508          Therapy Plan Review/Discharge Plan (OT)    Anticipated Discharge Disposition (OT) sub acute care setting;home with assist;home with home health  -MT     Row Name 05/15/23 1508          Positioning and Restraints    Pre-Treatment Position in bed  -MT     Post Treatment Position bed  -MT     In Bed fowlers;call light within reach;encouraged to call for assist;exit alarm on;side rails up x2  -MT           User Key  (r) = Recorded By, (t) = Taken By, (c) = Cosigned By    Initials Name Provider Type    Janet Andrade COTA Occupational Therapist Assistant               Outcome Measures     Row Name 05/15/23 1508          How much help from another is currently needed...    Putting on and taking off regular lower body clothing? 2  -MT     Bathing (including washing, rinsing, and drying) 2  -MT     Toileting (which includes using toilet bed  pan or urinal) 3  -MT     Putting on and taking off regular upper body clothing 3  -MT     Taking care of personal grooming (such as brushing teeth) 3  -MT     Eating meals 4  -MT     AM-PAC 6 Clicks Score (OT) 17  -MT     Row Name 05/15/23 1102          How much help from another person do you currently need...    Turning from your back to your side while in flat bed without using bedrails? 3  -ALFREDITO     Moving from lying on back to sitting on the side of a flat bed without bedrails? 3  -ALFREDITO     Moving to and from a bed to a chair (including a wheelchair)? 3  -ALFREDITO     Standing up from a chair using your arms (e.g., wheelchair, bedside chair)? 3  -ALFREDITO     Climbing 3-5 steps with a railing? 2  -ALFREDITO     To walk in hospital room? 2  -ALFREDITO     AM-PAC 6 Clicks Score (PT) 16  -ALFREDITO     Highest level of mobility 5 --> Static standing  -ALFREDITO     Row Name 05/15/23 1102          Functional Assessment    Outcome Measure Options AM-PAC 6 Clicks Basic Mobility (PT)  -ALFREDITO           User Key  (r) = Recorded By, (t) = Taken By, (c) = Cosigned By    Initials Name Provider Type    Satish Galindo, PT DPT Physical Therapist    MT Janet Hernandez COTA Occupational Therapist Assistant                Occupational Therapy Education     Title: PT OT SLP Therapies (In Progress)     Topic: Occupational Therapy (In Progress)     Point: ADL training (Done)     Description:   Instruct learner(s) on proper safety adaptation and remediation techniques during self care or transfers.   Instruct in proper use of assistive devices.              Learning Progress Summary           Patient Acceptance, E, VU by KAREN at 5/14/2023 2031                   Point: Home exercise program (Not Started)     Description:   Instruct learner(s) on appropriate technique for monitoring, assisting and/or progressing therapeutic exercises/activities.              Learner Progress:  Not documented in this visit.          Point: Precautions (Done)     Description:   Instruct  learner(s) on prescribed precautions during self-care and functional transfers.              Learning Progress Summary           Patient Acceptance, VALERIE, VU by KAREN at 5/14/2023 0390                   Point: Body mechanics (Not Started)     Description:   Instruct learner(s) on proper positioning and spine alignment during self-care, functional mobility activities and/or exercises.              Learner Progress:  Not documented in this visit.                      User Key     Initials Effective Dates Name Provider Type Discipline    KAREN 11/10/21 -  Kassi Jennings, OTR/L, CSRS Occupational Therapist OT              OT Recommendation and Plan     Plan of Care Review  Plan of Care Reviewed With: patient, family  Progress: improving  Outcome Evaluation: Pt originally on 6L SATs low 80s, NSG called and reported switch to 8L O2 HFNC pt SATs stayed 88 and above on 8L. Repositioned in bed Abraham, scooted up Max x2. Pt performed and was given written pulmonary HEP. Required multiple RBs between tasks 10 reps each. Also given and educated on red theraband use but pt too fatigued to perform at this time. Pt had multiple family members in room, questions about d/c plan and potential 24/7 care vs short term rehab. Educated on pt need for continued rehab at this time d/t overall weakness, O2 needs and limited ADL performance. Recommend safest option short term rehab, pt concerned d/t wife at home. If d/c home would need 24/7 care and HH OT/PT     Time Calculation:    Time Calculation- OT     Row Name 05/15/23 1508             Time Calculation- OT    OT Start Time 1508  -MT      OT Stop Time 1610  -MT      OT Time Calculation (min) 62 min  -MT      Total Timed Code Minutes- OT 62 minute(s)  -MT      OT Received On 05/15/23  -MT         Timed Charges    64345 - OT Therapeutic Exercise Minutes 40  -MT      06356 - OT Therapeutic Activity Minutes 22  -MT         Total Minutes    Timed Charges Total Minutes 62  -MT       Total Minutes 62   -MT            User Key  (r) = Recorded By, (t) = Taken By, (c) = Cosigned By    Initials Name Provider Type    MT Janet Hernandez COTA Occupational Therapist Assistant              Therapy Charges for Today     Code Description Service Date Service Provider Modifiers Qty    30110561025 HC OT THER PROC EA 15 MIN 5/15/2023 Janet Hernandez COTA GO 3    [id number]  OT THERAPEUTIC ACT EA 15 MIN 5/15/2023 Janet Hernandez COTA GO 1               DIANA Peter  5/15/2023

## 2023-05-15 NOTE — CASE MANAGEMENT/SOCIAL WORK
Discharge Planning Assessment  [facility]     Patient Name: Wyatt Curry  MRN: [id number]  Today's Date: 5/15/2023    Admit Date: 5/13/2023    Plan: Spoke with patient and spouse at bedside for dc planning. Patient states he was still driving and going to gym prior to illness. They have a sitter that comes daily from 8-6. Crystal is POA. Patient having weakness but states he would like to be able to return home with home health at discharge. PT / OT recommend home with hh vs short rehab. Will continue to follow for discharge needs.   Discharge Needs Assessment     Row Name 05/15/23 1400       Living Environment    People in Home spouse    Current Living Arrangements home    Primary Care Provided by self;other (see comments)  private sitter    Quality of Family Relationships supportive    Able to Return to Prior Arrangements yes       Transition Planning    Patient/Family Anticipates Transition to home    Patient/Family Anticipated Services at Transition home health care       Discharge Needs Assessment    Equipment Currently Used at Home cane, straight    Discharge Facility/Level of Care Needs home with home health               Discharge Plan     Row Name 05/15/23 1402       Plan    Plan Spoke with patient and spouse at bedside for dc planning. Patient states he was still driving and going to gym prior to illness. They have a sitter that comes daily from 8-6. Crystal is POA. Patient having weakness but states he would like to be able to return home with home health at discharge. PT / OT recommend home with hh vs short rehab. Will continue to follow for discharge needs.              Continued Care and Services - Admitted Since 5/13/2023    Coordination has not been started for this encounter.          Demographic Summary    No documentation.                Functional Status    No documentation.                Psychosocial    No documentation.                Abuse/Neglect    No documentation.                Legal     No documentation.                Substance Abuse    No documentation.                Patient Forms    No documentation.                   Merlina A Fletcher, RN

## 2023-05-15 NOTE — THERAPY EVALUATION
Patient Name: Wyatt Curry  : 1930    MRN: [id number]                              Today's Date: 5/15/2023       Admit Date: 2023    Visit Dx:     ICD-10-CM ICD-9-CM   1. Pneumonia of right lower lobe due to infectious organism  J18.9 486   2. Hypoxia  R09.02 799.02   3. Other iron deficiency anemia  D50.8 280.8   4. Impaired mobility  Z74.09 799.89     Patient Active Problem List   Diagnosis   • Benign localized prostatic hyperplasia without lower urinary tract symptoms (LUTS)   • History of bladder cancer   • Left rotator cuff tear arthropathy   • Coronary artery disease involving native coronary artery of native heart without angina pectoris   • Left bundle branch block   • Essential hypertension   • Mixed hyperlipidemia   • Overweight (BMI 25.0-29.9)   • Non-rheumatic mitral regurgitation   • Other fatigue   • Left ventricular diastolic dysfunction   • History of coronary artery stent placement   • Lung nodule   • Mediastinal adenopathy   • Cough   • Abnormal positron emission tomography (PET) scan   • Basal cell carcinoma   • Neoplasm of uncertain behavior   • Elevated troponin   • Bleeding   • Ischemic cardiomyopathy   • Right lower lobe pneumonia   • Pneumonia of right lower lobe due to infectious organism     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   • GERD (gastroesophageal reflux disease)    • Hyperlipidemia    • Hypertension    • Irregular heart beat    • Lung nodules      Past Surgical History:   Procedure Laterality Date   • BLADDER SURGERY     • CARDIAC CATHETERIZATION     • CARDIAC CATHETERIZATION Left 2023    Procedure: Cardiac Catheterization/Vascular Study;  Surgeon: Sukhi Hartley MD;  Location: [facility] CATH INVASIVE LOCATION;  Service: Cardiology;  Laterality: Left;   • COLONOSCOPY     • CORONARY ANGIOPLASTY WITH STENT  PLACEMENT  2006    STENT X 2   • CYSTOSCOPY     • ENDOSCOPY     • EXCISION LESION N/A 4/7/2023    Procedure: EXCISION LESION OF RIGHT TEMPLE AND LEFT TEMPLE WITH FROZEN SECTION WITH POSSIBLE FLAP OR GRAFT;  Surgeon: Brijesh Nickerson MD;  Location:  [facility];  Service: ENT;  Laterality: N/A;   • FLAP HEAD/NECK Left 03/09/2020    Procedure: POSSIBLE FLAP;  Surgeon: Brijesh Nickerson MD;  Location:  [facility];  Service: ENT;  Laterality: Left;   • HEAD/NECK LESION/CYST EXCISION Left 03/09/2020    Procedure: 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 with frozen section and possible flap or graft;  Surgeon: Brijesh Nickerson MD;  Location:  [facility];  Service: ENT;  Laterality: Left;   • HERNIA REPAIR     • SHOULDER SURGERY     • SKIN BIOPSY      lip biopsy   • SKIN FULL THICKNESS GRAFT Left 03/09/2020    Procedure: OR GRAFT;  Surgeon: Brijesh Nickerson MD;  Location:  [facility];  Service: ENT;  Laterality: Left;   • TRANSURETHRAL RESECTION OF BLADDER TUMOR        General Information     Row Name 05/15/23 1102          Physical Therapy Time and Intention    Document Type evaluation  Pt presents to ED with fatgiue, SOA, weakness and LE edema. Dx: RLL pneumonia.  -ALFREDITO     Mode of Treatment physical therapy  -ALFREDITO     Row Name 05/15/23 1102          General Information    Patient Profile Reviewed yes  -ALFREDITO     Prior Level of Function independent:;all household mobility;ADL's  -ALFREDITO     Existing Precautions/Restrictions fall;oxygen therapy device and L/min  -ALFREDITO     Barriers to Rehab medically complex  -ALFREDITO     Row Name 05/15/23 1102          Living Environment    People in Home spouse  -ALFREDITO     Row Name 05/15/23 1102          Home Main Entrance    Number of Stairs, Main Entrance three  -ALFREDITO     Stair Railings, Main Entrance none  -ALFREDITO     Row Name 05/15/23 1102          Stairs Within Home, Primary    Number of Stairs, Within Home, Primary none  -ALFREDITO     Row Name 05/15/23 1102          Cognition     Orientation Status (Cognition) oriented x 4  -ALFREDITO     Row Name 05/15/23 1102          Safety Issues, Functional Mobility    Impairments Affecting Function (Mobility) balance;endurance/activity tolerance;strength;shortness of breath  -ALFREDITO           User Key  (r) = Recorded By, (t) = Taken By, (c) = Cosigned By    Initials Name Provider Type    Satish Galindo, PT DPT Physical Therapist               Mobility     Row Name 05/15/23 1102          Bed Mobility    Bed Mobility supine-sit;sit-supine  -ALFREDITO     Supine-Sit [place] (Bed Mobility) minimum assist (75% patient effort)  -ALFREDITO     Sit-Supine [place] (Bed Mobility) not tested  -ALFREDITO     Assistive Device (Bed Mobility) head of bed elevated;bed rails  -ALFREDITO     Row Name 05/15/23 1102          Transfers    Comment, (Transfers) incontinent of bowel once standing. Called RN who assisted with cleaning. Stood again and t.f to the bedside chair with min assist x 1. He was weak and unsteady with steps but reports feeling improved compared to yesterday,   -ALFREDITO     Row Name 05/15/23 1102          Bed-Chair Transfer    Bed-Chair [place] (Transfers) minimum assist (75% patient effort)  -ALFREDITO     Row Name 05/15/23 1102          Sit-Stand Transfer    Sit-Stand [place] (Transfers) minimum assist (75% patient effort)  -ALFREDITO           User Key  (r) = Recorded By, (t) = Taken By, (c) = Cosigned By    Initials Name Provider Type    Satish Galindo, PT DPT Physical Therapist               Obj/Interventions     Row Name 05/15/23 1102          Range of Motion Comprehensive    Comment, General Range of Motion B LE AROM WFL  -ALFREDITO     Row Name 05/15/23 1102          Strength Comprehensive (MMT)    Comment, General Manual Muscle Testing (MMT) Assessment BLE grossly 4-/5  -ALFREDITO     Row Name 05/15/23 1102          Balance    Balance Assessment sitting dynamic balance;standing dynamic balance  -ALFREDITO     Dynamic Sitting Balance standby assist  -ALFREDITO     Position, Sitting Balance  unsupported;sitting edge of bed  -ALFREDITO     Dynamic Standing Balance minimal assist  -ALFREDITO     Position/Device Used, Standing Balance supported  -ALFREDITO     Row Name 05/15/23 1102          Sensory Assessment (Somatosensory)    Sensory Assessment (Somatosensory) LE sensation intact  -ALFREDITO           User Key  (r) = Recorded By, (t) = Taken By, (c) = Cosigned By    Initials Name Provider Type    Satish Galindo, PT DPT Physical Therapist               Goals/Plan     Row Name 05/15/23 1102          Bed Mobility Goal 1 (PT)    Activity/Assistive Device (Bed Mobility Goal 1, PT) sit to supine/supine to sit  -ALFREDITO     [place] Level/Cues Needed (Bed Mobility Goal 1, PT) standby assist  -ALFREDITO     Time Frame (Bed Mobility Goal 1, PT) long term goal (LTG);10 days  -ALFREDITO     Progress/Outcomes (Bed Mobility Goal 1, PT) new goal  -ALFREDITO     Row Name 05/15/23 1102          Transfer Goal 1 (PT)    Activity/Assistive Device (Transfer Goal 1, PT) sit-to-stand/stand-to-sit;bed-to-chair/chair-to-bed  -ALFREDITO     [place] Level/Cues Needed (Transfer Goal 1, PT) standby assist  -ALFREDITO     Time Frame (Transfer Goal 1, PT) long term goal (LTG);10 days  -ALFREDITO     Progress/Outcome (Transfer Goal 1, PT) new goal  -ALFREDITO     Row Name 05/15/23 1102          Gait Training Goal 1 (PT)    Activity/Assistive Device (Gait Training Goal 1, PT) gait (walking locomotion);assistive device use;decrease fall risk;improve balance and speed;increase endurance/gait distance  -ALFREDITO     [place] Level (Gait Training Goal 1, PT) standby assist  -ALFREDITO     Distance (Gait Training Goal 1, PT) 60 ft  -ALFREDITO     Time Frame (Gait Training Goal 1, PT) long term goal (LTG);10 days  -ALFREDITO     Progress/Outcome (Gait Training Goal 1, PT) new goal  -ALFREDITO     Row Name 05/15/23 1102          Therapy Assessment/Plan (PT)    Planned Therapy Interventions (PT) bed mobility training;transfer training;gait training;balance training;home exercise program;patient/family education;postural  re-education;stair training;strengthening  -ALFREDITO           User Key  (r) = Recorded By, (t) = Taken By, (c) = Cosigned By    Initials Name Provider Type    Satish Galindo, PT DPT Physical Therapist               Clinical Impression     Row Name 05/15/23 1102          Pain    Pretreatment Pain Rating 0/10 - no pain  -ALFREDITO     Row Name 05/15/23 1102          Plan of Care Review    Plan of Care Reviewed With patient;family  -ALFREDITO     Outcome Evaluation PT eval complete. He is alert and oriented x 4. 3 family/visitors in room. He was eager to work with therapy and hoepful to get out of bed. Needs min assist to get to the EOB. Stood with min assist. On first stance he was  incontinent of bowel once standing. Called RN who assisted with cleaning. Stood again and t.f to the bedside chair with min assist x 1. He was weak and unsteady with steps but reports feeling improved compared to yesterday. HR increased to ~ 170's however with activity. Down to 120's - 130's once sitting.  PT will cont with mobility, balance and strengthening. Recommend d/c home with assist and HH vs. short term rehab, pending pt progress and families ability to provide level of assist that is needed.  -ALFREDITO     Row Name 05/15/23 1102          Therapy Assessment/Plan (PT)    Patient/Family Therapy Goals Statement (PT) go home  -ALFREDITO     Rehab Potential (PT) good, to achieve stated therapy goals  -ALFREDITO     Criteria for Skilled Interventions Met (PT) yes;meets criteria;skilled treatment is necessary  -ALFREDITO     Therapy Frequency (PT) 2 times/day  -ALFREDITO     Predicted Duration of Therapy Intervention (PT) lynn boland  -ALFREDITO     Row Name 05/15/23 1102          Vital Signs    Pre SpO2 (%) 90  -ALFREDITO     O2 Delivery Pre Treatment hi-flow  -ALFREDITO     O2 Delivery Intra Treatment hi-flow  -ALFREDITO     Post SpO2 (%) 90  -ALFREDITO     O2 Delivery Post Treatment nasal cannula  -ALFREDITO     Pre Patient Position Supine  -ALFREDITO     Intra Patient Position Standing  -ALFREDITO     Post Patient Position Sitting  -ALFREDITO      Row Name 05/15/23 1102          Positioning and Restraints    Pre-Treatment Position in bed  -ALFREDITO     Post Treatment Position chair  -ALFREDITO     In Chair sitting;call light within reach;encouraged to call for assist;with family/caregiver;with nsg  -ALFREDITO           User Key  (r) = Recorded By, (t) = Taken By, (c) = Cosigned By    Initials Name Provider Type    Satish Galindo PT DPT Physical Therapist               Outcome Measures     Row Name 05/15/23 1102          How much help from another person do you currently need...    Turning from your back to your side while in flat bed without using bedrails? 3  -ALFREDITO     Moving from lying on back to sitting on the side of a flat bed without bedrails? 3  -AFLREDITO     Moving to and from a bed to a chair (including a wheelchair)? 3  -ALFREDITO     Standing up from a chair using your arms (e.g., wheelchair, bedside chair)? 3  -ALFREDITO     Climbing 3-5 steps with a railing? 2  -ALFREDITO     To walk in hospital room? 2  -ALFREDITO     AM-PAC 6 Clicks Score (PT) 16  -ALFREDITO     Highest level of mobility 5 --> Static standing  -ALFREDITO     Row Name 05/15/23 1102          Functional Assessment    Outcome Measure Options AM-PAC 6 Clicks Basic Mobility (PT)  -ALFREDITO           User Key  (r) = Recorded By, (t) = Taken By, (c) = Cosigned By    Initials Name Provider Type    Satish Galindo, PT DPT Physical Therapist                             Physical Therapy Education     Title: PT OT SLP Therapies (In Progress)     Topic: Physical Therapy (In Progress)     Point: Mobility training (Done)     Learning Progress Summary           Patient Acceptance, ALICIA PADILLA DU by ALFREDITO at 5/15/2023 1102    Comment: benefits of activity, progression of PT   Family Acceptance, E, GONZALO VARGAS by ALFREDITO at 5/15/2023 1102    Comment: benefits of activity, progression of PT                   Point: Home exercise program (Not Started)     Learner Progress:  Not documented in this visit.          Point: Body mechanics (Not Started)     Learner Progress:  Not  documented in this visit.          Point: Precautions (Not Started)     Learner Progress:  Not documented in this visit.                      User Key     Initials Effective Dates Name Provider Type Discipline    ALFREDITO 02/03/23 -  Satish Hall PT DPT Physical Therapist PT              PT Recommendation and Plan  Planned Therapy Interventions (PT): bed mobility training, transfer training, gait training, balance training, home exercise program, patient/family education, postural re-education, stair training, strengthening  Plan of Care Reviewed With: patient, family  Outcome Evaluation: PT eval complete. He is alert and oriented x 4. 3 family/visitors in room. He was eager to work with therapy and hoepful to get out of bed. Needs min assist to get to the EOB. Stood with min assist. On first stance he was  incontinent of bowel once standing. Called RN who assisted with cleaning. Stood again and t.f to the bedside chair with min assist x 1. He was weak and unsteady with steps but reports feeling improved compared to yesterday. HR increased to ~ 170's however with activity. Down to 120's - 130's once sitting.  PT will cont with mobility, balance and strengthening. Recommend d/c home with assist and HH vs. short term rehab, pending pt progress and families ability to provide level of assist that is needed.     Time Calculation:    PT Charges     Row Name 05/15/23 1157             Time Calculation    Start Time 1102  -ALFREDITO      Stop Time 1143  -ALFREDITO      Time Calculation (min) 41 min  -ALFREDITO      PT Received On 05/15/23  -ALFREDITO      PT Goal Re-Cert Due Date 05/25/23  -ALFREDITO            User Key  (r) = Recorded By, (t) = Taken By, (c) = Cosigned By    Initials Name Provider Type    Satish Glaindo PT DPT Physical Therapist              Therapy Charges for Today     Code Description Service Date Service Provider Modifiers Qty    [id number] HC PT JAMEL MOD COMPLEXITY 3 5/15/2023 Satish Hall PT DPT GP 1          PT  G-Codes  Outcome Measure Options: AM-PAC 6 Clicks Basic Mobility (PT)  AM-PAC 6 Clicks Score (PT): 16  AM-PAC 6 Clicks Score (OT): 17  PT Discharge Summary  Anticipated Discharge Disposition (PT): home with 24/7 care, home with home health, sub acute care setting    Satish Hall, PT DPT  5/15/2023

## 2023-05-15 NOTE — PLAN OF CARE
Goal Outcome Evaluation:  Plan of Care Reviewed With: patient, family           Outcome Evaluation: PT eval complete. He is alert and oriented x 4. 3 family/visitors in room. He was eager to work with therapy and hoepful to get out of bed. Needs min assist to get to the EOB. Stood with min assist. On first stance he was  incontinent of bowel once standing. Called RN who assisted with cleaning. Stood again and t.f to the bedside chair with min assist x 1. He was weak and unsteady with steps but reports feeling improved compared to yesterday. HR increased to ~ 170's however with activity. Down to 120's - 130's once sitting.  PT will cont with mobility, balance and strengthening. Recommend d/c home with assist and HH vs. short term rehab, pending pt progress and families ability to provide level of assist that is needed.

## 2023-05-15 NOTE — PROGRESS NOTES
"    [facility] Medicine Services  INPATIENT PROGRESS NOTE    Patient Name: Wyatt Curry  Date of Admission: 5/13/2023  Today's Date: 05/15/23  Length of Stay: 2  Primary Care Physician: Everardo Jackson MD    Subjective   Chief Complaint: Shortness of breath  HPI   Mr. Curry is a 89-year-old male who presented to [facility] on 5/13 with fatigue, cough and fever up to 103 °F.  He had noticed some dyspnea with exertion and coughing more than usual.  He had also complained of fatigue in which he wanted to sleep\" all the time\" which is unusual for him.  At baseline he is very independent and is the primary caregiver for his wife who has dementia.  He saw his primary care provider 2 days prior to admission and was prescribed amoxicillin for diagnosis of acute bronchitis.  He has had no significant improvement and continued to feel unwell.  Of note, he has a history of coronary artery disease and had been off of Plavix for procedure.  Postoperatively he complained of chest pain and was found to have an elevated troponin.  He underwent cardiac catheterization on 4/8/2023 and had stent to distal left anterior ascending coronary artery.  Work-up in the ER, CT angiogram chest showed no pulmonary embolism.  Right lower lobe pneumonia.  WBC 8, lactate 1.3, procalcitonin 1.58.  Magnesium 1.2    Yesterday patient was more short of breath and was requiring up to 6 L nasal cannula.  CODE STATUS changed to no CPR with limited support to include no intubation and no cardioversion.  Toprol-XL initially held as patient had a 12-second pause on telemetry.  Around 1700 he went into new onset atrial fibrillation with rapid ventricular response up to 180s Aileen RATLIFF.  He was given IV digoxin, IV Lopressor x2, 250 cc fluid bolus and started on Cardizem drip.     Max temp 100.3 °F overnight.  Up in chair.  States he is feeling better today.  Currently on 5 L nasal cannula.  Toprol-XL resumed at " 25 mg today.  Continues on Cardizem drip at 7.5 mg/hr, heart rate 110-120s.    Review of Systems   All pertinent negatives and positives are as above. All other systems have been reviewed and are negative unless otherwise stated.     Objective    Temp:  [98.4 °F (36.9 °C)-100.8 °F (38.2 °C)] 98.4 °F (36.9 °C)  Heart Rate:  [] 154  Resp:  [18-20] 18  BP: ()/(41-65) 106/53  Physical Exam  Vitals reviewed.   Constitutional:       General: He is not in acute distress.     Appearance: He is ill-appearing. He is not toxic-appearing.      Interventions: Nasal cannula in place.      Comments: Sitting up in bed.  No acute distress.  On 5 L nasal cannula.  Discussed with his nurse deepa at bedside.   HENT:      Head: Normocephalic and atraumatic.      Mouth/Throat:      Mouth: Mucous membranes are moist.      Pharynx: Oropharynx is clear.   Eyes:      Extraocular Movements: Extraocular movements intact.      Conjunctiva/sclera: Conjunctivae normal.      Pupils: Pupils are equal, round, and reactive to light.   Cardiovascular:      Rate and Rhythm: Tachycardia present. Rhythm irregular.      Pulses: Normal pulses.   Pulmonary:      Effort: Pulmonary effort is normal. No respiratory distress.      Breath sounds: Normal breath sounds. No wheezing.   Abdominal:      General: Bowel sounds are normal. There is no distension.      Palpations: Abdomen is soft.      Tenderness: There is no abdominal tenderness.   Musculoskeletal:         General: No swelling or tenderness. Normal range of motion.      Cervical back: Normal range of motion and neck supple. No muscular tenderness.   Skin:     General: Skin is warm and dry.      Findings: No erythema or rash.   Neurological:      General: No focal deficit present.      Mental Status: He is alert and oriented to person, place, and time.      Cranial Nerves: No cranial nerve deficit.      Motor: No weakness.   Psychiatric:         Mood and Affect: Mood normal.         Behavior:  Behavior normal.       Results Review:  I have reviewed the labs, radiology results, and diagnostic studies.    Laboratory Data:   Results from last 7 days   Lab Units 05/15/23  0346 05/14/23  0445 05/13/23  0834   WBC 10*3/mm3 8.80 8.70 8.23   HEMOGLOBIN g/dL 10.7* 10.5* 10.0*   HEMATOCRIT % 36.6* 33.9* 31.8*   PLATELETS 10*3/mm3 127* 121* 131*        Results from last 7 days   Lab Units 05/15/23  0346 05/14/23  0445 05/13/23  0834   SODIUM mmol/L 135* 137 135*   POTASSIUM mmol/L 3.3* 4.1 3.3*   CHLORIDE mmol/L 101 101 98   CO2 mmol/L 18.0* 24.0 23.0   BUN mg/dL 26* 19 20   CREATININE mg/dL 0.94 0.78 0.72*   CALCIUM mg/dL 8.1* 8.2 8.3   BILIRUBIN mg/dL  --  0.5 0.5   ALK PHOS U/L  --  50 51   ALT (SGPT) U/L  --  26 21   AST (SGOT) U/L  --  56* 38   GLUCOSE mg/dL 138* 110* 134*       Culture Data:   Blood Culture   Date Value Ref Range Status   05/13/2023 No growth at 24 hours  Preliminary     No results found for: BCIDPCR, CXREFLEX, CSFCX, CULTURETIS  No results found for: CULTURES, HSVCX, URCX  No results found for: EYECULTURE, GCCX, HSVCULTURE, LABHSV  No results found for: LEGIONELLA, MRSACX, MUMPSCX, MYCOPLASCX  No results found for: NOCARDIACX, STOOLCX  No results found for: THROATCX, UNSTIMCULT, URINECX, CULTURE, VZVCULTUR  No results found for: VIRALCULTU, WOUNDCX    Radiology Data:   Imaging Results (Last 24 Hours)     ** No results found for the last 24 hours. **          I have reviewed the patient's current medications.     Assessment/Plan   Assessment  Active Hospital Problems    Diagnosis    • **Pneumonia of right lower lobe due to infectious organism    • Sepsis due to pneumonia    • Hypoxia    • New onset atrial fibrillation with rapid ventricular response    • Mediastinal adenopathy    • Other fatigue    • Non-rheumatic mitral regurgitation    • Coronary artery disease involving native coronary artery of native heart without angina pectoris    • Essential hypertension    • Mixed hyperlipidemia    •  History of bladder cancer    • Benign localized prostatic hyperplasia without lower urinary tract symptoms (LUTS)      Treatment Plan  Pneumonia right lower lobe.  Respiratory panel, PCR negative.  Strep pneumo and Legionella urinary antigens negative.  MRSA screen negative, vancomycin discontinued.  Blood culture with no growth at 24 hours.  Respiratory culture if able.  Continue Zosyn.    Hypoxic, requiring up to 6 L nasal cannula.  Continue DuoNebs in favor of Atrovent.  Incentive spirometer.  Wean oxygen as tolerated.    New onset atrial fibrillation with rapid ventricular response in the setting of hypoxia and pneumonia.  Had recent cath with EF 40-45%.  Moderate mitral regurgitation.  On dual antiplatelet therapy for history of coronary artery disease with recent stenting.  No anticoagulation as patient is on dual antiplatelet therapy.  Continue efforts at rate control.  Currently on Cardizem 7.5 mg/hour.  Atrial fibrillation 102-116.  Toprol-XL 25 mg, may need to increase.    History of coronary artery disease with recent stenting to LAD on 4/8.  No chest pain.  Continue aspirin, Plavix, statin, beta-blocker, calcium channel blocker and ARB.    PT/OT.    SCDs for DVT prophylaxis.    Medical Decision Making  Number and Complexity of problems: 3 acute problems in the form of pneumonia right lower lobe, hypoxia, new onset atrial fibrillation with rapid ventricular response  Differential Diagnosis: None considered at present    Conditions and Status        Condition is unchanged.     [facility] Data  External documents reviewed: [facility] records  Cardiac tracing (EKG, telemetry) interpretation: Atrial fibrillation  Radiology interpretation: Interpreted by radiology  Labs reviewed: As above  Any tests that were considered but not ordered: None considered at present     Decision rules/scores evaluated (example [id number], [place], etc): Considered at present     Discussed with: Patient, deepa RATLIFF and Dr. Jennifer Clark  Planning  Shared decision making: Patient is agreeable to ongoing work-up and treatment  Code status and discussions: No CPR with limited support to include no intubation and no cardioversion.  Patient's niece Zamzam Marie is the power of .    Disposition  Social Determinants of Health that impact treatment or disposition: Home health  I expect the patient to be discharged to home with home health in 2-3 days.     Electronically signed by Tammy Moon, LARRY, 05/15/23, 12:15 CDT.    This visit was performed by both a physician and an APC. I personally evaluated and examined the patient. I performed all aspects of the MDM as documented.    Seen and discussed with his nurse, iAleen.     Seen and discussed with his niece, Candy.         On Zosyn.  He was on vancomycin, but this was de-escalated given a negative MRSA nasal screen.  Streptococcal and Legionella urinary antigens negative.  Blood cultures with no growth.  Advanced respiratory PCR panel negative.  Mycoplasma in process.  We will see if we can get a sputum culture.    He has had a fever each afternoon that he has been here.  He does had a temp of 101.7.    He has developed atrial fibrillation with rapid ventricular response.  He is already on dual antiplatelet therapy.  He had a drug-eluting stent placed in the LAD in April of this year by Dr. Hartley, but he sees Dr. Moseley.  I would not place him on full dose anticoagulation for the time being.  His atrial fibrillation was likely manifested as a result of being ill from his pneumonia.  This could be a self-limited process.  Still having difficulty with rate control.  Currently on 7.5 mg/hour of Cardizem.  I am back on a lower dose of Toprol-XL after having pauses yesterday.  Was given IV digoxin yesterday evening.  Consult cardiology as this is the preference of the patient and his family.  No albuterol.    Despite being 89, he has been functionally independent.    Electronically signed by Dillon HANEY  DO Jennifer, 5/15/2023, 16:47 CDT.

## 2023-05-15 NOTE — PLAN OF CARE
Goal Outcome Evaluation:  Plan of Care Reviewed With: patient, family        Progress: improving  Outcome Evaluation: Pt originally on 6L SATs low 80s, NSG called and reported switch to 8L O2 HFNC pt SATs stayed 88 and above on 8L. Repositioned in bed Abraham, scooted up Max x2. Pt performed and was given written pulmonary HEP. Required multiple RBs between tasks 10 reps each. Also given and educated on red theraband use but pt too fatigued to perform at this time. Pt had multiple family members in room, questions about d/c plan and potential 24/7 care vs short term rehab. Educated on pt need for continued rehab at this time d/t overall weakness, O2 needs and limited ADL performance. Recommend safest option short term rehab, pt concerned d/t wife at home. If d/c home would need 24/7 care and HH OT/PT

## 2023-05-15 NOTE — PAYOR COMM NOTE
"5/15/23. [facility] [phone]    [phone]      PATIENT ER NEW ADMISSION ON 5/13/23. INPATIENT ORDER.    FAXING FOR INPATIENT REVIEW.                  Maggy Solo (89 y.o. Male)     Date of Birth   05/13/1930    Social Security Number       Address   [address] [facility] [address]    Home Phone   [phone]    MRN   [id number]       [facility]    Marital Status                               Admission Date   5/13/23    Admission Type   Emergency    Admitting Provider   Dillon Rodriguez DO    Attending Provider   Dillon Rodriguez DO    Department, Room/Bed   [facility] 4B, 408/1       Discharge Date       Discharge Disposition       Discharge Destination                               Attending Provider: Dillon Rodriguez DO    Allergies: Lyrica [Pregabalin]    Isolation: None   Infection: None   Code Status: No CPR    Ht: 162.6 cm (64\")   Wt: 68 kg (150 lb)    Admission Cmt: None   Principal Problem: Pneumonia of right lower lobe due to infectious organism [J18.9]                 Active Insurance as of 5/13/2023     Primary Coverage     Payor Plan Insurance Group Employer/Plan Group    HUMANA MEDICARE REPLACEMENT HUMANA MEDICARE REPLACEMENT [id number]     Payor Plan Address Payor Plan Phone Number Payor Plan Fax Number Effective Dates    [address] [address] [phone]  1/1/2018 - None Entered    [facility] [id number]       Subscriber Name Subscriber Birth Date Member ID       MAGGY SOLO 5/13/1930 [id number]                 Emergency Contacts      (Rel.) Home Phone Work Phone Mobile Phone    Glory Solo (Spouse) [phone] -- --    Zamzam Marie (Relative) -- -- [phone]    Madai Marie -- -- [phone]           [facility] Encounter Date/Time: 5/13/2023 0749   Hospital Account: [id number]    MRN: [id number]   Patient:  Maggy Solo   Contact Serial #: [id number]   SSN: "          ENCOUNTER             Patient Class: Inpatient   Unit: [address] Service: Medicine     Bed: 408/1   Admitting Provider: Dillon Rodriguez DO   Referring Physician:     Attending Provider: Dillon Rodriguez DO   Adm Diagnosis: Pneumonia of right lower*               PATIENT          Name: Maggy Solo : 1930 (89 yrs)   Address: [address] [place] [address] Sex: Male   City: [place] [address]   County: [facility]   Marital Status:  Ethnicity: NOT                                                                              Race: WHITE   Primary Care Provider: Everardo Jackson MD Patients Phone: Home Phone: [phone]     Mobile Phone: [phone]   EMERGENCY CONTACT   Contact Name Legal Guardian? Relationship to Patient Home Phone Work Phone   1. Glory Solo  2. Zamzam Marie      Spouse  Relative [phone]              GUARANTOR            Guarantor: Maggy Solo     : 1930   Address: [address] [place] [address] Sex: Male     [place] [address]     Relation to Patient: Self       Home Phone: [phone]   Guarantor ID: [id number]       Work Phone:     GUARANTOR EMPLOYER   Employer:           Status: RETIRED   COVERAGE          PRIMARY INSURANCE   Payor: HUMANA MEDICARE REPLACEMENT Plan: HUMANA MEDICARE REPLACEMENT   Group Number: [id number] Insurance Type: INDEMNITY   Subscriber Name: MAGGY SOLO Subscriber : 1930   Subscriber ID: [id number] Coverage Address: [address] [address]   Pat. Rel. to Subscriber: Self Coverage Phone: [phone]   SECONDARY INSURANCE   Payor: N/A Plan: N/A   Group Number:   Insurance Type:     Subscriber Name:   Subscriber :     Subscriber ID:   Coverage Address:     Pat. Rel. to Subscriber:   Coverage Phone:        Contact Serial # ([id number])         May 15, 2023    Chart ID ([id number])         Brody Jackson MD   Physician  Hospitalist  H&P     Signed  Date of Service:  23  1132  Creation Time:  05/13/23 1132     Signed        [facility]  HISTORY AND PHYSICAL     Date of Admission: 5/13/2023  Primary Care Physician: Everardo Jackson MD     Subjective   Primary Historian: Patient and family     Chief Complaint: Pneumonia.     History of Present Illness     Patient is a 89-year-old  male presented to ER by EMS complaining of fatigue.  Patient's baseline is independent and active.  He usually ambulate without assistant.  For the past 3 days patient had a fever of 103 at time.  Patient also complain of chronic cough, coughing more than usual.  Patient has more shortness of breath specially with exertion.  When patient ambulate he complain of shortness of breath.  He is napping more through the day than usual.  Patient cannot sleep at night and restless all day.  Patient sleeping all the time and sleeping on the recliner which is unusual for him.  There are some edema lower extremities.  Patient saw his primary care physician 2 days ago and was prescribed amoxicillin for diagnosis of acute bronchitis.  Patient has been taking antibiotics and is not any better.  His edema lower extremity persists.  He is spiked a temperature 104 this morning.     Patient had skin surgery about a month ago and has been off on Plavix.  Postoperatively he developed chest discomfort and to monitor him.  Patient had elevated troponin then underwent cardiac catheterization 4/8/23 and had a stent placed distal left anterior ascending coronary artery.  PTCA of the proximal left anterior descending coronary artery.  In addition his chest pain was alleviated.      Patient has a past medical history of coronary artery disease with recent stenting and cardiac catheterization, ischemic cardiomyopathy, nonrheumatic mitral regurgitation, essential hypertension, mixed hyperlipidemia, left bundle branch block, bladder cancer,  carcinoma in situ of lip, lung nodules, tuberculosis.     Review of Systems   Constitutional: Positive for activity change, appetite change and fatigue. Negative for chills and fever.   HENT: Negative for hearing loss, nosebleeds, tinnitus and trouble swallowing.    Eyes: Negative for visual disturbance.   Respiratory: Positive for cough and shortness of breath. Negative for chest tightness and wheezing.    Cardiovascular: Negative for chest pain, palpitations and leg swelling.   Gastrointestinal: Negative for abdominal distention, abdominal pain, blood in stool, constipation, diarrhea, nausea and vomiting.   Endocrine: Negative for cold intolerance, heat intolerance, polydipsia, polyphagia and polyuria.   Genitourinary: Negative for decreased urine volume, difficulty urinating, dysuria, flank pain, frequency and hematuria.   Musculoskeletal: Positive for arthralgias, gait problem and myalgias. Negative for joint swelling.   Skin: Negative for rash.   Allergic/Immunologic: Negative for immunocompromised state.   Neurological: Positive for weakness. Negative for dizziness, syncope, light-headedness and headaches.   Hematological: Negative for adenopathy. Does not bruise/bleed easily.   Psychiatric/Behavioral: Positive for confusion. Negative for sleep disturbance. The patient is not nervous/anxious.          Otherwise complete ROS reviewed and negative except as mentioned in the HPI.     Past Medical History:   Medical History        Past Medical History:   Diagnosis Date   • Arthritis     • Bladder cancer     • Bronchitis     • CAD (coronary artery disease)     • Cancer 1975     bladder cancer   • Carcinoma in situ of lip       basel cell   • GERD (gastroesophageal reflux disease)     • Hyperlipidemia     • Hypertension     • Irregular heart beat     • Lung nodules           Past Surgical History:  Surgical History         Past Surgical History:   Procedure Laterality Date   • BLADDER SURGERY       • CARDIAC  CATHETERIZATION       • CARDIAC CATHETERIZATION Left 4/8/2023     Procedure: Cardiac Catheterization/Vascular Study;  Surgeon: Sukhi Hartley MD;  Location:  [facility] CATH INVASIVE [facility];  Service: Cardiology;  Laterality: Left;   • COLONOSCOPY       • CORONARY ANGIOPLASTY WITH STENT PLACEMENT   2006     STENT X 2   • CYSTOSCOPY       • ENDOSCOPY       • EXCISION LESION N/A 4/7/2023     Procedure: EXCISION LESION OF RIGHT TEMPLE AND LEFT TEMPLE WITH FROZEN SECTION WITH POSSIBLE FLAP OR GRAFT;  Surgeon: Brijesh Nickerson MD;  Location:  [facility] OR;  Service: ENT;  Laterality: N/A;   • FLAP HEAD/NECK Left 03/09/2020     Procedure: POSSIBLE FLAP;  Surgeon: Brijesh Nickerson MD;  Location:  [facility] OR;  Service: ENT;  Laterality: Left;   • HEAD/NECK LESION/CYST EXCISION Left 03/09/2020     Procedure: 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 His smoking use included cigarettes. He started smoking about 75 years ago. He has a 30.00 pack-year smoking history. He has been exposed to tobacco smoke. He has never used smokeless tobacco. He reports current alcohol use of about 1.0 standard drink per week. He reports that he does not currently use drugs.     Family History: family history includes Alcohol abuse in his sister; Diabetes in his sister; Heart attack in his brother and father; Heart disease in his brother, brother, father, and sister; Hyperlipidemia in his sister; Hypertension  "in his sister; Tuberculosis in his mother.        Allergies:        Allergies   Allergen Reactions   • Lyrica [Pregabalin] Mental Status Change         Vital Signs: /48   Pulse 89   Temp 99.5 °F (37.5 °C) (Oral)   Resp 12   Ht 162.6 cm (64\")   Wt 68 kg (150 lb)   SpO2 93%   BMI 25.75 kg/m²   Physical Exam  Vitals and nursing note reviewed.   Constitutional:       Comments: Advanced age .   HENT:      Head: Normocephalic.   Eyes:      Conjunctiva/sclera: Conjunctivae normal.      Pupils: Pupils are equal, round, and reactive to light.   Neck:      Vascular: No JVD.   Cardiovascular:      Rate and Rhythm: Normal rate and regular rhythm.      Heart sounds: Normal heart sounds.   Pulmonary:      Effort: No respiratory distress.      Breath sounds: No wheezing or rales.      Comments: Diminished breath semilateral, clear, on 2 L.  Chest:      Chest wall: No tenderness.   Abdominal:      General: Bowel sounds are normal. There is no distension.      Palpations: Abdomen is soft.      Tenderness: There is no abdominal tenderness.   Musculoskeletal:         General: No tenderness or deformity.      Cervical back: Neck supple.   Skin:     General: Skin is warm and dry.      Capillary Refill: Capillary refill takes 2 to 3 seconds.      Findings: No rash.   Neurological:      Mental Status: He is alert.      Cranial Nerves: No cranial nerve deficit.      Motor: Weakness present. No abnormal muscle tone.      Coordination: Coordination abnormal.      Gait: Gait abnormal.      Deep Tendon Reflexes: Reflexes normal.   Psychiatric:         Mood and Affect: Mood normal.         Behavior: Behavior normal.                  Results Reviewed:          Lab Results (last 24 hours)      Procedure Component Value Units Date/Time     Blood Culture With NINA - Blood, Arm, Left [[id number]] Collected: 05/13/23 0835     Specimen: Blood from Arm, Left Updated: 05/13/23 1149     Respiratory Panel PCR w/COVID-19(SARS-CoV-2) " MILAGRO/SHANNON/MITA/[facility]/MAD/TONYA In-House, NP Swab in UTM/VTM, 3-4 HR TAT - Swab, Nasopharynx [[id number]]  (Normal) Collected: 05/13/23 0848     Specimen: Swab from Nasopharynx Updated: 05/13/23 1026       ADENOVIRUS, PCR Not Detected       Coronavirus 229E Not Detected       Coronavirus HKU1 Not Detected       Coronavirus NL63 Not Detected       Coronavirus OC43 Not Detected       COVID19 Not Detected       Human Metapneumovirus Not Detected       Human Rhinovirus/Enterovirus Not Detected       Influenza A PCR Not Detected       Influenza B PCR Not Detected       Parainfluenza Virus 1 Not Detected       Parainfluenza Virus 2 Not Detected       Parainfluenza Virus 3 Not Detected       Parainfluenza Virus 4 Not Detected       RSV, PCR Not Detected       Bordetella pertussis pcr Not Detected       Bordetella parapertussis PCR Not Detected       Chlamydophila pneumoniae PCR Not Detected       Mycoplasma pneumo by PCR Not Detected     Narrative:       In the setting of a positive respiratory panel with a viral infection PLUS a negative procalcitonin without other underlying concern for bacterial infection, consider observing off antibiotics or discontinuation of antibiotics and continue supportive care. If the respiratory panel is positive for atypical bacterial infection (Bordetella pertussis, Chlamydophila pneumoniae, or Mycoplasma pneumoniae), consider antibiotic de-escalation to target atypical bacterial infection.     Blood Culture With NINA - Blood, Arm, Left [[id number]] Collected: 05/13/23 0834     Specimen: Blood from Arm, Left Updated: 05/13/23 1005     Urinalysis, Microscopic Only - Urine, Clean Catch [[id number]]  (Abnormal) Collected: 05/13/23 0905     Specimen: Urine, Clean Catch Updated: 05/13/23 0931       RBC, UA 0-2 /HPF         WBC, UA 0-2 /HPF         Comment: Urine culture not indicated.          Bacteria, UA Trace /HPF         Squamous Epithelial Cells, UA 0-2 /HPF         Hyaline Casts, UA None Seen /LPF          Coarse Granular Casts, UA 0-2 /LPF         Methodology Manual Light Microscopy     Urinalysis With Culture If Indicated - Urine, Clean Catch [[id number]]  (Abnormal) Collected: 05/13/23 0905     Specimen: Urine, Clean Catch Updated: 05/13/23 0931       Color, UA Dark Yellow       Appearance, UA Clear       pH, UA 5.5       Specific Gravity, UA 1.027       Glucose, UA Negative       Ketones, UA Negative       Bilirubin, UA Small (1+)       Blood, UA Negative       Protein,  mg/dL (2+)       Leuk Esterase, UA Negative       Nitrite, UA Negative       Urobilinogen, UA 1.0 E.U./dL     Narrative:       In absence of clinical symptoms, the presence of pyuria, bacteria, and/or nitrites on the urinalysis result does not correlate with infection.     Manual Differential [[id number]]  (Abnormal) Collected: 05/13/23 0834     Specimen: Blood Updated: 05/13/23 0926       Neutrophil % 83.0 %         Lymphocyte % 8.0 %         Bands %  9.0 %         Neutrophils Absolute 7.57 10*3/mm3         Lymphocytes Absolute 0.66 10*3/mm3         Polychromasia Slight/1+       WBC Morphology Normal       Platelet Estimate Decreased     CBC & Differential [[id number]]  (Abnormal) Collected: 05/13/23 0834     Specimen: Blood Updated: 05/13/23 0926     Narrative:       The following orders were created for panel order CBC & Differential.  Procedure                               Abnormality         Status                     ---------                               -----------         ------                     CBC Auto Differential[[id number]]        Abnormal            Final result                  Please view results for these tests on the individual orders.     CBC Auto Differential [[id number]]  (Abnormal) Collected: 05/13/23 0834     Specimen: Blood Updated: 05/13/23 0926       WBC 8.23 10*3/mm3         RBC 3.40 10*6/mm3         Hemoglobin 10.0 g/dL         Hematocrit 31.8 %         MCV 93.5 fL         MCH 29.4 pg         MCHC 31.4  g/dL         RDW 14.1 %         RDW-SD 48.0 fl         MPV 10.7 fL         Platelets 131 10*3/mm3       Procalcitonin [[id number]]  (Abnormal) Collected: 05/13/23 0834     Specimen: Blood Updated: 05/13/23 0924       Procalcitonin 2.58 ng/mL       Narrative:       As a Marker for Sepsis (Non-Neonates):     1. <0.5 ng/mL represents a low risk of severe sepsis and/or septic shock.  2. >2 ng/mL represents a high risk of severe sepsis and/or septic shock.     As a Marker for Lower              Comprehensive Metabolic Panel [[id number]]  (Abnormal) Collected: 05/13/23 0834     Specimen: Blood Updated: 05/13/23 0920       Glucose 134 mg/dL         BUN 20 mg/dL         Creatinine 0.72 mg/dL         Sodium 135 mmol/L         Potassium 3.3 mmol/L         Chloride 98 mmol/L         CO2 23.0 mmol/L         Calcium 8.3 mg/dL         Total Protein 6.5 g/dL         Albumin 3.4 g/dL         ALT (SGPT) 21 U/L         AST (SGOT) 38 U/L         Alkaline Phosphatase 51 U/L         Total Bilirubin 0.5 mg/dL         Globulin 3.1 gm/dL         A/G Ratio 1.1 g/dL         BUN/Creatinine Ratio 27.8       Anion Gap 14.0 mmol/L         eGFR 85.2 mL/min/1.73       Narrative:       GFR Normal >60  Chronic Kidney Disease <60  Kidney Failure <15     The GFR formula is only valid for adults with stable renal function between ages 18 and 70.     D-dimer, Quantitative [[id number]]  (Abnormal) Collected: 05/13/23 0834     Specimen: Blood Updated: 05/13/23 0919       D-Dimer, Quantitative 1.78       and an 80 year old 0.80 MCGFEU/mL.      BNP [[id number]]  (Abnormal) Collected: 05/13/23 0834     Specimen: Blood Updated: 05/13/23 0918       proBNP 1,826.0 pg/mL       Narrative:       Among patients with dyspnea, NT-proBNP is highly sensitive for the detection of acute congestive heart failure. In addition NT-proBNP of <300 pg/ml effectively rules out acute congestive heart failure with 99% negative predictive value.     Results may be falsely decreased if  patient taking Biotin.        Lactic Acid, Plasma [[id number]]  (Normal) Collected: 05/13/23 0839     Specimen: Blood Updated: 05/13/23 0917       Lactate 1.3 mmol/L       Single High Sensitivity Troponin T [[id number]]  (Abnormal) Collected: 05/13/23 0834     Specimen: Blood Updated: 05/13/23 0916       HS Troponin T 50 ng/L       Narrative:       High Sensitive Troponin T Reference Range:  <10.0 ng/L- Negative Female for AMI  <15.0 ng/L- Negative Male for AMI  >=10 - Abnormal Female indicating possible myocardial injury.  >=15 - Abnormal Male indicating possible myocardial injury.   Clinicians would have to utilize clinical acumen, EKG, Troponin, and serial changes to determine if it is an Acute Myocardial Infarction or myocardial injury due to an underlying chronic condition.           Blood Gas, Arterial - [[id number]]  (Abnormal) Collected: 05/13/23 0839     Specimen: Arterial Blood Updated: 05/13/23 0836       Site Right Brachial       Biju's Test N/A       pH, Arterial 7.451 pH units         Comment: 83 Value above reference range          pCO2, Arterial 38.6 mm Hg         pO2, Arterial 58.5 mm Hg         Comment: 84 Value below reference range          HCO3, Arterial 26.9 mmol/L         Comment: 83 Value above reference range          Base Excess, Arterial 2.8 mmol/L         Comment: 83 Value above reference range          O2 Saturation, Arterial 92.6 %         Comment: 84 Value below reference range          Temperature 37.0 C         Barometric Pressure for Blood Gas 752 mmHg         Modality Room Air       Ventilator Mode NA       Collected by [id number]       Comment: Meter: [id number]     :  [id number]          pCO2, Temperature Corrected 38.6 mm Hg         pH, Temp Corrected 7.451 pH Units         pO2, Temperature Corrected 58.5 mm Hg                    Imaging Results (Last 24 Hours)      Procedure Component Value Units Date/Time     CT Angiogram Chest [[id number]] Collected: 05/13/23 1056        Updated: 05/13/23 1058     Narrative:       EXAMINATION: CT ANGIOGRAM CHEST-      5/13/2023 10:26 AM CDT     HISTORY: Recent cardiac catheterization. Shortness of breath with fever  and lower extremity edema.     In order to have a CT radiation dose as low as reasonably achievable  Automated Exposure Control was utilized for adjustment of the mA and/or  KV according to patient size.     DLP in mGycm= 194.     CT angiogram chest with IV contrast.  CT angiography protocol.   CT imaging with bolus IV contrast injection.   Under concurrent supervision axial, sagittal, coronal,  three-dimensional, and MIP data sets were constructed on an independent  work station.     Comparison is made with 08/02/2022.     Normal heart size.  Normal size thoracic aorta.     Mildly enlarged and well opacified pulmonary arteries.  No pulmonary embolism.     Posterior right lower lobe pneumonia.     Underlying chronic interstitial disease.  No pneumothorax and no significant pleural effusion.  No sign of heart failure.     Prominent diffuse osteopenia.  Severe degenerative spine change with mild chronic anterior wedge  compression of T12.     Summary:  1. No pulmonary embolism.  2. Right lower lobe pneumonia.                 This report was finalized on 05/13/2023 10:55 by Dr. Mac Lucio MD.      XR Chest 1 View [[id number]] Collected: 05/13/23 0826       Updated: 05/13/23 0830     Narrative:       EXAMINATION: XR CHEST 1 VW-     5/13/2023 8:21 AM CDT     HISTORY: Chest pain.     1 view chest x-ray.     Comparison is made with 03/31/2023.     Stable heart size.     Lower lung volumes with patchy basilar atelectasis.     No pneumothorax or heart failure.     Summary:  1. Hypoaeration with patchy atelectasis.     This report was finalized on 05/13/2023 08:27 by Dr. Mac Lucio MD.          I have personally reviewed and interpreted the radiology studies and ECG obtained at time of admission.      Assessment / Plan   Assessment:         Active Hospital Problems     Diagnosis     • Right lower lobe pneumonia     • Cough     • Mediastinal adenopathy     • Other fatigue     • Non-rheumatic mitral regurgitation     • Coronary artery disease involving native coronary artery of native heart without angina pectoris     • Essential hypertension     • Mixed hyperlipidemia     • History of bladder cancer     • Benign localized prostatic hyperplasia without lower urinary tract symptoms (LUTS)           Treatment Plan  The patient will be admitted to my service here at [facility].      Pneumonia/right lower lobe . Elevated      Treatment Plan  The patient will be admitted to my service here at [facility].      Pneumonia/right lower lobe . Elevated procalcitonin.  Lactate is normal.  Chest x-ray-Hypoaeration with patchy atelectasis.  CTA of the chest-No pulmonary embolism, Right lower lobe pneumonia.     CAD-status post stent placement 4/8/23/hypertension/hyperlipidemia.  BNP 1800.  Troponin 50.  EKG shows sinus rhythm with PAC.   Aspirin.  Plavix.  Cozaar in half.  Norvasc in half.  Toprol.  Nitro as needed.  Echocardiogram 12/20/2022- ejection fraction 50%, diastolic dysfunction grade 1, right atrial cavity borderline dilated.     Hyponatremia.  Will follow.     Hypokalemia.  P.o. potassium.  Magnesium level.     Anemia.     Nutrition.  B12 . Cardiac diet.     Deconditioning.  PT and OT consult.     Blood culture NINA pending.  Respiratory panel-negative.     Medical Decision Making  Number and Complexity of problems: Right lower lobe pneumonia/CAD/hyponatremia/hypokalemia/anemia  Differential Diagnosis: None     Conditions and Status     Evaluation ongoing treatment .     MDM Data  External documents reviewed: Previous notes  Cardiac tracing (EKG, telemetry) interpretation: Sinus with PAC.  Radiology interpretation: X-ray and CT scan.  Labs reviewed: Laboratory .  Any tests that were considered but not ordered: Lab in a.m.      Decision rules/scores evaluated (example [id number], Wells, etc): None     Discussed with: Patient and daughter     Care Planning  Shared decision making: Patient and daughter  Code status and discussions: Full code.     Disposition  Social Determinants of Health that impact treatment or disposition: Unknown  Estimated length of stay is 2 to 5 days.      I confirmed that the patient's advanced care The patient's surrogate decision maker is daughter, Zamzam Marie.      The patient was seen and examined by me on 5/13/2023 at 11:40 AM.     Electronically signed by Brody Jackson MD, 05/13/23, 11:50 CDT.                                   This report was finalized on 05/13/2023 10:55        Brody Jackson MD   Physician  Hospitalist  Progress Notes     Addendum  Date of Service:  05/14/23 0902  Creation Time:  05/14/23 0902             [facility] Medicine Services  INPATIENT PROGRESS NOTE     Patient Name: Wyatt Curry  Date of Admission: 5/13/2023  Today's Date: 05/14/23  Length of Stay: 1  Primary Care Physician: Everardo Jackson MD     Subjective   Chief Complaint: Pneumonia.     HPI   Tmax 102.9.  T-current 98.7.  Second pulse earlier this morning, stop Toprol for now.  Long discussion with patient and daughter, recommended also discussed with patient, patient is now DNR and DNI.  Patient currently on 4 L of oxygen.  Hyponatremia-resolved.  Hypokalemia-resolved.  Hypomagnesia, 2 g magnesium, magnesium in AM.  Anemia, hemoglobin stable.  Thrombocytopenia, slight decrease in platelets, no sign of acute bleed.  Patient no acute distress.  Patient required more oxygen today than yesterday.  Patient denied of any chest pain.  Daughter requests wife to come in today at his bedside, requesting for a bigger bed.     Review of Systems   Constitutional: Positive for activity change, appetite change and fatigue. Negative for chills and fever.   HENT: Negative for hearing loss,  nosebleeds, tinnitus and trouble swallowing.    Eyes: Negative for visual disturbance.   Respiratory: Positive for cough and shortness of breath. Negative for chest tightness and wheezing.    Cardiovascular: Negative for chest pain, palpitations and leg swelling.   Gastrointestinal: Negative for abdominal distention, abdominal pain, blood in stool, constipation, diarrhea, nausea and vomiting.   Endocrine: Negative for cold intolerance, heat intolerance, polydipsia, polyphagia and polyuria.   Genitourinary: Negative for decreased urine volume, difficulty urinating, dysuria, flank pain, frequency and hematuria.   Musculoskeletal: Positive for arthralgias, gait problem and myalgias. Negative for joint swelling.   Skin: Negative for rash.   Allergic/Immunologic: Negative for immunocompromised state.   Neurological: Positive for weakness. Negative for dizziness, syncope, light-headedness and headaches.   Hematological: Negative for adenopathy. Does not bruise/bleed easily.   Psychiatric/Behavioral: Negative for confusion and sleep disturbance. The patient is not nervous/anxious.          All pertinent negatives and positives are as above. All other systems have been reviewed and are negative unless otherwise stated.      Objective    Temp:  [97.4 °F (36.3 °C)-102.9 °F (39.4 °C)] 98.7 °F (37.1 °C)  Heart Rate:  [] 90  Resp:  [14-20] 16  BP: ()/(44-60) 149/48  Physical Exam  Vitals and nursing note reviewed.   Constitutional:       Comments: Advanced age .   HENT:      Head: Normocephalic.   Eyes:      Conjunctiva/sclera: Conjunctivae normal.      Pupils: Pupils are equal, round, and reactive to light.   Neck:      Vascular: No JVD.   Cardiovascular:      Rate and Rhythm: Normal rate and regular rhythm.      Heart sounds: Normal heart sounds.   Pulmonary:      Effort: No respiratory distress.      Breath sounds: No wheezing or rales.      Comments: Diminished breath s bilateral, clear, on 4 L.  Chest:       Chest wall: No tenderness.   Abdominal:      General: Bowel sounds are normal. There is no distension.      Palpations: Abdomen is soft.      Tenderness: There is no abdominal tenderness.   Musculoskeletal:         General: No tenderness or deformity.      Cervical back: Neck supple.   Skin:     General: Skin is warm and dry.      Capillary Refill: Capillary refill takes 2 to 3 seconds.      Findings: No rash.   Neurological:      Mental Status: He is alert.      Cranial Nerves: No cranial nerve deficit.      Motor: Weakness present. No abnormal muscle tone.      Coordination: Coordination abnormal.      Gait: Gait abnormal.      Deep Tendon Reflexes: Reflexes normal.   Psychiatric:         Mood and Affect: Mood normal.         Behavior: Behavior normal.         Results Review:  I have reviewed the labs, radiology results, and diagnostic studies.     Laboratory Data:         Results from last 7 days   Lab Units 05/14/23  0445 05/13/23  0834   WBC 10*3/mm3 8.70 8.23   HEMOGLOBIN g/dL 10.5* 10.0*   HEMATOCRIT % 33.9* 31.8*   PLATELETS 10*3/mm3 121* 131*               Results from last 7 days   Lab Units 05/14/23  0445 05/13/23  0834   SODIUM mmol/L 137 135*   POTASSIUM mmol/L 4.1 3.3*   CHLORIDE mmol/L 101 98   CO2 mmol/L 24.0 23.0   BUN mg/dL 19 20   CREATININE mg/dL 0.78 0.72*   CALCIUM mg/dL 8.2 8.3   BILIRUBIN mg/dL 0.5 0.5   ALK PHOS U/L 50 51   ALT (SGPT) U/L 26 21   AST (SGOT) U/L 56* 38   GLUCOSE mg/dL 110* 134*         Culture Data:         Blood Culture   Date Value Ref Range Status   05/13/2023 No growth at less than 24 hours   Preliminary   05/13/2023 No growth at less than 24 hours   Preliminary         Radiology Data:           Imaging Results (Last 24 Hours)      Procedure Component Value Units Date/Time     CT Angiogram Chest [[id number]] Collected: 05/13/23 1052       Updated: 05/13/23 1058     Narrative:       EXAMINATION: CT ANGIOGRAM CHEST-      5/13/2023 10:26 AM CDT     HISTORY: Recent  cardiac catheterization. Shortness of breath with fever  and lower extremity edema.     In order to have a CT radiation dose as low as reasonably achievable  Automated Exposure Control was utilized for adjustment of the mA and/or  KV according to patient size.     DLP in mGycm= 194.     CT angiogram chest with IV contrast.  CT angiography protocol.   CT imaging with bolus IV contrast injection.   Under concurrent supervision axial, sagittal, coronal,  three-dimensional, and MIP data sets were constructed on an independent  work station.     Comparison is made with 08/02/2022.     Normal heart size.  Normal size thoracic aorta.     Mildly enlarged and well opacified pulmonary arteries.  No pulmonary embolism.     Posterior right lower lobe pneumonia.     Underlying chronic interstitial disease.  No pneumothorax and no significant pleural effusion.  No sign of heart failure.     Prominent diffuse osteopenia.  Severe degenerative spine change with mild chronic anterior wedge  compression of T12.     Summary:  1. No pulmonary embolism.  2. Right lower lobe pneumonia.                                            This report was finalized on 05/13/2023 10:55 by Dr. Mac Lucio MD.             I have reviewed the patient's current medications.      Assessment/Plan   Assessment       Active Hospital Problems     Diagnosis     • **Pneumonia of right lower lobe due to infectious organism     • Right lower lobe pneumonia     • Cough     • Mediastinal adenopathy     • Other fatigue     • Non-rheumatic mitral regurgitation     • Coronary artery disease involving native coronary             artery of native heart without angina pectoris      • Essential hypertension     • Mixed hyperlipidemia     • History of bladder cancer     • Benign localized prostatic hyperplasia without lower urinary tract symptoms (LUTS)           Treatment Plan  Pneumonia/right lower lobe . Elevated procalcitonin.  Lactate is normal.  White blood cells  normal.  DuoNebs.  Incentive spirometer.  Zosyn antibiotics to cover for aspiration.  MRSA is negative- DC vancomycin for now 5/14/2023.  Chest x-ray-Hypoaeration with patchy atelectasis.  CTA of the chest-No pulmonary embolism, Right lower lobe pneumonia.  Patient is on 4 L of oxygen.      CAD-status post stent placement 4/8/23/hypertension/hyperlipidemia.  BNP 1800. Troponin 50.  EKG shows sinus rhythm with PAC.  20 of Lasix x1.  Aspirin.  Plavix.  Cozaar in half.    Continue Norvasc.  Hold Toprol for now secondary to 12/s pulse. Nitro as needed.  Echocardiogram 12/20/2022- ejection fraction 50%, diastolic dysfunction grade 1, right atrial cavity borderline dilated.     Hyponatremia.  Resolved.     Hypokalemia.    Resolved     Magnesium level- 2 g magnesium.  Continue p.o. magnesium.  Magnesium in a.m.     Anemia.  Hemoglobin stable.  No sign of bleed..     Thrombocytopenia.  Decrease in platelets.  Will follow.     Nutrition.  B12 . Cardiac diet.     Deconditioning.  PT and OT consult.     Blood culture NINA-no growth less than 24 hours.  Respiratory panel-negative.  Mycoplasma pneumonia pending.  Legionella antigen-negative.  MRSA screen-negative.  Strep pneumo-negative.      DNR. DNI.     Medical Decision Making  Number and Complexity of problems: Right lower lobe pneumonia/CAD/hyponatremia/hypokalemia/anemia  Differential Diagnosis: None     Conditions and Status     Evaluation ongoing treatment .     MDM Data  External documents reviewed: Previous notes  Cardiac tracing (EKG, telemetry) interpretation: Sinus with PAC.  Care Planning  Shared decision making: Patient and daughter  Code status and discussions:  DNR.  DNI.     Disposition  Social Determinants of Health that impact treatment or disposition: Unknown  Estimated length of stay is 2 to 5 days.   Electronically signed by Brody Jackson MD, 05/14/23, 09:02 CDT.    Tammy Moon APRN   Nurse Practitioner  Hospitalist  Progress Notes      Cosign  "Needed  Date of Service:  05/15/23 1210  Creation Time:  05/15/23 1210     Cosign Needed                [facility] Medicine Services  INPATIENT PROGRESS NOTE     Patient Name: Wyatt Curry  Date of Admission: 5/13/2023  Today's Date: 05/15/23  Length of Stay: 2  Primary Care Physician: Everardo Jackson MD     Subjective   Chief Complaint: Shortness of breath  HPI   Mr. Curry is a 89-year-old male who presented to [facility] on 5/13 with fatigue, cough and fever up to 103 °F.  He had noticed some dyspnea with exertion and coughing more than usual.  He had also complained of fatigue in which he wanted to sleep\" all the time\" which is unusual for him.  At baseline he is very independent and is the primary caregiver for his wife who has dementia.  He saw his primary care provider 2 days prior to admission and was prescribed amoxicillin for diagnosis of acute bronchitis.  He has had no significant improvement and continued to feel unwell.  Of note, he has a history of coronary artery disease and had been off of Plavix for procedure.  Postoperatively he complained of chest pain and was found to have an elevated troponin.  He underwent cardiac catheterization on 4/8/2023 and had stent to distal left anterior ascending coronary artery.  Work-up in the ER, CT angiogram chest showed no pulmonary embolism.  Right lower lobe pneumonia.  WBC 8, lactate 1.3, procalcitonin 1.58.  Magnesium 1.2     Yesterday patient was more short of breath and was requiring up to 6 L nasal cannula.  CODE STATUS changed to no CPR with limited support to include no intubation and no cardioversion.  Toprol-XL initially held as patient had a 12-second pause on telemetry.  Around 1700 he went into new onset atrial fibrillation with rapid ventricular response up to 180s Aileen RATLIFF.  He was given IV digoxin, IV Lopressor x2, 250 cc fluid bolus and started on Cardizem drip.      Max temp 100.3 °F overnight.  " Up in chair.  States he is feeling better today.  Currently on 5 L nasal cannula.  Toprol-XL resumed at 25 mg today.  Continues on Cardizem drip at 7.5 mg/hr, heart rate 110-120s.                     Review of Systems   All pertinent negatives and positives are as above. All other systems have been reviewed and are negative unless otherwise stated.      Objective    Temp:  [98.4 °F (36.9 °C)-100.8 °F (38.2 °C)] 98.4 °F (36.9 °C)  Heart Rate:  [] 154  Resp:  [18-20] 18  BP: ()/(41-65) 106/53  Physical Exam  Vitals reviewed.   Constitutional:       General: He is not in acute distress.     Appearance: He is ill-appearing. He is not toxic-appearing.      Interventions: Nasal cannula in place.      Comments: Sitting up in bed.  No acute distress.  On 5 L nasal cannula.  Discussed with his nurse deepa at bedside.   HENT:      Head: Normocephalic and atraumatic.      Mouth/Throat:      Mouth: Mucous membranes are moist.      Pharynx: Oropharynx is clear.   Eyes:      Extraocular Movements: Extraocular movements intact.      Conjunctiva/sclera: Conjunctivae normal.      Pupils: Pupils are equal, round, and reactive to light.   Cardiovascular:      Rate and Rhythm: Tachycardia present. Rhythm irregular.      Pulses: Normal pulses.   Pulmonary:      Effort: Pulmonary effort is normal. No respiratory distress.      Breath sounds: Normal breath sounds. No wheezing.   Abdominal:      General: Bowel sounds are normal. There is no distension.      Palpations: Abdomen is soft.      Tenderness: There is no abdominal tenderness.   Musculoskeletal:         General: No swelling or tenderness. Normal range of motion.      Cervical back: Normal range of motion and neck supple. No muscular tenderness.   Skin:     General: Skin is warm and dry.      Findings: No erythema or rash.   Neurological:      General: No focal deficit present.      Mental Status: He is alert and oriented to person, place, and time.      Cranial Nerves:  No cranial nerve deficit.      Motor: No weakness.   Psychiatric:         Mood and Affect: Mood normal.         Behavior: Behavior normal.         Results Review:  I have reviewed the labs, radiology results, and diagnostic studies.     Laboratory Data:          Results from last 7 days   Lab Units 05/15/23  0346 05/14/23 0445 05/13/23  0834   WBC 10*3/mm3 8.80 8.70 8.23   HEMOGLOBIN g/dL 10.7* 10.5* 10.0*   HEMATOCRIT % 36.6* 33.9* 31.8*   PLATELETS 10*3/mm3 127* 121* 131*                Results from last 7 days   Lab Units 05/15/23  0346 05/14/23  0445 05/13/23  0834   SODIUM mmol/L 135* 137 135*   POTASSIUM mmol/L 3.3* 4.1 3.3*   CHLORIDE mmol/L 101 101 98   CO2 mmol/L 18.0* 24.0 23.0   BUN mg/dL 26* 19 20   CREATININE mg/dL 0.94 0.78 0.72*   CALCIUM mg/dL 8.1* 8.2 8.3   BILIRUBIN mg/dL  --  0.5 0.5   ALK PHOS U/L  --  50 51   ALT (SGPT) U/L  --  26 21   AST (SGOT) U/L  --  56* 38   GLUCOSE mg/dL 138* 110* 134*         Culture Data:         Blood Culture   Date Value Ref Range Status   05/13/2023 No growth at 24 hours   Preliminary      No results found for: BCIDPCR, CXREFLEX, CSFCX, CULTURETIS  No results found for: CULTURES, HSVCX, URCX  No results found for: EYECULTURE, GCCX, HSVCULTURE, LABHSV  No results found for: LEGIONELLA, MRSACX, MUMPSCX, MYCOPLASCX  No results found for: NOCARDIACX, STOOLCX  No results found for: THROATCX, UNSTIMCULT, URINECX, CULTURE, VZVCULTUR  No results found for: VIRALCULTU, WOUNDCX     Radiology Data:       Imaging Results (Last 24 Hours)      ** No results found for the last 24 hours. **             I have reviewed the patient's current medications.      Assessment/Plan   Assessment       Active Hospital Problems     Diagnosis     • **Pneumonia of right lower lobe due to infectious organism     • New onset atrial fibrillation with rapid ventricular response     • Right lower lobe pneumonia     • Cough     • Mediastinal adenopathy     • Other fatigue     • Non-rheumatic mitral  regurgitation     • Coronary artery disease involving native coronary artery of native heart without angina pectoris     • Essential hypertension     • Mixed hyperlipidemia     • History of bladder cancer     • Benign localized prostatic hyperplasia without lower urinary tract symptoms (LUTS)           Treatment Plan  Pneumonia right lower lobe.  Respiratory panel, PCR negative.  Strep pneumo and Legionella urinary antigens negative.  MRSA screen negative, vancomycin discontinued.  Blood culture with no growth at 24 hours.  Respiratory culture if able.  Continue Zosyn.         control.  Currently on Cardizem 7.5 mg/hour.  Atrial fibrillation 102-116.  Toprol-XL 25 mg, may need to increase.     History of coronary artery disease with recent stenting to LAD on 4/8.  No chest pain.  Continue aspirin, Plavix, statin, beta-blocker, calcium channel blocker and ARB.     PT/OT.     SCDs for DVT prophylaxis.     Medical Decision Making  Number and Complexity of problems: 3 acute problems in the form of pneumonia right lower lobe, hypoxia, new onset atrial fibrillation with rapid ventricular response  Differential Diagnosis: None considered at present     Conditions and Status        Condition is unchanged.     [facility] Data  External documents reviewed: Prior [facility] records  Cardiac tracing (EKG, telemetry) interpretation: Atrial fibrillation  Radiology interpretation: Interpreted by radiology  Labs reviewed: As above  Any tests that were considered but not ordered: None considered at present     Decision rules/scores evaluated (example [id number], Wells, etc): Considered at present     Discussed with: Patient, deepa RATLIFF and Dr. Rodriguez     Care Planning  Shared decision making: Patient is agreeable to ongoing work-up and treatment  Code status and discussions: No CPR with limited support to include no intubation and no cardioversion.  Patient's niece Zamzam Marie is the power of .     Disposition  Social Determinants  of Health that impact treatment or disposition: Home health  I expect the patient to be discharged to home with home health in 2-3 days.      Electronically signed by LARRY John, 05/15/23, 12:15 CDT.     Time Temp Pulse Resp BP Patient Position Device (Oxygen Therapy) Flow (L/min) SpO2   05/15/23 1151 98.4 (36.9) 154 Abnormal  18 106/53 Sitting humidified;high-flow nasal cannula 6 93   05/15/23 1032 -- 102 18 -- -- -- -- --   05/15/23 1026 -- 109 18 -- -- humidified;nasal cannula 6 92   05/15/23 0835 98.5 (36.9) 89 18 133/58 Lying humidified;nasal cannula 6 95   05/15/23 0623 -- 97 18 -- -- -- -- --   05/15/23 0616 -- 95 18 -- -- high-flow nasal cannula;humidified 6 95   05/15/23 0358 100.3 (37.9) 105 20 105/54 Lying high-flow nasal cannula;humidified 6 94   05/15/23 0150 -- 94 -- -- -- high-flow nasal cannula;humidified 6 93   05/15/23 0100 98.5 (36.9) 119 -- -- -- high-flow nasal cannula;humidified 6 94   05/14/23 2304 98.4 (36.9) 124 Abnormal  20 107/54 Lying high-flow nasal cannula;humidified 6 93   05/14/23 2150 -- 120 -- 114/53 Lying -- -- --   05/14/23 2115 -- 130 Abnormal  -- 108/41 Lying -- -- --   05/14/23 2112 -- -- -- 108/41 Lying -- -- --   05/14/23 2000 -- -- -- -- -- high-flow nasal cannula;humidified 6 --   05/14/23 1944 98.7 (37.1) 145 Abnormal  20 99/65 Lying high-flow nasal cannula;humidified 6 92   05/14/23 1925 -- 106 20 -- -- high-flow nasal cannula;humidified -- 94   05/14/23 1708 -- 146 Abnormal  -- -- -- -- -- --   05/14/23 1515 100.8 (38.2) Abnormal   121 Abnormal  18 92/65 Sitting high-flow mask;humidified 6 92   Temp: nurse aware at 05/14/23 1515   05/14/23 1507 -- 154 Abnormal  -- -- -- -- -- --   05/14/23 1103                     Current Facility-Administered Medications   Medication Dose Route Frequency Provider Last Rate Last Admin   • acetaminophen (TYLENOL) tablet 500 mg  500 mg Oral Q6H PRN Brody Jackson MD   500 mg at 05/15/23 0147   • amLODIPine (NORVASC) tablet 5 mg  5  mg Oral Daily Brody Jackson MD   5 mg at 05/15/23 0816   • aspirin EC tablet 81 mg  81 mg Oral Daily Brody Jackson MD   81 mg at 05/15/23 0816   • atorvastatin (LIPITOR) tablet 40 mg  40 mg Oral Daily Brody Jackson MD   40 mg at 05/15/23 0816   • benzonatate (TESSALON) capsule 200 mg  200 mg Oral TID PRN Brody Jackson MD       • clopidogrel (PLAVIX) tablet 75 mg  75 mg Oral Daily Brody Jackson MD   75 mg at 05/15/23 0816   • dilTIAZem (CARDIZEM) 125 mg in 125 mL NS infusion  5-15 mg/hr Intravenous Titrated Brody Jackson MD 7.5 mL/hr at 05/15/23 1257 7.5 mg/hr at 05/15/23 1257   • guaiFENesin (MUCINEX) 12 hr tablet 1,200 mg  1,200 mg Oral Q12H Tammy Moon APRN       • ipratropium (ATROVENT) nebulizer solution 0.5 mg  0.5 mg Nebulization 4x Daily - RT Tammy Moon APRN   0.5 mg at 05/15/23 1026   • ipratropium-albuterol (DUO-NEB) nebulizer solution 3 mL  3 mL Nebulization Q4H PRN Brody Jackson MD   3 mL at 05/14/23 0450   • losartan (COZAAR) tablet 50 mg  50 mg Oral Q24H Brody Jackson MD   50 mg at 05/15/23 0816   • magnesium oxide (MAG-OX) tablet 400 mg  400 mg Oral Daily Brody Jackson MD   400 mg at 05/15/23 0816   • [START ON 5/16/2023] metoprolol succinate XL (TOPROL-XL) 24 hr tablet 25 mg  25 mg Oral Q24H Tammy Moon APRN       • metoprolol tartrate (LOPRESSOR) injection 5 mg  5 mg Intravenous Q6H PRN Brody Jackson MD       • nitroglycerin (NITROSTAT) SL tablet 0.4 mg  0.4 mg Sublingual Q5 Min PRN Brody Jackson MD       • pantoprazole (PROTONIX) EC tablet 40 mg  40 mg Oral BID AC Brody Jackson MD   40 mg at 05/15/23 0816   • piperacillin-tazobactam (ZOSYN) 3.375 g in iso-osmotic dextrose 50 ml (premix)  3.375 g Intravenous Q8H Brody Jackson MD   3.375 g at 05/15/23 0816   • sodium chloride 0.9 % flush 10 mL  10 mL Intravenous PRN Brody Jackson MD       • sodium chloride 0.9 % flush 10 mL  10 mL Intravenous Q12H Brody Jackson MD   10 mL at 05/15/23 0816   • sodium chloride 0.9 %  flush 10 mL  10 mL Intravenous PRN Brody Jackson MD       • sodium chloride 0.9 % infusion 40 mL  40 mL Intravenous PRN Brody Jackson MD       • vitamin B-12 (CYANOCOBALAMIN) tablet 1,000 mcg  1,000 mcg Oral Daily Brody Jackson MD   1,000 mcg at 05/15/23 0816

## 2023-05-16 LAB
ALBUMIN SERPL-MCNC: 2.9 G/DL (ref 3.5–5.2)
ALBUMIN/GLOB SERPL: 0.8 G/DL
ALP SERPL-CCNC: 66 U/L (ref 39–117)
ALT SERPL W P-5'-P-CCNC: 78 U/L (ref 1–41)
ANION GAP SERPL CALCULATED.3IONS-SCNC: 11 MMOL/L (ref 5–15)
AST SERPL-CCNC: 172 U/L (ref 1–40)
BILIRUB SERPL-MCNC: 0.4 MG/DL (ref 0–1.2)
BUN SERPL-MCNC: 39 MG/DL (ref 8–23)
BUN/CREAT SERPL: 34.8 (ref 7–25)
CALCIUM SPEC-SCNC: 8.7 MG/DL (ref 8.2–9.6)
CHLORIDE SERPL-SCNC: 98 MMOL/L (ref 98–107)
CO2 SERPL-SCNC: 24 MMOL/L (ref 22–29)
CREAT SERPL-MCNC: 1.12 MG/DL (ref 0.76–1.27)
DEPRECATED RDW RBC AUTO: 51.2 FL (ref 37–54)
EGFRCR SERPLBLD CKD-EPI 2021: 61.3 ML/MIN/1.73
ERYTHROCYTE [DISTWIDTH] IN BLOOD BY AUTOMATED COUNT: 14.9 % (ref 12.3–15.4)
GLOBULIN UR ELPH-MCNC: 3.6 GM/DL
GLUCOSE SERPL-MCNC: 146 MG/DL (ref 65–99)
HCT VFR BLD AUTO: 32.9 % (ref 37.5–51)
HGB BLD-MCNC: 10.2 G/DL (ref 13–17.7)
MCH RBC QN AUTO: 28.9 PG (ref 26.6–33)
MCHC RBC AUTO-ENTMCNC: 31 G/DL (ref 31.5–35.7)
MCV RBC AUTO: 93.2 FL (ref 79–97)
PLATELET # BLD AUTO: 152 10*3/MM3 (ref 140–450)
PMV BLD AUTO: 11.1 FL (ref 6–12)
POTASSIUM SERPL-SCNC: 3.8 MMOL/L (ref 3.5–5.2)
PROT SERPL-MCNC: 6.5 G/DL (ref 6–8.5)
QT INTERVAL: 342 MS
QTC INTERVAL: 477 MS
RBC # BLD AUTO: 3.53 10*6/MM3 (ref 4.14–5.8)
SODIUM SERPL-SCNC: 133 MMOL/L (ref 136–145)
WBC NRBC COR # BLD: 8.3 10*3/MM3 (ref 3.4–10.8)

## 2023-05-16 PROCEDURE — 94761 N-INVAS EAR/PLS OXIMETRY MLT: CPT

## 2023-05-16 PROCEDURE — 85027 COMPLETE CBC AUTOMATED: CPT | Performed by: INTERNAL MEDICINE

## 2023-05-16 PROCEDURE — 97116 GAIT TRAINING THERAPY: CPT

## 2023-05-16 PROCEDURE — 25010000002 PIPERACILLIN SOD-TAZOBACTAM PER 1 G: Performed by: NURSE PRACTITIONER

## 2023-05-16 PROCEDURE — 99222 1ST HOSP IP/OBS MODERATE 55: CPT | Performed by: NURSE PRACTITIONER

## 2023-05-16 PROCEDURE — 94664 DEMO&/EVAL PT USE INHALER: CPT

## 2023-05-16 PROCEDURE — 94760 N-INVAS EAR/PLS OXIMETRY 1: CPT

## 2023-05-16 PROCEDURE — 97535 SELF CARE MNGMENT TRAINING: CPT

## 2023-05-16 PROCEDURE — 80053 COMPREHEN METABOLIC PANEL: CPT | Performed by: INTERNAL MEDICINE

## 2023-05-16 PROCEDURE — 97110 THERAPEUTIC EXERCISES: CPT

## 2023-05-16 PROCEDURE — 94799 UNLISTED PULMONARY SVC/PX: CPT

## 2023-05-16 PROCEDURE — 97530 THERAPEUTIC ACTIVITIES: CPT

## 2023-05-16 RX ORDER — METOPROLOL SUCCINATE 50 MG/1
50 TABLET, EXTENDED RELEASE ORAL
Status: DISCONTINUED | OUTPATIENT
Start: 2023-05-17 | End: 2023-05-17

## 2023-05-16 RX ADMIN — ACETAMINOPHEN 650 MG: 325 TABLET, FILM COATED ORAL at 12:52

## 2023-05-16 RX ADMIN — AMLODIPINE BESYLATE 5 MG: 5 TABLET ORAL at 08:40

## 2023-05-16 RX ADMIN — ASPIRIN 81 MG: 81 TABLET, COATED ORAL at 08:40

## 2023-05-16 RX ADMIN — IPRATROPIUM BROMIDE 0.5 MG: 0.5 SOLUTION RESPIRATORY (INHALATION) at 06:10

## 2023-05-16 RX ADMIN — Medication 10 ML: at 08:40

## 2023-05-16 RX ADMIN — DILTIAZEM HYDROCHLORIDE 5 MG/HR: 5 INJECTION INTRAVENOUS at 03:50

## 2023-05-16 RX ADMIN — PANTOPRAZOLE SODIUM 40 MG: 40 TABLET, DELAYED RELEASE ORAL at 08:39

## 2023-05-16 RX ADMIN — CLOPIDOGREL BISULFATE 75 MG: 75 TABLET, FILM COATED ORAL at 08:40

## 2023-05-16 RX ADMIN — TAZOBACTAM SODIUM AND PIPERACILLIN SODIUM 3.38 G: 375; 3 INJECTION, SOLUTION INTRAVENOUS at 18:36

## 2023-05-16 RX ADMIN — IPRATROPIUM BROMIDE 0.5 MG: 0.5 SOLUTION RESPIRATORY (INHALATION) at 14:58

## 2023-05-16 RX ADMIN — GUAIFENESIN 1200 MG: 600 TABLET, EXTENDED RELEASE ORAL at 20:58

## 2023-05-16 RX ADMIN — ATORVASTATIN CALCIUM 40 MG: 40 TABLET, FILM COATED ORAL at 08:40

## 2023-05-16 RX ADMIN — MAGNESIUM GLUCONATE 500 MG ORAL TABLET 400 MG: 500 TABLET ORAL at 08:40

## 2023-05-16 RX ADMIN — TAZOBACTAM SODIUM AND PIPERACILLIN SODIUM 3.38 G: 375; 3 INJECTION, SOLUTION INTRAVENOUS at 12:53

## 2023-05-16 RX ADMIN — METOPROLOL SUCCINATE 25 MG: 25 TABLET, EXTENDED RELEASE ORAL at 08:40

## 2023-05-16 RX ADMIN — GUAIFENESIN 1200 MG: 600 TABLET, EXTENDED RELEASE ORAL at 08:40

## 2023-05-16 RX ADMIN — PANTOPRAZOLE SODIUM 40 MG: 40 TABLET, DELAYED RELEASE ORAL at 18:36

## 2023-05-16 RX ADMIN — IPRATROPIUM BROMIDE 0.5 MG: 0.5 SOLUTION RESPIRATORY (INHALATION) at 18:57

## 2023-05-16 RX ADMIN — LOSARTAN POTASSIUM 50 MG: 50 TABLET, FILM COATED ORAL at 08:40

## 2023-05-16 RX ADMIN — IPRATROPIUM BROMIDE 0.5 MG: 0.5 SOLUTION RESPIRATORY (INHALATION) at 10:08

## 2023-05-16 RX ADMIN — ACETAMINOPHEN 650 MG: 325 TABLET, FILM COATED ORAL at 03:25

## 2023-05-16 RX ADMIN — Medication 1000 MCG: at 08:40

## 2023-05-16 RX ADMIN — ACETAMINOPHEN 650 MG: 325 TABLET, FILM COATED ORAL at 20:58

## 2023-05-16 NOTE — PLAN OF CARE
Goal Outcome Evaluation:  Plan of Care Reviewed With: patient        Progress: improving  Outcome Evaluation: VSS.  AF  BBB and PVC on tele.  SBP in 90s, HR improving.  Cardizem down to 5mg/hr.  O2 steady in low-mid 90s on 8L overnight.  Tylenol given once.  Patient looking improved.  [place] care provided. Safety maintained.

## 2023-05-16 NOTE — THERAPY TREATMENT NOTE
Patient Name: Wyatt Curry  : 1930    MRN: [id number]                              Today's Date: 2023       Admit Date: 2023    Visit Dx: Therapist utilized gait belt, applied non-slipped socks, provided fall risk education/prevention, & facilitated muscle strengthening PRN to reduce patient falls risk during this session.      ICD-10-CM ICD-9-CM   1. Pneumonia of right lower lobe due to infectious organism  J18.9 486   2. Hypoxia  R09.02 799.02   3. Other iron deficiency anemia  D50.8 280.8   4. Impaired mobility  Z74.09 799.89     Patient Active Problem List   Diagnosis    Benign localized prostatic hyperplasia without lower urinary tract symptoms (LUTS)    History of bladder cancer    Left rotator cuff tear arthropathy    Coronary artery disease involving native coronary artery of native heart without angina pectoris    Left bundle branch block    Essential hypertension    Mixed hyperlipidemia    Overweight (BMI 25.0-29.9)    Non-rheumatic mitral regurgitation    Other fatigue    Left ventricular diastolic dysfunction    History of coronary artery stent placement    Lung nodule    Mediastinal adenopathy    Cough    Abnormal positron emission tomography (PET) scan    Basal cell carcinoma    Neoplasm of uncertain behavior    Elevated troponin    Bleeding    Ischemic cardiomyopathy    Pneumonia of right lower lobe due to infectious organism    New onset atrial fibrillation with rapid ventricular response    Sepsis due to pneumonia    Hypoxia     Past Medical History:   Diagnosis Date    Arthritis     Bladder cancer     Bronchitis     CAD (coronary artery disease)     Cancer     bladder cancer    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    GERD (gastroesophageal reflux disease)     Hyperlipidemia     Hypertension     Irregular heart beat     Lung nodules      Past Surgical History:   Procedure Laterality Date    BLADDER SURGERY      CARDIAC CATHETERIZATION      CARDIAC CATHETERIZATION Left  4/8/2023    Procedure: Cardiac Catheterization/Vascular Study;  Surgeon: Sukhi Hartley MD;  Location:  [facility]ATH [facility];  Service: Cardiology;  Laterality: Left;    COLONOSCOPY      CORONARY ANGIOPLASTY WITH STENT PLACEMENT  2006    STENT X 2    CYSTOSCOPY      ENDOSCOPY      EXCISION LESION N/A 4/7/2023    Procedure: EXCISION LESION OF RIGHT TEMPLE AND LEFT TEMPLE WITH FROZEN SECTION WITH POSSIBLE FLAP OR GRAFT;  Surgeon: Brijesh Nickerson MD;  Location:  [facility] OR;  Service: ENT;  Laterality: N/A;    FLAP HEAD/NECK Left 03/09/2020    Procedure: POSSIBLE FLAP;  Surgeon: Brijesh Nickerson MD;  Location:  [facility] OR;  Service: ENT;  Laterality: Left;    HEAD/NECK LESION/CYST EXCISION Left 03/09/2020    Procedure: 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 with frozen section and possible flap or graft;  Surgeon: Brijesh Nickerson MD;  Location:  [facility] OR;  Service: ENT;  Laterality: Left;    HERNIA REPAIR      SHOULDER SURGERY      SKIN BIOPSY      lip biopsy    SKIN FULL THICKNESS GRAFT Left 03/09/2020    Procedure: OR GRAFT;  Surgeon: Brijesh Nickerson MD;  Location:  [facility] OR;  Service: ENT;  Laterality: Left;    TRANSURETHRAL RESECTION OF BLADDER TUMOR        General Information       Row Name 05/16/23 1124          OT Time and Intention    Document Type therapy note (daily note)  -MT     Mode of Treatment occupational therapy  -MT       Row Name 05/16/23 1124          General Information    Patient Profile Reviewed yes  -MT     Existing Precautions/Restrictions fall;oxygen therapy device and L/min  8L  -MT       Row Name 05/16/23 1124          Cognition    Orientation Status (Cognition) oriented x 4  -MT       Row Name 05/16/23 1124          Safety Issues, Functional Mobility    Impairments Affecting Function (Mobility) balance;endurance/activity tolerance;strength;shortness of breath  -MT               User Key  (r) = Recorded By, (t) = Taken By, (c) = Cosigned  By      Initials Name Provider Type    Janet Andrade COTA Occupational Therapist Assistant                     Mobility/ADL's       Row Name 05/16/23 1124          Bed Mobility    Comment, (Bed Mobility) chair  -[facility] Name 05/16/23 1124          Sit-Stand Transfer    Sit-Stand [place] (Transfers) contact guard;verbal cues  -[facility] Name 05/16/23 1124          Stand-Sit Transfer    Stand-Sit [place] (Transfers) contact guard;verbal cues  -MT       Row Name 05/16/23 1124          Functional Mobility    Functional Mobility- Ind. Level contact guard assist  -MT     Functional Mobility- Comment Anticipate increase in steadiness and decreased fall risk if utilized RW or rollator. Per pt has these at home for wife that he could use.  -MT       Row Name 05/16/23 1124          Lower Body Dressing Assessment/Training    [place] Level (Lower Body Dressing) moderate assist (50% patient effort);maximum assist (25% patient effort);pants/bottoms  -MT       Row Name 05/16/23 1124          Toileting Assessment/Training    [place] Level (Toileting) adjust/manage clothing;change pad/brief;perform perineal hygiene;maximum assist (25% patient effort)  -MT               User Key  (r) = Recorded By, (t) = Taken By, (c) = Cosigned By      Initials Name Provider Type    Janet Andrade COTA Occupational Therapist Assistant                   Obj/Interventions    No documentation.                  Goals/Plan    No documentation.                  Clinical Impression       [facility] Name 05/16/23 1124          Pain Assessment    Pretreatment Pain Rating 0/10 - no pain  -MT     Posttreatment Pain Rating 0/10 - no pain  -MT       Row Name 05/16/23 1124          Therapy Plan Review/Discharge Plan (OT)    Anticipated Discharge Disposition (OT) sub acute care setting;home with assist;home with home health  -[facility] Name 05/16/23 1124          Vital Signs    Pre SpO2 (%) 93  -MT     O2 Delivery Pre Treatment  supplemental O2  8L  -MT     Intra SpO2 (%) 89  -MT     Post SpO2 (%) 94  -MT       Row Name 05/16/23 1124          Positioning and Restraints    Pre-Treatment Position sitting in chair/recliner  -MT     Post Treatment Position chair  -MT     In Chair sitting;call light within reach;encouraged to call for assist;with family/caregiver  -MT               User Key  (r) = Recorded By, (t) = Taken By, (c) = Cosigned By      Initials Name Provider Type    MT Janet Hernandez COTA Occupational Therapist Assistant                   Outcome Measures       Row Name 05/16/23 1124          How much help from another is currently needed...    Putting on and taking off regular lower body clothing? 2  -MT     Bathing (including washing, rinsing, and drying) 2  -MT     Toileting (which includes using toilet bed pan or urinal) 2  -MT     Putting on and taking off regular upper body clothing 3  -MT     Taking care of personal grooming (such as brushing teeth) 3  -MT     Eating meals 4  -MT     AM-PAC 6 Clicks Score (OT) 16  -MT       Row Name 05/16/23 0930          How much help from another person do you currently need...    Turning from your back to your side while in flat bed without using bedrails? 3  -TB     Moving from lying on back to sitting on the side of a flat bed without bedrails? 3  -TB     Moving to and from a bed to a chair (including a wheelchair)? 3  -TB     Standing up from a chair using your arms (e.g., wheelchair, bedside chair)? 4  -TB     Climbing 3-5 steps with a railing? 3  -TB     To walk in hospital room? 3  -TB     AM-PAC 6 Clicks Score (PT) 19  -TB     Highest level of mobility 6 --> Walked 10 steps or more  -TB       Row Name 05/16/23 0930          Functional Assessment    Outcome Measure Options AM-PAC 6 Clicks Basic Mobility (PT)  -TB               User Key  (r) = Recorded By, (t) = Taken By, (c) = Cosigned By      Initials Name Provider Type    TB Clifton Chauhan, PTA Physical Therapist Assistant     Janet Andrade COTA Occupational Therapist Assistant                    Occupational Therapy Education       Title: PT OT SLP Therapies (In Progress)       Topic: Occupational Therapy (In Progress)       Point: ADL training (Done)       Description:   Instruct learner(s) on proper safety adaptation and remediation techniques during self care or transfers.   Instruct in proper use of assistive devices.                  Learning Progress Summary             Patient Acceptance, E, VU by  at 5/14/2023 2215                         Point: Home exercise program (Not Started)       Description:   Instruct learner(s) on appropriate technique for monitoring, assisting and/or progressing therapeutic exercises/activities.                  Learner Progress:  Not documented in this visit.              Point: Precautions (Done)       Description:   Instruct learner(s) on prescribed precautions during self-care and functional transfers.                  Learning Progress Summary             Patient Acceptance, E, VU by ISMA at 5/14/2023 7204                         Point: Body mechanics (Not Started)       Description:   Instruct learner(s) on proper positioning and spine alignment during self-care, functional mobility activities and/or exercises.                  Learner Progress:  Not documented in this visit.                              User Key       Initials Effective Dates Name Provider Type Discipline     11/10/21 -  Kassi Jennings, OTR/L, CSRS Occupational Therapist OT                  OT Recommendation and Plan     Plan of Care Review  Plan of Care Reviewed With: patient  Progress: improving  Outcome Evaluation: Fxl moiblity in room > BR doorway. Uncontrolled BM. Max-DepA hygiene. Dons brief MaxA d/t catheter management. Pt with good static standing balance. Anticipate increase in steadiness and decreased fall risk if utilized RW or rollator for RBs during dynamic tasks. Per pt has these at home for wife  that he could use. Pt HR in 130s this tx and minimal SOA. Multiple sit<>stands and intervals of chair<>chair t/fs x3 for increased fxl I. Pt is improving but would benefit from continued therapy. 24/7 care/HH vs sub acute rehab for d/c pending overall progress and O2 needs     Time Calculation:    Time Calculation- OT       Row Name 05/16/23 1124             Time Calculation- OT    OT Start Time 1124  -MT      OT Stop Time 1150  -MT      OT Time Calculation (min) 26 min  -MT      Total Timed Code Minutes- OT 26 minute(s)  -MT      OT Received On 05/16/23  -MT         Timed Charges    45457 - OT Self Care/Mgmt Minutes 26  -MT         Total Minutes    Timed Charges Total Minutes 26  -MT       Total Minutes 26  -MT                User Key  (r) = Recorded By, (t) = Taken By, (c) = Cosigned By      Initials Name Provider Type    MT Janet Hernandez COTA Occupational Therapist Assistant                  Therapy Charges for Today       Code Description Service Date Service Provider Modifiers Qty    41847344921 HC OT THER PROC EA 15 MIN 5/15/2023 Janet Hernandez COTA GO 3    65118116509 HC OT THERAPEUTIC ACT EA 15 MIN 5/15/2023 Janet Hernandez COTA GO 1    29929442654 HC OT SELF CARE/MGMT/TRAIN EA 15 MIN 5/16/2023 Janet Hernandez COTA GO 2                 DIANA Peter  5/16/2023

## 2023-05-16 NOTE — PROGRESS NOTES
"    [facility] Medicine Services  INPATIENT PROGRESS NOTE    Patient Name: Wyatt Curry  Date of Admission: 5/13/2023  Today's Date: 05/16/23  Length of Stay: 3  Primary Care Physician: Everardo Jackson MD    Subjective   Chief Complaint: Shortness of breath  HPI   Mr. Curry is a 89-year-old male who presented to [facility] on 5/13 with fatigue, cough and fever up to 103 °F.  He had noticed some dyspnea with exertion and coughing more than usual.  He had also complained of fatigue in which he wanted to sleep\" all the time\" which is unusual for him.  At baseline he is very independent and is the primary caregiver for his wife who has dementia.  He saw his primary care provider 2 days prior to admission and was prescribed amoxicillin for diagnosis of acute bronchitis.  He has had no significant improvement and continued to feel unwell.  Of note, he has a history of coronary artery disease and had been off of Plavix for procedure.  Postoperatively he complained of chest pain and was found to have an elevated troponin.  He underwent cardiac catheterization on 4/8/2023 and had stent to distal left anterior ascending coronary artery.  Work-up in the ER, CT angiogram chest showed no pulmonary embolism.  Right lower lobe pneumonia.  WBC 8, lactate 1.3, procalcitonin 1.58.  Magnesium 1.2    Up in chair with niece at bedside.  States he is feeling better today.  He is on 8 L nasal cannula.  Last documented temperature 101.7 °F yesterday afternoon.  Niece has asked for him to get Tylenol around-the-clock.  Continues on Cardizem drip at 5 mg/hour with heart rate  today.  No chest pain.    Review of Systems   All pertinent negatives and positives are as above. All other systems have been reviewed and are negative unless otherwise stated.     Objective    Temp:  [97.6 °F (36.4 °C)-101.7 °F (38.7 °C)] 97.9 °F (36.6 °C)  Heart Rate:  [] 111  Resp:  [18] 18  BP: " ()/(48-74) 128/74  Physical Exam  Vitals reviewed.   Constitutional:       General: He is not in acute distress.     Appearance: He is ill-appearing. He is not toxic-appearing.      Interventions: Nasal cannula in place.      Comments: Up in chair with niece at bedside.  No acute distress.  On 8 L nasal cannula.  Discussed with his nurse deepa at bedside.   HENT:      Head: Normocephalic and atraumatic.      Mouth/Throat:      Mouth: Mucous membranes are moist.      Pharynx: Oropharynx is clear.   Eyes:      Extraocular Movements: Extraocular movements intact.      Conjunctiva/sclera: Conjunctivae normal.      Pupils: Pupils are equal, round, and reactive to light.   Cardiovascular:      Rate and Rhythm: Tachycardia present. Rhythm irregular.      Pulses: Normal pulses.      Comments: Atrial fibrillation   Pulmonary:      Effort: Pulmonary effort is normal. No respiratory distress.      Breath sounds: Normal breath sounds. No wheezing.   Abdominal:      General: Bowel sounds are normal. There is no distension.      Palpations: Abdomen is soft.      Tenderness: There is no abdominal tenderness.   Musculoskeletal:         General: No swelling or tenderness. Normal range of motion.      Cervical back: Normal range of motion and neck supple. No muscular tenderness.   Skin:     General: Skin is warm and dry.      Findings: No erythema or rash.   Neurological:      General: No focal deficit present.      Mental Status: He is alert and oriented to person, place, and time.      Cranial Nerves: No cranial nerve deficit.      Motor: No weakness.   Psychiatric:         Mood and Affect: Mood normal.         Behavior: Behavior normal.     Results Review:  I have reviewed the labs, radiology results, and diagnostic studies.    Laboratory Data:   Results from last 7 days   Lab Units 05/16/23  0445 05/15/23  0346 05/14/23  0445   WBC 10*3/mm3 8.30 8.80 8.70   HEMOGLOBIN g/dL 10.2* 10.7* 10.5*   HEMATOCRIT % 32.9* 36.6*  33.9*   PLATELETS 10*3/mm3 152 127* 121*        Results from last 7 days   Lab Units 05/16/23  0445 05/15/23  0346 05/14/23  0445 05/13/23  0834   SODIUM mmol/L 133* 135* 137 135*   POTASSIUM mmol/L 3.8 3.3* 4.1 3.3*   CHLORIDE mmol/L 98 101 101 98   CO2 mmol/L 24.0 18.0* 24.0 23.0   BUN mg/dL 39* 26* 19 20   CREATININE mg/dL 1.12 0.94 0.78 0.72*   CALCIUM mg/dL 8.7 8.1* 8.2 8.3   BILIRUBIN mg/dL 0.4  --  0.5 0.5   ALK PHOS U/L 66  --  50 51   ALT (SGPT) U/L 78*  --  26 21   AST (SGOT) U/L 172*  --  56* 38   GLUCOSE mg/dL 146* 138* 110* 134*       Culture Data:   Blood Culture   Date Value Ref Range Status   05/13/2023 No growth at 24 hours  Preliminary     No results found for: BCIDPCR, CXREFLEX, CSFCX, CULTURETIS  No results found for: CULTURES, HSVCX, URCX  No results found for: EYECULTURE, GCCX, HSVCULTURE, LABHSV  No results found for: LEGIONELLA, MRSACX, MUMPSCX, MYCOPLASCX  No results found for: NOCARDIACX, STOOLCX  No results found for: THROATCX, UNSTIMCULT, URINECX, CULTURE, VZVCULTUR  No results found for: VIRALCULTU, WOUNDCX    Radiology Data:   Imaging Results (Last 24 Hours)       ** No results found for the last 24 hours. **            I have reviewed the patient's current medications.     Assessment/Plan   Assessment  Active Hospital Problems    Diagnosis    • **Pneumonia of right lower lobe due to infectious organism    • New onset atrial fibrillation with rapid ventricular response    • Sepsis due to pneumonia    • Hypoxia    • Mediastinal adenopathy    • Other fatigue    • Non-rheumatic mitral regurgitation    • Coronary artery disease involving native coronary artery of native heart without angina pectoris    • Essential hypertension    • Mixed hyperlipidemia    • History of bladder cancer    • Benign localized prostatic hyperplasia without lower urinary tract symptoms (LUTS)      Treatment Plan  Pneumonia right lower lobe.  Respiratory panel, PCR negative.  Strep pneumo and Legionella urinary  antigens negative.  MRSA screen negative, vancomycin discontinued 5/14.  Blood culture with no growth to date.  Respiratory culture if able.  Continue Zosyn.  Consider repeat chest imaging.    Hypoxic, requiring up to 8 L nasal cannula. Continue Atrovent.  Mucinex, incentive spirometer.  Wean oxygen as tolerated.    New onset atrial fibrillation with rapid ventricular response in the setting of hypoxia and pneumonia.  Had recent cath with EF 40-45%.  Moderate mitral regurgitation.  On dual antiplatelet therapy for history of coronary artery disease with recent stenting.  [id number]. No anticoagulation as patient is on dual antiplatelet therapy.  Continue efforts at rate control.  Currently on Cardizem 5 mg/hour.  Atrial fibrillation .  Continue Toprol-XL 25 mg.  Cardiology consulted.    History of coronary artery disease with recent stenting to LAD on 4/8.  No chest pain.  Continue aspirin, Plavix, statin, beta-blocker, calcium channel blocker and ARB.    PT/OT.    SCDs for DVT prophylaxis.    Medical Decision Making  Number and Complexity of problems: 3 acute problems in the form of pneumonia right lower lobe, hypoxia, new onset atrial fibrillation with rapid ventricular response  Differential Diagnosis: None considered at present    Conditions and Status        Condition is unchanged.     [facility] Data  External documents reviewed: Prior [facility] records  Cardiac tracing (EKG, telemetry) interpretation: Atrial fibrillation  Radiology interpretation: Interpreted by radiology  Labs reviewed: As above  Any tests that were considered but not ordered: None considered at present     Decision rules/scores evaluated (example [id number], [place], etc): Considered at present     Discussed with: Patient, deepa RATLIFF and Dr. Rodriguez     Care Planning  Shared decision making: Patient is agreeable to ongoing work-up and treatment  Code status and discussions: No CPR with limited support to include no intubation and no  cardioversion.  Patient's niece Zamzam Marie is the power of .    Disposition  Social Determinants of Health that impact treatment or disposition: Home health  I expect the patient to be discharged to home with home health in 3-4 days.     Electronically signed by LARRY John, 05/16/23, 12:43 CDT.\    This visit was performed by both a physician and an APC. I personally evaluated and examined the patient. I performed all aspects of the MDM as documented.    Discussed with his nurse, Aileen.     Seen and discussed with his niece, Bam.     His wife and other niece were both here earlier today.    He says he's feeling better. He was up and walking earlier today and spent considerable time in the chair.    His last recorded fever was yesterday afternoon at 1546.  That was just prior to the time that I had seen him.  He has not run any fever so far today.  He last had Tylenol at 1252 and his temperature just prior to receiving it was 97.9.    Still nothing has returned positive on microbiology work-up.  Continue Zosyn as hopefully we are finally starting to make some headway as he has appeared to have defervesced.    We did consult cardiology for his atrial fibrillation with rapid ventricular response.  Case discussed briefly with LARRY Gonzalez.  We agreed that we would hold off on full dose anticoagulation for now and continue dual antiplatelet therapy.  He may require this in the future.  He does remain on IV Cardizem, but rate control has been slowly improving.    Continue to try to wean oxygen as tolerated.    Electronically signed by Dillon Rodriguez DO, 5/16/2023, 16:57 CDT.

## 2023-05-16 NOTE — CASE MANAGEMENT/SOCIAL WORK
Continued Stay Note  MOIZ Menendez     Patient Name: Wyatt Curry  MRN: [id number]  Today's Date: 5/16/2023    Admit Date: 5/13/2023    Plan: Home Health   Discharge Plan       Row Name 05/16/23 1447       Plan    Plan Home Health    Plan Comments Spoke to patient and Zamzam VILLALBA re discharge planning options to continue PT/OT. Patient/Crystal agree that plan will be for patient to return home but they do want home health to continue therapy services with patient. Patient does not have preference on home health agency at this time ([facility] does not have any openings until next week). SW told patient/family that options can be discussed when discharge date is known.                      Expected Discharge Date and Time       Expected Discharge Date Expected Discharge Time    May 17, 2023               TREMAINE Howell

## 2023-05-16 NOTE — CONSULTS
[facility]ART [facility] CONSULT NOTE    Referring Provider: Dr. Rodriguez     Reason for Consultation: Atrial Fibrillation     Chief complaint:   Chief Complaint   Patient presents with    Shortness of Breath    Fatigue       Subjective .     History of present illness:  Wyatt Curry is a 89 y.o. male who has been admitted to the hospital since 5/13/23 for pneumonia with fevers and hypoxia. He has been treated with fever reducing medicines, antibiotics, breathing treatments and oxygen. His blood pressure has been borderline low. Yesterday morning patient was found to be in A-fib with poorly controlled rates. Rates got up to 150. He is asymptomatic. He was treated with IV Digoxin, IV Lasix, IV Lopressor and IV Cardizem. Patient today notes he is weak and short of breath. Denies chest pain. He denies being aware his heart is out of rhythm. Patient is followed by Dr. Moseley for coronary artery disease, ischemic cardiomyopathy, LBBB, hypertension and hyperlipidemia. Patient had a NSTEMI in April for which he had PTCA of LAD with dissection that was treated with drug eluting stent. He was also noted to have parent stents to OM and Circ. Noted to have Moderate MR and LVEF 40-45%.       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    GERD (gastroesophageal reflux disease)     Hyperlipidemia     Hypertension     Irregular heart beat     Lung nodules    ,   Past Surgical History:   Procedure Laterality Date    BLADDER SURGERY      CARDIAC CATHETERIZATION      CARDIAC CATHETERIZATION Left 4/8/2023    Procedure: Cardiac Catheterization/Vascular Study;  Surgeon: Sukhi Hartley MD;  Location: [facility] CATH INVASIVE LOCATION;  Service: Cardiology;  Laterality: Left;    COLONOSCOPY      CORONARY ANGIOPLASTY WITH STENT PLACEMENT  2006    STENT X 2    CYSTOSCOPY       ENDOSCOPY      EXCISION LESION N/A 2023    Procedure: EXCISION LESION OF RIGHT TEMPLE AND LEFT TEMPLE WITH FROZEN SECTION WITH POSSIBLE FLAP OR GRAFT;  Surgeon: Brijesh Nickerson MD;  Location:  [facility] OR;  Service: ENT;  Laterality: N/A;    FLAP HEAD/NECK Left 2020    Procedure: POSSIBLE FLAP;  Surgeon: Brijesh Nickerson MD;  Location:  [facility] OR;  Service: ENT;  Laterality: Left;    HEAD/NECK LESION/CYST EXCISION Left 2020    Procedure: 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 with frozen section and possible flap or graft;  Surgeon: Brijesh Nickerson MD;  Location:  [facility] OR;  Service: ENT;  Laterality: Left;    HERNIA REPAIR      SHOULDER SURGERY      SKIN BIOPSY      lip biopsy    SKIN FULL THICKNESS GRAFT Left 2020    Procedure: OR GRAFT;  Surgeon: Brijesh Nickerson MD;  Location:  [facility] OR;  Service: ENT;  Laterality: Left;    TRANSURETHRAL RESECTION OF BLADDER TUMOR     ,   Family History   Problem Relation Age of Onset    Tuberculosis Mother     Heart disease Father     Heart attack Father     Diabetes Sister     Hypertension Sister     Hyperlipidemia Sister     Heart disease Sister     Alcohol abuse Sister     Heart disease Brother     Heart attack Brother     Heart disease Brother    ,   Social History     Tobacco Use    Smoking status: Former     Packs/day: 1.00     Years: 30.00     Pack years: 30.00     Types: Cigarettes     Start date:      Quit date:      Years since quittin.4     Passive exposure: Past    Smokeless tobacco: Never   Vaping Use    Vaping Use: Never used   Substance Use Topics    Alcohol use: Yes     Alcohol/week: 1.0 standard drink     Types: 1 Glasses of wine per week     Comment: occ wine    Drug use: Not Currently   ,     Medications    Prior to Admission medications    Medication Sig Start Date End Date Taking? Authorizing Provider   acetaminophen (TYLENOL) 500 MG tablet Take 1 tablet by mouth Every 6 (Six)  Hours As Needed for Mild Pain.   Yes Manan Govea MD   amLODIPine (NORVASC) 10 MG tablet Take 1 tablet by mouth Daily. 10/2/17  Yes Manan Govea MD   amoxicillin (AMOXIL) 500 MG capsule Take 1 capsule by mouth 3 (Three) Times a Day. 5/11/23  Yes Manan Govea MD   aspirin 81 MG EC tablet Take 1 tablet by mouth Daily--DO NOT BREAK/CRUSH TABLET 4/11/23  Yes Neda Marin APRN   atorvastatin (LIPITOR) 40 MG tablet Take 1 tablet by mouth Daily. 4/11/23  Yes Neda Marin APRN   clopidogrel (PLAVIX) 75 MG tablet Take 1 tablet by mouth Daily. 4/18/16  Yes Manan Govea MD   irbesartan (AVAPRO) 300 MG tablet Take 1 tablet by mouth Every Night.   Yes Manan Govea MD   lansoprazole (PREVACID) 30 MG capsule Take 1 capsule by mouth Daily.   Yes Manan Govea MD   magnesium oxide (MAG-OX) 400 MG tablet Take 1 tablet by mouth Daily. 8/23/22  Yes Manan Govea MD   metoprolol succinate XL (TOPROL-XL) 25 MG 24 hr tablet Take 1 tablet by mouth Daily.  Patient taking differently: Take 1 tablet by mouth Every Night. 4/11/23  Yes Neda Marin APRN   vitamin B-12 (CYANOCOBALAMIN) 1000 MCG tablet Take 1 tablet by mouth Daily.   Yes Manan Govea MD   Vitron-C  MG tablet Take 1 tablet by mouth Daily. 4/13/23  Yes Manan Govea MD   Zinc 50 MG capsule Take 50 mg by mouth Daily.   Yes Manan Govea MD   benzonatate (TESSALON) 200 MG capsule Take 1 capsule by mouth 3 (Three) Times a Day As Needed for Cough.  Patient not taking: Reported on 5/14/2023    Manan Govea MD   nitroglycerin (NITROSTAT) 0.4 MG SL tablet Place 1 tablet under the tongue Every 5 (Five) Minutes As Needed.    Manan Govea MD       Current Facility-Administered Medications   Medication Dose Route Frequency Provider Last Rate Last Admin    acetaminophen (TYLENOL) tablet 650 mg  650 mg Oral Q6H PRN Dillon Rodriguez,    650 mg at 05/16/23 0325    amLODIPine  (NORVASC) tablet 5 mg  5 mg Oral Daily Brody Jackson MD   5 mg at 05/16/23 0840    aspirin EC tablet 81 mg  81 mg Oral Daily Brody Jackson MD   81 mg at 05/16/23 0840    atorvastatin (LIPITOR) tablet 40 mg  40 mg Oral Daily Brody Jackson MD   40 mg at 05/16/23 0840    benzonatate (TESSALON) capsule 200 mg  200 mg Oral TID PRN Brody Jackson MD        clopidogrel (PLAVIX) tablet 75 mg  75 mg Oral Daily Brody Jackson MD   75 mg at 05/16/23 0840    dilTIAZem (CARDIZEM) 125 mg in 125 mL NS infusion  5-15 mg/hr Intravenous Titrated Brody Jackson MD 5 mL/hr at 05/16/23 0350 5 mg/hr at 05/16/23 0350    guaiFENesin (MUCINEX) 12 hr tablet 1,200 mg  1,200 mg Oral Q12H Tammy Moon APRN   1,200 mg at 05/16/23 0840    ipratropium (ATROVENT) nebulizer solution 0.5 mg  0.5 mg Nebulization 4x Daily - RT Tammy Moon APRN   0.5 mg at 05/16/23 0610    ipratropium-albuterol (DUO-NEB) nebulizer solution 3 mL  3 mL Nebulization Q4H PRN Brody Jackson MD   3 mL at 05/14/23 0450    losartan (COZAAR) tablet 50 mg  50 mg Oral Q24H Brody Jackson MD   50 mg at 05/16/23 0840    magnesium oxide (MAG-OX) tablet 400 mg  400 mg Oral Daily Brody Jackson MD   400 mg at 05/16/23 0840    metoprolol succinate XL (TOPROL-XL) 24 hr tablet 25 mg  25 mg Oral Q24H Tammy Moon APRN   25 mg at 05/16/23 0840    metoprolol tartrate (LOPRESSOR) injection 5 mg  5 mg Intravenous Q6H PRN Brody Jackson MD        nitroglycerin (NITROSTAT) SL tablet 0.4 mg  0.4 mg Sublingual Q5 Min PRN Brody Jackson MD        pantoprazole (PROTONIX) EC tablet 40 mg  40 mg Oral BID AC Brody Jackson MD   40 mg at 05/16/23 0839    piperacillin-tazobactam (ZOSYN) 3.375 g in iso-osmotic dextrose 50 ml (premix)  3.375 g Intravenous Q8H Tammy Moon APRN        sodium chloride 0.9 % flush 10 mL  10 mL Intravenous PRN Brody Jackson MD        sodium chloride 0.9 % flush 10 mL  10 mL Intravenous Q12H Brody Jackson MD   10 mL at 05/16/23 0840    sodium chloride 0.9 %  flush 10 mL  10 mL Intravenous PRN Brody Jackson MD        sodium chloride 0.9 % infusion 40 mL  40 mL Intravenous PRN Brody Jackson MD        vitamin B-12 (CYANOCOBALAMIN) tablet 1,000 mcg  1,000 mcg Oral Daily Brody Jackson MD   1,000 mcg at 05/16/23 0840       Allergies:  Lyrica [pregabalin]    Review of Systems  Review of Systems   Constitutional: Positive for fever and malaise/fatigue. Negative for chills, decreased appetite, weight gain and weight loss.   HENT:  Negative for nosebleeds.    Eyes:  Negative for visual disturbance.   Cardiovascular:  Positive for dyspnea on exertion and palpitations. Negative for chest pain, leg swelling, near-syncope, orthopnea, paroxysmal nocturnal dyspnea and syncope.   Respiratory:  Positive for cough and shortness of breath. Negative for hemoptysis and snoring.    Endocrine: Negative for cold intolerance and heat intolerance.   Hematologic/Lymphatic: Negative for bleeding problem. Does not bruise/bleed easily.   Skin:  Negative for rash.   Musculoskeletal:  Negative for back pain and falls.   Gastrointestinal:  Negative for abdominal pain, constipation, diarrhea, heartburn, melena, nausea and vomiting.   Genitourinary:  Negative for hematuria.   Neurological:  Negative for dizziness, headaches and light-headedness.   Psychiatric/Behavioral:  Negative for altered mental status.    Allergic/Immunologic: Negative for persistent infections.     Objective     Physical Exam:  Patient Vitals for the past 24 hrs:   BP Temp Temp src Pulse Resp SpO2   05/16/23 0751 117/71 97.6 °F (36.4 °C) Oral 100 18 94 %   05/16/23 0619 -- -- -- 98 18 --   05/16/23 0610 -- -- -- 101 18 93 %   05/16/23 0330 96/54 97.7 °F (36.5 °C) Oral 89 18 90 %   05/16/23 0000 95/55 98 °F (36.7 °C) Oral 94 18 90 %   05/15/23 2150 94/48 98.3 °F (36.8 °C) Oral 80 18 92 %   05/15/23 2102 -- -- -- 94 18 94 %   05/15/23 2056 -- -- -- 100 18 93 %   05/15/23 1546 117/61 (!) 101.7 °F (38.7 °C) Oral 118 18 90 %    05/15/23 1433 -- -- -- (!) 122 18 90 %   05/15/23 1427 -- -- -- 107 18 (!) 88 %   05/15/23 1151 106/53 98.4 °F (36.9 °C) Oral (!) 154 18 93 %   05/15/23 1032 -- -- -- 102 18 --   05/15/23 1026 -- -- -- 109 18 92 %     Constitutional:       Appearance: Well-developed. Frail. Chronically ill-appearing.   Eyes:      Pupils: Pupils are equal, round, and reactive to light.   HENT:      Head: Normocephalic and atraumatic.   Neck:      Vascular: No carotid bruit or JVD.   Pulmonary:      Effort: Pulmonary effort is normal.      Breath sounds: Decreased breath sounds present.   Cardiovascular:      Tachycardia present. Irregular rhythm.      Murmurs: There is a systolic murmur.   Pulses:     Intact distal pulses.   Edema:     Peripheral edema absent.   Abdominal:      General: Bowel sounds are normal.      Palpations: Abdomen is soft.   Musculoskeletal: Normal range of motion.      Cervical back: Normal range of motion and neck supple. Skin:     General: Skin is warm and dry.   Neurological:      Mental Status: Alert, oriented to person, place, and time and oriented to person, place and time.      Deep Tendon Reflexes: Reflexes are normal and symmetric.   Psychiatric:         Behavior: Behavior normal.         Thought Content: Thought content normal.         Judgment: Judgment normal.       Results Review:   I reviewed the patient's new clinical results.    Lab Results (last 72 hours)       Procedure Component Value Units Date/Time    CBC (No Diff) [[id number]]  (Abnormal) Collected: 05/16/23 0445    Specimen: Blood Updated: 05/16/23 0636     WBC 8.30 10*3/mm3      RBC 3.53 10*6/mm3      Hemoglobin 10.2 g/dL      Hematocrit 32.9 %      MCV 93.2 fL      MCH 28.9 pg      MCHC 31.0 g/dL      RDW 14.9 %      RDW-SD 51.2 fl      MPV 11.1 fL      Platelets 152 10*3/mm3     Comprehensive Metabolic Panel [[id number]]  (Abnormal) Collected: 05/16/23 0445    Specimen: Blood Updated: 05/16/23 0623     Glucose 146 mg/dL      BUN 39  mg/dL      Creatinine 1.12 mg/dL      Sodium 133 mmol/L      Potassium 3.8 mmol/L      Chloride 98 mmol/L      CO2 24.0 mmol/L      Calcium 8.7 mg/dL      Total Protein 6.5 g/dL      Albumin 2.9 g/dL      ALT (SGPT) 78 U/L      AST (SGOT) 172 U/L      Alkaline Phosphatase 66 U/L      Total Bilirubin 0.4 mg/dL      Globulin 3.6 gm/dL      A/G Ratio 0.8 g/dL      BUN/Creatinine Ratio 34.8     Anion Gap 11.0 mmol/L      eGFR 61.3 mL/min/1.73     Narrative:      GFR Normal >60  Chronic Kidney Disease <60  Kidney Failure <15    The GFR formula is only valid for adults with stable renal function between ages 18 and 70.    Blood Culture - Blood, Arm, Left [[id number]]  (Normal) Collected: 05/13/23 1441    Specimen: Blood from Arm, Left Updated: 05/15/23 1515     Blood Culture No growth at 2 days    Blood Culture - Blood, Arm, Right [[id number]]  (Normal) Collected: 05/13/23 1442    Specimen: Blood from Arm, Right Updated: 05/15/23 1515     Blood Culture No growth at 2 days    Blood Culture With NINA - Blood, Arm, Left [[id number]]  (Normal) Collected: 05/13/23 0835    Specimen: Blood from Arm, Left Updated: 05/15/23 1201     Blood Culture No growth at 2 days    Blood Culture With NINA - Blood, Arm, Left [[id number]]  (Normal) Collected: 05/13/23 0834    Specimen: Blood from Arm, Left Updated: 05/15/23 1016     Blood Culture No growth at 2 days    Magnesium [[id number]]  (Normal) Collected: 05/15/23 0346    Specimen: Blood Updated: 05/15/23 0432     Magnesium 1.9 mg/dL     Basic Metabolic Panel [[id number]]  (Abnormal) Collected: 05/15/23 0346    Specimen: Blood Updated: 05/15/23 0415     Glucose 138 mg/dL      BUN 26 mg/dL      Creatinine 0.94 mg/dL      Sodium 135 mmol/L      Potassium 3.3 mmol/L      Chloride 101 mmol/L      CO2 18.0 mmol/L      Calcium 8.1 mg/dL      BUN/Creatinine Ratio 27.7     Anion Gap 16.0 mmol/L      eGFR 75.6 mL/min/1.73     Narrative:      GFR Normal >60  Chronic Kidney Disease <60  Kidney Failure  <15    The GFR formula is only valid for adults with stable renal function between ages 18 and 70.    CBC (No Diff) [[id number]]  (Abnormal) Collected: 05/15/23 0346    Specimen: Blood Updated: 05/15/23 0405     WBC 8.80 10*3/mm3      RBC 3.69 10*6/mm3      Hemoglobin 10.7 g/dL      Hematocrit 36.6 %      MCV 99.2 fL      MCH 29.0 pg      MCHC 29.2 g/dL      RDW 15.0 %      RDW-SD 55.4 fl      MPV 11.0 fL      Platelets 127 10*3/mm3     POC Glucose Once [[id number]]  (Abnormal) Collected: 05/14/23 2000    Specimen: Blood Updated: 05/14/23 2011     Glucose 154 mg/dL      Comment: : [id number] Nazia IvyMeter ID: [id number]       Urinalysis, Microscopic Only - Urine, Clean Catch [[id number]]  (Abnormal) Collected: 05/14/23 1455    Specimen: Urine, Clean Catch Updated: 05/14/23 1517     RBC, UA 0-2 /HPF      WBC, UA 0-2 /HPF      Bacteria, UA None Seen /HPF      Squamous Epithelial Cells, UA None Seen /HPF      Methodology Manual Light Microscopy    Urinalysis With Microscopic If Indicated (No Culture) - Urine, Clean Catch [[id number]]  (Abnormal) Collected: 05/14/23 1455    Specimen: Urine, Clean Catch Updated: 05/14/23 1517     Color, UA Yellow     Appearance, UA Clear     pH, UA 5.5     Specific Gravity, UA 1.019     Glucose, UA Negative     Ketones, UA 15 mg/dL (1+)     Bilirubin, UA Negative     Blood, UA Moderate (2+)     Protein,  mg/dL (2+)     Leuk Esterase, UA Negative     Nitrite, UA Negative     Urobilinogen, UA 0.2 E.U./dL    Manual Differential [[id number]]  (Abnormal) Collected: 05/14/23 0445    Specimen: Blood Updated: 05/14/23 0610     Neutrophil % 75.0 %      Lymphocyte % 6.0 %      Monocyte % 3.0 %      Bands %  16.0 %      Neutrophils Absolute 7.92 10*3/mm3      Lymphocytes Absolute 0.52 10*3/mm3      Monocytes Absolute 0.26 10*3/mm3      nRBC 1.0 /100 WBC      Poikilocytes Slight/1+     Polychromasia Slight/1+     WBC Morphology Normal     Platelet Estimate Decreased    Comprehensive  Metabolic Panel [[id number]]  (Abnormal) Collected: 05/14/23 0445    Specimen: Blood Updated: 05/14/23 0608     Glucose 110 mg/dL      BUN 19 mg/dL      Creatinine 0.78 mg/dL      Sodium 137 mmol/L      Potassium 4.1 mmol/L      Comment: Slight hemolysis detected by analyzer. Results may be affected.        Chloride 101 mmol/L      CO2 24.0 mmol/L      Calcium 8.2 mg/dL      Total Protein 6.6 g/dL      Albumin 3.3 g/dL      ALT (SGPT) 26 U/L      AST (SGOT) 56 U/L      Alkaline Phosphatase 50 U/L      Total Bilirubin 0.5 mg/dL      Globulin 3.3 gm/dL      A/G Ratio 1.0 g/dL      BUN/Creatinine Ratio 24.4     Anion Gap 12.0 mmol/L      eGFR 83.2 mL/min/1.73     Narrative:      GFR Normal >60  Chronic Kidney Disease <60  Kidney Failure <15    The GFR formula is only valid for adults with stable renal function between ages 18 and 70.    Lipid Panel [[id number]]  (Abnormal) Collected: 05/14/23 0445    Specimen: Blood Updated: 05/14/23 0608     Total Cholesterol 67 mg/dL      Triglycerides 52 mg/dL      HDL Cholesterol 28 mg/dL      LDL Cholesterol  26 mg/dL      VLDL Cholesterol 13 mg/dL      LDL/HDL Ratio 1.02    Narrative:      Cholesterol Reference Ranges  (U.S. Department of Health and Human Services ATP III Classifications)    Desirable          <200 mg/dL  Borderline High    200-239 mg/dL  High Risk          >240 mg/dL      Triglyceride Reference Ranges  (U.S. Department of Health and Human Services ATP III Classifications)    Normal           <150 mg/dL  Borderline High  150-199 mg/dL  High             200-499 mg/dL  Very High        >500 mg/dL    HDL Reference Ranges  (U.S. Department of Health and Human Services ATP III Classifications)    Low     <40 mg/dl (major risk factor for CHD)  High    >60 mg/dl ('negative' risk factor for CHD)        LDL Reference Ranges  (U.S. Department of Health and Human Services ATP III Classifications)    Optimal          <100 mg/dL  Near Optimal     100-129 mg/dL  Borderline High   130-159 mg/dL  High             160-189 mg/dL  Very High        >189 mg/dL    TSH [[id number]]  (Normal) Collected: 05/14/23 0445    Specimen: Blood Updated: 05/14/23 0608     TSH 0.809 uIU/mL     Hemoglobin A1c [[id number]]  (Normal) Collected: 05/14/23 0445    Specimen: Blood Updated: 05/14/23 0558     Hemoglobin A1C 5.50 %     Narrative:      Hemoglobin A1C Ranges:    Increased Risk for Diabetes  5.7% to 6.4%  Diabetes                     >= 6.5%  Diabetic Goal                < 7.0%    CBC Auto Differential [[id number]]  (Abnormal) Collected: 05/14/23 0445    Specimen: Blood Updated: 05/14/23 0554     WBC 8.70 10*3/mm3      RBC 3.60 10*6/mm3      Hemoglobin 10.5 g/dL      Hematocrit 33.9 %      MCV 94.2 fL      MCH 29.2 pg      MCHC 31.0 g/dL      RDW 14.6 %      RDW-SD 50.5 fl      MPV 11.3 fL      Platelets 121 10*3/mm3     Protime-INR [[id number]]  (Normal) Collected: 05/14/23 0518    Specimen: Blood Updated: 05/14/23 0551     Protime 13.9 Seconds      INR 1.06    S. Pneumo Ag Urine or CSF - Urine, Urine, Clean Catch [[id number]]  (Normal) Collected: 05/13/23 1814    Specimen: Urine, Clean Catch Updated: 05/13/23 1909     Strep Pneumo Ag Negative    Legionella Antigen, Urine - Urine, Urine, Clean Catch [[id number]]  (Normal) Collected: 05/13/23 1814    Specimen: Urine, Clean Catch Updated: 05/13/23 1909     LEGIONELLA ANTIGEN, URINE Negative    MRSA Screen, PCR (Inpatient) - Swab, Nares [[id number]]  (Normal) Collected: 05/13/23 1445    Specimen: Swab from Nares Updated: 05/13/23 1620     MRSA PCR No MRSA Detected    Narrative:      The negative predictive value of this diagnostic test is high and should only be used to consider de-escalating anti-MRSA therapy. A positive result may indicate colonization with MRSA and must be correlated clinically.    Mycoplasma Pneumoniae PCR - Swab, Larynx [[id number]] Collected: 05/13/23 1445    Specimen: Swab from Larynx Updated: 05/13/23 1459    Magnesium [[id number]]  (Abnormal)  Collected: 05/13/23 0834    Specimen: Blood Updated: 05/13/23 1422     Magnesium 1.2 mg/dL     Respiratory Panel PCR w/COVID-19(SARS-CoV-2) [facility]ANNON/[facility] In-House, NP Swab in UTM/VTM, 3-4 HR TAT - Swab, Nasopharynx [[id number]]  (Normal) Collected: 05/13/23 0848    Specimen: Swab from Nasopharynx Updated: 05/13/23 1026     ADENOVIRUS, PCR Not Detected     Coronavirus 229E Not Detected     Coronavirus HKU1 Not Detected     Coronavirus NL63 Not Detected     Coronavirus OC43 Not Detected     COVID19 Not Detected     Human Metapneumovirus Not Detected     Human Rhinovirus/Enterovirus Not Detected     Influenza A PCR Not Detected     Influenza B PCR Not Detected     Parainfluenza Virus 1 Not Detected     Parainfluenza Virus 2 Not Detected     Parainfluenza Virus 3 Not Detected     Parainfluenza Virus 4 Not Detected     RSV, PCR Not Detected     Bordetella pertussis pcr Not Detected     Bordetella parapertussis PCR Not Detected     Chlamydophila pneumoniae PCR Not Detected     Mycoplasma pneumo by PCR Not Detected    Narrative:      In the setting of a positive respiratory panel with a viral infection PLUS a negative procalcitonin without other underlying concern for bacterial infection, consider observing off antibiotics or discontinuation of antibiotics and continue supportive care. If the respiratory panel is positive for atypical bacterial infection (Bordetella pertussis, Chlamydophila pneumoniae, or Mycoplasma pneumoniae), consider antibiotic de-escalation to target atypical bacterial infection.            Lab Results   Component Value Date    ECHOEFEST 50.0 12/20/2022       Imaging Results (Last 72 Hours)       Procedure Component Value Units Date/Time    CT Angiogram Chest [[id number]] Collected: 05/13/23 1052     Updated: 05/13/23 1058    Narrative:      EXAMINATION: CT ANGIOGRAM CHEST-      5/13/2023 10:26 AM CDT     HISTORY: Recent cardiac catheterization. Shortness of breath with fever  and lower  extremity edema.     In order to have a CT radiation dose as low as reasonably achievable  Automated Exposure Control was utilized for adjustment of the mA and/or  KV according to patient size.     DLP in mGycm= 194.     CT angiogram chest with IV contrast.  CT angiography protocol.   CT imaging with bolus IV contrast injection.   Under concurrent supervision axial, sagittal, coronal,  three-dimensional, and MIP data sets were constructed on an independent  work station.     Comparison is made with 08/02/2022.     Normal heart size.  Normal size thoracic aorta.     Mildly enlarged and well opacified pulmonary arteries.  No pulmonary embolism.     Posterior right lower lobe pneumonia.     Underlying chronic interstitial disease.  No pneumothorax and no significant pleural effusion.  No sign of heart failure.     Prominent diffuse osteopenia.  Severe degenerative spine change with mild chronic anterior wedge  compression of T12.     Summary:  1. No pulmonary embolism.  2. Right lower lobe pneumonia.                                            This report was finalized on 05/13/2023 10:55 by Dr. Mac Lucio MD.          Assessment & Plan       Pneumonia of right lower lobe due to infectious organism    Benign localized prostatic hyperplasia without lower urinary tract symptoms (LUTS)    History of bladder cancer    Coronary artery disease involving native coronary artery of native heart without angina pectoris    Essential hypertension    Mixed hyperlipidemia    Non-rheumatic mitral regurgitation    Other fatigue    Mediastinal adenopathy    New onset atrial fibrillation with rapid ventricular response    Sepsis due to pneumonia    Hypoxia    Plan:  Atrial Fibrillation- new diagnosis in the setting of pneumonia. Check Echo. Heart Rates are rigoberto over night. Remains on a cardizem drip. Overall appears asymptomatic. Stop Norvasc- already given a dose this am. This will allow for PO Cardizem and titration of medications  for heart rate control. Increase Metoprolol tomorrow. [id number] 5. Patient does have advanced age but I would still consider anticoagulation at this time. Will require Plavix given recent PCI last month.     Coronary Artery disease with NSTEMI last month with intervention to LAD. Patent statents to OM and Circ noted. On Plavix, Aspirin, Toprol and Lipitor. No angina.     Pneumonia- primary reason for admission. Antibiotics per treatment team     Mitral Regurgitation- noted to be moderate on cath. No echo done at that time. Will check echo     Hypoxia- on oxygen therapy. Reportedly plans to wean off.     Ischemic Cardiomyopathy -- LVEF 40-45% on last check. Low Salt. On Tporol and Losartan.       Further orders per Dr. Shultz     Thank you for asking us to follow this patient with you.       Electronically signed by LARRY Gonzalez, 05/16/23, 9:58 AM CDT.

## 2023-05-16 NOTE — THERAPY TREATMENT NOTE
Acute Care - Physical Therapy Treatment Note  [facility]     Patient Name: Wyatt Curry  : 1930  MRN: [id number]  Today's Date: 2023      Visit Dx:     ICD-10-CM ICD-9-CM   1. Pneumonia of right lower lobe due to infectious organism  J18.9 486   2. Hypoxia  R09.02 799.02   3. Other iron deficiency anemia  D50.8 280.8   4. Impaired mobility  Z74.09 799.89     Patient Active Problem List   Diagnosis    Benign localized prostatic hyperplasia without lower urinary tract symptoms (LUTS)    History of bladder cancer    Left rotator cuff tear arthropathy    Coronary artery disease involving native coronary artery of native heart without angina pectoris    Left bundle branch block    Essential hypertension    Mixed hyperlipidemia    Overweight (BMI 25.0-29.9)    Non-rheumatic mitral regurgitation    Other fatigue    Left ventricular diastolic dysfunction    History of coronary artery stent placement    Lung nodule    Mediastinal adenopathy    Cough    Abnormal positron emission tomography (PET) scan    Basal cell carcinoma    Neoplasm of uncertain behavior    Elevated troponin    Bleeding    Ischemic cardiomyopathy    Pneumonia of right lower lobe due to infectious organism    New onset atrial fibrillation with rapid ventricular response    Sepsis due to pneumonia    Hypoxia     Past Medical History:   Diagnosis Date    Arthritis     Bladder cancer     Bronchitis     CAD (coronary artery disease)     Cancer     bladder cancer    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    GERD (gastroesophageal reflux disease)     Hyperlipidemia     Hypertension     Irregular heart beat     Lung nodules      Past Surgical History:   Procedure Laterality Date    BLADDER SURGERY      CARDIAC CATHETERIZATION      CARDIAC CATHETERIZATION Left 2023    Procedure: Cardiac Catheterization/Vascular Study;  Surgeon: Sukhi Hartley MD;  Location: [facility] INVAS[facility] LOC[facility];  Service: Cardiology;  Laterality: Left;     COLONOSCOPY      CORONARY ANGIOPLASTY WITH STENT PLACEMENT  2006    STENT X 2    CYSTOSCOPY      ENDOSCOPY      EXCISION LESION N/A 4/7/2023    Procedure: EXCISION LESION OF RIGHT TEMPLE AND LEFT TEMPLE WITH FROZEN SECTION WITH POSSIBLE FLAP OR GRAFT;  Surgeon: Brijesh Nickerson MD;  Location:  [facility] OR;  Service: ENT;  Laterality: N/A;    FLAP HEAD/NECK Left 03/09/2020    Procedure: POSSIBLE FLAP;  Surgeon: Brijesh Nickerson MD;  Location:  [facility];  Service: ENT;  Laterality: Left;    HEAD/NECK LESION/CYST EXCISION Left 03/09/2020    Procedure: 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 with frozen section and possible flap or graft;  Surgeon: Brijesh Nickerson MD;  Location:  [facility];  Service: ENT;  Laterality: Left;    HERNIA REPAIR      SHOULDER SURGERY      SKIN BIOPSY      lip biopsy    SKIN FULL THICKNESS GRAFT Left 03/09/2020    Procedure: OR GRAFT;  Surgeon: Brijesh Nickerson MD;  Location:  [facility];  Service: ENT;  Laterality: Left;    TRANSURETHRAL RESECTION OF BLADDER TUMOR       PT Assessment (last 12 hours)       PT Evaluation and Treatment       Row Name 05/16/23 0930          Physical Therapy Time and Intention    Subjective Information complains of;weakness  -TB     Document Type therapy note (daily note)  -TB     Mode of Treatment physical therapy  -TB       Row Name 05/16/23 0930          General Information    Existing Precautions/Restrictions fall;oxygen therapy device and L/min  -TB       Row Name 05/16/23 0930          Pain    Pretreatment Pain Rating 0/10 - no pain  -TB     Posttreatment Pain Rating 0/10 - no pain  -TB       Row Name 05/16/23 0930          Bed Mobility    Comment, (Bed Mobility) Up in chair  -TB       Row Name 05/16/23 0930          Transfers    Transfers sit-stand transfer;stand-sit transfer  -TB       Row Name 05/16/23 0930          Sit-Stand Transfer    Sit-Stand [place] (Transfers) contact guard;verbal cues  -TB       Row  Name 05/16/23 0930          Stand-Sit Transfer    Stand-Sit [place] (Transfers) contact guard;verbal cues  -TB       Row Name 05/16/23 0930          Gait/Stairs (Locomotion)    [place] Level (Gait) contact guard;verbal cues  -TB     Distance in Feet (Gait) 30'x2, 45'x2  -TB     Pattern (Gait) step-to  -TB     Comment, (Gait/Stairs) When pt starts to fatigue he starts to walk faster and has an increased FF posture  -TB       Row Name 05/16/23 0930          Plan of Care Review    Plan of Care Reviewed With patient;family  -TB     Progress improving  -TB     Outcome Evaluation Pt up in chair agreeable to tx. Pt on 8L O2. Pt stood CGA. Amb 30'x2 w/ CGA. Cues needed for pursed lip breathing. When pt starts to fatigue he starts to lean forward more and his gait starts to increase. HR stayed under 140s. Long seated rest break. Caridology came to talk w/ pt and family. Stood again CGA and amb another 45' x2 w/ CGA. Second walk pt amb better but still needed cues to slow down and have a controlled gait pattern. O2 stayed under 140s and O2 was 88-91% on 8L. No c/o SOA but pt felt fatigued in his hips. Encouraged pt to stay up in the chair as much as possible. Will cont to work on endurance training.  -TB       Row Name 05/16/23 0930          Positioning and Restraints    Pre-Treatment Position sitting in chair/recliner  -TB     Post Treatment Position chair  -TB     In Chair reclined;sitting;with family/caregiver;legs elevated;heels elevated  -TB               User Key  (r) = Recorded By, (t) = Taken By, (c) = Cosigned By      Initials Name Provider Type    TB Clifton Chauhan, PTA Physical Therapist Assistant                    Physical Therapy Education       Title: PT OT SLP Therapies (In Progress)       Topic: Physical Therapy (In Progress)       Point: Mobility training (Done)       Learning Progress Summary             Patient Acceptance, ALICIA PADILLA DU by ALFREDITO at 5/15/2023 1102    Comment: benefits of activity,  progression of PT   Family Acceptance, ALICIA PADILLA DU by ALFREDITO at 5/15/2023 1102    Comment: benefits of activity, progression of PT                         Point: Home exercise program (Not Started)       Learner Progress:  Not documented in this visit.              Point: Body mechanics (Not Started)       Learner Progress:  Not documented in this visit.              Point: Precautions (Not Started)       Learner Progress:  Not documented in this visit.                              User Key       Initials Effective Dates Name Provider Type Discipline    ALFREDITO 02/03/23 -  Satish Hall PT DPT Physical Therapist PT                  PT Recommendation and Plan     Plan of Care Reviewed With: patient, family  Progress: improving  Outcome Evaluation: Pt up in chair agreeable to tx. Pt on 8L O2. Pt stood CGA. Amb 30'x2 w/ CGA. Cues needed for pursed lip breathing. When pt starts to fatigue he starts to lean forward more and his gait starts to increase. HR stayed under 140s. Long seated rest break. Caridology came to talk w/ pt and family. Stood again CGA and amb another 45' x2 w/ CGA. Second walk pt amb better but still needed cues to slow down and have a controlled gait pattern. O2 stayed under 140s and O2 was 88-91% on 8L. No c/o SOA but pt felt fatigued in his hips. Encouraged pt to stay up in the chair as much as possible. Will cont to work on endurance training.   Outcome Measures       Row Name 05/16/23 7436             How much help from another person do you currently need...    Turning from your back to your side while in flat bed without using bedrails? 3  -TB      Moving from lying on back to sitting on the side of a flat bed without bedrails? 3  -TB      Moving to and from a bed to a chair (including a wheelchair)? 3  -TB      Standing up from a chair using your arms (e.g., wheelchair, bedside chair)? 4  -TB      Climbing 3-5 steps with a railing? 3  -TB      To walk in hospital room? 3  -TB      AM-PAC 6 Clicks  Score (PT) 19  -TB         Functional Assessment    Outcome Measure Options AM-PAC 6 Clicks Basic Mobility (PT)  -TB                User Key  (r) = Recorded By, (t) = Taken By, (c) = Cosigned By      Initials Name Provider Type    Clifton Harrison, ALIYA Physical Therapist Assistant                     Time Calculation:    PT Charges       Row Name 05/16/23 1154             Time Calculation    Start Time 0930  -TB      Stop Time 1010  -TB      Time Calculation (min) 40 min  -TB      PT Received On 05/16/23  -TB         Time Calculation- PT    Total Timed Code Minutes- PT 40 minute(s)  -TB                User Key  (r) = Recorded By, (t) = Taken By, (c) = Cosigned By      Initials Name Provider Type    Clifton Harrison PTA Physical Therapist Assistant                  Therapy Charges for Today       Code Description Service Date Service Provider Modifiers Qty    71953541930 HC GAIT TRAINING EA 15 MIN 5/16/2023 Clifton Chauhan, ALIYA GP 2    04698413816 HC PT THERAPEUTIC ACT EA 15 MIN 5/16/2023 Clifton Chauhan PTA GP 1            PT G-Codes  Outcome Measure Options: AM-PAC 6 Clicks Basic Mobility (PT)  AM-PAC 6 Clicks Score (PT): 19  AM-PAC 6 Clicks Score (OT): 17    Clifton Chauhan PTA  5/16/2023

## 2023-05-16 NOTE — THERAPY TREATMENT NOTE
Acute Care - Physical Therapy Treatment Note  [facility]     Patient Name: Wyatt Curry  : 1930  MRN: [id number]  Today's Date: 2023      Visit Dx:     ICD-10-CM ICD-9-CM   1. Pneumonia of right lower lobe due to infectious organism  J18.9 486   2. Hypoxia  R09.02 799.02   3. Other iron deficiency anemia  D50.8 280.8   4. Impaired mobility  Z74.09 799.89     Patient Active Problem List   Diagnosis    Benign localized prostatic hyperplasia without lower urinary tract symptoms (LUTS)    History of bladder cancer    Left rotator cuff tear arthropathy    Coronary artery disease involving native coronary artery of native heart without angina pectoris    Left bundle branch block    Essential hypertension    Mixed hyperlipidemia    Overweight (BMI 25.0-29.9)    Non-rheumatic mitral regurgitation    Other fatigue    Left ventricular diastolic dysfunction    History of coronary artery stent placement    Lung nodule    Mediastinal adenopathy    Cough    Abnormal positron emission tomography (PET) scan    Basal cell carcinoma    Neoplasm of uncertain behavior    Elevated troponin    Bleeding    Ischemic cardiomyopathy    Pneumonia of right lower lobe due to infectious organism    New onset atrial fibrillation with rapid ventricular response    Sepsis due to pneumonia    Hypoxia     Past Medical History:   Diagnosis Date    Arthritis     Bladder cancer     Bronchitis     CAD (coronary artery disease)     Cancer     bladder cancer    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    GERD (gastroesophageal reflux disease)     Hyperlipidemia     Hypertension     Irregular heart beat     Lung nodules      Past Surgical History:   Procedure Laterality Date    BLADDER SURGERY      CARDIAC CATHETERIZATION      CARDIAC CATHETERIZATION Left 2023    Procedure: Cardiac Catheterization/Vascular Study;  Surgeon: Sukhi Hartley MD;  Location: [facility] INVASIVE LOCATION;  Service: Cardiology;  Laterality: Left;     COLONOSCOPY      CORONARY ANGIOPLASTY WITH STENT PLACEMENT  2006    STENT X 2    CYSTOSCOPY      ENDOSCOPY      EXCISION LESION N/A 4/7/2023    Procedure: EXCISION LESION OF RIGHT TEMPLE AND LEFT TEMPLE WITH FROZEN SECTION WITH POSSIBLE FLAP OR GRAFT;  Surgeon: Brijesh Nickerson MD;  Location:  [facility];  Service: ENT;  Laterality: N/A;    FLAP HEAD/NECK Left 03/09/2020    Procedure: POSSIBLE FLAP;  Surgeon: Brijesh Nickerson MD;  Location:  [facility];  Service: ENT;  Laterality: Left;    HEAD/NECK LESION/CYST EXCISION Left 03/09/2020    Procedure: 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 with frozen section and possible flap or graft;  Surgeon: Brijesh Nickerson MD;  Location:  [facility];  Service: ENT;  Laterality: Left;    HERNIA REPAIR      SHOULDER SURGERY      SKIN BIOPSY      lip biopsy    SKIN FULL THICKNESS GRAFT Left 03/09/2020    Procedure: OR GRAFT;  Surgeon: Brijesh Nickerson MD;  Location:  [facility];  Service: ENT;  Laterality: Left;    TRANSURETHRAL RESECTION OF BLADDER TUMOR       PT Assessment (last 12 hours)       PT Evaluation and Treatment       Row Name 05/16/23 [facility] 05/16/23 0930       Physical Therapy Time and Intention    Subjective Information no complaints  -TB complains of;weakness  -TB    Document Type therapy note (daily note)  -TB therapy note (daily note)  -TB    Mode of Treatment physical therapy  -TB physical therapy  -TB      Row Name 05/16/23 [facility] 05/16/23 0930       General Information    Existing Precautions/Restrictions fall;oxygen therapy device and L/min  8L  -TB fall;oxygen therapy device and L/min  -TB      Row Name 05/16/23 1355 05/16/23 0930       Pain    Pretreatment Pain Rating 0/10 - no pain  -TB 0/10 - no pain  -TB    Posttreatment Pain Rating 0/10 - no pain  -TB 0/10 - no pain  -TB      Row Name 05/16/23 1355 05/16/23 0930       Bed Mobility    Bed Mobility sit-supine;scooting/bridging  -TB --    Scooting/Bridging [place]  (Bed Mobility) contact guard;verbal cues  -TB --    Sit-Supine [place] (Bed Mobility) minimum assist (75% patient effort);verbal cues  -TB --    Comment, (Bed Mobility) -- Up in chair  -TB      Row Name 05/16/23 1355 05/16/23 0930       Transfers    Transfers sit-stand transfer;stand-sit transfer  -TB sit-stand transfer;stand-sit transfer  -TB      Row Name 05/16/23 1355 05/16/23 0930       Sit-Stand Transfer    Sit-Stand [place] (Transfers) standby assist  -TB contact guard;verbal cues  -TB      Row Name 05/16/23 1355 05/16/23 0930       Stand-Sit Transfer    Stand-Sit [place] (Transfers) standby assist  -TB contact guard;verbal cues  -TB      Row Name 05/16/23 [facility] 05/16/23 0930       Gait/Stairs (Locomotion)    [place] Level (Gait) contact guard;verbal cues  -TB contact guard;verbal cues  -TB    Distance in Feet (Gait) 50'x2  -TB 30'x2, 45'x2  -TB    Pattern (Gait) step-to  -TB step-to  -TB    Bilateral Gait Deviations forward flexed posture  -TB --    Comment, (Gait/Stairs) -- When pt starts to fatigue he starts to walk faster and has an increased FF posture  -TB      Row Name 05/16/23 7988          Balance    Balance Assessment sitting static balance;sitting dynamic balance  -TB     Static Sitting Balance independent  -TB     Dynamic Sitting Balance independent  -TB     Position, Sitting Balance sitting in chair  -TB       Row Name 05/16/23 6996          Motor Skills    Therapeutic Exercise --  AROM BLE x15  -TB       Row Name 05/16/23 6461          Plan of Care Review    Plan of Care Reviewed With patient;family  -TB     Progress improving  -TB     Outcome Evaluation Pt up in chair agreeable to tx. Pt on 8L O2. Pt stood CGA. Amb 30'x2 w/ CGA. Cues needed for pursed lip breathing. When pt starts to fatigue he starts to lean forward more and his gait starts to increase. HR stayed under 140s. Long seated rest break. Caridology came to talk w/ pt and family. Stood again CGA and amb another 45'  x2 w/ CGA. Second walk pt amb better but still needed cues to slow down and have a controlled gait pattern. O2 stayed under 140s and O2 was 88-91% on 8L. No c/o SOA but pt felt fatigued in his hips. Encouraged pt to stay up in the chair as much as possible. Will cont to work on endurance training.  -TB       Row Name 05/16/23 1355 05/16/23 0930       Positioning and Restraints    Pre-Treatment Position sitting in chair/recliner  -TB sitting in chair/recliner  -TB    Post Treatment Position bed  -TB chair  -TB    In Bed fowlers;call light within reach;encouraged to call for assist;with family/caregiver;side rails up x2  -TB --    In Chair -- reclined;sitting;with family/caregiver;legs elevated;heels elevated  -TB              User Key  (r) = Recorded By, (t) = Taken By, (c) = Cosigned By      Initials Name Provider Type    TB Clifton Chauhan, PTA Physical Therapist Assistant                    Physical Therapy Education       Title: PT OT SLP Therapies (In Progress)       Topic: Physical Therapy (In Progress)       Point: Mobility training (Done)       Learning Progress Summary             Patient Acceptance, ALICIA PADILLA DU by ALFREDITO at 5/15/2023 1102    Comment: benefits of activity, progression of PT   Family Acceptance, ALICIA PADILLA DU by ALFREDITO at 5/15/2023 1102    Comment: benefits of activity, progression of PT                         Point: Home exercise program (Not Started)       Learner Progress:  Not documented in this visit.              Point: Body mechanics (Not Started)       Learner Progress:  Not documented in this visit.              Point: Precautions (Not Started)       Learner Progress:  Not documented in this visit.                              User Key       Initials Effective Dates Name Provider Type Discipline    ALFREDITO 02/03/23 -  Satish Hall, PT DPT Physical Therapist PT                  PT Recommendation and Plan     Plan of Care Reviewed With: patient, family  Progress: improving  Outcome Evaluation:  Pt up in chair agreeable to tx. Pt on 8L O2. Pt stood CGA. Amb 30'x2 w/ CGA. Cues needed for pursed lip breathing. When pt starts to fatigue he starts to lean forward more and his gait starts to increase. HR stayed under 140s. Long seated rest break. Caridology came to talk w/ pt and family. Stood again CGA and amb another 45' x2 w/ CGA. Second walk pt amb better but still needed cues to slow down and have a controlled gait pattern. O2 stayed under 140s and O2 was 88-91% on 8L. No c/o SOA but pt felt fatigued in his hips. Encouraged pt to stay up in the chair as much as possible. Will cont to work on endurance training.   Outcome Measures       Row Name 05/16/23 0930             How much help from another person do you currently need...    Turning from your back to your side while in flat bed without using bedrails? 3  -TB      Moving from lying on back to sitting on the side of a flat bed without bedrails? 3  -TB      Moving to and from a bed to a chair (including a wheelchair)? 3  -TB      Standing up from a chair using your arms (e.g., wheelchair, bedside chair)? 4  -TB      Climbing 3-5 steps with a railing? 3  -TB      To walk in hospital room? 3  -TB      AM-PAC 6 Clicks Score (PT) 19  -TB         Functional Assessment    Outcome Measure Options AM-PAC 6 Clicks Basic Mobility (PT)  -TB                User Key  (r) = Recorded By, (t) = Taken By, (c) = Cosigned By      Initials Name Provider Type    Clifton Harrison, PTA Physical Therapist Assistant                     Time Calculation:    PT Charges       Row Name 05/16/23 1616 05/16/23 1154          Time Calculation    Start Time 1355  -TB 0930  -TB     Stop Time 1419  -TB 1010  -TB     Time Calculation (min) 24 min  -TB 40 min  -TB     PT Received On 05/16/23  -TB 05/16/23  -TB        Time Calculation- PT    Total Timed Code Minutes- PT 24 minute(s)  -TB 40 minute(s)  -TB               User Key  (r) = Recorded By, (t) = Taken By, (c) = Cosigned By       Initials Name Provider Type    TB Clifton Chauhan, ALIYA Physical Therapist Assistant                  Therapy Charges for Today       Code Description Service Date Service Provider Modifiers Qty    16424770957 HC GAIT TRAINING EA 15 MIN 5/16/2023 Clifton Chauhan, PTA GP 2    93952788924 HC PT THERAPEUTIC ACT EA 15 MIN 5/16/2023 Clifton Chauhan, PTA GP 1    29188678417 HC GAIT TRAINING EA 15 MIN 5/16/2023 Clifton Chauhan, PTA GP 1    10195067407 HC PT THER PROC EA 15 MIN 5/16/2023 Clifton Chauhan, PTA GP 1            PT G-Codes  Outcome Measure Options: AM-PAC 6 Clicks Basic Mobility (PT)  AM-PAC 6 Clicks Score (PT): 19  AM-PAC 6 Clicks Score (OT): 16    Clifton Chauhan PTA  5/16/2023

## 2023-05-16 NOTE — PLAN OF CARE
Goal Outcome Evaluation:  Plan of Care Reviewed With: patient, family        Progress: improving  Outcome Evaluation: Pt up in chair agreeable to tx. Pt on 8L O2. Pt stood CGA. Amb 30'x2 w/ CGA. Cues needed for pursed lip breathing. When pt starts to fatigue he starts to lean forward more and his gait starts to increase. HR stayed under 140s. Long seated rest break. Caridology came to talk w/ pt and family. Stood again CGA and amb another 45' x2 w/ CGA. Second walk pt amb better but still needed cues to slow down and have a controlled gait pattern. O2 stayed under 140s and O2 was 88-91% on 8L. No c/o SOA but pt felt fatigued in his hips. Encouraged pt to stay up in the chair as much as possible. Will cont to work on endurance training.

## 2023-05-16 NOTE — PLAN OF CARE
Goal Outcome Evaluation:  Plan of Care Reviewed With: patient        Progress: improving  Outcome Evaluation: Fxl moiblity in room > BR doorway. Uncontrolled BM. Max-DepA hygiene. Dons brief MaxA d/t catheter management. Pt with good static standing balance. Anticipate increase in steadiness and decreased fall risk if utilized RW or rollator for RBs during dynamic tasks. Per pt has these at home for wife that he could use. Pt HR in 130s this tx and minimal SOA. Multiple sit<>stands and intervals of chair<>chair t/fs x3 for increased fxl I. Pt is improving but would benefit from continued therapy. 24/7 care/HH vs sub acute rehab for d/c pending overall progress and O2 needs

## 2023-05-17 ENCOUNTER — APPOINTMENT (OUTPATIENT)
Dept: CARDIOLOGY | Facility: HOSPITAL | Age: 88
End: 2023-05-17
Payer: MEDICARE

## 2023-05-17 LAB
BH CV ECHO MEAS - AO MAX PG: 9.6 MMHG
BH CV ECHO MEAS - AO MEAN PG: 3.7 MMHG
BH CV ECHO MEAS - AO ROOT DIAM: 3.2 CM
BH CV ECHO MEAS - AO V2 MAX: 155 CM/SEC
BH CV ECHO MEAS - AO V2 VTI: 17.5 CM
BH CV ECHO MEAS - AVA(I,D): 1.94 CM2
BH CV ECHO MEAS - EDV(CUBED): 113.4 ML
BH CV ECHO MEAS - EDV(MOD-SP4): 91.4 ML
BH CV ECHO MEAS - EF(MOD-SP4): 44.4 %
BH CV ECHO MEAS - ESV(CUBED): 93.6 ML
BH CV ECHO MEAS - ESV(MOD-SP4): 50.8 ML
BH CV ECHO MEAS - FS: 6.2 %
BH CV ECHO MEAS - IVS/LVPW: 1.41 CM
BH CV ECHO MEAS - IVSD: 1.35 CM
BH CV ECHO MEAS - LA DIMENSION: 3.7 CM
BH CV ECHO MEAS - LAT PEAK E' VEL: 8.7 CM/SEC
BH CV ECHO MEAS - LV DIASTOLIC VOL/BSA (35-75): 52.5 CM2
BH CV ECHO MEAS - LV MASS(C)D: 210.1 GRAMS
BH CV ECHO MEAS - LV MAX PG: 3.1 MMHG
BH CV ECHO MEAS - LV MEAN PG: 1 MMHG
BH CV ECHO MEAS - LV SYSTOLIC VOL/BSA (12-30): 29.2 CM2
BH CV ECHO MEAS - LV V1 MAX: 87.4 CM/SEC
BH CV ECHO MEAS - LV V1 VTI: 10.8 CM
BH CV ECHO MEAS - LVIDD: 4.8 CM
BH CV ECHO MEAS - LVIDS: 4.5 CM
BH CV ECHO MEAS - LVOT AREA: 3.1 CM2
BH CV ECHO MEAS - LVOT DIAM: 2 CM
BH CV ECHO MEAS - LVPWD: 0.96 CM
BH CV ECHO MEAS - MED PEAK E' VEL: 7 CM/SEC
BH CV ECHO MEAS - MV DEC TIME: 0.25 MSEC
BH CV ECHO MEAS - MV E MAX VEL: 120.4 CM/SEC
BH CV ECHO MEAS - RAP SYSTOLE: 5 MMHG
BH CV ECHO MEAS - RVSP: 31 MMHG
BH CV ECHO MEAS - SI(MOD-SP4): 23.3 ML/M2
BH CV ECHO MEAS - SV(LVOT): 33.9 ML
BH CV ECHO MEAS - SV(MOD-SP4): 40.6 ML
BH CV ECHO MEAS - TR MAX PG: 26 MMHG
BH CV ECHO MEAS - TR MAX VEL: 255 CM/SEC
BH CV ECHO MEASUREMENTS AVERAGE E/E' RATIO: 15.34
LEFT ATRIUM VOLUME INDEX: 36.2 ML/M2
LEFT ATRIUM VOLUME: 63 ML
M PNEUMO DNA SPEC QL NAA+PROBE: NEGATIVE

## 2023-05-17 PROCEDURE — 97530 THERAPEUTIC ACTIVITIES: CPT

## 2023-05-17 PROCEDURE — 94799 UNLISTED PULMONARY SVC/PX: CPT

## 2023-05-17 PROCEDURE — 97110 THERAPEUTIC EXERCISES: CPT

## 2023-05-17 PROCEDURE — 97116 GAIT TRAINING THERAPY: CPT

## 2023-05-17 PROCEDURE — 99232 SBSQ HOSP IP/OBS MODERATE 35: CPT | Performed by: NURSE PRACTITIONER

## 2023-05-17 PROCEDURE — 25010000002 PIPERACILLIN SOD-TAZOBACTAM PER 1 G: Performed by: NURSE PRACTITIONER

## 2023-05-17 PROCEDURE — 87205 SMEAR GRAM STAIN: CPT | Performed by: FAMILY MEDICINE

## 2023-05-17 PROCEDURE — 25510000001 PERFLUTREN 6.52 MG/ML SUSPENSION: Performed by: INTERNAL MEDICINE

## 2023-05-17 PROCEDURE — 87070 CULTURE OTHR SPECIMN AEROBIC: CPT | Performed by: FAMILY MEDICINE

## 2023-05-17 PROCEDURE — 94664 DEMO&/EVAL PT USE INHALER: CPT

## 2023-05-17 PROCEDURE — 93306 TTE W/DOPPLER COMPLETE: CPT

## 2023-05-17 PROCEDURE — 25010000002 FUROSEMIDE PER 20 MG: Performed by: NURSE PRACTITIONER

## 2023-05-17 PROCEDURE — 93306 TTE W/DOPPLER COMPLETE: CPT | Performed by: EMERGENCY MEDICINE

## 2023-05-17 RX ORDER — LANOLIN ALCOHOL/MO/W.PET/CERES
6 CREAM (GRAM) TOPICAL NIGHTLY PRN
Status: DISCONTINUED | OUTPATIENT
Start: 2023-05-17 | End: 2023-05-22 | Stop reason: HOSPADM

## 2023-05-17 RX ORDER — METOPROLOL SUCCINATE 50 MG/1
50 TABLET, EXTENDED RELEASE ORAL
Status: DISCONTINUED | OUTPATIENT
Start: 2023-05-18 | End: 2023-05-22 | Stop reason: HOSPADM

## 2023-05-17 RX ORDER — TEMAZEPAM 15 MG/1
15 CAPSULE ORAL NIGHTLY PRN
Status: DISCONTINUED | OUTPATIENT
Start: 2023-05-17 | End: 2023-05-22 | Stop reason: HOSPADM

## 2023-05-17 RX ORDER — FUROSEMIDE 10 MG/ML
20 INJECTION INTRAMUSCULAR; INTRAVENOUS ONCE
Status: COMPLETED | OUTPATIENT
Start: 2023-05-17 | End: 2023-05-17

## 2023-05-17 RX ORDER — METOPROLOL SUCCINATE 100 MG/1
100 TABLET, EXTENDED RELEASE ORAL
Status: DISCONTINUED | OUTPATIENT
Start: 2023-05-18 | End: 2023-05-17

## 2023-05-17 RX ORDER — DILTIAZEM HYDROCHLORIDE 60 MG/1
60 TABLET, FILM COATED ORAL EVERY 6 HOURS SCHEDULED
Status: DISCONTINUED | OUTPATIENT
Start: 2023-05-17 | End: 2023-05-19

## 2023-05-17 RX ADMIN — APIXABAN 5 MG: 5 TABLET, FILM COATED ORAL at 15:09

## 2023-05-17 RX ADMIN — IPRATROPIUM BROMIDE 0.5 MG: 0.5 SOLUTION RESPIRATORY (INHALATION) at 09:47

## 2023-05-17 RX ADMIN — Medication 1000 MCG: at 08:11

## 2023-05-17 RX ADMIN — GUAIFENESIN 1200 MG: 600 TABLET, EXTENDED RELEASE ORAL at 20:50

## 2023-05-17 RX ADMIN — IPRATROPIUM BROMIDE AND ALBUTEROL SULFATE 3 ML: .5; 3 SOLUTION RESPIRATORY (INHALATION) at 03:39

## 2023-05-17 RX ADMIN — METOPROLOL SUCCINATE 50 MG: 50 TABLET, EXTENDED RELEASE ORAL at 09:46

## 2023-05-17 RX ADMIN — PANTOPRAZOLE SODIUM 40 MG: 40 TABLET, DELAYED RELEASE ORAL at 17:53

## 2023-05-17 RX ADMIN — LOSARTAN POTASSIUM 50 MG: 50 TABLET, FILM COATED ORAL at 08:10

## 2023-05-17 RX ADMIN — CLOPIDOGREL BISULFATE 75 MG: 75 TABLET, FILM COATED ORAL at 08:10

## 2023-05-17 RX ADMIN — GUAIFENESIN 1200 MG: 600 TABLET, EXTENDED RELEASE ORAL at 08:11

## 2023-05-17 RX ADMIN — DILTIAZEM HYDROCHLORIDE 60 MG: 60 TABLET, FILM COATED ORAL at 17:53

## 2023-05-17 RX ADMIN — APIXABAN 5 MG: 5 TABLET, FILM COATED ORAL at 20:50

## 2023-05-17 RX ADMIN — TAZOBACTAM SODIUM AND PIPERACILLIN SODIUM 3.38 G: 375; 3 INJECTION, SOLUTION INTRAVENOUS at 03:32

## 2023-05-17 RX ADMIN — TEMAZEPAM 15 MG: 15 CAPSULE ORAL at 22:11

## 2023-05-17 RX ADMIN — TAZOBACTAM SODIUM AND PIPERACILLIN SODIUM 3.38 G: 375; 3 INJECTION, SOLUTION INTRAVENOUS at 09:47

## 2023-05-17 RX ADMIN — ATORVASTATIN CALCIUM 40 MG: 40 TABLET, FILM COATED ORAL at 08:10

## 2023-05-17 RX ADMIN — ASPIRIN 81 MG: 81 TABLET, COATED ORAL at 08:10

## 2023-05-17 RX ADMIN — IPRATROPIUM BROMIDE 0.5 MG: 0.5 SOLUTION RESPIRATORY (INHALATION) at 06:14

## 2023-05-17 RX ADMIN — TAZOBACTAM SODIUM AND PIPERACILLIN SODIUM 3.38 G: 375; 3 INJECTION, SOLUTION INTRAVENOUS at 17:53

## 2023-05-17 RX ADMIN — PANTOPRAZOLE SODIUM 40 MG: 40 TABLET, DELAYED RELEASE ORAL at 08:10

## 2023-05-17 RX ADMIN — FUROSEMIDE 20 MG: 10 INJECTION, SOLUTION INTRAMUSCULAR; INTRAVENOUS at 15:09

## 2023-05-17 RX ADMIN — DILTIAZEM HYDROCHLORIDE 5 MG/HR: 5 INJECTION INTRAVENOUS at 07:18

## 2023-05-17 RX ADMIN — MAGNESIUM GLUCONATE 500 MG ORAL TABLET 400 MG: 500 TABLET ORAL at 08:10

## 2023-05-17 RX ADMIN — PERFLUTREN 9.78 MG: 6.52 INJECTION, SUSPENSION INTRAVENOUS at 15:30

## 2023-05-17 RX ADMIN — IPRATROPIUM BROMIDE 0.5 MG: 0.5 SOLUTION RESPIRATORY (INHALATION) at 14:00

## 2023-05-17 RX ADMIN — Medication 10 ML: at 20:51

## 2023-05-17 RX ADMIN — IPRATROPIUM BROMIDE 0.5 MG: 0.5 SOLUTION RESPIRATORY (INHALATION) at 19:25

## 2023-05-17 RX ADMIN — Medication 10 ML: at 08:11

## 2023-05-17 NOTE — PLAN OF CARE
Goal Outcome Evaluation:  Plan of Care Reviewed With: patient        Progress: improving  Outcome Evaluation: VSS.  AF  with BBB and PVC on tele.  Remains on 8L NC.  Patient became a bit SOB and wheezy at 0330 today; PRN breathing treatment given with good results.  No c/o pain.  Patient has not been able to sleep well again tonight and is very tired.  Up in chair at 0600.  Safety maintained. Cardizem gtt continuing at 5mg/hr.

## 2023-05-17 NOTE — PROGRESS NOTES
"    [facility] Medicine Services  INPATIENT PROGRESS NOTE    Patient Name: Wyatt Curry  Date of Admission: 5/13/2023  Today's Date: 05/17/23  Length of Stay: 4  Primary Care Physician: Everardo Jackson MD    Subjective   Chief Complaint: Shortness of breath  HPI   Mr. Curry is a 89-year-old male who presented to [facility] on 5/13 with fatigue, cough and fever up to 103 °F.  He had noticed some dyspnea with exertion and coughing more than usual.  He had also complained of fatigue in which he wanted to sleep\" all the time\" which is unusual for him.  At baseline he is very independent and is the primary caregiver for his wife who has dementia.  He saw his primary care provider 2 days prior to admission and was prescribed amoxicillin for diagnosis of acute bronchitis.  He has had no significant improvement and continued to feel unwell.  Of note, he has a history of coronary artery disease and had been off of Plavix for procedure.  Postoperatively he complained of chest pain and was found to have an elevated troponin.  He underwent cardiac catheterization on 4/8/2023 and had stent to distal left anterior ascending coronary artery.  Work-up in the ER, CT angiogram chest showed no pulmonary embolism.  Right lower lobe pneumonia.  WBC 8, lactate 1.3, procalcitonin 1.58.  Magnesium 1.2    Up in chair.  States he had a bad night as he could not sleep. He was also more short of breath.  He had a breathing treatment and had improvement.  He was on 7 L nasal cannula with oxygen saturation 95%.  I turned him down to 6 L, oxygen remained at 93-94%.  Atrial fibrillation 101-119 -continues on Cardizem at 5 mg/hour.  No further documented fevers.  Last dose of Tylenol was 9 PM last night.  Was able to ambulate in the hallway today with therapy.    Review of Systems   All pertinent negatives and positives are as above. All other systems have been reviewed and are negative unless " otherwise stated.     Objective    Temp:  [97.7 °F (36.5 °C)-99 °F (37.2 °C)] 97.7 °F (36.5 °C)  Heart Rate:  [] 101  Resp:  [18] 18  BP: (118-128)/(55-89) 120/55  Physical Exam  Vitals reviewed.   Constitutional:       General: He is not in acute distress.     Appearance: He is ill-appearing. He is not toxic-appearing.      Interventions: Nasal cannula in place.      Comments: Up in chair.  No acute distress.  On 6 L nasal cannula.  Discussed with his nurse tonio.   HENT:      Head: Normocephalic and atraumatic.      Mouth/Throat:      Mouth: Mucous membranes are moist.      Pharynx: Oropharynx is clear.   Eyes:      Extraocular Movements: Extraocular movements intact.      Conjunctiva/sclera: Conjunctivae normal.      Pupils: Pupils are equal, round, and reactive to light.   Cardiovascular:      Rate and Rhythm: Tachycardia present. Rhythm irregular.      Pulses: Normal pulses.      Comments: Atrial fibrillation 101-119  Pulmonary:      Effort: Pulmonary effort is normal. No respiratory distress.      Breath sounds: Normal breath sounds. No wheezing.   Abdominal:      General: Bowel sounds are normal. There is no distension.      Palpations: Abdomen is soft.      Tenderness: There is no abdominal tenderness.   Musculoskeletal:         General: No swelling or tenderness. Normal range of motion.      Cervical back: Normal range of motion and neck supple. No muscular tenderness.   Skin:     General: Skin is warm and dry.      Findings: No erythema or rash.   Neurological:      General: No focal deficit present.      Mental Status: He is alert and oriented to person, place, and time.      Cranial Nerves: No cranial nerve deficit.      Motor: No weakness.   Psychiatric:         Mood and Affect: Mood normal.         Behavior: Behavior normal.     Results Review:  I have reviewed the labs, radiology results, and diagnostic studies.    Laboratory Data:   Results from last 7 days   Lab Units 05/16/23  5035  05/15/23  0346 05/14/23  0445   WBC 10*3/mm3 8.30 8.80 8.70   HEMOGLOBIN g/dL 10.2* 10.7* 10.5*   HEMATOCRIT % 32.9* 36.6* 33.9*   PLATELETS 10*3/mm3 152 127* 121*     Results from last 7 days   Lab Units 05/16/23  0445 05/15/23  0346 05/14/23 0445 05/13/23  0834   SODIUM mmol/L 133* 135* 137 135*   POTASSIUM mmol/L 3.8 3.3* 4.1 3.3*   CHLORIDE mmol/L 98 101 101 98   CO2 mmol/L 24.0 18.0* 24.0 23.0   BUN mg/dL 39* 26* 19 20   CREATININE mg/dL 1.12 0.94 0.78 0.72*   CALCIUM mg/dL 8.7 8.1* 8.2 8.3   BILIRUBIN mg/dL 0.4  --  0.5 0.5   ALK PHOS U/L 66  --  50 51   ALT (SGPT) U/L 78*  --  26 21   AST (SGOT) U/L 172*  --  56* 38   GLUCOSE mg/dL 146* 138* 110* 134*     Culture Data:   Blood Culture   Date Value Ref Range Status   05/13/2023 No growth at 4 days  Preliminary   05/13/2023 No growth at 4 days  Preliminary   05/13/2023 No growth at 4 days  Preliminary   05/13/2023 No growth at 4 days  Preliminary     I have reviewed the patient's current medications.     Assessment/Plan   Assessment  Active Hospital Problems    Diagnosis    • **Pneumonia of right lower lobe due to infectious organism    • New onset atrial fibrillation with rapid ventricular response    • Sepsis due to pneumonia    • Hypoxia    • Mediastinal adenopathy    • Other fatigue    • Non-rheumatic mitral regurgitation    • Coronary artery disease involving native coronary artery of native heart without angina pectoris    • Essential hypertension    • Mixed hyperlipidemia    • History of bladder cancer    • Benign localized prostatic hyperplasia without lower urinary tract symptoms (LUTS)      Treatment Plan  Pneumonia right lower lobe.  Respiratory panel, PCR negative.  Strep pneumo and Legionella urinary antigens negative.  MRSA screen negative, vancomycin discontinued 5/14.  Blood culture with no growth to date.  Follow respiratory culture.  Continue Zosyn.  Consider repeat chest imaging.    Hypoxic, requiring up to 9-8 L nasal cannula. Now on 6 L.  Continue Atrovent.  Mucinex, incentive spirometer.  Wean oxygen as tolerated.    New onset atrial fibrillation with rapid ventricular response in the setting of hypoxia and pneumonia.  Had recent cath with EF 40-45%.  Moderate mitral regurgitation.  On dual antiplatelet therapy for history of coronary artery disease with recent stenting.  [id number]. No anticoagulation as patient is on dual antiplatelet therapy.  Continue efforts at rate control.  Currently on Cardizem 5 mg/hour.  Atrial fibrillation 101-119.  Continue Toprol-XL 50 mg.  Cardiology following, planning for repeat echocardiogram today.    History of coronary artery disease with recent stenting to LAD on 4/8.  No chest pain.  Continue aspirin, Plavix, statin, beta-blocker, calcium channel blocker and ARB.    PT/OT.    SCDs for DVT prophylaxis.    Medical Decision Making  Number and Complexity of problems: 3 acute problems in the form of pneumonia right lower lobe, hypoxia, new onset atrial fibrillation with rapid ventricular response  Differential Diagnosis: None considered at present    Conditions and Status        Condition is unchanged.     [facility] Data  External documents reviewed: Prior [facility] records  Cardiac tracing (EKG, telemetry) interpretation: Atrial fibrillation  Radiology interpretation: Interpreted by radiology  Labs reviewed: As above  Any tests that were considered but not ordered: None considered at present     Decision rules/scores evaluated (example [id number], [place], etc): [id number].     Discussed with: Mabel, tonio RATLIFF and Dr. Rodriguez     Care Planning  Shared decision making: Patient is agreeable to ongoing work-up and treatment  Code status and discussions: No CPR with limited support to include no intubation and no cardioversion.  Patient's niece Zamzam Marie is the power of .    Disposition  Social Determinants of Health that impact treatment or disposition: Home health  I expect the patient to be discharged  to home with home health in 3-4 days.     Electronically signed by LARRY John, 05/17/23, 11:16 CDT.    This visit was performed by both a physician and an APC. I personally evaluated and examined the patient. I performed all aspects of the MDM as documented.    Discussed with his nurse, Shirley.    His wife is visiting with their caregiver and his wife is currently laying in bed with him.  Their caregiver contacted their great-niece, Margo, by phone and we discussed the case.    Cardiology had stopped his aspirin and started him on Eliquis in conjunction with Plavix.  His Toprol-XL was also increased.  Currently heart rate in the 80s.  dilTIAZem, 5-15 mg/hr, Last Rate: 5 mg/hr (05/17/23 0811)      Had been up as high as 9 L high flow after midnight and early this morning.  Titrated down to 7 at present.    He has not run any fever since the afternoon of 5/15.  Would continue Zosyn as I feel it has been effective at treating his issue.    May wish to repeat a noncontrasted CT of the chest tomorrow morning to reassess his pneumonia and make sure that a parapneumonic effusion has not developed.    The patient informed me this afternoon that he knows my father-in-law.    Electronically signed by Dillon Rodriguez DO, 5/17/2023, 16:46 CDT.

## 2023-05-17 NOTE — THERAPY TREATMENT NOTE
Acute Care - Physical Therapy Treatment Note  [facility]     Patient Name: Wyatt Curry  : 1930  MRN: [id number]  Today's Date: 2023      Visit Dx:     ICD-10-CM ICD-9-CM   1. Pneumonia of right lower lobe due to infectious organism  J18.9 486   2. Hypoxia  R09.02 799.02   3. Other iron deficiency anemia  D50.8 280.8   4. Impaired mobility  Z74.09 799.89     Patient Active Problem List   Diagnosis    Benign localized prostatic hyperplasia without lower urinary tract symptoms (LUTS)    History of bladder cancer    Left rotator cuff tear arthropathy    Coronary artery disease involving native coronary artery of native heart without angina pectoris    Left bundle branch block    Essential hypertension    Mixed hyperlipidemia    Overweight (BMI 25.0-29.9)    Non-rheumatic mitral regurgitation    Other fatigue    Left ventricular diastolic dysfunction    History of coronary artery stent placement    Lung nodule    Mediastinal adenopathy    Cough    Abnormal positron emission tomography (PET) scan    Basal cell carcinoma    Neoplasm of uncertain behavior    Elevated troponin    Bleeding    Ischemic cardiomyopathy    Pneumonia of right lower lobe due to infectious organism    New onset atrial fibrillation with rapid ventricular response    Sepsis due to pneumonia    Hypoxia     Past Medical History:   Diagnosis Date    Arthritis     Bladder cancer     Bronchitis     CAD (coronary artery disease)     Cancer     bladder cancer    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    GERD (gastroesophageal reflux disease)     Hyperlipidemia     Hypertension     Irregular heart beat     Lung nodules      Past Surgical History:   Procedure Laterality Date    BLADDER SURGERY      CARDIAC CATHETERIZATION      CARDIAC CATHETERIZATION Left 2023    Procedure: Cardiac Catheterization/Vascular Study;  Surgeon: Sukhi Hartley MD;  Location: [facility] INVASIVE LOCATION;  Service: Cardiology;  Laterality: Left;     COLONOSCOPY      CORONARY ANGIOPLASTY WITH STENT PLACEMENT  2006    STENT X 2    CYSTOSCOPY      ENDOSCOPY      EXCISION LESION N/A 4/7/2023    Procedure: EXCISION LESION OF RIGHT TEMPLE AND LEFT TEMPLE WITH FROZEN SECTION WITH POSSIBLE FLAP OR GRAFT;  Surgeon: Brijesh Nickerson MD;  Location:  [facility] OR;  Service: ENT;  Laterality: N/A;    FLAP HEAD/NECK Left 03/09/2020    Procedure: POSSIBLE FLAP;  Surgeon: Brijesh Nickerson MD;  Location:  [facility] OR;  Service: ENT;  Laterality: Left;    HEAD/NECK LESION/CYST EXCISION Left 03/09/2020    Procedure: 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 with frozen section and possible flap or graft;  Surgeon: Brijesh Nickerson MD;  Location:  [facility] OR;  Service: ENT;  Laterality: Left;    HERNIA REPAIR      SHOULDER SURGERY      SKIN BIOPSY      lip biopsy    SKIN FULL THICKNESS GRAFT Left 03/09/2020    Procedure: OR GRAFT;  Surgeon: Brijesh Nickerson MD;  Location:  [facility] OR;  Service: ENT;  Laterality: Left;    TRANSURETHRAL RESECTION OF BLADDER TUMOR       PT Assessment (last 12 hours)       PT Evaluation and Treatment       Row Name 05/17/23 1043          Physical Therapy Time and Intention    Subjective Information no complaints  -NW     Document Type therapy note (daily note)  -NW     Mode of Treatment physical therapy  -NW       Row Name 05/17/23 1043          General Information    Existing Precautions/Restrictions fall;oxygen therapy device and L/min  6L hi flow amb on 8L  -NW       Row Name 05/17/23 1043          Pain    Pretreatment Pain Rating 0/10 - no pain  -NW     Posttreatment Pain Rating 0/10 - no pain  -NW       Row Name 05/17/23 1043          Bed Mobility    Comment, (Bed Mobility) chair  -NW       Row Name 05/17/23 1043          Transfers    Transfers sit-stand transfer;stand-sit transfer  -NW       Row Name 05/17/23 1043          Sit-Stand Transfer    Sit-Stand [place] (Transfers) standby assist;contact guard  -NW        Row Name 05/17/23 1043          Stand-Sit Transfer    Stand-Sit [place] (Transfers) standby assist  -NW       Row Name 05/17/23 1043          Gait/Stairs (Locomotion)    [place] Level (Gait) contact guard;verbal cues  -NW     Assistive Device (Gait) walker, front-wheeled  started out w/ HH transitioned to rwx  -NW     Distance in Feet (Gait) 15' +50'  50x3  -NW     Pattern (Gait) step-to  -NW     Bilateral Gait Deviations forward flexed posture  -NW     Comment, (Gait/Stairs) started off amb w/ HH, discussed cane vs rwx and pt agree to try rwx. blance seemed much steadier w/ rwx  -NW       Row Name 05/17/23 1043          Safety Issues, Functional Mobility    Impairments Affecting Function (Mobility) balance;endurance/activity tolerance  -NW       Row Name 05/17/23 1043          Motor Skills    Therapeutic Exercise aerobic  -NW       Row Name 05/17/23 1043          Aerobic Exercise    Time Performed (Aerobic Exercise) AROM BLEs x15  -NW       Row Name 05/17/23 1043          Vital Signs    Pre SpO2 (%) 92  -NW     O2 Delivery Pre Treatment hi-flow  -NW     O2 Delivery Intra Treatment hi-flow  -NW     Post SpO2 (%) 92  -NW     O2 Delivery Post Treatment hi-flow  -NW       Row Name 05/17/23 1043          Positioning and Restraints    Pre-Treatment Position sitting in chair/recliner  -NW     Post Treatment Position chair  -NW     In Bed sitting;call light within reach;encouraged to call for assist;with family/caregiver  -NW               User Key  (r) = Recorded By, (t) = Taken By, (c) = Cosigned By      Initials Name Provider Type    Fadumo Moreno, PTA Physical Therapist Assistant                    Physical Therapy Education       Title: PT OT SLP Therapies (In Progress)       Topic: Physical Therapy (In Progress)       Point: Mobility training (Done)       Learning Progress Summary             Patient Acceptance, E, ALICIA,GONZALO by ALFREDITO at 5/15/2023 1102    Comment: benefits of activity, progression of PT    Family Acceptance, E, GONZALO VARGAS by ALFREDITO at 5/15/2023 1102    Comment: benefits of activity, progression of PT                         Point: Home exercise program (Not Started)       Learner Progress:  Not documented in this visit.              Point: Body mechanics (Not Started)       Learner Progress:  Not documented in this visit.              Point: Precautions (Not Started)       Learner Progress:  Not documented in this visit.                              User Key       Initials Effective Dates Name Provider Type Discipline    ALFREDITO 02/03/23 -  Satish Hall, PT DPT Physical Therapist PT                  PT Recommendation and Plan     Plan of Care Reviewed With: patient  Progress: improving  Outcome Evaluation: Pt up in chair. Has no c/o pt on 6L hi joan o2 92% sit-stand cga. Pt began amb HH discussed safety concerns and using cane or rwx and pt agreed. transitioned to rwx pt was able to amb 50'at a time before needing to take a standing rest due to fatigue. Pt amb on 8L hi flow when returned to room o2 was 92%. Pt tolerated AROM strengthening ex's. Pt plans to d/c home w/ HH when medically stable   Outcome Measures       Row Name 05/16/23 0930             How much help from another person do you currently need...    Turning from your back to your side while in flat bed without using bedrails? 3  -TB      Moving from lying on back to sitting on the side of a flat bed without bedrails? 3  -TB      Moving to and from a bed to a chair (including a wheelchair)? 3  -TB      Standing up from a chair using your arms (e.g., wheelchair, bedside chair)? 4  -TB      Climbing 3-5 steps with a railing? 3  -TB      To walk in hospital room? 3  -TB      AM-PAC 6 Clicks Score (PT) 19  -TB         Functional Assessment    Outcome Measure Options AM-PAC 6 Clicks Basic Mobility (PT)  -TB                User Key  (r) = Recorded By, (t) = Taken By, (c) = Cosigned By      Initials Name Provider Type    TB Clifton Chauhan, PTA  Physical Therapist Assistant                     Time Calculation:    PT Charges       Row Name 05/17/23 1117             Time Calculation    Start Time 1043  -NW      Stop Time 1121  -NW      Time Calculation (min) 38 min  -NW      PT Received On 05/17/23  -NW      PT Goal Re-Cert Due Date 05/25/23  -NW         Time Calculation- PT    Total Timed Code Minutes- PT 38 minute(s)  -NW         Timed Charges    09927 - PT Therapeutic Exercise Minutes 15  -NW      17409 - Gait Training Minutes  15  -NW      17651 - PT Therapeutic Activity Minutes 8  -NW         Total Minutes    Timed Charges Total Minutes 38  -NW       Total Minutes 38  -NW                User Key  (r) = Recorded By, (t) = Taken By, (c) = Cosigned By      Initials Name Provider Type    NW Fadumo Manzanares PTA Physical Therapist Assistant                  Therapy Charges for Today       Code Description Service Date Service Provider Modifiers Qty    50605840756 HC PT THER PROC EA 15 MIN 5/17/2023 Fadumo Manzanares, PTA GP 1    49672351263 HC GAIT TRAINING EA 15 MIN 5/17/2023 Fadumo Manzanares, PTA GP 1    17985172813 HC PT THERAPEUTIC ACT EA 15 MIN 5/17/2023 Fadumo Manzanares, PTA GP 1            PT G-Codes  Outcome Measure Options: AM-PAC 6 Clicks Basic Mobility (PT)  AM-PAC 6 Clicks Score (PT): 20  AM-PAC 6 Clicks Score (OT): 16    Fadumo Manzanares PTA  5/17/2023

## 2023-05-17 NOTE — PROGRESS NOTES
[facility] -  Progress Note     LOS: 4 days   Patient Care Team:  Everardo Jackson MD as PCP - General (Family Medicine)  Justin Howard MD as Consulting Physician (Urology)  Emigdio Moseley MD as Cardiologist (Cardiology)  Everardo Noriega MD as Consulting Physician (Pulmonary Disease)  Brijesh Nickerson MD as Consulting Physician (Otolaryngology)  Kaitlin Del Angel PA as Referring Physician (Physician Assistant)    Chief Complaint: Shortness of Breath. Pneumonia. New onset A-fib.     Subjective     Interval History:   Increase oxygen demand. Shortness of breath. Weakness. Notes heart racing at times.    Review of Systems:     Review of Systems   Constitutional:  Positive for fatigue.   Respiratory:  Positive for cough and shortness of breath.    Cardiovascular:  Positive for palpitations and leg swelling.   Objective     Vital Sign Min/Max for last 24 hours  Temp  Min: 97.4 °F (36.3 °C)  Max: 99 °F (37.2 °C)   BP  Min: 93/44  Max: 122/70   Pulse  Min: 95  Max: 116   Resp  Min: 18  Max: 18   SpO2  Min: 91 %  Max: 96 %   No data recorded   Weight  Min: 69 kg (152 lb 3 oz)  Max: 69 kg (152 lb 3 oz)         05/16/23 2045   Weight: 69 kg (152 lb 3 oz)       Telemetry: A-fib 98 currently.  mostly with rates up to 140.       Physical Exam:    Constitutional:       Appearance: Frail. Chronically ill-appearing.      Interventions: Nasal cannula in place.   Pulmonary:      Effort: Pulmonary effort is normal.      Breath sounds: Decreased breath sounds present.   Cardiovascular:      Normal rate. Irregular rhythm.   Edema:     Peripheral edema present.     Ankle: bilateral trace pitting edema of the ankle.  Abdominal:      General: Bowel sounds are normal.   Musculoskeletal: Normal range of motion. Skin:     General: Skin is warm and dry.   Neurological:      Mental Status: Alert.     Results Review:   Lab Results (last 72 hours)       Procedure Component Value Units Date/Time     Blood Culture With NINA - Blood, Arm, Left [[id number]]  (Normal) Collected: 05/13/23 0835    Specimen: Blood from Arm, Left Updated: 05/17/23 1201     Blood Culture No growth at 4 days    Respiratory Culture - Sputum, Cough [[id number]] Collected: 05/17/23 0742    Specimen: Sputum from Cough Updated: 05/17/23 1041     Gram Stain Many (4+) WBCs per low power field      Rare (1+) Epithelial cells per low power field      No organisms seen    Blood Culture With NINA - Blood, Arm, Left [[id number]]  (Normal) Collected: 05/13/23 0834    Specimen: Blood from Arm, Left Updated: 05/17/23 1015     Blood Culture No growth at 4 days    Blood Culture - Blood, Arm, Left [[id number]]  (Normal) Collected: 05/13/23 1441    Specimen: Blood from Arm, Left Updated: 05/16/23 1515     Blood Culture No growth at 3 days    Blood Culture - Blood, Arm, Right [[id number]]  (Normal) Collected: 05/13/23 1442    Specimen: Blood from Arm, Right Updated: 05/16/23 1515     Blood Culture No growth at 3 days    CBC (No Diff) [[id number]]  (Abnormal) Collected: 05/16/23 0445    Specimen: Blood Updated: 05/16/23 0636     WBC 8.30 10*3/mm3      RBC 3.53 10*6/mm3      Hemoglobin 10.2 g/dL      Hematocrit 32.9 %      MCV 93.2 fL      MCH 28.9 pg      MCHC 31.0 g/dL      RDW 14.9 %      RDW-SD 51.2 fl      MPV 11.1 fL      Platelets 152 10*3/mm3     Comprehensive Metabolic Panel [[id number]]  (Abnormal) Collected: 05/16/23 0445    Specimen: Blood Updated: 05/16/23 0623     Glucose 146 mg/dL      BUN 39 mg/dL      Creatinine 1.12 mg/dL      Sodium 133 mmol/L      Potassium 3.8 mmol/L      Chloride 98 mmol/L      CO2 24.0 mmol/L      Calcium 8.7 mg/dL      Total Protein 6.5 g/dL      Albumin 2.9 g/dL      ALT (SGPT) 78 U/L      AST (SGOT) 172 U/L      Alkaline Phosphatase 66 U/L      Total Bilirubin 0.4 mg/dL      Globulin 3.6 gm/dL      A/G Ratio 0.8 g/dL      BUN/Creatinine Ratio 34.8     Anion Gap 11.0 mmol/L      eGFR 61.3 mL/min/1.73     Narrative:      GFR  Normal >60  Chronic Kidney Disease <60  Kidney Failure <15    The GFR formula is only valid for adults with stable renal function between ages 18 and 70.    Magnesium [[id number]]  (Normal) Collected: 05/15/23 0346    Specimen: Blood Updated: 05/15/23 0432     Magnesium 1.9 mg/dL     Basic Metabolic Panel [[id number]]  (Abnormal) Collected: 05/15/23 0346    Specimen: Blood Updated: 05/15/23 0415     Glucose 138 mg/dL      BUN 26 mg/dL      Creatinine 0.94 mg/dL      Sodium 135 mmol/L      Potassium 3.3 mmol/L      Chloride 101 mmol/L      CO2 18.0 mmol/L      Calcium 8.1 mg/dL      BUN/Creatinine Ratio 27.7     Anion Gap 16.0 mmol/L      eGFR 75.6 mL/min/1.73     Narrative:      GFR Normal >60  Chronic Kidney Disease <60  Kidney Failure <15    The GFR formula is only valid for adults with stable renal function between ages 18 and 70.    CBC (No Diff) [[id number]]  (Abnormal) Collected: 05/15/23 0346    Specimen: Blood Updated: 05/15/23 0405     WBC 8.80 10*3/mm3      RBC 3.69 10*6/mm3      Hemoglobin 10.7 g/dL      Hematocrit 36.6 %      MCV 99.2 fL      MCH 29.0 pg      MCHC 29.2 g/dL      RDW 15.0 %      RDW-SD 55.4 fl      MPV 11.0 fL      Platelets 127 10*3/mm3     POC Glucose Once [[id number]]  (Abnormal) Collected: 05/14/23 2000    Specimen: Blood Updated: 05/14/23 2011     Glucose 154 mg/dL      Comment: : [id number] Nazia IvyMeter ID: [id number]       Urinalysis, Microscopic Only - Urine, Clean Catch [[id number]]  (Abnormal) Collected: 05/14/23 1455    Specimen: Urine, Clean Catch Updated: 05/14/23 1517     RBC, UA 0-2 /HPF      WBC, UA 0-2 /HPF      Bacteria, UA None Seen /HPF      Squamous Epithelial Cells, UA None Seen /HPF      Methodology Manual Light Microscopy    Urinalysis With Microscopic If Indicated (No Culture) - Urine, Clean Catch [[id number]]  (Abnormal) Collected: 05/14/23 1455    Specimen: Urine, Clean Catch Updated: 05/14/23 1517     Color, UA Yellow     Appearance, UA Clear     pH, UA 5.5      Specific Gravity, UA 1.019     Glucose, UA Negative     Ketones, UA 15 mg/dL (1+)     Bilirubin, UA Negative     Blood, UA Moderate (2+)     Protein,  mg/dL (2+)     Leuk Esterase, UA Negative     Nitrite, UA Negative     Urobilinogen, UA 0.2 E.U./dL          Results from last 7 days   Lab Units 05/16/23  0445 05/15/23  0346 05/14/23  0445   SODIUM mmol/L 133* 135* 137   POTASSIUM mmol/L 3.8 3.3* 4.1   CHLORIDE mmol/L 98 101 101   CO2 mmol/L 24.0 18.0* 24.0   BUN mg/dL 39* 26* 19   CREATININE mg/dL 1.12 0.94 0.78   GLUCOSE mg/dL 146* 138* 110*   CALCIUM mg/dL 8.7 8.1* 8.2         Medication Review: yes  Current Facility-Administered Medications   Medication Dose Route Frequency Provider Last Rate Last Admin    acetaminophen (TYLENOL) tablet 650 mg  650 mg Oral Q6H PRN Dillon Rodriguez DO   650 mg at 05/16/23 2058    aspirin EC tablet 81 mg  81 mg Oral Daily Brody Jackson MD   81 mg at 05/17/23 0810    atorvastatin (LIPITOR) tablet 40 mg  40 mg Oral Daily Brody Jackson MD   40 mg at 05/17/23 0810    benzonatate (TESSALON) capsule 200 mg  200 mg Oral TID PRN Brody Jackson MD        clopidogrel (PLAVIX) tablet 75 mg  75 mg Oral Daily Brody Jackson MD   75 mg at 05/17/23 0810    dilTIAZem (CARDIZEM) 125 mg in 125 mL NS infusion  5-15 mg/hr Intravenous Titrated Brody Jackson MD 5 mL/hr at 05/17/23 0811 5 mg/hr at 05/17/23 0811    furosemide (LASIX) injection 20 mg  20 mg Intravenous Once Jovani Lilly APRN        guanselmoFENesin (MUCINEX) 12 hr tablet 1,200 mg  1,200 mg Oral Q12H Tammy Moon APRN   1,200 mg at 05/17/23 0811    ipratropium (ATROVENT) nebulizer solution 0.5 mg  0.5 mg Nebulization 4x Daily - RT Tammy Moon APRN   0.5 mg at 05/17/23 0947    ipratropium-albuterol (DUO-NEB) nebulizer solution 3 mL  3 mL Nebulization Q4H PRN Brody Jackson MD   3 mL at 05/17/23 0339    losartan (COZAAR) tablet 50 mg  50 mg Oral Q24H Brody Jackson MD   50 mg at 05/17/23 0810    magnesium oxide (MAG-OX)  tablet 400 mg  400 mg Oral Daily Brody Jackson MD   400 mg at 05/17/23 0810    melatonin tablet 6 mg  6 mg Oral Nightly PRN Tammy Moon APRN        [START ON 5/18/2023] metoprolol succinate XL (TOPROL-XL) 24 hr tablet 100 mg  100 mg Oral Q24H Jovani Lilly, LARRY        metoprolol tartrate (LOPRESSOR) injection 5 mg  5 mg Intravenous Q6H PRN Brody Jackson MD        nitroglycerin (NITROSTAT) SL tablet 0.4 mg  0.4 mg Sublingual Q5 Min PRN Brody Jackson MD        pantoprazole (PROTONIX) EC tablet 40 mg  40 mg Oral BID AC Brody Jackson MD   40 mg at 05/17/23 0810    piperacillin-tazobactam (ZOSYN) 3.375 g in iso-osmotic dextrose 50 ml (premix)  3.375 g Intravenous Q8H Tammy Moon APRN   3.375 g at 05/17/23 0947    sodium chloride 0.9 % flush 10 mL  10 mL Intravenous PRN Brody Jackson MD        sodium chloride 0.9 % flush 10 mL  10 mL Intravenous Q12H Brody Jackson MD   10 mL at 05/17/23 0811    sodium chloride 0.9 % flush 10 mL  10 mL Intravenous PRN Brody Jackson MD        sodium chloride 0.9 % infusion 40 mL  40 mL Intravenous PRN Brody Jackson MD        vitamin B-12 (CYANOCOBALAMIN) tablet 1,000 mcg  1,000 mcg Oral Daily Brody Jackson MD   1,000 mcg at 05/17/23 0811         Assessment & Plan       Pneumonia of right lower lobe due to infectious organism    Benign localized prostatic hyperplasia without lower urinary tract symptoms (LUTS)    History of bladder cancer    Coronary artery disease involving native coronary artery of native heart without angina pectoris    Essential hypertension    Mixed hyperlipidemia    Non-rheumatic mitral regurgitation    Other fatigue    Mediastinal adenopathy    New onset atrial fibrillation with rapid ventricular response    Sepsis due to pneumonia    Hypoxia    Plan:  Atrial Fibrillation - new diagnosis in the setting of pneumonia. When at rest heart rate is . However with movement or walking heart rate is higher. Overall tolerating well. Would recommend  starting anticoagulation. Stop Aspirin. [id number]. Remains on Cardizem drip at 5 mg. Awaiting echo. Increase Toprol dosing. Could consider repeat Digoxin last dose 5/13.     Coronary Artery Disease with NSTEMI- PCI to LAD 4/8. Patent stents to OM and Circ noted. LVEF estimated to be 45%. No angina    Ischemic CMO- LVEF 40-45% on cath. Will check echo. Appears volume up. Will give a dose of IV Lasix. Strict I&O. Daily weights. Weight is up on admission. Low Salt Diet.     Hypoxia- increase requirement.     Pneumonia- reason for admission. Treatment per primary team    Mitral MR- noted moderate on recent cath. Check Echo       Electronically signed by LARRY Gonzalez, 05/17/23, 1:48 PM CDT.

## 2023-05-17 NOTE — PLAN OF CARE
Goal Outcome Evaluation:  Plan of Care Reviewed With: patient        Progress: improving  Outcome Evaluation: Pt up in chair. Has no c/o pt on 6L hi joan o2 92% sit-stand cga. Pt began amb HH discussed safety concerns and using cane or rwx and pt agreed. transitioned to rwx pt was able to amb 50'at a time before needing to take a standing rest due to fatigue. Pt amb on 8L hi flow when returned to room o2 was 92%. Pt tolerated AROM strengthening ex's. Pt plans to d/c home w/ HH when medically stable

## 2023-05-17 NOTE — PLAN OF CARE
Goal Outcome Evaluation:  Plan of Care Reviewed With: patient, family        Progress: improving  Outcome Evaluation: Pt has been up in chair this am. Back to bed after lunch. Pt had small BM this am. Skin on coccyx wnl. B/p has been soft with manual check done. HR has maintained in 90's this pm. Afib 103-119 on tele BBB with HR up to 140 when working with therapy. reminded pt to weight shift when safety rounding done. Echo done this pm with no results as yet. Denies any pain or discomfort. Safety maintained.

## 2023-05-18 ENCOUNTER — APPOINTMENT (OUTPATIENT)
Dept: CT IMAGING | Facility: HOSPITAL | Age: 88
End: 2023-05-18
Payer: MEDICARE

## 2023-05-18 LAB
BACTERIA SPEC AEROBE CULT: NORMAL
DEPRECATED RDW RBC AUTO: 51.2 FL (ref 37–54)
ERYTHROCYTE [DISTWIDTH] IN BLOOD BY AUTOMATED COUNT: 14.9 % (ref 12.3–15.4)
HCT VFR BLD AUTO: 35.5 % (ref 37.5–51)
HGB BLD-MCNC: 10.9 G/DL (ref 13–17.7)
MCH RBC QN AUTO: 28.7 PG (ref 26.6–33)
MCHC RBC AUTO-ENTMCNC: 30.7 G/DL (ref 31.5–35.7)
MCV RBC AUTO: 93.4 FL (ref 79–97)
PLATELET # BLD AUTO: 227 10*3/MM3 (ref 140–450)
PMV BLD AUTO: 11 FL (ref 6–12)
RBC # BLD AUTO: 3.8 10*6/MM3 (ref 4.14–5.8)
WBC NRBC COR # BLD: 8.39 10*3/MM3 (ref 3.4–10.8)

## 2023-05-18 PROCEDURE — 97116 GAIT TRAINING THERAPY: CPT

## 2023-05-18 PROCEDURE — 94799 UNLISTED PULMONARY SVC/PX: CPT

## 2023-05-18 PROCEDURE — 97110 THERAPEUTIC EXERCISES: CPT

## 2023-05-18 PROCEDURE — 94664 DEMO&/EVAL PT USE INHALER: CPT

## 2023-05-18 PROCEDURE — 94761 N-INVAS EAR/PLS OXIMETRY MLT: CPT

## 2023-05-18 PROCEDURE — 71250 CT THORAX DX C-: CPT

## 2023-05-18 PROCEDURE — 97530 THERAPEUTIC ACTIVITIES: CPT

## 2023-05-18 PROCEDURE — 85027 COMPLETE CBC AUTOMATED: CPT | Performed by: NURSE PRACTITIONER

## 2023-05-18 PROCEDURE — 99232 SBSQ HOSP IP/OBS MODERATE 35: CPT | Performed by: NURSE PRACTITIONER

## 2023-05-18 PROCEDURE — 25010000002 PIPERACILLIN SOD-TAZOBACTAM PER 1 G: Performed by: NURSE PRACTITIONER

## 2023-05-18 RX ADMIN — IPRATROPIUM BROMIDE 0.5 MG: 0.5 SOLUTION RESPIRATORY (INHALATION) at 06:13

## 2023-05-18 RX ADMIN — GUAIFENESIN 1200 MG: 600 TABLET, EXTENDED RELEASE ORAL at 10:26

## 2023-05-18 RX ADMIN — IPRATROPIUM BROMIDE 0.5 MG: 0.5 SOLUTION RESPIRATORY (INHALATION) at 10:32

## 2023-05-18 RX ADMIN — TEMAZEPAM 15 MG: 15 CAPSULE ORAL at 20:24

## 2023-05-18 RX ADMIN — DILTIAZEM HYDROCHLORIDE 60 MG: 60 TABLET, FILM COATED ORAL at 12:04

## 2023-05-18 RX ADMIN — METOPROLOL SUCCINATE 50 MG: 50 TABLET, EXTENDED RELEASE ORAL at 10:26

## 2023-05-18 RX ADMIN — DILTIAZEM HYDROCHLORIDE 60 MG: 60 TABLET, FILM COATED ORAL at 00:11

## 2023-05-18 RX ADMIN — TAZOBACTAM SODIUM AND PIPERACILLIN SODIUM 3.38 G: 375; 3 INJECTION, SOLUTION INTRAVENOUS at 10:26

## 2023-05-18 RX ADMIN — LOSARTAN POTASSIUM 50 MG: 50 TABLET, FILM COATED ORAL at 10:26

## 2023-05-18 RX ADMIN — GUAIFENESIN 1200 MG: 600 TABLET, EXTENDED RELEASE ORAL at 20:24

## 2023-05-18 RX ADMIN — PANTOPRAZOLE SODIUM 40 MG: 40 TABLET, DELAYED RELEASE ORAL at 18:11

## 2023-05-18 RX ADMIN — Medication 10 ML: at 10:28

## 2023-05-18 RX ADMIN — CLOPIDOGREL BISULFATE 75 MG: 75 TABLET, FILM COATED ORAL at 10:26

## 2023-05-18 RX ADMIN — Medication 1000 MCG: at 10:26

## 2023-05-18 RX ADMIN — IPRATROPIUM BROMIDE 0.5 MG: 0.5 SOLUTION RESPIRATORY (INHALATION) at 15:18

## 2023-05-18 RX ADMIN — TAZOBACTAM SODIUM AND PIPERACILLIN SODIUM 3.38 G: 375; 3 INJECTION, SOLUTION INTRAVENOUS at 18:11

## 2023-05-18 RX ADMIN — ATORVASTATIN CALCIUM 40 MG: 40 TABLET, FILM COATED ORAL at 10:26

## 2023-05-18 RX ADMIN — DILTIAZEM HYDROCHLORIDE 60 MG: 60 TABLET, FILM COATED ORAL at 05:40

## 2023-05-18 RX ADMIN — Medication 10 ML: at 20:24

## 2023-05-18 RX ADMIN — IPRATROPIUM BROMIDE 0.5 MG: 0.5 SOLUTION RESPIRATORY (INHALATION) at 18:50

## 2023-05-18 RX ADMIN — DILTIAZEM HYDROCHLORIDE 60 MG: 60 TABLET, FILM COATED ORAL at 18:11

## 2023-05-18 RX ADMIN — TAZOBACTAM SODIUM AND PIPERACILLIN SODIUM 3.38 G: 375; 3 INJECTION, SOLUTION INTRAVENOUS at 01:50

## 2023-05-18 RX ADMIN — APIXABAN 5 MG: 5 TABLET, FILM COATED ORAL at 10:26

## 2023-05-18 RX ADMIN — MAGNESIUM GLUCONATE 500 MG ORAL TABLET 400 MG: 500 TABLET ORAL at 10:26

## 2023-05-18 NOTE — THERAPY TREATMENT NOTE
Acute Care - Physical Therapy Treatment Note  [facility]     Patient Name: Wyatt Curry  : 1930  MRN: [id number]  Today's Date: 2023      Visit Dx:     ICD-10-CM ICD-9-CM   1. Pneumonia of right lower lobe due to infectious organism  J18.9 486   2. Hypoxia  R09.02 799.02   3. Other iron deficiency anemia  D50.8 280.8   4. Impaired mobility  Z74.09 799.89     Patient Active Problem List   Diagnosis    Benign localized prostatic hyperplasia without lower urinary tract symptoms (LUTS)    History of bladder cancer    Left rotator cuff tear arthropathy    Coronary artery disease involving native coronary artery of native heart without angina pectoris    Left bundle branch block    Essential hypertension    Mixed hyperlipidemia    Overweight (BMI 25.0-29.9)    Non-rheumatic mitral regurgitation    Other fatigue    Left ventricular diastolic dysfunction    History of coronary artery stent placement    Lung nodule    Mediastinal adenopathy    Cough    Abnormal positron emission tomography (PET) scan    Basal cell carcinoma    Neoplasm of uncertain behavior    Elevated troponin    Bleeding    Ischemic cardiomyopathy    Pneumonia of right lower lobe due to infectious organism    New onset atrial fibrillation with rapid ventricular response    Sepsis due to pneumonia    Hypoxia     Past Medical History:   Diagnosis Date    Arthritis     Bladder cancer     Bronchitis     CAD (coronary artery disease)     Cancer     bladder cancer    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    GERD (gastroesophageal reflux disease)     Hyperlipidemia     Hypertension     Irregular heart beat     Lung nodules      Past Surgical History:   Procedure Laterality Date    BLADDER SURGERY      CARDIAC CATHETERIZATION      CARDIAC CATHETERIZATION Left 2023    Procedure: Cardiac Catheterization/Vascular Study;  Surgeon: Sukhi Hartley MD;  Location: [facility] INVASIVE LOCATION;  Service: Cardiology;  Laterality: Left;     COLONOSCOPY      CORONARY ANGIOPLASTY WITH STENT PLACEMENT  2006    STENT X 2    CYSTOSCOPY      ENDOSCOPY      EXCISION LESION N/A 4/7/2023    Procedure: EXCISION LESION OF RIGHT TEMPLE AND LEFT TEMPLE WITH FROZEN SECTION WITH POSSIBLE FLAP OR GRAFT;  Surgeon: Brijesh Nickerson MD;  Location:  [facility];  Service: ENT;  Laterality: N/A;    FLAP HEAD/NECK Left 03/09/2020    Procedure: POSSIBLE FLAP;  Surgeon: Brijesh Nickerson MD;  Location:  [facility];  Service: ENT;  Laterality: Left;    HEAD/NECK LESION/CYST EXCISION Left 03/09/2020    Procedure: 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 with frozen section and possible flap or graft;  Surgeon: Brijesh Nickerson MD;  Location:  [facility];  Service: ENT;  Laterality: Left;    HERNIA REPAIR      SHOULDER SURGERY      SKIN BIOPSY      lip biopsy    SKIN FULL THICKNESS GRAFT Left 03/09/2020    Procedure: OR GRAFT;  Surgeon: Brijesh Nickerson MD;  Location:  [facility];  Service: ENT;  Laterality: Left;    TRANSURETHRAL RESECTION OF BLADDER TUMOR       PT Assessment (last 12 hours)       PT Evaluation and Treatment       Row Name 05/18/23 1416 05/18/23 0948       Physical Therapy Time and Intention    Subjective Information no complaints  -NW no complaints  -NW    Document Type therapy note (daily note)  -NW therapy note (daily note)  -NW    Mode of Treatment physical therapy  -NW physical therapy  -NW      Row Name 05/18/23 1416 05/18/23 0948       General Information    Existing Precautions/Restrictions fall;oxygen therapy device and L/min  4L reg amb on 6L  -NW fall;oxygen therapy device and L/min  6L hi flow amb on 8L  -NW      Row Name 05/18/23 1416 05/18/23 0948       Pain    Pretreatment Pain Rating 0/10 - no pain  -NW 0/10 - no pain  -NW    Posttreatment Pain Rating 0/10 - no pain  -NW 0/10 - no pain  -NW      Row Name 05/18/23 1416 05/18/23 0948       Bed Mobility    Comment, (Bed Mobility) chair  -NW chair  -NW      Row  Name 05/18/23 1416 05/18/23 0948       Transfers    Transfers sit-stand transfer;stand-sit transfer  -NW sit-stand transfer;stand-sit transfer  -NW      Row Name 05/18/23 1416 05/18/23 0948       Sit-Stand Transfer    Sit-Stand Divide (Transfers) contact guard  -NW contact guard  -NW      Row Name 05/18/23 1416 05/18/23 0948       Stand-Sit Transfer    Stand-Sit Divide (Transfers) standby assist  -NW standby assist  -NW      Row Name 05/18/23 1416 05/18/23 0948       Gait/Stairs (Locomotion)    Divide Level (Gait) contact guard;verbal cues  -NW contact guard;verbal cues  -NW    Assistive Device (Gait) walker, front-wheeled  -NW walker, front-wheeled  -NW    Distance in Feet (Gait) 50  50x3  -NW 50  50x3  -NW    Pattern (Gait) step-to  -NW step-to  -NW    Bilateral Gait Deviations forward flexed posture  -NW forward flexed posture  -NW    Comment, (Gait/Stairs) mult standing rest breaks  -NW requires mult standing rest due to fatigue  -NW      Row Name 05/18/23 1416 05/18/23 0948       Safety Issues, Functional Mobility    Impairments Affecting Function (Mobility) balance;endurance/activity tolerance  -NW balance;endurance/activity tolerance  -NW      Row Name 05/18/23 1416 05/18/23 0948       Aerobic Exercise    Time Performed (Aerobic Exercise) AROM BLEs x20  -NW AROM BLEs x20  -NW      Row Name 05/18/23 1416 05/18/23 0948       Vital Signs    Pre SpO2 (%) 95  -NW 94  -NW    O2 Delivery Pre Treatment nasal cannula  -NW hi-flow  -NW    O2 Delivery Intra Treatment nasal cannula  -NW hi-flow  -NW    Post SpO2 (%) 94  -NW 92  -NW    O2 Delivery Post Treatment nasal cannula  -NW hi-flow  -NW      Row Name 05/18/23 1416 05/18/23 0948       Positioning and Restraints    Pre-Treatment Position sitting in chair/recliner  -NW sitting in chair/recliner  -NW    Post Treatment Position chair  -NW chair  -NW    In Bed sitting;call light within reach;encouraged to call for assist;with family/caregiver  -NW  sitting;call light within reach;encouraged to call for assist;with family/caregiver  -NW              User Key  (r) = Recorded By, (t) = Taken By, (c) = Cosigned By      Initials Name Provider Type    NW Fadumo Manzanares PTA Physical Therapist Assistant                    Physical Therapy Education       Title: PT OT SLP Therapies (In Progress)       Topic: Physical Therapy (In Progress)       Point: Mobility training (Done)       Learning Progress Summary             Patient Acceptance, E, VU,DU by ALFREDITO at 5/15/2023 1102    Comment: benefits of activity, progression of PT   Family Acceptance, E, VU,DU by ALFREDITO at 5/15/2023 1102    Comment: benefits of activity, progression of PT                         Point: Home exercise program (Not Started)       Learner Progress:  Not documented in this visit.              Point: Body mechanics (Not Started)       Learner Progress:  Not documented in this visit.              Point: Precautions (Not Started)       Learner Progress:  Not documented in this visit.                              User Key       Initials Effective Dates Name Provider Type Discipline    ALFREDITO 02/03/23 -  Satish Hall PT DPT Physical Therapist PT                  PT Recommendation and Plan     Plan of Care Reviewed With: patient  Progress: improving  Outcome Evaluation: Pt up in chair,just back from testing. on 6L hi flow o2 94% sit-stand cga. Pt able to amb 50' at a time w/ rwx cga requires standing rest due to fatigue. Pt tolerated AROM BLEs x15-20 reps. o2 92% after rx. Pt left sitting up in chair. Pt will need HH upon d/c and rwx   Outcome Measures       Row Name 05/16/23 4312             How much help from another person do you currently need...    Turning from your back to your side while in flat bed without using bedrails? 3  -TB      Moving from lying on back to sitting on the side of a flat bed without bedrails? 3  -TB      Moving to and from a bed to a chair (including a wheelchair)? 3  -TB       Standing up from a chair using your arms (e.g., wheelchair, bedside chair)? 4  -TB      Climbing 3-5 steps with a railing? 3  -TB      To walk in hospital room? 3  -TB      AM-PAC 6 Clicks Score (PT) 19  -TB         Functional Assessment    Outcome Measure Options AM-PAC 6 Clicks Basic Mobility (PT)  -TB                User Key  (r) = Recorded By, (t) = Taken By, (c) = Cosigned By      Initials Name Provider Type    TB Clifton Chauhan PTA Physical Therapist Assistant                     Time Calculation:    PT Charges       Row Name 05/18/23 1442 05/18/23 1015          Time Calculation    Start Time 1416  -NW 0948  -NW     Stop Time 1442  -NW 1015  -NW     Time Calculation (min) 26 min  -NW 27 min  -NW     PT Received On -- 05/18/23  -NW     PT Goal Re-Cert Due Date -- 05/25/23  -NW        Time Calculation- PT    Total Timed Code Minutes- PT 26 minute(s)  -NW 27 minute(s)  -NW        Timed Charges    20132 - PT Therapeutic Exercise Minutes 11  -NW 12  -NW     99172 - Gait Training Minutes  15  -NW 15  -NW        Total Minutes    Timed Charges Total Minutes 26  -NW 27  -NW      Total Minutes 26  -NW 27  -NW               User Key  (r) = Recorded By, (t) = Taken By, (c) = Cosigned By      Initials Name Provider Type    NW Fadumo Manzanares PTA Physical Therapist Assistant                  Therapy Charges for Today       Code Description Service Date Service Provider Modifiers Qty    35449150214 HC PT THER PROC EA 15 MIN 5/17/2023 Fadumo Manzanares, PTA GP 1    80782766018 HC GAIT TRAINING EA 15 MIN 5/17/2023 Fadumo Manzanares, PTA GP 1    99452483244 HC PT THERAPEUTIC ACT EA 15 MIN 5/17/2023 Fadumo Manzanares, PTA GP 1    47048877378 HC PT THER PROC EA 15 MIN 5/18/2023 Fadumo Manzanares, PTA GP 1    26180038226 HC GAIT TRAINING EA 15 MIN 5/18/2023 Fadumo Manzanares, PTA GP 1    18784346026 HC PT THER PROC EA 15 MIN 5/18/2023 Fadumo Manzanares, PTA GP 1    75702275422 HC GAIT TRAINING EA 15 MIN 5/18/2023 Leighton  Fadumo CASEY, PTA GP 1            PT G-Codes  Outcome Measure Options: AM-PAC 6 Clicks Basic Mobility (PT)  AM-PAC 6 Clicks Score (PT): 20  AM-PAC 6 Clicks Score (OT): 16    Fadumo Manzanares, PTA  5/18/2023

## 2023-05-18 NOTE — PLAN OF CARE
Goal Outcome Evaluation:  Plan of Care Reviewed With: patient        Progress: improving  Outcome Evaluation: Pt up in chair,just back from testing. on 6L hi flow o2 94% sit-stand cga. Pt able to amb 50' at a time w/ rwx cga requires standing rest due to fatigue. Pt tolerated AROM BLEs x15-20 reps. o2 92% after rx. Pt left sitting up in chair. Pt will need HH upon d/c and rwx

## 2023-05-18 NOTE — THERAPY TREATMENT NOTE
Patient Name: Wyatt Curry  : 1930    MRN: [id number]                              Today's Date: 2023       Admit Date: 2023    Visit Dx:     ICD-10-CM ICD-9-CM   1. Pneumonia of right lower lobe due to infectious organism  J18.9 486   2. Hypoxia  R09.02 799.02   3. Other iron deficiency anemia  D50.8 280.8   4. Impaired mobility  Z74.09 799.89     Patient Active Problem List   Diagnosis    Benign localized prostatic hyperplasia without lower urinary tract symptoms (LUTS)    History of bladder cancer    Left rotator cuff tear arthropathy    Coronary artery disease involving native coronary artery of native heart without angina pectoris    Left bundle branch block    Essential hypertension    Mixed hyperlipidemia    Overweight (BMI 25.0-29.9)    Non-rheumatic mitral regurgitation    Other fatigue    Left ventricular diastolic dysfunction    History of coronary artery stent placement    Lung nodule    Mediastinal adenopathy    Cough    Abnormal positron emission tomography (PET) scan    Basal cell carcinoma    Neoplasm of uncertain behavior    Elevated troponin    Bleeding    Ischemic cardiomyopathy    Pneumonia of right lower lobe due to infectious organism    New onset atrial fibrillation with rapid ventricular response    Sepsis due to pneumonia    Hypoxia     Past Medical History:   Diagnosis Date    Arthritis     Bladder cancer     Bronchitis     CAD (coronary artery disease)     Cancer     bladder cancer    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    GERD (gastroesophageal reflux disease)     Hyperlipidemia     Hypertension     Irregular heart beat     Lung nodules      Past Surgical History:   Procedure Laterality Date    BLADDER SURGERY      CARDIAC CATHETERIZATION      CARDIAC CATHETERIZATION Left 2023    Procedure: Cardiac Catheterization/Vascular Study;  Surgeon: Sukhi Hartley MD;  Location:  [facility] CATH INVASIVE LOCATION;  Service: Cardiology;  Laterality: Left;    COLONOSCOPY       CORONARY ANGIOPLASTY WITH STENT PLACEMENT  2006    STENT X 2    CYSTOSCOPY      ENDOSCOPY      EXCISION LESION N/A 4/7/2023    Procedure: EXCISION LESION OF RIGHT TEMPLE AND LEFT TEMPLE WITH FROZEN SECTION WITH POSSIBLE FLAP OR GRAFT;  Surgeon: Brijesh Nickerson MD;  Location:  [facility] OR;  Service: ENT;  Laterality: N/A;    FLAP HEAD/NECK Left 03/09/2020    Procedure: POSSIBLE FLAP;  Surgeon: Brijesh Nickerson MD;  Location:  [facility] OR;  Service: ENT;  Laterality: Left;    HEAD/NECK LESION/CYST EXCISION Left 03/09/2020    Procedure: 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 with frozen section and possible flap or graft;  Surgeon: Brijesh Nickerson MD;  Location:  [facility] OR;  Service: ENT;  Laterality: Left;    HERNIA REPAIR      SHOULDER SURGERY      SKIN BIOPSY      lip biopsy    SKIN FULL THICKNESS GRAFT Left 03/09/2020    Procedure: OR GRAFT;  Surgeon: Brijesh Nickerson MD;  Location:  [facility] OR;  Service: ENT;  Laterality: Left;    TRANSURETHRAL RESECTION OF BLADDER TUMOR        General Information       Row Name 05/18/23 1311          OT Time and Intention    Document Type therapy note (daily note)  -MT     Mode of Treatment occupational therapy  -MT       Row Name 05/18/23 1311          General Information    Patient Profile Reviewed yes  -MT     Existing Precautions/Restrictions fall;oxygen therapy device and L/min  4L O2  -MT       Row Name 05/18/23 1311          Cognition    Orientation Status (Cognition) oriented x 4  -MT       Row Name 05/18/23 1311          Safety Issues, Functional Mobility    Impairments Affecting Function (Mobility) balance;endurance/activity tolerance  -MT               User Key  (r) = Recorded By, (t) = Taken By, (c) = Cosigned By      Initials Name Provider Type    MT Janet Hernandez COTA Occupational Therapist Assistant                     Mobility/ADL's       Row Name 05/18/23 1311          Bed Mobility    Comment, (Bed Mobility) chair   -MT       Row Name 05/18/23 1311          Sit-Stand Transfer    Sit-Stand [place] (Transfers) contact guard;standby assist  -MT       Row Name 05/18/23 1311          Stand-Sit Transfer    Stand-Sit [place] (Transfers) standby assist  -MT       Row Name 05/18/23 1311          Functional Mobility    Functional Mobility- Ind. Level contact guard assist  -MT     Functional Mobility- Comment in room RW forward/backwards  -MT               User Key  (r) = Recorded By, (t) = Taken By, (c) = Cosigned By      Initials Name Provider Type    Janet Andrade COTA Occupational Therapist Assistant                   Obj/Interventions       Row Name 05/18/23 1311          Motor Skills    Therapeutic Exercise aerobic  -MT       Row Name 05/18/23 1311          Aerobic Exercise    Comment, Aerobic Exercise (Therapeutic Exercise) Performed various BUE ther ex AROM standing at RW with and without UE support focusing on deep breathing/postural correction. Pt performed x10 reps standing for total of 6 mins x3 sets with seated RB between sets. Pt also performed standing task with dynamic reaching outside of [place] simulating/for carryover to ADL/IADL tasks at home. Pt with no LOB with unilateral support. Performed sit<>stands x3 reps x3 sets. Pt needing cues for hand placement. Pt needing Minimal RBs this date on 4L O2 SATs in 90s during tx  -MT               User Key  (r) = Recorded By, (t) = Taken By, (c) = Cosigned By      Initials Name Provider Type    Janet Andrade COTA Occupational Therapist Assistant                   Goals/Plan    No documentation.                  Clinical Impression       Row Name 05/18/23 1311          Pain Assessment    Pretreatment Pain Rating 0/10 - no pain  -MT     Posttreatment Pain Rating 0/10 - no pain  -MT       Row Name 05/18/23 1311          Therapy Plan Review/Discharge Plan (OT)    Anticipated Discharge Disposition (OT) home with assist;home with home health  -MT       Row Name  05/18/23 1311          Vital Signs    Pre SpO2 (%) 98  4L  -MT     O2 Delivery Pre Treatment supplemental O2  -MT     Intra SpO2 (%) 91  -MT               User Key  (r) = Recorded By, (t) = Taken By, (c) = Cosigned By      Initials Name Provider Type    Janet Andrade COTA Occupational Therapist Assistant                   Outcome Measures       Row Name 05/18/23 1311          How much help from another is currently needed...    Putting on and taking off regular lower body clothing? 2  -MT     Bathing (including washing, rinsing, and drying) 2  -MT     Toileting (which includes using toilet bed pan or urinal) 2  -MT     Putting on and taking off regular upper body clothing 3  -MT     Taking care of personal grooming (such as brushing teeth) 3  -MT     Eating meals 4  -MT     AM-PAC 6 Clicks Score (OT) 16  -MT               User Key  (r) = Recorded By, (t) = Taken By, (c) = Cosigned By      Initials Name Provider Type    Janet Andrade COTA Occupational Therapist Assistant                    Occupational Therapy Education       Title: PT OT SLP Therapies (In Progress)       Topic: Occupational Therapy (In Progress)       Point: ADL training (Done)       Description:   Instruct learner(s) on proper safety adaptation and remediation techniques during self care or transfers.   Instruct in proper use of assistive devices.                  Learning Progress Summary             Patient Acceptance, E, VU by KAREN at 5/14/2023 4113                         Point: Home exercise program (Not Started)       Description:   Instruct learner(s) on appropriate technique for monitoring, assisting and/or progressing therapeutic exercises/activities.                  Learner Progress:  Not documented in this visit.              Point: Precautions (Done)       Description:   Instruct learner(s) on prescribed precautions during self-care and functional transfers.                  Learning Progress Summary             Patient  Acceptance, E, VU by KAREN at 5/14/2023 1325                         Point: Body mechanics (Not Started)       Description:   Instruct learner(s) on proper positioning and spine alignment during self-care, functional mobility activities and/or exercises.                  Learner Progress:  Not documented in this visit.                              User Key       Initials Effective Dates Name Provider Type Discipline    KAREN 11/10/21 -  Kassi Jennings, OTR/L, CSRS Occupational Therapist OT                  OT Recommendation and Plan     Plan of Care Review  Plan of Care Reviewed With: patient, spouse, caregiver, family  Progress: improving  Outcome Evaluation: Performed various BUE ther ex AROM standing at RW with and without UE support focusing on deep breathing/postural correction. Pt performed x10 reps standing for total of 6 mins x3 sets with seated RB between sets. Pt also performed standing task with dynamic reaching outside of [place] simulating/for carryover to ADL/IADL tasks at home. Pt with no LOB with unilateral support. Performed sit<>stands x3 reps x3 sets CGA-SBA. Pt needing cues for hand placement. Pt needing Minimal RBs this date on 4L O2 SATs in 90s during tx. Pt is improving with OT and would benefit from HH/assist.     Time Calculation:    Time Calculation- OT       Row Name 05/18/23 1442 05/18/23 1311 05/18/23 1015       Time Calculation- OT    OT Start Time -- 1311  -MT --    OT Stop Time -- 1404  -MT --    OT Time Calculation (min) -- 53 min  -MT --    Total Timed Code Minutes- OT -- 53 minute(s)  -MT --    OT Received On -- 05/18/23  -MT --       Timed Charges    69931 - OT Therapeutic Exercise Minutes -- 25  -MT --    23828 - Gait Training Minutes  15  -NW -- 15  -NW    49704 - OT Therapeutic Activity Minutes -- 28  -MT --       Total Minutes    Timed Charges Total Minutes 15  -NW 53  -MT 15  -NW     Total Minutes 15  -NW 53  -MT 15  -NW              User Key  (r) = Recorded By, (t) = Taken By, (c)  = Cosigned By      Initials Name Provider Type    NW Fadumo Manzanares, PTA Physical Therapist Assistant    Janet Andrade COTA Occupational Therapist Assistant                  Therapy Charges for Today       Code Description Service Date Service Provider Modifiers Qty    85010123452 HC OT THER PROC EA 15 MIN 5/18/2023 Janet Hernandez COTA GO 2    04892822303 HC OT THERAPEUTIC ACT EA 15 MIN 5/18/2023 Janet Hernandez COTA GO 2                 DIANA Peter  5/18/2023

## 2023-05-18 NOTE — PLAN OF CARE
Goal Outcome Evaluation:  Plan of Care Reviewed With: patient, spouse, caregiver, family        Progress: improving  Outcome Evaluation: Performed various BUE ther ex AROM standing at RW with and without UE support focusing on deep breathing/postural correction. Pt performed x10 reps standing for total of 6 mins x3 sets with seated RB between sets. Pt also performed standing task with dynamic reaching outside of [place] simulating/for carryover to ADL/IADL tasks at home. Pt with no LOB with unilateral support. Performed sit<>stands x3 reps x3 sets CGA-SBA. Pt needing cues for hand placement. Pt needing Minimal RBs this date on 4L O2 SATs in 90s during tx. Pt is improving with OT and would benefit from HH/assist.

## 2023-05-18 NOTE — PLAN OF CARE
Goal Outcome Evaluation:  Plan of Care Reviewed With: patient        Progress: improving  Outcome Evaluation: patient is improving physically, hernández is noted to still have dark urine in it, and some small clot formation. Clinicians aware at this time.progressed with RT from 8L this morning to 2L NC.

## 2023-05-18 NOTE — THERAPY TREATMENT NOTE
Acute Care - Physical Therapy Treatment Note  [facility]     Patient Name: Wyatt Curry  : 1930  MRN: [id number]  Today's Date: 2023      Visit Dx:     ICD-10-CM ICD-9-CM   1. Pneumonia of right lower lobe due to infectious organism  J18.9 486   2. Hypoxia  R09.02 799.02   3. Other iron deficiency anemia  D50.8 280.8   4. Impaired mobility  Z74.09 799.89     Patient Active Problem List   Diagnosis    Benign localized prostatic hyperplasia without lower urinary tract symptoms (LUTS)    History of bladder cancer    Left rotator cuff tear arthropathy    Coronary artery disease involving native coronary artery of native heart without angina pectoris    Left bundle branch block    Essential hypertension    Mixed hyperlipidemia    Overweight (BMI 25.0-29.9)    Non-rheumatic mitral regurgitation    Other fatigue    Left ventricular diastolic dysfunction    History of coronary artery stent placement    Lung nodule    Mediastinal adenopathy    Cough    Abnormal positron emission tomography (PET) scan    Basal cell carcinoma    Neoplasm of uncertain behavior    Elevated troponin    Bleeding    Ischemic cardiomyopathy    Pneumonia of right lower lobe due to infectious organism    New onset atrial fibrillation with rapid ventricular response    Sepsis due to pneumonia    Hypoxia     Past Medical History:   Diagnosis Date    Arthritis     Bladder cancer     Bronchitis     CAD (coronary artery disease)     Cancer     bladder cancer    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    GERD (gastroesophageal reflux disease)     Hyperlipidemia     Hypertension     Irregular heart beat     Lung nodules      Past Surgical History:   Procedure Laterality Date    BLADDER SURGERY      CARDIAC CATHETERIZATION      CARDIAC CATHETERIZATION Left 2023    Procedure: Cardiac Catheterization/Vascular Study;  Surgeon: Sukhi Hartley MD;  Location: [facility] INVASIVE LOCATION;  Service: Cardiology;  Laterality: Left;     COLONOSCOPY      CORONARY ANGIOPLASTY WITH STENT PLACEMENT  2006    STENT X 2    CYSTOSCOPY      ENDOSCOPY      EXCISION LESION N/A 4/7/2023    Procedure: EXCISION LESION OF RIGHT TEMPLE AND LEFT TEMPLE WITH FROZEN SECTION WITH POSSIBLE FLAP OR GRAFT;  Surgeon: Brijesh Nickerson MD;  Location:  [facility] OR;  Service: ENT;  Laterality: N/A;    FLAP HEAD/NECK Left 03/09/2020    Procedure: POSSIBLE FLAP;  Surgeon: Brijesh Nickerson MD;  Location:  [facility] OR;  Service: ENT;  Laterality: Left;    HEAD/NECK LESION/CYST EXCISION Left 03/09/2020    Procedure: 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 with frozen section and possible flap or graft;  Surgeon: Brijesh Nickerson MD;  Location:  [facility] OR;  Service: ENT;  Laterality: Left;    HERNIA REPAIR      SHOULDER SURGERY      SKIN BIOPSY      lip biopsy    SKIN FULL THICKNESS GRAFT Left 03/09/2020    Procedure: OR GRAFT;  Surgeon: Brijesh Nickerson MD;  Location:  [facility] OR;  Service: ENT;  Laterality: Left;    TRANSURETHRAL RESECTION OF BLADDER TUMOR       PT Assessment (last 12 hours)       PT Evaluation and Treatment       Row Name 05/18/23 0948          Physical Therapy Time and Intention    Subjective Information no complaints  -NW     Document Type therapy note (daily note)  -NW     Mode of Treatment physical therapy  -NW       Row Name 05/18/23 0948          General Information    Existing Precautions/Restrictions fall;oxygen therapy device and L/min  6L hi flow amb on 8L  -NW       Row Name 05/18/23 0948          Pain    Pretreatment Pain Rating 0/10 - no pain  -NW     Posttreatment Pain Rating 0/10 - no pain  -NW       Row Name 05/18/23 0948          Bed Mobility    Comment, (Bed Mobility) chair  -NW       Row Name 05/18/23 0948          Transfers    Transfers sit-stand transfer;stand-sit transfer  -NW       Row Name 05/18/23 0948          Sit-Stand Transfer    Sit-Stand [place] (Transfers) contact guard  -NW       Row Name  05/18/23 0948          Stand-Sit Transfer    Stand-Sit [place] (Transfers) standby assist  -NW       Row Name 05/18/23 0948          Gait/Stairs (Locomotion)    [place] Level (Gait) contact guard;verbal cues  -NW     Assistive Device (Gait) walker, front-wheeled  -NW     Distance in Feet (Gait) 50  50x3  -NW     Pattern (Gait) step-to  -NW     Bilateral Gait Deviations forward flexed posture  -NW     Comment, (Gait/Stairs) requires mult standing rest due to fatigue  -NW       Row Name 05/18/23 0948          Safety Issues, Functional Mobility    Impairments Affecting Function (Mobility) balance;endurance/activity tolerance  -NW       Row Name 05/18/23 0948          Aerobic Exercise    Time Performed (Aerobic Exercise) AROM BLEs x20  -NW       Row Name 05/18/23 0948          Vital Signs    Pre SpO2 (%) 94  -NW     O2 Delivery Pre Treatment hi-flow  -NW     O2 Delivery Intra Treatment hi-flow  -NW     Post SpO2 (%) 92  -NW     O2 Delivery Post Treatment hi-flow  -NW       Row Name 05/18/23 0948          Positioning and Restraints    Pre-Treatment Position sitting in chair/recliner  -NW     Post Treatment Position chair  -NW     In Bed sitting;call light within reach;encouraged to call for assist;with family/caregiver  -NW               User Key  (r) = Recorded By, (t) = Taken By, (c) = Cosigned By      Initials Name Provider Type    Fadumo Moreno, PTA Physical Therapist Assistant                    Physical Therapy Education       Title: PT OT SLP Therapies (In Progress)       Topic: Physical Therapy (In Progress)       Point: Mobility training (Done)       Learning Progress Summary             Patient Acceptance, ALICIA PDAILLA DU by ALFREDITO at 5/15/2023 1102    Comment: benefits of activity, progression of PT   Family Acceptance, E, GONZALO VARGAS by ALFREDITO at 5/15/2023 1102    Comment: benefits of activity, progression of PT                         Point: Home exercise program (Not Started)       Learner Progress:  Not  documented in this visit.              Point: Body mechanics (Not Started)       Learner Progress:  Not documented in this visit.              Point: Precautions (Not Started)       Learner Progress:  Not documented in this visit.                              User Key       Initials Effective Dates Name Provider Type Odilon GLASER 02/03/23 -  Satish Hall, PT DPT Physical Therapist PT                  PT Recommendation and Plan     Plan of Care Reviewed With: patient  Progress: improving  Outcome Evaluation: Pt up in chair,just back from testing. on 6L hi flow o2 94% sit-stand cga. Pt able to amb 50' at a time w/ rwx cga requires standing rest due to fatigue. Pt tolerated AROM BLEs x15-20 reps. o2 92% after rx. Pt left sitting up in chair. Pt will need HH upon d/c and rwx   Outcome Measures       Row Name 05/16/23 0930             How much help from another person do you currently need...    Turning from your back to your side while in flat bed without using bedrails? 3  -TB      Moving from lying on back to sitting on the side of a flat bed without bedrails? 3  -TB      Moving to and from a bed to a chair (including a wheelchair)? 3  -TB      Standing up from a chair using your arms (e.g., wheelchair, bedside chair)? 4  -TB      Climbing 3-5 steps with a railing? 3  -TB      To walk in hospital room? 3  -TB      AM-PAC 6 Clicks Score (PT) 19  -TB         Functional Assessment    Outcome Measure Options AM-PAC 6 Clicks Basic Mobility (PT)  -TB                User Key  (r) = Recorded By, (t) = Taken By, (c) = Cosigned By      Initials Name Provider Type    TB Clifton Chauhan, PTA Physical Therapist Assistant                     Time Calculation:    PT Charges       Row Name 05/18/23 1015             Time Calculation    Start Time 0948  -NW      Stop Time 1015  -NW      Time Calculation (min) 27 min  -NW      PT Received On 05/18/23  -NW      PT Goal Re-Cert Due Date 05/25/23  -NW         Time  Calculation- PT    Total Timed Code Minutes- PT 27 minute(s)  -NW         Timed Charges    60538 - PT Therapeutic Exercise Minutes 12  -NW      97199 - Gait Training Minutes  15  -NW         Total Minutes    Timed Charges Total Minutes 27  -NW       Total Minutes 27  -NW                User Key  (r) = Recorded By, (t) = Taken By, (c) = Cosigned By      Initials Name Provider Type    NW Fadumo Manzanares, PTA Physical Therapist Assistant                  Therapy Charges for Today       Code Description Service Date Service Provider Modifiers Qty    83734273177 HC PT THER PROC EA 15 MIN 5/17/2023 Fadumo Manzanares, PTA GP 1    09562997605 HC GAIT TRAINING EA 15 MIN 5/17/2023 Fadumo Manzanares, PTA GP 1    30445409933 HC PT THERAPEUTIC ACT EA 15 MIN 5/17/2023 Fadumo Manzanares, PTA GP 1    39796295569 HC PT THER PROC EA 15 MIN 5/18/2023 Fadumo Manzanares, PTA GP 1    60888101544 HC GAIT TRAINING EA 15 MIN 5/18/2023 Fadumo Manzanares, PTA GP 1            PT G-Codes  Outcome Measure Options: AM-PAC 6 Clicks Basic Mobility (PT)  AM-PAC 6 Clicks Score (PT): 20  AM-PAC 6 Clicks Score (OT): 16    Fadumo Manzanares PTA  5/18/2023

## 2023-05-18 NOTE — PLAN OF CARE
Goal Outcome Evaluation:              Outcome Evaluation: UP CHAIR  CONT TO MONITOR ,  NO C/O CHEST PAIN OR SOB  BAILEY TO BSD   .

## 2023-05-18 NOTE — PROGRESS NOTES
"    [facility] Medicine Services  INPATIENT PROGRESS NOTE    Patient Name: Wyatt Curry  Date of Admission: 5/13/2023  Today's Date: 05/18/23  Length of Stay: 5  Primary Care Physician: Everardo Jackson MD    Subjective   Chief Complaint: Shortness of breath  HPI   Mr. Curry is a 89-year-old male who presented to [facility] on 5/13 with fatigue, cough and fever up to 103 °F.  He had noticed some dyspnea with exertion and coughing more than usual.  He had also complained of fatigue in which he wanted to sleep\" all the time\" which is unusual for him.  At baseline he is very independent and is the primary caregiver for his wife who has dementia.  He saw his primary care provider 2 days prior to admission and was prescribed amoxicillin for diagnosis of acute bronchitis.  He has had no significant improvement and continued to feel unwell.  Of note, he has a history of coronary artery disease and had been off of Plavix for procedure.  Postoperatively he complained of chest pain and was found to have an elevated troponin.  He underwent cardiac catheterization on 4/8/2023 and had stent to distal left anterior ascending coronary artery.  Work-up in the ER, CT angiogram chest showed no pulmonary embolism.  Right lower lobe pneumonia.  WBC 8, lactate 1.3, procalcitonin 1.58.  Magnesium 1.2    Up in chair with caregiver at bedside.  States he was able to rest well last night.  Breathing improving, currently on 6 L nasal cannula with oxygen saturation 95%.  No chest pain.  Atrial fibrillation .  No further fever.  Has not received Tylenol since 5/16.  He was able to ambulate 50 feet at a time with therapy.    Review of Systems   All pertinent negatives and positives are as above. All other systems have been reviewed and are negative unless otherwise stated.     Objective    Temp:  [97.4 °F (36.3 °C)-97.9 °F (36.6 °C)] 97.7 °F (36.5 °C)  Heart Rate:  [] 69  Resp:  " [16-18] 16  BP: ()/(44-63) 118/63  Physical Exam  Vitals reviewed.   Constitutional:       General: He is not in acute distress.     Appearance: He is ill-appearing. He is not toxic-appearing.      Interventions: Nasal cannula in place.      Comments: Up in chair.  No acute distress.  On 6 L nasal cannula.  Discussed with his nurse  enrique.   HENT:      Head: Normocephalic and atraumatic.      Mouth/Throat:      Mouth: Mucous membranes are moist.      Pharynx: Oropharynx is clear.   Eyes:      Extraocular Movements: Extraocular movements intact.      Conjunctiva/sclera: Conjunctivae normal.      Pupils: Pupils are equal, round, and reactive to light.   Cardiovascular:      Rate and Rhythm: Tachycardia present. Rhythm irregular.      Pulses: Normal pulses.      Comments: Converted to normal sinus rhythm yesterday evening  Converted back to atrial fibrillation at 0537 with rates   Pulmonary:      Effort: Pulmonary effort is normal. No respiratory distress.      Breath sounds: Normal breath sounds. No wheezing.   Abdominal:      General: Bowel sounds are normal. There is no distension.      Palpations: Abdomen is soft.      Tenderness: There is no abdominal tenderness.   Genitourinary:     Comments: indwelling Osullivan catheter  Musculoskeletal:         General: No swelling or tenderness. Normal range of motion.      Cervical back: Normal range of motion and neck supple. No muscular tenderness.   Skin:     General: Skin is warm and dry.      Findings: No erythema or rash.   Neurological:      General: No focal deficit present.      Mental Status: He is alert and oriented to person, place, and time.      Cranial Nerves: No cranial nerve deficit.      Motor: No weakness.   Psychiatric:         Mood and Affect: Mood normal.         Behavior: Behavior normal.     Results Review:  I have reviewed the labs, radiology results, and diagnostic studies.    Laboratory Data:   Results from last 7 days   Lab Units  05/16/23  0445 05/15/23  0346 05/14/23 0445   WBC 10*3/mm3 8.30 8.80 8.70   HEMOGLOBIN g/dL 10.2* 10.7* 10.5*   HEMATOCRIT % 32.9* 36.6* 33.9*   PLATELETS 10*3/mm3 152 127* 121*       Results from last 7 days   Lab Units 05/16/23  0445 05/15/23  0346 05/14/23 0445 05/13/23  0834   SODIUM mmol/L 133* 135* 137 135*   POTASSIUM mmol/L 3.8 3.3* 4.1 3.3*   CHLORIDE mmol/L 98 101 101 98   CO2 mmol/L 24.0 18.0* 24.0 23.0   BUN mg/dL 39* 26* 19 20   CREATININE mg/dL 1.12 0.94 0.78 0.72*   CALCIUM mg/dL 8.7 8.1* 8.2 8.3   BILIRUBIN mg/dL 0.4  --  0.5 0.5   ALK PHOS U/L 66  --  50 51   ALT (SGPT) U/L 78*  --  26 21   AST (SGOT) U/L 172*  --  56* 38   GLUCOSE mg/dL 146* 138* 110* 134*       Culture Data:   Blood Culture   Date Value Ref Range Status   05/13/2023 No growth at 4 days  Preliminary   05/13/2023 No growth at 4 days  Preliminary   05/13/2023 No growth at 4 days  Preliminary   05/13/2023 No growth at 4 days  Preliminary     I have reviewed the patient's current medications.     Assessment/Plan   Assessment  Active Hospital Problems    Diagnosis     **Pneumonia of right lower lobe due to infectious organism     New onset atrial fibrillation with rapid ventricular response     Sepsis due to pneumonia     Hypoxia     Mediastinal adenopathy     Other fatigue     Non-rheumatic mitral regurgitation     Coronary artery disease involving native coronary artery of native heart without angina pectoris     Essential hypertension     Mixed hyperlipidemia     History of bladder cancer     Benign localized prostatic hyperplasia without lower urinary tract symptoms (LUTS)      Treatment Plan  Pneumonia right lower lobe.  Respiratory panel, PCR negative.  Strep pneumo and Legionella urinary antigens negative.  MRSA screen negative, vancomycin discontinued 5/14.  Blood culture with no growth to date.  Follow respiratory culture.  Continue Zosyn.  Repeat CT chest today.    Hypoxic, requiring up to 9-8 L nasal cannula. Now on 6 L.  Continue Atrovent.  Mucinex, incentive spirometer.  Wean oxygen as tolerated.    New onset atrial fibrillation with rapid ventricular response in the setting of hypoxia and pneumonia.  Cardiology consulted.  Had recent cath with EF 40-45%.  Moderate mitral regurgitation.  Was originally on dual antiplatelet therapy for history of coronary artery disease with recent stenting.  [id number].  Cardiology had stopped his aspirin and started him on Eliquis in conjunction with Plavix.  Converted to normal sinus rhythm yesterday evening and is now back in atrial fibrillation.  Atrial fibrillation .  Continue Toprol-XL 50 mg and Cardizem 60 every 6.  Results for orders placed during the hospital encounter of 05/13/23    Adult Transthoracic Echo Complete W/ Cont if Necessary Per Protocol    Interpretation Summary    Left ventricular systolic function is mildly decreased. Left ventricular ejection fraction appears to be 41 - 45%.    The following segments are hypokinetic: apical anterior, apical septal, apical inferior, apical lateral and apex.    Left ventricular wall thickness is consistent with mild septal asymmetric hypertrophy.    Left ventricular diastolic function is consistent with (grade I) impaired relaxation.    He left atrial cavity is mildly dilated.    There is mild to moderate thickening of the aortic valve.    . Mild to moderate mitral valve regurgitation is present    Mild pulmonary hypertension is present.    History of coronary artery disease with recent stenting to LAD on 4/8.  No chest pain.  Continue Plavix, statin, beta-blocker, and ARB.    Indwelling Osullivan catheter placed on 5/14 due to acute urinary retention. Will need voiding trial.    PT/OT.    SCDs for DVT prophylaxis.    Medical Decision Making  Number and Complexity of problems: 3 acute problems in the form of pneumonia right lower lobe, hypoxia, new onset atrial fibrillation with rapid ventricular response  Differential Diagnosis: None  considered at present    Conditions and Status        Condition is unchanged.     [facility] Data  External documents reviewed: Prior epic records  Cardiac tracing (EKG, telemetry) interpretation: Atrial fibrillation  Radiology interpretation: Interpreted by radiology  Labs reviewed: As above  Any tests that were considered but not ordered: None considered at present     Decision rules/scores evaluated (example [id number], Wells, etc): [id number].     Discussed with: Patient, and Dr. Alexander.     Care Planning  Shared decision making: Patient is agreeable to ongoing work-up and treatment  Code status and discussions: No CPR with limited support to include no intubation and no cardioversion.  Patient's niece Zamzam Marie is the power of .    Disposition  Social Determinants of Health that impact treatment or disposition: Home health  I expect the patient to be discharged to home with home health in 3-4 days.     Electronically signed by LARRY John, 05/18/23, 09:15 CDT.

## 2023-05-18 NOTE — PROGRESS NOTES
[facility] -  Progress Note     LOS: 5 days   Patient Care Team:  Everardo Jackson MD as PCP - General (Family Medicine)  Justin Howard MD as Consulting Physician (Urology)  Emigdio Moseley MD as Cardiologist (Cardiology)  Everardo Noriega MD as Consulting Physician (Pulmonary Disease)  Brijesh Nickerson MD as Consulting Physician (Otolaryngology)  Kaitlin Del Angel PA as Referring Physician (Physician Assistant)    Chief Complaint: Shortness of Breath. Pneumonia. New onset A-fib.     Subjective     Interval History:   Breathing much better. Oxygen demand down. He notes dark urine in catheter. Intermittent heart racing     Review of Systems:     Review of Systems   Constitutional:  Positive for fatigue.   Respiratory:  Positive for cough and shortness of breath.    Cardiovascular:  Positive for palpitations. Negative for leg swelling.   Objective     Vital Sign Min/Max for last 24 hours  Temp  Min: 97.7 °F (36.5 °C)  Max: 98.2 °F (36.8 °C)   BP  Min: 114/44  Max: 124/56   Pulse  Min: 69  Max: 123   Resp  Min: 16  Max: 18   SpO2  Min: 88 %  Max: 98 %   No data recorded   Weight  Min: 69.5 kg (153 lb 2 oz)  Max: 69.5 kg (153 lb 2 oz)         05/17/23 2030   Weight: 69.5 kg (153 lb 2 oz)       Telemetry: 0919 converted to NSR. Also converted yesterday but has some Episodes of what appears to be Atrial flutter with rates of 160.       Physical Exam:    Constitutional:       Appearance: Frail. Chronically ill-appearing.      Interventions: Nasal cannula in place.   Pulmonary:      Effort: Pulmonary effort is normal.      Breath sounds: Decreased breath sounds present.   Cardiovascular:      Normal rate. Regular rhythm.   Edema:     Peripheral edema present.     Ankle: bilateral trace edema of the ankle.  Abdominal:      General: Bowel sounds are normal.   Musculoskeletal: Normal range of motion. Skin:     General: Skin is warm and dry.   Neurological:      Mental Status: Alert.      Results Review:   Lab Results (last 24 hours)       Procedure Component Value Units Date/Time    Blood Culture With NINA - Blood, Arm, Left [[id number]]  (Normal) Collected: 05/13/23 0835    Specimen: Blood from Arm, Left Updated: 05/18/23 1200     Blood Culture No growth at 5 days    Respiratory Culture - Sputum, Cough [[id number]] Collected: 05/17/23 0742    Specimen: Sputum from Cough Updated: 05/18/23 1055     Respiratory Culture Rare The culture consists of normal respiratory senthil. This is a preliminary report; final report to follow.     Gram Stain Many (4+) WBCs per low power field      Rare (1+) Epithelial cells per low power field      No organisms seen    Blood Culture With NINA - Blood, Arm, Left [[id number]]  (Normal) Collected: 05/13/23 0834    Specimen: Blood from Arm, Left Updated: 05/18/23 1015     Blood Culture No growth at 5 days    Mycoplasma Pneumoniae PCR - Swab, Larynx [[id number]] Collected: 05/13/23 1445    Specimen: Swab from Larynx Updated: 05/17/23 2208     Mycoplasma pneumo by PCR Negative     Comment: No Mycoplasma pneumoniae DNA detected.  This test was developed and its performance characteristics determined  by [employer].  It has not been cleared or approved by the Food and Drug  Administration.  The FDA has determined that such clearance or  approval is not necessary.       Narrative:      Performed at:  [address] [place] [address]  [id number]  : Tyra Bryan MD, Phone:  [phone]    Blood Culture - Blood, Arm, Left [[id number]]  (Normal) Collected: 05/13/23 1441    Specimen: Blood from Arm, Left Updated: 05/17/23 1515     Blood Culture No growth at 4 days    Blood Culture - Blood, Arm, Right [[id number]]  (Normal) Collected: 05/13/23 1442    Specimen: Blood from Arm, Right Updated: 05/17/23 1515     Blood Culture No growth at 4 days             Results from last 7 days   Lab Units 05/16/23  0445 05/15/23  0346 05/14/23  0445   SODIUM mmol/L  133* 135* 137   POTASSIUM mmol/L 3.8 3.3* 4.1   CHLORIDE mmol/L 98 101 101   CO2 mmol/L 24.0 18.0* 24.0   BUN mg/dL 39* 26* 19   CREATININE mg/dL 1.12 0.94 0.78   GLUCOSE mg/dL 146* 138* 110*   CALCIUM mg/dL 8.7 8.1* 8.2           Medication Review: yes  Current Facility-Administered Medications   Medication Dose Route Frequency Provider Last Rate Last Admin    acetaminophen (TYLENOL) tablet 650 mg  650 mg Oral Q6H PRN Dillon Rodriguez DO   650 mg at 05/16/23 2058    apixaban (ELIQUIS) tablet 5 mg  5 mg Oral Q12H Jovani Lilly APRN   5 mg at 05/18/23 1026    atorvastatin (LIPITOR) tablet 40 mg  40 mg Oral Daily Brody Jackson MD   40 mg at 05/18/23 1026    benzonatate (TESSALON) capsule 200 mg  200 mg Oral TID PRN Brody Jackson MD        clopidogrel (PLAVIX) tablet 75 mg  75 mg Oral Daily Brody Jackson MD   75 mg at 05/18/23 1026    dilTIAZem (CARDIZEM) 125 mg in 125 mL NS infusion  5-15 mg/hr Intravenous Titrated Brody Jackson MD   Stopped at 05/17/23 1902    dilTIAZem (CARDIZEM) tablet 60 mg  60 mg Oral Q6H Jovani Lilly APRN   60 mg at 05/18/23 1204    guaiFENesin (MUCINEX) 12 hr tablet 1,200 mg  1,200 mg Oral Q12H Tammy Moon APRN   1,200 mg at 05/18/23 1026    ipratropium (ATROVENT) nebulizer solution 0.5 mg  0.5 mg Nebulization 4x Daily - RT Tammy Moon APRN   0.5 mg at 05/18/23 1032    ipratropium-albuterol (DUO-NEB) nebulizer solution 3 mL  3 mL Nebulization Q4H PRN Brody Jackson MD   3 mL at 05/17/23 0339    losartan (COZAAR) tablet 50 mg  50 mg Oral Q24H Brody Jackson MD   50 mg at 05/18/23 1026    magnesium oxide (MAG-OX) tablet 400 mg  400 mg Oral Daily Brody Jackson MD   400 mg at 05/18/23 1026    melatonin tablet 6 mg  6 mg Oral Nightly PRN Tammy Moon APRN        metoprolol succinate XL (TOPROL-XL) 24 hr tablet 50 mg  50 mg Oral Q24H Jovani Lilly APRN   50 mg at 05/18/23 1026    metoprolol tartrate (LOPRESSOR) injection 5 mg  5 mg Intravenous Q6H PRN Brody Jackson MD         nitroglycerin (NITROSTAT) SL tablet 0.4 mg  0.4 mg Sublingual Q5 Min PRN Brody Jackson MD        pantoprazole (PROTONIX) EC tablet 40 mg  40 mg Oral BID AC Brody Jackson MD   40 mg at 05/17/23 1753    piperacillin-tazobactam (ZOSYN) 3.375 g in iso-osmotic dextrose 50 ml (premix)  3.375 g Intravenous Q8H Tammy Moon APRN   3.375 g at 05/18/23 1026    sodium chloride 0.9 % flush 10 mL  10 mL Intravenous PRN Brody Jackson MD        sodium chloride 0.9 % flush 10 mL  10 mL Intravenous Q12H Brody Jackson MD   10 mL at 05/18/23 1028    sodium chloride 0.9 % infusion 40 mL  40 mL Intravenous PRN Brody Jackson MD        temazepam (RESTORIL) capsule 15 mg  15 mg Oral Nightly PRN Dillon Rodriguez DO   15 mg at 05/17/23 2211    vitamin B-12 (CYANOCOBALAMIN) tablet 1,000 mcg  1,000 mcg Oral Daily Brody Jackson MD   1,000 mcg at 05/18/23 1026         Assessment & Plan       Pneumonia of right lower lobe due to infectious organism    Benign localized prostatic hyperplasia without lower urinary tract symptoms (LUTS)    History of bladder cancer    Coronary artery disease involving native coronary artery of native heart without angina pectoris    Essential hypertension    Mixed hyperlipidemia    Non-rheumatic mitral regurgitation    Other fatigue    Mediastinal adenopathy    New onset atrial fibrillation with rapid ventricular response    Sepsis due to pneumonia    Hypoxia    Plan:  Atrial Fibrillation - new diagnosis in the setting of pneumonia. [id number]. On Toprol 50 which is an increase from 25 daily. On Cardizem 60 q6h and Cardizem dripped weaned. Started on anticoagulation and Aspirin stopped. Has converted to NSR.     Coronary Artery Disease with NSTEMI- PCI to LAD 4/8. Patent stents to OM and Circ noted. LVEF estimated to be 45%. No angina. Aspirin stopped as needed for anticaogulation. No angina.     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 Echo stable. Strict I&O. Daily weights. Low Salt Diet. Overall it  appears he had a good symptomatic response to IV diuretic. Also rates are improved.     Hypoxia- improving. Decline in oxygen demand from yesterday.     Pneumonia- reason for admission. Treatment per primary team    Mitral MR- mild to moderate on echo     Electronically signed by LARRY Gonzalez, 05/18/23, 1:07 PM CDT.

## 2023-05-19 LAB
ANION GAP SERPL CALCULATED.3IONS-SCNC: 11 MMOL/L (ref 5–15)
BACTERIA SPEC RESP CULT: NORMAL
BUN SERPL-MCNC: 25 MG/DL (ref 8–23)
BUN/CREAT SERPL: 44.6 (ref 7–25)
CALCIUM SPEC-SCNC: 9.5 MG/DL (ref 8.2–9.6)
CHLORIDE SERPL-SCNC: 102 MMOL/L (ref 98–107)
CO2 SERPL-SCNC: 26 MMOL/L (ref 22–29)
CREAT SERPL-MCNC: 0.56 MG/DL (ref 0.76–1.27)
DEPRECATED RDW RBC AUTO: 51 FL (ref 37–54)
EGFRCR SERPLBLD CKD-EPI 2021: 91.9 ML/MIN/1.73
ERYTHROCYTE [DISTWIDTH] IN BLOOD BY AUTOMATED COUNT: 14.7 % (ref 12.3–15.4)
GLUCOSE SERPL-MCNC: 130 MG/DL (ref 65–99)
GRAM STN SPEC: NORMAL
HCT VFR BLD AUTO: 33.6 % (ref 37.5–51)
HGB BLD-MCNC: 10.5 G/DL (ref 13–17.7)
MAGNESIUM SERPL-MCNC: 1.7 MG/DL (ref 1.7–2.3)
MCH RBC QN AUTO: 29.2 PG (ref 26.6–33)
MCHC RBC AUTO-ENTMCNC: 31.3 G/DL (ref 31.5–35.7)
MCV RBC AUTO: 93.6 FL (ref 79–97)
PLATELET # BLD AUTO: 265 10*3/MM3 (ref 140–450)
PMV BLD AUTO: 10.8 FL (ref 6–12)
POTASSIUM SERPL-SCNC: 4 MMOL/L (ref 3.5–5.2)
RBC # BLD AUTO: 3.59 10*6/MM3 (ref 4.14–5.8)
SODIUM SERPL-SCNC: 139 MMOL/L (ref 136–145)
WBC NRBC COR # BLD: 9.32 10*3/MM3 (ref 3.4–10.8)

## 2023-05-19 PROCEDURE — 83735 ASSAY OF MAGNESIUM: CPT | Performed by: NURSE PRACTITIONER

## 2023-05-19 PROCEDURE — 94799 UNLISTED PULMONARY SVC/PX: CPT

## 2023-05-19 PROCEDURE — 80048 BASIC METABOLIC PNL TOTAL CA: CPT | Performed by: NURSE PRACTITIONER

## 2023-05-19 PROCEDURE — 94664 DEMO&/EVAL PT USE INHALER: CPT

## 2023-05-19 PROCEDURE — 97535 SELF CARE MNGMENT TRAINING: CPT

## 2023-05-19 PROCEDURE — 97110 THERAPEUTIC EXERCISES: CPT

## 2023-05-19 PROCEDURE — 85027 COMPLETE CBC AUTOMATED: CPT | Performed by: NURSE PRACTITIONER

## 2023-05-19 PROCEDURE — 94760 N-INVAS EAR/PLS OXIMETRY 1: CPT

## 2023-05-19 PROCEDURE — 97530 THERAPEUTIC ACTIVITIES: CPT

## 2023-05-19 PROCEDURE — 25010000002 PIPERACILLIN SOD-TAZOBACTAM PER 1 G: Performed by: NURSE PRACTITIONER

## 2023-05-19 PROCEDURE — 97116 GAIT TRAINING THERAPY: CPT

## 2023-05-19 RX ORDER — DILTIAZEM HYDROCHLORIDE 240 MG/1
240 CAPSULE, COATED, EXTENDED RELEASE ORAL
Status: DISCONTINUED | OUTPATIENT
Start: 2023-05-19 | End: 2023-05-22 | Stop reason: HOSPADM

## 2023-05-19 RX ADMIN — APIXABAN 5 MG: 5 TABLET, FILM COATED ORAL at 12:06

## 2023-05-19 RX ADMIN — GUAIFENESIN 1200 MG: 600 TABLET, EXTENDED RELEASE ORAL at 09:03

## 2023-05-19 RX ADMIN — TEMAZEPAM 15 MG: 15 CAPSULE ORAL at 21:04

## 2023-05-19 RX ADMIN — CLOPIDOGREL BISULFATE 75 MG: 75 TABLET, FILM COATED ORAL at 12:06

## 2023-05-19 RX ADMIN — TAZOBACTAM SODIUM AND PIPERACILLIN SODIUM 3.38 G: 375; 3 INJECTION, SOLUTION INTRAVENOUS at 12:06

## 2023-05-19 RX ADMIN — LOSARTAN POTASSIUM 50 MG: 50 TABLET, FILM COATED ORAL at 09:03

## 2023-05-19 RX ADMIN — IPRATROPIUM BROMIDE 0.5 MG: 0.5 SOLUTION RESPIRATORY (INHALATION) at 18:39

## 2023-05-19 RX ADMIN — METOPROLOL SUCCINATE 50 MG: 50 TABLET, EXTENDED RELEASE ORAL at 09:03

## 2023-05-19 RX ADMIN — DILTIAZEM HYDROCHLORIDE 240 MG: 240 CAPSULE, EXTENDED RELEASE ORAL at 12:10

## 2023-05-19 RX ADMIN — DILTIAZEM HYDROCHLORIDE 60 MG: 60 TABLET, FILM COATED ORAL at 00:42

## 2023-05-19 RX ADMIN — DILTIAZEM HYDROCHLORIDE 60 MG: 60 TABLET, FILM COATED ORAL at 06:00

## 2023-05-19 RX ADMIN — ATORVASTATIN CALCIUM 40 MG: 40 TABLET, FILM COATED ORAL at 09:03

## 2023-05-19 RX ADMIN — Medication 10 ML: at 21:00

## 2023-05-19 RX ADMIN — IPRATROPIUM BROMIDE 0.5 MG: 0.5 SOLUTION RESPIRATORY (INHALATION) at 06:29

## 2023-05-19 RX ADMIN — IPRATROPIUM BROMIDE 0.5 MG: 0.5 SOLUTION RESPIRATORY (INHALATION) at 14:03

## 2023-05-19 RX ADMIN — GUAIFENESIN 1200 MG: 600 TABLET, EXTENDED RELEASE ORAL at 21:00

## 2023-05-19 RX ADMIN — IPRATROPIUM BROMIDE 0.5 MG: 0.5 SOLUTION RESPIRATORY (INHALATION) at 10:11

## 2023-05-19 RX ADMIN — PANTOPRAZOLE SODIUM 40 MG: 40 TABLET, DELAYED RELEASE ORAL at 09:03

## 2023-05-19 RX ADMIN — Medication 6 MG: at 21:04

## 2023-05-19 RX ADMIN — Medication 1000 MCG: at 09:03

## 2023-05-19 RX ADMIN — APIXABAN 5 MG: 5 TABLET, FILM COATED ORAL at 21:00

## 2023-05-19 RX ADMIN — TAZOBACTAM SODIUM AND PIPERACILLIN SODIUM 3.38 G: 375; 3 INJECTION, SOLUTION INTRAVENOUS at 03:38

## 2023-05-19 RX ADMIN — MAGNESIUM GLUCONATE 500 MG ORAL TABLET 400 MG: 500 TABLET ORAL at 09:03

## 2023-05-19 RX ADMIN — TAZOBACTAM SODIUM AND PIPERACILLIN SODIUM 3.38 G: 375; 3 INJECTION, SOLUTION INTRAVENOUS at 18:20

## 2023-05-19 RX ADMIN — Medication 10 ML: at 12:07

## 2023-05-19 NOTE — PLAN OF CARE
Goal Outcome Evaluation:  Plan of Care Reviewed With: patient        Progress: improving  Outcome Evaluation: PT tx completed. PT has no c/o this am. Mobilizing well. S bed mobility, S sit<>stand, amb short distances with r wx on 3L CG. Recommend home with HH

## 2023-05-19 NOTE — PLAN OF CARE
Goal Outcome Evaluation:              Outcome Evaluation: No c/o pain , no acute resp difficulty pt sinus  on monitor cont to monitor  , no falls or injuries thi shift foly bsd urine light yellow with clots   cont on 2l/m .

## 2023-05-19 NOTE — PLAN OF CARE
Goal Outcome Evaluation:  Plan of Care Reviewed With: patient        Progress: improving  Outcome Evaluation: urine color improving. Clinicians indicated to continue thinners and they are monitoring patients H/H. Still requiring 2L NC

## 2023-05-19 NOTE — PLAN OF CARE
Goal Outcome Evaluation:  Plan of Care Reviewed With: patient        Progress: improving  Outcome Evaluation: Fxl mobility <> BR and around room 2reps with RW 2L O2 trialed RA and pt 88% with activity on RA. Re donned O2 2L and 96%. Pt performed sit<>stand x3 reps CGA. Pt toilets s/u. TOREY Rosario, spoke with NSG d/t drops of blood on floor post mobility. NSG with pt end of tx. Recommend Home with HH/assist

## 2023-05-19 NOTE — THERAPY TREATMENT NOTE
Acute Care - Physical Therapy Treatment Note  [facility]     Patient Name: Wyatt Curry  : 1930  MRN: [id number]  Today's Date: 2023      Visit Dx:     ICD-10-CM ICD-9-CM   1. Pneumonia of right lower lobe due to infectious organism  J18.9 486   2. Hypoxia  R09.02 799.02   3. Other iron deficiency anemia  D50.8 280.8   4. Impaired mobility  Z74.09 799.89     Patient Active Problem List   Diagnosis    Benign localized prostatic hyperplasia without lower urinary tract symptoms (LUTS)    History of bladder cancer    Left rotator cuff tear arthropathy    Coronary artery disease involving native coronary artery of native heart without angina pectoris    Left bundle branch block    Essential hypertension    Mixed hyperlipidemia    Overweight (BMI 25.0-29.9)    Non-rheumatic mitral regurgitation    Other fatigue    Left ventricular diastolic dysfunction    History of coronary artery stent placement    Lung nodule    Mediastinal adenopathy    Cough    Abnormal positron emission tomography (PET) scan    Basal cell carcinoma    Neoplasm of uncertain behavior    Elevated troponin    Bleeding    Ischemic cardiomyopathy    Pneumonia of right lower lobe due to infectious organism    New onset atrial fibrillation with rapid ventricular response    Sepsis due to pneumonia    Hypoxia     Past Medical History:   Diagnosis Date    Arthritis     Bladder cancer     Bronchitis     CAD (coronary artery disease)     Cancer     bladder cancer    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    GERD (gastroesophageal reflux disease)     Hyperlipidemia     Hypertension     Irregular heart beat     Lung nodules      Past Surgical History:   Procedure Laterality Date    BLADDER SURGERY      CARDIAC CATHETERIZATION      CARDIAC CATHETERIZATION Left 2023    Procedure: Cardiac Catheterization/Vascular Study;  Surgeon: Sukhi Hartley MD;  Location: [facility] INVASIVE LOCATION;  Service: Cardiology;  Laterality: Left;     COLONOSCOPY      CORONARY ANGIOPLASTY WITH STENT PLACEMENT  2006    STENT X 2    CYSTOSCOPY      ENDOSCOPY      EXCISION LESION N/A 4/7/2023    Procedure: EXCISION LESION OF RIGHT TEMPLE AND LEFT TEMPLE WITH FROZEN SECTION WITH POSSIBLE FLAP OR GRAFT;  Surgeon: Brijesh Nickerson MD;  Location:  [facility];  Service: ENT;  Laterality: N/A;    FLAP HEAD/NECK Left 03/09/2020    Procedure: POSSIBLE FLAP;  Surgeon: Brijesh Nickerson MD;  Location:  [facility];  Service: ENT;  Laterality: Left;    HEAD/NECK LESION/CYST EXCISION Left 03/09/2020    Procedure: 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 with frozen section and possible flap or graft;  Surgeon: Brijesh Nickerson MD;  Location:  [facility];  Service: ENT;  Laterality: Left;    HERNIA REPAIR      SHOULDER SURGERY      SKIN BIOPSY      lip biopsy    SKIN FULL THICKNESS GRAFT Left 03/09/2020    Procedure: OR GRAFT;  Surgeon: Brijesh Nickerson MD;  Location:  [facility];  Service: ENT;  Laterality: Left;    TRANSURETHRAL RESECTION OF BLADDER TUMOR       PT Assessment (last 12 hours)       PT Evaluation and Treatment       Row Name 05/19/23 1340 05/19/23 1030       Physical Therapy Time and Intention    Subjective Information no complaints  -KJ no complaints  -KJ    Document Type therapy note (daily note)  -KJ therapy note (daily note)  -KJ    Mode of Treatment physical therapy  -KJ physical therapy  -KJ    Patient Effort good  -KJ good  -KJ      Row Name 05/19/23 1340 05/19/23 1030       General Information    Existing Precautions/Restrictions oxygen therapy device and L/min;fall  -KJ oxygen therapy device and L/min;fall  -KJ      Row Name 05/19/23 1340 05/19/23 1030       Pain    Pretreatment Pain Rating 0/10 - no pain  -KJ 0/10 - no pain  -KJ    Posttreatment Pain Rating 0/10 - no pain  -KJ 0/10 - no pain  -KJ      Row Name 05/19/23 1340 05/19/23 1030       Bed Mobility    Supine-Sit [place] (Bed Mobility) -- verbal  cues;minimum assist (75% patient effort)  -KJ    Sit-Supine [place] (Bed Mobility) supervision  -KJ --      Row Name 05/19/23 1340 05/19/23 1030       Sit-Stand Transfer    Sit-Stand [place] (Transfers) contact guard  -KJ contact guard  -KJ      Row Name 05/19/23 1340 05/19/23 1030       Stand-Sit Transfer    Stand-Sit [place] (Transfers) contact guard  -KJ contact guard  -KJ      Row Name 05/19/23 1340 05/19/23 1030       Gait/Stairs (Locomotion)    [place] Level (Gait) contact guard;verbal cues  -KJ contact guard;verbal cues  -KJ    Assistive Device (Gait) walker, front-wheeled  -KJ walker, front-wheeled  -KJ    Distance in Feet (Gait) 40' x 3  -KJ 40' x 3  -KJ    Bilateral Gait Deviations forward flexed posture  -KJ forward flexed posture  -KJ      Row Name 05/19/23 1340 05/19/23 1030       Aerobic Exercise    Comment, Aerobic Exercise (Therapeutic Exercise) AROM x 20 reps  -KJ AROM x 20 reps  -KJ      Row Name 05/19/23 1340 05/19/23 1030       Positioning and Restraints    Pre-Treatment Position sitting in chair/recliner  -KJ in bed  -KJ    Post Treatment Position bed  -KJ chair  -KJ    In Bed call light within reach  -KJ call light within reach  -KJ              User Key  (r) = Recorded By, (t) = Taken By, (c) = Cosigned By      Initials Name Provider Type    Alice Lucio, PTA Physical Therapist Assistant                    Physical Therapy Education       Title: PT OT SLP Therapies (In Progress)       Topic: Physical Therapy (In Progress)       Point: Mobility training (Done)       Learning Progress Summary             Patient Acceptance, ALICIA PADILLA DU by ALFREDITO at 5/15/2023 1102    Comment: benefits of activity, progression of PT   Family Acceptance, E, VU,DU by ALFREDITO at 5/15/2023 1102    Comment: benefits of activity, progression of PT                         Point: Home exercise program (Not Started)       Learner Progress:  Not documented in this visit.              Point: Body mechanics (Not  Started)       Learner Progress:  Not documented in this visit.              Point: Precautions (Not Started)       Learner Progress:  Not documented in this visit.                              User Key       Initials Effective Dates Name Provider Type Odilon GLASER 02/03/23 -  Satish Hall PT DPT Physical Therapist PT                  PT Recommendation and Plan     Plan of Care Reviewed With: patient  Progress: improving  Outcome Evaluation: PT tx completed. PT has no c/o this am. Mobilizing well. S bed mobility, S sit<>stand, amb short distances with r wx on 3L CG. Recommend home with    Outcome Measures       Row Name 05/19/23 1100             How much help from another person do you currently need...    Turning from your back to your side while in flat bed without using bedrails? 4  -KJ      Moving from lying on back to sitting on the side of a flat bed without bedrails? 4  -KJ      Moving to and from a bed to a chair (including a wheelchair)? 4  -KJ      Standing up from a chair using your arms (e.g., wheelchair, bedside chair)? 3  -KJ      Climbing 3-5 steps with a railing? 3  -KJ      To walk in hospital room? 3  -KJ      AM-PAC 6 Clicks Score (PT) 21  -KJ         Functional Assessment    Outcome Measure Options AM-PAC 6 Clicks Basic Mobility (PT)  -KJ                User Key  (r) = Recorded By, (t) = Taken By, (c) = Cosigned By      Initials Name Provider Type    Alice Lucio, PTA Physical Therapist Assistant                     Time Calculation:    PT Charges       Row Name 05/19/23 1407 05/19/23 1125          Time Calculation    Start Time 1340  -KJ 1030  -KJ     Stop Time 1405  -KJ 1100  -KJ     Time Calculation (min) 25 min  -KJ 30 min  -KJ     PT Received On 05/19/23  -KJ 05/19/23  -KJ     PT Goal Re-Cert Due Date 05/25/23  -KJ 05/25/23  -KJ        Time Calculation- PT    Total Timed Code Minutes- PT 25 minute(s)  -KJ 30 minute(s)  -KJ               User Key  (r) = Recorded By, (t) =  Taken By, (c) = Cosigned By      Initials Name Provider Type    Alice Lucio, ALIYA Physical Therapist Assistant                  Therapy Charges for Today       Code Description Service Date Service Provider Modifiers Qty    42865621455 HC GAIT TRAINING EA 15 MIN 5/19/2023 Alice Gonzalez, ALIYA GP 2    84766194058 HC PT THER PROC EA 15 MIN 5/19/2023 Alice Gonzalez, ALIYA GP 1    32141696593 HC GAIT TRAINING EA 15 MIN 5/19/2023 Alice Gonzalez, PTA GP 1            PT G-Codes  Outcome Measure Options: AM-PAC 6 Clicks Basic Mobility (PT)  AM-PAC 6 Clicks Score (PT): 21  AM-PAC 6 Clicks Score (OT): 17    Alice Gonzalez PTA  5/19/2023

## 2023-05-19 NOTE — THERAPY TREATMENT NOTE
Acute Care - Physical Therapy Treatment Note  [facility]     Patient Name: Wyatt Curry  : 1930  MRN: [id number]  Today's Date: 2023      Visit Dx:     ICD-10-CM ICD-9-CM   1. Pneumonia of right lower lobe due to infectious organism  J18.9 486   2. Hypoxia  R09.02 799.02   3. Other iron deficiency anemia  D50.8 280.8   4. Impaired mobility  Z74.09 799.89     Patient Active Problem List   Diagnosis    Benign localized prostatic hyperplasia without lower urinary tract symptoms (LUTS)    History of bladder cancer    Left rotator cuff tear arthropathy    Coronary artery disease involving native coronary artery of native heart without angina pectoris    Left bundle branch block    Essential hypertension    Mixed hyperlipidemia    Overweight (BMI 25.0-29.9)    Non-rheumatic mitral regurgitation    Other fatigue    Left ventricular diastolic dysfunction    History of coronary artery stent placement    Lung nodule    Mediastinal adenopathy    Cough    Abnormal positron emission tomography (PET) scan    Basal cell carcinoma    Neoplasm of uncertain behavior    Elevated troponin    Bleeding    Ischemic cardiomyopathy    Pneumonia of right lower lobe due to infectious organism    New onset atrial fibrillation with rapid ventricular response    Sepsis due to pneumonia    Hypoxia     Past Medical History:   Diagnosis Date    Arthritis     Bladder cancer     Bronchitis     CAD (coronary artery disease)     Cancer     bladder cancer    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 INVASIVE LOCATION;  Service: Cardiology;  Laterality: Left;     COLONOSCOPY      CORONARY ANGIOPLASTY WITH STENT PLACEMENT  2006    STENT X 2    CYSTOSCOPY      ENDOSCOPY      EXCISION LESION N/A 4/7/2023    Procedure: EXCISION LESION OF RIGHT TEMPLE AND LEFT TEMPLE WITH FROZEN SECTION WITH POSSIBLE FLAP OR GRAFT;  Surgeon: Brijesh Nickerson MD;  Location:  [facility];  Service: ENT;  Laterality: N/A;    FLAP HEAD/NECK Left 03/09/2020    Procedure: POSSIBLE FLAP;  Surgeon: Brijesh Nickerson MD;  Location:  [facility];  Service: ENT;  Laterality: Left;    HEAD/NECK LESION/CYST EXCISION Left 03/09/2020    Procedure: 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 with frozen section and possible flap or graft;  Surgeon: Brijesh Nickerson MD;  Location:  [facility];  Service: ENT;  Laterality: Left;    HERNIA REPAIR      SHOULDER SURGERY      SKIN BIOPSY      lip biopsy    SKIN FULL THICKNESS GRAFT Left 03/09/2020    Procedure: OR GRAFT;  Surgeon: Brijesh Nickerson MD;  Location:  [facility];  Service: ENT;  Laterality: Left;    TRANSURETHRAL RESECTION OF BLADDER TUMOR       PT Assessment (last 12 hours)       PT Evaluation and Treatment       Row Name 05/19/23 1030          Physical Therapy Time and Intention    Subjective Information no complaints  -KJ     Document Type therapy note (daily note)  -KJ     Mode of Treatment physical therapy  -KJ     Patient Effort good  -KJ       Row Name 05/19/23 1030          General Information    Existing Precautions/Restrictions oxygen therapy device and L/min;fall  -KJ       Row Name 05/19/23 1030          Pain    Pretreatment Pain Rating 0/10 - no pain  -KJ     Posttreatment Pain Rating 0/10 - no pain  -KJ       Row Name 05/19/23 1030          Bed Mobility    Supine-Sit [place] (Bed Mobility) verbal cues;minimum assist (75% patient effort)  -KJ       Row Name 05/19/23 1030          Sit-Stand Transfer    Sit-Stand [place] (Transfers) contact guard  -KJ       Row Name 05/19/23 1030          Stand-Sit  Transfer    Stand-Sit [place] (Transfers) contact guard  -KJ       Row Name 05/19/23 1030          Gait/Stairs (Locomotion)    [place] Level (Gait) contact guard;verbal cues  -KJ     Assistive Device (Gait) walker, front-wheeled  -KJ     Distance in Feet (Gait) 40' x 3  -KJ     Bilateral Gait Deviations forward flexed posture  -KJ       Row Name 05/19/23 1030          Aerobic Exercise    Comment, Aerobic Exercise (Therapeutic Exercise) AROM x 20 reps  -KJ       Row Name 05/19/23 1030          Positioning and Restraints    Pre-Treatment Position in bed  -KJ     Post Treatment Position chair  -KJ     In Bed call light within reach  -KJ               User Key  (r) = Recorded By, (t) = Taken By, (c) = Cosigned By      Initials Name Provider Type    Alice Lucio, PTA Physical Therapist Assistant                    Physical Therapy Education       Title: PT OT SLP Therapies (In Progress)       Topic: Physical Therapy (In Progress)       Point: Mobility training (Done)       Learning Progress Summary             Patient Acceptance, E, VU,DU by ALFREDITO at 5/15/2023 1102    Comment: benefits of activity, progression of PT   Family Acceptance, E, VU,DU by ALFREDITO at 5/15/2023 1102    Comment: benefits of activity, progression of PT                         Point: Home exercise program (Not Started)       Learner Progress:  Not documented in this visit.              Point: Body mechanics (Not Started)       Learner Progress:  Not documented in this visit.              Point: Precautions (Not Started)       Learner Progress:  Not documented in this visit.                              User Key       Initials Effective Dates Name Provider Type Odilon GLASER 02/03/23 -  Satish Hall PT DPT Physical Therapist PT                  PT Recommendation and Plan     Plan of Care Reviewed With: patient  Progress: improving  Outcome Evaluation: PT tx completed. PT has no c/o this am. Mobilizing well. S bed mobility, S  sit<>stand, amb short distances with r wx on 3L CG. Recommend home with HH   Outcome Measures       Row Name 05/19/23 1100             How much help from another person do you currently need...    Turning from your back to your side while in flat bed without using bedrails? 4  -KJ      Moving from lying on back to sitting on the side of a flat bed without bedrails? 4  -KJ      Moving to and from a bed to a chair (including a wheelchair)? 4  -KJ      Standing up from a chair using your arms (e.g., wheelchair, bedside chair)? 3  -KJ      Climbing 3-5 steps with a railing? 3  -KJ      To walk in hospital room? 3  -KJ      AM-PAC 6 Clicks Score (PT) 21  -KJ         Functional Assessment    Outcome Measure Options AM-PAC 6 Clicks Basic Mobility (PT)  -KJ                User Key  (r) = Recorded By, (t) = Taken By, (c) = Cosigned By      Initials Name Provider Type    Alice Lucio PTA Physical Therapist Assistant                     Time Calculation:    PT Charges       Row Name 05/19/23 1125             Time Calculation    Start Time 1030  -KJ      Stop Time 1100  -KJ      Time Calculation (min) 30 min  -KJ      PT Received On 05/19/23  -KJ      PT Goal Re-Cert Due Date 05/25/23  -KJ         Time Calculation- PT    Total Timed Code Minutes- PT 30 minute(s)  -KJ                User Key  (r) = Recorded By, (t) = Taken By, (c) = Cosigned By      Initials Name Provider Type    Alice Lucio PTA Physical Therapist Assistant                  Therapy Charges for Today       Code Description Service Date Service Provider Modifiers Qty    81173473054 HC GAIT TRAINING EA 15 MIN 5/19/2023 Alice Gonzalez PTA GP 2            PT G-Codes  Outcome Measure Options: AM-PAC 6 Clicks Basic Mobility (PT)  AM-PAC 6 Clicks Score (PT): 21  AM-PAC 6 Clicks Score (OT): 17    Alice Gonzalez PTA  5/19/2023

## 2023-05-19 NOTE — THERAPY TREATMENT NOTE
Patient Name: Wyatt Curry  : 1930    MRN: [id number]                              Today's Date: 2023       Admit Date: 2023    Visit Dx: Therapist utilized gait belt, applied non-slipped socks, provided fall risk education/prevention, & facilitated muscle strengthening PRN to reduce patient falls risk during this session.      ICD-10-CM ICD-9-CM   1. Pneumonia of right lower lobe due to infectious organism  J18.9 486   2. Hypoxia  R09.02 799.02   3. Other iron deficiency anemia  D50.8 280.8   4. Impaired mobility  Z74.09 799.89     Patient Active Problem List   Diagnosis    Benign localized prostatic hyperplasia without lower urinary tract symptoms (LUTS)    History of bladder cancer    Left rotator cuff tear arthropathy    Coronary artery disease involving native coronary artery of native heart without angina pectoris    Left bundle branch block    Essential hypertension    Mixed hyperlipidemia    Overweight (BMI 25.0-29.9)    Non-rheumatic mitral regurgitation    Other fatigue    Left ventricular diastolic dysfunction    History of coronary artery stent placement    Lung nodule    Mediastinal adenopathy    Cough    Abnormal positron emission tomography (PET) scan    Basal cell carcinoma    Neoplasm of uncertain behavior    Elevated troponin    Bleeding    Ischemic cardiomyopathy    Pneumonia of right lower lobe due to infectious organism    New onset atrial fibrillation with rapid ventricular response    Sepsis due to pneumonia    Hypoxia     Past Medical History:   Diagnosis Date    Arthritis     Bladder cancer     Bronchitis     CAD (coronary artery disease)     Cancer     bladder cancer    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    GERD (gastroesophageal reflux disease)     Hyperlipidemia     Hypertension     Irregular heart beat     Lung nodules      Past Surgical History:   Procedure Laterality Date    BLADDER SURGERY      CARDIAC CATHETERIZATION      CARDIAC CATHETERIZATION Left  4/8/2023    Procedure: Cardiac Catheterization/Vascular Study;  Surgeon: Sukhi Hartley MD;  Location:  [facility]ATH [facility];  Service: Cardiology;  Laterality: Left;    COLONOSCOPY      CORONARY ANGIOPLASTY WITH STENT PLACEMENT  2006    STENT X 2    CYSTOSCOPY      ENDOSCOPY      EXCISION LESION N/A 4/7/2023    Procedure: EXCISION LESION OF RIGHT TEMPLE AND LEFT TEMPLE WITH FROZEN SECTION WITH POSSIBLE FLAP OR GRAFT;  Surgeon: Brijesh Nickerson MD;  Location:  [facility] OR;  Service: ENT;  Laterality: N/A;    FLAP HEAD/NECK Left 03/09/2020    Procedure: POSSIBLE FLAP;  Surgeon: Brijesh Nickerson MD;  Location:  [facility] OR;  Service: ENT;  Laterality: Left;    HEAD/NECK LESION/CYST EXCISION Left 03/09/2020    Procedure: 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 with frozen section and possible flap or graft;  Surgeon: Brijesh Nickerson MD;  Location:  [facility] OR;  Service: ENT;  Laterality: Left;    HERNIA REPAIR      SHOULDER SURGERY      SKIN BIOPSY      lip biopsy    SKIN FULL THICKNESS GRAFT Left 03/09/2020    Procedure: OR GRAFT;  Surgeon: Brijesh Nickerson MD;  Location:  [facility] OR;  Service: ENT;  Laterality: Left;    TRANSURETHRAL RESECTION OF BLADDER TUMOR        General Information       Row Name 05/19/23 0911          OT Time and Intention    Document Type therapy note (daily note)  -MT     Mode of Treatment occupational therapy  -MT       Row Name 05/19/23 0911          General Information    Patient Profile Reviewed yes  -MT     Existing Precautions/Restrictions fall;oxygen therapy device and L/min  2L O2  -MT       Row Name 05/19/23 0911          Cognition    Orientation Status (Cognition) oriented x 4  -MT       Row Name 05/19/23 0911          Safety Issues, Functional Mobility    Impairments Affecting Function (Mobility) balance;endurance/activity tolerance  -MT               User Key  (r) = Recorded By, (t) = Taken By, (c) = Cosigned By      Initials Name  Provider Type    MT Janet Hernandez COTA Occupational Therapist Assistant                     Mobility/ADL's       Row Name 05/19/23 0911          Bed Mobility    Sit-Supine [place] (Bed Mobility) minimum assist (75% patient effort);verbal cues  -MT     Bed Mobility, Safety Issues decreased use of arms for pushing/pulling;decreased use of legs for bridging/pushing  -MT     Assistive Device (Bed Mobility) head of bed elevated;bed rails  -MT       Row Name 05/19/23 0911          Transfers    Transfers sit-stand transfer;stand-sit transfer  -MT       Row Name 05/19/23 09          Sit-Stand Transfer    Sit-Stand [place] (Transfers) contact guard  -MT       Row Name 05/19/23 0911          Stand-Sit Transfer    Stand-Sit [place] (Transfers) contact guard  -MT       Row Name 05/19/23 09          Functional Mobility    Functional Mobility- Ind. Level contact guard assist  -MT     Functional Mobility- Comment Fxl mobility <> BR and around room 2reps with RW 2L O2 trialed RA and pt 88% with activity on RA. Re donned O2 2L and 96%.  -MT       Row Name 05/19/23 0911          Activities of Daily Living    BADL Assessment/Intervention lower body dressing;toileting  -MT       Row Name 05/19/23 0911          Toileting Assessment/Training    [place] Level (Toileting) adjust/manage clothing;perform perineal hygiene;set up  -MT     Position (Toileting) unsupported sitting  -MT               User Key  (r) = Recorded By, (t) = Taken By, (c) = Cosigned By      Initials Name Provider Type    MT Janet Hernandez COTA Occupational Therapist Assistant                   Obj/Interventions    No documentation.                  Goals/Plan    No documentation.                  Clinical Impression       Row Name 05/19/23 0911          Pain Assessment    Pretreatment Pain Rating 0/10 - no pain  -MT     Posttreatment Pain Rating 0/10 - no pain  -[facility] Name 05/19/23 0911          Plan of Care Review    Progress  improving  -MT       Row Name 05/19/23 0911          Therapy Plan Review/Discharge Plan (OT)    Anticipated Discharge Disposition (OT) home with assist;home with home health  -MT       Row Name 05/19/23 0911          Positioning and Restraints    Pre-Treatment Position sitting in chair/recliner  -MT     Post Treatment Position bed  -MT     In Bed fowlers;call light within reach;exit alarm on;encouraged to call for assist;side rails up x2  -MT               User Key  (r) = Recorded By, (t) = Taken By, (c) = Cosigned By      Initials Name Provider Type    Janet Andrade COTA Occupational Therapist Assistant                   Outcome Measures       Row Name 05/19/23 0911          How much help from another is currently needed...    Putting on and taking off regular lower body clothing? 2  -MT     Bathing (including washing, rinsing, and drying) 2  -MT     Toileting (which includes using toilet bed pan or urinal) 3  -MT     Putting on and taking off regular upper body clothing 3  -MT     Taking care of personal grooming (such as brushing teeth) 3  -MT     Eating meals 4  -MT     AM-PAC 6 Clicks Score (OT) 17  -MT               User Key  (r) = Recorded By, (t) = Taken By, (c) = Cosigned By      Initials Name Provider Type    Janet Andrade COTA Occupational Therapist Assistant                    Occupational Therapy Education       Title: PT OT SLP Therapies (In Progress)       Topic: Occupational Therapy (In Progress)       Point: ADL training (Done)       Description:   Instruct learner(s) on proper safety adaptation and remediation techniques during self care or transfers.   Instruct in proper use of assistive devices.                  Learning Progress Summary             Patient Sergio, E, VU by KAREN at 5/14/2023 9747                         Point: Home exercise program (Not Started)       Description:   Instruct learner(s) on appropriate technique for monitoring, assisting and/or progressing therapeutic  exercises/activities.                  Learner Progress:  Not documented in this visit.              Point: Precautions (Done)       Description:   Instruct learner(s) on prescribed precautions during self-care and functional transfers.                  Learning Progress Summary             Patient Sergio, VALERIE, VU by KAREN at 5/14/2023 [id number]                         Point: Body mechanics (Not Started)       Description:   Instruct learner(s) on proper positioning and spine alignment during self-care, functional mobility activities and/or exercises.                  Learner Progress:  Not documented in this visit.                              User Key       Initials Effective Dates Name Provider Type Discipline    KAREN 11/10/21 -  Kassi Jennings, OTR/L, CSRS Occupational Therapist OT                  OT Recommendation and Plan     Plan of Care Review  Plan of Care Reviewed With: patient  Progress: improving  Outcome Evaluation: Fxl mobility <> BR and around room 2reps with RW 2L O2 trialed RA and pt 88% with activity on RA. Re donned O2 2L and 96%. Pt performed sit<>stand x3 reps CGA. Pt toilets s/u. BTB Abraham, spoke with NSG d/t drops of blood on floor post mobility. NSG with pt end of tx. Recommend Home with HH/assist     Time Calculation:    Time Calculation- OT       Row Name 05/19/23 0911             Time Calculation- OT    OT Start Time 0911  -MT      OT Stop Time 0950  -MT      OT Time Calculation (min) 39 min  -MT      Total Timed Code Minutes- OT 39 minute(s)  -MT      OT Received On 05/19/23  -MT         Timed Charges    63332 - OT Therapeutic Activity Minutes 20  -MT      35199 - OT Self Care/Mgmt Minutes 19  -MT         Total Minutes    Timed Charges Total Minutes 39  -MT       Total Minutes 39  -MT                User Key  (r) = Recorded By, (t) = Taken By, (c) = Cosigned By      Initials Name Provider Type    MT Janet Hernandez COTA Occupational Therapist Assistant                  Therapy Charges for  Today       Code Description Service Date Service Provider Modifiers Qty    41775977573 HC OT THER PROC EA 15 MIN 5/18/2023 Janet Hernandez COTA GO 2    09380376438 HC OT THERAPEUTIC ACT EA 15 MIN 5/18/2023 Janet Hernandez COTA GO 2    16366099703 HC OT THERAPEUTIC ACT EA 15 MIN 5/19/2023 Janet Hernandez COTA GO 2    01159119802 HC OT SELF CARE/MGMT/TRAIN EA 15 MIN 5/19/2023 Janet Hernandez COTA GO 1                 DIANA Peter  5/19/2023

## 2023-05-19 NOTE — PROGRESS NOTES
"    [facility] Medicine Services  INPATIENT PROGRESS NOTE    Patient Name: Wyatt Curry  Date of Admission: 5/13/2023  Today's Date: 05/19/23  Length of Stay: 6  Primary Care Physician: Everardo Jackson MD    Subjective   Chief Complaint: Shortness of breath  HPI   Mr. Curry is a 89-year-old male who presented to [facility] on 5/13 with fatigue, cough and fever up to 103 °F.  He had noticed some dyspnea with exertion and coughing more than usual.  He had also complained of fatigue in which he wanted to sleep\" all the time\" which is unusual for him.  At baseline he is very independent and is the primary caregiver for his wife who has dementia.  He saw his primary care provider 2 days prior to admission and was prescribed amoxicillin for diagnosis of acute bronchitis.  He has had no significant improvement and continued to feel unwell.  Of note, he has a history of coronary artery disease and had been off of Plavix for procedure.  Postoperatively he complained of chest pain and was found to have an elevated troponin.  He underwent cardiac catheterization on 4/8/2023 and had stent to distal left anterior ascending coronary artery.  Work-up in the ER, CT angiogram chest showed no pulmonary embolism.  Right lower lobe pneumonia.  WBC 8, lactate 1.3, procalcitonin 1.58.  Magnesium 1.2    Up in chair.  States he continues to slowly improve with each day.  Oxygen down from 4-5 L to 2 L today.  Afebrile.  Continues on IV Zosyn.  Night shift RN held PM dose of eliquis as urine was red tinged.  Osullivan catheter in place with yellow urine.  Hemoglobin remains stable.    Review of Systems   All pertinent negatives and positives are as above. All other systems have been reviewed and are negative unless otherwise stated.     Objective    Temp:  [98 °F (36.7 °C)-98.3 °F (36.8 °C)] 98.3 °F (36.8 °C)  Heart Rate:  [63-78] 67  Resp:  [16-18] 16  BP: (110-138)/(44-56) 138/51  Physical " Exam  Vitals reviewed.   Constitutional:       General: He is not in acute distress.     Appearance: He is ill-appearing. He is not toxic-appearing.      Interventions: Nasal cannula in place.      Comments: Up in chair.  No acute distress.  On 2 L nasal cannula.  Discussed with his nurse  enrique.   HENT:      Head: Normocephalic and atraumatic.      Mouth/Throat:      Mouth: Mucous membranes are moist.      Pharynx: Oropharynx is clear.   Eyes:      Extraocular Movements: Extraocular movements intact.      Conjunctiva/sclera: Conjunctivae normal.      Pupils: Pupils are equal, round, and reactive to light.   Cardiovascular:      Rate and Rhythm: Normal rate and regular rhythm.      Pulses: Normal pulses.      Comments: Now normal sinus rhythm 68-77 bpm overnight  Pulmonary:      Effort: Pulmonary effort is normal. No respiratory distress.      Breath sounds: Normal breath sounds. No wheezing.   Abdominal:      General: Bowel sounds are normal. There is no distension.      Palpations: Abdomen is soft.      Tenderness: There is no abdominal tenderness.   Genitourinary:     Comments: indwelling Osullivan catheter  Musculoskeletal:         General: No swelling or tenderness. Normal range of motion.      Cervical back: Normal range of motion and neck supple. No muscular tenderness.   Skin:     General: Skin is warm and dry.      Findings: No erythema or rash.   Neurological:      General: No focal deficit present.      Mental Status: He is alert and oriented to person, place, and time.      Cranial Nerves: No cranial nerve deficit.      Motor: No weakness.   Psychiatric:         Mood and Affect: Mood normal.         Behavior: Behavior normal.     Results Review:  I have reviewed the labs, radiology results, and diagnostic studies.    Laboratory Data:   Results from last 7 days   Lab Units 05/19/23  0541 05/18/23  1231 05/16/23  0445   WBC 10*3/mm3 9.32 8.39 8.30   HEMOGLOBIN g/dL 10.5* 10.9* 10.2*   HEMATOCRIT % 33.6* 35.5*  32.9*   PLATELETS 10*3/mm3 265 227 152       Results from last 7 days   Lab Units 05/19/23  0541 05/16/23  0445 05/15/23  0346 05/14/23  0445 05/13/23  0834   SODIUM mmol/L 139 133* 135* 137 135*   POTASSIUM mmol/L 4.0 3.8 3.3* 4.1 3.3*   CHLORIDE mmol/L 102 98 101 101 98   CO2 mmol/L 26.0 24.0 18.0* 24.0 23.0   BUN mg/dL 25* 39* 26* 19 20   CREATININE mg/dL 0.56* 1.12 0.94 0.78 0.72*   CALCIUM mg/dL 9.5 8.7 8.1* 8.2 8.3   BILIRUBIN mg/dL  --  0.4  --  0.5 0.5   ALK PHOS U/L  --  66  --  50 51   ALT (SGPT) U/L  --  78*  --  26 21   AST (SGOT) U/L  --  172*  --  56* 38   GLUCOSE mg/dL 130* 146* 138* 110* 134*       Culture Data:   Blood Culture   Date Value Ref Range Status   05/13/2023 No growth at 4 days  Preliminary   05/13/2023 No growth at 4 days  Preliminary   05/13/2023 No growth at 4 days  Preliminary   05/13/2023 No growth at 4 days  Preliminary     I have reviewed the patient's current medications.     Assessment/Plan   Assessment  Active Hospital Problems    Diagnosis     **Pneumonia of right lower lobe due to infectious organism     New onset atrial fibrillation with rapid ventricular response     Sepsis due to pneumonia     Hypoxia     Mediastinal adenopathy     Other fatigue     Non-rheumatic mitral regurgitation     Coronary artery disease involving native coronary artery of native heart without angina pectoris     Essential hypertension     Mixed hyperlipidemia     History of bladder cancer     Benign localized prostatic hyperplasia without lower urinary tract symptoms (LUTS)      Treatment Plan  Pneumonia right lower lobe.  Respiratory panel, PCR negative.  Strep pneumo and Legionella urinary antigens negative.  MRSA screen negative, vancomycin discontinued 5/14.  Blood culture with no growth to date.  Follow respiratory culture.  Continue Zosyn.  Repeat CT chest on 5/18.    Hypoxic, requiring up to 9-8 L nasal cannula. Now down to 2 L. Continue Atrovent.  Mucinex, incentive spirometer.  Wean oxygen  as tolerated.  Walking oximetry at time of discharge.    New onset atrial fibrillation with rapid ventricular response in the setting of hypoxia and pneumonia.  Cardiology consulted.  Had recent cath with EF 40-45%.  Moderate mitral regurgitation.  Was originally on dual antiplatelet therapy for history of coronary artery disease with recent stenting.  [id number] 5.  Cardiology had stopped his aspirin and started him on Eliquis in conjunction with Plavix.  Converted to normal sinus rhythm.  Continue Toprol-XL 50 mg and transition Cardizem fast-acting to Cardizem  mg.  Results for orders placed during the hospital encounter of 05/13/23    Adult Transthoracic Echo Complete W/ Cont if Necessary Per Protocol    Interpretation Summary    Left ventricular systolic function is mildly decreased. Left ventricular ejection fraction appears to be 41 - 45%.    The following segments are hypokinetic: apical anterior, apical septal, apical inferior, apical lateral and apex.    Left ventricular wall thickness is consistent with mild septal asymmetric hypertrophy.    Left ventricular diastolic function is consistent with (grade I) impaired relaxation.    He left atrial cavity is mildly dilated.    There is mild to moderate thickening of the aortic valve.    . Mild to moderate mitral valve regurgitation is present    Mild pulmonary hypertension is present.    History of coronary artery disease with recent stenting to LAD on 4/8.  No chest pain.  Continue Plavix, statin, beta-blocker, and ARB.    Indwelling Osullivan catheter placed on 5/14 due to acute urinary retention.  Has had some pink/red-tinged urine with small clots noted.  Hemoglobin stable at 10.5.  Will need voiding trial.    PT/OT.  Therapy recommends home with home health.    SCDs for DVT prophylaxis.    Medical Decision Making  Number and Complexity of problems: 3 acute problems in the form of pneumonia right lower lobe, hypoxia, new onset atrial fibrillation with  rapid ventricular response  Differential Diagnosis: None considered at present    Conditions and Status        Condition is improving.     [facility] Data  External documents reviewed: Prior epic records  Cardiac tracing (EKG, telemetry) interpretation: Atrial fibrillation  Radiology interpretation: Interpreted by radiology  Labs reviewed: As above  Any tests that were considered but not ordered: None considered at present     Decision rules/scores evaluated (example [id number], [place], etc): [id number] 5.     Discussed with: Patient, and Dr. Alexander.     Care Planning  Shared decision making: Patient is agreeable to ongoing work-up and treatment  Code status and discussions: No CPR with limited support to include no intubation and no cardioversion.  Patient's niece Zamzam Marie is the power of .    Disposition  Social Determinants of Health that impact treatment or disposition: Home health  I expect the patient to be discharged to home with home health in 2-3 days.     Electronically signed by LARRY John, 05/19/23, 11:06 CDT.

## 2023-05-19 NOTE — SIGNIFICANT NOTE
Patient still having intermittent clots and bleeding through Urine. Clinician team notified and continued order for plavix and eliquis at this time. Currently patients urine is clear yellow with 2 small clots present in hernández bag. At shift change urine was red tinged, but clear.

## 2023-05-19 NOTE — PLAN OF CARE
Goal Outcome Evaluation:              Outcome Evaluation: NTN LOS assessment. Reported good appetite. PO 75% of one meal, average 120 mL oral fluid/day. Wt stable. Menu provided, encouraged daily food selections and PO intake. Does not meet criteria for malnutrition. NTN following per protocol.

## 2023-05-20 LAB
HCT VFR BLD AUTO: 32.6 % (ref 37.5–51)
HGB BLD-MCNC: 10 G/DL (ref 13–17.7)

## 2023-05-20 PROCEDURE — 25010000002 PIPERACILLIN SOD-TAZOBACTAM PER 1 G: Performed by: NURSE PRACTITIONER

## 2023-05-20 PROCEDURE — 85014 HEMATOCRIT: CPT | Performed by: NURSE PRACTITIONER

## 2023-05-20 PROCEDURE — 94799 UNLISTED PULMONARY SVC/PX: CPT

## 2023-05-20 PROCEDURE — 85018 HEMOGLOBIN: CPT | Performed by: NURSE PRACTITIONER

## 2023-05-20 PROCEDURE — 97116 GAIT TRAINING THERAPY: CPT

## 2023-05-20 PROCEDURE — 94664 DEMO&/EVAL PT USE INHALER: CPT

## 2023-05-20 RX ADMIN — IPRATROPIUM BROMIDE 0.5 MG: 0.5 SOLUTION RESPIRATORY (INHALATION) at 15:04

## 2023-05-20 RX ADMIN — APIXABAN 5 MG: 5 TABLET, FILM COATED ORAL at 08:43

## 2023-05-20 RX ADMIN — IPRATROPIUM BROMIDE 0.5 MG: 0.5 SOLUTION RESPIRATORY (INHALATION) at 10:49

## 2023-05-20 RX ADMIN — Medication 1000 MCG: at 08:43

## 2023-05-20 RX ADMIN — BENZONATATE 200 MG: 100 CAPSULE ORAL at 20:56

## 2023-05-20 RX ADMIN — TAZOBACTAM SODIUM AND PIPERACILLIN SODIUM 3.38 G: 375; 3 INJECTION, SOLUTION INTRAVENOUS at 20:52

## 2023-05-20 RX ADMIN — DILTIAZEM HYDROCHLORIDE 240 MG: 240 CAPSULE, EXTENDED RELEASE ORAL at 08:43

## 2023-05-20 RX ADMIN — METOPROLOL SUCCINATE 50 MG: 50 TABLET, EXTENDED RELEASE ORAL at 08:43

## 2023-05-20 RX ADMIN — GUAIFENESIN 1200 MG: 600 TABLET, EXTENDED RELEASE ORAL at 20:52

## 2023-05-20 RX ADMIN — ATORVASTATIN CALCIUM 40 MG: 40 TABLET, FILM COATED ORAL at 08:43

## 2023-05-20 RX ADMIN — IPRATROPIUM BROMIDE 0.5 MG: 0.5 SOLUTION RESPIRATORY (INHALATION) at 20:07

## 2023-05-20 RX ADMIN — TAZOBACTAM SODIUM AND PIPERACILLIN SODIUM 3.38 G: 375; 3 INJECTION, SOLUTION INTRAVENOUS at 02:15

## 2023-05-20 RX ADMIN — CLOPIDOGREL BISULFATE 75 MG: 75 TABLET, FILM COATED ORAL at 08:43

## 2023-05-20 RX ADMIN — MAGNESIUM GLUCONATE 500 MG ORAL TABLET 800 MG: 500 TABLET ORAL at 08:43

## 2023-05-20 RX ADMIN — Medication 6 MG: at 20:56

## 2023-05-20 RX ADMIN — GUAIFENESIN 1200 MG: 600 TABLET, EXTENDED RELEASE ORAL at 08:43

## 2023-05-20 RX ADMIN — LOSARTAN POTASSIUM 50 MG: 50 TABLET, FILM COATED ORAL at 08:43

## 2023-05-20 RX ADMIN — Medication 10 ML: at 20:52

## 2023-05-20 RX ADMIN — APIXABAN 5 MG: 5 TABLET, FILM COATED ORAL at 20:52

## 2023-05-20 RX ADMIN — TEMAZEPAM 15 MG: 15 CAPSULE ORAL at 20:56

## 2023-05-20 RX ADMIN — TAZOBACTAM SODIUM AND PIPERACILLIN SODIUM 3.38 G: 375; 3 INJECTION, SOLUTION INTRAVENOUS at 11:11

## 2023-05-20 RX ADMIN — IPRATROPIUM BROMIDE 0.5 MG: 0.5 SOLUTION RESPIRATORY (INHALATION) at 07:06

## 2023-05-20 RX ADMIN — PANTOPRAZOLE SODIUM 40 MG: 40 TABLET, DELAYED RELEASE ORAL at 08:43

## 2023-05-20 RX ADMIN — Medication 10 ML: at 08:43

## 2023-05-20 NOTE — PLAN OF CARE
Goal Outcome Evaluation:  Plan of Care Reviewed With: patient        Progress: improving  Outcome Evaluation: Urine color improving, hernández draining to bedside.  No falls noted.  IV abx continued.  SB/S 59-76 BBB, PVC, PAC.  2 l/min NC conintued with good sats.  Patient became uncomfortable around 330 and moved to chair.  No new skin issues noted.  Call light in reach.  Patient up with asst x 1 with walker.

## 2023-05-20 NOTE — PROGRESS NOTES
[facility] Medicine Services  INPATIENT PROGRESS NOTE    Patient Name: Wyatt Curry  Date of Admission: 5/13/2023  Today's Date: 05/20/23  Length of Stay: 7  Primary Care Physician: Everardo [place] MD    Subjective   Chief Complaint: Shortness of breath   HPI   Patient sitting at bedside surrounded by family. No new complaints.     Review of Systems   All pertinent negatives and positives are as above. All other systems have been reviewed and are negative unless otherwise stated.     Objective    Temp:  [97.5 °F (36.4 °C)-98.6 °F (37 °C)] 97.5 °F (36.4 °C)  Heart Rate:  [63-75] 70  Resp:  [16] 16  BP: (119-158)/(51-64) 158/64  Physical Exam  Constitutional:       General: He is not in acute distress.     Appearance: He is ill-appearing. He is not toxic-appearing.      Interventions: Nasal cannula in place.      Comments: Up in chair.  No acute distress.  On 2 L nasal cannula.  Discussed with his nurse.  HENT:      Head: Normocephalic and atraumatic.      Mouth/Throat:      Mouth: Mucous membranes are moist.      Pharynx: Oropharynx is clear.   Eyes:      Extraocular Movements: Extraocular movements intact.      Conjunctiva/sclera: Conjunctivae normal.      Pupils: Pupils are equal, round, and reactive to light.   Cardiovascular:      Rate and Rhythm: Sinus rhythm.      Pulses: Normal pulses.   Pulmonary:      Effort: Pulmonary effort is normal. No respiratory distress.      Breath sounds: Normal breath sounds. No wheezing.   Abdominal:      General: Bowel sounds are normal. There is no distension.      Palpations: Abdomen is soft.      Tenderness: There is no abdominal tenderness.   Musculoskeletal:         General: No swelling or tenderness. Normal range of motion.      Cervical back: Normal range of motion and neck supple. No muscular tenderness.   Skin:     General: Skin is warm and dry.      Findings: No erythema or rash.   Neurological:      General: No focal deficit  present.      Mental Status: He is alert and oriented to person, place, and time.      Cranial Nerves: No cranial nerve deficit.      Motor: No weakness.   Psychiatric:         Mood and Affect: Mood normal.         Behavior: Behavior normal.       Results Review:  I have reviewed the labs, radiology results, and diagnostic studies.    Laboratory Data:   Results from last 7 days   Lab Units 05/20/23  0339 05/19/23  0541 05/18/23  1231 05/16/23  0445   WBC 10*3/mm3  --  9.32 8.39 8.30   HEMOGLOBIN g/dL 10.0* 10.5* 10.9* 10.2*   HEMATOCRIT % 32.6* 33.6* 35.5* 32.9*   PLATELETS 10*3/mm3  --  265 227 152        Results from last 7 days   Lab Units 05/19/23  0541 05/16/23  0445 05/15/23  0346 05/14/23  0445   SODIUM mmol/L 139 133* 135* 137   POTASSIUM mmol/L 4.0 3.8 3.3* 4.1   CHLORIDE mmol/L 102 98 101 101   CO2 mmol/L 26.0 24.0 18.0* 24.0   BUN mg/dL 25* 39* 26* 19   CREATININE mg/dL 0.56* 1.12 0.94 0.78   CALCIUM mg/dL 9.5 8.7 8.1* 8.2   BILIRUBIN mg/dL  --  0.4  --  0.5   ALK PHOS U/L  --  66  --  50   ALT (SGPT) U/L  --  78*  --  26   AST (SGOT) U/L  --  172*  --  56*   GLUCOSE mg/dL 130* 146* 138* 110*       Culture Data:   Respiratory Culture   Date Value Ref Range Status   05/17/2023   Final    Rare Normal respiratory senthil. No S. aureus or Pseudomonas aeruginosa detected. Final report.       Radiology Data:   Imaging Results (Last 24 Hours)       ** No results found for the last 24 hours. **            I have reviewed the patient's current medications.     Assessment/Plan   Assessment  Active Hospital Problems    Diagnosis     **Pneumonia of right lower lobe due to infectious organism     New onset atrial fibrillation with rapid ventricular response     Sepsis due to pneumonia     Hypoxia     Mediastinal adenopathy     Other fatigue     Non-rheumatic mitral regurgitation     Coronary artery disease involving native coronary artery of native heart without angina pectoris     Essential hypertension     Mixed  hyperlipidemia     History of bladder cancer     Benign localized prostatic hyperplasia without lower urinary tract symptoms (LUTS)        Treatment Plan  Pneumonia right lower lobe.  Respiratory panel, PCR negative.  Strep pneumo and Legionella urinary antigens negative.  MRSA screen negative, vancomycin discontinued 5/14.  Blood culture with no growth to date.  Follow respiratory culture.  Continue Zosyn.  Consider repeat chest imaging.     Hypoxic, requiring up to 9-8 L nasal cannula. Now on 6 L. Continue Atrovent.  Mucinex, incentive spirometer.  Wean oxygen as tolerated.     New onset atrial fibrillation with rapid ventricular response in the setting of hypoxia and pneumonia.  Had recent cath with EF 40-45%.  Moderate mitral regurgitation.  On dual antiplatelet therapy for history of coronary artery disease with recent stenting.  [id number] 5. No anticoagulation as patient is on dual antiplatelet therapy.  Continue efforts at rate control.  Currently on Cardizem 5 mg/hour.  Atrial fibrillation 101-119.  Continue Toprol-XL 50 mg.  Cardiology following, planning for repeat echocardiogram today.     History of coronary artery disease with recent stenting to LAD on 4/8.  No chest pain.  Continue aspirin, Plavix, statin, beta-blocker, calcium channel blocker and ARB.     PT/OT.     SCDs for DVT prophylaxis.    Medical Decision Making  Number and Complexity of problems: 3 acute problems in the form of pneumonia right lower lobe, hypoxia, new onset atrial fibrillation with rapid ventricular response  Differential Diagnosis: None considered at present    Conditions and Status        Condition is improving.     [facility] Data  External documents reviewed: Prior [facility] records  Cardiac tracing (EKG, telemetry) interpretation: Atrial fibrillation  Radiology interpretation: Interpreted by radiology  Labs reviewed: As above  Any tests that were considered but not ordered: None considered at present     Decision rules/scores  evaluated (example [id number], [place], etc): [id number].     Discussed with: Patient, tonio RN and family     Care Planning  Shared decision making: Patient is agreeable to ongoing work-up and treatment  Code status and discussions: No CPR with limited support to include no intubation and no cardioversion.  Patient's niece Zamzam Marie is the power of .    Disposition  Social Determinants of Health that impact treatment or disposition:  Home health  I expect the patient to be discharged to home with home health in 3-4 days.     Electronically signed by Iggy Alexander MD, 05/20/23, 15:25 CDT.

## 2023-05-20 NOTE — PLAN OF CARE
Goal Outcome Evaluation:  Plan of Care Reviewed With: patient        Progress: improving  Outcome Evaluation: PT tx completed. Pt has no c/o this pm. Denies any pn. Up in chair, transfers CG/binu Rosario 40' x 3 with r wx CG on 2L oxygen.

## 2023-05-20 NOTE — THERAPY TREATMENT NOTE
Acute Care - Physical Therapy Treatment Note  [facility]     Patient Name: Wyatt Curry  : 1930  MRN: [id number]  Today's Date: 2023      Visit Dx:     ICD-10-CM ICD-9-CM   1. Pneumonia of right lower lobe due to infectious organism  J18.9 486   2. Hypoxia  R09.02 799.02   3. Other iron deficiency anemia  D50.8 280.8   4. Impaired mobility  Z74.09 799.89     Patient Active Problem List   Diagnosis    Benign localized prostatic hyperplasia without lower urinary tract symptoms (LUTS)    History of bladder cancer    Left rotator cuff tear arthropathy    Coronary artery disease involving native coronary artery of native heart without angina pectoris    Left bundle branch block    Essential hypertension    Mixed hyperlipidemia    Overweight (BMI 25.0-29.9)    Non-rheumatic mitral regurgitation    Other fatigue    Left ventricular diastolic dysfunction    History of coronary artery stent placement    Lung nodule    Mediastinal adenopathy    Cough    Abnormal positron emission tomography (PET) scan    Basal cell carcinoma    Neoplasm of uncertain behavior    Elevated troponin    Bleeding    Ischemic cardiomyopathy    Pneumonia of right lower lobe due to infectious organism    New onset atrial fibrillation with rapid ventricular response    Sepsis due to pneumonia    Hypoxia     Past Medical History:   Diagnosis Date    Arthritis     Bladder cancer     Bronchitis     CAD (coronary artery disease)     Cancer     bladder cancer    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    GERD (gastroesophageal reflux disease)     Hyperlipidemia     Hypertension     Irregular heart beat     Lung nodules      Past Surgical History:   Procedure Laterality Date    BLADDER SURGERY      CARDIAC CATHETERIZATION      CARDIAC CATHETERIZATION Left 2023    Procedure: Cardiac Catheterization/Vascular Study;  Surgeon: Sukhi Hartley MD;  Location: [facility] INVASIVE LOCATION;  Service: Cardiology;  Laterality: Left;     COLONOSCOPY      CORONARY ANGIOPLASTY WITH STENT PLACEMENT  2006    STENT X 2    CYSTOSCOPY      ENDOSCOPY      EXCISION LESION N/A 4/7/2023    Procedure: EXCISION LESION OF RIGHT TEMPLE AND LEFT TEMPLE WITH FROZEN SECTION WITH POSSIBLE FLAP OR GRAFT;  Surgeon: Brijesh Nickerson MD;  Location:  [facility] OR;  Service: ENT;  Laterality: N/A;    FLAP HEAD/NECK Left 03/09/2020    Procedure: POSSIBLE FLAP;  Surgeon: Brijesh Nickerson MD;  Location:  [facility] OR;  Service: ENT;  Laterality: Left;    HEAD/NECK LESION/CYST EXCISION Left 03/09/2020    Procedure: 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 with frozen section and possible flap or graft;  Surgeon: Brijesh Nickesron MD;  Location:  [facility] OR;  Service: ENT;  Laterality: Left;    HERNIA REPAIR      SHOULDER SURGERY      SKIN BIOPSY      lip biopsy    SKIN FULL THICKNESS GRAFT Left 03/09/2020    Procedure: OR GRAFT;  Surgeon: Brijesh Nickerson MD;  Location:  [facility] OR;  Service: ENT;  Laterality: Left;    TRANSURETHRAL RESECTION OF BLADDER TUMOR       PT Assessment (last 12 hours)       PT Evaluation and Treatment       Row Name 05/20/23 1404          Physical Therapy Time and Intention    Subjective Information no complaints  -KJ     Document Type therapy note (daily note)  -KJ     Mode of Treatment physical therapy  -KJ     Patient Effort good  -KJ       Row Name 05/20/23 1404          General Information    Existing Precautions/Restrictions fall;oxygen therapy device and L/min  -KJ       Row Name 05/20/23 1404          Pain    Pretreatment Pain Rating 0/10 - no pain  -KJ     Posttreatment Pain Rating 0/10 - no pain  -KJ       Row Name 05/20/23 1404          Bed Mobility    Comment, (Bed Mobility) up in chair  -KJ       Row Name 05/20/23 1404          Sit-Stand Transfer    Sit-Stand [place] (Transfers) verbal cues;contact guard  -KJ       Row Name 05/20/23 1404          Stand-Sit Transfer    Stand-Sit [place]  (Transfers) contact guard;verbal cues  -KJ       Row Name 05/20/23 1404          Gait/Stairs (Locomotion)    [place] Level (Gait) verbal cues;contact guard  -KJ     Assistive Device (Gait) walker, front-wheeled  -KJ     Distance in Feet (Gait) 40' x 3  -KJ     Pattern (Gait) step-to  -KJ     Bilateral Gait Deviations forward flexed posture  -KJ       Row Name 05/20/23 1404          Positioning and Restraints    Pre-Treatment Position sitting in chair/recliner  -KJ     Post Treatment Position chair  -KJ     In Bed call light within reach  -KJ               User Key  (r) = Recorded By, (t) = Taken By, (c) = Cosigned By      Initials Name Provider Type    Alice Lucio, PTA Physical Therapist Assistant                    Physical Therapy Education       Title: PT OT SLP Therapies (In Progress)       Topic: Physical Therapy (In Progress)       Point: Mobility training (Done)       Learning Progress Summary             Patient Acceptance, EALICIA DU by ALFREDITO at 5/15/2023 1102    Comment: benefits of activity, progression of PT   Family Acceptance, E, GONZALO VARGAS by ALFREDITO at 5/15/2023 1102    Comment: benefits of activity, progression of PT                         Point: Home exercise program (Not Started)       Learner Progress:  Not documented in this visit.              Point: Body mechanics (Not Started)       Learner Progress:  Not documented in this visit.              Point: Precautions (Not Started)       Learner Progress:  Not documented in this visit.                              User Key       Initials Effective Dates Name Provider Type Odilon GLASER 02/03/23 -  Satish Hall PT DPT Physical Therapist PT                  PT Recommendation and Plan     Plan of Care Reviewed With: patient  Progress: improving  Outcome Evaluation: PT tx completed. Pt has no c/o this pm. Denies any pn. Up in chair, transfers CG/Abraham, amb 40' x 3 with r wx CG on 2L oxygen.   Outcome Measures       Row Name 05/20/23 1400 05/19/23  1100          How much help from another person do you currently need...    Turning from your back to your side while in flat bed without using bedrails? 4  -KJ 4  -KJ     Moving from lying on back to sitting on the side of a flat bed without bedrails? 4  -KJ 4  -KJ     Moving to and from a bed to a chair (including a wheelchair)? 4  -KJ 4  -KJ     Standing up from a chair using your arms (e.g., wheelchair, bedside chair)? 4  -KJ 3  -KJ     Climbing 3-5 steps with a railing? 3  -KJ 3  -KJ     To walk in hospital room? 3  -KJ 3  -KJ     AM-PAC 6 Clicks Score (PT) 22  -KJ 21  -KJ        Functional Assessment    Outcome Measure Options AM-PAC 6 Clicks Basic Mobility (PT)  -KJ AM-PAC 6 Clicks Basic Mobility (PT)  -KJ               User Key  (r) = Recorded By, (t) = Taken By, (c) = Cosigned By      Initials Name Provider Type    Alice Lucio PTA Physical Therapist Assistant                     Time Calculation:    PT Charges       Row Name 05/20/23 1442             Time Calculation    Start Time 1404  -KJ      Stop Time 1420  -KJ      Time Calculation (min) 16 min  -KJ      PT Received On 05/20/23  -KJ      PT Goal Re-Cert Due Date 05/25/23  -KJ                User Key  (r) = Recorded By, (t) = Taken By, (c) = Cosigned By      Initials Name Provider Type    Alice Lucio PTA Physical Therapist Assistant                  Therapy Charges for Today       Code Description Service Date Service Provider Modifiers Qty    38667822217 HC GAIT TRAINING EA 15 MIN 5/19/2023 Alice Gonzalez, PTA GP 2    29579401408 HC PT THER PROC EA 15 MIN 5/19/2023 Alice Gonzalez, PTA GP 1    01494997540 HC GAIT TRAINING EA 15 MIN 5/19/2023 Alice Gonzalez, PTA GP 1    86155198807 HC GAIT TRAINING EA 15 MIN 5/20/2023 Alice Gonzalez, PTA GP 1            PT G-Codes  Outcome Measure Options: AM-PAC 6 Clicks Basic Mobility (PT)  AM-PAC 6 Clicks Score (PT): 22  AM-PAC 6 Clicks Score (OT): 17    Alice Gonzalez PTA  5/20/2023

## 2023-05-21 PROCEDURE — 25010000002 PIPERACILLIN SOD-TAZOBACTAM PER 1 G: Performed by: NURSE PRACTITIONER

## 2023-05-21 PROCEDURE — 94799 UNLISTED PULMONARY SVC/PX: CPT

## 2023-05-21 PROCEDURE — 94760 N-INVAS EAR/PLS OXIMETRY 1: CPT

## 2023-05-21 PROCEDURE — 94664 DEMO&/EVAL PT USE INHALER: CPT

## 2023-05-21 PROCEDURE — 97116 GAIT TRAINING THERAPY: CPT

## 2023-05-21 RX ADMIN — PANTOPRAZOLE SODIUM 40 MG: 40 TABLET, DELAYED RELEASE ORAL at 08:21

## 2023-05-21 RX ADMIN — TAZOBACTAM SODIUM AND PIPERACILLIN SODIUM 3.38 G: 375; 3 INJECTION, SOLUTION INTRAVENOUS at 10:32

## 2023-05-21 RX ADMIN — Medication 10 ML: at 08:20

## 2023-05-21 RX ADMIN — ATORVASTATIN CALCIUM 40 MG: 40 TABLET, FILM COATED ORAL at 08:20

## 2023-05-21 RX ADMIN — MAGNESIUM GLUCONATE 500 MG ORAL TABLET 800 MG: 500 TABLET ORAL at 08:19

## 2023-05-21 RX ADMIN — Medication 10 ML: at 20:14

## 2023-05-21 RX ADMIN — Medication 1000 MCG: at 08:20

## 2023-05-21 RX ADMIN — IPRATROPIUM BROMIDE 0.5 MG: 0.5 SOLUTION RESPIRATORY (INHALATION) at 06:17

## 2023-05-21 RX ADMIN — CLOPIDOGREL BISULFATE 75 MG: 75 TABLET, FILM COATED ORAL at 08:20

## 2023-05-21 RX ADMIN — TAZOBACTAM SODIUM AND PIPERACILLIN SODIUM 3.38 G: 375; 3 INJECTION, SOLUTION INTRAVENOUS at 01:36

## 2023-05-21 RX ADMIN — IPRATROPIUM BROMIDE 0.5 MG: 0.5 SOLUTION RESPIRATORY (INHALATION) at 10:18

## 2023-05-21 RX ADMIN — Medication 6 MG: at 21:35

## 2023-05-21 RX ADMIN — TEMAZEPAM 15 MG: 15 CAPSULE ORAL at 21:35

## 2023-05-21 RX ADMIN — METOPROLOL SUCCINATE 50 MG: 50 TABLET, EXTENDED RELEASE ORAL at 08:20

## 2023-05-21 RX ADMIN — DILTIAZEM HYDROCHLORIDE 240 MG: 240 CAPSULE, EXTENDED RELEASE ORAL at 08:19

## 2023-05-21 RX ADMIN — IPRATROPIUM BROMIDE 0.5 MG: 0.5 SOLUTION RESPIRATORY (INHALATION) at 20:03

## 2023-05-21 RX ADMIN — LOSARTAN POTASSIUM 50 MG: 50 TABLET, FILM COATED ORAL at 08:20

## 2023-05-21 RX ADMIN — GUAIFENESIN 1200 MG: 600 TABLET, EXTENDED RELEASE ORAL at 20:14

## 2023-05-21 RX ADMIN — IPRATROPIUM BROMIDE 0.5 MG: 0.5 SOLUTION RESPIRATORY (INHALATION) at 14:00

## 2023-05-21 RX ADMIN — TAZOBACTAM SODIUM AND PIPERACILLIN SODIUM 3.38 G: 375; 3 INJECTION, SOLUTION INTRAVENOUS at 17:38

## 2023-05-21 RX ADMIN — GUAIFENESIN 1200 MG: 600 TABLET, EXTENDED RELEASE ORAL at 08:20

## 2023-05-21 NOTE — PROGRESS NOTES
[facility] Medicine Services  INPATIENT PROGRESS NOTE    Patient Name: Wyatt Curry  Date of Admission: 5/13/2023  Today's Date: 05/21/23  Length of Stay: 8  Primary Care Physician: Everardo Jackson MD    Subjective   Chief Complaint: Shortness of breath   HPI   Patient sitting at bedside surrounded by family. No new complaints. On room air.     Review of Systems   All pertinent negatives and positives are as above. All other systems have been reviewed and are negative unless otherwise stated.     Objective    Temp:  [97.6 °F (36.4 °C)-98.2 °F (36.8 °C)] 97.6 °F (36.4 °C)  Heart Rate:  [60-73] 71  Resp:  [16] 16  BP: (119-134)/(40-70) 134/54  Physical Exam  Constitutional:       General: He is not in acute distress.     Appearance: He is not ill appearing. He is not toxic-appearing.      Interventions: Nasal cannula in place.      Comments: Up in chair.  No acute distress. Discussed with his nurse.  HENT:      Head: Normocephalic and atraumatic.      Mouth/Throat:      Mouth: Mucous membranes are moist.      Pharynx: Oropharynx is clear.   Eyes:      Extraocular Movements: Extraocular movements intact.      Conjunctiva/sclera: Conjunctivae normal.      Pupils: Pupils are equal, round, and reactive to light.   Cardiovascular:      Rate and Rhythm: Sinus rhythm.      Pulses: Normal pulses.   Pulmonary:      Effort: Pulmonary effort is normal. No respiratory distress.      Breath sounds: Normal breath sounds. No wheezing.   Abdominal:      General: Bowel sounds are normal. There is no distension.      Palpations: Abdomen is soft.      Tenderness: There is no abdominal tenderness.   Musculoskeletal:         General: No swelling or tenderness. Normal range of motion.      Cervical back: Normal range of motion and neck supple. No muscular tenderness.   Skin:     General: Skin is warm and dry.      Findings: No erythema or rash.   Neurological:      General: No focal deficit  present.      Mental Status: He is alert and oriented to person, place, and time.      Cranial Nerves: No cranial nerve deficit.      Motor: No weakness.   Psychiatric:         Mood and Affect: Mood normal.         Behavior: Behavior normal.       Results Review:  I have reviewed the labs, radiology results, and diagnostic studies.    Laboratory Data:   Results from last 7 days   Lab Units 05/20/23  0339 05/19/23  0541 05/18/23  1231 05/16/23  0445   WBC 10*3/mm3  --  9.32 8.39 8.30   HEMOGLOBIN g/dL 10.0* 10.5* 10.9* 10.2*   HEMATOCRIT % 32.6* 33.6* 35.5* 32.9*   PLATELETS 10*3/mm3  --  265 227 152          Results from last 7 days   Lab Units 05/19/23  0541 05/16/23  0445 05/15/23  0346   SODIUM mmol/L 139 133* 135*   POTASSIUM mmol/L 4.0 3.8 3.3*   CHLORIDE mmol/L 102 98 101   CO2 mmol/L 26.0 24.0 18.0*   BUN mg/dL 25* 39* 26*   CREATININE mg/dL 0.56* 1.12 0.94   CALCIUM mg/dL 9.5 8.7 8.1*   BILIRUBIN mg/dL  --  0.4  --    ALK PHOS U/L  --  66  --    ALT (SGPT) U/L  --  78*  --    AST (SGOT) U/L  --  172*  --    GLUCOSE mg/dL 130* 146* 138*         Culture Data:   Respiratory Culture   Date Value Ref Range Status   05/17/2023   Final    Rare Normal respiratory senthil. No S. aureus or Pseudomonas aeruginosa detected. Final report.       Radiology Data:   Imaging Results (Last 24 Hours)       ** No results found for the last 24 hours. **            I have reviewed the patient's current medications.     Assessment/Plan   Assessment  Active Hospital Problems    Diagnosis     **Pneumonia of right lower lobe due to infectious organism     New onset atrial fibrillation with rapid ventricular response     Sepsis due to pneumonia     Hypoxia     Mediastinal adenopathy     Other fatigue     Non-rheumatic mitral regurgitation     Coronary artery disease involving native coronary artery of native heart without angina pectoris     Essential hypertension     Mixed hyperlipidemia     History of bladder cancer     Benign  localized prostatic hyperplasia without lower urinary tract symptoms (LUTS)        Treatment Plan  Pneumonia right lower lobe.  Respiratory panel, PCR negative.  Strep pneumo and Legionella urinary antigens negative.  MRSA screen negative, vancomycin discontinued 5/14.  Blood culture with no growth to date.  Follow respiratory culture.  Continue Zosyn.  Consider repeat chest imaging.     Hypoxic, requiring up to 9-8 L nasal cannula. Now on 6 L. Continue Atrovent.  Mucinex, incentive spirometer.  Wean oxygen as tolerated.     New onset atrial fibrillation with rapid ventricular response in the setting of hypoxia and pneumonia.  Had recent cath with EF 40-45%.  Moderate mitral regurgitation.  On dual antiplatelet therapy for history of coronary artery disease with recent stenting.  [id number] 5. No anticoagulation as patient is on dual antiplatelet therapy.  Continue efforts at rate control.  Currently on Cardizem 5 mg/hour.  Atrial fibrillation 101-119.  Continue Toprol-XL 50 mg.  Cardiology following, planning for repeat echocardiogram today.     History of coronary artery disease with recent stenting to LAD on 4/8.  No chest pain.  Continue aspirin, Plavix, statin, beta-blocker, calcium channel blocker and ARB.     PT/OT.     SCDs for DVT prophylaxis.    Probably home tomorrow.     Medical Decision Making  Number and Complexity of problems: 3 acute problems in the form of pneumonia right lower lobe, hypoxia, new onset atrial fibrillation with rapid ventricular response  Differential Diagnosis: None considered at present    Conditions and Status        Condition is improving.     [facility] Data  External documents reviewed: Prior [facility] records  Cardiac tracing (EKG, telemetry) interpretation: Atrial fibrillation  Radiology interpretation: Interpreted by radiology  Labs reviewed: As above  Any tests that were considered but not ordered: None considered at present     Decision rules/scores evaluated (example [id number],  Gurvinder, etc): [id number] 5.     Discussed with: Patient, tonio RN and family     Care Planning  Shared decision making: Patient is agreeable to ongoing work-up and treatment  Code status and discussions: No CPR with limited support to include no intubation and no cardioversion.  Patient's niece Zamzam Marie is the power of .    Disposition  Social Determinants of Health that impact treatment or disposition:  Home health  I expect the patient to be discharged to home with home health in 3-4 days.     Electronically signed by Iggy Alexander MD, 05/21/23, 15:11 CDT.

## 2023-05-21 NOTE — PLAN OF CARE
Goal Outcome Evaluation:  Plan of Care Reviewed With: patient        Progress: no change  Outcome Evaluation: NSR 61-72 BBB, PAC, PVC. IV ABX zosyn continued.  No falls noted.  No new skin issues noted.  Patient a very pleasant man, a/o x 4, hearing aids available at bedside.  Eliquis PO continued.  [place] draining clear urine to bedside.  Up with walker asst x 1.  Call light in reach.  Gave restoril and melatonin to aid sleep last night.

## 2023-05-21 NOTE — THERAPY TREATMENT NOTE
Acute Care - Physical Therapy Treatment Note  [facility]     Patient Name: Wyatt Curry  : 1930  MRN: [id number]  Today's Date: 2023      Visit Dx:     ICD-10-CM ICD-9-CM   1. Pneumonia of right lower lobe due to infectious organism  J18.9 486   2. Hypoxia  R09.02 799.02   3. Other iron deficiency anemia  D50.8 280.8   4. Impaired mobility  Z74.09 799.89     Patient Active Problem List   Diagnosis    Benign localized prostatic hyperplasia without lower urinary tract symptoms (LUTS)    History of bladder cancer    Left rotator cuff tear arthropathy    Coronary artery disease involving native coronary artery of native heart without angina pectoris    Left bundle branch block    Essential hypertension    Mixed hyperlipidemia    Overweight (BMI 25.0-29.9)    Non-rheumatic mitral regurgitation    Other fatigue    Left ventricular diastolic dysfunction    History of coronary artery stent placement    Lung nodule    Mediastinal adenopathy    Cough    Abnormal positron emission tomography (PET) scan    Basal cell carcinoma    Neoplasm of uncertain behavior    Elevated troponin    Bleeding    Ischemic cardiomyopathy    Pneumonia of right lower lobe due to infectious organism    New onset atrial fibrillation with rapid ventricular response    Sepsis due to pneumonia    Hypoxia     Past Medical History:   Diagnosis Date    Arthritis     Bladder cancer     Bronchitis     CAD (coronary artery disease)     Cancer     bladder cancer    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    GERD (gastroesophageal reflux disease)     Hyperlipidemia     Hypertension     Irregular heart beat     Lung nodules      Past Surgical History:   Procedure Laterality Date    BLADDER SURGERY      CARDIAC CATHETERIZATION      CARDIAC CATHETERIZATION Left 2023    Procedure: Cardiac Catheterization/Vascular Study;  Surgeon: Sukhi Hartley MD;  Location: [facility] INVASIVE LOCATION;  Service: Cardiology;  Laterality: Left;     COLONOSCOPY      CORONARY ANGIOPLASTY WITH STENT PLACEMENT  2006    STENT X 2    CYSTOSCOPY      ENDOSCOPY      EXCISION LESION N/A 4/7/2023    Procedure: EXCISION LESION OF RIGHT TEMPLE AND LEFT TEMPLE WITH FROZEN SECTION WITH POSSIBLE FLAP OR GRAFT;  Surgeon: Brijesh Nickerson MD;  Location:  [facility] OR;  Service: ENT;  Laterality: N/A;    FLAP HEAD/NECK Left 03/09/2020    Procedure: POSSIBLE FLAP;  Surgeon: Brijesh Nickerson MD;  Location:  [facility] OR;  Service: ENT;  Laterality: Left;    HEAD/NECK LESION/CYST EXCISION Left 03/09/2020    Procedure: 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 with frozen section and possible flap or graft;  Surgeon: Brijesh Nickerson MD;  Location:  [facility] OR;  Service: ENT;  Laterality: Left;    HERNIA REPAIR      SHOULDER SURGERY      SKIN BIOPSY      lip biopsy    SKIN FULL THICKNESS GRAFT Left 03/09/2020    Procedure: OR GRAFT;  Surgeon: Brijesh Nickerson MD;  Location:  [facility] OR;  Service: ENT;  Laterality: Left;    TRANSURETHRAL RESECTION OF BLADDER TUMOR       PT Assessment (last 12 hours)       PT Evaluation and Treatment       Row Name 05/21/23 0930          Physical Therapy Time and Intention    Subjective Information no complaints  -KJ     Document Type therapy note (daily note)  -KJ     Mode of Treatment physical therapy  -KJ     Patient Effort good  -KJ       Row Name 05/21/23 0930          General Information    Existing Precautions/Restrictions fall  -KJ       Row Name 05/21/23 0930          Pain    Pretreatment Pain Rating 0/10 - no pain  -KJ     Posttreatment Pain Rating 0/10 - no pain  -KJ       Row Name 05/21/23 0930          Bed Mobility    Comment, (Bed Mobility) up in chair  -KJ       Row Name 05/21/23 0930          Sit-Stand Transfer    Sit-Stand [place] (Transfers) verbal cues;contact guard  -KJ     Assistive Device (Sit-Stand Transfers) walker, front-wheeled  -KJ       Row Name 05/21/23 0930          Stand-Sit  Transfer    Stand-Sit [place] (Transfers) verbal cues;contact guard  -KJ     Assistive Device (Stand-Sit Transfers) walker, front-wheeled  -KJ       Row Name 05/21/23 0930          Gait/Stairs (Locomotion)    [place] Level (Gait) verbal cues;contact guard  -KJ     Assistive Device (Gait) walker, front-wheeled  -KJ     Distance in Feet (Gait) 40' x 3  -KJ     Pattern (Gait) step-through  -KJ     Deviations/Abnormal Patterns (Gait) gait speed decreased;stride length decreased  -KJ     Bilateral Gait Deviations forward flexed posture  -KJ       Row Name 05/21/23 0930          Positioning and Restraints    Pre-Treatment Position sitting in chair/recliner  -KJ     Post Treatment Position chair  -KJ     In Bed call light within reach  -KJ               User Key  (r) = Recorded By, (t) = Taken By, (c) = Cosigned By      Initials Name Provider Type    Alice Lucio, PTA Physical Therapist Assistant                    Physical Therapy Education       Title: PT OT SLP Therapies (In Progress)       Topic: Physical Therapy (In Progress)       Point: Mobility training (Done)       Learning Progress Summary             Patient Acceptance, VALERIE, GONZALO VARGAS by ALFREDITO at 5/15/2023 1102    Comment: benefits of activity, progression of PT   Family Acceptance, E, VU,DU by ALFREDITO at 5/15/2023 1102    Comment: benefits of activity, progression of PT                         Point: Home exercise program (Not Started)       Learner Progress:  Not documented in this visit.              Point: Body mechanics (Not Started)       Learner Progress:  Not documented in this visit.              Point: Precautions (Not Started)       Learner Progress:  Not documented in this visit.                              User Key       Initials Effective Dates Name Provider Type Odilon GLASER 02/03/23 -  Satish Hall PT DPT Physical Therapist PT                  PT Recommendation and Plan     Plan of Care Reviewed With: patient  Progress:  improving  Outcome Evaluation: PT tx completed. Pt has no c/o this am. On room air. Transfers S, amb with rwx CG short distances. Mobilzing well, benefit from HH or contd PT services at discharge.   Outcome Measures       Row Name 05/21/23 1000 05/20/23 1400 05/19/23 1100       How much help from another person do you currently need...    Turning from your back to your side while in flat bed without using bedrails? 4  -KJ 4  -KJ 4  -KJ    Moving from lying on back to sitting on the side of a flat bed without bedrails? 4  -KJ 4  -KJ 4  -KJ    Moving to and from a bed to a chair (including a wheelchair)? 4  -KJ 4  -KJ 4  -KJ    Standing up from a chair using your arms (e.g., wheelchair, bedside chair)? 4  -KJ 4  -KJ 3  -KJ    Climbing 3-5 steps with a railing? 3  -KJ 3  -KJ 3  -KJ    To walk in hospital room? 3  -KJ 3  -KJ 3  -KJ    AM-PAC 6 Clicks Score (PT) 22  -KJ 22  -KJ 21  -KJ       Functional Assessment    Outcome Measure Options AM-PAC 6 Clicks Basic Mobility (PT)  -KJ AM-PAC 6 Clicks Basic Mobility (PT)  -KJ AM-PAC 6 Clicks Basic Mobility (PT)  -KJ              User Key  (r) = Recorded By, (t) = Taken By, (c) = Cosigned By      Initials Name Provider Type    Alice Lucio PTA Physical Therapist Assistant                     Time Calculation:    PT Charges       Row Name 05/21/23 1025             Time Calculation    Start Time 0930  -KJ      Stop Time 0945  -KJ      Time Calculation (min) 15 min  -KJ      PT Received On 05/21/23  -KJ      PT Goal Re-Cert Due Date 05/25/23  -KJ         Time Calculation- PT    Total Timed Code Minutes- PT 15 minute(s)  -KJ                User Key  (r) = Recorded By, (t) = Taken By, (c) = Cosigned By      Initials Name Provider Type    Alice Lucio PTA Physical Therapist Assistant                  Therapy Charges for Today       Code Description Service Date Service Provider Modifiers Qty    49648604416 HC GAIT TRAINING EA 15 MIN 5/20/2023 Alice Gonzalez, ALIYA GP 1     [id number]  GAIT TRAINING EA 15 MIN 5/21/2023 Alice Gonzalez, PTA GP 1            PT G-Codes  Outcome Measure Options: AM-PAC 6 Clicks Basic Mobility (PT)  AM-PAC 6 Clicks Score (PT): 22  AM-PAC 6 Clicks Score (OT): 17    Alice Gonzalez PTA  5/21/2023

## 2023-05-21 NOTE — PLAN OF CARE
Goal Outcome Evaluation:  Plan of Care Reviewed With: patient        Progress: improving  Outcome Evaluation: PT tx completed. Pt has no c/o this am. On room air. Transfers S, amb with rwx CG short distances. Mobilzing well, benefit from HH or contd PT services at discharge.

## 2023-05-21 NOTE — PLAN OF CARE
Problem: Adult Inpatient Plan of Care  Goal: Plan of Care Review  Outcome: Ongoing, Progressing  Flowsheets (Taken 5/21/2023 1808)  Progress: improving  Plan of Care Reviewed With: patient  Outcome Evaluation: Pt had no c/o pain this shift. IV ABX given. [place] remains in place, some pink tinged urine noted this afternoon. Eliquis d/c per pt and MD. Other VSS, remains on room air, sats WNL. Safety maintained, up in chair for most of the day. Possible d/c tomorrow. Will update MD as needed

## 2023-05-22 ENCOUNTER — READMISSION MANAGEMENT (OUTPATIENT)
Dept: CALL CENTER | Facility: HOSPITAL | Age: 88
End: 2023-05-22
Payer: MEDICARE

## 2023-05-22 VITALS
SYSTOLIC BLOOD PRESSURE: 144 MMHG | WEIGHT: 152.2 LBS | OXYGEN SATURATION: 92 % | HEART RATE: 73 BPM | HEIGHT: 64 IN | BODY MASS INDEX: 25.99 KG/M2 | DIASTOLIC BLOOD PRESSURE: 58 MMHG | TEMPERATURE: 97.8 F | RESPIRATION RATE: 16 BRPM

## 2023-05-22 PROBLEM — R09.02 HYPOXIA: Status: RESOLVED | Noted: 2023-05-15 | Resolved: 2023-05-22

## 2023-05-22 PROBLEM — A41.9 SEPSIS DUE TO PNEUMONIA: Status: RESOLVED | Noted: 2023-05-15 | Resolved: 2023-05-22

## 2023-05-22 PROBLEM — I48.91 NEW ONSET ATRIAL FIBRILLATION: Status: RESOLVED | Noted: 2023-05-15 | Resolved: 2023-05-22

## 2023-05-22 PROBLEM — J18.9 SEPSIS DUE TO PNEUMONIA: Status: RESOLVED | Noted: 2023-05-15 | Resolved: 2023-05-22

## 2023-05-22 PROCEDURE — 25010000002 PIPERACILLIN SOD-TAZOBACTAM PER 1 G: Performed by: NURSE PRACTITIONER

## 2023-05-22 PROCEDURE — 94799 UNLISTED PULMONARY SVC/PX: CPT

## 2023-05-22 PROCEDURE — 94664 DEMO&/EVAL PT USE INHALER: CPT

## 2023-05-22 PROCEDURE — 97110 THERAPEUTIC EXERCISES: CPT

## 2023-05-22 PROCEDURE — 97530 THERAPEUTIC ACTIVITIES: CPT

## 2023-05-22 PROCEDURE — 97116 GAIT TRAINING THERAPY: CPT

## 2023-05-22 RX ORDER — DILTIAZEM HYDROCHLORIDE 240 MG/1
240 CAPSULE, COATED, EXTENDED RELEASE ORAL
Qty: 30 CAPSULE | Refills: 12 | Status: SHIPPED | OUTPATIENT
Start: 2023-05-23

## 2023-05-22 RX ORDER — CEFDINIR 300 MG/1
300 CAPSULE ORAL 2 TIMES DAILY
Qty: 14 CAPSULE | Refills: 0 | Status: SHIPPED | OUTPATIENT
Start: 2023-05-22 | End: 2023-05-29

## 2023-05-22 RX ADMIN — TAZOBACTAM SODIUM AND PIPERACILLIN SODIUM 3.38 G: 375; 3 INJECTION, SOLUTION INTRAVENOUS at 09:22

## 2023-05-22 RX ADMIN — GUAIFENESIN 1200 MG: 600 TABLET, EXTENDED RELEASE ORAL at 09:21

## 2023-05-22 RX ADMIN — Medication 1000 MCG: at 09:21

## 2023-05-22 RX ADMIN — TAZOBACTAM SODIUM AND PIPERACILLIN SODIUM 3.38 G: 375; 3 INJECTION, SOLUTION INTRAVENOUS at 03:16

## 2023-05-22 RX ADMIN — PANTOPRAZOLE SODIUM 40 MG: 40 TABLET, DELAYED RELEASE ORAL at 09:23

## 2023-05-22 RX ADMIN — IPRATROPIUM BROMIDE 0.5 MG: 0.5 SOLUTION RESPIRATORY (INHALATION) at 10:27

## 2023-05-22 RX ADMIN — LOSARTAN POTASSIUM 50 MG: 50 TABLET, FILM COATED ORAL at 09:21

## 2023-05-22 RX ADMIN — DILTIAZEM HYDROCHLORIDE 240 MG: 240 CAPSULE, EXTENDED RELEASE ORAL at 09:22

## 2023-05-22 RX ADMIN — METOPROLOL SUCCINATE 50 MG: 50 TABLET, EXTENDED RELEASE ORAL at 09:22

## 2023-05-22 RX ADMIN — IPRATROPIUM BROMIDE 0.5 MG: 0.5 SOLUTION RESPIRATORY (INHALATION) at 14:24

## 2023-05-22 RX ADMIN — ATORVASTATIN CALCIUM 40 MG: 40 TABLET, FILM COATED ORAL at 09:21

## 2023-05-22 RX ADMIN — MAGNESIUM GLUCONATE 500 MG ORAL TABLET 800 MG: 500 TABLET ORAL at 09:21

## 2023-05-22 RX ADMIN — CLOPIDOGREL BISULFATE 75 MG: 75 TABLET, FILM COATED ORAL at 09:21

## 2023-05-22 RX ADMIN — Medication 10 ML: at 09:22

## 2023-05-22 RX ADMIN — IPRATROPIUM BROMIDE 0.5 MG: 0.5 SOLUTION RESPIRATORY (INHALATION) at 06:15

## 2023-05-22 NOTE — PLAN OF CARE
Goal Outcome Evaluation:  Plan of Care Reviewed With: patient        Progress: improving  Outcome Evaluation: Pt was up in the chair, eager to participate with therapy.  Transfered sit to stand with CGA.  Amb into the hallway with RWX and CGA.  Performed LE exercises.  Would benefit from home health therapy to continue to increase strength and improve mobiltiy.

## 2023-05-22 NOTE — THERAPY TREATMENT NOTE
Patient Name: Wyatt Curry  : 1930    MRN: [id number]                              Today's Date: 2023       Admit Date: 2023    Visit Dx:     ICD-10-CM ICD-9-CM   1. Pneumonia of right lower lobe due to infectious organism  J18.9 486   2. Hypoxia  R09.02 799.02   3. Other iron deficiency anemia  D50.8 280.8   4. Impaired mobility  Z74.09 799.89     Patient Active Problem List   Diagnosis    Benign localized prostatic hyperplasia without lower urinary tract symptoms (LUTS)    History of bladder cancer    Left rotator cuff tear arthropathy    Coronary artery disease involving native coronary artery of native heart without angina pectoris    Left bundle branch block    Essential hypertension    Mixed hyperlipidemia    Overweight (BMI 25.0-29.9)    Non-rheumatic mitral regurgitation    Other fatigue    Left ventricular diastolic dysfunction    History of coronary artery stent placement    Lung nodule    Mediastinal adenopathy    Cough    Abnormal positron emission tomography (PET) scan    Basal cell carcinoma    Neoplasm of uncertain behavior    Elevated troponin    Bleeding    Ischemic cardiomyopathy    Pneumonia of right lower lobe due to infectious organism    New onset atrial fibrillation with rapid ventricular response    Sepsis due to pneumonia    Hypoxia     Past Medical History:   Diagnosis Date    Arthritis     Bladder cancer     Bronchitis     CAD (coronary artery disease)     Cancer     bladder cancer    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    GERD (gastroesophageal reflux disease)     Hyperlipidemia     Hypertension     Irregular heart beat     Lung nodules      Past Surgical History:   Procedure Laterality Date    BLADDER SURGERY      CARDIAC CATHETERIZATION      CARDIAC CATHETERIZATION Left 2023    Procedure: Cardiac Catheterization/Vascular Study;  Surgeon: Sukhi Hartley MD;  Location:  [facility] CATH INVASIVE LOCATION;  Service: Cardiology;  Laterality: Left;    COLONOSCOPY       CORONARY ANGIOPLASTY WITH STENT PLACEMENT  2006    STENT X 2    CYSTOSCOPY      ENDOSCOPY      EXCISION LESION N/A 4/7/2023    Procedure: EXCISION LESION OF RIGHT TEMPLE AND LEFT TEMPLE WITH FROZEN SECTION WITH POSSIBLE FLAP OR GRAFT;  Surgeon: Brijesh Nickerson MD;  Location:  [facility] OR;  Service: ENT;  Laterality: N/A;    FLAP HEAD/NECK Left 03/09/2020    Procedure: POSSIBLE FLAP;  Surgeon: Brijesh Nickerson MD;  Location:  [facility] OR;  Service: ENT;  Laterality: Left;    HEAD/NECK LESION/CYST EXCISION Left 03/09/2020    Procedure: 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 with frozen section and possible flap or graft;  Surgeon: Brijesh Nickerson MD;  Location:  [facility] OR;  Service: ENT;  Laterality: Left;    HERNIA REPAIR      SHOULDER SURGERY      SKIN BIOPSY      lip biopsy    SKIN FULL THICKNESS GRAFT Left 03/09/2020    Procedure: OR GRAFT;  Surgeon: Brijesh Nickerson MD;  Location:  [facility] OR;  Service: ENT;  Laterality: Left;    TRANSURETHRAL RESECTION OF BLADDER TUMOR        General Information       Row Name 05/22/23 1002          OT Time and Intention    Document Type therapy note (daily note)  -LR     Mode of Treatment occupational therapy  -LR       Row Name 05/22/23 1002          General Information    Patient Profile Reviewed yes  -LR     Existing Precautions/Restrictions fall  -LR               User Key  (r) = Recorded By, (t) = Taken By, (c) = Cosigned By      Initials Name Provider Type    LR April Orozco, OTR/L Occupational Therapist                     Mobility/ADL's       Row Name 05/22/23 1002          Bed Mobility    Comment, (Bed Mobility) sitting in chair  -LR       Row Name 05/22/23 1002          Transfers    Transfers sit-stand transfer;stand-sit transfer;toilet transfer  -LR       Row Name 05/22/23 1002          Sit-Stand Transfer    Sit-Stand [place] (Transfers) standby assist;verbal cues  -LR     Assistive Device (Sit-Stand Transfers)  walker, front-wheeled  -LR       Row Name 05/22/23 1002          Stand-Sit Transfer    Stand-Sit [place] (Transfers) standby assist;verbal cues  -LR     Assistive Device (Stand-Sit Transfers) walker, front-wheeled  -LR       Row Name 05/22/23 1002          Toilet Transfer    Type (Toilet Transfer) sit-stand;stand-sit  -LR     [place] Level (Toilet Transfer) standby assist;verbal cues  -LR     Assistive Device (Toilet Transfer) commode;walker, front-wheeled  -LR       Row Name 05/22/23 1002          Functional Mobility    Functional Mobility- Ind. Level contact guard assist;standby assist  -LR     Functional Mobility- Device walker, front-wheeled  -LR       Row Name 05/22/23 1002          Activities of Daily Living    BADL Assessment/Intervention grooming  -LR       Row Name 05/22/23 1002          Grooming Assessment/Training    [place] Level (Grooming) wash face, hands;standby assist  -LR     Position (Grooming) sink side;unsupported standing  -LR               User Key  (r) = Recorded By, (t) = Taken By, (c) = Cosigned By      Initials Name Provider Type    April Pa OTR/L Occupational Therapist                   Obj/Interventions       Row Name 05/22/23 1002          Balance    Balance Assessment sitting static balance;sitting dynamic balance;standing static balance;standing dynamic balance  -LR     Static Sitting Balance independent  -LR     Dynamic Sitting Balance supervision  -LR     Position, Sitting Balance supported;sitting in chair  -LR     Static Standing Balance standby assist  -LR     Dynamic Standing Balance contact guard;standby assist  -LR     Position/Device Used, Standing Balance supported;walker, front-wheeled  -LR               User Key  (r) = Recorded By, (t) = Taken By, (c) = Cosigned By      Initials Name Provider Type    April Pa OTR/L Occupational Therapist                   Goals/Plan    No documentation.                  Clinical Impression        Row Name 05/22/23 1002          Pain Assessment    Pretreatment Pain Rating 0/10 - no pain  -LR     Posttreatment Pain Rating 0/10 - no pain  -LR       Row Name 05/22/23 1002          Plan of Care Review    Plan of Care Reviewed With patient  -LR     Progress improving  -LR     Outcome Evaluation OT tx completed. Pt reports plans to return home soon. Pt educated on home safety with handout provided, pt verbalized understanding. Pt reports currently having 24/7 assist at home, but unsure about future plans. Pt educated on importance of having assist at home d/t increased fall risk. Pt completed sit<>stand t/f's from chair and toilet with SBA. Cues required for tech and hand placement. Pt completed fxl mobility in room using FWW with CGA/SBA. Grooming completed while standing sink-side with SBA. Continue OT POC in order to increase pt safety and I during ADLs, fxl mobility, and fxl t/f's.  -LR       Row Name 05/22/23 1002          Therapy Plan Review/Discharge Plan (OT)    Anticipated Discharge Disposition (OT) home with 24/7 care;home with home health  -LR       Row Name 05/22/23 1002          Vital Signs    O2 Delivery Pre Treatment room air  -LR     O2 Delivery Intra Treatment room air  -LR     O2 Delivery Post Treatment room air  -LR     Pre Patient Position Sitting  -LR     Intra Patient Position Standing  -LR     Post Patient Position Sitting  -LR       Row Name 05/22/23 1002          Positioning and Restraints    Pre-Treatment Position sitting in chair/recliner  -LR     Post Treatment Position chair  -LR     In Chair sitting;call light within reach;encouraged to call for assist  -LR               User Key  (r) = Recorded By, (t) = Taken By, (c) = Cosigned By      Initials Name Provider Type    LR April Orozco, OTR/L Occupational Therapist                   Outcome Measures       Row Name 05/22/23 1002          How much help from another is currently needed...    Putting on and taking off regular lower  body clothing? 2  -LR     Bathing (including washing, rinsing, and drying) 3  -LR     Toileting (which includes using toilet bed pan or urinal) 3  -LR     Putting on and taking off regular upper body clothing 3  -LR     Taking care of personal grooming (such as brushing teeth) 3  -LR     Eating meals 4  -LR     AM-PAC 6 Clicks Score (OT) 18  -LR       Row Name 05/22/23 0921          How much help from another person do you currently need...    Turning from your back to your side while in flat bed without using bedrails? 4  -AC     Moving from lying on back to sitting on the side of a flat bed without bedrails? 4  -AC     Moving to and from a bed to a chair (including a wheelchair)? 4  -AC     Standing up from a chair using your arms (e.g., wheelchair, bedside chair)? 4  -AC     Climbing 3-5 steps with a railing? 3  -AC     To walk in hospital room? 3  -AC     AM-PAC 6 Clicks Score (PT) 22  -AC     Highest level of mobility 7 --> Walked 25 feet or more  -AC       Row Name 05/22/23 1002          Functional Assessment    Outcome Measure Options AM-PAC 6 Clicks Daily Activity (OT)  -LR               User Key  (r) = Recorded By, (t) = Taken By, (c) = Cosigned By      Initials Name Provider Type    AC Jeferson Jacob, RN Registered Nurse    April Pa, OTR/L Occupational Therapist                    Occupational Therapy Education       Title: PT OT SLP Therapies (In Progress)       Topic: Occupational Therapy (In Progress)       Point: ADL training (Done)       Description:   Instruct learner(s) on proper safety adaptation and remediation techniques during self care or transfers.   Instruct in proper use of assistive devices.                  Learning Progress Summary             Patient Acceptance, E,D,H, VU,NR by LR at 5/22/2023 1029    Acceptance, E, VU by KAREN at 5/14/2023 1253                         Point: Home exercise program (Not Started)       Description:   Instruct learner(s) on appropriate  technique for monitoring, assisting and/or progressing therapeutic exercises/activities.                  Learner Progress:  Not documented in this visit.              Point: Precautions (Done)       Description:   Instruct learner(s) on prescribed precautions during self-care and functional transfers.                  Learning Progress Summary             Patient Acceptance, E,D,H, VU,NR by LR at 5/22/2023 1029    Acceptance, E, VU by  at 5/14/2023 1459                         Point: Body mechanics (Done)       Description:   Instruct learner(s) on proper positioning and spine alignment during self-care, functional mobility activities and/or exercises.                  Learning Progress Summary             Patient Acceptance, E,D,H, VU,NR by LR at 5/22/2023 1029                                         User Key       Initials Effective Dates Name Provider Type Discipline    KAREN 11/10/21 -  Kassi Jennings OTR/L, RADHIKA Occupational Therapist OT    LR 04/25/23 -  April Orozco OTR/L Occupational Therapist OT                  OT Recommendation and Plan     Plan of Care Review  Plan of Care Reviewed With: patient  Progress: improving  Outcome Evaluation: OT tx completed. Pt reports plans to return home soon. Pt educated on home safety with handout provided, pt verbalized understanding. Pt reports currently having 24/7 assist at home, but unsure about future plans. Pt educated on importance of having assist at home d/t increased fall risk. Pt completed sit<>stand t/f's from chair and toilet with SBA. Cues required for tech and hand placement. Pt completed fxl mobility in room using FWW with CGA/SBA. Grooming completed while standing sink-side with SBA. Continue OT POC in order to increase pt safety and I during ADLs, fxl mobility, and fxl t/f's.     Time Calculation:    Time Calculation- OT       Row Name 05/22/23 1002             Time Calculation- OT    OT Start Time 1002  -LR      OT Stop Time 1030  -LR       OT Time Calculation (min) 28 min  -LR      Total Timed Code Minutes- OT 28 minute(s)  -LR      OT Received On 05/22/23  -LR         Timed Charges    66130 - OT Therapeutic Activity Minutes 28  -LR         Total Minutes    Timed Charges Total Minutes 28  -LR       Total Minutes 28  -LR                User Key  (r) = Recorded By, (t) = Taken By, (c) = Cosigned By      Initials Name Provider Type    LR April Orozco OTR/L Occupational Therapist                  Therapy Charges for Today       Code Description Service Date Service Provider Modifiers Qty    [id number]  OT THERAPEUTIC ACT EA 15 MIN 5/22/2023 April Orozco OTR/L GO 2                 MELIA Oconnell/MARIELA  5/22/2023

## 2023-05-22 NOTE — PLAN OF CARE
Goal Outcome Evaluation:  Plan of Care Reviewed With: patient        Progress: improving  Outcome Evaluation: OT tx completed. Pt reports plans to return home soon. Pt educated on home safety with handout provided, pt verbalized understanding. Pt reports currently having 24/7 assist at home, but unsure about future plans. Pt educated on importance of having assist at home d/t increased fall risk. Pt completed sit<>stand t/f's from chair and toilet with SBA. Cues required for tech and hand placement. Pt completed fxl mobility in room using FWW with CGA/SBA. Grooming completed while standing sink-side with SBA. Continue OT POC in order to increase pt safety and I during ADLs, fxl mobility, and fxl t/f's.

## 2023-05-22 NOTE — DISCHARGE SUMMARY
[facility] Medicine Services  DISCHARGE SUMMARY       Date of Admission: 5/13/2023  Date of Discharge:  5/22/2023  Primary Care Physician: Everardo Jackson MD    Presenting Problem/History of Present Illness:  Patient is a 89-year-old  male presented to ER by EMS complaining of fatigue.  Patient's baseline is independent and active.  He usually ambulate without assistant.  For the past 3 days patient had a fever of 103 at time.  Patient also complain of chronic cough, coughing more than usual.  Patient has more shortness of breath specially with exertion.  When patient ambulate he complain of shortness of breath.       Final Discharge Diagnoses:  Active Hospital Problems    Diagnosis     **Pneumonia of right lower lobe due to infectious organism     New onset atrial fibrillation with rapid ventricular response     Sepsis due to pneumonia     Hypoxia     Mediastinal adenopathy     Other fatigue     Non-rheumatic mitral regurgitation     Coronary artery disease involving native coronary artery of native heart without angina pectoris     Essential hypertension     Mixed hyperlipidemia     History of bladder cancer     Benign localized prostatic hyperplasia without lower urinary tract symptoms (LUTS)      Consults:   Dr Shultz, Cardiology    Pertinent Test Results:   Results for orders placed during the hospital encounter of 05/13/23    Adult Transthoracic Echo Complete W/ Cont if Necessary Per Protocol    Interpretation Summary    Left ventricular systolic function is mildly decreased. Left ventricular ejection fraction appears to be 41 - 45%.    The following segments are hypokinetic: apical anterior, apical septal, apical inferior, apical lateral and apex.    Left ventricular wall thickness is consistent with mild septal asymmetric hypertrophy.    Left ventricular diastolic function is consistent with (grade I) impaired relaxation.    He left atrial cavity is mildly  dilated.    There is mild to moderate thickening of the aortic valve.    . Mild to moderate mitral valve regurgitation is present    Mild pulmonary hypertension is present.      Imaging Results (All)       Procedure Component Value Units Date/Time    CT Chest Without Contrast Diagnostic [[id number]] Collected: 05/18/23 0941     Updated: 05/18/23 0953    Narrative:      EXAM: CT CHEST WO CONTRAST DIAGNOSTIC- - 5/18/2023 8:53 AM CDT     HISTORY: Significant right lower lobe pneumonia; J18.9-Pneumonia,  unspecified organism; R09.02-Hypoxemia; D50.8-Other iron deficiency  anemias; Z74.09-Other reduced mobility       COMPARISON: 5/13/2023.      DOSE LENGTH PRODUCT: 111 mGy cm. Automatic exposure control was utilized  to make radiation dose as low as reasonably achievable.     TECHNIQUE: Unenhanced CT images obtained with multiplanar reformats.     FINDINGS: Motion limited evaluation.     AIRWAYS/PULMONARY PARENCHYMA: The central airways are midline and  patent. Small airway thickening. Small RIGHT pleural fluid. Trace LEFT  pleural fluid. Consolidative opacity in the RIGHT lower lobe with air  bronchograms, suggestive of infection. Small LEFT lower lobe opacity,  likely compressive atelectasis.     VASCULATURE: Limited evaluation of the vasculature without intravenous  contrast. Thoracic aorta is normal in course and caliber with moderate  calcified atherosclerosis. Enlarged pulmonary artery measures 3.7 cm in  transverse dimension, which could be seen with pulmonary artery  hypertension.     CARDIAC:  Borderline heart size with heavily calcified coronary arteries  verses stent material.  No pericardial effusion.       MEDIASTINUM: Esophagus has normal course, caliber and wall thickness.  Borderline mediastinal lymph nodes, favored to be reactive.      EXTRATHORACIC: Limited evaluation of the thoracic inlet due to metallic  streak artifact from LEFT shoulder replacement. Diminutive thyroid. No  axillary adenopathy. Mild  body wall edema. Overlying soft tissues  otherwise within normal limits.      INCLUDED UPPER ABDOMEN: Subcentimeter low-density lesion in the anterior  liver on axial series 2, image 139 is similar compared to 12/9/2019.  Stability favors benignity. Visualized portion of the pancreas, spleen,  adrenal glands and upper kidneys appear within normal limits.     OSSEOUS: Similar mild height loss of T12 compared as far back as  12/9/2019. No new acute or suspicious bony finding.           Impression:      1. Consolidative opacity in the RIGHT lower lobe with air bronchograms  and small airway thickening most likely pneumonia. Small RIGHT and trace  LEFT pleural fluid. Small LEFT lower lobe dependent opacity, likely  compressive atelectasis.  2. Enlarged pulmonary artery measures 3.7 cm in transverse dimension,  which can be seen with pulmonary artery hypertension.  3. Borderline heart size with heavily calcified coronary arteries verses  stent material.  4. Similar chronic compression deformity of T12.  This report was finalized on 05/18/2023 09:50 by Dr Kait Ibarra MD.    CT Angiogram Chest [[id number]] Collected: 05/13/23 1052     Updated: 05/13/23 1058    Narrative:      EXAMINATION: CT ANGIOGRAM CHEST-      5/13/2023 10:26 AM CDT     HISTORY: Recent cardiac catheterization. Shortness of breath with fever  and lower extremity edema.     In order to have a CT radiation dose as low as reasonably achievable  Automated Exposure Control was utilized for adjustment of the mA and/or  KV according to patient size.     DLP in mGycm= 194.     CT angiogram chest with IV contrast.  CT angiography protocol.   CT imaging with bolus IV contrast injection.   Under concurrent supervision axial, sagittal, coronal,  three-dimensional, and MIP data sets were constructed on an independent  work station.     Comparison is made with 08/02/2022.     Normal heart size.  Normal size thoracic aorta.     Mildly enlarged and well opacified  pulmonary arteries.  No pulmonary embolism.     Posterior right lower lobe pneumonia.     Underlying chronic interstitial disease.  No pneumothorax and no significant pleural effusion.  No sign of heart failure.     Prominent diffuse osteopenia.  Severe degenerative spine change with mild chronic anterior wedge  compression of T12.     Summary:  1. No pulmonary embolism.  2. Right lower lobe pneumonia.                                            This report was finalized on 05/13/2023 10:55 by Dr. Mac Lucio MD.    XR Chest 1 View [[id number]] Collected: 05/13/23 0826     Updated: 05/13/23 0830    Narrative:      EXAMINATION: XR CHEST 1 VW-     5/13/2023 8:21 AM CDT     HISTORY: Chest pain.     1 view chest x-ray.     Comparison is made with 03/31/2023.     Stable heart size.     Lower lung volumes with patchy basilar atelectasis.     No pneumothorax or heart failure.     Summary:  1. Hypoaeration with patchy atelectasis.     This report was finalized on 05/13/2023 08:27 by Dr. Mac Lucio MD.          LAB RESULTS:      Lab 05/20/23  0339 05/19/23  0541 05/18/23  1231 05/16/23  0445   WBC  --  9.32 8.39 8.30   HEMOGLOBIN 10.0* 10.5* 10.9* 10.2*   HEMATOCRIT 32.6* 33.6* 35.5* 32.9*   PLATELETS  --  265 227 152   MCV  --  93.6 93.4 93.2         Lab 05/19/23  0541 05/16/23  0445   SODIUM 139 133*   POTASSIUM 4.0 3.8   CHLORIDE 102 98   CO2 26.0 24.0   ANION GAP 11.0 11.0   BUN 25* 39*   CREATININE 0.56* 1.12   EGFR 91.9 61.3   GLUCOSE 130* 146*   CALCIUM 9.5 8.7   MAGNESIUM 1.7  --          Lab 05/16/23  0445   TOTAL PROTEIN 6.5   ALBUMIN 2.9*   GLOBULIN 3.6   ALT (SGPT) 78*   AST (SGOT) 172*   BILIRUBIN 0.4   ALK PHOS 66                     Brief Urine Lab Results  (Last result in the past 365 days)        Color   Clarity   Blood   Leuk Est   Nitrite   Protein   CREAT   Urine HCG        05/14/23 1455 Yellow   Clear   Moderate (2+)   Negative   Negative   100 mg/dL (2+)                 Microbiology Results (last 10  days)       Procedure Component Value - Date/Time    Respiratory Culture - Sputum, Cough [[id number]] Collected: 05/17/23 0742    Lab Status: Final result Specimen: Sputum from Cough Updated: 05/19/23 1004     Respiratory Culture Rare Normal respiratory senthil. No S. aureus or Pseudomonas aeruginosa detected. Final report.     Gram Stain Many (4+) WBCs per low power field      Rare (1+) Epithelial cells per low power field      No organisms seen    Legionella Antigen, Urine - Urine, Urine, Clean Catch [[id number]]  (Normal) Collected: 05/13/23 1814    Lab Status: Final result Specimen: Urine, Clean Catch Updated: 05/13/23 1909     LEGIONELLA ANTIGEN, URINE Negative    S. Pneumo Ag Urine or CSF - Urine, Urine, Clean Catch [[id number]]  (Normal) Collected: 05/13/23 1814    Lab Status: Final result Specimen: Urine, Clean Catch Updated: 05/13/23 1909     Strep Pneumo Ag Negative    Mycoplasma Pneumoniae PCR - Swab, Larynx [[id number]] Collected: 05/13/23 1445    Lab Status: Final result Specimen: Swab from Larynx Updated: 05/17/23 2208     Mycoplasma pneumo by PCR Negative     Comment: No Mycoplasma pneumoniae DNA detected.  This test was developed and its performance characteristics determined  by [facility].  It has not been cleared or approved by the Food and Drug  Administration.  The FDA has determined that such clearance or  approval is not necessary.       Narrative:      Performed at:   - [address]  [id number]  : Tyra Bryan MD, Phone:  [phone]    MRSA Screen, PCR (Inpatient) - Swab, Nares [[id number]]  (Normal) Collected: 05/13/23 1445    Lab Status: Final result Specimen: Swab from Nares Updated: 05/13/23 1620     MRSA PCR No MRSA Detected    Narrative:      The negative predictive value of this diagnostic test is high and should only be used to consider de-escalating anti-MRSA therapy. A positive result may indicate colonization with MRSA and must be  correlated clinically.    Blood Culture - Blood, Arm, Right [[id number]]  (Normal) Collected: 05/13/23 1442    Lab Status: Final result Specimen: Blood from Arm, Right Updated: 05/18/23 1515     Blood Culture No growth at 5 days    Blood Culture - Blood, Arm, Left [[id number]]  (Normal) Collected: 05/13/23 1441    Lab Status: Final result Specimen: Blood from Arm, Left Updated: 05/18/23 1515     Blood Culture No growth at 5 days    Respiratory Panel PCR w/COVID-19(SARS-CoV-2) MILAGRO/SHANNON/MITA/PAD/COR/MAD/TONYA In-House, NP Swab in UTM/VTM, 3-4 HR TAT - Swab, Nasopharynx [[id number]]  (Normal) Collected: 05/13/23 0848    Lab Status: Final result Specimen: Swab from Nasopharynx Updated: 05/13/23 1026     ADENOVIRUS, PCR Not Detected     Coronavirus 229E Not Detected     Coronavirus HKU1 Not Detected     Coronavirus NL63 Not Detected     Coronavirus OC43 Not Detected     COVID19 Not Detected     Human Metapneumovirus Not Detected     Human Rhinovirus/Enterovirus Not Detected     Influenza A PCR Not Detected     Influenza B PCR Not Detected     Parainfluenza Virus 1 Not Detected     Parainfluenza Virus 2 Not Detected     Parainfluenza Virus 3 Not Detected     Parainfluenza Virus 4 Not Detected     RSV, PCR Not Detected     Bordetella pertussis pcr Not Detected     Bordetella parapertussis PCR Not Detected     Chlamydophila pneumoniae PCR Not Detected     Mycoplasma pneumo by PCR Not Detected    Narrative:      In the setting of a positive respiratory panel with a viral infection PLUS a negative procalcitonin without other underlying concern for bacterial infection, consider observing off antibiotics or discontinuation of antibiotics and continue supportive care. If the respiratory panel is positive for atypical bacterial infection (Bordetella pertussis, Chlamydophila pneumoniae, or Mycoplasma pneumoniae), consider antibiotic de-escalation to target atypical bacterial infection.    Blood Culture With NINA - Blood, Arm, Left  "[[id number]]  (Normal) Collected: 05/13/23 0835    Lab Status: Final result Specimen: Blood from Arm, Left Updated: 05/18/23 1200     Blood Culture No growth at 5 days    Blood Culture With NINA - Blood, Arm, Left [[id number]]  (Normal) Collected: 05/13/23 0834    Lab Status: Final result Specimen: Blood from Arm, Left Updated: 05/18/23 1015     Blood Culture No growth at 5 days          Hospital Course:   Mr. Curry is a 89-year-old male who presented to [facility] on 5/13 with fatigue, cough and fever up to 103 °F. He had noticed some dyspnea with exertion and coughing more than usual. He had also complained of fatigue in which he wanted to sleep\" all the time\" which is unusual for him. At baseline he is very independent and is the primary caregiver for his wife who has dementia. He saw his primary care provider 2 days prior to admission and was prescribed amoxicillin for diagnosis of acute bronchitis.     He was admitted to telemetry floor and monitored closely. Initially had high oxygen requirements up to 9 LPM by nasal canula. Treatment was Zosyn with good response. Microbiology was negative. PE was ruled out.     He developed Afib in the context of pneumonia and was treated with Cardizem drip to oral. Eliquis was recommended by the patient and family were concerned with small amounts of blood in urine noted in Osullivan, this it was decided to discontinue Eliquis. Maybe he has lone Afib, the risk of CVA was explained and understood.     Pneumonia is much improved. He has been on room air since yesterday without events. He has very good family support and wants to return home.    During the peak illness he had urinary retention and required a Osullivan. At this point I am recommending removal of Osullivan in 2-3 days at home with home health care and watch for voiding.     Physical Exam on Discharge:  /58 (BP Location: Right arm, Patient Position: Lying)   Pulse 73   Temp 97.8 °F (36.6 °C) (Tympanic)   " "Resp 16   Ht 162.6 cm (64\")   Wt 69 kg (152 lb 3.2 oz)   SpO2 92%   BMI 26.13 kg/m²   Physical Exam  Constitutional:       General: He is not in acute distress.     Appearance: He is not ill appearing. He is not toxic-appearing.      Interventions: Nasal cannula in place.      Comments: Up in chair.  No acute distress.   HENT:      Head: Normocephalic and atraumatic.      Mouth/Throat:      Mouth: Mucous membranes are moist.      Pharynx: Oropharynx is clear.   Eyes:      Extraocular Movements: Extraocular movements intact.      Conjunctiva/sclera: Conjunctivae normal.      Pupils: Pupils are equal, round, and reactive to light.   Cardiovascular:      Rate and Rhythm: Sinus rhythm.      Pulses: Normal pulses.   Pulmonary:      Effort: Pulmonary effort is normal. No respiratory distress.      Breath sounds: Normal breath sounds. No wheezing.   Abdominal:      General: Bowel sounds are normal. There is no distension.      Palpations: Abdomen is soft.      Tenderness: There is no abdominal tenderness.   Musculoskeletal:         General: No swelling or tenderness. Normal range of motion.      Cervical back: Normal range of motion and neck supple. No muscular tenderness.   Skin:     General: Skin is warm and dry.      Findings: No erythema or rash.   Neurological:      General: No focal deficit present.      Mental Status: He is alert and oriented to person, place, and time.      Cranial Nerves: No cranial nerve deficit.      Motor: No weakness.   Psychiatric:         Mood and Affect: Mood normal.         Behavior: Behavior normal.     Condition on Discharge:   Stable    Discharge Disposition:  Home    Discharge Medications:     Discharge Medications        New Medications        Instructions Start Date   cefdinir 300 MG capsule  Commonly known as: OMNICEF   300 mg, Oral, 2 Times Daily      dilTIAZem  MG 24 hr capsule  Commonly known as: CARDIZEM CD   240 mg, Oral, Every 24 Hours Scheduled   Start Date: May 23, " 2023            Changes to Medications        Instructions Start Date   metoprolol succinate XL 25 MG 24 hr tablet  Commonly known as: TOPROL-XL  What changed: when to take this   25 mg, Oral, Every 24 Hours Scheduled             Continue These Medications        Instructions Start Date   acetaminophen 500 MG tablet  Commonly known as: TYLENOL   500 mg, Oral, Every 6 Hours PRN      Aspirin Adult Low Strength 81 MG EC tablet  Generic drug: aspirin   Take 1 tablet by mouth Daily--DO NOT BREAK/CRUSH TABLET      atorvastatin 40 MG tablet  Commonly known as: LIPITOR   40 mg, Oral, Daily      clopidogrel 75 MG tablet  Commonly known as: PLAVIX   75 mg, Oral, Daily      irbesartan 300 MG tablet  Commonly known as: AVAPRO   300 mg, Oral, Nightly      lansoprazole 30 MG capsule  Commonly known as: PREVACID   30 mg, Oral, Daily      magnesium oxide 400 MG tablet  Commonly known as: MAG-OX   1 tablet, Oral, Daily      nitroglycerin 0.4 MG SL tablet  Commonly known as: NITROSTAT   Place 1 tablet under the tongue Every 5 (Five) Minutes As Needed.      vitamin B-12 1000 MCG tablet  Commonly known as: CYANOCOBALAMIN   1,000 mcg, Oral, Daily      Vitron-C  MG tablet  Generic drug: Iron-Vitamin C   1 tablet, Oral, Daily      Zinc 50 MG capsule   50 mg, Oral, Daily             Stop These Medications      amLODIPine 10 MG tablet  Commonly known as: NORVASC     amoxicillin 500 MG capsule  Commonly known as: AMOXIL     benzonatate 200 MG capsule  Commonly known as: TESSALON              This patient has current or prior documentation of an left ventricular ejection fraction (LVEF) of less than or equal to 45%.    Discharge Diet:   Cardiac    Activity at Discharge:   Resume usual activity  Fall precautions  Home PT/OT    Follow-up Appointments:   Future Appointments   Date Time Provider Department Center   7/17/2023  1:45 PM Eduardo Woodard APRN [facility]   7/24/2023  9:00 AM Bell Lawson APRN [facility]       Test  Results Pending at Discharge: None    Electronically signed by Iggy Alexander MD, 05/22/23, 16:14 CDT.    Time: 38 minutes.

## 2023-05-22 NOTE — THERAPY TREATMENT NOTE
Acute Care - Physical Therapy Treatment Note  [facility]     Patient Name: Wyatt Curry  : 1930  MRN: [id number]  Today's Date: 2023      Visit Dx:     ICD-10-CM ICD-9-CM   1. Pneumonia of right lower lobe due to infectious organism  J18.9 486   2. Hypoxia  R09.02 799.02   3. Other iron deficiency anemia  D50.8 280.8   4. Impaired mobility  Z74.09 799.89     Patient Active Problem List   Diagnosis    Benign localized prostatic hyperplasia without lower urinary tract symptoms (LUTS)    History of bladder cancer    Left rotator cuff tear arthropathy    Coronary artery disease involving native coronary artery of native heart without angina pectoris    Left bundle branch block    Essential hypertension    Mixed hyperlipidemia    Overweight (BMI 25.0-29.9)    Non-rheumatic mitral regurgitation    Other fatigue    Left ventricular diastolic dysfunction    History of coronary artery stent placement    Lung nodule    Mediastinal adenopathy    Cough    Abnormal positron emission tomography (PET) scan    Basal cell carcinoma    Neoplasm of uncertain behavior    Elevated troponin    Bleeding    Ischemic cardiomyopathy    Pneumonia of right lower lobe due to infectious organism    New onset atrial fibrillation with rapid ventricular response    Sepsis due to pneumonia    Hypoxia     Past Medical History:   Diagnosis Date    Arthritis     Bladder cancer     Bronchitis     CAD (coronary artery disease)     Cancer     bladder cancer    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    GERD (gastroesophageal reflux disease)     Hyperlipidemia     Hypertension     Irregular heart beat     Lung nodules      Past Surgical History:   Procedure Laterality Date    BLADDER SURGERY      CARDIAC CATHETERIZATION      CARDIAC CATHETERIZATION Left 2023    Procedure: Cardiac Catheterization/Vascular Study;  Surgeon: Sukhi Hartley MD;  Location: [facility] INVASIVE LOCATION;  Service: Cardiology;  Laterality: Left;     COLONOSCOPY      CORONARY ANGIOPLASTY WITH STENT PLACEMENT  2006    STENT X 2    CYSTOSCOPY      ENDOSCOPY      EXCISION LESION N/A 4/7/2023    Procedure: EXCISION LESION OF RIGHT TEMPLE AND LEFT TEMPLE WITH FROZEN SECTION WITH POSSIBLE FLAP OR GRAFT;  Surgeon: Brijesh Nickerson MD;  Location:  [facility] OR;  Service: ENT;  Laterality: N/A;    FLAP HEAD/NECK Left 03/09/2020    Procedure: POSSIBLE FLAP;  Surgeon: Brijesh Nickerson MD;  Location:  [facility] OR;  Service: ENT;  Laterality: Left;    HEAD/NECK LESION/CYST EXCISION Left 03/09/2020    Procedure: 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 with frozen section and possible flap or graft;  Surgeon: Brijesh Nickerson MD;  Location:  [facility] OR;  Service: ENT;  Laterality: Left;    HERNIA REPAIR      SHOULDER SURGERY      SKIN BIOPSY      lip biopsy    SKIN FULL THICKNESS GRAFT Left 03/09/2020    Procedure: OR GRAFT;  Surgeon: Brijesh Nickerson MD;  Location:  [facility] OR;  Service: ENT;  Laterality: Left;    TRANSURETHRAL RESECTION OF BLADDER TUMOR       PT Assessment (last 12 hours)       PT Evaluation and Treatment       Row Name 05/22/23 1342          Physical Therapy Time and Intention    Subjective Information no complaints  -     Document Type therapy note (daily note)  -     Mode of Treatment physical therapy  -       Row Name 05/22/23 1341          General Information    Existing Precautions/Restrictions fall  -       Row Name 05/22/23 1346          Pain    Pretreatment Pain Rating 0/10 - no pain  -     Posttreatment Pain Rating 0/10 - no pain  -       Row Name 05/22/23 134          Bed Mobility    Comment, (Bed Mobility) chair  -       Row Name 05/22/23 1341          Sit-Stand Transfer    Sit-Stand Sampson (Transfers) verbal cues;contact guard  -     Assistive Device (Sit-Stand Transfers) walker, front-wheeled  -       Row Name 05/22/23 1348          Stand-Sit Transfer    Stand-Sit Sampson  (Transfers) verbal cues;contact guard  -LISSA     Assistive Device (Stand-Sit Transfers) walker, front-wheeled  -LISSA       Row Name 05/22/23 1344          Gait/Stairs (Locomotion)    Rush Level (Gait) verbal cues;contact guard  -LISSA     Assistive Device (Gait) walker, front-wheeled  -LISSA     Distance in Feet (Gait) 40' x 3  -LISSA     Pattern (Gait) step-through  -LISSA     Deviations/Abnormal Patterns (Gait) gait speed decreased  -LISSA     Bilateral Gait Deviations forward flexed posture  -LISSA       Row Name 05/22/23 1344          Motor Skills    Comments, Therapeutic Exercise sitting AROM BLE X 20  -LISSA     Additional Documentation Comments, Therapeutic Exercise (Row)  -LISSA       Row Name 05/22/23 1344          Positioning and Restraints    Pre-Treatment Position sitting in chair/recliner  -LISSA     Post Treatment Position chair  -LISSA     In Chair sitting;call light within reach;encouraged to call for assist  -LISSA               User Key  (r) = Recorded By, (t) = Taken By, (c) = Cosigned By      Initials Name Provider Type    Masood Hannon, PTA Physical Therapist Assistant                    Physical Therapy Education       Title: PT OT SLP Therapies (In Progress)       Topic: Physical Therapy (In Progress)       Point: Mobility training (Done)       Learning Progress Summary             Patient Acceptance, E, VU by LISSA at 5/22/2023 1344    Comment: gait, safety, home safety    Acceptance, E, VU,DU by ALFREDITO at 5/15/2023 1102    Comment: benefits of activity, progression of PT   Family Acceptance, E, VU,DU by ALFREDITO at 5/15/2023 1102    Comment: benefits of activity, progression of PT                         Point: Home exercise program (Not Started)       Learner Progress:  Not documented in this visit.              Point: Body mechanics (Not Started)       Learner Progress:  Not documented in this visit.              Point: Precautions (Not Started)       Learner Progress:  Not documented in this visit.                               User Key       Initials Effective Dates Name Provider Type Discipline    ALFREDITO 02/03/23 -  Satish Hall, PT DPT Physical Therapist PT    LISSA 02/03/23 -  Masood Stevenson PTA Physical Therapist Assistant PT                  PT Recommendation and Plan     Plan of Care Reviewed With: patient  Progress: improving  Outcome Evaluation: Pt was up in the chair, eager to participate with therapy.  Transfered sit to stand with CGA.  Amb into the hallway with RWX and CGA.  Performed LE exercises.  Would benefit from home health therapy to continue to increase strength and improve mobiltiy.   Outcome Measures       Row Name 05/22/23 1344 05/21/23 1000 05/20/23 1400       How much help from another person do you currently need...    Turning from your back to your side while in flat bed without using bedrails? 4  -LISSA 4  -KJ 4  -KJ    Moving from lying on back to sitting on the side of a flat bed without bedrails? 4  -LISSA 4  -KJ 4  -KJ    Moving to and from a bed to a chair (including a wheelchair)? 4  -LISSA 4  -KJ 4  -KJ    Standing up from a chair using your arms (e.g., wheelchair, bedside chair)? 4  -LISSA 4  -KJ 4  -KJ    Climbing 3-5 steps with a railing? 3  -LISSA 3  -KJ 3  -KJ    To walk in hospital room? 3  -LISSA 3  -KJ 3  -KJ    AM-PAC 6 Clicks Score (PT) 22  -LISSA 22  -KJ 22  -KJ       Functional Assessment    Outcome Measure Options AM-PAC 6 Clicks Basic Mobility (PT)  -LISSA AM-PAC 6 Clicks Basic Mobility (PT)  -KJ AM-PAC 6 Clicks Basic Mobility (PT)  -KJ              User Key  (r) = Recorded By, (t) = Taken By, (c) = Cosigned By      Initials Name Provider Type    KJ Alice Gonzalez, ALIYA Physical Therapist Assistant    Masood Hannon, ALIYA Physical Therapist Assistant                     Time Calculation:    PT Charges       Row Name 05/22/23 1344             Time Calculation    Start Time 1344  -LISSA      Stop Time 1408  -LISSA      Time Calculation (min) 24 min  -LISSA      PT Received On 05/22/23  -LISSA         Time  Calculation- PT    Total Timed Code Minutes- PT 24 minute(s)  -LISSA         Timed Charges    85374 - PT Therapeutic Exercise Minutes 10  -LISSA      25522 - Gait Training Minutes  14  -LISSA         Total Minutes    Timed Charges Total Minutes 24  -LISSA       Total Minutes 24  -LISSA                User Key  (r) = Recorded By, (t) = Taken By, (c) = Cosigned By      Initials Name Provider Type    Masood Hannon PTA Physical Therapist Assistant                  Therapy Charges for Today       Code Description Service Date Service Provider Modifiers Qty    05282975818 HC GAIT TRAINING EA 15 MIN 5/22/2023 Masood Stevenson PTA GP 1    45481344345 HC PT THER PROC EA 15 MIN 5/22/2023 Masood Stevenson, PTA GP 1            PT G-Codes  Outcome Measure Options: AM-PAC 6 Clicks Basic Mobility (PT)  AM-PAC 6 Clicks Score (PT): 22  AM-PAC 6 Clicks Score (OT): 18    Masood Stevenson PTA  5/22/2023

## 2023-05-22 NOTE — PLAN OF CARE
Goal Outcome Evaluation:  Plan of Care Reviewed With: patient        Progress: improving  Outcome Evaluation: NSR 67-75.  POssible home today.  No falls noted.  Patient up with asst x 1 and walker to chair and BR.  No new skin issues noted.  IV abx continued.  RA with good sats overnight.  Osullivan draining to bedside.  Eliquis stopped.  Call light in reach.  No cpr.

## 2023-05-22 NOTE — CASE MANAGEMENT/SOCIAL WORK
Continued Stay Note  [facility]     Patient Name: Wyatt Curry  MRN: [id number]  Today's Date: 5/22/2023    Admit Date: 5/13/2023    Plan: Home Health   Discharge Plan       Row Name 05/22/23 1242       Plan    Plan Comments Events noted. Plan is still for dc to home when medically stable.  is being requested. Will check with [facility] once dc date known.                   Discharge Codes    No documentation.                 Expected Discharge Date and Time       Expected Discharge Date Expected Discharge Time    May 22, 2023               TREMAINE Haque

## 2023-05-23 ENCOUNTER — HOME HEALTH ADMISSION (OUTPATIENT)
Dept: HOME HEALTH SERVICES | Facility: HOME HEALTHCARE | Age: 88
End: 2023-05-23
Payer: MEDICARE

## 2023-05-23 NOTE — THERAPY DISCHARGE NOTE
Acute Care - Occupational Therapy Discharge Summary  [facility]     Patient Name: Wyatt Curry  : 1930  MRN: [id number]    Today's Date: 2023                 Admit Date: 2023        OT Recommendation and Plan    Visit Dx:    ICD-10-CM ICD-9-CM   1. Pneumonia of right lower lobe due to infectious organism  J18.9 486   2. Hypoxia  R09.02 799.02   3. Other iron deficiency anemia  D50.8 280.8   4. Impaired mobility  Z74.09 799.89                OT Rehab Goals       Row Name 23 1400             Dressing Goal 1 (OT)    Activity/Device (Dressing Goal 1, OT) dressing skills, all  -LS      [place]/Cues Needed (Dressing Goal 1, OT) standby assist  -LS      Time Frame (Dressing Goal 1, OT) long term goal (LTG);by discharge  -LS      Progress/Outcome (Dressing Goal 1, OT) goal not met  -LS         Toileting Goal 1 (OT)    Activity/Device (Toileting Goal 1, OT) toileting skills, all  -LS      [place]ie Level/Cues Needed (Toileting Goal 1, OT) standby assist  -LS      Time Frame (Toileting Goal 1, OT) long term goal (LTG);by discharge  -LS      Progress/Outcome (Toileting Goal 1, OT) goal not met  -LS         Problem Specific Goal 1 (OT)    Problem Specific Goal 1 (OT) Pt will increase his tolerance for standing by completing adl in standing position sink side for 5 minutes without rest break.  -LS      Time Frame (Problem Specific Goal 1, OT) long term goal (LTG);by discharge  -LS      Progress/Outcome (Problem Specific Goal 1, OT) goal not met  -LS                User Key  (r) = Recorded By, (t) = Taken By, (c) = Cosigned By      Initials Name Provider Type Discipline    LS Selam Rouse COTA Occupational Therapist Assistant THERAPIES                     Outcome Measures       Row Name 23 1344 23 1000          How much help from another person do you currently need...    Turning from your back to your side while in flat bed without using bedrails? 4  -LISSA 4  -KJ     Moving from  lying on back to sitting on the side of a flat bed without bedrails? 4  -LISSA 4  -KJ     Moving to and from a bed to a chair (including a wheelchair)? 4  -LISSA 4  -KJ     Standing up from a chair using your arms (e.g., wheelchair, bedside chair)? 4  -LISSA 4  -KJ     Climbing 3-5 steps with a railing? 3  -LISSA 3  -KJ     To walk in hospital room? 3  -LISSA 3  -KJ     AM-PAC 6 Clicks Score (PT) 22  -LISSA 22  -KJ        Functional Assessment    Outcome Measure Options AM-PAC 6 Clicks Basic Mobility (PT)  -LISSA AM-PAC 6 Clicks Basic Mobility (PT)  -KJ               User Key  (r) = Recorded By, (t) = Taken By, (c) = Cosigned By      Initials Name Provider Type    Alice Lucio, PTA Physical Therapist Assistant    Masood Hannon, ALIYA Physical Therapist Assistant                            OT Discharge Summary  Anticipated Discharge Disposition (OT): home with 24/7 care, home with home health  Reason for Discharge: Discharge from facility  Outcomes Achieved: Refer to plan of care for updates on goals achieved  Discharge Destination: Home with assist, Home with home health      DIANA Schwartz  5/23/2023

## 2023-05-23 NOTE — PAYOR COMM NOTE
"REF:   [id number]     [facility]  ANDREA  [phone]  OR  FAX  [phone]     Maggy Solo (89 y.o. Male)       Date of Birth   05/13/1930    Social Security Number       Address   [address] [facility] [address]    Home Phone   [phone]    MRN   [id number]       Yarsanism   [facility]    Marital Status                               Admission Date   5/13/23    Admission Type   Emergency    Admitting Provider   Iggy Alexander MD    Attending Provider       Department, Room/Bed   [facility] 4B, 408/1       Discharge Date   5/22/2023    Discharge Disposition   Home or Self Care    Discharge Destination                                 Attending Provider: (none)   Allergies: Lyrica [Pregabalin]    Isolation: None   Infection: None   Code Status: Prior    Ht: 162.6 cm (64\")   Wt: 69 kg (152 lb 3.2 oz)    Admission Cmt: None   Principal Problem: Pneumonia of right lower lobe due to infectious organism [J18.9]                   Active Insurance as of 5/13/2023       Primary Coverage       Payor Plan Insurance Group Employer/Plan Group    HUMANA MEDICARE REPLACEMENT HUMANA MEDICARE REPLACEMENT [id number]       Payor Plan Address Payor Plan Phone Number Payor Plan Fax Number Effective Dates    [address] [address] [phone]  1/1/2018 - None Entered    [facility] [id number]         Subscriber Name Subscriber Birth Date Member ID       MAGGY SOLO 5/13/1930 [id number]                     Emergency Contacts        (Rel.) Home Phone Work Phone Mobile Phone    Glory Solo (Spouse) [phone] -- --    Zamzam Marie (Relative) -- -- [phone]    Madai Marie -- -- [phone]                 Discharge Summary        Iggy Alexander MD at 05/22/23 [facility]                [facility]  DISCHARGE SUMMARY       Date of Admission: 5/13/2023  Date of Discharge:  5/22/2023  Primary Care " Physician: Everardo Jackson MD    Presenting Problem/History of Present Illness:  Patient is a 89-year-old  male presented to ER by EMS complaining of fatigue.  Patient's baseline is independent and active.  He usually ambulate without assistant.  For the past 3 days patient had a fever of 103 at time.  Patient also complain of chronic cough, coughing more than usual.  Patient has more shortness of breath specially with exertion.  When patient ambulate he complain of shortness of breath.       Final Discharge Diagnoses:  Active Hospital Problems    Diagnosis     **Pneumonia of right lower lobe due to infectious organism     New onset atrial fibrillation with rapid ventricular response     Sepsis due to pneumonia     Hypoxia     Mediastinal adenopathy     Other fatigue     Non-rheumatic mitral regurgitation     Coronary artery disease involving native coronary artery of native heart without angina pectoris     Essential hypertension     Mixed hyperlipidemia     History of bladder cancer     Benign localized prostatic hyperplasia without lower urinary tract symptoms (LUTS)      Consults:   Dr Shultz, Cardiology    Pertinent Test Results:   Results for orders placed during the hospital encounter of 05/13/23    Adult Transthoracic Echo Complete W/ Cont if Necessary Per Protocol    Interpretation Summary    Left ventricular systolic function is mildly decreased. Left ventricular ejection fraction appears to be 41 - 45%.    The following segments are hypokinetic: apical anterior, apical septal, apical inferior, apical lateral and apex.    Left ventricular wall thickness is consistent with mild septal asymmetric hypertrophy.    Left ventricular diastolic function is consistent with (grade I) impaired relaxation.    He left atrial cavity is mildly dilated.    There is mild to moderate thickening of the aortic valve.    . Mild to moderate mitral valve regurgitation is present    Mild pulmonary hypertension is  present.      Imaging Results (All)       Procedure Component Value Units Date/Time    CT Chest Without Contrast Diagnostic [[id number]] Collected: 05/18/23 0941     Updated: 05/18/23 0953    Narrative:      EXAM: CT CHEST WO CONTRAST DIAGNOSTIC- - 5/18/2023 8:53 AM CDT     HISTORY: Significant right lower lobe pneumonia; J18.9-Pneumonia,  unspecified organism; R09.02-Hypoxemia; D50.8-Other iron deficiency  anemias; Z74.09-Other reduced mobility       COMPARISON: 5/13/2023.      DOSE LENGTH PRODUCT: 111 mGy cm. Automatic exposure control was utilized  to make radiation dose as low as reasonably achievable.     TECHNIQUE: Unenhanced CT images obtained with multiplanar reformats.     FINDINGS: Motion limited evaluation.     AIRWAYS/PULMONARY PARENCHYMA: The central airways are midline and  patent. Small airway thickening. Small RIGHT pleural fluid. Trace LEFT  pleural fluid. Consolidative opacity in the RIGHT lower lobe with air  bronchograms, suggestive of infection. Small LEFT lower lobe opacity,  likely compressive atelectasis.     VASCULATURE: Limited evaluation of the vasculature without intravenous  contrast. Thoracic aorta is normal in course and caliber with moderate  calcified atherosclerosis. Enlarged pulmonary artery measures 3.7 cm in  transverse dimension, which could be seen with pulmonary artery  hypertension.     CARDIAC:  Borderline heart size with heavily calcified coronary arteries  verses stent material.  No pericardial effusion.       MEDIASTINUM: Esophagus has normal course, caliber and wall thickness.  Borderline mediastinal lymph nodes, favored to be reactive.      EXTRATHORACIC: Limited evaluation of the thoracic inlet due to metallic  streak artifact from LEFT shoulder replacement. Diminutive thyroid. No  axillary adenopathy. Mild body wall edema. Overlying soft tissues  otherwise within normal limits.      INCLUDED UPPER ABDOMEN: Subcentimeter low-density lesion in the anterior  liver on  axial series 2, image 139 is similar compared to 12/9/2019.  Stability favors benignity. Visualized portion of the pancreas, spleen,  adrenal glands and upper kidneys appear within normal limits.     OSSEOUS: Similar mild height loss of T12 compared as far back as  12/9/2019. No new acute or suspicious bony finding.           Impression:      1. Consolidative opacity in the RIGHT lower lobe with air bronchograms  and small airway thickening most likely pneumonia. Small RIGHT and trace  LEFT pleural fluid. Small LEFT lower lobe dependent opacity, likely  compressive atelectasis.  2. Enlarged pulmonary artery measures 3.7 cm in transverse dimension,  which can be seen with pulmonary artery hypertension.  3. Borderline heart size with heavily calcified coronary arteries verses  stent material.  4. Similar chronic compression deformity of T12.  This report was finalized on 05/18/2023 09:50 by Dr Kait Ibarra MD.    CT Angiogram Chest [[id number]] Collected: 05/13/23 1052     Updated: 05/13/23 1058    Narrative:      EXAMINATION: CT ANGIOGRAM CHEST-      5/13/2023 10:26 AM CDT     HISTORY: Recent cardiac catheterization. Shortness of breath with fever  and lower extremity edema.     In order to have a CT radiation dose as low as reasonably achievable  Automated Exposure Control was utilized for adjustment of the mA and/or  KV according to patient size.     DLP in mGycm= 194.     CT angiogram chest with IV contrast.  CT angiography protocol.   CT imaging with bolus IV contrast injection.   Under concurrent supervision axial, sagittal, coronal,  three-dimensional, and MIP data sets were constructed on an independent  work station.     Comparison is made with 08/02/2022.     Normal heart size.  Normal size thoracic aorta.     Mildly enlarged and well opacified pulmonary arteries.  No pulmonary embolism.     Posterior right lower lobe pneumonia.     Underlying chronic interstitial disease.  No pneumothorax and no  significant pleural effusion.  No sign of heart failure.     Prominent diffuse osteopenia.  Severe degenerative spine change with mild chronic anterior wedge  compression of T12.     Summary:  1. No pulmonary embolism.  2. Right lower lobe pneumonia.                                            This report was finalized on 05/13/2023 10:55 by Dr. Mac Lucio MD.    XR Chest 1 View [[id number]] Collected: 05/13/23 0826     Updated: 05/13/23 0830    Narrative:      EXAMINATION: XR CHEST 1 VW-     5/13/2023 8:21 AM CDT     HISTORY: Chest pain.     1 view chest x-ray.     Comparison is made with 03/31/2023.     Stable heart size.     Lower lung volumes with patchy basilar atelectasis.     No pneumothorax or heart failure.     Summary:  1. Hypoaeration with patchy atelectasis.     This report was finalized on 05/13/2023 08:27 by Dr. Mac Lucio MD.          LAB RESULTS:      Lab 05/20/23  0339 05/19/23  0541 05/18/23  1231 05/16/23  0445   WBC  --  9.32 8.39 8.30   HEMOGLOBIN 10.0* 10.5* 10.9* 10.2*   HEMATOCRIT 32.6* 33.6* 35.5* 32.9*   PLATELETS  --  265 227 152   MCV  --  93.6 93.4 93.2         Lab 05/19/23  0541 05/16/23  0445   SODIUM 139 133*   POTASSIUM 4.0 3.8   CHLORIDE 102 98   CO2 26.0 24.0   ANION GAP 11.0 11.0   BUN 25* 39*   CREATININE 0.56* 1.12   EGFR 91.9 61.3   GLUCOSE 130* 146*   CALCIUM 9.5 8.7   MAGNESIUM 1.7  --          Lab 05/16/23  0445   TOTAL PROTEIN 6.5   ALBUMIN 2.9*   GLOBULIN 3.6   ALT (SGPT) 78*   AST (SGOT) 172*   BILIRUBIN 0.4   ALK PHOS 66                     Brief Urine Lab Results  (Last result in the past 365 days)        Color   Clarity   Blood   Leuk Est   Nitrite   Protein   CREAT   Urine HCG        05/14/23 1455 Yellow   Clear   Moderate (2+)   Negative   Negative   100 mg/dL (2+)                 Microbiology Results (last 10 days)       Procedure Component Value - Date/Time    Respiratory Culture - Sputum, Cough [[id number]] Collected: 05/17/23 0742    Lab Status: Final result  Specimen: Sputum from Cough Updated: 05/19/23 1004     Respiratory Culture Rare Normal respiratory senthil. No S. aureus or Pseudomonas aeruginosa detected. Final report.     Gram Stain Many (4+) WBCs per low power field      Rare (1+) Epithelial cells per low power field      No organisms seen    Legionella Antigen, Urine - Urine, Urine, Clean Catch [[id number]]  (Normal) Collected: 05/13/23 1814    Lab Status: Final result Specimen: Urine, Clean Catch Updated: 05/13/23 1909     LEGIONELLA ANTIGEN, URINE Negative    S. Pneumo Ag Urine or CSF - Urine, Urine, Clean Catch [[id number]]  (Normal) Collected: 05/13/23 1814    Lab Status: Final result Specimen: Urine, Clean Catch Updated: 05/13/23 1909     Strep Pneumo Ag Negative    Mycoplasma Pneumoniae PCR - Swab, Larynx [[id number]] Collected: 05/13/23 1445    Lab Status: Final result Specimen: Swab from Larynx Updated: 05/17/23 2208     Mycoplasma pneumo by PCR Negative     Comment: No Mycoplasma pneumoniae DNA detected.  This test was developed and its performance characteristics determined  by [facility].  It has not been cleared or approved by the Food and Drug  Administration.  The FDA has determined that such clearance or  approval is not necessary.       Narrative:      Performed at:   - [address]  [id number]  : Tyra Bryan MD, Phone:  [phone]    MRSA Screen, PCR (Inpatient) - Swab, Nares [[id number]]  (Normal) Collected: 05/13/23 1445    Lab Status: Final result Specimen: Swab from Nares Updated: 05/13/23 1620     MRSA PCR No MRSA Detected    Narrative:      The negative predictive value of this diagnostic test is high and should only be used to consider de-escalating anti-MRSA therapy. A positive result may indicate colonization with MRSA and must be correlated clinically.    Blood Culture - Blood, Arm, Right [[id number]]  (Normal) Collected: 05/13/23 1442    Lab Status: Final result Specimen: Blood from Arm,  Right Updated: 05/18/23 1515     Blood Culture No growth at 5 days    Blood Culture - Blood, Arm, Left [[id number]]  (Normal) Collected: 05/13/23 1441    Lab Status: Final result Specimen: Blood from Arm, Left Updated: 05/18/23 1515     Blood Culture No growth at 5 days    Respiratory Panel PCR w/COVID-19(SARS-CoV-2) MILAGRO/SHANNON/MITA/PAD/COR/MAD/TONYA In-House, NP Swab in UTM/VTM, 3-4 HR TAT - Swab, Nasopharynx [[id number]]  (Normal) Collected: 05/13/23 0848    Lab Status: Final result Specimen: Swab from Nasopharynx Updated: 05/13/23 1026     ADENOVIRUS, PCR Not Detected     Coronavirus 229E Not Detected     Coronavirus HKU1 Not Detected     Coronavirus NL63 Not Detected     Coronavirus OC43 Not Detected     COVID19 Not Detected     Human Metapneumovirus Not Detected     Human Rhinovirus/Enterovirus Not Detected     Influenza A PCR Not Detected     Influenza B PCR Not Detected     Parainfluenza Virus 1 Not Detected     Parainfluenza Virus 2 Not Detected     Parainfluenza Virus 3 Not Detected     Parainfluenza Virus 4 Not Detected     RSV, PCR Not Detected     Bordetella pertussis pcr Not Detected     Bordetella parapertussis PCR Not Detected     Chlamydophila pneumoniae PCR Not Detected     Mycoplasma pneumo by PCR Not Detected    Narrative:      In the setting of a positive respiratory panel with a viral infection PLUS a negative procalcitonin without other underlying concern for bacterial infection, consider observing off antibiotics or discontinuation of antibiotics and continue supportive care. If the respiratory panel is positive for atypical bacterial infection (Bordetella pertussis, Chlamydophila pneumoniae, or Mycoplasma pneumoniae), consider antibiotic de-escalation to target atypical bacterial infection.    Blood Culture With NINA - Blood, Arm, Left [[id number]]  (Normal) Collected: 05/13/23 0835    Lab Status: Final result Specimen: Blood from Arm, Left Updated: 05/18/23 1200     Blood Culture No growth at 5 days  "   Blood Culture With NINA - Blood, Arm, Left [[id number]]  (Normal) Collected: 05/13/23 0834    Lab Status: Final result Specimen: Blood from Arm, Left Updated: 05/18/23 1015     Blood Culture No growth at 5 days          Hospital Course:   Mr. Curry is a 89-year-old male who presented to [facility] on 5/13 with fatigue, cough and fever up to 103 °F. He had noticed some dyspnea with exertion and coughing more than usual. He had also complained of fatigue in which he wanted to sleep\" all the time\" which is unusual for him. At baseline he is very independent and is the primary caregiver for his wife who has dementia. He saw his primary care provider 2 days prior to admission and was prescribed amoxicillin for diagnosis of acute bronchitis.     He was admitted to telemetry floor and monitored closely. Initially had high oxygen requirements up to 9 LPM by nasal canula. Treatment was Zosyn with good response. Microbiology was negative. PE was ruled out.     He developed Afib in the context of pneumonia and was treated with Cardizem drip to oral. Eliquis was recommended by the patient and family were concerned with small amounts of blood in urine noted in Osullivan, this it was decided to discontinue Eliquis. Maybe he has lone Afib, the risk of CVA was explained and understood.     Pneumonia is much improved. He has been on room air since yesterday without events. He has very good family support and wants to return home.    During the peak illness he had urinary retention and required a Osullivan. At this point I am recommending removal of Osullivan in 2-3 days at home with home health care and watch for voiding.     Physical Exam on Discharge:  /58 (BP Location: Right arm, Patient Position: Lying)   Pulse 73   Temp 97.8 °F (36.6 °C) (Tympanic)   Resp 16   Ht 162.6 cm (64\")   Wt 69 kg (152 lb 3.2 oz)   SpO2 92%   BMI 26.13 kg/m²   Physical Exam  Constitutional:       General: He is not in acute distress.     " Appearance: He is not ill appearing. He is not toxic-appearing.      Interventions: Nasal cannula in place.      Comments: Up in chair.  No acute distress.   HENT:      Head: Normocephalic and atraumatic.      Mouth/Throat:      Mouth: Mucous membranes are moist.      Pharynx: Oropharynx is clear.   Eyes:      Extraocular Movements: Extraocular movements intact.      Conjunctiva/sclera: Conjunctivae normal.      Pupils: Pupils are equal, round, and reactive to light.   Cardiovascular:      Rate and Rhythm: Sinus rhythm.      Pulses: Normal pulses.   Pulmonary:      Effort: Pulmonary effort is normal. No respiratory distress.      Breath sounds: Normal breath sounds. No wheezing.   Abdominal:      General: Bowel sounds are normal. There is no distension.      Palpations: Abdomen is soft.      Tenderness: There is no abdominal tenderness.   Musculoskeletal:         General: No swelling or tenderness. Normal range of motion.      Cervical back: Normal range of motion and neck supple. No muscular tenderness.   Skin:     General: Skin is warm and dry.      Findings: No erythema or rash.   Neurological:      General: No focal deficit present.      Mental Status: He is alert and oriented to person, place, and time.      Cranial Nerves: No cranial nerve deficit.      Motor: No weakness.   Psychiatric:         Mood and Affect: Mood normal.         Behavior: Behavior normal.     Condition on Discharge:   Stable    Discharge Disposition:  Home    Discharge Medications:     Discharge Medications        New Medications        Instructions Start Date   cefdinir 300 MG capsule  Commonly known as: OMNICEF   300 mg, Oral, 2 Times Daily      dilTIAZem  MG 24 hr capsule  Commonly known as: CARDIZEM CD   240 mg, Oral, Every 24 Hours Scheduled   Start Date: May 23, 2023            Changes to Medications        Instructions Start Date   metoprolol succinate XL 25 MG 24 hr tablet  Commonly known as: TOPROL-XL  What changed: when to  take this   25 mg, Oral, Every 24 Hours Scheduled             Continue These Medications        Instructions Start Date   acetaminophen 500 MG tablet  Commonly known as: TYLENOL   500 mg, Oral, Every 6 Hours PRN      Aspirin Adult Low Strength 81 MG EC tablet  Generic drug: aspirin   Take 1 tablet by mouth Daily--DO NOT BREAK/CRUSH TABLET      atorvastatin 40 MG tablet  Commonly known as: LIPITOR   40 mg, Oral, Daily      clopidogrel 75 MG tablet  Commonly known as: PLAVIX   75 mg, Oral, Daily      irbesartan 300 MG tablet  Commonly known as: AVAPRO   300 mg, Oral, Nightly      lansoprazole 30 MG capsule  Commonly known as: PREVACID   30 mg, Oral, Daily      magnesium oxide 400 MG tablet  Commonly known as: MAG-OX   1 tablet, Oral, Daily      nitroglycerin 0.4 MG SL tablet  Commonly known as: NITROSTAT   Place 1 tablet under the tongue Every 5 (Five) Minutes As Needed.      vitamin B-12 1000 MCG tablet  Commonly known as: CYANOCOBALAMIN   1,000 mcg, Oral, Daily      Vitron-C  MG tablet  Generic drug: Iron-Vitamin C   1 tablet, Oral, Daily      Zinc 50 MG capsule   50 mg, Oral, Daily             Stop These Medications      amLODIPine 10 MG tablet  Commonly known as: NORVASC     amoxicillin 500 MG capsule  Commonly known as: AMOXIL     benzonatate 200 MG capsule  Commonly known as: TESSALON              This patient has current or prior documentation of an left ventricular ejection fraction (LVEF) of less than or equal to 45%.    Discharge Diet:   Cardiac    Activity at Discharge:   Resume usual activity  Fall precautions  Home PT/OT    Follow-up Appointments:   Future Appointments   Date Time Provider Department Center   7/17/2023  1:45 PM Eduardo Woodard APRN [facility]   7/24/2023  9:00 AM Bell Lawson APRN [facility]       Test Results Pending at Discharge: None    Electronically signed by Iggy Alexander MD, 05/22/23, 16:14 CDT.    Time: 38 minutes.         Electronically signed by  Richardson Diego MD at 05/22/23 1634       Discharge Order (From admission, onward)       Start     Ordered    05/22/23 1615  Discharge patient  Once        Expected Discharge Date: 05/22/23    Discharge Disposition: Home or Self Care    Physician of Record for Attribution - Please select from Treatment Team: RICHARDSON DIEGO [[id number]]    Review needed by CMO to determine Physician of Record: No       Question Answer Comment   Physician of Record for Attribution - Please select from Treatment Team RICHARDSON DIEGO    Review needed by CMO to determine Physician of Record No        05/22/23 3952

## 2023-05-23 NOTE — OUTREACH NOTE
Prep Survey    Flowsheet Row Responses   [facility] facility patient discharged from? [facility]   Is LACE score < 7 ? No   Eligibility Readm Mgmt   Discharge diagnosis Pneumonia of right lower lobe   Does the patient have one of the following disease processes/diagnoses(primary or secondary)? Pneumonia   Does the patient have Home health ordered? No   Is there a DME ordered? No   Prep survey completed? Yes          Alice BLANC - Registered Nurse

## 2023-05-23 NOTE — THERAPY DISCHARGE NOTE
Acute Care - Physical Therapy Discharge Summary  [facility]       Patient Name: Wyatt Curry  : 1930  MRN: [id number]    Today's Date: 2023                 Admit Date: 2023      PT Recommendation and Plan    Visit Dx:    ICD-10-CM ICD-9-CM   1. Pneumonia of right lower lobe due to infectious organism  J18.9 486   2. Hypoxia  R09.02 799.02   3. Other iron deficiency anemia  D50.8 280.8   4. Impaired mobility  Z74.09 799.89        Outcome Measures       Row Name 23 1344 23 1000 23 1400       How much help from another person do you currently need...    Turning from your back to your side while in flat bed without using bedrails? 4  -LISSA 4  -KJ 4  -KJ    Moving from lying on back to sitting on the side of a flat bed without bedrails? 4  -LISSA 4  -KJ 4  -KJ    Moving to and from a bed to a chair (including a wheelchair)? 4  -LISSA 4  -KJ 4  -KJ    Standing up from a chair using your arms (e.g., wheelchair, bedside chair)? 4  -LISSA 4  -KJ 4  -KJ    Climbing 3-5 steps with a railing? 3  -LISSA 3  -KJ 3  -KJ    To walk in hospital room? 3  -LISSA 3  -KJ 3  -KJ    AM-PAC 6 Clicks Score (PT) 22  -LISSA 22  -KJ 22  -KJ       Functional Assessment    Outcome Measure Options AM-PAC 6 Clicks Basic Mobility (PT)  -LISSA AM-PAC 6 Clicks Basic Mobility (PT)  -KJ AM-PAC 6 Clicks Basic Mobility (PT)  -KJ              User Key  (r) = Recorded By, (t) = Taken By, (c) = Cosigned By      Initials Name Provider Type    Alice Lucio PTA Physical Therapist Assistant    Masood Hannon PTA Physical Therapist Assistant                         PT Rehab Goals       Row Name 23 0700             Bed Mobility Goal 1 (PT)    Activity/Assistive Device (Bed Mobility Goal 1, PT) sit to supine/supine to sit  -AB      [place] Level/Cues Needed (Bed Mobility Goal 1, PT) standby assist  -AB      Time Frame (Bed Mobility Goal 1, PT) long term goal (LTG);10 days  -AB      Progress/Outcomes (Bed Mobility Goal 1, PT)  goal not met  -AB         Transfer Goal 1 (PT)    Activity/Assistive Device (Transfer Goal 1, PT) sit-to-stand/stand-to-sit;bed-to-chair/chair-to-bed  -AB      [place] Level/Cues Needed (Transfer Goal 1, PT) standby assist  -AB      Time Frame (Transfer Goal 1, PT) long term goal (LTG);10 days  -AB      Progress/Outcome (Transfer Goal 1, PT) goal not met  -AB         Gait Training Goal 1 (PT)    Activity/Assistive Device (Gait Training Goal 1, PT) gait (walking locomotion);assistive device use;decrease fall risk;improve balance and speed;increase endurance/gait distance  -AB      [place] Level (Gait Training Goal 1, PT) standby assist  -AB      Distance (Gait Training Goal 1, PT) 60 ft  -AB      Time Frame (Gait Training Goal 1, PT) long term goal (LTG);10 days  -AB      Progress/Outcome (Gait Training Goal 1, PT) goal not met  -AB                User Key  (r) = Recorded By, (t) = Taken By, (c) = Cosigned By      Initials Name Provider Type Discipline    Neda Prado, PTA Physical Therapist Assistant PT                        PT Discharge Summary  Anticipated Discharge Disposition (PT): home with 24/7 care, home with home health, sub acute care setting  Reason for Discharge: Discharge from facility  Outcomes Achieved: Refer to plan of care for updates on goals achieved  Discharge Destination: Home with assist      Neda Mckeon PTA   5/23/2023

## 2023-05-24 NOTE — CASE MANAGEMENT/SOCIAL WORK
Continued Stay Note  [facility]     Patient Name: Wyatt Curry  MRN: [id number]  Today's Date: 5/24/2023    Admit Date: 5/13/2023    Plan: Home Health   Discharge Plan       Row Name 05/24/23 1408       Plan    Final Discharge Disposition Code 06 - home with home health care    Final Note SW was entering dc codes and pt was dc on 5/22 with hh orders.  This was never arranged.  Pt had requested [facility].  SW called Shannon at [facility] and she states she got pt setup because pt's spouse called [facility] to inquire about services.                   Discharge Codes    No documentation.                 Expected Discharge Date and Time       Expected Discharge Date Expected Discharge Time    May 22, 2023               IRAM Cohen

## 2023-05-25 ENCOUNTER — READMISSION MANAGEMENT (OUTPATIENT)
Dept: CALL CENTER | Facility: HOSPITAL | Age: 88
End: 2023-05-25
Payer: MEDICARE

## 2023-05-25 NOTE — OUTREACH NOTE
COPD/PN Week 1 Survey    Flowsheet Row Responses   [facility] patient discharged from? [place]   Does the patient have one of the following disease processes/diagnoses(primary or secondary)? Pneumonia   Week 1 attempt successful? Yes   Call start time 1054   Call end time 1102   Discharge diagnosis Pneumonia of right lower lobe   Meds reviewed with patient/caregiver? Yes   Is the patient having any side effects they believe may be caused by any medication additions or changes? No   Does the patient have all medications ordered at discharge? Yes   Is the patient taking all medications as directed (includes completed medication regime)? Yes   Does the patient have a primary care provider?  Yes   Does the patient have an appointment with their PCP or specialist within 7 days of discharge? Yes   Comments regarding PCP 5/26/23   Has the patient kept scheduled appointments due by today? N/A   What is the Home health agency?  [facility]   Has home health visited the patient within 72 hours of discharge? Yes   DME comments pt uses a walker with ambulation   Pulse Ox monitoring Intermittent   Pulse Ox device source Patient   O2 Sat comments 92-93% on RA   O2 Sat: education provided Sat levels   Psychosocial issues? No   Did the patient receive a copy of their discharge instructions? Yes   Nursing interventions Reviewed instructions with patient   What is the patient's perception of their health status since discharge? Improving   Nursing Interventions Nurse provided patient education   Is the patient/caregiver able to teach back the hierarchy of who to call/visit for symptoms/problems? PCP, Specialist, Home health nurse, Urgent Care, ED, 911 Yes   Is the patient/caregiver able to teach back signs and symptoms of worsening condition: Fever/chills, Shortness of breath, Chest pain   Is the patient/caregiver able to teach back importance of completing antibiotic course of treatment? Yes   Week 1 call completed? Yes           Madonna S - Registered Nurse

## 2023-05-26 ENCOUNTER — TRANSCRIBE ORDERS (OUTPATIENT)
Dept: ADMINISTRATIVE | Facility: HOSPITAL | Age: 88
End: 2023-05-26

## 2023-05-26 DIAGNOSIS — J18.9 PNEUMONIA DUE TO INFECTIOUS ORGANISM, UNSPECIFIED LATERALITY, UNSPECIFIED PART OF LUNG: Primary | ICD-10-CM

## 2023-05-27 ENCOUNTER — HOME CARE VISIT (OUTPATIENT)
Dept: HOME HEALTH SERVICES | Facility: CLINIC | Age: 88
End: 2023-05-27
Payer: MEDICARE

## 2023-05-27 PROCEDURE — G0299 HHS/HOSPICE OF RN EA 15 MIN: HCPCS

## 2023-05-28 VITALS
TEMPERATURE: 97.9 F | DIASTOLIC BLOOD PRESSURE: 60 MMHG | HEART RATE: 67 BPM | OXYGEN SATURATION: 95 % | RESPIRATION RATE: 16 BRPM | SYSTOLIC BLOOD PRESSURE: 138 MMHG

## 2023-05-28 NOTE — HOME HEALTH
"SOC Note:  Mr. Curry is a 89-year-old male who presented to [facility] on 5/13 with fatigue, cough and fever up to 103 °F. He had noticed some dyspnea with exertion and coughing more than usual. He had also complained of fatigue which is unusual for him. At baseline he is very independent and is the primary caregiver for his wife who has dementia. He saw his primary care provider 2 days prior to admission and was prescribed amoxicillin for diagnosis of acute bronchitis. He was admitted to telemetry floor and monitored closely. Initially had high oxygen requirements up to 9 LPM by nasal canula. .Microbiology was negative and PE was ruled out. He developed Afib in the hospital  and was treated with Cardizem drip to oral. Eliquis was recommended but patient and family were concerned with small amounts of blood in urine noted in [place] . Eliquis was d/c.   During the illness he had urinary retention and required a [place]. Osullivan catheter removed on 5/26/23 and patient Iis voiding without any difficulty  without any blood per patient.Patient is alert and oriented and very pleasant . Patientis  still weak and needs strengthening     Home Health ordered for: disciplines SN,PT,OT    Reason for Hosp/Primary Dx/Co-morbidities: Bronchopneumonia ,AFIB,urine retention ,hypertension,bladder cancer  Focus of Care: Bronchopneumonia   Patient's goal(s) \"I want to get back to the gym\"    Current Functional status/mobility/DME: PATIENT UP WITH ROLLING WALKER AND ONE ASSIST     HB status/Living Arrangements: LIVES WITH  WIFE IN THEIR HOME     Skin Integrity/wound status: SKIN INTACT     Code Status:FULL CODE    Fall Risk/Safety concerns: HIGH FALL RISK    Medication issues/Concerns:PATIENT RECENTLY STARTED  A CARDIZEM FOR AFIB AND OMNECIF FOR PNA    Additional Problems/Concerns: NA    SDOH Barriers (i.e. caregiver concerns, social isolation, transportation, food insecurity, environment, income etc.)/Need for MSW: NA    Plan " for next visit: CARDIOPULMONARY ASSESSMENT AND MEDICATION EDUCATION

## 2023-05-30 NOTE — CASE COMMUNICATION
"SOC Note:  Mr. Curry is a 89-year-old male who presented to [facility] on 5/13 with fatigue, cough and fever up to 103 °F. He had noticed some dyspnea with exertion and coughing more than usual. He had also complained of fatigue which is unusual for him. At baseline he is very independent and is the primary caregiver for his wife who has dementia. He saw his primary care provider 2 days prior to admission and was prescribed  amoxicillin for diagnosis of acute bronchitis. He was admitted to  hospital. Initially had high oxygen requirements up to 9 LPM by nasal canula. .Microbiology was negative and PE was ruled out. He developed Afib in the hospital  and was treated with Cardizem drip to oral. Eliquis was recommended but patient and family were concerned with small amounts of blood in urine noted in [place] . Eliquis was d/c.   During the illness he had urina ry retention and required a [place]. [place] catheter was removed on 5/26/23 and patient is voiding without any difficulty  without any blood per patient. Patient is still weak and needs strengthening .Patient is alert and oriented and very pleasant    Home Health ordered for: disciplines SN,PT,OT    Reason for Hosp/Primary Dx/Co-morbidities: Bronchopneumonia ,AFIB,urine retention ,hypertension,bladder cancer  Focus of Care: Bronchopneumoni a   Patient's goal(s) \"I want to get back to the gym\"    Current Functional status/mobility/DME: PATIENT UP WITH ROLLING WALKER AND ONE ASSIST     HB status/Living Arrangements: LIVES WITH  WIFE IN THEIR HOME     Skin Integrity/wound status: SKIN INTACT     Code Status:FULL CODE    Fall Risk/Safety concerns: HIGH FALL RISK    Medication issues/Concerns:PATIENT RECENTLY STARTED  A CARDIZEM FOR AFIB AND OMNECIF FOR PNA    Additional Probl ems/Concerns: NA    SDOH Barriers (i.e. caregiver concerns, social isolation, transportation, food insecurity, environment, income etc.)/Need for MSW: NA    Plan for next visit: " CARDIOPULMONARY ASSESSMENT AND MEDICATION EDUCATION

## 2023-05-31 ENCOUNTER — HOME CARE VISIT (OUTPATIENT)
Dept: HOME HEALTH SERVICES | Facility: CLINIC | Age: 88
End: 2023-05-31
Payer: MEDICARE

## 2023-05-31 VITALS
SYSTOLIC BLOOD PRESSURE: 130 MMHG | TEMPERATURE: 97.7 F | HEART RATE: 77 BPM | RESPIRATION RATE: 16 BRPM | OXYGEN SATURATION: 94 % | DIASTOLIC BLOOD PRESSURE: 60 MMHG

## 2023-05-31 VITALS
OXYGEN SATURATION: 96 % | RESPIRATION RATE: 17 BRPM | TEMPERATURE: 97.9 F | HEART RATE: 80 BPM | SYSTOLIC BLOOD PRESSURE: 130 MMHG | DIASTOLIC BLOOD PRESSURE: 70 MMHG

## 2023-05-31 PROCEDURE — G0151 HHCP-SERV OF PT,EA 15 MIN: HCPCS

## 2023-05-31 PROCEDURE — G0152 HHCP-SERV OF OT,EA 15 MIN: HCPCS

## 2023-05-31 NOTE — HOME HEALTH
SUBJECTIVE: reports right leg 'not working well'  DX: Bronchopneumonia  PMH: recent MI while hospitalized for skin CA removal April 7th approximately  SURGICAL PROCEDURE: no recent surgery  PRECAUTIONS:  no  OXYGEN USE: no  EQUIPMENT: cane for ambulating outside; has walker  PRIOR LEVEL OF FUNCTION: lives with spouse, was independent with ADLs  MEDICAL NECESSITY: skilled OT to assess ADL safety and AE needs  PATIENT GOALS: to continue to live independently  DATE OF NEXT APPOINTMENT WITH DOCTOR: next week but unsure of date  ANTICIPATED DISCHARGE PLAN: to self care  PATIENT/CAREGIVER AGREE WITH DISCHARGE PLAN: yes  FREQUENCY AND DURATION: Eval only      PATIENT HAS RECEIVED EDUCATION ON COVID-19, PREVENTION, HANDWASHING, SOCIAL DISTANCING PER CDC

## 2023-06-01 ENCOUNTER — HOME CARE VISIT (OUTPATIENT)
Dept: HOME HEALTH SERVICES | Facility: CLINIC | Age: 88
End: 2023-06-01
Payer: MEDICARE

## 2023-06-01 VITALS
RESPIRATION RATE: 18 BRPM | SYSTOLIC BLOOD PRESSURE: 132 MMHG | HEART RATE: 68 BPM | DIASTOLIC BLOOD PRESSURE: 64 MMHG | TEMPERATURE: 97.3 F | OXYGEN SATURATION: 97 %

## 2023-06-01 PROCEDURE — G0495 RN CARE TRAIN/EDU IN HH: HCPCS

## 2023-06-01 NOTE — HOME HEALTH
FOCUS OF CARE/SKILLED NEED: Pneumonia management/education, teaching/education a fib, fall safety/prevention, skin integrity, home safety    TEACHING/INTERVENTIONS: A&O X 4, VSS. Pt c/o mild generalized pain today.  Pt denies falls, chest pain or shortness of breath.  Pt education provided on home safety, medication dose and freq, pressure relief measures, etc. Pt verbalized understanding. Pt reports no trouble urinating since hernández cath removed on 5/27.    PROGRESS TOWARD GOALS: Ongoing/progressing    PHYSICIAN CONTACT: No    INSURANCE CHANGES? No    FALLS SINCE LAST VISIT? No    MEDICATION CHANGES SINCE LAST VISIT? No     PRE-SCREENED FOR COVID? Yes, No s/s of COVID. No recent exposure.

## 2023-06-03 ENCOUNTER — HOME CARE VISIT (OUTPATIENT)
Dept: HOME HEALTH SERVICES | Facility: CLINIC | Age: 88
End: 2023-06-03
Payer: MEDICARE

## 2023-06-03 VITALS
TEMPERATURE: 97.9 F | HEART RATE: 60 BPM | SYSTOLIC BLOOD PRESSURE: 120 MMHG | DIASTOLIC BLOOD PRESSURE: 60 MMHG | RESPIRATION RATE: 16 BRPM | OXYGEN SATURATION: 96 %

## 2023-06-03 PROCEDURE — G0299 HHS/HOSPICE OF RN EA 15 MIN: HCPCS

## 2023-06-04 NOTE — HOME HEALTH
FOCUS OF CARE/SKILLED NEED :medication education    TEACHING/INTERVENTIONS: educated patient on fall and bleeding precautions  and medications,     PATIENT/CAREGIVER RESPONSE :patient verbalized understanding    PROGRESS TOWARD GOALS:ongoing and progressing     PHYSICAN CONTACT: no    FALLS SINCE LAST VISIT : no    MEDICATION CHANGE SINCE LAST VISIT: no    PLAN FOR NEXT VISIT: medication education and cardiopulmonary assessment

## 2023-06-05 ENCOUNTER — READMISSION MANAGEMENT (OUTPATIENT)
Dept: CALL CENTER | Facility: HOSPITAL | Age: 88
End: 2023-06-05
Payer: MEDICARE

## 2023-06-05 ENCOUNTER — HOME CARE VISIT (OUTPATIENT)
Dept: HOME HEALTH SERVICES | Facility: CLINIC | Age: 88
End: 2023-06-05
Payer: MEDICARE

## 2023-06-05 VITALS
OXYGEN SATURATION: 95 % | RESPIRATION RATE: 18 BRPM | TEMPERATURE: 97.9 F | HEART RATE: 62 BPM | DIASTOLIC BLOOD PRESSURE: 62 MMHG | SYSTOLIC BLOOD PRESSURE: 140 MMHG

## 2023-06-05 PROCEDURE — G0157 HHC PT ASSISTANT EA 15: HCPCS

## 2023-06-05 NOTE — HOME HEALTH
SUBJECTIVE: Patient says his right calf and outside of knee has been sore since he got home. He says it seems like the more he walks the better it gets however. He says sometimes it feels as if it wants to go out from under him. Patient asks when he can go back to the gym.     OBJECTIVE: Instructed patient to ask PCP next week when he goes for a follow-up regarding when he can be released to return to the gym.    MEDICATION CHANGES: none    FALLS SINCE LAST VISIT: none    CHANGES TO INSURANCE: none    MENTAL STATUS: alert and oriented    PAIN: soreness    EDEMA: min. B ankles feet    SUPPLEMENTAL OXYGEN: no     PLAN: Continue with focus on gait and continued strength.

## 2023-06-05 NOTE — OUTREACH NOTE
COPD/PN Week 2 Survey      Flowsheet Row Responses   [facility] patient discharged from? [place]   Does the patient have one of the following disease processes/diagnoses(primary or secondary)? Pneumonia   Week 2 attempt successful? Yes   Call start time 1421   Call end time 1423   Person spoke with today (if not patient) and relationship patient   Meds reviewed with patient/caregiver? Yes   Is the patient taking all medications as directed (includes completed medication regime)? Yes   Does the patient have a primary care provider?  Yes   Comments regarding PCP Has seen pcp   Has the patient kept scheduled appointments due by today? N/A   What is the Home health agency?  [facility]   Has home health visited the patient within 72 hours of discharge? Yes   Home health comments Home health is still coming   Psychosocial issues? No   Did the patient receive a copy of their discharge instructions? Yes   Nursing interventions Reviewed instructions with patient   What is the patient's perception of their health status since discharge? Improving   Is the patient/caregiver able to teach back the hierarchy of who to call/visit for symptoms/problems? PCP, Specialist, Home health nurse, Urgent Care, ED, 911 Yes   Is the patient able to teach back COPD zones? Yes   Patient reports what zone on this call? Green Zone   Green Zone Reports doing well   Week 2 call completed? Yes   Is the patient interested in additional calls from an ambulatory ?  NOTE:  applies to high risk patients requiring additional follow-up. No   Wrap up additional comments Patient states he is doing well, HH is still coming. Has had fu visits no conerns or questions noted.            Alice BLANC - Registered Nurse

## 2023-06-07 ENCOUNTER — HOME CARE VISIT (OUTPATIENT)
Dept: HOME HEALTH SERVICES | Facility: CLINIC | Age: 88
End: 2023-06-07
Payer: MEDICARE

## 2023-06-07 VITALS
SYSTOLIC BLOOD PRESSURE: 140 MMHG | TEMPERATURE: 97.9 F | OXYGEN SATURATION: 95 % | RESPIRATION RATE: 18 BRPM | HEART RATE: 59 BPM | DIASTOLIC BLOOD PRESSURE: 70 MMHG

## 2023-06-07 VITALS
DIASTOLIC BLOOD PRESSURE: 60 MMHG | TEMPERATURE: 97.6 F | SYSTOLIC BLOOD PRESSURE: 130 MMHG | HEART RATE: 60 BPM | RESPIRATION RATE: 18 BRPM | OXYGEN SATURATION: 94 %

## 2023-06-07 PROCEDURE — G0157 HHC PT ASSISTANT EA 15: HCPCS

## 2023-06-07 PROCEDURE — G0300 HHS/HOSPICE OF LPN EA 15 MIN: HCPCS

## 2023-06-07 NOTE — HOME HEALTH
SUBJECTIVE: Patient says he has an ache on right shin but he wouldn't describe it as pain. He reports he is walking fairly well but occasionally his leg will give out on him.     MEDICATION CHANGES: none    FALLS SINCE LAST VISIT: none    CHANGES TO INSURANCE: none    MENTAL STATUS: alert and oriented    PAIN: DULL ACHE right shin    EDEMA: Trace right ankle.    SUPPLEMENTAL OXYGEN: no    PLAN: Continue with focus exercise and gait progression.

## 2023-06-08 NOTE — HOME HEALTH
FOCUS OF CARE/SKILLED NEED: fall prevention, edema prevention,   TEACHING/INTERVENTIONS:  fall prevention, edema prevention,  PROGRESS TOWARD GOALS: ongoing  PHYSICIAN CONTACT:  no  INSURANCE CHANGES?  no  FALLS SINCE LAST VISIT?  no  MEDICATION CHANGES? no  PLAN FOR NEXT VISIT:  fall prevention, edema prevention,  PRE-SCREENED FOR COVID? no s/s of COVID voiced or noted

## 2023-06-09 ENCOUNTER — TRANSCRIBE ORDERS (OUTPATIENT)
Dept: ADMINISTRATIVE | Facility: HOSPITAL | Age: 88
End: 2023-06-09
Payer: MEDICARE

## 2023-06-09 ENCOUNTER — HOSPITAL ENCOUNTER (OUTPATIENT)
Dept: GENERAL RADIOLOGY | Facility: HOSPITAL | Age: 88
Discharge: HOME | End: 2023-06-09
Payer: MEDICARE

## 2023-06-09 ENCOUNTER — LAB (OUTPATIENT)
Dept: LAB | Facility: HOSPITAL | Age: 88
End: 2023-06-09
Payer: MEDICARE

## 2023-06-09 DIAGNOSIS — J18.9 PNEUMONIA DUE TO INFECTIOUS ORGANISM, UNSPECIFIED LATERALITY, UNSPECIFIED PART OF LUNG: ICD-10-CM

## 2023-06-09 DIAGNOSIS — N39.0 URINARY TRACT INFECTION WITHOUT HEMATURIA, SITE UNSPECIFIED: ICD-10-CM

## 2023-06-09 DIAGNOSIS — N39.0 URINARY TRACT INFECTION WITHOUT HEMATURIA, SITE UNSPECIFIED: Primary | ICD-10-CM

## 2023-06-09 LAB
BILIRUB UR QL STRIP: NEGATIVE
CLARITY UR: CLEAR
COLOR UR: YELLOW
GLUCOSE UR STRIP-MCNC: NEGATIVE MG/DL
HGB UR QL STRIP.AUTO: NEGATIVE
KETONES UR QL STRIP: NEGATIVE
LEUKOCYTE ESTERASE UR QL STRIP.AUTO: NEGATIVE
NITRITE UR QL STRIP: NEGATIVE
PH UR STRIP.AUTO: 7 [PH] (ref 5–8)
PROT UR QL STRIP: NEGATIVE
SP GR UR STRIP: 1.01 (ref 1–1.03)
UROBILINOGEN UR QL STRIP: NORMAL

## 2023-06-09 PROCEDURE — 81003 URINALYSIS AUTO W/O SCOPE: CPT

## 2023-06-09 PROCEDURE — 71046 X-RAY EXAM CHEST 2 VIEWS: CPT

## 2023-06-12 ENCOUNTER — HOME CARE VISIT (OUTPATIENT)
Dept: HOME HEALTH SERVICES | Facility: CLINIC | Age: 88
End: 2023-06-12
Payer: MEDICARE

## 2023-06-12 PROCEDURE — G0157 HHC PT ASSISTANT EA 15: HCPCS

## 2023-06-14 ENCOUNTER — HOME CARE VISIT (OUTPATIENT)
Dept: HOME HEALTH SERVICES | Facility: CLINIC | Age: 88
End: 2023-06-14
Payer: MEDICARE

## 2023-06-14 VITALS
RESPIRATION RATE: 16 BRPM | OXYGEN SATURATION: 94 % | DIASTOLIC BLOOD PRESSURE: 60 MMHG | TEMPERATURE: 98 F | SYSTOLIC BLOOD PRESSURE: 137 MMHG | HEART RATE: 97 BPM

## 2023-06-14 VITALS
OXYGEN SATURATION: 94 % | RESPIRATION RATE: 17 BRPM | DIASTOLIC BLOOD PRESSURE: 56 MMHG | TEMPERATURE: 97.9 F | SYSTOLIC BLOOD PRESSURE: 110 MMHG | HEART RATE: 82 BPM

## 2023-06-14 PROCEDURE — G0151 HHCP-SERV OF PT,EA 15 MIN: HCPCS

## 2023-06-14 NOTE — HOME HEALTH
SUBJECTIVE: Patient says his right leg pain has improved. He says he is tired this morning and has been falling asleep in his chair. He also notes he has been very cold today and cannot shop shivering. He went and had an x-ray of his lungs and a urinalysis and both were negative for abnormalities.     MEDICATION CHANGES: none    FALLS SINCE LAST VISIT: none    CHANGES TO INSURANCE: none    MENTAL STATUS: alert and oriented    PAIN: no    EDEMA: Minimal B ankles    SUPPLEMENTAL OXYGEN: no    PLAN: DC next visit to continue HEP until he is released from doctor to return the to gym,

## 2023-06-17 ENCOUNTER — NURSE TRIAGE (OUTPATIENT)
Dept: CALL CENTER | Facility: HOSPITAL | Age: 88
End: 2023-06-17
Payer: MEDICARE

## 2023-06-17 PROBLEM — I51.89 SYSTOLIC DYSFUNCTION WITHOUT HEART FAILURE: Status: ACTIVE | Noted: 2023-06-17

## 2023-06-17 PROBLEM — I48.0 PAF (PAROXYSMAL ATRIAL FIBRILLATION): Status: ACTIVE | Noted: 2023-05-15

## 2023-06-17 PROBLEM — K52.9 PROCTOCOLITIS: Status: ACTIVE | Noted: 2023-06-17

## 2023-06-18 PROBLEM — A04.72 CLOSTRIDIUM DIFFICILE COLITIS: Status: ACTIVE | Noted: 2023-06-18

## 2023-07-07 PROBLEM — I51.9 LEFT VENTRICULAR DIASTOLIC DYSFUNCTION: Status: RESOLVED | Noted: 2018-08-13 | Resolved: 2023-07-07

## 2023-07-07 PROBLEM — I50.23 ACUTE ON CHRONIC SYSTOLIC CONGESTIVE HEART FAILURE: Status: ACTIVE | Noted: 2023-06-17

## 2023-07-14 NOTE — Clinical Note
The left coronary artery was selectively engaged, injected and visualized. 2 = difficulty understanding (not related to language barrier)

## 2023-07-24 ENCOUNTER — OFFICE VISIT (OUTPATIENT)
Dept: CARDIOLOGY | Facility: CLINIC | Age: 88
End: 2023-07-24
Payer: MEDICARE

## 2023-07-24 VITALS
OXYGEN SATURATION: 97 % | WEIGHT: 132 LBS | HEIGHT: 63 IN | DIASTOLIC BLOOD PRESSURE: 58 MMHG | BODY MASS INDEX: 23.39 KG/M2 | SYSTOLIC BLOOD PRESSURE: 122 MMHG | HEART RATE: 71 BPM

## 2023-07-24 DIAGNOSIS — I50.23 ACUTE ON CHRONIC SYSTOLIC CONGESTIVE HEART FAILURE: Primary | ICD-10-CM

## 2023-07-24 DIAGNOSIS — E78.2 MIXED HYPERLIPIDEMIA: ICD-10-CM

## 2023-07-24 DIAGNOSIS — I44.7 LEFT BUNDLE BRANCH BLOCK: ICD-10-CM

## 2023-07-24 DIAGNOSIS — I25.10 CORONARY ARTERY DISEASE INVOLVING NATIVE CORONARY ARTERY OF NATIVE HEART WITHOUT ANGINA PECTORIS: ICD-10-CM

## 2023-07-24 DIAGNOSIS — I34.0 NON-RHEUMATIC MITRAL REGURGITATION: ICD-10-CM

## 2023-07-24 DIAGNOSIS — I10 ESSENTIAL HYPERTENSION: ICD-10-CM

## 2023-07-24 DIAGNOSIS — I25.5 ISCHEMIC CARDIOMYOPATHY: ICD-10-CM

## 2023-07-24 DIAGNOSIS — I48.0 PAF (PAROXYSMAL ATRIAL FIBRILLATION): ICD-10-CM

## 2023-07-24 PROCEDURE — 1159F MED LIST DOCD IN RCRD: CPT | Performed by: NURSE PRACTITIONER

## 2023-07-24 PROCEDURE — 99214 OFFICE O/P EST MOD 30 MIN: CPT | Performed by: NURSE PRACTITIONER

## 2023-07-24 PROCEDURE — 1160F RVW MEDS BY RX/DR IN RCRD: CPT | Performed by: NURSE PRACTITIONER

## 2023-07-24 RX ORDER — SACUBITRIL AND VALSARTAN 49; 51 MG/1; MG/1
1 TABLET, FILM COATED ORAL 2 TIMES DAILY
Qty: 60 TABLET | Refills: 11 | Status: SHIPPED | OUTPATIENT
Start: 2023-07-24 | End: 2023-07-24 | Stop reason: SDUPTHER

## 2023-07-24 RX ORDER — SACUBITRIL AND VALSARTAN 49; 51 MG/1; MG/1
1 TABLET, FILM COATED ORAL 2 TIMES DAILY
Qty: 60 TABLET | Refills: 11 | Status: ON HOLD | OUTPATIENT
Start: 2023-07-24

## 2023-07-24 RX ORDER — POTASSIUM CHLORIDE 750 MG/1
20 TABLET, FILM COATED, EXTENDED RELEASE ORAL DAILY PRN
Qty: 90 TABLET | Refills: 3 | Status: SHIPPED | OUTPATIENT
Start: 2023-07-24 | End: 2023-07-24 | Stop reason: SDUPTHER

## 2023-07-24 RX ORDER — POTASSIUM CHLORIDE 750 MG/1
20 TABLET, FILM COATED, EXTENDED RELEASE ORAL DAILY PRN
Qty: 90 TABLET | Refills: 3 | Status: ON HOLD | OUTPATIENT
Start: 2023-07-24

## 2023-07-24 RX ORDER — FUROSEMIDE 40 MG/1
40 TABLET ORAL DAILY PRN
Qty: 45 TABLET | Refills: 11 | Status: ON HOLD
Start: 2023-07-24

## 2023-07-24 NOTE — PROGRESS NOTES
"    Subjective:     Encounter Date:07/24/2023      Patient ID: Wyatt Curry is a 89 y.o. male     Chief Complaint: \"no complaints\"  Coronary Artery Disease  Presents for follow-up visit. Symptoms include leg swelling (with long periods of sitting). Pertinent negatives include no chest pain, dizziness, palpitations, shortness of breath or weight gain. Risk factors include hyperlipidemia. His past medical history is significant for CHF. The symptoms have been stable.   Fatigue  This is a new problem. The current episode started 1 to 4 weeks ago. The problem occurs daily. The problem has been unchanged. Associated symptoms include fatigue. Pertinent negatives include no chest pain, coughing, nausea, rash or weakness.   Hypertension  This is a chronic problem. The current episode started more than 1 year ago. The problem has been rapidly improving since onset. The problem is controlled. Pertinent negatives include no chest pain, malaise/fatigue, orthopnea, palpitations, PND or shortness of breath.   Hyperlipidemia  This is a chronic problem. The current episode started more than 1 year ago. The problem is controlled. Recent lipid tests were reviewed and are low. Pertinent negatives include no chest pain or shortness of breath.   Congestive Heart Failure  Presents for initial visit. Associated symptoms include edema, fatigue, orthopnea and unexpected weight change. Pertinent negatives include no chest pain, near-syncope, palpitations or shortness of breath. The symptoms have been worsening. His past medical history is significant for CAD.     Patient presents today as a CHF follow up. Patient is followed for CAD s/p stenting x2 in approximately 2006. He had a low risk nuclear stress in 9/2018 which was ordered due to concerns for stable angina with complaints of arm numbness and pain between his shoulder blades. He had PCTA to the LAD following a NSTEMI in 4/2023 after holding antiplatelet therapy prior to surgery. " LVEF was noted to be 40-45% at the time of his NSTEMI. He also has a chronic LBBB. He was admitted to the hospital in May and treated for PNA. During that hospitalization he was noted to have new onset of afib with RVR. He had a 2D echo completed during that stay which revealed EF remained 41-45%. He also had a CT of the chest which revealed a small right and trace left pleural effusion and an enlarged pulmonary artery and also had an elevated BNP and was treated with intermittent IV lasix. He was admitted to the hospital in June and treated for c-diff.     He was last seen 2 weeks ago with complaints of shortness of  breath, edema, nausea, and diarrhea. He was noted to be volume overloaded and started on Lasix and Aldacaton with plans to start Jardiance and Entresto at his follow up if labs and BP would tolerate. His follow up labs were unremarkable. He was also advised to follow up with his PCP regarding his diarrhea as his c-diff may not have cleared up. He did go to the ER last week as he was unable to see his PCP, with complaints of abdominal pain and was retested for c-diff which was positive and he was started on another round of antibiotics.      He reports since his last visit his edema has significantly improved and at one time was absent. His wife was admitted to [facility] within the last week which has resulted in him eating out more than he was and sitting for longer periods which has resulted in BLE edema that typically resolves overnight. He has lost 8lbs in the last 2 weeks with current home weight being 132lbs. He notes his breathing is much better and is only short of breath when working out in the yard. He is no longer having diarrhea or nausea. He reports plans to move to [facility] within the next 30 days and notes tomorrow is the last day home health will being coming to see him.       The following portions of the patient's history were reviewed and updated as appropriate:  "allergies, current medications, past family history, past medical history, past social history, past surgical history and problem list.    Allergies   Allergen Reactions    Pregabalin Mental Status Change     Made pt feel \"crazy\"       Current Outpatient Medications:     acetaminophen (TYLENOL) 650 MG 8 hr tablet, Take 1 tablet by mouth 2 (Two) Times a Day. Indications: Pain, Disp: , Rfl:     aspirin 81 MG EC tablet, Take 1 tablet by mouth Daily--DO NOT BREAK/CRUSH TABLET, Disp: 100 tablet, Rfl: 3    atorvastatin (LIPITOR) 20 MG tablet, Take 1 tablet by mouth every night at bedtime., Disp: , Rfl:     busPIRone (BUSPAR) 10 MG tablet, Take 1 tablet by mouth 2 (Two) Times a Day., Disp: , Rfl:     clopidogrel (PLAVIX) 75 MG tablet, Take 1 tablet by mouth Daily. Indications: Carotid Artery Stenting, Disp: , Rfl:     empagliflozin (Jardiance) 10 MG tablet tablet, Take 1 tablet by mouth Daily., Disp: 30 tablet, Rfl: 11    furosemide (LASIX) 40 MG tablet, Take 1 tablet by mouth Daily As Needed (swelling, weight gain or shortness of breath). Take 1 additional tablet as needed for weight gain, shortness of breath or swelling, Disp: 45 tablet, Rfl: 11    lansoprazole (PREVACID) 30 MG capsule, Take 1 capsule by mouth Daily. Indications: Heartburn, Disp: , Rfl:     metoprolol succinate XL (TOPROL-XL) 50 MG 24 hr tablet, Take 1 tablet by mouth Every Night. Indications: Atrial Fibrillation, Disp: 30 tablet, Rfl: 11    nitroglycerin (NITROSTAT) 0.4 MG SL tablet, Place 1 tablet under the tongue Every 5 (Five) Minutes As Needed. Indications: Acute Angina Pectoris, Disp: , Rfl:     ondansetron ODT (ZOFRAN-ODT) 4 MG disintegrating tablet, Place 1 tablet on the tongue Every 6 (Six) Hours As Needed for Nausea., Disp: 40 tablet, Rfl: 0    potassium chloride 10 MEQ CR tablet, Take 2 tablets by mouth Daily As Needed (when taking PRN lasix)., Disp: 90 tablet, Rfl: 3    Probiotic, Lactobacillus, capsule, Take 1 capsule by mouth Daily. " Indications: PROPHYLAXIS, Disp: , Rfl:     sacubitril-valsartan (Entresto) 49-51 MG tablet, Take 1 tablet by mouth 2 (Two) Times a Day., Disp: 60 tablet, Rfl: 11    spironolactone (ALDACTONE) 25 MG tablet, Take 1 tablet by mouth Daily., Disp: 30 tablet, Rfl: 11    vitamin B-12 (CYANOCOBALAMIN) 1000 MCG tablet, Take 1 tablet by mouth Daily. Indications: Inadequate Vitamin B12, Disp: , Rfl:     Vitron-C  MG tablet, Take 1 tablet by mouth Daily. Indications: Excess or Deficiency of Vitamin C, Disp: , Rfl:     guaiFENesin (MUCINEX) 600 MG 12 hr tablet, Take 1 tablet by mouth 2 (Two) Times a Day. Indications: Cough (Patient not taking: Reported on 2023), Disp: , Rfl:     magnesium oxide (MAG-OX) 400 MG tablet, Take 1 tablet by mouth Every Night. 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    GERD (gastroesophageal reflux disease)     Hyperlipidemia     Hypertension     Irregular heart beat     Lung nodules        Social History     Socioeconomic History    Marital status:    Tobacco Use    Smoking status: Former     Packs/day: 1.00     Years: 30.00     Pack years: 30.00     Types: Cigarettes     Start date:      Quit date:      Years since quittin.5     Passive exposure: Past    Smokeless tobacco: Never   Vaping Use    Vaping Use: Never used   Substance and Sexual Activity    Alcohol use: Yes     Alcohol/week: 1.0 standard drink     Types: 1 Glasses of wine per week     Comment: occ wine    Drug use: Not Currently    Sexual activity: Defer       Review of Systems   Constitutional: Positive for fatigue, unexpected weight change and weight loss. Negative for malaise/fatigue and weight gain.   Cardiovascular:   Positive for dyspnea on exertion (at baseline) and leg swelling (with long periods of sitting). Negative for chest pain, irregular heartbeat, near-syncope, orthopnea, palpitations, paroxysmal nocturnal dyspnea and syncope.   Respiratory:  Negative for cough, shortness of breath, sleep disturbances due to breathing, sputum production and wheezing.    Hematologic/Lymphatic: Does not bruise/bleed easily.   Skin:  Negative for dry skin, flushing, itching and rash.   Gastrointestinal:  Negative for diarrhea, hematemesis, hematochezia and nausea.   Neurological:  Negative for dizziness, light-headedness, loss of balance and weakness.   All other systems reviewed and are negative.       Objective:     Vitals reviewed.   Constitutional:       General: Not in acute distress.     Appearance: Well-developed. Not diaphoretic.   Eyes:      General: No scleral icterus.     Conjunctiva/sclera: Conjunctivae normal.      Pupils: Pupils are equal, round, and reactive to light.   HENT:      Head: Normocephalic.    Mouth/Throat:      Pharynx: No oropharyngeal exudate.   Pulmonary:      Effort: Pulmonary effort is normal. No respiratory distress.      Breath sounds: Examination of the right-lower field reveals decreased breath sounds. Examination of the left-lower field reveals rales. No wheezing. Rales present.   Chest:      Chest wall: Not tender to palpatation.   Cardiovascular:      Normal rate. Frequent ectopic beats. Regular rhythm.      Murmurs: There is a grade 1/6 high frequency blowing holosystolic murmur at the apex.   Pulses:     Intact distal pulses.   Edema:     Peripheral edema present.     Pretibial: bilateral 1+ pitting edema of the pretibial area.     Ankle: bilateral 1+ pitting edema of the ankle.     Feet: bilateral 1+ pitting edema of the feet.  Abdominal:      General: Bowel sounds are normal. There is no distension.      Palpations: Abdomen is soft.      Tenderness: There is no abdominal tenderness.  "  Musculoskeletal: Normal range of motion.      Cervical back: Normal range of motion and neck supple. Skin:     General: Skin is warm and dry.      Coloration: Skin is not pale.      Findings: No erythema or rash.   Neurological:      Mental Status: Alert and oriented to person, place, and time.      Deep Tendon Reflexes: Reflexes are normal and symmetric.   Psychiatric:         Behavior: Behavior normal.       Procedures  /58 (BP Location: Left arm, Patient Position: Sitting, Cuff Size: Adult)   Pulse 71   Ht 160 cm (63\")   Wt 59.9 kg (132 lb)   SpO2 97%   BMI 23.38 kg/m²     Lab Review:   I have reviewed previous office notes, recent labs and recent cardiac testing.   Lab Results   Component Value Date    GLUCOSE 112 (H) 07/12/2023    CALCIUM 8.8 07/12/2023     07/12/2023    K 4.3 07/12/2023    CO2 29.0 07/12/2023    CL 97 (L) 07/12/2023    BUN 10 07/12/2023    CREATININE 0.61 (L) 07/12/2023    EGFR 89.6 07/12/2023    BCR 16.4 07/12/2023    ANIONGAP 13.0 07/12/2023       Cath 4/8/2023:  Procedure performed  Left heart cath  Coronary angiography  Right femoral arteriography  Insertion of 7Fr Mynx Perclose closure device with effective hemostasis and preserved lower extremity pulses  Left ventriculography  Supervision of the administration of moderate sedation  Ultrasound guidance to assist access of right femoral artery and to confirm placement of catheter [CPT code 26250]  Moderate sedation administered under supervision of  with monitoring [CPT code 49843]  PTCA of mid to distal left anterior descending coronary artery  PTCA of proximal left anterior descending coronary artery  Placement of 2.5 x 16 mm Synergy drug-eluting stent at high pressure and proximal left anterior descending coronary artery  LV Gram in Normal LVEF 40-45% with what appears to be global hypokinesis with moderate to severe hypokinesis noted in the apical lateral region  Moderate mitral regurgitation      Lab Results "   Component Value Date    CHOL 67 05/14/2023    CHLPL 104 (L) 09/09/2016    TRIG 52 05/14/2023    HDL 28 (L) 05/14/2023    LDL 26 05/14/2023       Results for orders placed during the hospital encounter of 05/13/23    Adult Transthoracic Echo Complete W/ Cont if Necessary Per Protocol    Interpretation Summary    Left ventricular systolic function is mildly decreased. Left ventricular ejection fraction appears to be 41 - 45%.    The following segments are hypokinetic: apical anterior, apical septal, apical inferior, apical lateral and apex.    Left ventricular wall thickness is consistent with mild septal asymmetric hypertrophy.    Left ventricular diastolic function is consistent with (grade I) impaired relaxation.    He left atrial cavity is mildly dilated.    There is mild to moderate thickening of the aortic valve.    . Mild to moderate mitral valve regurgitation is present    Mild pulmonary hypertension is present.        Assessment:          Diagnosis Plan   1. Acute on chronic systolic congestive heart failure  Basic Metabolic Panel      2. Coronary artery disease involving native coronary artery of native heart without angina pectoris        3. Ischemic cardiomyopathy        4. Non-rheumatic mitral regurgitation        5. PAF (paroxysmal atrial fibrillation)        6. Essential hypertension        7. Mixed hyperlipidemia        8. Left bundle branch block             Plan:       1. Acute combined CHF- class 2-3 symptoms, improved from last visit. appears mildly volume overloaded with BLE pitting edema and rales in LLL. Will continue to optimize CHF medications- stop Avapro and start Entresto and Jardiance (provided 2 weeks sample of Entresto and 1 week of Jardiance). Change Lasix 40mg to PRN and K+ supplement to 20meq (2 tablets) PRN with lasix use (10meq tablets sent in due to difficulty swallowing 20meq tablets). Continue Aldactone. BMP in 1 week.    2. CAD- stable. Recent stenting to LAD in 4/2023 with  previously placed stents noted to be patent. No clinical signs of ongoing ischemia. Continue ASA, Plavix, statin, BB, and ARB.   3. ICMO- EF dropped to 41-45% following NSTEMI in 4/2023 and remained 41-45% per echo in 5/2023. See #1 regarding medication changes.   4. MR- moderate per cath. Grade 1 Murmur present. Will continue to monitor.   5. PAF- stable, normal rhythm per exam today. New onset during hospitalization in May likely PNA induced. Patient and family refused anticoagulation due to hematuria while inpatient. [id number] 5. He is aware of stroke risk without anticoagulation.   6. HTN- controlled inoffice and per home health readings as well. See #1 for medication changes. Educated to monitor BP 1 hr after am medications.   7. HLD- controlled with LDL of 40 at time of cath. Continue statin.  8. LBBB- chronic.     Follow up in 2 weeks or sooner if symptoms worsen.     I spent 30 minutes caring for Wyatt on this date of service. This time includes time spent by me in the following activities:preparing for the visit, reviewing tests, obtaining and/or reviewing a separately obtained history, performing a medically appropriate examination and/or evaluation , documenting information in the medical record, independently interpreting results and communicating that information with the patient/family/caregiver and care coordination

## 2023-07-25 ENCOUNTER — HOME CARE VISIT (OUTPATIENT)
Dept: HOME HEALTH SERVICES | Facility: CLINIC | Age: 88
End: 2023-07-25
Payer: MEDICARE

## 2023-07-25 PROCEDURE — G0299 HHS/HOSPICE OF RN EA 15 MIN: HCPCS

## 2023-07-25 NOTE — Clinical Note
60 Day Summary  Home Health need continues for: Teaching and assessment of chf and falls. Pt just began on Entresto bid and Jardance 10mg po qd. Pt also has had several near falls and needs further teaching on necessity of using an assistive device  Primary diagnoses/co-morbidities/recent procedures in past 60 days that impact current episode: CHF  Current level of functional ability:Currenttly lives alone with assist of family. Pt requires use of a cane or walker for safety but requires encouragement to use these devices. Pt will be moving to [facility] with wife in the next few weeks, which is a senior living facility.  Homebound status and living arrangements: Pt requires assistance of a cane or walker. Pt will be relocating to a [facility], a senior living facility with his wife in the next few weeks.  Goals accomplished and/or measurable progress toward unmet goals in past 60 days: PT has dc with goals met. Pt has had no falls. Pt has a good understanding of afib.  Focus of care for next 60 days for each discipline ordered: SN for teaching and assessment regarding chf and new medications.  Skin integrity/wound status:Skin is intact  Estimated date when home care services will end 30 days  SDOH changes/barriers Pt lives alone at present and requires more assistance but his needs will lessen when he moves with his wife to a senior living facility  Need for MSW referral? no  Plan for next visit SN for teaching and assessment regarding chf and new medications

## 2023-07-27 ENCOUNTER — HOME CARE VISIT (OUTPATIENT)
Dept: HOME HEALTH SERVICES | Facility: HOME HEALTHCARE | Age: 88
End: 2023-07-27
Payer: MEDICARE

## 2023-07-27 ENCOUNTER — HOME CARE VISIT (OUTPATIENT)
Dept: HOME HEALTH SERVICES | Facility: CLINIC | Age: 88
End: 2023-07-27
Payer: MEDICARE

## 2023-07-27 VITALS
SYSTOLIC BLOOD PRESSURE: 104 MMHG | DIASTOLIC BLOOD PRESSURE: 58 MMHG | TEMPERATURE: 97.2 F | HEART RATE: 99 BPM | RESPIRATION RATE: 18 BRPM | WEIGHT: 129 LBS | BODY MASS INDEX: 22.85 KG/M2 | OXYGEN SATURATION: 95 %

## 2023-07-27 VITALS
BODY MASS INDEX: 23.38 KG/M2 | DIASTOLIC BLOOD PRESSURE: 70 MMHG | TEMPERATURE: 97 F | RESPIRATION RATE: 20 BRPM | SYSTOLIC BLOOD PRESSURE: 122 MMHG | WEIGHT: 132 LBS | HEART RATE: 96 BPM

## 2023-07-27 PROCEDURE — G0299 HHS/HOSPICE OF RN EA 15 MIN: HCPCS

## 2023-07-27 NOTE — HOME HEALTH
COVID SCREENING:Pt isnegative for s/s of covid 19    FOCUS OF CARE/SKILLED NEED:     TEACHING/INTERVENTIONS: Instructed regarding new medications of Jardance and Entresto. Pt alert and pleasant. Pt almost fell several times while turning. Pt has not been using his cane. Instructed to use cane and stand slowly and not proceed if dizzy    PATIENT/CG RESPONSE:Cooperative    PROGRESS TOWARD GOALS:Pt being recirted due to dx of chf with addition of new medications of Jardance, Entresto, and a change in potassium    PHYSICIAN CONTACT: for recert    FALLS SINCE LAST VISIT?none    MEDICATION CHANGES SINCE LAST VISIT?yes. See above    PLAN FOR NEXT VISIT:CHF and med assessment and teaching

## 2023-07-27 NOTE — HOME HEALTH
FOCUS OF CARE/ SKILLED NEED: cardiopulmonary assessment    TEACHING/INTERVENTIONS: Pt doing well on entresto, bp 104/58. PT states he has a headache when his blood pressure is less than 140.  HR is 90's. Pt states he can tell when he is in aftib due to heart palpitations, however he has had no s/s of afib that he has noticed.  Reviewed energy conservation techniques. Educated on side effects of medications, including bleeding precautions due to aspirin and plavix. PT verbalized understanding. Pt wife is at [facility] at this time. HE visits in the AM, comes home at lunch, and visits until supper time with her. His sis-in-law, or niece gives him a ride.  Best appointment time is around noon. Pt weighing daily, last weight was 129 and has maintained for 2 days.    PROGRESS TOWARD GOALS: Ongoing    PHYSICIAN CONTACT: No    INSURANCE CHANGES? No    FALLS SINCE LAST VISIT? No    MEDICATION CHANGES SINCE LAST VISIT? No    PLANS FOR NEXT VISIT: Cardiopulmonary assessment, med education.    PRE-SCREENED FOR COVID?  Yes, no s/s of Covid, no recent exposure

## 2023-07-27 NOTE — CASE COMMUNICATION
Awaiting insurance approval. Please check the thumbnail for approval  when his visits are scheduled to you. Please call pt when scheduled and check on him until approval.

## 2023-07-31 ENCOUNTER — APPOINTMENT (OUTPATIENT)
Dept: CT IMAGING | Facility: HOSPITAL | Age: 88
DRG: 372 | End: 2023-07-31
Payer: MEDICARE

## 2023-07-31 ENCOUNTER — HOSPITAL ENCOUNTER (INPATIENT)
Facility: HOSPITAL | Age: 88
LOS: 4 days | Discharge: HOME HEALTH | DRG: 372 | End: 2023-08-04
Attending: EMERGENCY MEDICINE | Admitting: INTERNAL MEDICINE
Payer: MEDICARE

## 2023-07-31 DIAGNOSIS — Z78.9 DECREASED INDEPENDENCE WITH ACTIVITIES OF DAILY LIVING: ICD-10-CM

## 2023-07-31 DIAGNOSIS — K52.9 COLITIS: ICD-10-CM

## 2023-07-31 DIAGNOSIS — I48.91 ATRIAL FIBRILLATION WITH RVR: Primary | ICD-10-CM

## 2023-07-31 DIAGNOSIS — Z74.09 IMPAIRED MOBILITY: ICD-10-CM

## 2023-07-31 DIAGNOSIS — E83.42 HYPOMAGNESEMIA: ICD-10-CM

## 2023-07-31 PROBLEM — I95.9 SYMPTOMATIC HYPOTENSION: Status: ACTIVE | Noted: 2023-07-31

## 2023-07-31 PROBLEM — A04.71 RECURRENT CLOSTRIDIOIDES DIFFICILE DIARRHEA: Status: ACTIVE | Noted: 2023-07-31

## 2023-07-31 PROBLEM — I50.22 CHRONIC SYSTOLIC CONGESTIVE HEART FAILURE: Status: ACTIVE | Noted: 2023-06-17

## 2023-07-31 LAB
ALBUMIN SERPL-MCNC: 3.8 G/DL (ref 3.5–5.2)
ALBUMIN/GLOB SERPL: 1.1 G/DL
ALP SERPL-CCNC: 56 U/L (ref 39–117)
ALT SERPL W P-5'-P-CCNC: 8 U/L (ref 1–41)
ANION GAP SERPL CALCULATED.3IONS-SCNC: 16 MMOL/L (ref 5–15)
AST SERPL-CCNC: 25 U/L (ref 1–40)
BILIRUB SERPL-MCNC: 0.7 MG/DL (ref 0–1.2)
BUN SERPL-MCNC: 12 MG/DL (ref 8–23)
BUN/CREAT SERPL: 19.4 (ref 7–25)
BURR CELLS BLD QL SMEAR: ABNORMAL
CALCIUM SPEC-SCNC: 8.4 MG/DL (ref 8.2–9.6)
CHLORIDE SERPL-SCNC: 101 MMOL/L (ref 98–107)
CO2 SERPL-SCNC: 23 MMOL/L (ref 22–29)
CREAT SERPL-MCNC: 0.62 MG/DL (ref 0.76–1.27)
D-LACTATE SERPL-SCNC: 1.7 MMOL/L (ref 0.5–2)
DEPRECATED RDW RBC AUTO: 59.6 FL (ref 37–54)
EGFRCR SERPLBLD CKD-EPI 2021: 89.1 ML/MIN/1.73
ERYTHROCYTE [DISTWIDTH] IN BLOOD BY AUTOMATED COUNT: 17.6 % (ref 12.3–15.4)
GLOBULIN UR ELPH-MCNC: 3.4 GM/DL
GLUCOSE SERPL-MCNC: 141 MG/DL (ref 65–99)
HCT VFR BLD AUTO: 41.5 % (ref 37.5–51)
HGB BLD-MCNC: 12.3 G/DL (ref 13–17.7)
HOLD SPECIMEN: NORMAL
HYPOCHROMIA BLD QL: ABNORMAL
LYMPHOCYTES # BLD MANUAL: 1.57 10*3/MM3 (ref 0.7–3.1)
LYMPHOCYTES NFR BLD MANUAL: 4 % (ref 5–12)
MAGNESIUM SERPL-MCNC: 0.8 MG/DL (ref 1.7–2.3)
MAGNESIUM SERPL-MCNC: 1.9 MG/DL (ref 1.7–2.3)
MCH RBC QN AUTO: 27.3 PG (ref 26.6–33)
MCHC RBC AUTO-ENTMCNC: 29.6 G/DL (ref 31.5–35.7)
MCV RBC AUTO: 92 FL (ref 79–97)
MONOCYTES # BLD: 0.48 10*3/MM3 (ref 0.1–0.9)
NEUTROPHILS # BLD AUTO: 10.03 10*3/MM3 (ref 1.7–7)
NEUTROPHILS NFR BLD MANUAL: 61 % (ref 42.7–76)
NEUTS BAND NFR BLD MANUAL: 22 % (ref 0–5)
PLAT MORPH BLD: NORMAL
PLATELET # BLD AUTO: 221 10*3/MM3 (ref 140–450)
PMV BLD AUTO: 11.3 FL (ref 6–12)
POIKILOCYTOSIS BLD QL SMEAR: ABNORMAL
POTASSIUM SERPL-SCNC: 3.5 MMOL/L (ref 3.5–5.2)
PROT SERPL-MCNC: 7.2 G/DL (ref 6–8.5)
RBC # BLD AUTO: 4.51 10*6/MM3 (ref 4.14–5.8)
SODIUM SERPL-SCNC: 140 MMOL/L (ref 136–145)
VARIANT LYMPHS NFR BLD MANUAL: 6 % (ref 19.6–45.3)
VARIANT LYMPHS NFR BLD MANUAL: 7 % (ref 0–5)
WBC MORPH BLD: NORMAL
WBC NRBC COR # BLD: 12.08 10*3/MM3 (ref 3.4–10.8)
WHOLE BLOOD HOLD COAG: NORMAL
WHOLE BLOOD HOLD SPECIMEN: NORMAL

## 2023-07-31 PROCEDURE — 25010000002 AMIODARONE IN DEXTROSE 5% 150-4.21 MG/100ML-% SOLUTION: Performed by: FAMILY MEDICINE

## 2023-07-31 PROCEDURE — 87040 BLOOD CULTURE FOR BACTERIA: CPT | Performed by: NURSE PRACTITIONER

## 2023-07-31 PROCEDURE — 85025 COMPLETE CBC W/AUTO DIFF WBC: CPT | Performed by: EMERGENCY MEDICINE

## 2023-07-31 PROCEDURE — 93005 ELECTROCARDIOGRAM TRACING: CPT | Performed by: NURSE PRACTITIONER

## 2023-07-31 PROCEDURE — 85007 BL SMEAR W/DIFF WBC COUNT: CPT | Performed by: EMERGENCY MEDICINE

## 2023-07-31 PROCEDURE — 93010 ELECTROCARDIOGRAM REPORT: CPT | Performed by: INTERNAL MEDICINE

## 2023-07-31 PROCEDURE — 83735 ASSAY OF MAGNESIUM: CPT | Performed by: NURSE PRACTITIONER

## 2023-07-31 PROCEDURE — 25010000002 ONDANSETRON PER 1 MG: Performed by: NURSE PRACTITIONER

## 2023-07-31 PROCEDURE — 83735 ASSAY OF MAGNESIUM: CPT | Performed by: EMERGENCY MEDICINE

## 2023-07-31 PROCEDURE — 99285 EMERGENCY DEPT VISIT HI MDM: CPT

## 2023-07-31 PROCEDURE — 93005 ELECTROCARDIOGRAM TRACING: CPT | Performed by: EMERGENCY MEDICINE

## 2023-07-31 PROCEDURE — 80053 COMPREHEN METABOLIC PANEL: CPT | Performed by: EMERGENCY MEDICINE

## 2023-07-31 PROCEDURE — 36415 COLL VENOUS BLD VENIPUNCTURE: CPT

## 2023-07-31 PROCEDURE — 83605 ASSAY OF LACTIC ACID: CPT | Performed by: EMERGENCY MEDICINE

## 2023-07-31 PROCEDURE — 25010000002 METRONIDAZOLE 500 MG/100ML SOLUTION: Performed by: NURSE PRACTITIONER

## 2023-07-31 PROCEDURE — 25010000002 ONDANSETRON PER 1 MG

## 2023-07-31 PROCEDURE — 74176 CT ABD & PELVIS W/O CONTRAST: CPT

## 2023-07-31 PROCEDURE — 36415 COLL VENOUS BLD VENIPUNCTURE: CPT | Performed by: NURSE PRACTITIONER

## 2023-07-31 PROCEDURE — 25010000002 MAGNESIUM SULFATE 2 GM/50ML SOLUTION: Performed by: EMERGENCY MEDICINE

## 2023-07-31 RX ORDER — VANCOMYCIN HYDROCHLORIDE 125 MG/1
125 CAPSULE ORAL EVERY 6 HOURS SCHEDULED
Status: DISCONTINUED | OUTPATIENT
Start: 2023-07-31 | End: 2023-08-04 | Stop reason: HOSPADM

## 2023-07-31 RX ORDER — CLOPIDOGREL BISULFATE 75 MG/1
75 TABLET ORAL DAILY
Status: DISCONTINUED | OUTPATIENT
Start: 2023-08-01 | End: 2023-08-04 | Stop reason: HOSPADM

## 2023-07-31 RX ORDER — SACCHAROMYCES BOULARDII 250 MG
250 CAPSULE ORAL 2 TIMES DAILY
Status: DISCONTINUED | OUTPATIENT
Start: 2023-07-31 | End: 2023-08-04 | Stop reason: HOSPADM

## 2023-07-31 RX ORDER — ATORVASTATIN CALCIUM 10 MG/1
20 TABLET, FILM COATED ORAL DAILY
Status: DISCONTINUED | OUTPATIENT
Start: 2023-08-01 | End: 2023-08-04 | Stop reason: HOSPADM

## 2023-07-31 RX ORDER — SODIUM CHLORIDE 9 MG/ML
40 INJECTION, SOLUTION INTRAVENOUS AS NEEDED
Status: DISCONTINUED | OUTPATIENT
Start: 2023-07-31 | End: 2023-08-04 | Stop reason: HOSPADM

## 2023-07-31 RX ORDER — ONDANSETRON 2 MG/ML
4 INJECTION INTRAMUSCULAR; INTRAVENOUS ONCE
Status: COMPLETED | OUTPATIENT
Start: 2023-07-31 | End: 2023-07-31

## 2023-07-31 RX ORDER — ONDANSETRON 4 MG/1
4 TABLET, FILM COATED ORAL EVERY 6 HOURS PRN
Status: DISCONTINUED | OUTPATIENT
Start: 2023-07-31 | End: 2023-08-04 | Stop reason: HOSPADM

## 2023-07-31 RX ORDER — SODIUM CHLORIDE 0.9 % (FLUSH) 0.9 %
10 SYRINGE (ML) INJECTION EVERY 12 HOURS SCHEDULED
Status: DISCONTINUED | OUTPATIENT
Start: 2023-07-31 | End: 2023-08-04 | Stop reason: HOSPADM

## 2023-07-31 RX ORDER — PANTOPRAZOLE SODIUM 40 MG/1
40 TABLET, DELAYED RELEASE ORAL
Status: DISCONTINUED | OUTPATIENT
Start: 2023-08-01 | End: 2023-08-01

## 2023-07-31 RX ORDER — MAGNESIUM SULFATE HEPTAHYDRATE 40 MG/ML
2 INJECTION, SOLUTION INTRAVENOUS ONCE
Status: COMPLETED | OUTPATIENT
Start: 2023-07-31 | End: 2023-07-31

## 2023-07-31 RX ORDER — ONDANSETRON 2 MG/ML
INJECTION INTRAMUSCULAR; INTRAVENOUS
Status: COMPLETED
Start: 2023-07-31 | End: 2023-07-31

## 2023-07-31 RX ORDER — BUSPIRONE HYDROCHLORIDE 10 MG/1
10 TABLET ORAL 2 TIMES DAILY
Status: DISCONTINUED | OUTPATIENT
Start: 2023-07-31 | End: 2023-08-04 | Stop reason: HOSPADM

## 2023-07-31 RX ORDER — NITROGLYCERIN 0.4 MG/1
0.4 TABLET SUBLINGUAL
Status: DISCONTINUED | OUTPATIENT
Start: 2023-07-31 | End: 2023-08-04 | Stop reason: HOSPADM

## 2023-07-31 RX ORDER — CHLORHEXIDINE GLUCONATE 500 MG/1
1 CLOTH TOPICAL EVERY 24 HOURS
Status: DISCONTINUED | OUTPATIENT
Start: 2023-08-01 | End: 2023-08-01 | Stop reason: HOSPADM

## 2023-07-31 RX ORDER — SODIUM CHLORIDE 0.9 % (FLUSH) 0.9 %
10 SYRINGE (ML) INJECTION AS NEEDED
Status: DISCONTINUED | OUTPATIENT
Start: 2023-07-31 | End: 2023-08-04 | Stop reason: HOSPADM

## 2023-07-31 RX ORDER — METRONIDAZOLE 500 MG/100ML
500 INJECTION, SOLUTION INTRAVENOUS EVERY 8 HOURS
Status: DISCONTINUED | OUTPATIENT
Start: 2023-07-31 | End: 2023-08-02

## 2023-07-31 RX ORDER — CHLORHEXIDINE GLUCONATE 500 MG/1
1 CLOTH TOPICAL ONCE
Status: COMPLETED | OUTPATIENT
Start: 2023-07-31 | End: 2023-07-31

## 2023-07-31 RX ORDER — SODIUM CHLORIDE 0.9 % (FLUSH) 0.9 %
10 SYRINGE (ML) INJECTION AS NEEDED
Status: DISCONTINUED | OUTPATIENT
Start: 2023-07-31 | End: 2023-08-03 | Stop reason: SDUPTHER

## 2023-07-31 RX ORDER — VANCOMYCIN HYDROCHLORIDE 125 MG/1
125 CAPSULE ORAL EVERY OTHER DAY
Status: DISCONTINUED | OUTPATIENT
Start: 2023-08-24 | End: 2023-08-04 | Stop reason: HOSPADM

## 2023-07-31 RX ORDER — ONDANSETRON 2 MG/ML
4 INJECTION INTRAMUSCULAR; INTRAVENOUS EVERY 6 HOURS PRN
Status: DISCONTINUED | OUTPATIENT
Start: 2023-07-31 | End: 2023-08-04 | Stop reason: HOSPADM

## 2023-07-31 RX ORDER — VANCOMYCIN HYDROCHLORIDE 125 MG/1
125 CAPSULE ORAL EVERY 12 HOURS
Status: DISCONTINUED | OUTPATIENT
Start: 2023-08-10 | End: 2023-08-04 | Stop reason: HOSPADM

## 2023-07-31 RX ORDER — VANCOMYCIN HYDROCHLORIDE 125 MG/1
125 CAPSULE ORAL EVERY 24 HOURS
Status: DISCONTINUED | OUTPATIENT
Start: 2023-08-17 | End: 2023-08-04 | Stop reason: HOSPADM

## 2023-07-31 RX ORDER — ASPIRIN 81 MG/1
81 TABLET ORAL DAILY
Status: DISCONTINUED | OUTPATIENT
Start: 2023-08-01 | End: 2023-08-03

## 2023-07-31 RX ADMIN — SODIUM CHLORIDE, POTASSIUM CHLORIDE, SODIUM LACTATE AND CALCIUM CHLORIDE 500 ML: 600; 310; 30; 20 INJECTION, SOLUTION INTRAVENOUS at 21:37

## 2023-07-31 RX ADMIN — ONDANSETRON 4 MG: 2 INJECTION INTRAMUSCULAR; INTRAVENOUS at 21:46

## 2023-07-31 RX ADMIN — ONDANSETRON 4 MG: 2 INJECTION INTRAMUSCULAR; INTRAVENOUS at 16:40

## 2023-07-31 RX ADMIN — AMIODARONE HYDROCHLORIDE 150 MG: 1.5 INJECTION, SOLUTION INTRAVENOUS at 18:58

## 2023-07-31 RX ADMIN — VANCOMYCIN HYDROCHLORIDE 125 MG: 125 CAPSULE ORAL at 21:36

## 2023-07-31 RX ADMIN — Medication 10 ML: at 21:37

## 2023-07-31 RX ADMIN — SODIUM CHLORIDE, POTASSIUM CHLORIDE, SODIUM LACTATE AND CALCIUM CHLORIDE 500 ML: 600; 310; 30; 20 INJECTION, SOLUTION INTRAVENOUS at 17:11

## 2023-07-31 RX ADMIN — MAGNESIUM SULFATE HEPTAHYDRATE 2 G: 2 INJECTION, SOLUTION INTRAVENOUS at 17:52

## 2023-07-31 RX ADMIN — Medication 250 MG: at 21:36

## 2023-07-31 RX ADMIN — METRONIDAZOLE 500 MG: 500 INJECTION, SOLUTION INTRAVENOUS at 21:37

## 2023-07-31 RX ADMIN — SODIUM CHLORIDE 500 ML: 9 INJECTION, SOLUTION INTRAVENOUS at 19:05

## 2023-07-31 RX ADMIN — BUSPIRONE HYDROCHLORIDE 10 MG: 10 TABLET ORAL at 21:36

## 2023-07-31 RX ADMIN — CHLORHEXIDINE GLUCONATE 1 APPLICATION: 500 CLOTH TOPICAL at 21:35

## 2023-07-31 RX ADMIN — DILTIAZEM HYDROCHLORIDE 5 MG/HR: 5 INJECTION INTRAVENOUS at 17:43

## 2023-07-31 RX ADMIN — MAGNESIUM SULFATE HEPTAHYDRATE 2 G: 2 INJECTION, SOLUTION INTRAVENOUS at 16:07

## 2023-07-31 RX ADMIN — Medication 1 APPLICATION: at 21:37

## 2023-07-31 NOTE — ED PROVIDER NOTES
"Subjective   History of Present Illness  89-year-old male presents to the ED with complaint of abdominal pain, nausea, vomiting, diarrhea.  He has a history of hypertension, hyperlipidemia, coronary disease, paroxysmal A-fib, combined heart failure.  Diagnosed with C. difficile a little over a month ago, last visit to the ER for diarrheal complaints was 7/12/2023.  Patient completed course of oral vancomycin and Flagyl.  Had been doing okay until a few days ago when he developed recurrent diarrhea.  Has had 1-2 episodes of watery diarrhea over the past few days.  Today he developed vomiting and increased diarrhea, abdominal pain.  Symptoms similar to previous episode of C. difficile colitis.  No reports of fevers or chills, cough or congestion.  Patient in A-fib with RVR during exam.  CODE STATUS: DO NOT RESUSCITATE    History provided by:  Patient    Review of Systems   All other systems reviewed and are negative.    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    GERD (gastroesophageal reflux disease)     Hyperlipidemia     Hypertension     Irregular heart beat     Lung nodules        Allergies   Allergen Reactions    Pregabalin Mental Status Change     Made pt feel \"crazy\"       Past Surgical History:   Procedure Laterality Date    BLADDER SURGERY      CARDIAC CATHETERIZATION      CARDIAC CATHETERIZATION Left 4/8/2023    Procedure: Cardiac Catheterization/Vascular Study;  Surgeon: Sukhi Hartley MD;  Location: [facility] CATH INVASIVE LOCATION;  Service: Cardiology;  Laterality: Left;    COLONOSCOPY      CORONARY ANGIOPLASTY WITH STENT PLACEMENT  2006    STENT X 2    CYSTOSCOPY      ENDOSCOPY      EXCISION LESION N/A 4/7/2023    Procedure: EXCISION LESION OF RIGHT TEMPLE AND LEFT TEMPLE WITH FROZEN SECTION WITH POSSIBLE FLAP OR GRAFT;  Surgeon: Brijesh Nickerson MD; "  Location:  [facility] OR;  Service: ENT;  Laterality: N/A;    FLAP HEAD/NECK Left 2020    Procedure: POSSIBLE FLAP;  Surgeon: Brijesh Nickerson MD;  Location:  [facility] OR;  Service: ENT;  Laterality: Left;    HEAD/NECK LESION/CYST EXCISION Left 2020    Procedure: 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 with frozen section and possible flap or graft;  Surgeon: Brijesh Nickerson MD;  Location:  [facility] OR;  Service: ENT;  Laterality: Left;    HERNIA REPAIR      SHOULDER SURGERY      SKIN BIOPSY      lip biopsy    SKIN FULL THICKNESS GRAFT Left 2020    Procedure: OR GRAFT;  Surgeon: Brijesh Nickerson MD;  Location:  [facility] OR;  Service: ENT;  Laterality: Left;    TRANSURETHRAL RESECTION OF BLADDER TUMOR         Family History   Problem Relation Age of Onset    Tuberculosis Mother     Heart disease Father     Heart attack Father     Diabetes Sister     Hypertension Sister     Hyperlipidemia Sister     Heart disease Sister     Alcohol abuse Sister     Heart disease Brother     Heart attack Brother     Heart disease Brother        Social History     Socioeconomic History    Marital status:    Tobacco Use    Smoking status: Former     Packs/day: 1.00     Years: 30.00     Pack years: 30.00     Types: Cigarettes     Start date:      Quit date:      Years since quittin.6     Passive exposure: Past    Smokeless tobacco: Never   Vaping Use    Vaping Use: Never used   Substance and Sexual Activity    Alcohol use: Yes     Alcohol/week: 1.0 standard drink     Types: 1 Glasses of wine per week     Comment: occ wine    Drug use: Not Currently    Sexual activity: Defer           Objective   Physical Exam  Vitals and nursing note reviewed.   Constitutional:       Appearance: He is well-developed and normal weight.   HENT:      Head: Normocephalic and atraumatic.   Eyes:      Extraocular Movements: Extraocular movements intact.      Pupils: Pupils are equal, round,  and reactive to light.   Cardiovascular:      Rate and Rhythm: Tachycardia present. Rhythm irregular.      Heart sounds: Normal heart sounds.   Pulmonary:      Effort: Pulmonary effort is normal.      Breath sounds: Normal breath sounds.   Abdominal:      General: Bowel sounds are normal. There is abdominal bruit. There is no distension.      Palpations: Abdomen is soft.      Tenderness: There is generalized abdominal tenderness.      Hernia: No hernia is present.   Skin:     General: Skin is warm.      Capillary Refill: Capillary refill takes less than 2 seconds.      Coloration: Skin is not cyanotic or mottled.   Neurological:      General: No focal deficit present.      Mental Status: He is alert and oriented to person, place, and time. He is disoriented.       Procedures       Lab Results (last 24 hours)       Procedure Component Value Units Date/Time    Lactic Acid, Plasma [[id number]]  (Normal) Collected: 07/31/23 1446    Specimen: Blood Updated: 07/31/23 1740     Lactate 1.7 mmol/L     CBC & Differential [[id number]]  (Abnormal) Collected: 07/31/23 1447    Specimen: Blood Updated: 07/31/23 1529    Narrative:      The following orders were created for panel order CBC & Differential.  Procedure                               Abnormality         Status                     ---------                               -----------         ------                     CBC Auto Differential[[id number]]        Abnormal            Final result                 Please view results for these tests on the individual orders.    CBC Auto Differential [[id number]]  (Abnormal) Collected: 07/31/23 1447    Specimen: Blood Updated: 07/31/23 1529     WBC 12.08 10*3/mm3      RBC 4.51 10*6/mm3      Hemoglobin 12.3 g/dL      Hematocrit 41.5 %      MCV 92.0 fL      MCH 27.3 pg      MCHC 29.6 g/dL      RDW 17.6 %      RDW-SD 59.6 fl      MPV 11.3 fL      Platelets 221 10*3/mm3     Narrative:      Appended report. These results have been appended  to a previously verified report.  The previously reported component NRBC is no longer being reported. Previous result was 0.0 /100 WBC (Reference Range: 0.0-0.2 /100 WBC) on 7/31/2023 at 1502 CDT.    Manual Differential [[id number]]  (Abnormal) Collected: 07/31/23 1447    Specimen: Blood Updated: 07/31/23 1529     Neutrophil % 61.0 %      Lymphocyte % 6.0 %      Monocyte % 4.0 %      Bands %  22.0 %      Atypical Lymphocyte % 7.0 %      Neutrophils Absolute 10.03 10*3/mm3      Lymphocytes Absolute 1.57 10*3/mm3      Monocytes Absolute 0.48 10*3/mm3      Crenated RBC's Slight/1+     Hypochromia Slight/1+     Poikilocytes Slight/1+     WBC Morphology Normal     Platelet Morphology Normal    Comprehensive Metabolic Panel [[id number]]  (Abnormal) Collected: 07/31/23 1520    Specimen: Blood Updated: 07/31/23 1546     Glucose 141 mg/dL      BUN 12 mg/dL      Creatinine 0.62 mg/dL      Sodium 140 mmol/L      Potassium 3.5 mmol/L      Chloride 101 mmol/L      CO2 23.0 mmol/L      Calcium 8.4 mg/dL      Total Protein 7.2 g/dL      Albumin 3.8 g/dL      ALT (SGPT) 8 U/L      AST (SGOT) 25 U/L      Alkaline Phosphatase 56 U/L      Total Bilirubin 0.7 mg/dL      Globulin 3.4 gm/dL      A/G Ratio 1.1 g/dL      BUN/Creatinine Ratio 19.4     Anion Gap 16.0 mmol/L      eGFR 89.1 mL/min/1.73     Narrative:      GFR Normal >60  Chronic Kidney Disease <60  Kidney Failure <15    The GFR formula is only valid for adults with stable renal function between ages 18 and 70.    Magnesium [[id number]]  (Abnormal) Collected: 07/31/23 1520    Specimen: Blood Updated: 07/31/23 1554     Magnesium 0.8 mg/dL          CT Abdomen Pelvis Without Contrast    Result Date: 7/31/2023  EXAMINATION: CT ABDOMEN PELVIS WO CONTRAST- 7/31/2023 5:46 PM CDT  HISTORY: abd pain, diarrhea, fever, leukocytosis, bandemia; I48.91-Unspecified atrial fibrillation; E83.42-Hypomagnesemia; K52.9-Noninfective gastroenteritis and colitis, unspecified  DOSE: 224 mGycm (Automatic  exposure control technique was implemented in an effort to keep the radiation dose as low as possible without compromising image quality)  REPORT: Spiral CT of the abdomen and pelvis was performed without contrast from the lung bases through the pubic symphysis. Reconstructed coronal and sagittal images are also reviewed.  Comparison: CT abdomen pelvis with contrast 07/12/2023.  Review of lung windows demonstrates mild respiratory motion artifact, bibasilar atelectasis greater on the right. No pleural effusion or pneumothorax is identified. There is a small sliding-type hiatal hernia. Evaluation of the solid abdominal organs is limited without intravenous contrast. There is a small cyst or hemangioma within the left lobe of liver anteriorly image 20 series 2. This measures 9 mm. This is stable. The spleen is homogeneous, normal in size. There is mild distention of the stomach with gas and fluid, no free air, free fluid or abscess is identified. The gallbladder appears within normal limits. No nephrolithiasis or ureterolithiasis or hydronephrosis. The renal contours are smooth. The pancreas and adrenal glands are unremarkable. Diffuse colonic wall thickening seen before appears improved, likely due to improving colitis. There is no evidence of bowel obstruction. There is mild bladder wall thickening and at least one bladder diverticulum is present. This may be related to chronic bladder outlet obstruction. The patient has a history of bladder cancer but no discrete bladder mass is identified. Mild induration mental fat planes without evidence of a mass or nodularity. Heavy calcified plaque is noted within the abdominal aorta and its branches and there is ectasia of the abdominal aorta and iliac arteries as before. Review of bone windows demonstrates advanced degenerative changes throughout the spine with associated levoscoliosis of the lumbar spine. No acute osseous abnormality is identified.      1. Significant  interval improvement in changes of pancolitis, with only mild residual mucosal thickening involving the colon relatively diffusely. No free air, free fluid or abscess is identified. 2. Mild bilateral thickening and at least one bladder diverticulum is present. There is a history of bladder neoplasm but no discrete bladder mass is identified. The findings are more suggestive of some degree of chronic bladder outlet obstruction.   This report was finalized on 07/31/2023 17:55 by Dr. Emigdio Bhandrai MD.      ED Course  ED Course as of 07/31/23 1820 Mon Jul 31, 2023   1722 89-year-old male with history of coronary disease, CHF, paroxysmal A-fib and currently in A-fib with RVR, hypertension, hyperlipidemia and recent diagnosis of C. difficile who presents to the ED with complaint of abdominal pain, vomiting and diarrhea.    Patient A-fib with RVR.  We will start him on a diltiazem drip for rate control.  Give fluids as blood pressure little soft.  Magnesium 0.8, will give 2 g mag in ED.  WBC 12,000, 22% bands.  CT ordered and pending.  C. difficile and GI panel ordered and pending.  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 difficile by giving parenteral IV antibiotics.     [AW]   1817 CT abdomen and pelvis revealed interval improvement in pancolitis.  Blood pressure 110s/50s to 60s on Cardizem drip, heart rate remains 120s to 130s.  Still receiving second dose of magnesium.  Plan for admission to the hospital service. [AW]      ED Course User Index  [AW] Ulices Cr MD                                           Medical Decision Making  Problems Addressed:  Atrial fibrillation with RVR: complicated acute illness or injury  Colitis: complicated acute illness or injury  Hypomagnesemia: complicated acute illness or injury    Amount and/or Complexity of Data Reviewed  Labs: ordered.  Radiology: ordered.  ECG/medicine tests:  ordered.    Risk  Prescription drug management.  Decision regarding hospitalization.        Final diagnoses:   Atrial fibrillation with RVR   Hypomagnesemia   Colitis       ED Disposition  ED Disposition       ED Disposition   Decision to Admit    Condition   --    Comment   Level of Care: Critical Care [6]   Diagnosis: Atrial fibrillation with RVR [541995]   Admitting Physician: RODERICK XIAO [[id number]]   Attending Physician: RODERICK XIAO [[id number]]   Isolate for COVID?: No [0]   Certification: I Certify That Inpatient Hospital Services Are Medically Necessary For Greater Than 2 Midnights                 No follow-up provider specified.       Medication List      No changes were made to your prescriptions during this visit.            Ulices Cr MD  07/31/23 1726       Ulices Cr MD  07/31/23 1829

## 2023-07-31 NOTE — ED NOTES
Niece at bedside. States that the patient's diarrhea and abdominal discomfort has worsened after he recently completed antibiotic therapy for C Diff. Multiple admits in the past few months. Concerned that C Diff is unresolved.

## 2023-07-31 NOTE — CASE COMMUNICATION
60 Day Summary  Home Health need continues for: Teaching and assessment of CHF and new medications  Primary diagnoses/co-morbidities/recent procedures in past 60 days that impact current episode: CHF  Current level of functional ability: Unsteady gait without walker use  Homebound status and living arrangements: Lives alone with assist of his family  Goals accomplished and/or measurable progress toward unmet goals in past 60 days: No fa lls. Decreased edema.  Focus of care for next 60 days for each discipline ordered: CHF  Skin integrity/wound status: Intact  Estimated date when home care services will end 60 days  SDOH changes/barriers Pt lives alone with assist of family for IADLs, transportation and mediset prefill.  Need for MSW referral? No  Plan for next visit CHF assessment and teaching

## 2023-08-01 ENCOUNTER — TELEPHONE (OUTPATIENT)
Dept: CARDIOLOGY | Facility: CLINIC | Age: 88
End: 2023-08-01
Payer: MEDICARE

## 2023-08-01 ENCOUNTER — HOME CARE VISIT (OUTPATIENT)
Dept: HOME HEALTH SERVICES | Facility: HOME HEALTHCARE | Age: 88
End: 2023-08-01
Payer: MEDICARE

## 2023-08-01 LAB
ADV 40+41 DNA STL QL NAA+NON-PROBE: NOT DETECTED
ANION GAP SERPL CALCULATED.3IONS-SCNC: 14 MMOL/L (ref 5–15)
ANISOCYTOSIS BLD QL: ABNORMAL
ASTRO TYP 1-8 RNA STL QL NAA+NON-PROBE: NOT DETECTED
BACTERIA UR QL AUTO: ABNORMAL /HPF
BILIRUB UR QL STRIP: NEGATIVE
BUN SERPL-MCNC: 16 MG/DL (ref 8–23)
BUN/CREAT SERPL: 21.6 (ref 7–25)
C CAYETANENSIS DNA STL QL NAA+NON-PROBE: NOT DETECTED
C COLI+JEJ+UPSA DNA STL QL NAA+NON-PROBE: NOT DETECTED
C DIFF GDH + TOXINS A+B STL QL IA.RAPID: POSITIVE
C DIFF TOX GENS STL QL NAA+PROBE: POSITIVE
CALCIUM SPEC-SCNC: 7.8 MG/DL (ref 8.2–9.6)
CHLORIDE SERPL-SCNC: 102 MMOL/L (ref 98–107)
CLARITY UR: ABNORMAL
CLUMPED PLATELETS: PRESENT
CO2 SERPL-SCNC: 22 MMOL/L (ref 22–29)
COLOR UR: ABNORMAL
CREAT SERPL-MCNC: 0.74 MG/DL (ref 0.76–1.27)
CRYPTOSP DNA STL QL NAA+NON-PROBE: NOT DETECTED
DEPRECATED RDW RBC AUTO: 59.3 FL (ref 37–54)
E HISTOLYT DNA STL QL NAA+NON-PROBE: NOT DETECTED
EAEC PAA PLAS AGGR+AATA ST NAA+NON-PRB: NOT DETECTED
EC STX1+STX2 GENES STL QL NAA+NON-PROBE: NOT DETECTED
EGFRCR SERPLBLD CKD-EPI 2021: 84.5 ML/MIN/1.73
EPEC EAE GENE STL QL NAA+NON-PROBE: NOT DETECTED
ERYTHROCYTE [DISTWIDTH] IN BLOOD BY AUTOMATED COUNT: 17.6 % (ref 12.3–15.4)
ETEC LTA+ST1A+ST1B TOX ST NAA+NON-PROBE: NOT DETECTED
G LAMBLIA DNA STL QL NAA+NON-PROBE: NOT DETECTED
GIANT PLATELETS: ABNORMAL
GLUCOSE SERPL-MCNC: 179 MG/DL (ref 65–99)
GLUCOSE UR STRIP-MCNC: ABNORMAL MG/DL
HCT VFR BLD AUTO: 34.7 % (ref 37.5–51)
HGB BLD-MCNC: 10.4 G/DL (ref 13–17.7)
HGB UR QL STRIP.AUTO: ABNORMAL
HYALINE CASTS UR QL AUTO: ABNORMAL /LPF
KETONES UR QL STRIP: ABNORMAL
LEUKOCYTE ESTERASE UR QL STRIP.AUTO: ABNORMAL
LYMPHOCYTES # BLD MANUAL: 0.82 10*3/MM3 (ref 0.7–3.1)
LYMPHOCYTES NFR BLD MANUAL: 13 % (ref 5–12)
MAGNESIUM SERPL-MCNC: 1.9 MG/DL (ref 1.7–2.3)
MCH RBC QN AUTO: 27.4 PG (ref 26.6–33)
MCHC RBC AUTO-ENTMCNC: 30 G/DL (ref 31.5–35.7)
MCV RBC AUTO: 91.3 FL (ref 79–97)
MONOCYTES # BLD: 1.52 10*3/MM3 (ref 0.1–0.9)
NEUTROPHILS # BLD AUTO: 9.34 10*3/MM3 (ref 1.7–7)
NEUTROPHILS NFR BLD MANUAL: 72 % (ref 42.7–76)
NEUTS BAND NFR BLD MANUAL: 8 % (ref 0–5)
NEUTS VAC BLD QL SMEAR: ABNORMAL
NITRITE UR QL STRIP: NEGATIVE
NOROVIRUS GI+II RNA STL QL NAA+NON-PROBE: NOT DETECTED
NRBC BLD AUTO-RTO: 0 /100 WBC (ref 0–0.2)
P SHIGELLOIDES DNA STL QL NAA+NON-PROBE: NOT DETECTED
PH UR STRIP.AUTO: 5.5 [PH] (ref 5–8)
PLATELET # BLD AUTO: 185 10*3/MM3 (ref 140–450)
PMV BLD AUTO: 11 FL (ref 6–12)
POIKILOCYTOSIS BLD QL SMEAR: ABNORMAL
POLYCHROMASIA BLD QL SMEAR: ABNORMAL
POTASSIUM SERPL-SCNC: 3.1 MMOL/L (ref 3.5–5.2)
POTASSIUM SERPL-SCNC: 3.3 MMOL/L (ref 3.5–5.2)
PROT UR QL STRIP: ABNORMAL
RBC # BLD AUTO: 3.8 10*6/MM3 (ref 4.14–5.8)
RBC # UR STRIP: ABNORMAL /HPF
REF LAB TEST METHOD: ABNORMAL
RVA RNA STL QL NAA+NON-PROBE: NOT DETECTED
S ENT+BONG DNA STL QL NAA+NON-PROBE: NOT DETECTED
SAPO I+II+IV+V RNA STL QL NAA+NON-PROBE: NOT DETECTED
SHIGELLA SP+EIEC IPAH ST NAA+NON-PROBE: NOT DETECTED
SODIUM SERPL-SCNC: 138 MMOL/L (ref 136–145)
SP GR UR STRIP: 1.02 (ref 1–1.03)
SQUAMOUS #/AREA URNS HPF: ABNORMAL /HPF
UROBILINOGEN UR QL STRIP: ABNORMAL
V CHOL+PARA+VUL DNA STL QL NAA+NON-PROBE: NOT DETECTED
V CHOLERAE DNA STL QL NAA+NON-PROBE: NOT DETECTED
VARIANT LYMPHS NFR BLD MANUAL: 7 % (ref 19.6–45.3)
WBC # UR STRIP: ABNORMAL /HPF
WBC NRBC COR # BLD: 11.68 10*3/MM3 (ref 3.4–10.8)
Y ENTEROCOL DNA STL QL NAA+NON-PROBE: NOT DETECTED
YEAST URNS QL MICRO: ABNORMAL /HPF

## 2023-08-01 PROCEDURE — 99222 1ST HOSP IP/OBS MODERATE 55: CPT | Performed by: EMERGENCY MEDICINE

## 2023-08-01 PROCEDURE — 87086 URINE CULTURE/COLONY COUNT: CPT | Performed by: EMERGENCY MEDICINE

## 2023-08-01 PROCEDURE — 83735 ASSAY OF MAGNESIUM: CPT | Performed by: NURSE PRACTITIONER

## 2023-08-01 PROCEDURE — 25010000002 METRONIDAZOLE 500 MG/100ML SOLUTION: Performed by: NURSE PRACTITIONER

## 2023-08-01 PROCEDURE — 80048 BASIC METABOLIC PNL TOTAL CA: CPT | Performed by: NURSE PRACTITIONER

## 2023-08-01 PROCEDURE — 97166 OT EVAL MOD COMPLEX 45 MIN: CPT | Performed by: OCCUPATIONAL THERAPIST

## 2023-08-01 PROCEDURE — 85025 COMPLETE CBC W/AUTO DIFF WBC: CPT | Performed by: NURSE PRACTITIONER

## 2023-08-01 PROCEDURE — 99222 1ST HOSP IP/OBS MODERATE 55: CPT | Performed by: INTERNAL MEDICINE

## 2023-08-01 PROCEDURE — 25010000002 ONDANSETRON PER 1 MG: Performed by: NURSE PRACTITIONER

## 2023-08-01 PROCEDURE — 25010000002 METRONIDAZOLE 500 MG/100ML SOLUTION: Performed by: INTERNAL MEDICINE

## 2023-08-01 PROCEDURE — 87449 NOS EACH ORGANISM AG IA: CPT | Performed by: EMERGENCY MEDICINE

## 2023-08-01 PROCEDURE — 81001 URINALYSIS AUTO W/SCOPE: CPT | Performed by: EMERGENCY MEDICINE

## 2023-08-01 PROCEDURE — 84132 ASSAY OF SERUM POTASSIUM: CPT | Performed by: FAMILY MEDICINE

## 2023-08-01 PROCEDURE — 25010000002 MAGNESIUM SULFATE IN D5W 1G/100ML (PREMIX) 1-5 GM/100ML-% SOLUTION: Performed by: INTERNAL MEDICINE

## 2023-08-01 PROCEDURE — 85007 BL SMEAR W/DIFF WBC COUNT: CPT | Performed by: NURSE PRACTITIONER

## 2023-08-01 PROCEDURE — 87507 IADNA-DNA/RNA PROBE TQ 12-25: CPT | Performed by: EMERGENCY MEDICINE

## 2023-08-01 PROCEDURE — 97162 PT EVAL MOD COMPLEX 30 MIN: CPT

## 2023-08-01 PROCEDURE — 87493 C DIFF AMPLIFIED PROBE: CPT | Performed by: EMERGENCY MEDICINE

## 2023-08-01 PROCEDURE — 25010000002 DIGOXIN PER 500 MCG: Performed by: EMERGENCY MEDICINE

## 2023-08-01 RX ORDER — AMIODARONE HYDROCHLORIDE 200 MG/1
200 TABLET ORAL EVERY 12 HOURS SCHEDULED
Status: DISCONTINUED | OUTPATIENT
Start: 2023-08-01 | End: 2023-08-01

## 2023-08-01 RX ORDER — DIGOXIN 0.25 MG/ML
125 INJECTION INTRAMUSCULAR; INTRAVENOUS ONCE
Status: COMPLETED | OUTPATIENT
Start: 2023-08-01 | End: 2023-08-01

## 2023-08-01 RX ORDER — POTASSIUM CHLORIDE 20MEQ/15ML
40 LIQUID (ML) ORAL ONCE
Status: COMPLETED | OUTPATIENT
Start: 2023-08-01 | End: 2023-08-01

## 2023-08-01 RX ORDER — MAGNESIUM SULFATE 1 G/100ML
1 INJECTION INTRAVENOUS ONCE
Status: COMPLETED | OUTPATIENT
Start: 2023-08-01 | End: 2023-08-01

## 2023-08-01 RX ORDER — FAMOTIDINE 20 MG/1
20 TABLET, FILM COATED ORAL
Status: DISCONTINUED | OUTPATIENT
Start: 2023-08-01 | End: 2023-08-04 | Stop reason: HOSPADM

## 2023-08-01 RX ORDER — POTASSIUM CHLORIDE 750 MG/1
2 TABLET, EXTENDED RELEASE ORAL DAILY PRN
COMMUNITY

## 2023-08-01 RX ORDER — POTASSIUM CHLORIDE 750 MG/1
40 CAPSULE, EXTENDED RELEASE ORAL 2 TIMES DAILY WITH MEALS
Status: DISCONTINUED | OUTPATIENT
Start: 2023-08-01 | End: 2023-08-04 | Stop reason: HOSPADM

## 2023-08-01 RX ORDER — DIGOXIN 0.25 MG/ML
250 INJECTION INTRAMUSCULAR; INTRAVENOUS
Status: DISCONTINUED | OUTPATIENT
Start: 2023-08-01 | End: 2023-08-02

## 2023-08-01 RX ADMIN — POTASSIUM CHLORIDE 40 MEQ: 10 CAPSULE, COATED, EXTENDED RELEASE ORAL at 18:10

## 2023-08-01 RX ADMIN — CHLORHEXIDINE GLUCONATE 1 APPLICATION: 500 CLOTH TOPICAL at 05:24

## 2023-08-01 RX ADMIN — VANCOMYCIN HYDROCHLORIDE 125 MG: 125 CAPSULE ORAL at 00:44

## 2023-08-01 RX ADMIN — BUSPIRONE HYDROCHLORIDE 10 MG: 10 TABLET ORAL at 09:29

## 2023-08-01 RX ADMIN — DIGOXIN 250 MCG: 0.25 INJECTION INTRAMUSCULAR; INTRAVENOUS at 12:36

## 2023-08-01 RX ADMIN — DIGOXIN 125 MCG: 0.25 INJECTION INTRAMUSCULAR; INTRAVENOUS at 18:08

## 2023-08-01 RX ADMIN — CLOPIDOGREL BISULFATE 75 MG: 75 TABLET, FILM COATED ORAL at 09:29

## 2023-08-01 RX ADMIN — Medication 250 MG: at 21:05

## 2023-08-01 RX ADMIN — EMPAGLIFLOZIN 10 MG: 10 TABLET, FILM COATED ORAL at 09:29

## 2023-08-01 RX ADMIN — MAGNESIUM SULFATE HEPTAHYDRATE 1 G: 1 INJECTION, SOLUTION INTRAVENOUS at 18:07

## 2023-08-01 RX ADMIN — ONDANSETRON 4 MG: 2 INJECTION INTRAMUSCULAR; INTRAVENOUS at 05:43

## 2023-08-01 RX ADMIN — Medication 1 APPLICATION: at 09:29

## 2023-08-01 RX ADMIN — VANCOMYCIN HYDROCHLORIDE 125 MG: 125 CAPSULE ORAL at 12:36

## 2023-08-01 RX ADMIN — Medication 250 MG: at 09:29

## 2023-08-01 RX ADMIN — POTASSIUM CHLORIDE 40 MEQ: 20 SOLUTION ORAL at 05:35

## 2023-08-01 RX ADMIN — BUSPIRONE HYDROCHLORIDE 10 MG: 10 TABLET ORAL at 21:05

## 2023-08-01 RX ADMIN — Medication 1 APPLICATION: at 21:05

## 2023-08-01 RX ADMIN — VANCOMYCIN HYDROCHLORIDE 125 MG: 125 CAPSULE ORAL at 18:10

## 2023-08-01 RX ADMIN — ASPIRIN 81 MG: 81 TABLET, COATED ORAL at 09:29

## 2023-08-01 RX ADMIN — Medication 10 ML: at 21:06

## 2023-08-01 RX ADMIN — FAMOTIDINE 20 MG: 20 TABLET, FILM COATED ORAL at 18:10

## 2023-08-01 RX ADMIN — VANCOMYCIN HYDROCHLORIDE 125 MG: 125 CAPSULE ORAL at 05:25

## 2023-08-01 RX ADMIN — Medication 10 ML: at 09:29

## 2023-08-01 RX ADMIN — ATORVASTATIN CALCIUM 20 MG: 10 TABLET, FILM COATED ORAL at 09:29

## 2023-08-01 RX ADMIN — METRONIDAZOLE 500 MG: 500 INJECTION, SOLUTION INTRAVENOUS at 12:36

## 2023-08-01 RX ADMIN — METRONIDAZOLE 500 MG: 500 INJECTION, SOLUTION INTRAVENOUS at 05:24

## 2023-08-01 RX ADMIN — METRONIDAZOLE 500 MG: 500 INJECTION, SOLUTION INTRAVENOUS at 21:05

## 2023-08-01 RX ADMIN — ONDANSETRON 4 MG: 2 INJECTION INTRAMUSCULAR; INTRAVENOUS at 12:36

## 2023-08-01 NOTE — THERAPY EVALUATION
Patient Name: Wyatt Curry  : 1930    MRN: [id number]                              Today's Date: 2023       Admit Date: 2023    Visit Dx:     ICD-10-CM ICD-9-CM   1. Atrial fibrillation with RVR  I48.91 427.31   2. Hypomagnesemia  E83.42 275.2   3. Colitis  K52.9 558.9   4. Decreased independence with activities of daily living [Z78.9 (ICD-10-CM)]  Z78.9 V49.89     Patient Active Problem List   Diagnosis    Benign localized prostatic hyperplasia without lower urinary tract symptoms (LUTS)    History of bladder cancer    Left rotator cuff tear arthropathy    Coronary artery disease involving native coronary artery of native heart without angina pectoris    Left bundle branch block    Essential hypertension    Mixed hyperlipidemia    Overweight (BMI 25.0-29.9)    Non-rheumatic mitral regurgitation    Other fatigue    History of coronary artery stent placement    Lung nodule    Mediastinal adenopathy    Cough    Abnormal positron emission tomography (PET) scan    Basal cell carcinoma    Neoplasm of uncertain behavior    Elevated troponin    Bleeding    Ischemic cardiomyopathy    Pneumonia of right lower lobe due to infectious organism    PAF (paroxysmal atrial fibrillation)    Proctocolitis (high concern for C. 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    GERD (gastroesophageal reflux disease)     Hyperlipidemia     Hypertension     Irregular heart beat     Lung nodules      Past Surgical History:   Procedure Laterality Date    BLADDER SURGERY      CARDIAC CATHETERIZATION      CARDIAC CATHETERIZATION Left 2023     Procedure: Cardiac Catheterization/Vascular Study;  Surgeon: Sukhi Hartley MD;  Location:  [facility];  Service: Cardiology;  Laterality: Left;    COLONOSCOPY      CORONARY ANGIOPLASTY WITH STENT PLACEMENT  2006    STENT X 2    CYSTOSCOPY      ENDOSCOPY      EXCISION LESION N/A 4/7/2023    Procedure: EXCISION LESION OF RIGHT TEMPLE AND LEFT TEMPLE WITH FROZEN SECTION WITH POSSIBLE FLAP OR GRAFT;  Surgeon: Brijesh Nickerson MD;  Location:  [facility] OR;  Service: ENT;  Laterality: N/A;    FLAP HEAD/NECK Left 03/09/2020    Procedure: POSSIBLE FLAP;  Surgeon: Brijesh Nickerson MD;  Location:  [facility] OR;  Service: ENT;  Laterality: Left;    HEAD/NECK LESION/CYST EXCISION Left 03/09/2020    Procedure: 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 with frozen section and possible flap or graft;  Surgeon: Brijesh Nickerson MD;  Location:  [facility] OR;  Service: ENT;  Laterality: Left;    HERNIA REPAIR      SHOULDER SURGERY      SKIN BIOPSY      lip biopsy    SKIN FULL THICKNESS GRAFT Left 03/09/2020    Procedure: OR GRAFT;  Surgeon: Brijesh Nickerson MD;  Location:  [facility] OR;  Service: ENT;  Laterality: Left;    TRANSURETHRAL RESECTION OF BLADDER TUMOR        General Information       Row Name 08/01/23 0820          OT Time and Intention    Document Type evaluation  PMH: hypertension, hyperlipidemia, coronary disease, paroxysmal A-fib, combined heart failure, recurrent C difficile toxin. Dx: Atrial fibrillation with RVR,  Hypomagnesemia,  Colitis.  -JJ (r) RH (t) JISMA (c)     Mode of Treatment occupational therapy  -JISMA (r) RH (t) JISMA (c)       Row Name 08/01/23 0820          General Information    Patient Profile Reviewed yes  -JJ (r) RH (t) JJ (c)     Prior Level of Function independent:;ADL's;driving;all household mobility;transfer;bed mobility;dependent:;cooking;cleaning  -JJ (r) RH (t) JJ (c)     Existing Precautions/Restrictions fall;oxygen therapy device and L/min  -JJ (r)  RH (t) JJ (c)     Barriers to Rehab medically complex;previous functional deficit  -JJ (r) RH (t) JJ (c)       Row Name 08/01/23 0820          Occupational Profile    Environmental Supports and Barriers (Occupational Profile) Walk in shower; uses cane for community mobility  -JJ (r) RH (t) JJ (c)       Row Name 08/01/23 0820          Living Environment    People in Home alone  -JJ (r) RH (t) JJ (c)       Row Name 08/01/23 0820          Home Main Entrance    Number of Stairs, Main Entrance three  -JJ (r) RH (t) JJ (c)     Stair Railings, Main Entrance railing on left side (ascending)  -JJ (r) RH (t) JJ (c)       Row Name 08/01/23 0820          Stairs Within Home, Primary    Number of Stairs, Within Home, Primary none  -JJ (r) RH (t) JJ (c)     Stair Railings, Within Home, Primary none  -JJ (r) RH (t) JJ (c)       Row Name 08/01/23 0820          Cognition    Orientation Status (Cognition) oriented x 4  -JJ (r) RH (t) JJ (c)       Row Name 08/01/23 0820          Safety Issues, Functional Mobility    Safety Issues Affecting Function (Mobility) impulsivity;insight into deficits/self-awareness;awareness of need for assistance  -JJ (r) RH (t) JJ (c)     Impairments Affecting Function (Mobility) shortness of breath;strength;range of motion (ROM);balance  -JJ (r) RH (t) JJ (c)               User Key  (r) = Recorded By, (t) = Taken By, (c) = Cosigned By      Initials Name Provider Type    Kassi Porter, OTR/L, CSRS Occupational Therapist    RH Adelaida Rodas, OT Student OT Student                     Mobility/ADL's       Row Name 08/01/23 0820          Bed Mobility    Bed Mobility supine-sit;sit-supine  -JJ (r) RH (t) JJ (c)     Supine-Sit [place] (Bed Mobility) standby assist  -JJ (r) RH (t) JJ (c)     Sit-Supine [place] (Bed Mobility) standby assist  -JJ (r) RH (t) JJ (c)     Assistive Device (Bed Mobility) head of bed elevated  -KAREN (r) ABNER (boogie) KAREN (fahad)       Row Name 08/01/23 0820          Transfers     Transfers sit-stand transfer;stand-sit transfer;toilet transfer;bed-chair transfer  -JJ (r) RH (t) JJ (c)       Row Name 08/01/23 0820          Bed-Chair Transfer    Bed-Chair [place] (Transfers) contact guard  -JJ (r) RH (t) JJ (c)       Row Name 08/01/23 0820          Sit-Stand Transfer    Sit-Stand [place] (Transfers) contact guard  -JJ (r) RH (t) JJ (c)       Row Name 08/01/23 0820          Stand-Sit Transfer    Stand-Sit [place] (Transfers) standby assist  -JJ (r) RH (t) JJ (c)       Row Name 08/01/23 0820          Toilet Transfer    Type (Toilet Transfer) sit-stand;stand-sit  -JJ (r) RH (t) JJ (c)     [place] Level (Toilet Transfer) standby assist  -JJ (r) RH (t) JJ (c)     Assistive Device (Toilet Transfer) commode, bedside without drop arms  -JJ (r) RH (t) JJ (c)       Row Name 08/01/23 0820          Functional Mobility    Functional Mobility- Ind. Level contact guard assist  -JJ (r) RH (t) JJ (c)     Functional Mobility- Safety Issues supplemental O2  2 L O2/NC  -JJ (r) RH (t) JJ (c)       Row Name 08/01/23 0820          Activities of Daily Living    BADL Assessment/Intervention lower body dressing;toileting  -JJ (r) RH (t) JJ (c)       Row Name 08/01/23 0820          Lower Body Dressing Assessment/Training    [place] Level (Lower Body Dressing) don;socks;standby assist  -JJ (r) RH (t) JJ (c)     Position (Lower Body Dressing) unsupported sitting;edge of bed sitting  -JJ (r) RH (t) JJ (c)       Row Name 08/01/23 0820          Toileting Assessment/Training    [place] Level (Toileting) standby assist  -JJ (r) RH (t) JJ (c)     Assistive Devices (Toileting) commode, bedside without drop arms  -JJ (r) RH (t) JJ (c)     Position (Toileting) unsupported sitting  -JJ (r)  (t) JISMA (c)               User Key  (r) = Recorded By, (t) = Taken By, (c) = Cosigned By      Initials Name Provider Type    Kassi Porter, SHIRLEYR/L, CSRS Occupational Therapist    Adelaida Mckeon, OT  Student OT Student                   Obj/Interventions       Row Name 08/01/23 0820          Sensory Assessment (Somatosensory)    Sensory Assessment (Somatosensory) UE sensation intact  -JJ (r) RH (t) JJ (c)       Row Name 08/01/23 0820          Vision Assessment/Intervention    Visual Impairment/Limitations WFL  -JJ (r) RH (t) JJ (c)       Row Name 08/01/23 0820          Range of Motion Comprehensive    General Range of Motion upper extremity range of motion deficits identified  -JJ (r) RH (t) JJ (c)     Comment, General Range of Motion L shoulder impaired by 50%. Pt reports this is from a previous injury. Remaining joints WFL.  -JJ (r) RH (t) JJ (c)       Row Name 08/01/23 0820          Strength Comprehensive (MMT)    General Manual Muscle Testing (MMT) Assessment upper extremity strength deficits identified  -JJ (r) RH (t) JJ (c)     Comment, General Manual Muscle Testing (MMT) Assessment R shoulder 4/5; L shoulder not tested d/t ROM limitations; R bicep/tricep 4/5; L bicep/tricep 4+/5; B  strength 4+/5  -JJ (r) RH (t) JJ (c)       Row Name 08/01/23 0820          Balance    Balance Assessment sitting static balance;sitting dynamic balance;sit to stand dynamic balance;standing static balance;standing dynamic balance  -JJ (r) RH (t) JJ (c)     Static Sitting Balance independent  -JJ (r) RH (t) JJ (c)     Dynamic Sitting Balance independent  -JJ (r) RH (t) JJ (c)     Position, Sitting Balance unsupported;sitting edge of bed  -JJ (r) RH (t) JJ (c)     Sit to Stand Dynamic Balance standby assist  -JJ (r) RH (t) JJ (c)     Static Standing Balance supervision  -JJ (r) RH (t) JJ (c)     Dynamic Standing Balance contact guard  -JJ (r) RH (t) JJ (c)     Position/Device Used, Standing Balance unsupported  -JJ (r) RH (t) JJ (c)               User Key  (r) = Recorded By, (t) = Taken By, (c) = Cosigned By      Initials Name Provider Type    JJ Jennings, Kassi, OTR/L, CSRS Occupational Therapist    Adelaida Mckeon,  OT Student OT Student                   Goals/Plan       Row Name 08/01/23 0820          Bathing Goal 1 (OT)    Activity/Device (Bathing Goal 1, OT) bathing skills, all  -JJ (r) RH (t) JJ (c)     [place] Level/Cues Needed (Bathing Goal 1, OT) standby assist  -JJ (r) RH (t) JJ (c)     Time Frame (Bathing Goal 1, OT) long term goal (LTG);10 days  -JJ (r) RH (t) JJ (c)     Progress/Outcomes (Bathing Goal 1, OT) new goal  -JJ (r) RH (t) JJ (c)       Row Name 08/01/23 0820          Toileting Goal 1 (OT)    Activity/Device (Toileting Goal 1, OT) toileting skills, all  -JJ (r) RH (t) JJ (c)     [place] Level/Cues Needed (Toileting Goal 1, OT) independent  -JJ (r) RH (t) JJ (c)     Time Frame (Toileting Goal 1, OT) long term goal (LTG);10 days  -JJ (r) RH (t) JJ (c)     Progress/Outcome (Toileting Goal 1, OT) new goal  -JJ (r) RH (t) JJ (c)       Row Name 08/01/23 0820          Problem Specific Goal 1 (OT)    Problem Specific Goal 1 (OT) Pt will tolerate standing for ADL participation for 15 min with no LOBs to improve fxl activity tolerance and balance.  -JJ (r) RH (t) JJ (c)     Time Frame (Problem Specific Goal 1, OT) long term goal (LTG);10 days  -JJ (r) RH (t) JJ (c)     Progress/Outcome (Problem Specific Goal 1, OT) new goal  -JJ (r) RH (t) JJ (c)       Row Name 08/01/23 0820          Therapy Assessment/Plan (OT)    Planned Therapy Interventions (OT) activity tolerance training;adaptive equipment training;BADL retraining;functional balance retraining;IADL retraining;occupation/activity based interventions;patient/caregiver education/training;ROM/therapeutic exercise;strengthening exercise;transfer/mobility retraining  -JJ (r) RH (t) JJ (c)               User Key  (r) = Recorded By, (t) = Taken By, (c) = Cosigned By      Initials Name Provider Type    Kassi Porter, OTSANDRA/L, CSRS Occupational Therapist    Adelaida Mckeon, OT Student OT Student                   Clinical Impression       Row Name  08/01/23 0820          Pain Assessment    Pretreatment Pain Rating 1/10  -JJ (r) RH (t) KAREN (c)     Posttreatment Pain Rating 1/10  -JJ (r) ABNER (t) KAREN (c)     Pain Intervention(s) Repositioned;Ambulation/increased activity  -JISMA (r) ABNER (t) KAREN (c)       Row Name 08/01/23 0820          Plan of Care Review    Plan of Care Reviewed With patient  -KAREN (r) ABNER (t) KAREN (c)     Progress no change  -JISMA (r) ABNER (t) KAREN (c)     Outcome Evaluation OT eval completed. Pt A&Ox4, reports 1/10 pain in his lower abdomen. Pt reports PLOF as independent for all ADLs, driving, mobility, transfers; dependent for cooking and cleaning. Pt reports use of cane for community mobility. Today pt completed bed mobility with SBA. Pt completed LB dressing with SBA. Pt completed sit-stand and stand-sit with SBA. Pt completed toilet transfer with SBA to [facility]. Pt SBA for all toileting skills. Pt performed fxl mobility with CGA. Pt completed bed-chair transfer with CGA. Pt demo'd deficits in ROM, balance, stregnth, and SOB. Pt would benefit from skilled OT services to address these deficits. Recommend d/c to assisted living. Pt reports he is going to [facility] after d/c.  -ISMAISMA (r) ABNER (t) KAREN (c)       Row Name 08/01/23 0820          Therapy Assessment/Plan (OT)    Rehab Potential (OT) good, to achieve stated therapy goals  -KAREN (r) ABNER (t) KAREN (c)     Criteria for Skilled Therapeutic Interventions Met (OT) yes;meets criteria;skilled treatment is necessary  -KAREN (r) ABNER (t) KAREN (c)     Therapy Frequency (OT) 5 times/wk  -KAREN (r) ABNER (t) KAREN (c)       Row Name 08/01/23 0820          Therapy Plan Review/Discharge Plan (OT)    Anticipated Discharge Disposition (OT) assisted living  -KAREN (r) ABNER (t) KAREN (c)       Row Name 08/01/23 0820          Vital Signs    O2 Delivery Pre Treatment nasal cannula  2 L O2/NC  -JISMA (r) ABNER (t) KAREN (c)     O2 Delivery Intra Treatment nasal cannula  -JJ (r) ABNER (t) JJ (c)     O2 Delivery Post Treatment nasal cannula  -JJ (r) ABNER (t) JISMA  (c)     Pre Patient Position Supine  -JJ (r) RH (t) JJ (c)     Intra Patient Position Standing  -JJ (r) RH (t) JJ (c)     Post Patient Position Sitting  -JJ (r) RH (t) JJ (c)       Row Name 08/01/23 0820          Positioning and Restraints    Pre-Treatment Position in bed  -JJ (r) RH (t) JJ (c)     Post Treatment Position chair  -JJ (r) RH (t) JJ (c)     In Chair sitting;call light within reach;encouraged to call for assist;patient within staff view  -JJ (r) RH (t) JJ (c)               User Key  (r) = Recorded By, (t) = Taken By, (c) = Cosigned By      Initials Name Provider Type    Kassi Porter, OTR/L, CSRS Occupational Therapist    Adelaida Mckeon, OT Student OT Student                   Outcome Measures       Row Name 08/01/23 0820          How much help from another is currently needed...    Putting on and taking off regular lower body clothing? 3  -JJ (r) RH (t) JJ (c)     Bathing (including washing, rinsing, and drying) 2  -JJ (r) RH (t) JJ (c)     Toileting (which includes using toilet bed pan or urinal) 3  -JJ (r) RH (t) JJ (c)     Putting on and taking off regular upper body clothing 4  -JJ (r) RH (t) JJ (c)     Taking care of personal grooming (such as brushing teeth) 4  -JJ (r) RH (t) JJ (c)     Eating meals 4  -JJ (r) RH (t) JJ (c)     AM-PAC 6 Clicks Score (OT) 20  -JJ (r) RH (t)       Row Name 08/01/23 0815          How much help from another person do you currently need...    Turning from your back to your side while in flat bed without using bedrails? 4 (P)   -MW     Moving from lying on back to sitting on the side of a flat bed without bedrails? 4 (P)   -MW     Moving to and from a bed to a chair (including a wheelchair)? 3 (P)   -MW     Standing up from a chair using your arms (e.g., wheelchair, bedside chair)? 3 (P)   -MW     Climbing 3-5 steps with a railing? 3 (P)   -MW     To walk in hospital room? 3 (P)   -MW     AM-PAC 6 Clicks Score (PT) 20 (P)   -MW     Highest level of mobility  6 --> Walked 10 steps or more (P)   -MW       Row Name 08/01/23 0820 08/01/23 0815       Functional Assessment    Outcome Measure Options AM-PAC 6 Clicks Daily Activity (OT)  -KAREN (r)  (t) KAREN (c) AM-PAC 6 Clicks Basic Mobility (PT) (P)   -MW              User Key  (r) = Recorded By, (t) = Taken By, (c) = Cosigned By      Initials Name Provider Type    Kassi Porter, OTSANDRA/L, CSRS Occupational Therapist     Adelaida Rodas, OT Student OT Student    Josie Crespo, PT Student PT Student                    Occupational Therapy Education       Title: PT OT SLP Therapies (In Progress)       Topic: Occupational Therapy (In Progress)       Point: ADL training (Done)       Description:   Instruct learner(s) on proper safety adaptation and remediation techniques during self care or transfers.   Instruct in proper use of assistive devices.                  Learning Progress Summary             Patient Acceptance, E, VU by  at 8/1/2023 0820    Comment: Educated on POC, benefits and purpose of OT services.                         Point: Home exercise program (Not Started)       Description:   Instruct learner(s) on appropriate technique for monitoring, assisting and/or progressing therapeutic exercises/activities.                  Learner Progress:  Not documented in this visit.              Point: Precautions (Not Started)       Description:   Instruct learner(s) on prescribed precautions during self-care and functional transfers.                  Learner Progress:  Not documented in this visit.              Point: Body mechanics (Not Started)       Description:   Instruct learner(s) on proper positioning and spine alignment during self-care, functional mobility activities and/or exercises.                  Learner Progress:  Not documented in this visit.                              User Key       Initials Effective Dates Name Provider Type Discipline     05/03/23 -  Adelaida Rodas, OT Student OT Student OT                   OT Recommendation and Plan  Planned Therapy Interventions (OT): activity tolerance training, adaptive equipment training, BADL retraining, functional balance retraining, IADL retraining, occupation/activity based interventions, patient/caregiver education/training, ROM/therapeutic exercise, strengthening exercise, transfer/mobility retraining  Therapy Frequency (OT): 5 times/wk  Plan of Care Review  Plan of Care Reviewed With: patient  Progress: no change  Outcome Evaluation: OT eval completed. Pt A&Ox4, reports 1/10 pain in his lower abdomen. Pt reports PLOF as independent for all ADLs, driving, mobility, transfers; dependent for cooking and cleaning. Pt reports use of cane for community mobility. Today pt completed bed mobility with SBA. Pt completed LB dressing with SBA. Pt completed sit-stand and stand-sit with SBA. Pt completed toilet transfer with SBA to [facility] Pt SBA for all toileting skills. Pt performed fxl mobility with CGA. Pt completed bed-chair transfer with CGA. Pt demo'd deficits in ROM, balance, stregnth, and SOB. Pt would benefit from skilled OT services to address these deficits. Recommend d/c to assisted living. Pt reports he is going to [facility] after d/c.     Time Calculation:         Time Calculation- OT       Row Name 08/01/23 0820             Time Calculation- OT    OT Start Time 0817  -JJ (r) RH (t) JJ (c)      OT Stop Time 0855  -JJ (r) RH (t) JJ (c)      OT Time Calculation (min) 38 min  -JJ (r) RH (t)      Total Timed Code Minutes- OT 38 minute(s)  -JJ (r) RH (t) JJ (c)      OT Received On 08/01/23  -JJ (r) RH (t) JJ (c)      OT Goal Re-Cert Due Date 08/11/23  -JJ (r) RH (t) JJ (c)                User Key  (r) = Recorded By, (t) = Taken By, (c) = Cosigned By      Initials Name Provider Type    Kassi Porter, MELIA/L, CSRS Occupational Therapist    Adelaida Mckeon, OT Student OT Student                           Adelaida Rodas, OT Student  8/1/2023

## 2023-08-01 NOTE — THERAPY EVALUATION
Patient Name: Wyatt Curry  : 1930    MRN: [id number]                              Today's Date: 2023       Admit Date: 2023    Visit Dx:     ICD-10-CM ICD-9-CM   1. Atrial fibrillation with RVR  I48.91 427.31   2. Hypomagnesemia  E83.42 275.2   3. Colitis  K52.9 558.9   4. Decreased independence with activities of daily living [Z78.9 (ICD-10-CM)]  Z78.9 V49.89     Patient Active Problem List   Diagnosis    Benign localized prostatic hyperplasia without lower urinary tract symptoms (LUTS)    History of bladder cancer    Left rotator cuff tear arthropathy    Coronary artery disease involving native coronary artery of native heart without angina pectoris    Left bundle branch block    Essential hypertension    Mixed hyperlipidemia    Overweight (BMI 25.0-29.9)    Non-rheumatic mitral regurgitation    Other fatigue    History of coronary artery stent placement    Lung nodule    Mediastinal adenopathy    Cough    Abnormal positron emission tomography (PET) scan    Basal cell carcinoma    Neoplasm of uncertain behavior    Elevated troponin    Bleeding    Ischemic cardiomyopathy    Pneumonia of right lower lobe due to infectious organism    PAF (paroxysmal atrial fibrillation)    Proctocolitis (high concern for C. 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    GERD (gastroesophageal reflux disease)     Hyperlipidemia     Hypertension     Irregular heart beat     Lung nodules      Past Surgical History:   Procedure Laterality Date    BLADDER SURGERY      CARDIAC CATHETERIZATION      CARDIAC CATHETERIZATION Left 2023     Procedure: Cardiac Catheterization/Vascular Study;  Surgeon: Sukhi Hartley MD;  Location:  [facility];  Service: Cardiology;  Laterality: Left;    COLONOSCOPY      CORONARY ANGIOPLASTY WITH STENT PLACEMENT  2006    STENT X 2    CYSTOSCOPY      ENDOSCOPY      EXCISION LESION N/A 4/7/2023    Procedure: EXCISION LESION OF RIGHT TEMPLE AND LEFT TEMPLE WITH FROZEN SECTION WITH POSSIBLE FLAP OR GRAFT;  Surgeon: Brijesh Nickerson MD;  Location:  [facility] OR;  Service: ENT;  Laterality: N/A;    FLAP HEAD/NECK Left 03/09/2020    Procedure: POSSIBLE FLAP;  Surgeon: Brijesh Nickerson MD;  Location:  [facility] OR;  Service: ENT;  Laterality: Left;    HEAD/NECK LESION/CYST EXCISION Left 03/09/2020    Procedure: 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 with frozen section and possible flap or graft;  Surgeon: Brijesh Nickerson MD;  Location:  [facility] OR;  Service: ENT;  Laterality: Left;    HERNIA REPAIR      SHOULDER SURGERY      SKIN BIOPSY      lip biopsy    SKIN FULL THICKNESS GRAFT Left 03/09/2020    Procedure: OR GRAFT;  Surgeon: Brijesh Nickerson MD;  Location:  [facility] OR;  Service: ENT;  Laterality: Left;    TRANSURETHRAL RESECTION OF BLADDER TUMOR        General Information       Row Name 08/01/23 0815          Physical Therapy Time and Intention    Document Type evaluation  CC: abdominal pain, poor oral intake  Dx: Afib, C diff  Hx: recurrent C diff, CAD, cancer  -ALFREDITO (r) MW (t) ALFREDITO (c)     Mode of Treatment physical therapy  -ALFREDITO (r) MW (t) ALFREDITO (c)       Row Name 08/01/23 0815          General Information    Patient Profile Reviewed yes  -ALFREDITO (r) MW (t) ALFREDITO (c)     Prior Level of Function independent:;gait;driving;cooking;bathing;dressing;grooming  -ALFREDITO (r) MW (t) ALFREDITO (c)     Existing Precautions/Restrictions fall;oxygen therapy device and L/min  2L  -ALFREDITO (r) MW (t) ALFREDITO (c)     Barriers to Rehab medically complex  -ALFREDITO (r) MW (t) ALFREDITO (c)       Row Name 08/01/23 0815          Living  Environment    People in Home alone  Great niece comes to see him every day, does say he is going to be going to [facility]  -ALFREDITO (r) MW (t) ALFREDITO (c)       Row Name 08/01/23 0815          Home Main Entrance    Number of Stairs, Main Entrance three  -ALFREDITO (r) MW (t) ALFREDITO (c)     Stair Railings, Main Entrance railing on left side (ascending)  -ALFREDITO (r) MW (t) ALFREDITO (c)       Row Name 08/01/23 0815          Stairs Within Home, Primary    Number of Stairs, Within Home, Primary none  -ALFREDITO (r) MW (t) ALFREDITO (c)       Row Name 08/01/23 0815          Cognition    Orientation Status (Cognition) oriented x 4  -ALFREDITO (r) MW (t) ALFREDITO (c)       Row Name 08/01/23 0815          Safety Issues, Functional Mobility    Safety Issues Affecting Function (Mobility) awareness of need for assistance;impulsivity;insight into deficits/self-awareness  -ALFREDITO (r) MW (t) ALFREDITO (c)     Impairments Affecting Function (Mobility) balance;strength;other (see comments)  BM  -ALFREDITO (r) MW (t) ALFREDITO (c)               User Key  (r) = Recorded By, (t) = Taken By, (c) = Cosigned By      Initials Name Provider Type    Satish Galindo PT DPT Physical Therapist    Josie Crespo PT Student PT Student                   Mobility       Row Name 08/01/23 0815          Bed Mobility    Bed Mobility supine-sit  -ALFREDITO (r) MW (t) ALFREDITO (c)     Supine-Sit [place] (Bed Mobility) independent  -ALFREDITO (r) MW (t) ALFREDITO (c)       Row Name 08/01/23 0815          Sit-Stand Transfer    Sit-Stand [place] (Transfers) supervision  -ALFREDITO (r) MW (t) ALFREDITO (c)       Row Name 08/01/23 0815          Gait/Stairs (Locomotion)    [place] Level (Gait) contact guard  -ALFREDITO (r) MW (t) ALFREDITO (c)     Distance in Feet (Gait) 50 feet within room  -ALFREDITO (r) MW (t) ALFREDITO (c)     Deviations/Abnormal Patterns (Gait) other (see comments)  Poor balance  -ALFREDITO (r) MW (t) ALFREDITO (c)               User Key  (r) = Recorded By, (t) = Taken By, (c) = Cosigned By      Initials Name Provider Type    Satish Galindo, PT DPT Physical Therapist    MW  Josie Gonzalez, PT Student PT Student                   Obj/Interventions       Row Name 08/01/23 0815          Range of Motion Comprehensive    General Range of Motion bilateral lower extremity ROM WFL  -ALFREDITO (r) MW (t) ALFREDITO (c)       Row Name 08/01/23 0815          Strength Comprehensive (MMT)    Comment, General Manual Muscle Testing (MMT) Assessment B ankle DF 4/5, B knee ext 4/5, B hip flex 4-/5  -ALFREDITO (r) MW (t) ALFREDITO (c)       Row Name 08/01/23 0815          Balance    Balance Assessment sitting dynamic balance;standing dynamic balance  -ALFREDITO (r) MW (t) ALFREDITO (c)     Dynamic Sitting Balance supervision  -ALFREDITO (r) MW (t) ALFREDITO (c)     Position, Sitting Balance sitting edge of bed  -ALFREDITO (r) MW (t) ALFREDITO (c)     Dynamic Standing Balance contact guard  -ALFREDITO (r) MW (t) ALFREDITO (c)     Position/Device Used, Standing Balance unsupported  -ALFREDITO (r) MW (t) ALFREDITO (c)       [facility] Name 08/01/23 0815          Sensory Assessment (Somatosensory)    Sensory Assessment (Somatosensory) LE sensation intact  -ALFREDITO (r) MW (t) ALFREDITO (c)               User Key  (r) = Recorded By, (t) = Taken By, (c) = Cosigned By      Initials Name Provider Type    Satish Galindo, PT DPT Physical Therapist    Josie Crespo, PT Student PT Student                   Goals/Plan       Row Name 08/01/23 0815          Transfer Goal 1 (PT)    Activity/Assistive Device (Transfer Goal 1, PT) sit-to-stand/stand-to-sit  -ALFREDITO (r) MW (t) ALFREDITO (c)     Sac Level/Cues Needed (Transfer Goal 1, PT) independent  -ALFREDITO (r) MW (t) ALFREDITO (c)     Time Frame (Transfer Goal 1, PT) long term goal (LTG);10 days  -ALFREDITO (r) MW (t) ALFREDITO (c)     Progress/Outcome (Transfer Goal 1, PT) new goal  -ALFREDITO (r) MW (t) ALFREDITO (c)       Row Name 08/01/23 0815          Gait Training Goal 1 (PT)    Activity/Assistive Device (Gait Training Goal 1, PT) gait (walking locomotion);assistive device use;decrease fall risk;improve balance and speed;cane, straight  -ALFREDITO (r) MW (t) ALFREDITO (c)     Sac Level (Gait Training Goal 1, PT)  independent  -ALFREDITO (r) MW (t) ALFREDITO (c)     Distance (Gait Training Goal 1, PT) 100 feet  -ALFREDITO (r) MW (t) ALFREDITO (c)     Time Frame (Gait Training Goal 1, PT) long term goal (LTG);10 days  -ALFREDITO     Progress/Outcome (Gait Training Goal 1, PT) new goal  -ALFREDITO       Row Name 08/01/23 0815          Therapy Assessment/Plan (PT)    Planned Therapy Interventions (PT) balance training;gait training;patient/family education;strengthening;transfer training;bed mobility training  -ALFREDITO               User Key  (r) = Recorded By, (t) = Taken By, (c) = Cosigned By      Initials Name Provider Type    ALFREDITO Satish Hall, PT DPT Physical Therapist    Josie Crespo, PT Student PT Student                   Clinical Impression       Row Name 08/01/23 0815          Pain    Pretreatment Pain Rating 1/10  -ALFREDITO (r) MW (t) ALFREDITO (c)     Posttreatment Pain Rating 1/10  -ALFREDITO (r) MW (t) ALFREDITO (c)     Pain Location - abdomen  -ALFREDITO (r) MW (t) ALFREDITO (c)     Pain Intervention(s) Repositioned;Ambulation/increased activity  -ALFREDITO (r) MW (t) ALFREDITO (c)       Row Name 08/01/23 0815          Plan of Care Review    Plan of Care Reviewed With patient  -ALFREDITO (r) MW (t) ALFREDITO (c)     Outcome Evaluation PT eval complete. Pt. A&Ox4. Pt. has been living at home alone and his great niece visits him every day. He is using a cane to ambulate and is still driving. Today, pt. reports being 1/10 pain in his abdomen. Pt. independently got from supine to sit with HOB elevated. Pt. stood to adjust his gown at least 3 times independently but PT advised that he waited until we were closer to help. Pt. needed to use the beside commode for a BM. After, pt. ambulated about 50 feet within his room. Pt. reports that he will be going to [facility] for care with his wife. Skilled PT services are needed to increase balance. Recommend d/c to [facility].  -ALFREDITO (r) MW (t) ALFREDITO (c)       Row Name 08/01/23 0815          Therapy Assessment/Plan (PT)    Patient/Family Therapy Goals Statement (PT) to be with his wife  -ALFREDITO (r)  MW (t) ALFREDITO (c)     Rehab Potential (PT) good, to achieve stated therapy goals  -ALFREDITO (r) MW (t) ALFREDITO (c)     Criteria for Skilled Interventions Met (PT) meets criteria;skilled treatment is necessary;yes  -ALFREDITO (r) MW (t) ALFREDITO (c)     Therapy Frequency (PT) 2 times/day  -ALFREDITO (r) MW (t) ALFREDITO (c)     Predicted Duration of Therapy Intervention (PT) until d/c  -ALFREDITO (r) MW (t) ALFREDITO (c)       Row Name 08/01/23 0815          Vital Signs    O2 Delivery Pre Treatment nasal cannula  2L  -ALFREDITO (r) MW (t) ALFREDITO (c)     O2 Delivery Intra Treatment nasal cannula  -ALFREDITO (r) MW (t) ALFREDITO (c)     O2 Delivery Post Treatment nasal cannula  -ALFREDITO (r) MW (t) ALFREDITO (c)     Pre Patient Position Supine  -ALFREDITO (r) MW (t) ALFREDITO (c)     Intra Patient Position Standing  -ALFREDITO (r) MW (t) ALFREDITO (c)     Post Patient Position Sitting  -ALFREDITO (r) MW (t) ALFREDITO (c)       Row Name 08/01/23 0815          Positioning and Restraints    Pre-Treatment Position in bed  -ALFREDITO (r) MW (t) ALFREDITO (c)     Post Treatment Position chair  -ALFREDITO (r) MW (t) ALFREDITO (c)     In Chair notified nsg;sitting;call light within reach;encouraged to call for assist;with OT  -ALFREDITO (r) MW (t) ALFREDITO (c)               User Key  (r) = Recorded By, (t) = Taken By, (c) = Cosigned By      Initials Name Provider Type    Satish Galindo, PT DPT Physical Therapist    Josie Crespo, FER Student PT Student                   Outcome Measures       Row Name 08/01/23 0815 08/01/23 0800       How much help from another person do you currently need...    Turning from your back to your side while in flat bed without using bedrails? 4  -ALFREDITO (r) MW (t) ALFREDITO (c) 3  -CC    Moving from lying on back to sitting on the side of a flat bed without bedrails? 4  -ALFREDITO (r) MW (t) ALFREDITO (c) 3  -CC    Moving to and from a bed to a chair (including a wheelchair)? 3  -ALFREDITO (r) MW (t) ALFREDITO (c) 3  -CC    Standing up from a chair using your arms (e.g., wheelchair, bedside chair)? 3  -ALFREDITO (r) MW (t) ALFREDITO (c) 3  -CC    Climbing 3-5 steps with a railing? 3  -ALFREDITO (r) MW (t) ALFREDITO (c) 3  -CC     To walk in hospital room? 3  -ALFREDITO (r) MW (t) ALFREDITO (c) 3  -CC    AM-PAC 6 Clicks Score (PT) 20  -ALFREDITO (r) MW (t) 18  -CC    Highest level of mobility 6 --> Walked 10 steps or more  -ALFREDITO (r) MW (t) 6 --> Walked 10 steps or more  -CC      Row Name 08/01/23 0820 08/01/23 0815       Functional Assessment    Outcome Measure Options AM-PAC 6 Clicks Daily Activity (OT)  -JJ (r) RH (t) JJ (c) AM-PAC 6 Clicks Basic Mobility (PT)  -ALFREDITO (r) MW (t) ALFREDITO (c)              User Key  (r) = Recorded By, (t) = Taken By, (c) = Cosigned By      Initials Name Provider Type    Satish Galindo, PT DPT Physical Therapist    Kassi Porter, OTR/L, CSRS Occupational Therapist    Elio Euceda, RN Registered Nurse    Adelaida Mckeon, OT Student OT Student    Josie Crespo, PT Student PT Student                                 Physical Therapy Education       Title: PT OT SLP Therapies (In Progress)       Topic: Physical Therapy (In Progress)       Point: Mobility training (Done)       Learning Progress Summary             Patient Acceptance, E, VU by  at 8/1/2023 0815    Comment: educated pt. about safety precautions and needing help when wanting to move in the room                         Point: Home exercise program (Not Started)       Learner Progress:  Not documented in this visit.              Point: Body mechanics (Not Started)       Learner Progress:  Not documented in this visit.              Point: Precautions (Not Started)       Learner Progress:  Not documented in this visit.                              User Key       Initials Effective Dates Name Provider Type Discipline     05/25/23 -  Josie Gonzalez, PT Student PT Student PT                  PT Recommendation and Plan     Plan of Care Reviewed With: patient  Outcome Evaluation: PT eval complete. Pt. A&Ox4. Pt. has been living at home alone and his great niece visits him every day. He is using a cane to ambulate and is still driving. Today, pt. reports being 1/10  pain in his abdomen. Pt. independently got from supine to sit with HOB elevated. Pt. stood to adjust his gown at least 3 times independently but PT advised that he waited until we were closer to help. Pt. needed to use the beside commode for a BM. After, pt. ambulated about 50 feet within his room. Pt. reports that he will be going to [facility] for care with his wife. Skilled PT services are needed to increase balance. Recommend d/c to [facility].     Time Calculation:         PT Charges       Row Name 08/01/23 0815             Time Calculation    Start Time 0815  -ALFREDITO (r) MW (t) ALFREDITO (c)      Stop Time 0855  -ALFREDITO (r) MW (t) ALFREDITO (c)      Time Calculation (min) 40 min  -ALFREDITO (r) MW (t)      PT Received On 08/01/23  -ALFREDITO (r) MW (t) ALFREDITO (c)      PT Goal Re-Cert Due Date 08/11/23  -ALFREDITO (r) MW (t) ALFREDITO (c)                User Key  (r) = Recorded By, (t) = Taken By, (c) = Cosigned By      Initials Name Provider Type    Satish Galindo, PT DPT Physical Therapist    Josie Crespo, PT Student PT Student                      PT G-Codes  Outcome Measure Options: AM-PAC 6 Clicks Daily Activity (OT)  AM-PAC 6 Clicks Score (PT): 20  AM-PAC 6 Clicks Score (OT): 20  PT Discharge Summary  Anticipated Discharge Disposition (PT): skilled nursing facility    Josie Gonzalez PT Student  8/1/2023

## 2023-08-01 NOTE — PROGRESS NOTES
[facility] Medicine Services  INPATIENT PROGRESS NOTE    Patient Name: Wyatt Curry  Date of Admission: 7/31/2023  Today's Date: 08/01/23  Length of Stay: 1  Primary Care Physician: Everardo Jackson MD    Subjective   Chief Complaint: Diarrhea    HPI   Patient has improvement in his pressures heart rate is better controlled overnight. Diarrhea is slightly improved but he 2 episodes earlier this morning. His abdominal pain is improving.    Review of Systems   All pertinent negatives and positives are as above. All other systems have been reviewed and are negative unless otherwise stated.     Objective    Temp:  [97.9 øF (36.6 øC)-99.8 øF (37.7 øC)] 98.6 øF (37 øC)  Heart Rate:  [] 95  Resp:  [18-23] 18  BP: ()/(40-71) 130/70  Physical Exam  Constitutional:       General: He is not in acute distress.     Appearance: He is well-developed. He is ill-appearing. He is not diaphoretic.      Comments: Chronically ill-appearing elderly man.   HENT:      Head: Normocephalic.   Eyes:      General: No scleral icterus.     Conjunctiva/sclera: Conjunctivae normal.      Pupils: Pupils are equal, round, and reactive to light.   Neck:      Thyroid: No thyromegaly.      Vascular: No JVD.      Trachea: No tracheal deviation.   Cardiovascular:      Rate and Rhythm: Normal rate. Rhythm irregular.      Heart sounds: Normal heart sounds. No murmur heard.    No friction rub. No gallop.   Pulmonary:      Effort: Pulmonary effort is normal. No respiratory distress.      Breath sounds: Normal breath sounds. No stridor. No wheezing or rales.   Chest:      Chest wall: No tenderness.   Abdominal:      General: Bowel sounds are normal. There is no distension.      Palpations: Abdomen is soft. There is no mass.      Tenderness: There is abdominal tenderness. There is no guarding or rebound.      Hernia: No hernia is present.      Comments: Tenderness in periumbilical region.   Musculoskeletal:          General: No tenderness or deformity. Normal range of motion.      Cervical back: Normal range of motion and neck supple.      Right lower leg: No edema.      Left lower leg: No edema.   Lymphadenopathy:      Cervical: No cervical adenopathy.   Skin:     General: Skin is warm and dry.      Coloration: Skin is not pale.      Findings: No erythema or rash.   Neurological:      General: No focal deficit present.      Mental Status: He is alert and oriented to person, place, and time.      Cranial Nerves: No cranial nerve deficit.      Sensory: No sensory deficit.      Motor: No abnormal muscle tone.      Coordination: Coordination normal.   Psychiatric:         Mood and Affect: Mood normal.         Behavior: Behavior normal.         Thought Content: Thought content normal.     Results Review:  I have reviewed the labs, radiology results, and diagnostic studies.    Laboratory Data:   Results from last 7 days   Lab Units 08/01/23  0359 07/31/23  1447   WBC 10*3/mm3 11.68* 12.08*   HEMOGLOBIN g/dL 10.4* 12.3*   HEMATOCRIT % 34.7* 41.5   PLATELETS 10*3/mm3 185 221        Results from last 7 days   Lab Units 08/01/23  0945 08/01/23  0359 07/31/23  1520   SODIUM mmol/L  --  138 140   POTASSIUM mmol/L 3.3* 3.1* 3.5   CHLORIDE mmol/L  --  102 101   CO2 mmol/L  --  22.0 23.0   BUN mg/dL  --  16 12   CREATININE mg/dL  --  0.74* 0.62*   CALCIUM mg/dL  --  7.8* 8.4   BILIRUBIN mg/dL  --   --  0.7   ALK PHOS U/L  --   --  56   ALT (SGPT) U/L  --   --  8   AST (SGOT) U/L  --   --  25   GLUCOSE mg/dL  --  179* 141*       Culture Data:   No results found for: BLOODCX, URINECX, WOUNDCX, MRSACX, RESPCX, STOOLCX    Radiology Data:   Imaging Results (Last 24 Hours)       Procedure Component Value Units Date/Time    CT Abdomen Pelvis Without Contrast [[id number]] Collected: 07/31/23 1746     Updated: 07/31/23 1758    Narrative:      EXAMINATION: CT ABDOMEN PELVIS WO CONTRAST- 7/31/2023 5:46 PM CDT     HISTORY: abd pain, diarrhea,  fever, leukocytosis, bandemia;  I48.91-Unspecified atrial fibrillation; E83.42-Hypomagnesemia;  K52.9-Noninfective gastroenteritis and colitis, unspecified     DOSE: 224 mGycm (Automatic exposure control technique was implemented in  an effort to keep the radiation dose as low as possible without  compromising image quality)     REPORT: Spiral CT of the abdomen and pelvis was performed without  contrast from the lung bases through the pubic symphysis. Reconstructed  coronal and sagittal images are also reviewed.     Comparison: CT abdomen pelvis with contrast 07/12/2023.     Review of lung windows demonstrates mild respiratory motion artifact,  bibasilar atelectasis greater on the right. No pleural effusion or  pneumothorax is identified. There is a small sliding-type hiatal hernia.  Evaluation of the solid abdominal organs is limited without intravenous  contrast. There is a small cyst or hemangioma within the left lobe of  liver anteriorly image 20 series 2. This measures 9 mm. This is stable.  The spleen is homogeneous, normal in size. There is mild distention of  the stomach with gas and fluid, no free air, free fluid or abscess is  identified. The gallbladder appears within normal limits. No  nephrolithiasis or ureterolithiasis or hydronephrosis. The renal  contours are smooth. The pancreas and adrenal glands are unremarkable.  Diffuse colonic wall thickening seen before appears improved, likely due  to improving colitis. There is no evidence of bowel obstruction. There  is mild bladder wall thickening and at least one bladder diverticulum is  present. This may be related to chronic bladder outlet obstruction. The  patient has a history of bladder cancer but no discrete bladder mass is  identified. Mild induration mental fat planes without evidence of a mass  or nodularity. Heavy calcified plaque is noted within the abdominal  aorta and its branches and there is ectasia of the abdominal aorta and  iliac  arteries as before. Review of bone windows demonstrates advanced  degenerative changes throughout the spine with associated levoscoliosis  of the lumbar spine. No acute osseous abnormality is identified.       Impression:      1. Significant interval improvement in changes of pancolitis, with only  mild residual mucosal thickening involving the colon relatively  diffusely. No free air, free fluid or abscess is identified.  2. Mild bilateral thickening and at least one bladder diverticulum is  present. There is a history of bladder neoplasm but no discrete bladder  mass is identified. The findings are more suggestive of some degree of  chronic bladder outlet obstruction.        This report was finalized on 07/31/2023 17:55 by Dr. Emigdio Bhandari MD.            I have reviewed the patient's current medications.     Assessment/Plan   Assessment  Active Hospital Problems    Diagnosis     **Atrial fibrillation with RVR     Recurrent Clostridioides difficile diarrhea     Symptomatic hypotension     Hypomagnesemia    Hypokalemia    Treatment Plan  Atrial fibrillation with RVR is rate controlled at this time.  Cardiology input appreciated.  Continue with digoxin.  We will consider restarting amiodarone if no rate control is maintained.  Patient's blood pressure is improved.  We will avoid anticoagulation now due to advanced age.    Continue oral vancomycin and IV Flagyl for recurrent C. difficile infection.  Infectious disease consultation will be obtained for further recommendations.    Continue aspirin, Lipitor, Jardiance    Replete potassium and magnesium aggressively.    DVT prophylaxis with SCDs    CODE STATUS is full code    Medical Decision Making  Number and Complexity of problems: 2 acute, severe, high complexity medical problems.  Differential Diagnosis: None    Conditions and Status        Condition is improving.     [facility] Data  External documents reviewed: None  Cardiac tracing (EKG, telemetry) interpretation:  Telemetry reviewed by me  Radiology interpretation: None  Labs reviewed: CBC, BMP reviewed by me  Any tests that were considered but not ordered: None     Decision rules/scores evaluated (example [id number], Wells, etc): Christian Vasc score 5     Discussed with: Patient and his power of      Care Planning  Shared decision making: Patient and his power of   Code status and discussions: CODE STATUS is DNR/DNI    Disposition  Social Determinants of Health that impact treatment or disposition: None  I expect the patient to be discharged to home with home health in 2 to 3 days.     Electronically signed by Cherie Mcmanus MD, 08/01/23, 17:54 CDT.

## 2023-08-01 NOTE — PLAN OF CARE
Goal Outcome Evaluation:  Plan of Care Reviewed With: (P) patient           Outcome Evaluation: (P) PT lisaal complete. Pt. A&Ox4. Pt. has been living at home alone and his great niece visits him every day. He is using a cane to ambulate and is still driving. Today, pt. reports being 1/10 pain in his abdomen. Pt. independently got from supine to sit with HOB elevated. Pt. stood to adjust his gown at least 3 times independently but PT advised that he waited until we were closer to help. Pt. needed to use the beside commode for a BM. After, pt. ambulated about 50 feet within his room. Pt. reports that he will be going to [facility] for care with his wife. Skilled PT services are needed to increase balance. Recommend d/c to [facility].      Anticipated Discharge Disposition (PT): (P) skilled nursing facility

## 2023-08-01 NOTE — PLAN OF CARE
Problem: Adult Inpatient Plan of Care  Goal: Absence of Hospital-Acquired Illness or Injury  Intervention: Identify and Manage Fall Risk  Recent Flowsheet Documentation  Taken 8/1/2023 0600 by Blanca Vazquez RN  Safety Promotion/Fall Prevention: safety round/check completed  Taken 8/1/2023 0500 by Blanca Vazquez RN  Safety Promotion/Fall Prevention: safety round/check completed  Taken 8/1/2023 0200 by Blanca Vazquez RN  Safety Promotion/Fall Prevention: safety round/check completed  Taken 8/1/2023 0100 by Blanca Vazquez RN  Safety Promotion/Fall Prevention: safety round/check completed  Taken 8/1/2023 0000 by Blanca Vazquez RN  Safety Promotion/Fall Prevention: safety round/check completed  Taken 7/31/2023 2300 by Blanca Vazquez RN  Safety Promotion/Fall Prevention: safety round/check completed  Taken 7/31/2023 2200 by Blanca Vazquez RN  Safety Promotion/Fall Prevention: safety round/check completed  Taken 7/31/2023 2100 by Blanca Vazquez RN  Safety Promotion/Fall Prevention: safety round/check completed  Taken 7/31/2023 2000 by Blanca Vazquez RN  Safety Promotion/Fall Prevention: safety round/check completed  Taken 7/31/2023 1939 by Blanca Vazquez RN  Safety Promotion/Fall Prevention: safety round/check completed  Intervention: Prevent Skin Injury  Recent Flowsheet Documentation  Taken 8/1/2023 0600 by Blanca Vazquez RN  Body Position:   upper extremity elevated   weight shifting  Taken 8/1/2023 0400 by Blanca Vazquez RN  Skin Protection: adhesive use limited  Taken 8/1/2023 0300 by Blanca Vazquez RN  Body Position:   upper extremity elevated   weight shifting  Taken 8/1/2023 0200 by Blanca Vazquez RN  Body Position:   upper extremity elevated   weight shifting  Taken 8/1/2023 0000 by Blanca Vazquez RN  Body Position:   upper extremity elevated   weight shifting  Skin Protection: adhesive use limited  Taken 7/31/2023 2200 by Blanca Vazquez  GAUDENCIO Jama  Body Position: upper extremity elevated  Taken 7/31/2023 2000 by Blanca Vazquez RN  Body Position: upper extremity elevated  Taken 7/31/2023 1939 by Blanca Vazquez RN  Body Position: upper extremity elevated  Skin Protection: adhesive use limited  Intervention: Prevent and Manage VTE (Venous Thromboembolism) Risk  Recent Flowsheet Documentation  Taken 8/1/2023 0400 by Blanca Vazquez RN  VTE Prevention/Management:   bilateral   sequential compression devices on  Taken 8/1/2023 0300 by Blanca Vazquez RN  Activity Management: up to bedside commode  Taken 8/1/2023 0000 by Blanca Vazquez RN  VTE Prevention/Management:   bilateral   sequential compression devices on  Taken 7/31/2023 1939 by Blanca Vazquez RN  Activity Management: bedrest  VTE Prevention/Management:   bilateral   sequential compression devices on  Goal: Optimal Comfort and Wellbeing  Intervention: Provide Person-Centered Care  Recent Flowsheet Documentation  Taken 7/31/2023 1939 by Blanca Vazquez RN  Trust Relationship/Rapport:   care explained   questions answered     Problem: Fall Injury Risk  Goal: Absence of Fall and Fall-Related Injury  Intervention: Identify and Manage Contributors  Recent Flowsheet Documentation  Taken 8/1/2023 0600 by Blanca Vazquez RN  Medication Review/Management: medications reviewed  Taken 8/1/2023 0500 by Blanca Vazquez RN  Medication Review/Management: medications reviewed  Taken 8/1/2023 0200 by Blanca Vazquez RN  Medication Review/Management: medications reviewed  Taken 8/1/2023 0100 by Blanca Vazquez RN  Medication Review/Management: medications reviewed  Taken 8/1/2023 0000 by Blanca Vazquez RN  Medication Review/Management: medications reviewed  Taken 7/31/2023 2300 by Blanca Vazquez RN  Medication Review/Management: medications reviewed  Taken 7/31/2023 2200 by Blanca Vazquez RN  Medication Review/Management: medications reviewed  Taken  7/31/2023 2100 by Blanca Vazquez RN  Medication Review/Management: medications reviewed  Taken 7/31/2023 2000 by Blanca Vazquez RN  Medication Review/Management: medications reviewed  Taken 7/31/2023 1939 by Blanca Vazquez RN  Medication Review/Management: medications reviewed  Intervention: Promote Injury-Free Environment  Recent Flowsheet Documentation  Taken 8/1/2023 0600 by Blanca Vazquez RN  Safety Promotion/Fall Prevention: safety round/check completed  Taken 8/1/2023 0500 by Blanca Vazquez RN  Safety Promotion/Fall Prevention: safety round/check completed  Taken 8/1/2023 0200 by Blanca Vazquez RN  Safety Promotion/Fall Prevention: safety round/check completed  Taken 8/1/2023 0100 by Blanca Vazquez RN  Safety Promotion/Fall Prevention: safety round/check completed  Taken 8/1/2023 0000 by Blanca Vazquez RN  Safety Promotion/Fall Prevention: safety round/check completed  Taken 7/31/2023 2300 by Blanca Vazquez RN  Safety Promotion/Fall Prevention: safety round/check completed  Taken 7/31/2023 2200 by Blanca Vazquez RN  Safety Promotion/Fall Prevention: safety round/check completed  Taken 7/31/2023 2100 by Blanca Vazquez RN  Safety Promotion/Fall Prevention: safety round/check completed  Taken 7/31/2023 2000 by Blanca Vazquez RN  Safety Promotion/Fall Prevention: safety round/check completed  Taken 7/31/2023 1939 by Blanca Vazquez RN  Safety Promotion/Fall Prevention: safety round/check completed   Goal Outcome Evaluation:

## 2023-08-01 NOTE — NURSING NOTE
Patient is current with [facility], and we will resume care, if appropriate, at discharge.

## 2023-08-01 NOTE — PAYOR COMM NOTE
"8/1/23 [facility] [phone]  [phone]    ER ADMIT TO ICU ON 7/31/23. FAXING CLINICAL FOR INPATIENT REVIEW.              Maggy Solo (89 y.o. Male)       Date of Birth   05/13/1930    Social Security Number       Address   [address] [id number]    Home Phone   [phone]    MRN   [id number]       Cheondoism   Sabianism    Marital Status                               Admission Date   7/31/23    Admission Type   Emergency    Admitting Provider   Cherie Mcmanus MD    Attending Provider   Cherie Mcmanus MD    Department, Room/Bed   [facility] INTENSIVE CARE, I011/1       Discharge Date       Discharge Disposition       Discharge Destination                                 Attending Provider: Cherie Mcmanus MD    Allergies: Pregabalin    Isolation: Spore   Infection: C.difficile (06/18/23)   Code Status: No CPR    Ht: 160 cm (63\")   Wt: 59.9 kg (132 lb)    Admission Cmt: None   Principal Problem: Atrial fibrillation with RVR [I48.91]                   Active Insurance as of 7/31/2023       Primary Coverage       Payor Plan Insurance Group Employer/Plan Group    HUMANA MEDICARE REPLACEMENT HUMANA MEDICARE REPLACEMENT [id number]       Payor Plan Address Payor Plan Phone Number Payor Plan Fax Number Effective Dates    [address] [address] [phone]  1/1/2018 - None Entered    [facility] [id number]         Subscriber Name Subscriber Birth Date Member ID       MAGGY SOLO 5/13/1930 [id number]                     Emergency Contacts        (Rel.) Home Phone Work Phone Mobile Phone    Zamzam Marie (Relative) -- -- [phone]    Glory Solo (Spouse) [phone] -- --    Madai Marie -- -- [phone]             [facility] Encounter Date/Time: 7/31/2023 [facility]   Hospital Account: [id number]    MRN: [id number]   Patient:  Maggy Solo   Contact Serial #: [id number]   SSN:        "   ENCOUNTER             Patient Class: Inpatient   Unit: [facility] ICU   Hospital Service: Medicine     Bed: I011/1   Admitting Provider: Cherie Mcmanus MD   Referring Physician: Provider, No Known   Attending Provider: Cherie Mcmanus MD   Adm Diagnosis: Atrial fibrillation with*               PATIENT          Name: Maggy Solo : 1930 (89 yrs)   Address: [address] [place] [address] Sex: Male   City: [place] [address]   County: [facility]   Marital Status:  Ethnicity: NOT                                                                              Race: WHITE   Primary Care Provider: Everardo Jackson MD Patients Phone: Home Phone: [phone]  Work Phone: [phone]  Mobile Phone: [phone]   EMERGENCY CONTACT   Contact Name Legal Guardian? Relationship to Patient Home Phone Work Phone   1. Zamzam Marie POA  2. Glory Solo      Relative  Spouse    [phone]           GUARANTOR            Guarantor: Maggy Solo     : 1930   Address: [address] [place] [address] Sex: [place] [address]     Relation to Patient: Self       Home Phone: [phone]   Guarantor ID: [id number]       Work Phone:     GUARANTOR EMPLOYER   Employer:           Status: RETIRED   COVERAGE          PRIMARY INSURANCE   Payor: HUMANA MEDICARE REPLACEMENT Plan: HUMANA MEDICARE REPLACEMENT   Group Number: [id number] Insurance Type: INDEMNITY   Subscriber Name: MAGGY SOLO Subscriber : 1930   Subscriber ID: [id number] Coverage Address: [address] [address]   Pat. Rel. to Subscriber: Self Coverage Phone: [phone]   SECONDARY INSURANCE   Payor: N/A Plan: N/A   Group Number:   Insurance Type:     Subscriber Name:   Subscriber :     Subscriber ID:   Coverage Address:     Pat. Rel. to Subscriber:   Coverage Phone:        Contact Serial # ([id number])         2023    Chart ID ([id number])            Coleen Reyna APRN   Nurse Practitioner  Hospitalist      H&P      Cosign Needed     Date of Service: 07/31/23 1829  Creation Time: 07/31/23 1956     Cosign Needed       Expand All Collapse All         [facility] Medicine Services  HISTORY AND PHYSICAL     Date of Admission: 7/31/2023  Primary Care Physician: Everardo Jackson MD     Subjective   Primary Historian: Niece (power of )     Chief Complaint: Recurrent abdominal pain, nausea/vomiting and diarrhea     History of Present Illness  Wyatt Curry is a 89-year-old male with a past medical history of recurrent C. difficile toxin.  This is his third visit to the hospital with same complaints.  Niece is at bedside who provides all history.  He has been taking Imodium so he can visit his wife in the nursing home however complaints of abdominal pain, nausea/vomiting and daily watery foul-smelling stool are progressively worsening and she feels he has again got C. difficile.  He has had an extensive change in baseline health since March after skin cancer surgery with hospital-acquired pneumonia, antibiotics causing C. difficile toxin, newly diagnosed heart failure.  After arrival patient developed A-fib with RVR.  Hypotensive, unable to start Cardizem drip, amiodarone bolus given no drip started.  Patient is alert and oriented.  Niece is agreeable to DNR/DNI status however she is quite distressed over the severe decline he has had since March, 2023 and wishes for aggressive measures except for intubation and CPR.  Patient is agreeable to CODE STATUS.  He denies pain.  He is admitted to critical care for further evaluation treatment.     Review of Systems   Otherwise complete ROS reviewed and negative except as mentioned in the HPI.     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    GERD (gastroesophageal reflux disease)      Hyperlipidemia      Hypertension      Irregular heart beat      Lung nodules           Past Surgical History:  Surgical History         Past Surgical History:   Procedure Laterality Date    BLADDER SURGERY        CARDIAC CATHETERIZATION        CARDIAC CATHETERIZATION Left 4/8/2023     Procedure: Cardiac Catheterization/Vascular Study;  Surgeon: Sukhi Hartley MD;  Location:  [facility] CATH INVASIVE LOCATION;  Service: Cardiology;  Laterality: Left;    COLONOSCOPY        CORONARY ANGIOPLASTY WITH STENT PLACEMENT   2006     STENT X 2    CYSTOSCOPY        ENDOSCOPY        EXCISION LESION N/A 4/7/2023     Procedure: EXCISION LESION OF RIGHT TEMPLE AND LEFT TEMPLE WITH FROZEN SECTION WITH POSSIBLE FLAP OR GRAFT;  Surgeon: Brijesh Nickerson MD;  Location:  [facility] OR;  Service: ENT;  Laterality: N/A;    FLAP HEAD/NECK Left 03/09/2020     Procedure: POSSIBLE FLAP;  Surgeon: Brijesh Nickerson MD;  Location:  [facility] OR;  Service: ENT;  Laterality: Left;    HEAD/NECK LESION/CYST EXCISION Left 03/09/2020     Procedure: 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 His smoking use included cigarettes. He started smoking about 75 years ago. He has a 30.00 pack-year smoking history. He has been exposed to tobacco smoke. He has never used smokeless tobacco. He reports current alcohol use of about 1.0 standard drink per week. He reports that he does  "not currently use drugs.     Family History: family history includes Alcohol abuse in his sister; Diabetes in his sister; Heart attack in his brother and father; Heart disease in his brother, brother, father, and sister; Hyperlipidemia in his sister; Hypertension in his sister; Tuberculosis in his mother.        Allergies:        Allergies   Allergen Reactions    Pregabalin Mental Status Change       Made pt feel \"crazy\"         Medications:          Prior to Admission medications    Medication Sig Start Date End Date Taking? Authorizing Provider   acetaminophen (TYLENOL) 650 MG 8 hr tablet Take 1 tablet by mouth 2 (Two) Times a Day. Indications: Pain       ProviderManan MD   aspirin 81 MG EC tablet Take 1 tablet by mouth Daily--DO NOT BREAK/CRUSH TABLET 4/11/23     Neda Marin APRN   atorvastatin (LIPITOR) 20 MG tablet Take 1 tablet by mouth every night at bedtime. Indications: Cardiac Failure, High Amount of Fats in the Blood       ProviderManan MD   busPIRone (BUSPAR) 10 MG tablet Take 1 tablet by mouth 2 (Two) Times a Day. Indications: Anxiety Disorder       ProviderManan MD   clopidogrel (PLAVIX) 75 MG tablet Take 1 tablet by mouth Daily. Indications: Carotid Artery Stenting 4/18/16     ProviderManan MD   empagliflozin (Jardiance) 10 MG tablet tablet Take 1 tablet by mouth Daily.  Patient taking differently: Take 1 tablet by mouth Daily. 7/24/23     Bell Lawson APRN   furosemide (LASIX) 40 MG tablet Take 1 tablet by mouth Daily As Needed (swelling, weight gain or shortness of breath). Take 1 additional tablet as needed for weight gain, shortness of breath or swelling 7/24/23     Bell Lawson APRN   guaiFENesin (MUCINEX) 600 MG 12 hr tablet Take 1 tablet by mouth 2 (Two) Times a Day. Indications: Cough  Patient not taking: Reported on 7/24/2023 7/4/23     Manan Govea MD   lansoprazole (PREVACID) 30 MG capsule Take 1 capsule by mouth Daily. Indications: " Heartburn 5/27/23     Manan Govea MD   magnesium oxide (MAG-OX) 400 MG tablet Take 1 tablet by mouth Every Night. Not taking due to diarrhea  Indications: Disorder with Low Magnesium Levels  Patient not taking: Reported on 7/7/2023 8/23/22     Manan Govea MD   metoprolol succinate XL (TOPROL-XL) 50 MG 24 hr tablet Take 1 tablet by mouth Every Night. Indications: Atrial Fibrillation 7/7/23     Bell Lawson APRN   nitroglycerin (NITROSTAT) 0.4 MG SL tablet Place 1 tablet under the tongue Every 5 (Five) Minutes As Needed. Indications: Acute Angina Pectoris 5/27/23     Manan Govea MD   ondansetron ODT (ZOFRAN-ODT) 4 MG disintegrating tablet Place 1 tablet on the tongue Every 6 (Six) Hours As Needed for Nausea. 7/12/23     Abi Dominguez APRN   potassium chloride 10 MEQ CR tablet Take 2 tablets by mouth Daily As Needed (when taking PRN lasix).  Patient not taking: Reported on 7/27/2023 7/24/23     Bell Lawson APRN   Probiotic, Lactobacillus, capsule Take 1 capsule by mouth Daily. Indications: PROPHYLAXIS 6/22/23     Manan Govea MD   sacubitril-valsartan (Entresto) 49-51 MG tablet Take 1 tablet by mouth 2 (Two) Times a Day. 7/24/23     Bell Lawson APRN   spironolactone (ALDACTONE) 25 MG tablet Take 1 tablet by mouth Daily. 7/7/23     Bell Lawson APRN   vitamin B-12 (CYANOCOBALAMIN) 1000 MCG tablet Take 1 tablet by mouth Daily. Indications: Inadequate Vitamin B12 5/27/23     Manan Govea MD   Vitron-C  MG tablet Take 1 tablet by mouth Daily. Indications: Excess or Deficiency of Vitamin C 4/13/23     Manan Govea MD   potassium chloride (MICRO-K) 10 MEQ CR capsule Take 10 mEq by mouth Daily. Take daily when taking lasix  Indications: Low Amount of Potassium in the Blood 7/25/23     Manan Govea MD   sacubitril-valsartan (Entresto) 49-51 MG tablet Take 1 tablet by mouth 2 (Two) Times a Day. Indications: Cardiac Failure 7/24/23      "Provider, MD Manan      I have utilized all available immediate resources to obtain, update, or review the patient's current medications (including all prescriptions, over-the-counter products, herbals, cannabis/cannabidiol products, and vitamin/mineral/dietary (nutritional) supplements).     Objective      Vital Signs: /47   Pulse 112   Temp 98.3 øF (36.8 øC) (Oral)   Resp 20   Ht 167.6 cm (66\")   Wt 59 kg (130 lb)   SpO2 95%   BMI 20.98 kg/mý   Physical Exam  Vitals reviewed.   Constitutional:       Appearance: He is ill-appearing.   HENT:      Head: Normocephalic and atraumatic.      Mouth/Throat:      Mouth: Mucous membranes are moist.      Pharynx: Oropharynx is clear.   Eyes:      Extraocular Movements: Extraocular movements intact.      Conjunctiva/sclera: Conjunctivae normal.   Cardiovascular:      Rate and Rhythm: Tachycardia present. Rhythm irregular.   Pulmonary:      Effort: Pulmonary effort is normal.      Breath sounds: Normal breath sounds.   Abdominal:      General: There is no distension.      Palpations: Abdomen is soft.   Musculoskeletal:      Cervical back: Normal range of motion and neck supple.      Right lower leg: No edema.      Left lower leg: No edema.      Comments: Generalized weakness and debility   Skin:     General: Skin is warm and dry.   Neurological:      General: No focal deficit present.      Mental Status: He is alert and oriented to person, place, and time.   Psychiatric:         Mood and Affect: Mood normal.         Behavior: Behavior normal.         Results Reviewed:  Lab Results (last 24 hours)         Procedure Component Value Units Date/Time     Blood Culture - Blood, Arm, Left [[id number]] Collected: 07/31/23 1844     Specimen: Blood from Arm, Left Updated: 07/31/23 1903     Blood Culture - Blood, Arm, Right [[id number]] Collected: 07/31/23 1849     Specimen: Blood from Arm, Right Updated: 07/31/23 1903     Lactic Acid, Plasma [[id number]]  (Normal) " Collected: 07/31/23 1446     Specimen: Blood Updated: 07/31/23 1740       Lactate 1.7 mmol/L       Magnesium [[id number]]  (Abnormal) Collected: 07/31/23 1520     Specimen: Blood Updated: 07/31/23 1554       Magnesium 0.8 mg/dL       Comprehensive Metabolic Panel [[id number]]  (Abnormal) Collected: 07/31/23 1520     Specimen: Blood Updated: 07/31/23 1546       Glucose 141 mg/dL         BUN 12 mg/dL         Creatinine 0.62 mg/dL         Sodium 140 mmol/L         Potassium 3.5 mmol/L         Chloride 101 mmol/L         CO2 23.0 mmol/L         Calcium 8.4 mg/dL         Total Protein 7.2 g/dL         Albumin 3.8 g/dL         ALT (SGPT) 8 U/L         AST (SGOT) 25 U/L         Alkaline Phosphatase 56 U/L         Total Bilirubin 0.7 mg/dL         Globulin 3.4 gm/dL         A/G Ratio 1.1 g/dL         BUN/Creatinine Ratio 19.4       Anion Gap 16.0 mmol/L         eGFR 89.1 mL/min/1.73       Manual Differential [[id number]]  (Abnormal) Collected: 07/31/23 1447     Specimen: Blood Updated: 07/31/23 1529       Neutrophil % 61.0 %         Lymphocyte % 6.0 %         Monocyte % 4.0 %         Bands %  22.0 %         Atypical Lymphocyte % 7.0 %         Neutrophils Absolute 10.03 10*3/mm3         Lymphocytes Absolute 1.57 10*3/mm3         Monocytes Absolute 0.48 10*3/mm3         Crenated RBC's Slight/1+       Hypochromia Slight/1+       Poikilocytes Slight/1+       WBC Morphology Normal       Platelet Morphology Normal     CBC Auto Differential [[id number]]  (Abnormal) Collected: 07/31/23 1447     Specimen: Blood Updated: 07/31/23 1529       WBC 12.08 10*3/mm3         RBC 4.51 10*6/mm3         Hemoglobin 12.3 g/dL         Hematocrit 41.5 %         MCV 92.0 fL         MCH 27.3 pg         MCHC 29.6 g/dL         RDW 17.6 %         RDW-SD 59.6 fl         MPV 11.3 fL         Platelets 221 10*3/mm3               Imaging Results (Last 24 Hours)         Procedure Component Value Units Date/Time     CT Abdomen Pelvis Without Contrast [[id number]]  Collected: 07/31/23 1746       Updated: 07/31/23 1758     Narrative:       EXAMINATION: CT ABDOMEN PELVIS WO CONTRAST- 7/31/2023 5:46 PM CDT     HISTORY: abd pain, diarrhea, fever, leukocytosis, bandemia;  I48.91-Unspecified atrial fibrillation; E83.42-Hypomagnesemia;  K52.9-Noninfective gastroenteritis and colitis, unspecified     DOSE: 224 mGycm (Automatic exposure control technique was implemented in  an effort to keep the radiation dose as low as possible without  compromising image quality)     REPORT: Spiral CT of the abdomen and pelvis was performed without  contrast from the lung bases through the pubic symphysis. Reconstructed  coronal and sagittal images are also reviewed.     Comparison: CT abdomen pelvis with contrast 07/12/2023.     Review of lung windows demonstrates mild respiratory motion artifact,  bibasilar atelectasis greater on the right. No pleural effusion or  pneumothorax is identified. There is a small sliding-type hiatal hernia.  Evaluation of the solid abdominal organs is limited without intravenous  contrast. There is a small cyst or hemangioma within the left lobe of  liver anteriorly image 20 series 2. This measures 9 mm. This is stable.  The spleen is homogeneous, normal in size. There is mild distention of  the stomach with gas and fluid, no free air, free fluid or abscess is  identified. The gallbladder appears within normal limits. No  nephrolithiasis or ureterolithiasis or hydronephrosis. The renal  contours are smooth. The pancreas and adrenal glands are unremarkable.  Diffuse colonic wall thickening seen before appears improved, likely due  to improving colitis. There is no evidence of bowel obstruction. There  is mild bladder wall thickening and at least one bladder diverticulum is  present. This may be related to chronic bladder outlet obstruction. The  patient has a history of bladder cancer but no discrete bladder mass is  identified. Mild induration mental fat planes without  evidence of a mass  or nodularity. Heavy calcified plaque is noted within the abdominal  aorta and its branches and there is ectasia of the abdominal aorta and  iliac arteries as before. Review of bone windows demonstrates advanced  degenerative changes throughout the spine with associated levoscoliosis  of the lumbar spine. No acute osseous abnormality is identified.        Impression:       1. Significant interval improvement in changes of pancolitis, with only  mild residual mucosal thickening involving the colon relatively  diffusely. No free air, free fluid or abscess is identified.  2. Mild bilateral thickening and at least one bladder diverticulum is  present. There is a history of bladder neoplasm but no discrete bladder  mass is identified. The findings are more suggestive of some degree of  chronic bladder outlet obstruction.        This report was finalized on 07/31/2023 17:55 by Dr. Emigdio Bhandari MD.             Assessment / Plan   Assessment:        Active Hospital Problems     Diagnosis      **Atrial fibrillation with RVR      Recurrent Clostridioides difficile diarrhea      Symptomatic hypotension      Hypomagnesemia           Treatment Plan  1.  The patient will be admitted to Dr. Mcmanus's service here at [facility].   2.  DVT prophylaxis with SCDs for now due to high risk of bleeding, will reassess EKG  3.  Home medications reviewed and restarted as appropriate, hold hypertensive agents  4.  Oral vancomycin and IV Flagyl  5.  Consult cardiology and infectious diseases  6.  Repeat fluid bolus 500 mL  7.  Repeat magnesium level at 10 PM, has received 4 g IV  8.  Awaiting stool panel and urinalysis to be collected, labs in a.m.  9.  Daily weights, oral care, supplemental oxygen as needed, incentive spirometry  10.  Isolation contacts for  11.  Consult PT and OT for strengthening     Medical Decision Making  Number and Complexity of problems: 4  Differential Diagnosis: None     Conditions  and Status        Condition is unchanged.     [facility] Data  External documents reviewed: No  Cardiac tracing (EKG, telemetry) interpretation: Reviewed  Radiology interpretation: Reviewed  Labs reviewed: Yes  Any tests that were considered but not ordered: No     Decision rules/scores evaluated (example [id number], Wells, etc): No     Discussed with: Patient, josefina and Dr. Mcmanus     Care Planning  Shared decision making: Patient, ojsefina and Dr. Mcmanus  Code status and discussions: DNR/DNI     Disposition  Social Determinants of Health that impact treatment or disposition: No  Estimated length of stay is 2-3 days.      I confirmed that the patient's advanced care plan is present, code status is documented, and a surrogate decision maker is listed in the patient's medical record.      The patient's surrogate decision maker is josefina.      The patient was seen and examined by me on 7/31/2023 at 6:29 PM.     Electronically signed by LARRY Knox, 07/31/23, 21:05 CDT.                           Josie Gonzalez, PT Student   PT Student  Physical Therapy     Plan of Care      Cosign Needed     Date of Service: 08/01/23 0902  Creation Time: 08/01/23 0902     Cosign Needed         Goal Outcome Evaluation:  Plan of Care Reviewed With: (P) patient  Outcome Evaluation: (P) PT eval complete. Pt. A&Ox4. Pt. has been living at home alone and his great niece visits him every day. He is using a cane to ambulate and is still driving. Today, pt. reports being 1/10 pain in his abdomen. Pt. independently got from supine to sit with HOB elevated. Pt. stood to adjust his gown at least 3 times independently but PT advised that he waited until we were closer to help. Pt. needed to use the beside commode for a BM. After, pt. ambulated about 50 feet within his room. Pt. reports that he will be going to [facility] for care with his wife. Skilled PT services are needed to increase balance. Recommend d/c to [facility].        Anticipated  Discharge Disposition (PT): (P) skilled nursing facility                    Reji Shultz DO   Physician  Cardiology     Consults     Incomplete     Date of Service: 08/01/23 0947  Creation Time: 08/01/23 0947     Incomplete       Expand All Collapse All      [facility] CONSULT NOTE     Patient Care Team:  Everardo Jackson MD as PCP - General (Family Medicine)  Justin Howard MD as Consulting Physician (Urology)  Emigdio Moseley MD as Cardiologist (Cardiology)  Everardo Noriega MD as Consulting Physician (Pulmonary Disease)  Brijesh Nickerson MD as Consulting Physician (Otolaryngology)  Kaitlin Del Angel PA as Referring Physician (Physician Assistant)  No Known Provider  REASON FOR REFERRAL:             Subjective         Patient is a 89 y.o. male presents with . Onset of symptoms was .  Symptoms are associated with  Symptoms are aggravated by .   Symptoms improve with .  Severty  Context  Quality      Review of Systems              Review of Systems     History  Medical History        Past Medical History:   Diagnosis Date    Arthritis      Bladder cancer      Bronchitis      CAD (coronary artery disease)      Cancer 1975     bladder cancer    Carcinoma in situ of lip       basel cell    GERD (gastroesophageal reflux disease)      Hyperlipidemia      Hypertension      Irregular heart beat      Lung nodules           Surgical History         Past Surgical History:   Procedure Laterality Date    BLADDER SURGERY        CARDIAC CATHETERIZATION        CARDIAC CATHETERIZATION Left 4/8/2023     Procedure: Cardiac Catheterization/Vascular Study;  Surgeon: Sukhi Hartley MD;  Location:  [facility] CATH INVASIVE LOC[facility];  Service: Cardiology;  Laterality: Left;    COLONOSCOPY        CORONARY ANGIOPLASTY WITH STENT PLACEMENT   2006     STENT X 2    CYSTOSCOPY        ENDOSCOPY        EXCISION LESION N/A 4/7/2023     Procedure: EXCISION LESION OF RIGHT TEMPLE AND LEFT TEMPLE WITH  FROZEN SECTION WITH POSSIBLE FLAP OR GRAFT;  Surgeon: Brijesh Nickerson MD;  Location:  [facility] OR;  Service: ENT;  Laterality: N/A;    FLAP HEAD/NECK Left 2020     Procedure: POSSIBLE FLAP;  Surgeon: Brijesh Nickerson MD;  Location:  [facility] OR;  Service: ENT;  Laterality: Left;    HEAD/NECK LESION/CYST EXCISION Left 2020     Procedure: 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 with frozen section and possible flap or graft;  Surgeon: Brijesh Nickerson MD;  Location:  [facility] OR;  Service: ENT;  Laterality: Left;    HERNIA REPAIR        SHOULDER SURGERY        SKIN BIOPSY         lip biopsy    SKIN FULL THICKNESS GRAFT Left 2020     Procedure: OR GRAFT;  Surgeon: Brijesh Nickerson MD;  Location:  [facility] OR;  Service: ENT;  Laterality: Left;    TRANSURETHRAL RESECTION OF BLADDER TUMOR                   Family History   Problem Relation Age of Onset    Tuberculosis Mother      Heart disease Father      Heart attack Father      Diabetes Sister      Hypertension Sister      Hyperlipidemia Sister      Heart disease Sister      Alcohol abuse Sister      Heart disease Brother      Heart attack Brother      Heart disease Brother        Social History            Tobacco Use    Smoking status: Former       Packs/day: 1.00       Years: 30.00       Pack years: 30.00       Types: Cigarettes       Start date:        Quit date:        Years since quittin.6       Passive exposure: Past    Smokeless tobacco: Never   Vaping Use    Vaping Use: Never used   Substance Use Topics    Alcohol use: Yes       Alcohol/week: 1.0 standard drink       Types: 1 Glasses of wine per week       Comment: occ wine    Drug use: Not Currently      Prescriptions Prior to Admission           Medications Prior to Admission   Medication Sig Dispense Refill Last Dose    acetaminophen (TYLENOL) 650 MG 8 hr tablet Take 1 tablet by mouth 2 (Two) Times a Day. Indications: Pain          aspirin 81  MG EC tablet Take 1 tablet by mouth Daily--DO NOT BREAK/CRUSH TABLET 100 tablet 3      atorvastatin (LIPITOR) 20 MG tablet Take 1 tablet by mouth every night at bedtime. Indications: Cardiac Failure, High Amount of Fats in the Blood          busPIRone (BUSPAR) 10 MG tablet Take 1 tablet by mouth 2 (Two) Times a Day. Indications: Anxiety Disorder          clopidogrel (PLAVIX) 75 MG tablet Take 1 tablet by mouth Daily. Indications: Carotid Artery Stenting          empagliflozin (Jardiance) 10 MG tablet tablet Take 1 tablet by mouth Daily. (Patient taking differently: Take 1 tablet by mouth Daily.) 30 tablet 11      furosemide (LASIX) 40 MG tablet Take 1 tablet by mouth Daily As Needed (swelling, weight gain or shortness of breath). Take 1 additional tablet as needed for weight gain, shortness of breath or swelling 45 tablet 11      guaiFENesin (MUCINEX) 600 MG 12 hr tablet Take 1 tablet by mouth 2 (Two) Times a Day. Indications: Cough (Patient not taking: Reported on 7/24/2023)          lansoprazole (PREVACID) 30 MG capsule Take 1 capsule by mouth Daily. Indications: Heartburn          magnesium oxide (MAG-OX) 400 MG tablet Take 1 tablet by mouth Every Night. Not taking due to diarrhea  Indications: Disorder with Low Magnesium Levels (Patient not taking: Reported on 7/7/2023)          metoprolol succinate XL (TOPROL-XL) 50 MG 24 hr tablet Take 1 tablet by mouth Every Night. Indications: Atrial Fibrillation 30 tablet 11      nitroglycerin (NITROSTAT) 0.4 MG SL tablet Place 1 tablet under the tongue Every 5 (Five) Minutes As Needed. Indications: Acute Angina Pectoris          ondansetron ODT (ZOFRAN-ODT) 4 MG disintegrating tablet Place 1 tablet on the tongue Every 6 (Six) Hours As Needed for Nausea. 40 tablet 0      potassium chloride 10 MEQ CR tablet Take 2 tablets by mouth Daily As Needed (when taking PRN lasix). (Patient not taking: Reported on 7/27/2023) 90 tablet 3      Probiotic, Lactobacillus, capsule Take 1  capsule by mouth Daily. Indications: PROPHYLAXIS          sacubitril-valsartan (Entresto) 49-51 MG tablet Take 1 tablet by mouth 2 (Two) Times a Day. 60 tablet 11      spironolactone (ALDACTONE) 25 MG tablet Take 1 tablet by mouth Daily. 30 tablet 11      vitamin B-12 (CYANOCOBALAMIN) 1000 MCG tablet Take 1 tablet by mouth Daily. Indications: Inadequate Vitamin B12          Vitron-C  MG tablet Take 1 tablet by mouth Daily. Indications: Excess or Deficiency of Vitamin C               Allergies:  Pregabalin           Objective []Expand by Default        Vital Signs  Temp:  [98.2 øF (36.8 øC)-99.8 øF (37.7 øC)] 98.2 øF (36.8 øC)  Heart Rate:  [] 93  Resp:  [18-23] 19  BP: ()/(40-91) 135/55     Physical Exam:  Physical Exam  Results Review:   Lab Results (last 72 hours)         Procedure Component Value Units Date/Time     Clostridioides difficile toxin Ag, Reflex - Stool, Per Rectum [[id number]]  (Abnormal) Collected: 08/01/23 0244     Specimen: Stool from Per Rectum Updated: 08/01/23 0653       C.diff Toxin Ag Positive     Narrative:       DNA from a toxigenic strain of C.difficile was detected, along with the presence of free toxin. These results are suggestive of C.difficile infection.     Clostridioides difficile Toxin - Stool, Per Rectum [[id number]]  (Abnormal) Collected: 08/01/23 0244     Specimen: Stool from Per Rectum Updated: 08/01/23 0653     Narrative:       The following orders were created for panel order Clostridioides difficile Toxin - Stool, Per Rectum.  Procedure                               Abnormality         Status                     ---------                               -----------         ------                     Clostridioides difficile...[[id number]]  Abnormal            Final result                  Please view results for these tests on the individual orders.     Clostridioides difficile Toxin, PCR - Stool, Per Rectum [[id number]]  (Abnormal) Collected: 08/01/23 0244      Specimen: Stool from Per Rectum Updated: 08/01/23 0653       Toxigenic C. difficile by PCR Positive     Narrative:       DNA from a toxigenic strain of C.difficile has been detected. Antigen testing for the presence of free C.difficile toxin is currently in progress, to help determine the clinical significance of this PCR result.      Manual Differential [[id number]]  (Abnormal) Collected: 08/01/23 0359     Specimen: Blood Updated: 08/01/23 0442       Neutrophil % 72.0 %         Lymphocyte % 7.0 %         Monocyte % 13.0 %         Bands %  8.0 %         Neutrophils Absolute 9.34 10*3/mm3         Lymphocytes Absolute 0.82 10*3/mm3         Monocytes Absolute 1.52 10*3/mm3         Anisocytosis Slight/1+       Poikilocytes Slight/1+       Polychromasia Slight/1+       Vacuolated Neutrophils Slight/1+       Clumped Platelets Present       Giant Platelets Slight/1+     Gastrointestinal Panel, PCR - Stool, Per Rectum [[id number]]  (Normal) Collected: 08/01/23 0240     Specimen: Stool from Per Rectum Updated: 08/01/23 0438       Campylobacter Not Detected       Plesiomonas shigelloides Not Detected       Salmonella Not Detected       Vibrio Not Detected       Vibrio cholerae Not Detected       Yersinia enterocolitica Not Detected       Enteroaggregative E. coli (EAEC) Not Detected       Enteropathogenic E. coli (EPEC) Not Detected       Enterotoxigenic E. coli (ETEC) lt/st Not Detected       Shiga-like toxin-producing E. coli (STEC) stx1/stx2 Not Detected       Shigella/Enteroinvasive E. coli (EIEC) Not Detected       Cryptosporidium Not Detected       Cyclospora cayetanensis Not Detected       Entamoeba histolytica Not Detected       Giardia lamblia Not Detected       Adenovirus F40/41 Not Detected       Astrovirus Not Detected       Norovirus GI/GII Not Detected       Rotavirus A Not Detected       Sapovirus (I, II, IV or V) Not Detected     Narrative:       If Aeromonas, Staphylococcus aureus or Bacillus cereus are  suspected, please order [id number]: Stool Culture, Aeromonas, S aureus, B Cereus.     Magnesium [[id number]]  (Normal) Collected: 08/01/23 0359     Specimen: Blood Updated: 08/01/23 0425       Magnesium 1.9 mg/dL       Basic Metabolic Panel [[id number]]  (Abnormal) Collected: 08/01/23 0359     Specimen: Blood Updated: 08/01/23 0425       Glucose 179 mg/dL         BUN 16 mg/dL         Creatinine 0.74 mg/dL         Sodium 138 mmol/L         Potassium 3.1 mmol/L         Chloride 102 mmol/L         CO2 22.0 mmol/L         Calcium 7.8 mg/dL         BUN/Creatinine Ratio 21.6       Anion Gap 14.0 mmol/L         eGFR 84.5 mL/min/1.73       Narrative:       GFR Normal >60  Chronic Kidney Disease <60  Kidney Failure <15     The GFR formula is only valid for adults with stable renal function between ages 18 and 70.     CBC Auto Differential [[id number]]  (Abnormal) Collected: 08/01/23 0359     Specimen: Blood Updated: 08/01/23 0410       WBC 11.68 10*3/mm3         RBC 3.80 10*6/mm3         Hemoglobin 10.4 g/dL         Hematocrit 34.7 %         MCV 91.3 fL         MCH 27.4 pg         MCHC 30.0 g/dL         RDW 17.6 %         RDW-SD 59.3 fl         MPV 11.0 fL         Platelets 185 10*3/mm3         nRBC 0.0 /100 WBC       Urinalysis, Microscopic Only - Urine, Clean Catch [[id number]]  (Abnormal) Collected: 08/01/23 0018     Specimen: Urine, Clean Catch Updated: 08/01/23 0057       RBC, UA 3-5 /HPF         WBC, UA 6-12 /HPF         Bacteria, UA Trace /HPF         Squamous Epithelial Cells, UA 3-6 /HPF         Yeast, UA Large/3+ Budding Yeast /HPF         Hyaline Casts, UA 3-6 /LPF         Comment: Corrected result. Previous result was None Seen /LPF on 8/1/2023 at 0055 CDT.          Methodology Manual Light Microscopy     Urine Culture - Urine, Urine, Clean Catch [[id number]] Collected: 08/01/23 0018     Specimen: Urine, Clean Catch Updated: 08/01/23 0055     Urinalysis With Culture If Indicated - Urine, Clean Catch [[id number]]   (Abnormal) Collected: 08/01/23 0018     Specimen: Urine, Clean Catch Updated: 08/01/23 0052       Color, UA Dark Yellow       Appearance, UA Cloudy       pH, UA 5.5       Specific Gravity, UA 1.021       Glucose,  mg/dL (2+)       Ketones, UA 15 mg/dL (1+)       Bilirubin, UA Negative       Blood, UA Trace       Protein,  mg/dL (2+)       Leuk Esterase, UA Moderate (2+)       Nitrite, UA Negative       Urobilinogen, UA 0.2 E.U./dL     Narrative:       In absence of clinical symptoms, the presence of pyuria, bacteria, and/or nitrites on the urinalysis result does not correlate with infection.     Magnesium [[id number]]  (Normal) Collected: 07/31/23 2151     Specimen: Blood Updated: 07/31/23 2234       Magnesium 1.9 mg/dL       Blood Culture - Blood, Arm, Left [[id number]] Collected: 07/31/23 1844     Specimen: Blood from Arm, Left Updated: 07/31/23 1903     Blood Culture - Blood, Arm, Right [[id number]] Collected: 07/31/23 1849     Specimen: Blood from Arm, Right Updated: 07/31/23 1903     [place] Draw [[id number]] Collected: 07/31/23 1446     Specimen: Blood Updated: 07/31/23 1900     Narrative:       The following orders were created for panel order [place] Draw.  Procedure                               Abnormality         Status                     ---------                               -----------         ------                     Green Top (Gel)[[id number]]                                  Final result               Lavender Top[[id number]]                                     Final result               Red Top[[id number]]                                          Final result               Stearns Top[[id number]]                                         Final result               Light Blue Top[[id number]]                                   Final result                  Please view results for these tests on the individual orders.     Stearns Top [[id number]] Collected: 07/31/23 1446     Specimen: Blood Updated:  07/31/23 1900       Extra Tube Hold for add-ons.       Comment: Auto resulted.        Lactic Acid, Plasma [[id number]]  (Normal) Collected: 07/31/23 1446     Specimen: Blood Updated: 07/31/23 1740       Lactate 1.7 mmol/L       Green Top (Gel) [[id number]] Collected: 07/31/23 1520     Specimen: Blood Updated: 07/31/23 1631       Extra Tube Hold for add-ons.       Comment: Auto resulted.        Red Top [[id number]] Collected: 07/31/23 1520     Specimen: Blood Updated: 07/31/23 1631       Extra Tube Hold for add-ons.       Comment: Auto resulted.        Lavender Top [[id number]] Collected: 07/31/23 1520     Specimen: Blood Updated: 07/31/23 1631       Extra Tube hold for add-on       Comment: Auto resulted        Light Blue Top [[id number]] Collected: 07/31/23 1446     Specimen: Blood Updated: 07/31/23 1600       Extra Tube Hold for add-ons.       Comment: Auto resulted        Magnesium [[id number]]  (Abnormal) Collected: 07/31/23 1520     Specimen: Blood Updated: 07/31/23 1554       Magnesium 0.8 mg/dL       Comprehensive Metabolic Panel [[id number]]  (Abnormal) Collected: 07/31/23 1520     Specimen: Blood Updated: 07/31/23 1546       Glucose 141 mg/dL         BUN 12 mg/dL         Creatinine 0.62 mg/dL         Sodium 140 mmol/L         Potassium 3.5 mmol/L         Chloride 101 mmol/L         CO2 23.0 mmol/L         Calcium 8.4 mg/dL         Total Protein 7.2 g/dL         Albumin 3.8 g/dL         ALT (SGPT) 8 U/L         AST (SGOT) 25 U/L         Alkaline Phosphatase 56 U/L         Total Bilirubin 0.7 mg/dL         Globulin 3.4 gm/dL         A/G Ratio 1.1 g/dL         BUN/Creatinine Ratio 19.4       Anion Gap 16.0 mmol/L         eGFR 89.1 mL/min/1.73       Narrative:       GFR Normal >60  Chronic Kidney Disease <60  Kidney Failure <15     The GFR formula is only valid for adults with stable renal function between ages 18 and 70.     Manual Differential [[id number]]  (Abnormal) Collected: 07/31/23 1447     Specimen: Blood  Updated: 07/31/23 1529       Neutrophil % 61.0 %         Lymphocyte % 6.0 %         Monocyte % 4.0 %         Bands %  22.0 %         Atypical Lymphocyte % 7.0 %         Neutrophils Absolute 10.03 10*3/mm3         Lymphocytes Absolute 1.57 10*3/mm3         Monocytes Absolute 0.48 10*3/mm3         Crenated RBC's Slight/1+       Hypochromia Slight/1+       Poikilocytes Slight/1+       WBC Morphology Normal       Platelet Morphology Normal     CBC & Differential [[id number]]  (Abnormal) Collected: 07/31/23 1447     Specimen: Blood Updated: 07/31/23 1529     Narrative:       The following orders were created for panel order CBC & Differential.  Procedure                               Abnormality         Status                     ---------                               -----------         ------                     CBC Auto Differential[[id number]]        Abnormal            Final result                  Please view results for these tests on the individual orders.     CBC Auto Differential [[id number]]  (Abnormal) Collected: 07/31/23 1447     Specimen: Blood Updated: 07/31/23 1529       WBC 12.08 10*3/mm3         RBC 4.51 10*6/mm3         Hemoglobin 12.3 g/dL         Hematocrit 41.5 %         MCV 92.0 fL         MCH 27.3 pg         MCHC 29.6 g/dL         RDW 17.6 %         RDW-SD 59.6 fl         MPV 11.3 fL         Platelets 221 10*3/mm3       Narrative:       Appended report. These results have been appended to a previously verified report.  The previously reported component NRBC is no longer being reported. Previous result was 0.0 /100 WBC (Reference Range: 0.0-0.2 /100 WBC) on 7/31/2023 at 1502 CDT.                ECG reviewed, my interpretation:  CXR personally visualized, my interpretation:   Results for orders placed during the hospital encounter of 05/13/23     Adult Transthoracic Echo Complete W/ Cont if Necessary Per Protocol     Interpretation Summary    Left ventricular systolic function is mildly decreased.  Left ventricular ejection fraction appears to be 41 - 45%.    The following segments are hypokinetic: apical anterior, apical septal, apical inferior, apical lateral and apex.    Left ventricular wall thickness is consistent with mild septal asymmetric hypertrophy.    Left ventricular diastolic function is consistent with (grade I) impaired relaxation.    He left atrial cavity is mildly dilated.    There is mild to moderate thickening of the aortic valve.    . Mild to moderate mitral valve regurgitation is present    Mild pulmonary hypertension is present.              Assessment & Plan          Atrial fibrillation with RVR    Recurrent Clostridioides difficile diarrhea    Symptomatic hypotension    Hypomagnesemia             I discussed the patient's findings and my recommendations with patient.      Reji CASEY DO [place]  08/01/23  09:48 CDT        doreen Temp Pulse Resp BP Patient Position Device (Oxygen Therapy) Flow (L/min) SpO2   08/01/23 0600 -- 93 19 135/55 -- -- -- 96   08/01/23 0500 -- 92 20 132/61 -- -- -- 97   08/01/23 0400 98.2 (36.8) 84 23 123/66 Lying nasal cannula 2 96   08/01/23 0300 -- 80 20 137/68 -- -- -- 94   08/01/23 0100 -- 91 20 110/54 -- -- -- 96   08/01/23 0000 99.3 (37.4) 89 20 107/47 Lying nasal cannula 2 95   07/31/23 2300 -- 87 18 107/50 -- -- -- 94   07/31/23 2200 -- 86 18 114/55 -- -- -- 97   07/31/23 2100 -- 94 20 117/55 -- -- -- 95   07/31/23 1939 99.8 (37.7) -- -- -- -- nasal cannula 2 --   07/31/23 1928 -- 112 -- 106/47 -- -- -- 95   07/31/23 1925 -- 127 Abnormal  -- 93/40 -- -- -- 95   07/31/23 1922 -- 122 Abnormal  -- 99/40 -- -- -- 94   07/31/23 1919 -- 147 Abnormal  -- 99/50 -- -- -- 92   07/31/23 1917 -- 127 Abnormal  -- 77/47 Abnormal  -- -- -- 94   07/31/23 1913 -- 125 Abnormal  -- 102/62 -- -- -- 94 07/31/23 1911 -- 143 Abnormal  -- 83/47 Abnormal  -- -- -- 95 07/31/23 1907 -- 133 Abnormal  -- 95/49 -- -- -- 95 07/31/23 1905 -- 117 -- 104/59 -- -- -- 95 07/31/2                                        Component  Ref Range & Units 03:59  (8/1/23) 1 d ago  (7/31/23) 2 wk ago  (7/12/23) 2 wk ago  (7/12/23) 3 wk ago  (7/7/23) 1 mo ago  (6/21/23) 1 mo ago  (6/20/23)   Glucose  65 - 99 mg/dL 179 High  141 High  112 High  144 High  131 High  117 High     BUN  8 - 23 mg/dL 16 12 10 10 11 7 Low     Creatinine  0.76 - 1.27 mg/dL 0.74 Low  0.62 Low  0.61 Low  0.55 Low  0.49 Low  0.44 Low     Sodium  136 - 145 mmol/L 138 140 139 137 138 136    Potassium  3.5 - 5.2 mmol/L 3.1 Low                             Component  Ref Range & Units 03:59  (8/1/23) 1 d ago  (7/31/23) 2 wk ago  (7/12/23) 1 mo ago  (6/19/23) 1 mo ago  (6/17/23) 1 mo ago  (6/17/23) 2 mo ago  (5/14/23)   Neutrophil %  42.7 - 76.0 % 72.0 61.0 78.6 High  83.1 High  82.5 High  84.6 High  75.0   Lymphocyte %  19.6 - 45.3 % 7.0 Low  6.0 Low  10.9 Low  7.1 Low  7.1 Low  6.9 Low  6.0 Low    Monocyte %  5.0 - 12.0 % 13.0 High  4.0 Low  9.5 8.2 7.9 7.6 3.0 Low    Bands %  0.0 - 5.0 % 8.0 High  22.0 High      16.0 High    Neutrophils Absolute  1.70 - 7.00 10*3/mm3 9.34 High  10.03 High  12.38 High  16.52 High  15.47 High  13.78 High  7.92 High    Lymphocytes Absolute  0.70 - 3.10 10*3/mm3 0.82 1.57 1.72 1.41 1.33 1.13 0.52 Low    Monocytes Absolute  0.10 - 0.90 10*3/mm3 1.52 High                         Component  Ref Range & Units 09:45  (8/1/23) 03:59  (8/1/23) 1 d ago  (7/31/23) 2 wk ago  (7/12/23) 2 wk ago  (7/12/23) 3 wk ago  (7/7/23) 1 mo ago  (6/21/23)   Potassium  3.5 - 5.2 mmol/L 3.3 Low  3.1 Low  3                              Current Facility-Administered Medications   Medication Dose Route Frequency Provider Last Rate Last Admin    aspirin EC tablet 81 mg  81 mg Oral Daily Coleen Reyna APRN   81 mg at 08/01/23 0929    atorvastatin (LIPITOR) tablet 20 mg  20 mg Oral Daily Coleen Reyna APRN   20 mg at 08/01/23 0929    busPIRone (BUSPAR) tablet 10 mg  10 mg Oral BID Coleen Reyna APRN   10 mg at 08/01/23 0929     Chlorhexidine Gluconate Cloth 2 % pads 1 application   1 application  Topical Q24H Coleen Reyna APRN   1 application  at 08/01/23 0524    clopidogrel (PLAVIX) tablet 75 mg  75 mg Oral Daily Coleen Reyna APRN   75 mg at 08/01/23 0929    digoxin (LANOXIN) injection 250 mcg  250 mcg Intravenous Daily Reji Shultz DO        empagliflozin (JARDIANCE) tablet 10 mg  10 mg Oral Daily Coleen Reyna, APRN   10 mg at 08/01/23 0929    famotidine (PEPCID) tablet 20 mg  20 mg Oral BID AC Cherie Mcmanus MD        metroNIDAZOLE (FLAGYL) IVPB 500 mg  500 mg Intravenous Q8H Coleen Reyna APRN 100 mL/hr at 08/01/23 0524 500 mg at 08/01/23 0524    mupirocin (BACTROBAN) 2 % nasal ointment 1 application   1 application  Each Nare BID Coleen Reyna APRN   1 application  at 08/01/23 0929    nitroglycerin (NITROSTAT) SL tablet 0.4 mg  0.4 mg Sublingual Q5 Min PRN Coleen Reyna APRN        ondansetron (ZOFRAN) tablet 4 mg  4 mg Oral Q6H PRN Coleen Reyna APRN        Or    ondansetron (ZOFRAN) injection 4 mg  4 mg Intravenous Q6H PRN Coleen Reyna APRN   4 mg at 08/01/23 0543    Pharmacy Consult   Does not apply Continuous PRN Coleen Reyna APRN        saccharomyces boulardii (FLORASTOR) capsule 250 mg  250 mg Oral BID Coleen Reyna APRN   250 mg at 08/01/23 0929    sodium chloride 0.9 % flush 10 mL  10 mL Intravenous PRN Ulices Cr MD        sodium chloride 0.9 % flush 10 mL  10 mL Intravenous Q12H Coleen Reyna APRN   10 mL at 08/01/23 0929    sodium chloride 0.9 % flush 10 mL  10 mL Intravenous PRN Coleen Reyna APRN        sodium chloride 0.9 % infusion 40 mL  40 mL Intravenous PRN Coleen Reyna APRN        vancomycin (VANCOCIN) capsule 125 mg  125 mg Oral Q6H Coleen Reyna APRN   125 mg at 08/01/23 0525    [START ON 8/10/2023] vancomycin (VANCOCIN) capsule 125 mg  125 mg Oral Q12H Coleen Reyna APRN        [START ON 8/17/2023]  vancomycin (VANCOCIN) capsule 125 mg  125 mg Oral Q24H Coleen Reyna, APRN        [START ON 8/24/2023] vancomycin (VANCOCIN) capsule 125 mg  125 mg Oral Every Other Day Coleen Reyna, APRN

## 2023-08-01 NOTE — CONSULTS
INFECTIOUS DISEASES CONSULT NOTE    Patient:  Wyatt Curry 89 y.o. male  ROOM # I011/1  YOB: 1930  MRN: [id number]  [facility]:  [id number]  Admit date: 7/31/2023   Admitting Physician: Cherie Mcmanus MD  Primary Care Physician: Everardo Jackson MD  REFERRING PROVIDER: Provider, No Known    Reason for Consultation: Recurrent C diff    History of Present Illness/Chief Complaint: Very pleasant 89-year-old man.  He indicates he enjoyed very good health until April when he started to experience some difficulties.  He had a skin cancer removed.  Post procedure he had a myocardial infarction.  He then had a readmission for pneumonia.  He indicates he received antibiotic treatment.  He developed problems with diarrhea and a subsequent diagnosis of C. difficile.  It sounds like he has had 2 prior episodes of C. difficile and current admission represents his third episode.  He indicates he took a tablet 4 times a day for the first 2 episodes.  He indicates each time symptoms resolved after about 2 to 3 days.  He indicates he was symptom-free until about 5 to 10 days after completing his treatment when he would experience recurrent symptoms.  He indicates symptoms that led to this hospitalization started a few days ago.  He developed frequent watery diarrhea.  He had some bilateral lower quadrant crampy abdominal pain.  He indicates he required admission to the hospital because of A-fib and a high heart rate.  He is feeling better currently.  He indicates stool is starting to get some form.  He is tolerating oral intake.  No nausea or vomiting. Infectious disease asked to evaluate and offer recommendations.    Current Scheduled Medications:   aspirin, 81 mg, Oral, Daily  atorvastatin, 20 mg, Oral, Daily  busPIRone, 10 mg, Oral, BID  Chlorhexidine Gluconate Cloth, 1 application , Topical, Q24H  clopidogrel, 75 mg, Oral, Daily  digoxin, 250 mcg, Intravenous, Daily  empagliflozin, 10 mg, Oral,  "Daily  famotidine, 20 mg, Oral, BID AC  metroNIDAZOLE, 500 mg, Intravenous, Q8H  mupirocin, 1 application , Each Nare, BID  saccharomyces boulardii, 250 mg, Oral, BID  sodium chloride, 10 mL, Intravenous, Q12H  vancomycin, 125 mg, Oral, Q6H  [START ON 8/10/2023] vancomycin, 125 mg, Oral, Q12H  [START ON 2023] vancomycin, 125 mg, Oral, Q24H  [START ON 2023] vancomycin, 125 mg, Oral, Every Other Day    Current PRN Medications:    nitroglycerin    ondansetron **OR** ondansetron    Pharmacy Consult    [COMPLETED] Insert Peripheral IV **AND** sodium chloride    sodium chloride    sodium chloride    Allergies:    Allergies   Allergen Reactions    Pregabalin Mental Status Change     Made pt feel \"crazy\"     Past Medical History: Skin cancer.  Bladder cancer.  Coronary artery disease.  Hypertension.  Hyperlipidemia.  Atrial fibrillation.    Past Surgical History: Coronary artery stenting.  Bladder surgery.  Cystoscopy.  Head and neck cyst excision.  Shoulder surgery.  Hernia repair.  Transurethral resection of bladder tumor.    Social History: He lives in [place].  He lives alone currently.  He indicates his wife is in a nursing home.  No tobacco, alcohol, or illicit drug use.    Family History: Sister history of alcohol use.  Diabetes mellitus.  Hypertension.  Tuberculosis in his mother.  Heart disease.    Exposure History: He indicates no exposures or close contacts have been ill.    Review of Systems no chest pain or chest pressure.  No productive cough or sputum production.  No nausea or vomiting.  Please see HPI for remaining pertinent positives negatives from his review of systems.    Vital Signs:  /55   Pulse 93   Temp 98.2 øF (36.8 øC) (Oral)   Resp 19   Ht 160 cm (63\")   Wt 59.9 kg (132 lb)   SpO2 96%   BMI 23.38 kg/mý  Temp (24hrs), Av.9 øF (37.2 øC), Min:98.2 øF (36.8 øC), Max:99.8 øF (37.7 øC)    Physical Exam  Vital signs - reviewed.  Alert, pleasant, smiling, interactive, and in no " distress.  He is up to chair.  He is strong appearing.  Lungs clear without crackles or wheezes  Heart without murmur  Abdomen with mild bilateral lower quadrant abdominal tenderness.  Minimal distention.  No guarding or rebound.  Skin without rash.    Lab Results:  CBC:   Results from last 7 days   Lab Units 08/01/23  0359 07/31/23  1447   WBC 10*3/mm3 11.68* 12.08*   HEMOGLOBIN g/dL 10.4* 12.3*   HEMATOCRIT % 34.7* 41.5   PLATELETS 10*3/mm3 185 221   LYMPHOCYTE % % 7.0* 6.0*   MONOCYTES % % 13.0* 4.0*     CMP:   Results from last 7 days   Lab Units 08/01/23  0945 08/01/23  0359 07/31/23  1520   SODIUM mmol/L  --  138 140   POTASSIUM mmol/L 3.3* 3.1* 3.5   CHLORIDE mmol/L  --  102 101   CO2 mmol/L  --  22.0 23.0   BUN mg/dL  --  16 12   CREATININE mg/dL  --  0.74* 0.62*   CALCIUM mg/dL  --  7.8* 8.4   BILIRUBIN mg/dL  --   --  0.7   ALK PHOS U/L  --   --  56   ALT (SGPT) U/L  --   --  8   AST (SGOT) U/L  --   --  25   GLUCOSE mg/dL  --  179* 141*     Radiology:     CT abdomen of pelvis without contrast 7/31/2023:  (Personally reviewed CT images-no severe colonic wall thickening.  Agree appears to be improved in comparison to previous CT on June 17 which was also reviewed)  IMPRESSION:  1. Significant interval improvement in changes of pancolitis, with only  mild residual mucosal thickening involving the colon relatively  diffusely. No free air, free fluid or abscess is identified.  2. Mild bilateral thickening and at least one bladder diverticulum is  present. There is a history of bladder neoplasm but no discrete bladder  mass is identified. The findings are more suggestive of some degree of  chronic bladder outlet obstruction.  This report was finalized on 07/31/2023 17:55 by Dr. Emigdio Bhandari MD    Selected CT image from June 17, 2024 scan:      Additional Studies Reviewed:   2D echocardiogram May 17, 2023:    Left ventricular systolic function is mildly decreased. Left ventricular ejection fraction appears to  be 41 - 45%.    The following segments are hypokinetic: apical anterior, apical septal, apical inferior, apical lateral and apex.    Left ventricular wall thickness is consistent with mild septal asymmetric hypertrophy.    Left ventricular diastolic function is consistent with (grade I) impaired relaxation.    He left atrial cavity is mildly dilated.    There is mild to moderate thickening of the aortic valve.    . Mild to moderate mitral valve regurgitation is present    Mild pulmonary hypertension is present.     Impression:   Recurrent C. difficile colitis.  This is his third episode since June.  Each episode seems to have responded to treatment with recurrent symptoms about 1 week following discontinuation of therapy.  He has benign abdominal exam.  CT does not show severe colitis findings and is improved from prior CT in June.  White blood cell count only minimally elevated.  He is without fever and vital signs are stable.  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  No current symptoms to suggest pulmonary infection.    Recommendations:    He seems to be improving with C. difficile treatment  He seems to have responded rapidly to his prior C. difficile treatment  Feel he will do well with antimicrobial taper  2 options for treatment would be a course of for daptomycin or a pulsed vancomycin taper  Would favor a trial of pulsed vancomycin taper as he seems to respond to vancomycin treatment and will follow him as an outpatient  If he were to experience an additional recurrence we will treat with Dificid or consider possible fecal transplant  Discontinue metronidazole  Continue oral vancomycin-suggest the following approach:  Vancomycin 125 mg orally 4 times daily for 10 days from initiation then  Vancomycin 125 mg orally twice daily for  7 days then  Vancomycin orally 125 mg for 7 days  Then vancomycin 125 mg orally every other day for 2 weeks  Will continue to follow while hospitalized and would be happy to see him in follow-up as an outpatient.    Jules Wren MD  08/01/23  12:09 CDT

## 2023-08-01 NOTE — H&P
[facility] Medicine Services  HISTORY AND PHYSICAL    Date of Admission: 7/31/2023  Primary Care Physician: Everardo Jackson MD    Subjective   Primary Historian: Niece (power of )    Chief Complaint: Recurrent abdominal pain, nausea/vomiting and diarrhea    History of Present Illness  Wyatt Curry is a 89-year-old male with a past medical history of recurrent C. difficile toxin.  This is his third visit to the hospital with same complaints.  Niece is at bedside who provides all history.  He has been taking Imodium so he can visit his wife in the nursing home however complaints of abdominal pain, nausea/vomiting and daily watery foul-smelling stool are progressively worsening and she feels he has again got C. difficile.  He has had an extensive change in baseline health since March after skin cancer surgery with hospital-acquired pneumonia, antibiotics causing C. difficile toxin, newly diagnosed heart failure.  After arrival patient developed A-fib with RVR.  Hypotensive, unable to start Cardizem drip, amiodarone bolus given no drip started.  Patient is alert and oriented.  Niece is agreeable to DNR/DNI status however she is quite distressed over the severe decline he has had since March, 2023 and wishes for aggressive measures except for intubation and CPR.  Patient is agreeable to CODE STATUS.  He denies pain.  He is admitted to critical care for further evaluation treatment.    Review of Systems   Otherwise complete ROS reviewed and negative except as mentioned in the HPI.    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    GERD (gastroesophageal reflux disease)     Hyperlipidemia     Hypertension     Irregular heart beat     Lung nodules      Past Surgical History:  Past Surgical History:    Procedure Laterality Date    BLADDER SURGERY      CARDIAC CATHETERIZATION      CARDIAC CATHETERIZATION Left 4/8/2023    Procedure: Cardiac Catheterization/Vascular Study;  Surgeon: Sukhi Hartley MD;  Location:  [facility] CATH [facility]ASIVE [facility];  Service: Cardiology;  Laterality: Left;    COLONOSCOPY      CORONARY ANGIOPLASTY WITH STENT PLACEMENT  2006    STENT X 2    CYSTOSCOPY      ENDOSCOPY      EXCISION LESION N/A 4/7/2023    Procedure: EXCISION LESION OF RIGHT TEMPLE AND LEFT TEMPLE WITH FROZEN SECTION WITH POSSIBLE FLAP OR GRAFT;  Surgeon: Brijesh Nickerson MD;  Location:  [facility] OR;  Service: ENT;  Laterality: N/A;    FLAP HEAD/NECK Left 03/09/2020    Procedure: POSSIBLE FLAP;  Surgeon: Brijesh Nickerson MD;  Location:  [facility] OR;  Service: ENT;  Laterality: Left;    HEAD/NECK LESION/CYST EXCISION Left 03/09/2020    Procedure: 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 His smoking use included cigarettes. He started smoking about 75 years ago. He has a 30.00 pack-year smoking history. He has been exposed to tobacco smoke. He has never used smokeless tobacco. He reports current alcohol use of about 1.0 standard drink per week. He reports that he does not currently use drugs.    Family History: family history includes Alcohol abuse in his sister; Diabetes in his sister; Heart attack in his brother and father; Heart disease in his brother, brother, father, and sister; Hyperlipidemia in his sister;  "Hypertension in his sister; Tuberculosis in his mother.       Allergies:  Allergies   Allergen Reactions    Pregabalin Mental Status Change     Made pt feel \"crazy\"       Medications:  Prior to Admission medications    Medication Sig Start Date End Date Taking? Authorizing Provider   acetaminophen (TYLENOL) 650 MG 8 hr tablet Take 1 tablet by mouth 2 (Two) Times a Day. Indications: Pain    Manan Govea MD   aspirin 81 MG EC tablet Take 1 tablet by mouth Daily--DO NOT BREAK/CRUSH TABLET 4/11/23   Neda Marin APRN   atorvastatin (LIPITOR) 20 MG tablet Take 1 tablet by mouth every night at bedtime. Indications: Cardiac Failure, High Amount of Fats in the Blood    ProviderManan MD   busPIRone (BUSPAR) 10 MG tablet Take 1 tablet by mouth 2 (Two) Times a Day. Indications: Anxiety Disorder    Manan Govea MD   clopidogrel (PLAVIX) 75 MG tablet Take 1 tablet by mouth Daily. Indications: Carotid Artery Stenting 4/18/16   Manan Govea MD   empagliflozin (Jardiance) 10 MG tablet tablet Take 1 tablet by mouth Daily.  Patient taking differently: Take 1 tablet by mouth Daily. 7/24/23   Bell Lawson APRN   furosemide (LASIX) 40 MG tablet Take 1 tablet by mouth Daily As Needed (swelling, weight gain or shortness of breath). Take 1 additional tablet as needed for weight gain, shortness of breath or swelling 7/24/23   Bell Lawson APRN   guaiFENesin (MUCINEX) 600 MG 12 hr tablet Take 1 tablet by mouth 2 (Two) Times a Day. Indications: Cough  Patient not taking: Reported on 7/24/2023 7/4/23   Manan Govea MD   lansoprazole (PREVACID) 30 MG capsule Take 1 capsule by mouth Daily. Indications: Heartburn 5/27/23   Manan Govea MD   magnesium oxide (MAG-OX) 400 MG tablet Take 1 tablet by mouth Every Night. Not taking due to diarrhea  Indications: Disorder with Low Magnesium Levels  Patient not taking: Reported on 7/7/2023 8/23/22   Manan Govea MD   metoprolol " succinate XL (TOPROL-XL) 50 MG 24 hr tablet Take 1 tablet by mouth Every Night. Indications: Atrial Fibrillation 7/7/23   Bell Lawson APRN   nitroglycerin (NITROSTAT) 0.4 MG SL tablet Place 1 tablet under the tongue Every 5 (Five) Minutes As Needed. Indications: Acute Angina Pectoris 5/27/23   Manan Govea MD   ondansetron ODT (ZOFRAN-ODT) 4 MG disintegrating tablet Place 1 tablet on the tongue Every 6 (Six) Hours As Needed for Nausea. 7/12/23   Abi Dominguez APRN   potassium chloride 10 MEQ CR tablet Take 2 tablets by mouth Daily As Needed (when taking PRN lasix).  Patient not taking: Reported on 7/27/2023 7/24/23   Bell Lawson APRN   Probiotic, Lactobacillus, capsule Take 1 capsule by mouth Daily. Indications: PROPHYLAXIS 6/22/23   Manan Govea MD   sacubitril-valsartan (Entresto) 49-51 MG tablet Take 1 tablet by mouth 2 (Two) Times a Day. 7/24/23   Bell Lawson APRN   spironolactone (ALDACTONE) 25 MG tablet Take 1 tablet by mouth Daily. 7/7/23   Bell Lawson APRN   vitamin B-12 (CYANOCOBALAMIN) 1000 MCG tablet Take 1 tablet by mouth Daily. Indications: Inadequate Vitamin B12 5/27/23   Manan Govea MD   Vitron-C  MG tablet Take 1 tablet by mouth Daily. Indications: Excess or Deficiency of Vitamin C 4/13/23   Manan Govea MD   potassium chloride (MICRO-K) 10 MEQ CR capsule Take 10 mEq by mouth Daily. Take daily when taking lasix  Indications: Low Amount of Potassium in the Blood 7/25/23   Manan Govea MD   sacubitril-valsartan (Entresto) 49-51 MG tablet Take 1 tablet by mouth 2 (Two) Times a Day. Indications: Cardiac Failure 7/24/23   Manan Govea MD     I have utilized all available immediate resources to obtain, update, or review the patient's current medications (including all prescriptions, over-the-counter products, herbals, cannabis/cannabidiol products, and vitamin/mineral/dietary (nutritional) supplements).    Objective  "    Vital Signs: /47   Pulse 112   Temp 98.3 øF (36.8 øC) (Oral)   Resp 20   Ht 167.6 cm (66\")   Wt 59 kg (130 lb)   SpO2 95%   BMI 20.98 kg/mý   Physical Exam  Vitals reviewed.   Constitutional:       Appearance: He is ill-appearing.   HENT:      Head: Normocephalic and atraumatic.      Mouth/Throat:      Mouth: Mucous membranes are moist.      Pharynx: Oropharynx is clear.   Eyes:      Extraocular Movements: Extraocular movements intact.      Conjunctiva/sclera: Conjunctivae normal.   Cardiovascular:      Rate and Rhythm: Tachycardia present. Rhythm irregular.   Pulmonary:      Effort: Pulmonary effort is normal.      Breath sounds: Normal breath sounds.   Abdominal:      General: There is no distension.      Palpations: Abdomen is soft.   Musculoskeletal:      Cervical back: Normal range of motion and neck supple.      Right lower leg: No edema.      Left lower leg: No edema.      Comments: Generalized weakness and debility   Skin:     General: Skin is warm and dry.   Neurological:      General: No focal deficit present.      Mental Status: He is alert and oriented to person, place, and time.   Psychiatric:         Mood and Affect: Mood normal.         Behavior: Behavior normal.        Results Reviewed:  Lab Results (last 24 hours)       Procedure Component Value Units Date/Time    Blood Culture - Blood, Arm, Left [[id number]] Collected: 07/31/23 1844    Specimen: Blood from Arm, Left Updated: 07/31/23 1903    Blood Culture - Blood, Arm, Right [[id number]] Collected: 07/31/23 1849    Specimen: Blood from Arm, Right Updated: 07/31/23 1903    Lactic Acid, Plasma [[id number]]  (Normal) Collected: 07/31/23 1446    Specimen: Blood Updated: 07/31/23 1740     Lactate 1.7 mmol/L     Magnesium [[id number]]  (Abnormal) Collected: 07/31/23 1520    Specimen: Blood Updated: 07/31/23 1554     Magnesium 0.8 mg/dL     Comprehensive Metabolic Panel [[id number]]  (Abnormal) Collected: 07/31/23 1520    Specimen: Blood " Updated: 07/31/23 1546     Glucose 141 mg/dL      BUN 12 mg/dL      Creatinine 0.62 mg/dL      Sodium 140 mmol/L      Potassium 3.5 mmol/L      Chloride 101 mmol/L      CO2 23.0 mmol/L      Calcium 8.4 mg/dL      Total Protein 7.2 g/dL      Albumin 3.8 g/dL      ALT (SGPT) 8 U/L      AST (SGOT) 25 U/L      Alkaline Phosphatase 56 U/L      Total Bilirubin 0.7 mg/dL      Globulin 3.4 gm/dL      A/G Ratio 1.1 g/dL      BUN/Creatinine Ratio 19.4     Anion Gap 16.0 mmol/L      eGFR 89.1 mL/min/1.73     Manual Differential [[id number]]  (Abnormal) Collected: 07/31/23 1447    Specimen: Blood Updated: 07/31/23 1529     Neutrophil % 61.0 %      Lymphocyte % 6.0 %      Monocyte % 4.0 %      Bands %  22.0 %      Atypical Lymphocyte % 7.0 %      Neutrophils Absolute 10.03 10*3/mm3      Lymphocytes Absolute 1.57 10*3/mm3      Monocytes Absolute 0.48 10*3/mm3      Crenated RBC's Slight/1+     Hypochromia Slight/1+     Poikilocytes Slight/1+     WBC Morphology Normal     Platelet Morphology Normal    CBC Auto Differential [[id number]]  (Abnormal) Collected: 07/31/23 1447    Specimen: Blood Updated: 07/31/23 1529     WBC 12.08 10*3/mm3      RBC 4.51 10*6/mm3      Hemoglobin 12.3 g/dL      Hematocrit 41.5 %      MCV 92.0 fL      MCH 27.3 pg      MCHC 29.6 g/dL      RDW 17.6 %      RDW-SD 59.6 fl      MPV 11.3 fL      Platelets 221 10*3/mm3           Imaging Results (Last 24 Hours)       Procedure Component Value Units Date/Time    CT Abdomen Pelvis Without Contrast [[id number]] Collected: 07/31/23 1746     Updated: 07/31/23 1758    Narrative:      EXAMINATION: CT ABDOMEN PELVIS WO CONTRAST- 7/31/2023 5:46 PM CDT     HISTORY: abd pain, diarrhea, fever, leukocytosis, bandemia;  I48.91-Unspecified atrial fibrillation; E83.42-Hypomagnesemia;  K52.9-Noninfective gastroenteritis and colitis, unspecified     DOSE: 224 mGycm (Automatic exposure control technique was implemented in  an effort to keep the radiation dose as low as possible  without  compromising image quality)     REPORT: Spiral CT of the abdomen and pelvis was performed without  contrast from the lung bases through the pubic symphysis. Reconstructed  coronal and sagittal images are also reviewed.     Comparison: CT abdomen pelvis with contrast 07/12/2023.     Review of lung windows demonstrates mild respiratory motion artifact,  bibasilar atelectasis greater on the right. No pleural effusion or  pneumothorax is identified. There is a small sliding-type hiatal hernia.  Evaluation of the solid abdominal organs is limited without intravenous  contrast. There is a small cyst or hemangioma within the left lobe of  liver anteriorly image 20 series 2. This measures 9 mm. This is stable.  The spleen is homogeneous, normal in size. There is mild distention of  the stomach with gas and fluid, no free air, free fluid or abscess is  identified. The gallbladder appears within normal limits. No  nephrolithiasis or ureterolithiasis or hydronephrosis. The renal  contours are smooth. The pancreas and adrenal glands are unremarkable.  Diffuse colonic wall thickening seen before appears improved, likely due  to improving colitis. There is no evidence of bowel obstruction. There  is mild bladder wall thickening and at least one bladder diverticulum is  present. This may be related to chronic bladder outlet obstruction. The  patient has a history of bladder cancer but no discrete bladder mass is  identified. Mild induration mental fat planes without evidence of a mass  or nodularity. Heavy calcified plaque is noted within the abdominal  aorta and its branches and there is ectasia of the abdominal aorta and  iliac arteries as before. Review of bone windows demonstrates advanced  degenerative changes throughout the spine with associated levoscoliosis  of the lumbar spine. No acute osseous abnormality is identified.       Impression:      1. Significant interval improvement in changes of pancolitis, with  only  mild residual mucosal thickening involving the colon relatively  diffusely. No free air, free fluid or abscess is identified.  2. Mild bilateral thickening and at least one bladder diverticulum is  present. There is a history of bladder neoplasm but no discrete bladder  mass is identified. The findings are more suggestive of some degree of  chronic bladder outlet obstruction.        This report was finalized on 07/31/2023 17:55 by Dr. Emigdio Bhandari MD.          Assessment / Plan   Assessment:   Active Hospital Problems    Diagnosis     **Atrial fibrillation with RVR     Recurrent Clostridioides difficile diarrhea     Symptomatic hypotension     Hypomagnesemia        Treatment Plan  1.  The patient will be admitted to Dr. Mcmanus's service here at [facility].   2.  DVT prophylaxis with SCDs for now due to high risk of bleeding, will reassess EKG  3.  Home medications reviewed and restarted as appropriate, hold hypertensive agents  4.  Oral vancomycin and IV Flagyl  5.  Consult cardiology and infectious diseases  6.  Repeat fluid bolus 500 mL  7.  Repeat magnesium level at 10 PM, has received 4 g IV  8.  Awaiting stool panel and urinalysis to be collected, labs in a.m.  9.  Daily weights, oral care, supplemental oxygen as needed, incentive spirometry  10.  Isolation contacts for  11.  Consult PT and OT for strengthening    Medical Decision Making  Number and Complexity of problems: 4  Differential Diagnosis: None    Conditions and Status        Condition is unchanged.     [facility] Data  External documents reviewed: No  Cardiac tracing (EKG, telemetry) interpretation: Reviewed  Radiology interpretation: Reviewed  Labs reviewed: Yes  Any tests that were considered but not ordered: No     Decision rules/scores evaluated (example [id number], Wells, etc): No     Discussed with: josefina Monroe and Dr. Mcmanus     Care Planning  Shared decision making: Patientjosefina and Dr. Mcmanus  Code status and  discussions: DNR/DNI    Disposition  Social Determinants of Health that impact treatment or disposition: No  Estimated length of stay is 2-3 days.     I confirmed that the patient's advanced care plan is present, code status is documented, and a surrogate decision maker is listed in the patient's medical record.     The patient's surrogate decision maker is niece.     The patient was seen and examined by me on 7/31/2023 at 6:29 PM.    Electronically signed by LARRY Knox, 07/31/23, 21:05 CDT.

## 2023-08-01 NOTE — CONSULTS
[facility] CONSULT NOTE    Patient Care Team:  Everardo Jackson MD as PCP - General (Family Medicine)  Justin Howard MD as Consulting Physician (Urology)  Emigdio Moseley MD as Cardiologist (Cardiology)  Everardo Noriega MD as Consulting Physician (Pulmonary Disease)  Brijesh Nickerson MD as Consulting Physician (Otolaryngology)  Kaitlin Del Angel PA as Referring Physician (Physician Assistant)  No Known Provider  REASON FOR REFERRAL: Atrial fibrillation  Chief complaint     Subjective     Patient is a 89 y.o. male with a past medical history of hypertension, hyperlipidemia, coronary artery disease status post stenting in the past, bladder cancer, CHF and atrial fibrillation who presented to the emergency department with a chief complaint of recurrent diarrhea, nausea, vomiting and abdominal pain.  Patient has a history of recurrent C. difficile infections.  He has been on antibiotics a couple of times previously.  Patient's went into rapid ventricular rate with his atrial fibrillation shortly after arrival.  He was given an amiodarone bolus.    Cardiology consulted to assist with care.    Review of Systems   Review of Systems   Constitutional:  Negative for fatigue.   HENT:  Negative for trouble swallowing.    Eyes:  Negative for pain.   Respiratory:  Negative for cough, chest tightness, shortness of breath and wheezing.    Cardiovascular:  Positive for palpitations. Negative for chest pain and leg swelling.   Gastrointestinal:  Positive for abdominal pain, diarrhea, nausea and vomiting.   Musculoskeletal:  Negative for arthralgias.   Skin:  Negative for color change.   Neurological:  Negative for dizziness and weakness.   Psychiatric/Behavioral:  Negative for confusion.      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    GERD (gastroesophageal reflux disease)     Hyperlipidemia     Hypertension     Irregular heart beat     Lung nodules      Past Surgical History:   Procedure Laterality Date    BLADDER SURGERY      CARDIAC CATHETERIZATION      CARDIAC CATHETERIZATION Left 4/8/2023    Procedure: Cardiac Catheterization/Vascular Study;  Surgeon: Sukhi Hartley MD;  Location:  [facility];  Service: Cardiology;  Laterality: Left;    COLONOSCOPY      CORONARY ANGIOPLASTY WITH STENT PLACEMENT  2006    STENT X 2    CYSTOSCOPY      ENDOSCOPY      EXCISION LESION N/A 4/7/2023    Procedure: EXCISION LESION OF RIGHT TEMPLE AND LEFT TEMPLE WITH FROZEN SECTION WITH POSSIBLE FLAP OR GRAFT;  Surgeon: Brijesh Nickerson MD;  Location:  [facility] OR;  Service: ENT;  Laterality: N/A;    FLAP HEAD/NECK Left 03/09/2020    Procedure: POSSIBLE FLAP;  Surgeon: Brijesh Nickerson MD;  Location:  [facility] OR;  Service: ENT;  Laterality: Left;    HEAD/NECK LESION/CYST EXCISION Left 03/09/2020    Procedure: 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 with frozen section and possible flap or graft;  Surgeon: Brijesh Nickerson MD;  Location:  [facility] OR;  Service: ENT;  Laterality: Left;    HERNIA REPAIR      SHOULDER SURGERY      SKIN BIOPSY      lip biopsy    SKIN FULL THICKNESS GRAFT Left 03/09/2020    Procedure: OR GRAFT;  Surgeon: Brijesh Nickerson MD;  Location:  [facility] OR;  Service: ENT;  Laterality: Left;    TRANSURETHRAL RESECTION OF BLADDER TUMOR       Family History   Problem Relation Age of Onset    Tuberculosis Mother     Heart disease Father     Heart attack Father     Diabetes Sister     Hypertension Sister     Hyperlipidemia Sister     Heart disease Sister     Alcohol abuse Sister     Heart disease Brother     Heart attack Brother     Heart disease Brother      Social History     Tobacco Use    Smoking status: Former     Packs/day: 1.00     Years: 30.00     Pack years: 30.00     Types:  Cigarettes     Start date:      Quit date:      Years since quittin.6     Passive exposure: Past    Smokeless tobacco: Never   Vaping Use    Vaping Use: Never used   Substance Use Topics    Alcohol use: Yes     Alcohol/week: 1.0 standard drink     Types: 1 Glasses of wine per week     Comment: occ wine    Drug use: Not Currently     Medications Prior to Admission   Medication Sig Dispense Refill Last Dose    acetaminophen (TYLENOL) 650 MG 8 hr tablet Take 1 tablet by mouth 2 (Two) Times a Day. Indications: Pain       aspirin 81 MG EC tablet Take 1 tablet by mouth Daily--DO NOT BREAK/CRUSH TABLET 100 tablet 3     atorvastatin (LIPITOR) 20 MG tablet Take 1 tablet by mouth every night at bedtime. Indications: Cardiac Failure, High Amount of Fats in the Blood       busPIRone (BUSPAR) 10 MG tablet Take 1 tablet by mouth 2 (Two) Times a Day. Indications: Anxiety Disorder       clopidogrel (PLAVIX) 75 MG tablet Take 1 tablet by mouth Daily. Indications: Carotid Artery Stenting       empagliflozin (Jardiance) 10 MG tablet tablet Take 1 tablet by mouth Daily. (Patient taking differently: Take 1 tablet by mouth Daily.) 30 tablet 11     furosemide (LASIX) 40 MG tablet Take 1 tablet by mouth Daily As Needed (swelling, weight gain or shortness of breath). Take 1 additional tablet as needed for weight gain, shortness of breath or swelling 45 tablet 11     guaiFENesin (MUCINEX) 600 MG 12 hr tablet Take 1 tablet by mouth 2 (Two) Times a Day. Indications: Cough (Patient not taking: Reported on 2023)       lansoprazole (PREVACID) 30 MG capsule Take 1 capsule by mouth Daily. Indications: Heartburn       magnesium oxide (MAG-OX) 400 MG tablet Take 1 tablet by mouth Every Night. Not taking due to diarrhea  Indications: Disorder with Low Magnesium Levels (Patient not taking: Reported on 2023)       metoprolol succinate XL (TOPROL-XL) 50 MG 24 hr tablet Take 1 tablet by mouth Every Night. Indications: Atrial  Fibrillation 30 tablet 11     nitroglycerin (NITROSTAT) 0.4 MG SL tablet Place 1 tablet under the tongue Every 5 (Five) Minutes As Needed. Indications: Acute Angina Pectoris       ondansetron ODT (ZOFRAN-ODT) 4 MG disintegrating tablet Place 1 tablet on the tongue Every 6 (Six) Hours As Needed for Nausea. 40 tablet 0     potassium chloride 10 MEQ CR tablet Take 2 tablets by mouth Daily As Needed (when taking PRN lasix). (Patient not taking: Reported on 7/27/2023) 90 tablet 3     Probiotic, Lactobacillus, capsule Take 1 capsule by mouth Daily. Indications: PROPHYLAXIS       sacubitril-valsartan (Entresto) 49-51 MG tablet Take 1 tablet by mouth 2 (Two) Times a Day. 60 tablet 11     spironolactone (ALDACTONE) 25 MG tablet Take 1 tablet by mouth Daily. 30 tablet 11     vitamin B-12 (CYANOCOBALAMIN) 1000 MCG tablet Take 1 tablet by mouth Daily. Indications: Inadequate Vitamin B12       Vitron-C  MG tablet Take 1 tablet by mouth Daily. Indications: Excess or Deficiency of Vitamin C        Allergies:  Pregabalin    Objective     Vital Signs  Temp:  [98.2 øF (36.8 øC)-99.8 øF (37.7 øC)] 98.2 øF (36.8 øC)  Heart Rate:  [] 93  Resp:  [18-23] 19  BP: ()/(40-91) 135/55    Physical Exam:  Vitals and nursing note reviewed.   Constitutional:       Appearance: Normal and healthy appearance. Well-developed and not in distress.   Eyes:      Extraocular Movements: Extraocular movements intact.      Pupils: Pupils are equal, round, and reactive to light.   HENT:      Head: Normocephalic and atraumatic.    Mouth/Throat:      Pharynx: Oropharynx is clear.   Neck:      Vascular: JVD normal.      Trachea: Trachea normal.   Pulmonary:      Effort: Pulmonary effort is normal.      Breath sounds: Normal breath sounds. No wheezing. No rhonchi. No rales.   Cardiovascular:      PMI at left midclavicular line. Normal rate. Irregularly irregular rhythm. Normal S1. Normal S2.       Murmurs: There is a grade 2/6 systolic murmur.       No gallop.  No click. No rub.   Pulses:     Dorsalis pedis: 2+ bilaterally.     Posterior tibial: 2+ bilaterally.  Abdominal:      General: Bowel sounds are normal.      Palpations: Abdomen is soft.      Tenderness: There is no abdominal tenderness.   Musculoskeletal: Normal range of motion.      Cervical back: Normal range of motion and neck supple. Skin:     General: Skin is warm and dry.      Capillary Refill: Capillary refill takes less than 2 seconds.   Feet:      Right foot:      Skin integrity: Skin integrity normal.      Left foot:      Skin integrity: Skin integrity normal.   Neurological:      Mental Status: Alert and oriented to person, place and time.      Cranial Nerves: Cranial nerves are intact.      Sensory: Sensation is intact.      Motor: Motor function is intact.      Coordination: Coordination is intact.   Psychiatric:         Speech: Speech normal.         Behavior: Behavior is cooperative.     Results Review:   Lab Results (last 72 hours)       Procedure Component Value Units Date/Time    Clostridioides difficile toxin Ag, Reflex - Stool, Per Rectum [[id number]]  (Abnormal) Collected: 08/01/23 0244    Specimen: Stool from Per Rectum Updated: 08/01/23 0653     C.diff Toxin Ag Positive    Narrative:      DNA from a toxigenic strain of C.difficile was detected, along with the presence of free toxin. These results are suggestive of C.difficile infection.    Clostridioides difficile Toxin - Stool, Per Rectum [[id number]]  (Abnormal) Collected: 08/01/23 0244    Specimen: Stool from Per Rectum Updated: 08/01/23 0653    Narrative:      The following orders were created for panel order Clostridioides difficile Toxin - Stool, Per Rectum.  Procedure                               Abnormality         Status                     ---------                               -----------         ------                     Clostridioides difficile...[[id number]]  Abnormal            Final result                 Please  view results for these tests on the individual orders.    Clostridioides difficile Toxin, PCR - Stool, Per Rectum [[id number]]  (Abnormal) Collected: 08/01/23 0244    Specimen: Stool from Per Rectum Updated: 08/01/23 0653     Toxigenic C. difficile by PCR Positive    Narrative:      DNA from a toxigenic strain of C.difficile has been detected. Antigen testing for the presence of free C.difficile toxin is currently in progress, to help determine the clinical significance of this PCR result.     Manual Differential [[id number]]  (Abnormal) Collected: 08/01/23 0359    Specimen: Blood Updated: 08/01/23 0442     Neutrophil % 72.0 %      Lymphocyte % 7.0 %      Monocyte % 13.0 %      Bands %  8.0 %      Neutrophils Absolute 9.34 10*3/mm3      Lymphocytes Absolute 0.82 10*3/mm3      Monocytes Absolute 1.52 10*3/mm3      Anisocytosis Slight/1+     Poikilocytes Slight/1+     Polychromasia Slight/1+     Vacuolated Neutrophils Slight/1+     Clumped Platelets Present     Giant Platelets Slight/1+    Gastrointestinal Panel, PCR - Stool, Per Rectum [[id number]]  (Normal) Collected: 08/01/23 0240    Specimen: Stool from Per Rectum Updated: 08/01/23 0438     Campylobacter Not Detected     Plesiomonas shigelloides Not Detected     Salmonella Not Detected     Vibrio Not Detected     Vibrio cholerae Not Detected     Yersinia enterocolitica Not Detected     Enteroaggregative E. coli (EAEC) Not Detected     Enteropathogenic E. coli (EPEC) Not Detected     Enterotoxigenic E. coli (ETEC) lt/st Not Detected     Shiga-like toxin-producing E. coli (STEC) stx1/stx2 Not Detected     Shigella/Enteroinvasive E. coli (EIEC) Not Detected     Cryptosporidium Not Detected     Cyclospora cayetanensis Not Detected     Entamoeba histolytica Not Detected     Giardia lamblia Not Detected     Adenovirus F40/41 Not Detected     Astrovirus Not Detected     Norovirus GI/GII Not Detected     Rotavirus A Not Detected     Sapovirus (I, II, IV or V) Not Detected     Narrative:      If Aeromonas, Staphylococcus aureus or Bacillus cereus are suspected, please order [id number]: Stool Culture, Aeromonas, S aureus, B Cereus.    Magnesium [[id number]]  (Normal) Collected: 08/01/23 0359    Specimen: Blood Updated: 08/01/23 0425     Magnesium 1.9 mg/dL     Basic Metabolic Panel [[id number]]  (Abnormal) Collected: 08/01/23 0359    Specimen: Blood Updated: 08/01/23 0425     Glucose 179 mg/dL      BUN 16 mg/dL      Creatinine 0.74 mg/dL      Sodium 138 mmol/L      Potassium 3.1 mmol/L      Chloride 102 mmol/L      CO2 22.0 mmol/L      Calcium 7.8 mg/dL      BUN/Creatinine Ratio 21.6     Anion Gap 14.0 mmol/L      eGFR 84.5 mL/min/1.73     Narrative:      GFR Normal >60  Chronic Kidney Disease <60  Kidney Failure <15    The GFR formula is only valid for adults with stable renal function between ages 18 and 70.    CBC Auto Differential [[id number]]  (Abnormal) Collected: 08/01/23 0359    Specimen: Blood Updated: 08/01/23 0410     WBC 11.68 10*3/mm3      RBC 3.80 10*6/mm3      Hemoglobin 10.4 g/dL      Hematocrit 34.7 %      MCV 91.3 fL      MCH 27.4 pg      MCHC 30.0 g/dL      RDW 17.6 %      RDW-SD 59.3 fl      MPV 11.0 fL      Platelets 185 10*3/mm3      nRBC 0.0 /100 WBC     Urinalysis, Microscopic Only - Urine, Clean Catch [[id number]]  (Abnormal) Collected: 08/01/23 0018    Specimen: Urine, Clean Catch Updated: 08/01/23 0057     RBC, UA 3-5 /HPF      WBC, UA 6-12 /HPF      Bacteria, UA Trace /HPF      Squamous Epithelial Cells, UA 3-6 /HPF      Yeast, UA Large/3+ Budding Yeast /HPF      Hyaline Casts, UA 3-6 /LPF      Comment: Corrected result. Previous result was None Seen /LPF on 8/1/2023 at 0055 CDT.        Methodology Manual Light Microscopy    Urine Culture - Urine, Urine, Clean Catch [[id number]] Collected: 08/01/23 0018    Specimen: Urine, Clean Catch Updated: 08/01/23 0055    Urinalysis With Culture If Indicated - Urine, Clean Catch [[id number]]  (Abnormal) Collected: 08/01/23 0018     Specimen: Urine, Clean Catch Updated: 08/01/23 0052     Color, UA Dark Yellow     Appearance, UA Cloudy     pH, UA 5.5     Specific Gravity, UA 1.021     Glucose,  mg/dL (2+)     Ketones, UA 15 mg/dL (1+)     Bilirubin, UA Negative     Blood, UA Trace     Protein,  mg/dL (2+)     Leuk Esterase, UA Moderate (2+)     Nitrite, UA Negative     Urobilinogen, UA 0.2 E.U./dL    Narrative:      In absence of clinical symptoms, the presence of pyuria, bacteria, and/or nitrites on the urinalysis result does not correlate with infection.    Magnesium [[id number]]  (Normal) Collected: 07/31/23 2151    Specimen: Blood Updated: 07/31/23 2234     Magnesium 1.9 mg/dL     Blood Culture - Blood, Arm, Left [[id number]] Collected: 07/31/23 1844    Specimen: Blood from Arm, Left Updated: 07/31/23 1903    Blood Culture - Blood, Arm, Right [[id number]] Collected: 07/31/23 1849    Specimen: Blood from Arm, Right Updated: 07/31/23 1903    [place] Draw [[id number]] Collected: 07/31/23 1446    Specimen: Blood Updated: 07/31/23 1900    Narrative:      The following orders were created for panel order [place] Draw.  Procedure                               Abnormality         Status                     ---------                               -----------         ------                     Green Top (Gel)[[id number]]                                  Final result               Lavender Top[[id number]]                                     Final result               Red Top[[id number]]                                          Final result               Stearns Top[[id number]]                                         Final result               Light Blue Top[[id number]]                                   Final result                 Please view results for these tests on the individual orders.    [place] Top [[id number]] Collected: 07/31/23 1446    Specimen: Blood Updated: 07/31/23 1900     Extra Tube Hold for add-ons.     Comment: Auto resulted.        Lactic Acid, Plasma [[id number]]  (Normal) Collected: 07/31/23 1446    Specimen: Blood Updated: 07/31/23 1740     Lactate 1.7 mmol/L     Green Top (Gel) [[id number]] Collected: 07/31/23 1520    Specimen: Blood Updated: 07/31/23 1631     Extra Tube Hold for add-ons.     Comment: Auto resulted.       Red Top [[id number]] Collected: 07/31/23 1520    Specimen: Blood Updated: 07/31/23 1631     Extra Tube Hold for add-ons.     Comment: Auto resulted.       Lavender Top [[id number]] Collected: 07/31/23 1520    Specimen: Blood Updated: 07/31/23 1631     Extra Tube hold for add-on     Comment: Auto resulted       Light Blue Top [[id number]] Collected: 07/31/23 1446    Specimen: Blood Updated: 07/31/23 1600     Extra Tube Hold for add-ons.     Comment: Auto resulted       Magnesium [[id number]]  (Abnormal) Collected: 07/31/23 1520    Specimen: Blood Updated: 07/31/23 1554     Magnesium 0.8 mg/dL     Comprehensive Metabolic Panel [[id number]]  (Abnormal) Collected: 07/31/23 1520    Specimen: Blood Updated: 07/31/23 1546     Glucose 141 mg/dL      BUN 12 mg/dL      Creatinine 0.62 mg/dL      Sodium 140 mmol/L      Potassium 3.5 mmol/L      Chloride 101 mmol/L      CO2 23.0 mmol/L      Calcium 8.4 mg/dL      Total Protein 7.2 g/dL      Albumin 3.8 g/dL      ALT (SGPT) 8 U/L      AST (SGOT) 25 U/L      Alkaline Phosphatase 56 U/L      Total Bilirubin 0.7 mg/dL      Globulin 3.4 gm/dL      A/G Ratio 1.1 g/dL      BUN/Creatinine Ratio 19.4     Anion Gap 16.0 mmol/L      eGFR 89.1 mL/min/1.73     Narrative:      GFR Normal >60  Chronic Kidney Disease <60  Kidney Failure <15    The GFR formula is only valid for adults with stable renal function between ages 18 and 70.    Manual Differential [[id number]]  (Abnormal) Collected: 07/31/23 1447    Specimen: Blood Updated: 07/31/23 1529     Neutrophil % 61.0 %      Lymphocyte % 6.0 %      Monocyte % 4.0 %      Bands %  22.0 %      Atypical Lymphocyte % 7.0 %      Neutrophils Absolute 10.03  10*3/mm3      Lymphocytes Absolute 1.57 10*3/mm3      Monocytes Absolute 0.48 10*3/mm3      Crenated RBC's Slight/1+     Hypochromia Slight/1+     Poikilocytes Slight/1+     WBC Morphology Normal     Platelet Morphology Normal    CBC & Differential [[id number]]  (Abnormal) Collected: 07/31/23 1447    Specimen: Blood Updated: 07/31/23 1529    Narrative:      The following orders were created for panel order CBC & Differential.  Procedure                               Abnormality         Status                     ---------                               -----------         ------                     CBC Auto Differential[[id number]]        Abnormal            Final result                 Please view results for these tests on the individual orders.    CBC Auto Differential [[id number]]  (Abnormal) Collected: 07/31/23 1447    Specimen: Blood Updated: 07/31/23 1529     WBC 12.08 10*3/mm3      RBC 4.51 10*6/mm3      Hemoglobin 12.3 g/dL      Hematocrit 41.5 %      MCV 92.0 fL      MCH 27.3 pg      MCHC 29.6 g/dL      RDW 17.6 %      RDW-SD 59.6 fl      MPV 11.3 fL      Platelets 221 10*3/mm3     Narrative:      Appended report. These results have been appended to a previously verified report.  The previously reported component NRBC is no longer being reported. Previous result was 0.0 /100 WBC (Reference Range: 0.0-0.2 /100 WBC) on 7/31/2023 at 1502 CDT.              Results for orders placed during the hospital encounter of 05/13/23    Adult Transthoracic Echo Complete W/ Cont if Necessary Per Protocol    Interpretation Summary    Left ventricular systolic function is mildly decreased. Left ventricular ejection fraction appears to be 41 - 45%.    The following segments are hypokinetic: apical anterior, apical septal, apical inferior, apical lateral and apex.    Left ventricular wall thickness is consistent with mild septal asymmetric hypertrophy.    Left ventricular diastolic function is consistent with (grade I) impaired  relaxation.    He left atrial cavity is mildly dilated.    There is mild to moderate thickening of the aortic valve.    . Mild to moderate mitral valve regurgitation is present    Mild pulmonary hypertension is present.      Assessment & Plan       Atrial fibrillation with RVR    Recurrent Clostridioides difficile diarrhea    Symptomatic hypotension    Hypomagnesemia      Plan:      Starting around 9 PM last night, patient's heart rates improved significantly and have been running in the 80s to 90s since that time.  Blood pressures have also been improved.  Currently he is on aspirin 81, Lipitor 20, Plavix 75, Jardiance 10, Vanco and Flagyl.    I am going to start him digoxin therapy today.  May need to start on amiodarone in the future if the digoxin fails to control his rates.    I discussed the patient's findings and my recommendations with patient.     Thank you for the consult.  Please call with any questions.  We will continue to follow along.    Reji Shultz, DO  Interventional cardiology  [facility]  08/01/23  09:48 CDT

## 2023-08-01 NOTE — CASE MANAGEMENT/SOCIAL WORK
Discharge Planning Assessment  [facility]     Patient Name: Wyatt Curry  MRN: [id number]  Today's Date: 8/1/2023    Admit Date: 7/31/2023        Discharge Needs Assessment       Row Name 08/01/23 1139       Living Environment    People in Home alone    Current Living Arrangements home    Primary Care Provided by self    Provides Primary Care For no one    Family Caregiver if Needed other relative(s)    Family Caregiver Names Zamzam rocha    Able to Return to Prior Arrangements yes       Resource/Environmental Concerns    Resource/Environmental Concerns none       Transition Planning    Patient/Family Anticipates Transition to home    Transportation Anticipated family or friend will provide       Discharge Needs Assessment    Readmission Within the Last 30 Days no previous admission in last 30 days    Equipment Currently Used at Home none    Concerns to be Addressed discharge planning    Equipment Needed After Discharge none    Outpatient/Agency/Support Group Needs homecare agency    Discharge Facility/Level of Care Needs home with home health    Discharge Coordination/Progress spoke to patient who lives alone with niece Zamzam VILLALBA; has RX coverage [facility] and PCP; still driving spouse is at [facility]; will follow for DC needs                   Discharge Plan    No documentation.                 Continued Care and Services - Admitted Since 7/31/2023    Coordination has not been started for this encounter.       Selected Continued Care - Prior Encounters Includes continued care and service providers with selected services from prior encounters from 5/2/2023 to 8/1/2023      Discharged on 6/21/2023 Admission date: 6/17/2023 - Discharge disposition: Home-Health Care Svc      Home Medical Care       Service Provider Selected Services Address Phone Fax Patient Preferred    [facility] [address], [facility] [address] [phone] --                       Discharged on 5/22/2023 Admission date: 5/13/2023 - Discharge disposition: Home-Health Care Svc      Home Medical Care       Service Provider Selected Services Address Phone Fax Patient Preferred    [facility] [address] , [facility] [address] [phone] --                             Demographic Summary    No documentation.                  Functional Status    No documentation.                  Psychosocial    No documentation.                  Abuse/Neglect    No documentation.                  Legal    No documentation.                  Substance Abuse    No documentation.                  Patient Forms    No documentation.                     Alice Cruz RN

## 2023-08-01 NOTE — PLAN OF CARE
Goal Outcome Evaluation:  Plan of Care Reviewed With: patient        Progress: no change  Outcome Evaluation: OT eval completed. Pt A&Ox4, reports 1/10 pain in his lower abdomen. Pt reports PLOF as independent for all ADLs, driving, mobility, transfers; dependent for cooking and cleaning. Pt reports use of cane for community mobility. Today pt completed bed mobility with SBA. Pt completed LB dressing with SBA. Pt completed sit-stand and stand-sit with SBA. Pt completed toilet transfer with SBA to [facility] [place]. Pt SBA for all toileting skills. Pt performed fxl mobility with CGA. Pt completed bed-chair transfer with CGA. Pt demo'd deficits in ROM, balance, stregnth, and SOB. Pt would benefit from skilled OT services to address these deficits. Recommend d/c to assisted living. Pt reports he is going to [facility] after d/c.      Anticipated Discharge Disposition (OT): assisted living

## 2023-08-01 NOTE — ED NOTES
Nursing report ED to floor  Wyatt Curry  89 y.o.  male    HPI:   Chief Complaint   Patient presents with    Abdominal Pain    Diarrhea     Recent hx of c diff       Admitting doctor:   Cherie Mcmanus MD    Consulting provider(s):  Consults       No orders found from 7/2/2023 to 8/1/2023.             Admitting diagnosis:   The primary encounter diagnosis was Atrial fibrillation with RVR. Diagnoses of Hypomagnesemia and Colitis were also pertinent to this visit.    Code status:   Current Code Status       Date Active Code Status Order ID Comments User Context       7/31/2023 1900 No CPR (Do Not Attempt to Resuscitate) [id number]  Coleen Reyna, [facility]        Question Answer    Code Status (Patient has no pulse and is not breathing) No CPR (Do Not Attempt to Resuscitate)    Medical Interventions (Patient has pulse or is breathing) Limited Support    Medical Intervention Limits: NO intubation (DNI)     NO cardioversion    Level Of Support Discussed With Patient     Health Care Surrogate    Release to patient Routine Release                    Allergies:   Pregabalin    Intake and Output    Intake/Output Summary (Last 24 hours) at 7/31/2023 1908  Last data filed at 7/31/2023 1832  Gross per 24 hour   Intake 600 ml   Output --   Net 600 ml       Weight:       07/31/23  1411   Weight: 59 kg (130 lb)       Most recent vitals:   Vitals:    07/31/23 1822 07/31/23 1827 07/31/23 1832 07/31/23 1837   BP: (!) 88/42 106/44 98/48 127/48   Pulse: (!) 142 (!) 144 (!) 123 (!) 130   Resp:       Temp:       TempSrc:       SpO2: (!) 89% (!) 89% 97% 90%   Weight:       Height:         Oxygen Therapy: .    Active LDAs/IV Access:   Lines, Drains & Airways       Active LDAs       Name Placement date Placement time Site Days    Peripheral IV 07/31/23 1444 Left Antecubital 07/31/23  1444  Antecubital  less than 1    Peripheral IV 07/31/23 1746 Anterior;Right Hand 07/31/23  1746  Hand  less than 1                    Labs (abnormal  labs have a star):   Labs Reviewed   COMPREHENSIVE METABOLIC PANEL - Abnormal; Notable for the following components:       Result Value    Glucose 141 (*)     Creatinine 0.62 (*)     Anion Gap 16.0 (*)     All other components within normal limits    Narrative:     GFR Normal >60  Chronic Kidney Disease <60  Kidney Failure <15    The GFR formula is only valid for adults with stable renal function between ages 18 and 70.   MAGNESIUM - Abnormal; Notable for the following components:    Magnesium 0.8 (*)     All other components within normal limits   CBC WITH AUTO DIFFERENTIAL - Abnormal; Notable for the following components:    WBC 12.08 (*)     Hemoglobin 12.3 (*)     MCHC 29.6 (*)     RDW 17.6 (*)     RDW-SD 59.6 (*)     All other components within normal limits    Narrative:     Appended report. These results have been appended to a previously verified report.  The previously reported component NRBC is no longer being reported. Previous result was 0.0 /100 WBC (Reference Range: 0.0-0.2 /100 WBC) on 7/31/2023 at 1502 CDT.   MANUAL DIFFERENTIAL - Abnormal; Notable for the following components:    Lymphocyte % 6.0 (*)     Monocyte % 4.0 (*)     Bands %  22.0 (*)     Atypical Lymphocyte % 7.0 (*)     Neutrophils Absolute 10.03 (*)     All other components within normal limits   LACTIC ACID, PLASMA - Normal   GASTROINTESTINAL PANEL, PCR (PREFERRED) DOES NOT INCLUDE CDIFF   CLOSTRIDIOIDES DIFFICILE TOXIN    Narrative:     The following orders were created for panel order Clostridioides difficile Toxin - Stool, Per Rectum.  Procedure                               Abnormality         Status                     ---------                               -----------         ------                     Clostridioides difficile...[[id number]]                                                   Please view results for these tests on the individual orders.   CLOSTRIDIOIDES DIFFICILE TOXIN, PCR   BLOOD CULTURE   BLOOD CULTURE   RAINBOW  DRAW    Narrative:     The following orders were created for panel order [place] Draw.  Procedure                               Abnormality         Status                     ---------                               -----------         ------                     Green Top (Gel)[[id number]]                                  Final result               Lavender Top[[id number]]                                     Final result               Red Top[[id number]]                                          Final result               Stearns Top[[id number]]                                         Final result               Light Blue Top[[id number]]                                   Final result                 Please view results for these tests on the individual orders.   URINALYSIS W/ CULTURE IF INDICATED   GREEN TOP   LAVENDER TOP   RED TOP   GRAY TOP   LIGHT BLUE TOP   CBC AND DIFFERENTIAL    Narrative:     The following orders were created for panel order CBC & Differential.  Procedure                               Abnormality         Status                     ---------                               -----------         ------                     CBC Auto Differential[[id number]]        Abnormal            Final result                 Please view results for these tests on the individual orders.       Meds given in ED:   Medications   sodium chloride 0.9 % flush 10 mL (has no administration in time range)   dilTIAZem (CARDIZEM) 125 mg in 125 mL NS infusion (0 mg/hr Intravenous Stopped 7/31/23 1837)   sodium chloride 0.9 % bolus 500 mL (500 mL Intravenous New Bag 7/31/23 1905)   sodium chloride 0.9 % flush 10 mL (has no administration in time range)   sodium chloride 0.9 % flush 10 mL (has no administration in time range)   sodium chloride 0.9 % infusion 40 mL (has no administration in time range)   mupirocin (BACTROBAN) 2 % nasal ointment 1 application  (has no administration in time range)   Chlorhexidine Gluconate Cloth 2 % pads 1  application  (has no administration in time range)   Chlorhexidine Gluconate Cloth 2 % pads 1 application  (has no administration in time range)   ondansetron (ZOFRAN) tablet 4 mg (has no administration in time range)     Or   ondansetron (ZOFRAN) injection 4 mg (has no administration in time range)   magnesium sulfate 2g/50 mL (PREMIX) infusion (0 g Intravenous Stopped 7/31/23 1635)   ondansetron (ZOFRAN) injection 4 mg (4 mg Intravenous Given 7/31/23 1640)   lactated ringers bolus 500 mL (0 mL Intravenous Stopped 7/31/23 1748)   magnesium sulfate 2g/50 mL (PREMIX) infusion (0 g Intravenous Stopped 7/31/23 1832)   amiodarone 150 mg in 100 mL D5W (loading dose) (150 mg Intravenous New Bag 7/31/23 1858)     dilTIAZem, 5-15 mg/hr, Last Rate: Stopped (07/31/23 1835)         NIH Stroke Scale:       Isolation/Infection(s):  Contact Spore   C.difficile, C.difficile (rule out)     COVID Testing  Collected .  Resulted .    Nursing report ED to floor:  Mental status: .  Ambulatory status: .  Precautions: .    ED nurse phone extentsion- ..

## 2023-08-02 LAB
ANION GAP SERPL CALCULATED.3IONS-SCNC: 9 MMOL/L (ref 5–15)
BACTERIA SPEC AEROBE CULT: ABNORMAL
BASOPHILS # BLD AUTO: 0.03 10*3/MM3 (ref 0–0.2)
BASOPHILS NFR BLD AUTO: 0.3 % (ref 0–1.5)
BUN SERPL-MCNC: 11 MG/DL (ref 8–23)
BUN/CREAT SERPL: 22 (ref 7–25)
CALCIUM SPEC-SCNC: 8.8 MG/DL (ref 8.2–9.6)
CHLORIDE SERPL-SCNC: 103 MMOL/L (ref 98–107)
CO2 SERPL-SCNC: 27 MMOL/L (ref 22–29)
CREAT SERPL-MCNC: 0.5 MG/DL (ref 0.76–1.27)
DEPRECATED RDW RBC AUTO: 59.8 FL (ref 37–54)
EGFRCR SERPLBLD CKD-EPI 2021: 95.1 ML/MIN/1.73
EOSINOPHIL # BLD AUTO: 0.09 10*3/MM3 (ref 0–0.4)
EOSINOPHIL NFR BLD AUTO: 0.9 % (ref 0.3–6.2)
ERYTHROCYTE [DISTWIDTH] IN BLOOD BY AUTOMATED COUNT: 17.6 % (ref 12.3–15.4)
GLUCOSE SERPL-MCNC: 143 MG/DL (ref 65–99)
HCT VFR BLD AUTO: 37.3 % (ref 37.5–51)
HGB BLD-MCNC: 11 G/DL (ref 13–17.7)
LYMPHOCYTES # BLD AUTO: 1.6 10*3/MM3 (ref 0.7–3.1)
LYMPHOCYTES NFR BLD AUTO: 15.5 % (ref 19.6–45.3)
MAGNESIUM SERPL-MCNC: 1.8 MG/DL (ref 1.7–2.3)
MCH RBC QN AUTO: 28.1 PG (ref 26.6–33)
MCHC RBC AUTO-ENTMCNC: 29.5 G/DL (ref 31.5–35.7)
MCV RBC AUTO: 95.2 FL (ref 79–97)
MONOCYTES # BLD AUTO: 1.04 10*3/MM3 (ref 0.1–0.9)
MONOCYTES NFR BLD AUTO: 10.1 % (ref 5–12)
NEUTROPHILS NFR BLD AUTO: 7.48 10*3/MM3 (ref 1.7–7)
NEUTROPHILS NFR BLD AUTO: 72.6 % (ref 42.7–76)
PLATELET # BLD AUTO: 219 10*3/MM3 (ref 140–450)
PMV BLD AUTO: 11.1 FL (ref 6–12)
POTASSIUM SERPL-SCNC: 3.3 MMOL/L (ref 3.5–5.2)
QT INTERVAL: 384 MS
QT INTERVAL: 408 MS
QTC INTERVAL: 507 MS
QTC INTERVAL: 540 MS
RBC # BLD AUTO: 3.92 10*6/MM3 (ref 4.14–5.8)
SODIUM SERPL-SCNC: 139 MMOL/L (ref 136–145)
WBC NRBC COR # BLD: 10.3 10*3/MM3 (ref 3.4–10.8)

## 2023-08-02 PROCEDURE — 25010000002 MAGNESIUM SULFATE IN D5W 1G/100ML (PREMIX) 1-5 GM/100ML-% SOLUTION: Performed by: INTERNAL MEDICINE

## 2023-08-02 PROCEDURE — 99232 SBSQ HOSP IP/OBS MODERATE 35: CPT | Performed by: NURSE PRACTITIONER

## 2023-08-02 PROCEDURE — 25010000002 DIGOXIN PER 500 MCG: Performed by: INTERNAL MEDICINE

## 2023-08-02 PROCEDURE — 80048 BASIC METABOLIC PNL TOTAL CA: CPT | Performed by: INTERNAL MEDICINE

## 2023-08-02 PROCEDURE — 25010000002 METRONIDAZOLE 500 MG/100ML SOLUTION: Performed by: INTERNAL MEDICINE

## 2023-08-02 PROCEDURE — 97535 SELF CARE MNGMENT TRAINING: CPT | Performed by: OCCUPATIONAL THERAPIST

## 2023-08-02 PROCEDURE — 97116 GAIT TRAINING THERAPY: CPT

## 2023-08-02 PROCEDURE — 99231 SBSQ HOSP IP/OBS SF/LOW 25: CPT | Performed by: INTERNAL MEDICINE

## 2023-08-02 PROCEDURE — 83735 ASSAY OF MAGNESIUM: CPT | Performed by: INTERNAL MEDICINE

## 2023-08-02 PROCEDURE — 85025 COMPLETE CBC W/AUTO DIFF WBC: CPT | Performed by: INTERNAL MEDICINE

## 2023-08-02 RX ORDER — DIGOXIN 125 MCG
125 TABLET ORAL
Status: DISCONTINUED | OUTPATIENT
Start: 2023-08-03 | End: 2023-08-04 | Stop reason: HOSPADM

## 2023-08-02 RX ORDER — METOPROLOL SUCCINATE 25 MG/1
25 TABLET, EXTENDED RELEASE ORAL
Status: DISCONTINUED | OUTPATIENT
Start: 2023-08-02 | End: 2023-08-03

## 2023-08-02 RX ORDER — MAGNESIUM SULFATE 1 G/100ML
1 INJECTION INTRAVENOUS ONCE
Status: COMPLETED | OUTPATIENT
Start: 2023-08-02 | End: 2023-08-02

## 2023-08-02 RX ADMIN — Medication 10 ML: at 22:25

## 2023-08-02 RX ADMIN — ATORVASTATIN CALCIUM 20 MG: 10 TABLET, FILM COATED ORAL at 09:03

## 2023-08-02 RX ADMIN — DIGOXIN 250 MCG: 0.25 INJECTION INTRAMUSCULAR; INTRAVENOUS at 15:14

## 2023-08-02 RX ADMIN — ASPIRIN 81 MG: 81 TABLET, COATED ORAL at 09:02

## 2023-08-02 RX ADMIN — POTASSIUM CHLORIDE 40 MEQ: 10 CAPSULE, COATED, EXTENDED RELEASE ORAL at 17:57

## 2023-08-02 RX ADMIN — Medication 250 MG: at 22:24

## 2023-08-02 RX ADMIN — VANCOMYCIN HYDROCHLORIDE 125 MG: 125 CAPSULE ORAL at 06:48

## 2023-08-02 RX ADMIN — VANCOMYCIN HYDROCHLORIDE 125 MG: 125 CAPSULE ORAL at 01:57

## 2023-08-02 RX ADMIN — MAGNESIUM SULFATE HEPTAHYDRATE 1 G: 1 INJECTION, SOLUTION INTRAVENOUS at 15:14

## 2023-08-02 RX ADMIN — Medication 10 ML: at 09:03

## 2023-08-02 RX ADMIN — METRONIDAZOLE 500 MG: 500 INJECTION, SOLUTION INTRAVENOUS at 15:14

## 2023-08-02 RX ADMIN — POTASSIUM CHLORIDE 40 MEQ: 10 CAPSULE, COATED, EXTENDED RELEASE ORAL at 09:03

## 2023-08-02 RX ADMIN — BUSPIRONE HYDROCHLORIDE 10 MG: 10 TABLET ORAL at 09:02

## 2023-08-02 RX ADMIN — VANCOMYCIN HYDROCHLORIDE 125 MG: 125 CAPSULE ORAL at 15:13

## 2023-08-02 RX ADMIN — FAMOTIDINE 20 MG: 20 TABLET, FILM COATED ORAL at 06:47

## 2023-08-02 RX ADMIN — Medication 250 MG: at 09:02

## 2023-08-02 RX ADMIN — CLOPIDOGREL BISULFATE 75 MG: 75 TABLET, FILM COATED ORAL at 09:03

## 2023-08-02 RX ADMIN — EMPAGLIFLOZIN 10 MG: 10 TABLET, FILM COATED ORAL at 09:03

## 2023-08-02 RX ADMIN — BUSPIRONE HYDROCHLORIDE 10 MG: 10 TABLET ORAL at 22:24

## 2023-08-02 RX ADMIN — FAMOTIDINE 20 MG: 20 TABLET, FILM COATED ORAL at 18:00

## 2023-08-02 RX ADMIN — METRONIDAZOLE 500 MG: 500 INJECTION, SOLUTION INTRAVENOUS at 05:24

## 2023-08-02 RX ADMIN — VANCOMYCIN HYDROCHLORIDE 125 MG: 125 CAPSULE ORAL at 22:25

## 2023-08-02 RX ADMIN — METRONIDAZOLE 500 MG: 500 INJECTION, SOLUTION INTRAVENOUS at 22:23

## 2023-08-02 RX ADMIN — METOPROLOL SUCCINATE 25 MG: 25 TABLET, EXTENDED RELEASE ORAL at 15:13

## 2023-08-02 NOTE — PROGRESS NOTES
[facility] -  Progress Note     LOS: 2 days   Patient Care Team:  Everardo Jackson MD as PCP - General (Family Medicine)  Justin Howard MD as Consulting Physician (Urology)  Emigdio Moseley MD as Cardiologist (Cardiology)  Everardo Noriega MD as Consulting Physician (Pulmonary Disease)  Brijesh Nickerson MD as Consulting Physician (Otolaryngology)  Kaitlin Del Angel PA as Referring Physician (Physician Assistant)    Chief Complaint: Diarrhea     Subjective     Interval History:   Converted to NSR yesterday. Overall stable. Diarrhea improving.     Review of Systems:     Review of Systems   Constitutional:  Positive for fatigue.   Gastrointestinal:  Positive for diarrhea.   Objective     Vital Sign Min/Max for last 24 hours  Temp  Min: 98 øF (36.7 øC)  Max: 98.7 øF (37.1 øC)   BP  Min: 114/47  Max: 140/62   Pulse  Min: 80  Max: 97   Resp  Min: 18  Max: 18   SpO2  Min: 97 %  Max: 99 %   No data recorded   Weight  Min: 59.6 kg (131 lb 6.4 oz)  Max: 59.9 kg (132 lb 1.6 oz)         08/02/23  0443   Weight: 59.6 kg (131 lb 6.4 oz)       Telemetry: NSR      Physical Exam:    Constitutional:       Appearance: Well-developed. Frail. Chronically ill-appearing.   Eyes:      Pupils: Pupils are equal, round, and reactive to light.   HENT:      Head: Normocephalic and atraumatic.   Neck:      Vascular: No carotid bruit or JVD.   Pulmonary:      Effort: Pulmonary effort is normal.      Breath sounds: Normal breath sounds.   Cardiovascular:      Normal rate. Regular rhythm.   Pulses:     Intact distal pulses.   Edema:     Peripheral edema absent.   Abdominal:      General: Bowel sounds are normal.      Palpations: Abdomen is soft.   Musculoskeletal: Normal range of motion.      Cervical back: Normal range of motion and neck supple. Skin:     General: Skin is warm and dry.   Neurological:      Mental Status: Alert and oriented to person, place, and time.      Deep Tendon Reflexes: Reflexes are  normal and symmetric.   Psychiatric:         Behavior: Behavior normal.         Thought Content: Thought content normal.         Judgment: Judgment normal.     Results Review:   Lab Results (last 72 hours)       Procedure Component Value Units Date/Time    Magnesium [[id number]]  (Normal) Collected: 08/02/23 1009    Specimen: Blood Updated: 08/02/23 1107     Magnesium 1.8 mg/dL     Basic Metabolic Panel [[id number]]  (Abnormal) Collected: 08/02/23 1009    Specimen: Blood Updated: 08/02/23 1107     Glucose 143 mg/dL      BUN 11 mg/dL      Creatinine 0.50 mg/dL      Sodium 139 mmol/L      Potassium 3.3 mmol/L      Chloride 103 mmol/L      CO2 27.0 mmol/L      Calcium 8.8 mg/dL      BUN/Creatinine Ratio 22.0     Anion Gap 9.0 mmol/L      eGFR 95.1 mL/min/1.73     Narrative:      GFR Normal >60  Chronic Kidney Disease <60  Kidney Failure <15    The GFR formula is only valid for adults with stable renal function between ages 18 and 70.    CBC & Differential [[id number]]  (Abnormal) Collected: 08/02/23 1009    Specimen: Blood Updated: 08/02/23 1058    Narrative:      The following orders were created for panel order CBC & Differential.  Procedure                               Abnormality         Status                     ---------                               -----------         ------                     CBC Auto Differential[[id number]]        Abnormal            Final result                 Please view results for these tests on the individual orders.    CBC Auto Differential [[id number]]  (Abnormal) Collected: 08/02/23 1009    Specimen: Blood Updated: 08/02/23 1058     WBC 10.30 10*3/mm3      RBC 3.92 10*6/mm3      Hemoglobin 11.0 g/dL      Hematocrit 37.3 %      MCV 95.2 fL      MCH 28.1 pg      MCHC 29.5 g/dL      RDW 17.6 %      RDW-SD 59.8 fl      MPV 11.1 fL      Platelets 219 10*3/mm3      Neutrophil % 72.6 %      Lymphocyte % 15.5 %      Monocyte % 10.1 %      Eosinophil % 0.9 %      Basophil % 0.3 %       Neutrophils, Absolute 7.48 10*3/mm3      Lymphocytes, Absolute 1.60 10*3/mm3      Monocytes, Absolute 1.04 10*3/mm3      Eosinophils, Absolute 0.09 10*3/mm3      Basophils, Absolute 0.03 10*3/mm3     Urine Culture - Urine, Urine, Clean Catch [[id number]]  (Abnormal) Collected: 08/01/23 0018    Specimen: Urine, Clean Catch Updated: 08/02/23 0929     Urine Culture Yeast isolated     Comment: No further workup.        Narrative:      Colonization of the urinary tract without infection is common. Treatment is discouraged unless the patient is symptomatic, pregnant, or undergoing an invasive urologic procedure.    Blood Culture - Blood, Arm, Right [[id number]]  (Normal) Collected: 07/31/23 1849    Specimen: Blood from Arm, Right Updated: 08/01/23 1915     Blood Culture No growth at 24 hours    Blood Culture - Blood, Arm, Left [[id number]]  (Normal) Collected: 07/31/23 1844    Specimen: Blood from Arm, Left Updated: 08/01/23 1915     Blood Culture No growth at 24 hours    Potassium [[id number]]  (Abnormal) Collected: 08/01/23 0945    Specimen: Blood Updated: 08/01/23 1006     Potassium 3.3 mmol/L      Comment: Slight hemolysis detected by analyzer. Results may be affected.       Clostridioides difficile toxin Ag, Reflex - Stool, Per Rectum [[id number]]  (Abnormal) Collected: 08/01/23 0244    Specimen: Stool from Per Rectum Updated: 08/01/23 0653     C.diff Toxin Ag Positive    Narrative:      DNA from a toxigenic strain of C.difficile was detected, along with the presence of free toxin. These results are suggestive of C.difficile infection.    Clostridioides difficile Toxin - Stool, Per Rectum [[id number]]  (Abnormal) Collected: 08/01/23 0244    Specimen: Stool from Per Rectum Updated: 08/01/23 0653    Narrative:      The following orders were created for panel order Clostridioides difficile Toxin - Stool, Per Rectum.  Procedure                               Abnormality         Status                     ---------                                -----------         ------                     Clostridioides difficile...[[id number]]  Abnormal            Final result                 Please view results for these tests on the individual orders.    Clostridioides difficile Toxin, PCR - Stool, Per Rectum [[id number]]  (Abnormal) Collected: 08/01/23 0244    Specimen: Stool from Per Rectum Updated: 08/01/23 0653     Toxigenic C. difficile by PCR Positive    Narrative:      DNA from a toxigenic strain of C.difficile has been detected. Antigen testing for the presence of free C.difficile toxin is currently in progress, to help determine the clinical significance of this PCR result.     Manual Differential [[id number]]  (Abnormal) Collected: 08/01/23 0359    Specimen: Blood Updated: 08/01/23 0442     Neutrophil % 72.0 %      Lymphocyte % 7.0 %      Monocyte % 13.0 %      Bands %  8.0 %      Neutrophils Absolute 9.34 10*3/mm3      Lymphocytes Absolute 0.82 10*3/mm3      Monocytes Absolute 1.52 10*3/mm3      Anisocytosis Slight/1+     Poikilocytes Slight/1+     Polychromasia Slight/1+     Vacuolated Neutrophils Slight/1+     Clumped Platelets Present     Giant Platelets Slight/1+    Gastrointestinal Panel, PCR - Stool, Per Rectum [[id number]]  (Normal) Collected: 08/01/23 0240    Specimen: Stool from Per Rectum Updated: 08/01/23 0438     Campylobacter Not Detected     Plesiomonas shigelloides Not Detected     Salmonella Not Detected     Vibrio Not Detected     Vibrio cholerae Not Detected     Yersinia enterocolitica Not Detected     Enteroaggregative E. coli (EAEC) Not Detected     Enteropathogenic E. coli (EPEC) Not Detected     Enterotoxigenic E. coli (ETEC) lt/st Not Detected     Shiga-like toxin-producing E. coli (STEC) stx1/stx2 Not Detected     Shigella/Enteroinvasive E. coli (EIEC) Not Detected     Cryptosporidium Not Detected     Cyclospora cayetanensis Not Detected     Entamoeba histolytica Not Detected     Giardia lamblia Not Detected      Adenovirus F40/41 Not Detected     Astrovirus Not Detected     Norovirus GI/GII Not Detected     Rotavirus A Not Detected     Sapovirus (I, II, IV or V) Not Detected    Narrative:      If Aeromonas, Staphylococcus aureus or Bacillus cereus are suspected, please order [id number]: Stool Culture, Aeromonas, S aureus, B Cereus.    Magnesium [[id number]]  (Normal) Collected: 08/01/23 0359    Specimen: Blood Updated: 08/01/23 0425     Magnesium 1.9 mg/dL     Basic Metabolic Panel [[id number]]  (Abnormal) Collected: 08/01/23 0359    Specimen: Blood Updated: 08/01/23 0425     Glucose 179 mg/dL      BUN 16 mg/dL      Creatinine 0.74 mg/dL      Sodium 138 mmol/L      Potassium 3.1 mmol/L      Chloride 102 mmol/L      CO2 22.0 mmol/L      Calcium 7.8 mg/dL      BUN/Creatinine Ratio 21.6     Anion Gap 14.0 mmol/L      eGFR 84.5 mL/min/1.73     Narrative:      GFR Normal >60  Chronic Kidney Disease <60  Kidney Failure <15    The GFR formula is only valid for adults with stable renal function between ages 18 and 70.    CBC Auto Differential [[id number]]  (Abnormal) Collected: 08/01/23 0359    Specimen: Blood Updated: 08/01/23 0410     WBC 11.68 10*3/mm3      RBC 3.80 10*6/mm3      Hemoglobin 10.4 g/dL      Hematocrit 34.7 %      MCV 91.3 fL      MCH 27.4 pg      MCHC 30.0 g/dL      RDW 17.6 %      RDW-SD 59.3 fl      MPV 11.0 fL      Platelets 185 10*3/mm3      nRBC 0.0 /100 WBC     Urinalysis, Microscopic Only - Urine, Clean Catch [[id number]]  (Abnormal) Collected: 08/01/23 0018    Specimen: Urine, Clean Catch Updated: 08/01/23 0057     RBC, UA 3-5 /HPF      WBC, UA 6-12 /HPF      Bacteria, UA Trace /HPF      Squamous Epithelial Cells, UA 3-6 /HPF      Yeast, UA Large/3+ Budding Yeast /HPF      Hyaline Casts, UA 3-6 /LPF      Comment: Corrected result. Previous result was None Seen /LPF on 8/1/2023 at 0055 T.        Methodology Manual Light Microscopy    Urinalysis With Culture If Indicated - Urine, Clean Catch [[id number]]   (Abnormal) Collected: 08/01/23 0018    Specimen: Urine, Clean Catch Updated: 08/01/23 0052     Color, UA Dark Yellow     Appearance, UA Cloudy     pH, UA 5.5     Specific Gravity, UA 1.021     Glucose,  mg/dL (2+)     Ketones, UA 15 mg/dL (1+)     Bilirubin, UA Negative     Blood, UA Trace     Protein,  mg/dL (2+)     Leuk Esterase, UA Moderate (2+)     Nitrite, UA Negative     Urobilinogen, UA 0.2 E.U./dL    Narrative:      In absence of clinical symptoms, the presence of pyuria, bacteria, and/or nitrites on the urinalysis result does not correlate with infection.    Magnesium [[id number]]  (Normal) Collected: 07/31/23 2151    Specimen: Blood Updated: 07/31/23 2234     Magnesium 1.9 mg/dL     [place] Draw [[id number]] Collected: 07/31/23 1446    Specimen: Blood Updated: 07/31/23 1900    Narrative:      The following orders were created for panel order [place] Draw.  Procedure                               Abnormality         Status                     ---------                               -----------         ------                     Green Top (Gel)[[id number]]                                  Final result               Lavender Top[[id number]]                                     Final result               Red Top[[id number]]                                          Final result               Stearns Top[[id number]]                                         Final result               Light Blue Top[[id number]]                                   Final result                 Please view results for these tests on the individual orders.    [place]s Top [[id number]] Collected: 07/31/23 1446    Specimen: Blood Updated: 07/31/23 1900     Extra Tube Hold for add-ons.     Comment: Auto resulted.       Lactic Acid, Plasma [[id number]]  (Normal) Collected: 07/31/23 1446    Specimen: Blood Updated: 07/31/23 1740     Lactate 1.7 mmol/L     Green Top (Gel) [[id number]] Collected: 07/31/23 1520    Specimen: Blood Updated: 07/31/23  1631     Extra Tube Hold for add-ons.     Comment: Auto resulted.       Red Top [[id number]] Collected: 07/31/23 1520    Specimen: Blood Updated: 07/31/23 1631     Extra Tube Hold for add-ons.     Comment: Auto resulted.       Lavender Top [[id number]] Collected: 07/31/23 1520    Specimen: Blood Updated: 07/31/23 1631     Extra Tube hold for add-on     Comment: Auto resulted       Light Blue Top [[id number]] Collected: 07/31/23 1446    Specimen: Blood Updated: 07/31/23 1600     Extra Tube Hold for add-ons.     Comment: Auto resulted       Magnesium [[id number]]  (Abnormal) Collected: 07/31/23 1520    Specimen: Blood Updated: 07/31/23 1554     Magnesium 0.8 mg/dL     Comprehensive Metabolic Panel [[id number]]  (Abnormal) Collected: 07/31/23 1520    Specimen: Blood Updated: 07/31/23 1546     Glucose 141 mg/dL      BUN 12 mg/dL      Creatinine 0.62 mg/dL      Sodium 140 mmol/L      Potassium 3.5 mmol/L      Chloride 101 mmol/L      CO2 23.0 mmol/L      Calcium 8.4 mg/dL      Total Protein 7.2 g/dL      Albumin 3.8 g/dL      ALT (SGPT) 8 U/L      AST (SGOT) 25 U/L      Alkaline Phosphatase 56 U/L      Total Bilirubin 0.7 mg/dL      Globulin 3.4 gm/dL      A/G Ratio 1.1 g/dL      BUN/Creatinine Ratio 19.4     Anion Gap 16.0 mmol/L      eGFR 89.1 mL/min/1.73     Narrative:      GFR Normal >60  Chronic Kidney Disease <60  Kidney Failure <15    The GFR formula is only valid for adults with stable renal function between ages 18 and 70.    Manual Differential [[id number]]  (Abnormal) Collected: 07/31/23 1447    Specimen: Blood Updated: 07/31/23 1529     Neutrophil % 61.0 %      Lymphocyte % 6.0 %      Monocyte % 4.0 %      Bands %  22.0 %      Atypical Lymphocyte % 7.0 %      Neutrophils Absolute 10.03 10*3/mm3      Lymphocytes Absolute 1.57 10*3/mm3      Monocytes Absolute 0.48 10*3/mm3      Crenated RBC's Slight/1+     Hypochromia Slight/1+     Poikilocytes Slight/1+     WBC Morphology Normal     Platelet Morphology Normal    CBC  & Differential [[id number]]  (Abnormal) Collected: 07/31/23 1447    Specimen: Blood Updated: 07/31/23 1529    Narrative:      The following orders were created for panel order CBC & Differential.  Procedure                               Abnormality         Status                     ---------                               -----------         ------                     CBC Auto Differential[[id number]]        Abnormal            Final result                 Please view results for these tests on the individual orders.    CBC Auto Differential [[id number]]  (Abnormal) Collected: 07/31/23 1447    Specimen: Blood Updated: 07/31/23 1529     WBC 12.08 10*3/mm3      RBC 4.51 10*6/mm3      Hemoglobin 12.3 g/dL      Hematocrit 41.5 %      MCV 92.0 fL      MCH 27.3 pg      MCHC 29.6 g/dL      RDW 17.6 %      RDW-SD 59.6 fl      MPV 11.3 fL      Platelets 221 10*3/mm3     Narrative:      Appended report. These results have been appended to a previously verified report.  The previously reported component NRBC is no longer being reported. Previous result was 0.0 /100 WBC (Reference Range: 0.0-0.2 /100 WBC) on 7/31/2023 at 1502 CDT.          Results for orders placed during the hospital encounter of 05/13/23    Adult Transthoracic Echo Complete W/ Cont if Necessary Per Protocol    Interpretation Summary    Left ventricular systolic function is mildly decreased. Left ventricular ejection fraction appears to be 41 - 45%.    The following segments are hypokinetic: apical anterior, apical septal, apical inferior, apical lateral and apex.    Left ventricular wall thickness is consistent with mild septal asymmetric hypertrophy.    Left ventricular diastolic function is consistent with (grade I) impaired relaxation.    He left atrial cavity is mildly dilated.    There is mild to moderate thickening of the aortic valve.    . Mild to moderate mitral valve regurgitation is present    Mild pulmonary hypertension is present.     Medication  Review: yes  Current Facility-Administered Medications   Medication Dose Route Frequency Provider Last Rate Last Admin    aspirin EC tablet 81 mg  81 mg Oral Daily Cherie Mcmanus MD   81 mg at 08/02/23 0902    atorvastatin (LIPITOR) tablet 20 mg  20 mg Oral Daily Cherie Mcmanus MD   20 mg at 08/02/23 0903    busPIRone (BUSPAR) tablet 10 mg  10 mg Oral BID Cherie Mcmanus MD   10 mg at 08/02/23 0902    clopidogrel (PLAVIX) tablet 75 mg  75 mg Oral Daily Cherie Mcmanus MD   75 mg at 08/02/23 0903    digoxin (LANOXIN) injection 250 mcg  250 mcg Intravenous Daily Cherie Mcmanus MD   250 mcg at 08/01/23 1236    empagliflozin (JARDIANCE) tablet 10 mg  10 mg Oral Daily Cherie Mcmanus MD   10 mg at 08/02/23 0903    famotidine (PEPCID) tablet 20 mg  20 mg Oral BID AC Cherie Mcmanus MD   20 mg at 08/02/23 0647    metroNIDAZOLE (FLAGYL) IVPB 500 mg  500 mg Intravenous Q8H Cherie Mcmanus  mL/hr at 08/02/23 0524 500 mg at 08/02/23 0524    nitroglycerin (NITROSTAT) SL tablet 0.4 mg  0.4 mg Sublingual Q5 Min PRN Cherie Mcmanus MD        ondansetron (ZOFRAN) tablet 4 mg  4 mg Oral Q6H PRN Cherie Mcmanus MD        Or    ondansetron (ZOFRAN) injection 4 mg  4 mg Intravenous Q6H PRN Cherie Mcmanus MD   4 mg at 08/01/23 1236    Pharmacy Consult   Does not apply Continuous PRN Cherie Mcmanus MD        potassium chloride (MICRO-K) CR capsule 40 mEq  40 mEq Oral BID With Meals Cherie Mcmanus MD   40 mEq at 08/02/23 0903    saccharomyces boulardii (FLORASTOR) capsule 250 mg  250 mg Oral BID Cherie Mcmanus MD   250 mg at 08/02/23 0902    sodium chloride 0.9 % flush 10 mL  10 mL Intravenous PRN Cherie Mcmanus MD        sodium chloride 0.9 % flush 10 mL  10 mL Intravenous Q12H Cherie Mcmanus MD   10 mL at 08/02/23 0903    sodium chloride 0.9 % flush 10 mL  10 mL Intravenous PRN Cherie Mcmanus MD        sodium chloride 0.9 % infusion 40 mL  40 mL  Intravenous PRN Cherie Mcmanus MD        vancomycin (VANCOCIN) capsule 125 mg  125 mg Oral Q6H Cherie Mcmanus MD   125 mg at 08/02/23 0648    [START ON 8/10/2023] vancomycin (VANCOCIN) capsule 125 mg  125 mg Oral Q12H Cherie Mcmanus MD        [START ON 8/17/2023] vancomycin (VANCOCIN) capsule 125 mg  125 mg Oral Q24H Cherie Mcmanus MD        [START ON 8/24/2023] vancomycin (VANCOCIN) capsule 125 mg  125 mg Oral Every Other Day Cherie Mcmanus MD             Assessment & Plan       Atrial fibrillation with RVR    Recurrent Clostridioides difficile diarrhea    Symptomatic hypotension    Hypomagnesemia    Plan:  Atrial Fibrillation- converted to NSR in the ER and has maintained NSR. Was given IV digoxin yesterday. Will resume Toprol at 25 mg given hypotension- but this is improving. Consider increasing to home dose tomorrow if BP stable. I did discuss anticoagulation and he does not recall stopping or having hematuria. Attempted to call POA and she did not answer- waiting call back. Previously documented patient and family refused due to hematuria when started in May.     Recurrent C-Diff with diarrhea- likely contributed to hypotension,electrolyte abnormalities and  A-fib RVR. Improving. ID following    Hypotension- much improved.    Coronary Artery Disease with NSTEMI in April of 2023 with PCI of LAD. NO angina. Now on Aspirin and Plavix. On Lipitor.     Ischemic Cardiomyopathy- LVEF 40-45%. No heart failure. Monitor daily weights and low salt diet.     Mitral MR- mild to moderate    Cardiology will sign off. Please call if can be of further assistance. Has follow up next week with Bell JUAREZ    Electronically signed by LARRY Gonzalez, 08/02/23, 12:26 PM CDT.

## 2023-08-02 NOTE — PROGRESS NOTES
[facility] Medicine Services  INPATIENT PROGRESS NOTE    Patient Name: Wyatt Curry  Date of Admission: 7/31/2023  Today's Date: 08/02/23  Length of Stay: 2  Primary Care Physician: Everardo Jackson MD    Subjective   Chief Complaint: Diarrhea    HPI   Patient states that his diarrhea is improved.  He had 1 episode of diarrhea this morning.  Discharge 3 or moreHis abdominal pain is improving.    Review of Systems   All pertinent negatives and positives are as above. All other systems have been reviewed and are negative unless otherwise stated.     Objective    Temp:  [98 øF (36.7 øC)-98.1 øF (36.7 øC)] 98.1 øF (36.7 øC)  Heart Rate:  [80-88] 80  Resp:  [18] 18  BP: (114-134)/(47-64) 130/62  Physical Exam  Constitutional:       General: He is not in acute distress.     Appearance: He is well-developed. He is ill-appearing. He is not diaphoretic.      Comments: Chronically ill-appearing elderly man.   HENT:      Head: Normocephalic.   Eyes:      General: No scleral icterus.     Conjunctiva/sclera: Conjunctivae normal.      Pupils: Pupils are equal, round, and reactive to light.   Neck:      Thyroid: No thyromegaly.      Vascular: No JVD.      Trachea: No tracheal deviation.   Cardiovascular:      Rate and Rhythm: Normal rate. Rhythm irregular.      Heart sounds: Normal heart sounds. No murmur heard.    No friction rub. No gallop.   Pulmonary:      Effort: Pulmonary effort is normal. No respiratory distress.      Breath sounds: Normal breath sounds. No stridor. No wheezing or rales.   Chest:      Chest wall: No tenderness.   Abdominal:      General: Bowel sounds are normal. There is no distension.      Palpations: Abdomen is soft. There is no mass.      Tenderness: There is abdominal tenderness. There is no guarding or rebound.      Hernia: No hernia is present.      Comments: Tenderness in periumbilical region.   Musculoskeletal:         General: No tenderness or deformity.  Normal range of motion.      Cervical back: Normal range of motion and neck supple.      Right lower leg: No edema.      Left lower leg: No edema.   Lymphadenopathy:      Cervical: No cervical adenopathy.   Skin:     General: Skin is warm and dry.      Coloration: Skin is not pale.      Findings: No erythema or rash.   Neurological:      General: No focal deficit present.      Mental Status: He is alert and oriented to person, place, and time.      Cranial Nerves: No cranial nerve deficit.      Sensory: No sensory deficit.      Motor: No abnormal muscle tone.      Coordination: Coordination normal.   Psychiatric:         Mood and Affect: Mood normal.         Behavior: Behavior normal.         Thought Content: Thought content normal.     Results Review:  I have reviewed the labs, radiology results, and diagnostic studies.    Laboratory Data:   Results from last 7 days   Lab Units 08/02/23  1009 08/01/23  0359 07/31/23  1447   WBC 10*3/mm3 10.30 11.68* 12.08*   HEMOGLOBIN g/dL 11.0* 10.4* 12.3*   HEMATOCRIT % 37.3* 34.7* 41.5   PLATELETS 10*3/mm3 219 185 221          Results from last 7 days   Lab Units 08/02/23  1009 08/01/23  0945 08/01/23  0359 07/31/23  1520   SODIUM mmol/L 139  --  138 140   POTASSIUM mmol/L 3.3* 3.3* 3.1* 3.5   CHLORIDE mmol/L 103  --  102 101   CO2 mmol/L 27.0  --  22.0 23.0   BUN mg/dL 11  --  16 12   CREATININE mg/dL 0.50*  --  0.74* 0.62*   CALCIUM mg/dL 8.8  --  7.8* 8.4   BILIRUBIN mg/dL  --   --   --  0.7   ALK PHOS U/L  --   --   --  56   ALT (SGPT) U/L  --   --   --  8   AST (SGOT) U/L  --   --   --  25   GLUCOSE mg/dL 143*  --  179* 141*         Culture Data:   No results found for: BLOODCX, URINECX, WOUNDCX, MRSACX, RESPCX, STOOLCX    Radiology Data:   Imaging Results (Last 24 Hours)       ** No results found for the last 24 hours. **            I have reviewed the patient's current medications.     Assessment/Plan   Assessment  Active Hospital Problems    Diagnosis     **Atrial  fibrillation with RVR     Recurrent Clostridioides difficile diarrhea     Symptomatic hypotension     Hypomagnesemia    Hypokalemia    Treatment Plan  Atrial fibrillation with RVR is rate controlled at this time.  Cardiology input appreciated.  Patient received digoxin.  We will continue with p.o. metoprolol.  We will avoid anticoagulation now due to advanced age and previous hematuria.    Continue oral vancomycin for C. difficile with plan for prolonged taper.  Infectious disease consultation input appreciated.    Continue aspirin, Lipitor, Jardiance    Replete potassium and magnesium aggressively.    DVT prophylaxis with SCDs    CODE STATUS is full code    Medical Decision Making  Number and Complexity of problems: 2 acute, severe, high complexity medical problems.  Differential Diagnosis: None    Conditions and Status        Condition is improving.     [facility] Data  External documents reviewed: None  Cardiac tracing (EKG, telemetry) interpretation: Telemetry reviewed by me  Radiology interpretation: None  Labs reviewed: CBC, BMP reviewed by me  Any tests that were considered but not ordered: None     Decision rules/scores evaluated (example [id number], [place], etc): Christian Vasc score 5     Discussed with: Patient and his power of      Care Planning  Shared decision making: Patient and his power of   Code status and discussions: CODE STATUS is DNR/DNI    Disposition  Social Determinants of Health that impact treatment or disposition: None  I expect the patient to be discharged to home with home health in 1-2 days.     Electronically signed by Cherie Mcmanus MD, 08/02/23, 18:24 CDT.

## 2023-08-02 NOTE — PLAN OF CARE
Goal Outcome Evaluation:  Plan of Care Reviewed With: patient        Progress: improving  Outcome Evaluation: Pt up in chair w/ no c/o. sit-stand supervision. Pt amb hallway 100'x2 sba cues for safety. discussed POC. Pt is planning to d/c to Assistive Living

## 2023-08-02 NOTE — THERAPY TREATMENT NOTE
Acute Care - Physical Therapy Treatment Note  [facility]     Patient Name: Wyatt Curry  : 1930  MRN: [id number]  Today's Date: 2023      Visit Dx:     ICD-10-CM ICD-9-CM   1. Atrial fibrillation with RVR  I48.91 427.31   2. Hypomagnesemia  E83.42 275.2   3. Colitis  K52.9 558.9   4. Decreased independence with activities of daily living [Z78.9 (ICD-10-CM)]  Z78.9 V49.89   5. Impaired mobility [Z74.09 (ICD-10-CM)]  Z74.09 799.89     Patient Active Problem List   Diagnosis    Benign localized prostatic hyperplasia without lower urinary tract symptoms (LUTS)    History of bladder cancer    Left rotator cuff tear arthropathy    Coronary artery disease involving native coronary artery of native heart without angina pectoris    Left bundle branch block    Essential hypertension    Mixed hyperlipidemia    Overweight (BMI 25.0-29.9)    Non-rheumatic mitral regurgitation    Other fatigue    History of coronary artery stent placement    Lung nodule    Mediastinal adenopathy    Cough    Abnormal positron emission tomography (PET) scan    Basal cell carcinoma    Neoplasm of uncertain behavior    Elevated troponin    Bleeding    Ischemic cardiomyopathy    Pneumonia of right lower lobe due to infectious organism    PAF (paroxysmal atrial fibrillation)    Proctocolitis (high concern for C. 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    GERD (gastroesophageal reflux disease)     Hyperlipidemia     Hypertension     Irregular heart beat     Lung nodules      Past Surgical History:   Procedure Laterality Date    BLADDER SURGERY      CARDIAC  CATHETERIZATION      CARDIAC CATHETERIZATION Left 4/8/2023    Procedure: Cardiac Catheterization/Vascular Study;  Surgeon: Sukhi Hartley MD;  Location:  [facility] CATH INVASIVE [facility];  Service: Cardiology;  Laterality: Left;    COLONOSCOPY      CORONARY ANGIOPLASTY WITH STENT PLACEMENT  2006    STENT X 2    CYSTOSCOPY      ENDOSCOPY      EXCISION LESION N/A 4/7/2023    Procedure: EXCISION LESION OF RIGHT TEMPLE AND LEFT TEMPLE WITH FROZEN SECTION WITH POSSIBLE FLAP OR GRAFT;  Surgeon: Brijesh Nickerson MD;  Location:  [facility] OR;  Service: ENT;  Laterality: N/A;    FLAP HEAD/NECK Left 03/09/2020    Procedure: POSSIBLE FLAP;  Surgeon: Brijesh Nickerson MD;  Location:  [facility] OR;  Service: ENT;  Laterality: Left;    HEAD/NECK LESION/CYST EXCISION Left 03/09/2020    Procedure: 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 with frozen section and possible flap or graft;  Surgeon: Brijesh Nickerson MD;  Location:  [facility] OR;  Service: ENT;  Laterality: Left;    HERNIA REPAIR      SHOULDER SURGERY      SKIN BIOPSY      lip biopsy    SKIN FULL THICKNESS GRAFT Left 03/09/2020    Procedure: OR GRAFT;  Surgeon: Brijesh Nickerson MD;  Location:  [facility] OR;  Service: ENT;  Laterality: Left;    TRANSURETHRAL RESECTION OF BLADDER TUMOR       PT Assessment (last 12 hours)       PT Evaluation and Treatment       Row Name 08/02/23 1449          Physical Therapy Time and Intention    Subjective Information no complaints  -NW     Document Type therapy note (daily note)  -NW     Mode of Treatment physical therapy  -NW       Row Name 08/02/23 1449          General Information    Existing Precautions/Restrictions fall  -NW       Row Name 08/02/23 1449          Pain    Pretreatment Pain Rating 0/10 - no pain  -NW       Row Name 08/02/23 1449          Bed Mobility    Comment, (Bed Mobility) chair  -NW       Row Name 08/02/23 1449          Sit-Stand Transfer    Sit-Stand [place] (Transfers) modified  independence  -NW       Row Name 08/02/23 1449          Stand-Sit Transfer    Stand-Sit [place] (Transfers) independent  -NW       Row Name 08/02/23 1449          Gait/Stairs (Locomotion)    [place] Level (Gait) supervision  -NW     Distance in Feet (Gait) 100x2  -NW     Comment, (Gait/Stairs) cues for safety and reviewed POC  -NW       Row Name 08/02/23 1449          Safety Issues, Functional Mobility    Impairments Affecting Function (Mobility) balance  -NW       Row Name 08/02/23 1449          Positioning and Restraints    Pre-Treatment Position sitting in chair/recliner  -NW     Post Treatment Position chair  -NW     In Chair sitting;call light within reach;encouraged to call for assist  -NW               User Key  (r) = Recorded By, (t) = Taken By, (c) = Cosigned By      Initials Name Provider Type    NW Fadumo Manzanares, PTA Physical Therapist Assistant                    Physical Therapy Education       Title: PT OT SLP Therapies (In Progress)       Topic: Physical Therapy (In Progress)       Point: Mobility training (Done)       Learning Progress Summary             Patient Acceptance, E, VU by  at 8/1/2023 0815    Comment: educated pt. about safety precautions and needing help when wanting to move in the room                         Point: Home exercise program (Not Started)       Learner Progress:  Not documented in this visit.              Point: Body mechanics (Not Started)       Learner Progress:  Not documented in this visit.              Point: Precautions (Not Started)       Learner Progress:  Not documented in this visit.                              User Key       Initials Effective Dates Name Provider Type Discipline     05/25/23 -  Josie Gonzalez, PT Student PT Student PT                  PT Recommendation and Plan     Plan of Care Reviewed With: patient  Progress: improving  Outcome Evaluation: Pt up in chair w/ no c/o. sit-stand supervision. Pt amb hallway 100'x2 sba cues for  safety. discussed POC. Pt is planning to d/c to Assistive Living       Time Calculation:    PT Charges       Row Name 08/02/23 1507             Time Calculation    Start Time 1449  -NW      Stop Time 1508  -NW      Time Calculation (min) 19 min  -NW      PT Received On 08/02/23  -NW      PT Goal Re-Cert Due Date 08/11/23  -NW         Time Calculation- PT    Total Timed Code Minutes- PT 19 minute(s)  -NW         Timed Charges    42768 - Gait Training Minutes  19  -NW         Total Minutes    Timed Charges Total Minutes 19  -NW       Total Minutes 19  -NW                User Key  (r) = Recorded By, (t) = Taken By, (c) = Cosigned By      Initials Name Provider Type    NW Fadumo Manzanares PTA Physical Therapist Assistant                  Therapy Charges for Today       Code Description Service Date Service Provider Modifiers Qty    [id number] HC GAIT TRAINING EA 15 MIN 8/2/2023 Fadumo Manzanares PTA GP 1            PT G-Codes  Outcome Measure Options: AM-PAC 6 Clicks Daily Activity (OT)  AM-PAC 6 Clicks Score (PT): 20  AM-PAC 6 Clicks Score (OT): 21    Fadumo Manzanares PTA  8/2/2023

## 2023-08-02 NOTE — PLAN OF CARE
Goal Outcome Evaluation:  Plan of Care Reviewed With: patient        Progress: no change  Outcome Evaluation: Pt. able to come to EOB at S with good balance.  Mr. Crury appears to be in no pain, no SOB, but does report some imbalance.  He ambualted without an AD at SBA & was go to his chair and sit in recliner with no difficulty. Mr. Curry reports living alone and caring for himself.  Cont OT tx to improve his act michelle, balance, and abiliyt to perofrm higher level ADLs/IADLs.  Cont OT tx      Anticipated Discharge Disposition (OT): assisted living

## 2023-08-03 LAB
ANION GAP SERPL CALCULATED.3IONS-SCNC: 8 MMOL/L (ref 5–15)
BASOPHILS # BLD AUTO: 0.02 10*3/MM3 (ref 0–0.2)
BASOPHILS NFR BLD AUTO: 0.3 % (ref 0–1.5)
BUN SERPL-MCNC: 11 MG/DL (ref 8–23)
BUN/CREAT SERPL: 19 (ref 7–25)
CALCIUM SPEC-SCNC: 8.9 MG/DL (ref 8.2–9.6)
CHLORIDE SERPL-SCNC: 107 MMOL/L (ref 98–107)
CO2 SERPL-SCNC: 26 MMOL/L (ref 22–29)
CREAT SERPL-MCNC: 0.58 MG/DL (ref 0.76–1.27)
DEPRECATED RDW RBC AUTO: 59.4 FL (ref 37–54)
EGFRCR SERPLBLD CKD-EPI 2021: 90.9 ML/MIN/1.73
EOSINOPHIL # BLD AUTO: 0.28 10*3/MM3 (ref 0–0.4)
EOSINOPHIL NFR BLD AUTO: 3.6 % (ref 0.3–6.2)
ERYTHROCYTE [DISTWIDTH] IN BLOOD BY AUTOMATED COUNT: 17.2 % (ref 12.3–15.4)
GLUCOSE SERPL-MCNC: 95 MG/DL (ref 65–99)
HCT VFR BLD AUTO: 39.6 % (ref 37.5–51)
HGB BLD-MCNC: 11.7 G/DL (ref 13–17.7)
IMM GRANULOCYTES # BLD AUTO: 0.04 10*3/MM3 (ref 0–0.05)
IMM GRANULOCYTES NFR BLD AUTO: 0.5 % (ref 0–0.5)
LYMPHOCYTES # BLD AUTO: 2.33 10*3/MM3 (ref 0.7–3.1)
LYMPHOCYTES NFR BLD AUTO: 30.3 % (ref 19.6–45.3)
MAGNESIUM SERPL-MCNC: 1.8 MG/DL (ref 1.7–2.3)
MCH RBC QN AUTO: 28.1 PG (ref 26.6–33)
MCHC RBC AUTO-ENTMCNC: 29.5 G/DL (ref 31.5–35.7)
MCV RBC AUTO: 95.2 FL (ref 79–97)
MONOCYTES # BLD AUTO: 0.83 10*3/MM3 (ref 0.1–0.9)
MONOCYTES NFR BLD AUTO: 10.8 % (ref 5–12)
NEUTROPHILS NFR BLD AUTO: 4.18 10*3/MM3 (ref 1.7–7)
NEUTROPHILS NFR BLD AUTO: 54.5 % (ref 42.7–76)
NRBC BLD AUTO-RTO: 0 /100 WBC (ref 0–0.2)
PLATELET # BLD AUTO: 238 10*3/MM3 (ref 140–450)
PMV BLD AUTO: 11 FL (ref 6–12)
POTASSIUM SERPL-SCNC: 3.6 MMOL/L (ref 3.5–5.2)
RBC # BLD AUTO: 4.16 10*6/MM3 (ref 4.14–5.8)
SODIUM SERPL-SCNC: 141 MMOL/L (ref 136–145)
WBC NRBC COR # BLD: 7.68 10*3/MM3 (ref 3.4–10.8)

## 2023-08-03 PROCEDURE — 99232 SBSQ HOSP IP/OBS MODERATE 35: CPT | Performed by: INTERNAL MEDICINE

## 2023-08-03 PROCEDURE — 80048 BASIC METABOLIC PNL TOTAL CA: CPT | Performed by: INTERNAL MEDICINE

## 2023-08-03 PROCEDURE — 25010000002 MAGNESIUM SULFATE 2 GM/50ML SOLUTION: Performed by: INTERNAL MEDICINE

## 2023-08-03 PROCEDURE — 97116 GAIT TRAINING THERAPY: CPT

## 2023-08-03 PROCEDURE — 99232 SBSQ HOSP IP/OBS MODERATE 35: CPT | Performed by: NURSE PRACTITIONER

## 2023-08-03 PROCEDURE — 83735 ASSAY OF MAGNESIUM: CPT | Performed by: INTERNAL MEDICINE

## 2023-08-03 PROCEDURE — 97535 SELF CARE MNGMENT TRAINING: CPT | Performed by: OCCUPATIONAL THERAPIST

## 2023-08-03 PROCEDURE — 25010000002 AMIODARONE IN DEXTROSE 5% 150-4.21 MG/100ML-% SOLUTION: Performed by: NURSE PRACTITIONER

## 2023-08-03 PROCEDURE — 85025 COMPLETE CBC W/AUTO DIFF WBC: CPT | Performed by: INTERNAL MEDICINE

## 2023-08-03 RX ORDER — MAGNESIUM SULFATE HEPTAHYDRATE 40 MG/ML
2 INJECTION, SOLUTION INTRAVENOUS ONCE
Status: COMPLETED | OUTPATIENT
Start: 2023-08-03 | End: 2023-08-03

## 2023-08-03 RX ORDER — METOPROLOL SUCCINATE 25 MG/1
25 TABLET, EXTENDED RELEASE ORAL ONCE
Status: COMPLETED | OUTPATIENT
Start: 2023-08-03 | End: 2023-08-03

## 2023-08-03 RX ORDER — FLUCONAZOLE 200 MG/1
200 TABLET ORAL ONCE
Status: COMPLETED | OUTPATIENT
Start: 2023-08-03 | End: 2023-08-03

## 2023-08-03 RX ORDER — METOPROLOL SUCCINATE 50 MG/1
50 TABLET, EXTENDED RELEASE ORAL
Status: DISCONTINUED | OUTPATIENT
Start: 2023-08-04 | End: 2023-08-04 | Stop reason: HOSPADM

## 2023-08-03 RX ORDER — SPIRONOLACTONE 25 MG/1
25 TABLET ORAL DAILY
Status: DISCONTINUED | OUTPATIENT
Start: 2023-08-03 | End: 2023-08-04 | Stop reason: HOSPADM

## 2023-08-03 RX ADMIN — MAGNESIUM SULFATE HEPTAHYDRATE 2 G: 2 INJECTION, SOLUTION INTRAVENOUS at 09:57

## 2023-08-03 RX ADMIN — EMPAGLIFLOZIN 10 MG: 10 TABLET, FILM COATED ORAL at 08:21

## 2023-08-03 RX ADMIN — Medication 10 ML: at 08:21

## 2023-08-03 RX ADMIN — CLOPIDOGREL BISULFATE 75 MG: 75 TABLET, FILM COATED ORAL at 08:21

## 2023-08-03 RX ADMIN — Medication 250 MG: at 08:21

## 2023-08-03 RX ADMIN — POTASSIUM CHLORIDE 40 MEQ: 10 CAPSULE, COATED, EXTENDED RELEASE ORAL at 17:15

## 2023-08-03 RX ADMIN — METOPROLOL SUCCINATE 25 MG: 25 TABLET, EXTENDED RELEASE ORAL at 08:21

## 2023-08-03 RX ADMIN — POTASSIUM CHLORIDE 40 MEQ: 10 CAPSULE, COATED, EXTENDED RELEASE ORAL at 08:21

## 2023-08-03 RX ADMIN — Medication 250 MG: at 21:23

## 2023-08-03 RX ADMIN — ASPIRIN 81 MG: 81 TABLET, COATED ORAL at 08:21

## 2023-08-03 RX ADMIN — APIXABAN 5 MG: 5 TABLET, FILM COATED ORAL at 21:23

## 2023-08-03 RX ADMIN — VANCOMYCIN HYDROCHLORIDE 125 MG: 125 CAPSULE ORAL at 11:37

## 2023-08-03 RX ADMIN — FLUCONAZOLE 200 MG: 200 TABLET ORAL at 11:37

## 2023-08-03 RX ADMIN — DIGOXIN 125 MCG: 125 TABLET ORAL at 11:36

## 2023-08-03 RX ADMIN — METOPROLOL SUCCINATE 25 MG: 25 TABLET, EXTENDED RELEASE ORAL at 09:57

## 2023-08-03 RX ADMIN — VANCOMYCIN HYDROCHLORIDE 125 MG: 125 CAPSULE ORAL at 17:15

## 2023-08-03 RX ADMIN — BUSPIRONE HYDROCHLORIDE 10 MG: 10 TABLET ORAL at 21:23

## 2023-08-03 RX ADMIN — FAMOTIDINE 20 MG: 20 TABLET, FILM COATED ORAL at 08:21

## 2023-08-03 RX ADMIN — VANCOMYCIN HYDROCHLORIDE 125 MG: 125 CAPSULE ORAL at 03:40

## 2023-08-03 RX ADMIN — AMIODARONE HYDROCHLORIDE 150 MG: 1.5 INJECTION, SOLUTION INTRAVENOUS at 05:07

## 2023-08-03 RX ADMIN — FAMOTIDINE 20 MG: 20 TABLET, FILM COATED ORAL at 17:15

## 2023-08-03 RX ADMIN — ATORVASTATIN CALCIUM 20 MG: 10 TABLET, FILM COATED ORAL at 08:21

## 2023-08-03 RX ADMIN — Medication 10 ML: at 21:24

## 2023-08-03 RX ADMIN — SPIRONOLACTONE 25 MG: 25 TABLET ORAL at 09:57

## 2023-08-03 RX ADMIN — APIXABAN 5 MG: 5 TABLET, FILM COATED ORAL at 11:36

## 2023-08-03 RX ADMIN — BUSPIRONE HYDROCHLORIDE 10 MG: 10 TABLET ORAL at 09:57

## 2023-08-03 RX ADMIN — VANCOMYCIN HYDROCHLORIDE 125 MG: 125 CAPSULE ORAL at 21:31

## 2023-08-03 NOTE — PLAN OF CARE
Goal Outcome Evaluation:  Plan of Care Reviewed With: patient        Progress: improving  Outcome Evaluation: Pt up in chair., sit-stand supervision. Pt able to amb hallway sba however pt did have mult LOB w/ self correction and reaching for handrails at times for balance. discussed home safety and POC. pt reports plan is to d/c home tomorrow and family is working on getting pt to assistive living

## 2023-08-03 NOTE — PLAN OF CARE
Goal Outcome Evaluation:  Plan of Care Reviewed With: patient        Progress: improving  Outcome Evaluation: OT tx complete. A&Ox4. Pt is sitting in bedside chair with o2 N/C off. Pt states he does not use o2 at home and stats maintained between 94-97% throughout tx. Pt states he is planning to d/c home with family assistance maybe tomorrow. Pt reports no pain throughout tx. Today pt completed sit<>stand with CGA/verbal cues. While standing table side pt completed washing hands/face and grooming activities with SBA. Pt was able to bring food/liquids to mouth with SBA. Pt was able to stand unsupported for approx. 10 min to increase activity tolerance and balance. Stand<>sit with SBA/verbal cues for hand placement. Pt education provided on energy conservation techniques. Pt was left in the bedside chair with call light in reach. O2 2L N/C was donned after tx. OT will continue POC to improve activity tolerance, strength, and balance to support ADL performance. Recommended d/c assisted living.      Anticipated Discharge Disposition (OT): assisted living

## 2023-08-03 NOTE — THERAPY TREATMENT NOTE
Patient Name: Wyatt Curry  : 1930    MRN: [id number]                              Today's Date: 8/3/2023       Admit Date: 2023    Visit Dx:     ICD-10-CM ICD-9-CM   1. Atrial fibrillation with RVR  I48.91 427.31   2. Hypomagnesemia  E83.42 275.2   3. Colitis  K52.9 558.9   4. Decreased independence with activities of daily living [Z78.9 (ICD-10-CM)]  Z78.9 V49.89   5. Impaired mobility [Z74.09 (ICD-10-CM)]  Z74.09 799.89     Patient Active Problem List   Diagnosis    Benign localized prostatic hyperplasia without lower urinary tract symptoms (LUTS)    History of bladder cancer    Left rotator cuff tear arthropathy    Coronary artery disease involving native coronary artery of native heart without angina pectoris    Left bundle branch block    Essential hypertension    Mixed hyperlipidemia    Overweight (BMI 25.0-29.9)    Non-rheumatic mitral regurgitation    Other fatigue    History of coronary artery stent placement    Lung nodule    Mediastinal adenopathy    Cough    Abnormal positron emission tomography (PET) scan    Basal cell carcinoma    Neoplasm of uncertain behavior    Elevated troponin    Bleeding    Ischemic cardiomyopathy    Pneumonia of right lower lobe due to infectious organism    PAF (paroxysmal atrial fibrillation)    Proctocolitis (high concern for C. 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    GERD (gastroesophageal reflux disease)     Hyperlipidemia     Hypertension     Irregular heart beat     Lung nodules      Past Surgical History:   Procedure Laterality Date    BLADDER SURGERY      CARDIAC  CATHETERIZATION      CARDIAC CATHETERIZATION Left 4/8/2023    Procedure: Cardiac Catheterization/Vascular Study;  Surgeon: Sukhi Hartley MD;  Location:  [facility]ATH [facility];  Service: Cardiology;  Laterality: Left;    COLONOSCOPY      CORONARY ANGIOPLASTY WITH STENT PLACEMENT  2006    STENT X 2    CYSTOSCOPY      ENDOSCOPY      EXCISION LESION N/A 4/7/2023    Procedure: EXCISION LESION OF RIGHT TEMPLE AND LEFT TEMPLE WITH FROZEN SECTION WITH POSSIBLE FLAP OR GRAFT;  Surgeon: Brijesh Nickerson MD;  Location:  [facility] OR;  Service: ENT;  Laterality: N/A;    FLAP HEAD/NECK Left 03/09/2020    Procedure: POSSIBLE FLAP;  Surgeon: Brijesh Nickerson MD;  Location:  [facility] OR;  Service: ENT;  Laterality: Left;    HEAD/NECK LESION/CYST EXCISION Left 03/09/2020    Procedure: 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 with frozen section and possible flap or graft;  Surgeon: Brijesh Nickerson MD;  Location:  [facility] OR;  Service: ENT;  Laterality: Left;    HERNIA REPAIR      SHOULDER SURGERY      SKIN BIOPSY      lip biopsy    SKIN FULL THICKNESS GRAFT Left 03/09/2020    Procedure: OR GRAFT;  Surgeon: Brijesh Nickerson MD;  Location:  [facility] OR;  Service: ENT;  Laterality: Left;    TRANSURETHRAL RESECTION OF BLADDER TUMOR        General Information       Row Name 08/03/23 1105          OT Time and Intention    Document Type therapy note (daily note)  -JISMA (r) KT (t) JISMA (c)     Mode of Treatment occupational therapy  -JJ (r) KT (t) JJ (c)       Row Name 08/03/23 1105          General Information    Patient Profile Reviewed yes  -JJ (r) KT (t) JJ (c)     Existing Precautions/Restrictions fall  -JJ (r) KT (t) JJ (c)     Barriers to Rehab hearing deficit;medically complex  Hearing aids  -JJ (r) KT (t) JJ (c)       Row Name 08/03/23 1105          Cognition    Orientation Status (Cognition) oriented x 4  -JJ (r) KT (t) JJ (c)       Row Name 08/03/23 1105          Safety Issues, Functional  Mobility    Safety Issues Affecting Function (Mobility) impulsivity;insight into deficits/self-awareness;awareness of need for assistance  -JJ (r) KT (t) JJ (c)     Impairments Affecting Function (Mobility) balance;endurance/activity tolerance  -JJ (r) KT (t) JJ (c)               User Key  (r) = Recorded By, (t) = Taken By, (c) = Cosigned By      Initials Name Provider Type    Kassi Porter OTR/L, CSRS Occupational Therapist    Micheline Otero OT Student OT Student                     Mobility/ADL's       Row Name 08/03/23 1105          Bed Mobility    Comment, (Bed Mobility) Up in chair  -JJ (r) KT (t) JJ (c)       Row Name 08/03/23 1105          Transfers    Transfers sit-stand transfer;stand-sit transfer  -JJ (r) KT (t) JJ (c)       Row Name 08/03/23 1105          Sit-Stand Transfer    Sit-Stand [place] (Transfers) standby assist  -JJ (r) KT (t) JJ (c)       Row Name 08/03/23 1105          Stand-Sit Transfer    Stand-Sit [place] (Transfers) standby assist  -JJ (r) KT (t) JJ (c)       Row Name 08/03/23 1105          Activities of Daily Living    BADL Assessment/Intervention grooming;feeding  -JJ (r) KT (t) JJ (c)       Row Name 08/03/23 1105          Grooming Assessment/Training    [place] Level (Grooming) wash face, hands;standby assist  -JJ (r) KT (t) JJ (c)     Position (Grooming) unsupported standing  -JJ (r) KT (t) JJ (c)       Row Name 08/03/23 1105          Self-Feeding Assessment/Training    [place] Level (Feeding) liquids to mouth;prepare tray/open items;scoop food and bring to mouth;independent  -JJ (r) KT (t) JJ (c)     Position (Self-Feeding) unsupported sitting  -JJ (r) KT (t) JJ (c)               User Key  (r) = Recorded By, (t) = Taken By, (c) = Cosigned By      Initials Name Provider Type    Kassi Porter OTR/L, CSRS Occupational Therapist    Micheline Otero, OT Student OT Student                   Obj/Interventions       Row Name 08/03/23 1107           Balance    Balance Assessment sitting static balance;sitting dynamic balance;sit to stand dynamic balance;standing static balance;standing dynamic balance  -JJ (r) KT (t) JJ (c)     Static Sitting Balance independent  -JJ (r) KT (t) JJ (c)     Dynamic Sitting Balance independent  -JJ (r) KT (t) JJ (c)     Position, Sitting Balance sitting in chair;unsupported  -JJ (r) KT (t) JJ (c)     Sit to Stand Dynamic Balance standby assist  -JJ (r) KT (t) JJ (c)     Static Standing Balance standby assist  -JJ (r) KT (t) JJ (c)     Dynamic Standing Balance standby assist  -JJ (r) KT (t) JJ (c)     Position/Device Used, Standing Balance unsupported  -JJ (r) KT (t) JJ (c)     Balance Interventions sitting;standing;sit to stand;static;dynamic;occupation based/functional task  -JJ (r) KT (t) JJ (c)               User Key  (r) = Recorded By, (t) = Taken By, (c) = Cosigned By      Initials Name Provider Type    Kassi Porter, OTR/L, CSRS Occupational Therapist    Micheline Otero, OT Student OT Student                   Goals/Plan    No documentation.                  Clinical Impression       Row Name 08/03/23 1112          Pain Assessment    Pretreatment Pain Rating 0/10 - no pain  -JJ (r) KT (t) JJ (c)     Posttreatment Pain Rating 0/10 - no pain  -JISMA (r) KT (t) JJ (c)     Pain Intervention(s) Repositioned;Ambulation/increased activity  -JJ (r) KT (t) JJ (c)       Row Name 08/03/23 1112          Plan of Care Review    Plan of Care Reviewed With patient  -JISMA (r) KT (t) JJ (c)     Progress improving  -JISMA (r) KT (t) JJ (c)     Outcome Evaluation OT tx complete. A&Ox4. Pt is sitting in bedside chair with o2 N/C off. Pt states he does not use o2 at home and stats maintained between 94-97% throughout tx. Pt states he is planning to d/c home with family assistance maybe tomorrow. Pt reports no pain throughout tx. Today pt completed sit<>stand with CGA/verbal cues. While standing table side pt completed washing hands/face and  grooming activities with SBA. Pt was able to bring food/liquids to mouth with SBA. Pt was able to stand unsupported for approx. 10 min to increase activity tolerance and balance. Stand<>sit with SBA/verbal cues for hand placement. Pt education provided on energy conservation techniques. Pt was left in the bedside chair with call light in reach. O2 2L N/C was donned after tx. OT will continue POC to improve activity tolerance, strength, and balance to support ADL performance. Recommended d/c assisted living.  -JJ (r) KT (t) JJ (c)       Row Name 08/03/23 1112          Therapy Plan Review/Discharge Plan (OT)    Anticipated Discharge Disposition (OT) assisted living  -JJ (r) KT (t) JJ (c)       Row Name 08/03/23 1112          Vital Signs    O2 Delivery Pre Treatment room air  pt o2 N/C is set on 2L  -JJ (r) KT (t) JJ (c)     O2 Delivery Intra Treatment room air  -JJ (r) KT (t) JJ (c)     O2 Delivery Post Treatment nasal cannula  -JJ (r) KT (t) JJ (c)     Pre Patient Position Sitting  -JJ (r) KT (t) JJ (c)     Intra Patient Position Standing  -JJ (r) KT (t) JJ (c)     Post Patient Position Sitting  -JJ (r) KT (t) JJ (c)       Row Name 08/03/23 1112          Positioning and Restraints    Pre-Treatment Position sitting in chair/recliner  -JJ (r) KT (t) JJ (c)     Post Treatment Position chair  -JJ (r) KT (t) JJ (c)     In Chair notified nsg;sitting;call light within reach;encouraged to call for assist  -JJ (r) KT (t) JJ (c)               User Key  (r) = Recorded By, (t) = Taken By, (c) = Cosigned By      Initials Name Provider Type    Kassi Porter, OTR/L, CSRS Occupational Therapist    Micheline Otero, OT Student OT Student                   Outcome Measures       Row Name 08/03/23 1105          How much help from another is currently needed...    Putting on and taking off regular lower body clothing? 3  -JJ (r) KT (t) JJ (c)     Bathing (including washing, rinsing, and drying) 3  -JJ (r) CECILIA (t) KAREN (c)      Toileting (which includes using toilet bed pan or urinal) 3  -JJ (r) KT (t) JJ (c)     Putting on and taking off regular upper body clothing 4  -JJ (r) KT (t) JJ (c)     Taking care of personal grooming (such as brushing teeth) 4  -JJ (r) KT (t) JJ (c)     Eating meals 4  -JJ (r) KT (t) JJ (c)     AM-PAC 6 Clicks Score (OT) 21  -JJ (r) KT (t)       Row Name 08/03/23 0755          How much help from another person do you currently need...    Turning from your back to your side while in flat bed without using bedrails? 4  -SC     Moving from lying on back to sitting on the side of a flat bed without bedrails? 4  -SC     Moving to and from a bed to a chair (including a wheelchair)? 3  -SC     Standing up from a chair using your arms (e.g., wheelchair, bedside chair)? 3  -SC     Climbing 3-5 steps with a railing? 3  -SC     To walk in hospital room? 3  -SC     AM-PAC 6 Clicks Score (PT) 20  -SC     Highest level of mobility 6 --> Walked 10 steps or more  -SC       Row Name 08/03/23 1105          Functional Assessment    Outcome Measure Options AM-PAC 6 Clicks Daily Activity (OT)  -JJ (r) KT (t) JJ (c)               User Key  (r) = Recorded By, (t) = Taken By, (c) = Cosigned By      Initials Name Provider Type    SC Tiki Teresa RN Registered Nurse    Kassi Porter, OTR/L, CSRS Occupational Therapist    Micheline Otero, OT Student OT Student                    Occupational Therapy Education       Title: PT OT SLP Therapies (In Progress)       Topic: Occupational Therapy (In Progress)       Point: ADL training (Done)       Description:   Instruct learner(s) on proper safety adaptation and remediation techniques during self care or transfers.   Instruct in proper use of assistive devices.                  Learning Progress Summary             Patient Acceptance, E, VU by KT at 8/3/2023 1134    Acceptance, E,D, VU,NR by  at 8/2/2023 1448    Acceptance, E, VU by  at 8/1/2023 0820    Comment: Educated on  POC, benefits and purpose of OT services.                         Point: Home exercise program (Not Started)       Description:   Instruct learner(s) on appropriate technique for monitoring, assisting and/or progressing therapeutic exercises/activities.                  Learner Progress:  Not documented in this visit.              Point: Precautions (Not Started)       Description:   Instruct learner(s) on prescribed precautions during self-care and functional transfers.                  Learner Progress:  Not documented in this visit.              Point: Body mechanics (Done)       Description:   Instruct learner(s) on proper positioning and spine alignment during self-care, functional mobility activities and/or exercises.                  Learning Progress Summary             Patient Acceptance, E, VU by KT at 8/3/2023 1134                                         User Key       Initials Effective Dates Name Provider Type Discipline     07/11/23 -  Magy Soto, OTR/L Occupational Therapist OT    KT 04/27/23 -  Micheline Dietz, OT Student OT Student OT     05/03/23 -  Adelaida Rodas, OT Student OT Student OT                  OT Recommendation and Plan     Plan of Care Review  Plan of Care Reviewed With: patient  Progress: improving  Outcome Evaluation: OT tx complete. A&Ox4. Pt is sitting in bedside chair with o2 N/C off. Pt states he does not use o2 at home and stats maintained between 94-97% throughout tx. Pt states he is planning to d/c home with family assistance maybe tomorrow. Pt reports no pain throughout tx. Today pt completed sit<>stand with CGA/verbal cues. While standing table side pt completed washing hands/face and grooming activities with SBA. Pt was able to bring food/liquids to mouth with SBA. Pt was able to stand unsupported for approx. 10 min to increase activity tolerance and balance. Stand<>sit with SBA/verbal cues for hand placement. Pt education provided on energy conservation techniques.  Pt was left in the bedside chair with call light in reach. O2 2L N/C was donned after tx. OT will continue POC to improve activity tolerance, strength, and balance to support ADL performance. Recommended d/c assisted living.     Time Calculation:         Time Calculation- OT       Row Name 08/03/23 1105             Time Calculation- OT    OT Start Time 1105  -JJ (r) KT (t) JJ (c)      OT Stop Time 1135  -JJ (r) KT (t) JJ (c)      OT Time Calculation (min) 30 min  -JJ (r) KT (t)      Total Timed Code Minutes- OT 30 minute(s)  -JJ (r) KT (t) JJ (c)      OT Received On 08/03/23  -JJ (r) KT (t) JJ (c)         Timed Charges    54906 - OT Self Care/Mgmt Minutes 30  -JJ (r) KT (t) JJ (c)         Total Minutes    Timed Charges Total Minutes 30  -JJ (r) KT (t)       Total Minutes 30  -JJ (r) KT (t)                User Key  (r) = Recorded By, (t) = Taken By, (c) = Cosigned By      Initials Name Provider Type    Kassi Porter, OTR/L, CSRS Occupational Therapist    Micheline Otero OT Student OT Student                           Micheline Dietz OT Student  8/3/2023

## 2023-08-03 NOTE — PLAN OF CARE
[facility] COMPLAINT       Chief Complaint   Patient presents with    Flank Pain     right       Nurses Notes reviewed and I agree except as noted in the HPI. HISTORY OF PRESENT ILLNESS    Katheirne Jean-Baptiste is a 40 y.o. male who presents to the Emergency Department for the evaluation of right-sided flank pain. Patient seen 2 days ago at urgent care for same, was diagnosed clinically with kidney stones and started on Toradol. To the emergency department today as pain has not improved. Reports that pain is more tolerable with positioning. He denies hematuria or dysuria, denies history of kidney stones. Describes pain as dull and aching radiating from right flank to his right groin. The HPI was provided by the patient. REVIEW OF SYSTEMS     Review of Systems   Constitutional: Negative for chills, diaphoresis, fatigue and fever. HENT: Negative for congestion, ear pain, facial swelling, rhinorrhea, sinus pressure, sinus pain, sneezing, sore throat and trouble swallowing. Eyes: Negative for photophobia. Respiratory: Negative for apnea, cough, chest tightness, shortness of breath and wheezing. Cardiovascular: Negative for chest pain and palpitations. Gastrointestinal: Positive for abdominal pain. Negative for abdominal distention, constipation, diarrhea, nausea and vomiting. Endocrine: Negative for polydipsia, polyphagia and polyuria. Genitourinary: Positive for flank pain. Negative for decreased urine volume, dysuria, frequency and urgency. Musculoskeletal: Negative for arthralgias, back pain, joint swelling, myalgias, neck pain and neck stiffness. Skin: Negative for color change and wound. Neurological: Negative for dizziness, tremors, weakness, light-headedness, numbness and headaches.        PAST MEDICAL HISTORY    has a past medical history of Chronic neck pain, Heart murmur, History of cervical fracture, Migraine Goal Outcome Evaluation:    Problem: Adult Inpatient Plan of Care  Goal: Plan of Care Review  Outcome: Ongoing, Progressing  Flowsheets (Taken 8/3/2023 0504)  Progress: no change  Plan of Care Reviewed With: patient  Outcome Evaluation: Pt denies pain throughout shift. Pt resting well this shift. On RA. Tele- NSR 72-99. At 0305, pt went to afib and would intermittently go from afib to NSR. At 0350, HR up to 130-140s Afib, highest hr up to 160s. At 0430, pt had a 5.08 sec run of SVT hr up to 180s then back down to 132. Coleen JUAREZ notified, IV amio bolus ordered & being given currently. Will cont. to monitor throughout shift and notify MD with any changes. No BM this shift. Safety maintained.               headache, Nicotine dependence, and S/P splenectomy. SURGICAL HISTORY      has a past surgical history that includes Splenectomy. CURRENT MEDICATIONS       Discharge Medication List as of 11/4/2020  6:10 PM      CONTINUE these medications which have NOT CHANGED    Details   ketorolac (TORADOL) 10 MG tablet Take 1 tablet by mouth every 6 hours as needed for Pain, Disp-16 tablet,R-0Print             ALLERGIES     has No Known Allergies. FAMILY HISTORY     He indicated that the status of his neg hx is unknown.   family history is not on file. SOCIAL HISTORY      reports that he has been smoking cigarettes. He has a 10.00 pack-year smoking history. He has never used smokeless tobacco. He reports that he does not drink alcohol or use drugs. PHYSICAL EXAM     INITIAL VITALS:  height is 5' 6\" (1.676 m) and weight is 135 lb (61.2 kg). His oral temperature is 97.8 °F (36.6 °C). His blood pressure is 143/97 (abnormal) and his pulse is 74. His respiration is 18 and oxygen saturation is 98%. Physical Exam  Vitals signs and nursing note reviewed. Constitutional:       General: He is awake. He is not in acute distress. Appearance: Normal appearance. He is well-developed and normal weight. He is not ill-appearing, toxic-appearing or diaphoretic. HENT:      Head: Normocephalic and atraumatic. Nose: Nose normal.      Mouth/Throat:      Mouth: Mucous membranes are moist.      Pharynx: Oropharynx is clear. Eyes:      Extraocular Movements: Extraocular movements intact. Pupils: Pupils are equal, round, and reactive to light. Neck:      Musculoskeletal: Normal range of motion and neck supple. No neck rigidity or muscular tenderness. Cardiovascular:      Rate and Rhythm: Normal rate and regular rhythm. No extrasystoles are present. Pulses: Normal pulses. Heart sounds: Normal heart sounds, S1 normal and S2 normal. Heart sounds not distant. No murmur. No friction rub. No gallop. Pulmonary:      Effort: Pulmonary effort is normal. No tachypnea, bradypnea, accessory muscle usage, prolonged expiration, respiratory distress or retractions. Breath sounds: Normal breath sounds. No stridor. No wheezing, rhonchi or rales. Chest:      Chest wall: No tenderness. Abdominal:      General: Abdomen is flat. Bowel sounds are normal. There is no distension. Palpations: Abdomen is soft. There is no shifting dullness, hepatomegaly, splenomegaly or mass. Tenderness: There is no abdominal tenderness. There is right CVA tenderness (mild). Hernia: No hernia is present. Musculoskeletal: Normal range of motion. General: No swelling, tenderness, deformity or signs of injury. Right lower leg: No edema. Left lower leg: No edema. Skin:     General: Skin is warm and dry. Capillary Refill: Capillary refill takes less than 2 seconds. Coloration: Skin is not jaundiced or pale. Neurological:      General: No focal deficit present. Mental Status: He is alert and oriented to person, place, and time. Mental status is at baseline. GCS: GCS eye subscore is 4. GCS verbal subscore is 5. GCS motor subscore is 6. Cranial Nerves: Cranial nerves are intact. Sensory: Sensation is intact. Psychiatric:         Mood and Affect: Mood normal.         Behavior: Behavior normal. Behavior is cooperative. DIFFERENTIAL DIAGNOSIS:   Kidney stone, renal colic, lumbar strain, pyelonephritis, UTI, inguinal hernia    DIAGNOSTIC RESULTS     EKG: All EKG's are interpreted by the Emergency Department Physician who either signs or Co-signs this chart in the absence of a cardiologist.    None    RADIOLOGY: non-plainfilm images(s) such as CT, Ultrasound and MRI are read by the radiologist.    CT ABDOMEN PELVIS WO CONTRAST Additional Contrast? None   Final Result   Moderate stool throughout the colon. No bowel wall thickening or obstruction. No hydronephrosis. [id number]  [phone]    Schedule an appointment as soon as possible for a visit in 1 week  FOr re-evaluation    [facility] EMERGENCY DEPT  [address]  Go to   If symptoms worsen      DISCHARGE MEDICATIONS:  Discharge Medication List as of 11/4/2020  6:10 PM          (Please note that portions of this note were completed with a voice recognition program.  Efforts were made to edit the dictations but occasionally words are mis-transcribed.)    The patient was given an opportunity to see the Emergency Attending. The patient voiced understanding that I was a Mid-LevelProvider and was in agreement with being seen independently by myself.      SUSNA Miranda CNP, 11/9/20, 9:04 AM        SUSAN Lewis CNP  11/09/20 [id number]

## 2023-08-03 NOTE — PROGRESS NOTES
"Infectious Diseases Progress Note    Patient:  Wyatt Curry  YOB: 1930  MRN: [id number]   Admit date: 7/31/2023   Admitting Physician: Cherie Mcmanus MD  Primary Care Physician: Everardo Jackson MD    Chief Complaint/Interval History: He continues to do well.  He is up to chair.  He ate a full breakfast.  No watery bowel movement.  Stool partially formed.  He is without fever.  No abdominal pain.  He feels ready for discharge home.  He did not report any urinary symptoms.    No intake or output data in the 24 hours ending 08/03/23 0827  Allergies:   Allergies   Allergen Reactions    Pregabalin Mental Status Change     Made pt feel \"crazy\"     Current Scheduled Medications:   aspirin, 81 mg, Oral, Daily  atorvastatin, 20 mg, Oral, Daily  busPIRone, 10 mg, Oral, BID  clopidogrel, 75 mg, Oral, Daily  digoxin, 125 mcg, Oral, Daily  empagliflozin, 10 mg, Oral, Daily  famotidine, 20 mg, Oral, BID AC  metoprolol succinate XL, 25 mg, Oral, Q24H  potassium chloride, 40 mEq, Oral, BID With Meals  saccharomyces boulardii, 250 mg, Oral, BID  sodium chloride, 10 mL, Intravenous, Q12H  vancomycin, 125 mg, Oral, Q6H  [START ON 8/10/2023] vancomycin, 125 mg, Oral, Q12H  [START ON 8/17/2023] vancomycin, 125 mg, Oral, Q24H  [START ON 8/24/2023] vancomycin, 125 mg, Oral, Every Other Day      Current PRN Medications:    nitroglycerin    ondansetron **OR** ondansetron    Pharmacy Consult    [COMPLETED] Insert Peripheral IV **AND** sodium chloride    sodium chloride    sodium chloride    Review of Systems see HPI.  No cardiopulmonary complaints reported.    Vital Signs:  /59 (BP Location: Left arm, Patient Position: Lying)   Pulse 110   Temp 98 øF (36.7 øC) (Oral)   Resp 18   Ht 160 cm (63\")   Wt 60.6 kg (133 lb 11.2 oz)   SpO2 96%   BMI 23.68 kg/mý     Physical Exam  Vital signs - reviewed.  Abdomen soft and nontender.  No guarding or rebound.  General alert, pleasant, smiling, interactive, and in no " distress    Lab Results:  CBC:   Results from last 7 days   Lab Units 08/03/23  0507 08/02/23  1009 08/01/23  0359 07/31/23  1447   WBC 10*3/mm3 7.68 10.30 11.68* 12.08*   HEMOGLOBIN g/dL 11.7* 11.0* 10.4* 12.3*   HEMATOCRIT % 39.6 37.3* 34.7* 41.5   PLATELETS 10*3/mm3 238 219 185 221     BMP:  Results from last 7 days   Lab Units 08/03/23  0507 08/02/23  1009 08/01/23  0945 08/01/23  0359 07/31/23  1520   SODIUM mmol/L 141 139  --  138 140   POTASSIUM mmol/L 3.6 3.3* 3.3* 3.1* 3.5   CHLORIDE mmol/L 107 103  --  102 101   CO2 mmol/L 26.0 27.0  --  22.0 23.0   BUN mg/dL 11 11  --  16 12   CREATININE mg/dL 0.58* 0.50*  --  0.74* 0.62*   GLUCOSE mg/dL 95 143*  --  179* 141*   CALCIUM mg/dL 8.9 8.8  --  7.8* 8.4   ALT (SGPT) U/L  --   --   --   --  8     Urinalysis 2 days ago:  3-5 red blood cells  6-12 white blood cells  3-6 squamous epithelial cells  Urine culture yeast    Culture Results:   Blood Culture   Date Value Ref Range Status   07/31/2023 No growth at 2 days  Preliminary   07/31/2023 No growth at 2 days  Preliminary     Urine Culture   Date Value Ref Range Status   08/01/2023 Yeast isolated (A)  Final     Comment:     No further workup.      Radiology: None  Additional Studies Reviewed: None    Impression:   1.  Recurrent C. difficile colitis-improving  2.  Suspect positive urine culture for least may be contaminant given squamous cells on UA, but large amount of budding yeast present.  Suspect a single dose of fluconazole would eradicate if mild cystitis/UTI.  3.  Systolic congestive heart failure with EF 40 to 45%-remains free of any cardiopulmonary symptoms    Recommendations:   Will give single dose of fluconazole 200 mg orally x1  Continue vancomycin taper  Okay with me for discharge home  I would like to see him in follow-up to make sure continued response to C. difficile treatment and no need for additional treatment following completion of his taper  Recommended vancomycin taper outlined  below    Continue oral vancomycin-suggest the following approach:  Vancomycin 125 mg orally 4 times daily for 10 days from initiation then  Vancomycin 125 mg orally twice daily for 7 days then  Vancomycin orally 125 mg for 7 days  Then vancomycin 125 mg orally every other day for 2 weeks    Follow-up appointment 2 to 3 weeks after hospital discharge    Jules Wren MD

## 2023-08-03 NOTE — PROGRESS NOTES
[facility] Medicine Services  INPATIENT PROGRESS NOTE    Patient Name: Wyatt Curry  Date of Admission: 7/31/2023  Today's Date: 08/03/23  Length of Stay: 3  Primary Care Physician: Everardo Jackson MD    Subjective   Chief Complaint: Diarrhea    HPI   Patient states that his diarrhea is resolved. He had episode of afib with RVR which is rate controlled.    Review of Systems   All pertinent negatives and positives are as above. All other systems have been reviewed and are negative unless otherwise stated.     Objective    Temp:  [97.6 øF (36.4 øC)-98.2 øF (36.8 øC)] 98.2 øF (36.8 øC)  Heart Rate:  [] 100  Resp:  [18] 18  BP: (114-142)/(52-68) 114/68  Physical Exam  Constitutional:       General: He is not in acute distress.     Appearance: He is well-developed. He is ill-appearing. He is not diaphoretic.      Comments: Chronically ill-appearing elderly man.   HENT:      Head: Normocephalic.   Eyes:      General: No scleral icterus.     Conjunctiva/sclera: Conjunctivae normal.      Pupils: Pupils are equal, round, and reactive to light.   Neck:      Thyroid: No thyromegaly.      Vascular: No JVD.      Trachea: No tracheal deviation.   Cardiovascular:      Rate and Rhythm: Normal rate. Rhythm irregular.      Heart sounds: Normal heart sounds. No murmur heard.    No friction rub. No gallop.   Pulmonary:      Effort: Pulmonary effort is normal. No respiratory distress.      Breath sounds: Normal breath sounds. No stridor. No wheezing or rales.   Chest:      Chest wall: No tenderness.   Abdominal:      General: Bowel sounds are normal. There is no distension.      Palpations: Abdomen is soft. There is no mass.      Tenderness: There is no abdominal tenderness. There is no guarding or rebound.      Hernia: No hernia is present.      Comments: No tendeness   Musculoskeletal:         General: No tenderness or deformity. Normal range of motion.      Cervical back: Normal range  of motion and neck supple.      Right lower leg: No edema.      Left lower leg: No edema.   Lymphadenopathy:      Cervical: No cervical adenopathy.   Skin:     General: Skin is warm and dry.      Coloration: Skin is not pale.      Findings: No erythema or rash.   Neurological:      General: No focal deficit present.      Mental Status: He is alert and oriented to person, place, and time.      Cranial Nerves: No cranial nerve deficit.      Sensory: No sensory deficit.      Motor: No abnormal muscle tone.      Coordination: Coordination normal.   Psychiatric:         Mood and Affect: Mood normal.         Behavior: Behavior normal.         Thought Content: Thought content normal.     Results Review:  I have reviewed the labs, radiology results, and diagnostic studies.    Laboratory Data:   Results from last 7 days   Lab Units 08/03/23  0507 08/02/23  1009 08/01/23  0359   WBC 10*3/mm3 7.68 10.30 11.68*   HEMOGLOBIN g/dL 11.7* 11.0* 10.4*   HEMATOCRIT % 39.6 37.3* 34.7*   PLATELETS 10*3/mm3 238 219 185        Results from last 7 days   Lab Units 08/03/23  0507 08/02/23  1009 08/01/23  0945 08/01/23  0359 07/31/23  1520   SODIUM mmol/L 141 139  --  138 140   POTASSIUM mmol/L 3.6 3.3* 3.3* 3.1* 3.5   CHLORIDE mmol/L 107 103  --  102 101   CO2 mmol/L 26.0 27.0  --  22.0 23.0   BUN mg/dL 11 11  --  16 12   CREATININE mg/dL 0.58* 0.50*  --  0.74* 0.62*   CALCIUM mg/dL 8.9 8.8  --  7.8* 8.4   BILIRUBIN mg/dL  --   --   --   --  0.7   ALK PHOS U/L  --   --   --   --  56   ALT (SGPT) U/L  --   --   --   --  8   AST (SGOT) U/L  --   --   --   --  25   GLUCOSE mg/dL 95 143*  --  179* 141*       Culture Data:   No results found for: BLOODCX, URINECX, WOUNDCX, MRSACX, RESPCX, STOOLCX    Radiology Data:   Imaging Results (Last 24 Hours)       ** No results found for the last 24 hours. **          I have reviewed the patient's current medications.     Assessment/Plan   Assessment  Active Hospital Problems    Diagnosis     **Atrial  fibrillation with RVR     Recurrent Clostridioides difficile diarrhea     Symptomatic hypotension     Hypomagnesemia    Hypokalemia    Treatment Plan  Atrial fibrillation with RVR is rate controlled at this time. Cardiology input appreciated. Continue digoxin. We will continue with p.o. metoprolol. Start eliquis for anticoagulation.    Continue oral vancomycin for C. difficile with plan for prolonged taper.  Infectious disease consultation input appreciated.    Continue plavix, Lipitor, Jardiance. Discontinue aspirin. Continue aldactone for ischemic cardiomyopathy    Replete potassium and magnesium aggressively    DVT prophylaxis with SCDs    CODE STATUS is full code    Medical Decision Making  Number and Complexity of problems: 2 acute, severe, high complexity medical problems.  Differential Diagnosis: None    Conditions and Status        Condition is improving.     MDM Data  External documents reviewed: None  Cardiac tracing (EKG, telemetry) interpretation: Telemetry reviewed by me  Radiology interpretation: None  Labs reviewed: CBC, BMP reviewed by me  Any tests that were considered but not ordered: None     Decision rules/scores evaluated (example [id number], Wells, etc): Christian Vasc score 5     Discussed with: Patient and his power of      Care Planning  Shared decision making: Patient and his power of   Code status and discussions: CODE STATUS is DNR/DNI    Disposition  Social Determinants of Health that impact treatment or disposition: None  I expect the patient to be discharged to home with home health in 1-2 days.     Electronically signed by Cherie Mcmanus MD, 08/03/23, 12:41 CDT.

## 2023-08-03 NOTE — PROGRESS NOTES
[facility] -  Progress Note     LOS: 3 days   Patient Care Team:  Everardo Jackson MD as PCP - General (Family Medicine)  Justin Howard MD as Consulting Physician (Urology)  Emigdio Moseley MD as Cardiologist (Cardiology)  Everardo Noriega MD as Consulting Physician (Pulmonary Disease)  Brijesh Nickerson MD as Consulting Physician (Otolaryngology)  Kaitlin Del Angel PA as Referring Physician (Physician Assistant)    Chief Complaint: Diarrhea     Subjective     Interval History:   Intermittent A-fib with fast rates. Overall patient is asymptomatic. Diarrhea improving. BP stable. No new complaints.     Review of Systems:     Review of Systems   Constitutional:  Positive for fatigue.   Gastrointestinal:  Positive for diarrhea.   Objective     Vital Sign Min/Max for last 24 hours  Temp  Min: 97.6 øF (36.4 øC)  Max: 98 øF (36.7 øC)   BP  Min: 118/62  Max: 142/56   Pulse  Min: 77  Max: 141   Resp  Min: 18  Max: 18   SpO2  Min: 96 %  Max: 97 %   No data recorded   Weight  Min: 60.6 kg (133 lb 11.2 oz)  Max: 60.6 kg (133 lb 11.2 oz)         08/03/23  0435   Weight: 60.6 kg (133 lb 11.2 oz)       Telemetry: A-fib episodes this morning up to 180.       Physical Exam:    Constitutional:       Appearance: Well-developed. Frail. Chronically ill-appearing.   Eyes:      Pupils: Pupils are equal, round, and reactive to light.   HENT:      Head: Normocephalic and atraumatic.   Neck:      Vascular: No carotid bruit or JVD.   Pulmonary:      Effort: Pulmonary effort is normal.      Breath sounds: Normal breath sounds.   Cardiovascular:      Normal rate. Irregular rhythm.   Pulses:     Intact distal pulses.   Edema:     Peripheral edema absent.   Abdominal:      General: Bowel sounds are normal.      Palpations: Abdomen is soft.   Musculoskeletal: Normal range of motion.      Cervical back: Normal range of motion and neck supple. Skin:     General: Skin is warm and dry.   Neurological:       Mental Status: Alert and oriented to person, place, and time.      Deep Tendon Reflexes: Reflexes are normal and symmetric.   Psychiatric:         Behavior: Behavior normal.         Thought Content: Thought content normal.         Judgment: Judgment normal.     Results Review:   Lab Results (last 24 hours)       Procedure Component Value Units Date/Time    Magnesium [[id number]]  (Normal) Collected: 08/03/23 0507    Specimen: Blood Updated: 08/03/23 0601     Magnesium 1.8 mg/dL     Basic Metabolic Panel [[id number]]  (Abnormal) Collected: 08/03/23 0507    Specimen: Blood Updated: 08/03/23 0601     Glucose 95 mg/dL      BUN 11 mg/dL      Creatinine 0.58 mg/dL      Sodium 141 mmol/L      Potassium 3.6 mmol/L      Chloride 107 mmol/L      CO2 26.0 mmol/L      Calcium 8.9 mg/dL      BUN/Creatinine Ratio 19.0     Anion Gap 8.0 mmol/L      eGFR 90.9 mL/min/1.73     Narrative:      GFR Normal >60  Chronic Kidney Disease <60  Kidney Failure <15    The GFR formula is only valid for adults with stable renal function between ages 18 and 70.    CBC & Differential [[id number]]  (Abnormal) Collected: 08/03/23 0507    Specimen: Blood Updated: 08/03/23 0540    Narrative:      The following orders were created for panel order CBC & Differential.  Procedure                               Abnormality         Status                     ---------                               -----------         ------                     CBC Auto Differential[[id number]]        Abnormal            Final result                 Please view results for these tests on the individual orders.    CBC Auto Differential [[id number]]  (Abnormal) Collected: 08/03/23 0507    Specimen: Blood Updated: 08/03/23 0540     WBC 7.68 10*3/mm3      RBC 4.16 10*6/mm3      Hemoglobin 11.7 g/dL      Hematocrit 39.6 %      MCV 95.2 fL      MCH 28.1 pg      MCHC 29.5 g/dL      RDW 17.2 %      RDW-SD 59.4 fl      MPV 11.0 fL      Platelets 238 10*3/mm3      Neutrophil % 54.5 %       Lymphocyte % 30.3 %      Monocyte % 10.8 %      Eosinophil % 3.6 %      Basophil % 0.3 %      Immature Grans % 0.5 %      Neutrophils, Absolute 4.18 10*3/mm3      Lymphocytes, Absolute 2.33 10*3/mm3      Monocytes, Absolute 0.83 10*3/mm3      Eosinophils, Absolute 0.28 10*3/mm3      Basophils, Absolute 0.02 10*3/mm3      Immature Grans, Absolute 0.04 10*3/mm3      nRBC 0.0 /100 WBC     Blood Culture - Blood, Arm, Right [[id number]]  (Normal) Collected: 07/31/23 1849    Specimen: Blood from Arm, Right Updated: 08/02/23 1915     Blood Culture No growth at 2 days    Blood Culture - Blood, Arm, Left [[id number]]  (Normal) Collected: 07/31/23 1844    Specimen: Blood from Arm, Left Updated: 08/02/23 1915     Blood Culture No growth at 2 days    Magnesium [[id number]]  (Normal) Collected: 08/02/23 1009    Specimen: Blood Updated: 08/02/23 1107     Magnesium 1.8 mg/dL     Basic Metabolic Panel [[id number]]  (Abnormal) Collected: 08/02/23 1009    Specimen: Blood Updated: 08/02/23 1107     Glucose 143 mg/dL      BUN 11 mg/dL      Creatinine 0.50 mg/dL      Sodium 139 mmol/L      Potassium 3.3 mmol/L      Chloride 103 mmol/L      CO2 27.0 mmol/L      Calcium 8.8 mg/dL      BUN/Creatinine Ratio 22.0     Anion Gap 9.0 mmol/L      eGFR 95.1 mL/min/1.73     Narrative:      GFR Normal >60  Chronic Kidney Disease <60  Kidney Failure <15    The GFR formula is only valid for adults with stable renal function between ages 18 and 70.    CBC & Differential [[id number]]  (Abnormal) Collected: 08/02/23 1009    Specimen: Blood Updated: 08/02/23 1058    Narrative:      The following orders were created for panel order CBC & Differential.  Procedure                               Abnormality         Status                     ---------                               -----------         ------                     CBC Auto Differential[[id number]]        Abnormal            Final result                 Please view results for these tests on the  individual orders.    CBC Auto Differential [[id number]]  (Abnormal) Collected: 08/02/23 1009    Specimen: Blood Updated: 08/02/23 1058     WBC 10.30 10*3/mm3      RBC 3.92 10*6/mm3      Hemoglobin 11.0 g/dL      Hematocrit 37.3 %      MCV 95.2 fL      MCH 28.1 pg      MCHC 29.5 g/dL      RDW 17.6 %      RDW-SD 59.8 fl      MPV 11.1 fL      Platelets 219 10*3/mm3      Neutrophil % 72.6 %      Lymphocyte % 15.5 %      Monocyte % 10.1 %      Eosinophil % 0.9 %      Basophil % 0.3 %      Neutrophils, Absolute 7.48 10*3/mm3      Lymphocytes, Absolute 1.60 10*3/mm3      Monocytes, Absolute 1.04 10*3/mm3      Eosinophils, Absolute 0.09 10*3/mm3      Basophils, Absolute 0.03 10*3/mm3     Urine Culture - Urine, Urine, Clean Catch [[id number]]  (Abnormal) Collected: 08/01/23 0018    Specimen: Urine, Clean Catch Updated: 08/02/23 0929     Urine Culture Yeast isolated     Comment: No further workup.        Narrative:      Colonization of the urinary tract without infection is common. Treatment is discouraged unless the patient is symptomatic, pregnant, or undergoing an invasive urologic procedure.           Results from last 7 days   Lab Units 08/03/23  0507 08/02/23  1009 08/01/23  0945 08/01/23  0359   SODIUM mmol/L 141 139  --  138   POTASSIUM mmol/L 3.6 3.3* 3.3* 3.1*   CHLORIDE mmol/L 107 103  --  102   CO2 mmol/L 26.0 27.0  --  22.0   BUN mg/dL 11 11  --  16   CREATININE mg/dL 0.58* 0.50*  --  0.74*   GLUCOSE mg/dL 95 143*  --  179*   CALCIUM mg/dL 8.9 8.8  --  7.8*        Results for orders placed during the hospital encounter of 05/13/23    Adult Transthoracic Echo Complete W/ Cont if Necessary Per Protocol    Interpretation Summary    Left ventricular systolic function is mildly decreased. Left ventricular ejection fraction appears to be 41 - 45%.    The following segments are hypokinetic: apical anterior, apical septal, apical inferior, apical lateral and apex.    Left ventricular wall thickness is consistent with mild  septal asymmetric hypertrophy.    Left ventricular diastolic function is consistent with (grade I) impaired relaxation.    He left atrial cavity is mildly dilated.    There is mild to moderate thickening of the aortic valve.    . Mild to moderate mitral valve regurgitation is present    Mild pulmonary hypertension is present.     Medication Review: yes  Current Facility-Administered Medications   Medication Dose Route Frequency Provider Last Rate Last Admin    aspirin EC tablet 81 mg  81 mg Oral Daily Cherie Mcmanus MD   81 mg at 08/03/23 0821    atorvastatin (LIPITOR) tablet 20 mg  20 mg Oral Daily Cherie Mcmanus MD   20 mg at 08/03/23 0821    busPIRone (BUSPAR) tablet 10 mg  10 mg Oral BID Cherie Mcmanus MD   10 mg at 08/02/23 2224    clopidogrel (PLAVIX) tablet 75 mg  75 mg Oral Daily Cherie Mcmanus MD   75 mg at 08/03/23 0821    digoxin (LANOXIN) tablet 125 mcg  125 mcg Oral Daily Jovani Lilly APRN        empagliflozin (JARDIANCE) tablet 10 mg  10 mg Oral Daily Cherie Mcmanus MD   10 mg at 08/03/23 0821    famotidine (PEPCID) tablet 20 mg  20 mg Oral BID AC Cherie Mcmanus MD   20 mg at 08/03/23 0821    metoprolol succinate XL (TOPROL-XL) 24 hr tablet 25 mg  25 mg Oral Q24H Jovani Lilly APRN   25 mg at 08/03/23 0821    nitroglycerin (NITROSTAT) SL tablet 0.4 mg  0.4 mg Sublingual Q5 Min PRN Cherie Mcmanus MD        ondansetron (ZOFRAN) tablet 4 mg  4 mg Oral Q6H PRN Cherie Mcmanus MD        Or    ondansetron (ZOFRAN) injection 4 mg  4 mg Intravenous Q6H PRN Cherie Mcmanus MD   4 mg at 08/01/23 1236    Pharmacy Consult   Does not apply Continuous PRN Cherie Mcmanus MD        potassium chloride (MICRO-K) CR capsule 40 mEq  40 mEq Oral BID With Meals Cherie Mcmanus MD   40 mEq at 08/03/23 0821    saccharomyces boulardii (FLORASTOR) capsule 250 mg  250 mg Oral BID Cherie Mcmanus MD   250 mg at 08/03/23 0821    sodium chloride 0.9 % flush 10 mL  10 mL  "Intravenous PRN Cherie Mcmanus MD        sodium chloride 0.9 % flush 10 mL  10 mL Intravenous Q12H Cherie Mcmanus MD   10 mL at 08/03/23 0821    sodium chloride 0.9 % flush 10 mL  10 mL Intravenous PRN Cherie Mcmanus MD        sodium chloride 0.9 % infusion 40 mL  40 mL Intravenous PRN Cherie Mcmanus MD        vancomycin (VANCOCIN) capsule 125 mg  125 mg Oral Q6H Cherie Mcmanus MD   125 mg at 08/03/23 0340    [START ON 8/10/2023] vancomycin (VANCOCIN) capsule 125 mg  125 mg Oral Q12H Cherie Mcmanus MD        [START ON 8/17/2023] vancomycin (VANCOCIN) capsule 125 mg  125 mg Oral Q24H Cherie Mcmanus MD        [START ON 8/24/2023] vancomycin (VANCOCIN) capsule 125 mg  125 mg Oral Every Other Day Cherie Mcmanus MD             Assessment & Plan       Atrial fibrillation with RVR    Recurrent Clostridioides difficile diarrhea    Symptomatic hypotension    Hypomagnesemia    Plan:  Atrial Fibrillation- Episodes of A-fib RVR overnight. Potassium replaced by primary. Will increase Toprol. Switched to PO Digoxin yesterday. Given IV Amiodarone this morning. [id number] 5. I  have discussed with patient and his POA. There are agreeable to anticoagulation. Patient notes \"I do not want to have a stroke\". Reportedly had a UTI at time of hematuria. Recurrent C-Diff with diarrhea- likely contributed to hypotension,electrolyte abnormalities and  A-fib RVR. Improving. ID following. Stop Aspirin. Start Eliquis 5 mg BID- need to monitor weight closely. If drops below 60 kg would need Eliquis 2.5 mg BID.     Hypotension- much improved.    Coronary Artery Disease with NSTEMI in April of 2023 with PCI of LAD. NO angina. On Lipitor. Stop Aspirin. Eliquis and Plavix.     Ischemic Cardiomyopathy- LVEF 40-45%. No heart failure. Monitor daily weights and low salt diet. Entresto on hold due to hypotension. Will start Aldactone. Needs close follow up with [facility] JUAREZ at discharge to address heart " failure medications.     Mitral MR- mild to moderate    Spoke with POA and niece Zamzam Marie today for roughly 20 minutes. Discussed medications and anticoagulation. She is agrees with patient for anticoagulation.     Electronically signed by LARRY Gonzalez, 08/03/23, 8:56 AM CDT.

## 2023-08-03 NOTE — THERAPY TREATMENT NOTE
Acute Care - Physical Therapy Treatment Note  [facility]     Patient Name: Wyatt Curry  : 1930  MRN: [id number]  Today's Date: 8/3/2023      Visit Dx:     ICD-10-CM ICD-9-CM   1. Atrial fibrillation with RVR  I48.91 427.31   2. Hypomagnesemia  E83.42 275.2   3. Colitis  K52.9 558.9   4. Decreased independence with activities of daily living [Z78.9 (ICD-10-CM)]  Z78.9 V49.89   5. Impaired mobility [Z74.09 (ICD-10-CM)]  Z74.09 799.89     Patient Active Problem List   Diagnosis    Benign localized prostatic hyperplasia without lower urinary tract symptoms (LUTS)    History of bladder cancer    Left rotator cuff tear arthropathy    Coronary artery disease involving native coronary artery of native heart without angina pectoris    Left bundle branch block    Essential hypertension    Mixed hyperlipidemia    Overweight (BMI 25.0-29.9)    Non-rheumatic mitral regurgitation    Other fatigue    History of coronary artery stent placement    Lung nodule    Mediastinal adenopathy    Cough    Abnormal positron emission tomography (PET) scan    Basal cell carcinoma    Neoplasm of uncertain behavior    Elevated troponin    Bleeding    Ischemic cardiomyopathy    Pneumonia of right lower lobe due to infectious organism    PAF (paroxysmal atrial fibrillation)    Proctocolitis (high concern for C. 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    GERD (gastroesophageal reflux disease)     Hyperlipidemia     Hypertension     Irregular heart beat     Lung nodules      Past Surgical History:   Procedure Laterality Date    BLADDER SURGERY      CARDIAC  CATHETERIZATION      CARDIAC CATHETERIZATION Left 4/8/2023    Procedure: Cardiac Catheterization/Vascular Study;  Surgeon: Sukhi Hartley MD;  Location:  [facility] CATH [facility]VASIVE LOC[facility];  Service: Cardiology;  Laterality: Left;    COLONOSCOPY      CORONARY ANGIOPLASTY WITH STENT PLACEMENT  2006    STENT X 2    CYSTOSCOPY      ENDOSCOPY      EXCISION LESION N/A 4/7/2023    Procedure: EXCISION LESION OF RIGHT TEMPLE AND LEFT TEMPLE WITH FROZEN SECTION WITH POSSIBLE FLAP OR GRAFT;  Surgeon: Brijesh Nickerson MD;  Location:  [facility] OR;  Service: ENT;  Laterality: N/A;    FLAP HEAD/NECK Left 03/09/2020    Procedure: POSSIBLE FLAP;  Surgeon: Brijesh Nickerson MD;  Location:  [facility] OR;  Service: ENT;  Laterality: Left;    HEAD/NECK LESION/CYST EXCISION Left 03/09/2020    Procedure: 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 with frozen section and possible flap or graft;  Surgeon: Brijesh Nickerson MD;  Location:  [facility] OR;  Service: ENT;  Laterality: Left;    HERNIA REPAIR      SHOULDER SURGERY      SKIN BIOPSY      lip biopsy    SKIN FULL THICKNESS GRAFT Left 03/09/2020    Procedure: OR GRAFT;  Surgeon: Brijesh Nickerson MD;  Location:  [facility] OR;  Service: ENT;  Laterality: Left;    TRANSURETHRAL RESECTION OF BLADDER TUMOR       PT Assessment (last 12 hours)       PT Evaluation and Treatment       Row Name 08/03/23 [facility] 08/03/23 1150       Physical Therapy Time and Intention    Subjective Information no complaints  -NW --    Document Type therapy note (daily note)  -NW --    Mode of Treatment physical therapy  -NW --    Comment, Session Not Performed -- just finished w/ OT  -NW      Row Name 08/03/23 1423          General Information    Existing Precautions/Restrictions fall  -NW       Row Name 08/03/23 1423          Pain    Pretreatment Pain Rating 0/10 - no pain  -NW       Row Name 08/03/23 1423          Bed Mobility    Comment, (Bed Mobility) chair  -NW       Row Name 08/03/23 1423           Sit-Stand Transfer    Sit-Stand [place] (Transfers) supervision  -NW       Row Name 08/03/23 1423          Stand-Sit Transfer    Stand-Sit [place] (Transfers) supervision  -NW       Row Name 08/03/23 1423          Gait/Stairs (Locomotion)    [place] Level (Gait) supervision  -NW     Distance in Feet (Gait) 150x2  -NW     Comment, (Gait/Stairs) had LOB during gait w/ self correction or reaching for handrails. discussed home safety and POC. Pt plans to d/c home tomorrow. Family looking into assistive living  -NW       Row Name 08/03/23 1423          Safety Issues, Functional Mobility    Impairments Affecting Function (Mobility) balance;endurance/activity tolerance  -NW       Row Name 08/03/23 1423          Positioning and Restraints    Pre-Treatment Position sitting in chair/recliner  -NW     Post Treatment Position chair  -NW     In Chair sitting;call light within reach;encouraged to call for assist  -NW               User Key  (r) = Recorded By, (t) = Taken By, (c) = Cosigned By      Initials Name Provider Type    Fadumo Moreno, PTA Physical Therapist Assistant                    Physical Therapy Education       Title: PT OT SLP Therapies (In Progress)       Topic: Physical Therapy (In Progress)       Point: Mobility training (Done)       Learning Progress Summary             Patient Acceptance, E, VU by  at 8/1/2023 0815    Comment: educated pt. about safety precautions and needing help when wanting to move in the room                         Point: Home exercise program (Not Started)       Learner Progress:  Not documented in this visit.              Point: Body mechanics (Not Started)       Learner Progress:  Not documented in this visit.              Point: Precautions (Not Started)       Learner Progress:  Not documented in this visit.                              User Key       Initials Effective Dates Name Provider Type Discipline    EJ 05/25/23 -  Josie Gonzalez, PT Student PT  Student PT                  PT Recommendation and Plan     Plan of Care Reviewed With: patient  Progress: improving  Outcome Evaluation: Pt up in chair., sit-stand supervision. Pt able to amb hallway sba however pt did have mult LOB w/ self correction and reaching for handrails at times for balance. discussed home safety and POC. pt reports plan is to d/c home tomorrow and family is working on getting pt to assistive living       Time Calculation:    PT Charges       Row Name 08/03/23 1455             Time Calculation    Start Time 1423  -NW      Stop Time 1440  -NW      Time Calculation (min) 17 min  -NW      PT Received On 08/03/23  -NW      PT Goal Re-Cert Due Date 08/11/23  -NW         Time Calculation- PT    Total Timed Code Minutes- PT 17 minute(s)  -NW         Timed Charges    50236 - Gait Training Minutes  17  -NW         Total Minutes    Timed Charges Total Minutes 17  -NW       Total Minutes 17  -NW                User Key  (r) = Recorded By, (t) = Taken By, (c) = Cosigned By      Initials Name Provider Type    NW Fadumo Manzanares PTA Physical Therapist Assistant                  Therapy Charges for Today       Code Description Service Date Service Provider Modifiers Qty    93024132862 HC GAIT TRAINING EA 15 MIN 8/2/2023 Fadumo Manzanares PTA GP 1    86531943021 HC GAIT TRAINING EA 15 MIN 8/3/2023 Fadumo Manzanares PTA GP 1            PT G-Codes  Outcome Measure Options: AM-PAC 6 Clicks Daily Activity (OT)  AM-PAC 6 Clicks Score (PT): 20  AM-PAC 6 Clicks Score (OT): 21    Fadumo Manzanares PTA  8/3/2023

## 2023-08-03 NOTE — PLAN OF CARE
Problem: Adult Inpatient Plan of Care  Goal: Plan of Care Review  Outcome: Ongoing, Progressing  Flowsheets (Taken 8/3/2023 0509)  Progress: no change  Plan of Care Reviewed With: patient  Outcome Evaluation: Pt denies pain throughout shift. Pt resting well this shift. On RA. At 0305, pt went to afib and would intermittently go from afib to NSR. At 0350, HR up to 130-140s Afib, highest hr up to 160s. At 0430, pt had a 5.08 sec run of SVT hr up to 180s then back down to 132. Coleen JUAREZ notified, IV amio bolus ordered & being given currently. Will cont. to monitor throughout shift and notify MD with any changes. No BM this shift. Safety maintained. Tele-

## 2023-08-03 NOTE — PROGRESS NOTES
"Infectious Diseases Progress Note    Patient:  Wyatt Curry  YOB: 1930  MRN: [id number]   Admit date: 7/31/2023   Admitting Physician: Cherie Mcmanus MD  Primary Care Physician: Everardo Jackson MD    Chief Complaint/Interval History: He has been transferred out of ICU.  He is doing well.  He indicates watery bowel movements have resolved.  He has loose but not watery bowel movements at this point.  He indicates lower abdominal pain has resolved.  Stool frequency and volume improving.    Intake/Output Summary (Last 24 hours) at 8/2/2023 2132  Last data filed at 8/2/2023 0524  Gross per 24 hour   Intake 100 ml   Output --   Net 100 ml     Allergies:   Allergies   Allergen Reactions    Pregabalin Mental Status Change     Made pt feel \"crazy\"     Current Scheduled Medications:   aspirin, 81 mg, Oral, Daily  atorvastatin, 20 mg, Oral, Daily  busPIRone, 10 mg, Oral, BID  clopidogrel, 75 mg, Oral, Daily  [START ON 8/3/2023] digoxin, 125 mcg, Oral, Daily  empagliflozin, 10 mg, Oral, Daily  famotidine, 20 mg, Oral, BID AC  metoprolol succinate XL, 25 mg, Oral, Q24H  metroNIDAZOLE, 500 mg, Intravenous, Q8H  potassium chloride, 40 mEq, Oral, BID With Meals  saccharomyces boulardii, 250 mg, Oral, BID  sodium chloride, 10 mL, Intravenous, Q12H  vancomycin, 125 mg, Oral, Q6H  [START ON 8/10/2023] vancomycin, 125 mg, Oral, Q12H  [START ON 8/17/2023] vancomycin, 125 mg, Oral, Q24H  [START ON 8/24/2023] vancomycin, 125 mg, Oral, Every Other Day      Current PRN Medications:    nitroglycerin    ondansetron **OR** ondansetron    Pharmacy Consult    [COMPLETED] Insert Peripheral IV **AND** sodium chloride    sodium chloride    sodium chloride    Review of Systems see HPI    Vital Signs:  /56 (BP Location: Left arm, Patient Position: Sitting)   Pulse 79   Temp 97.8 øF (36.6 øC) (Oral)   Resp 18   Ht 160 cm (63\")   Wt 59.6 kg (131 lb 6.4 oz)   SpO2 96%   BMI 23.28 kg/mý     Physical Exam  Vital signs " - reviewed.  Line/IV site - No erythema, warmth, induration, or tenderness.  Abdomen soft and nontender    Lab Results:  CBC:   Results from last 7 days   Lab Units 08/02/23  1009 08/01/23  0359 07/31/23  1447   WBC 10*3/mm3 10.30 11.68* 12.08*   HEMOGLOBIN g/dL 11.0* 10.4* 12.3*   HEMATOCRIT % 37.3* 34.7* 41.5   PLATELETS 10*3/mm3 219 185 221     BMP:  Results from last 7 days   Lab Units 08/02/23  1009 08/01/23  0945 08/01/23  0359 07/31/23  1520   SODIUM mmol/L 139  --  138 140   POTASSIUM mmol/L 3.3* 3.3* 3.1* 3.5   CHLORIDE mmol/L 103  --  102 101   CO2 mmol/L 27.0  --  22.0 23.0   BUN mg/dL 11  --  16 12   CREATININE mg/dL 0.50*  --  0.74* 0.62*   GLUCOSE mg/dL 143*  --  179* 141*   CALCIUM mg/dL 8.8  --  7.8* 8.4   ALT (SGPT) U/L  --   --   --  8     Culture Results:   Blood Culture   Date Value Ref Range Status   07/31/2023 No growth at 2 days  Preliminary   07/31/2023 No growth at 2 days  Preliminary     Urine Culture   Date Value Ref Range Status   08/01/2023 Yeast isolated (A)  Final     Comment:     No further workup.      Radiology: None  Additional Studies Reviewed: None    Impression:   1.  Recurrent C. difficile colitis-improving  2.  Recent myocardial infarction  3.  Systolic congestive heart failure with EF 40 to 45%-well compensated at present  4.  Recent skin cancer removal  5.  He reported recent admission for pneumonia-no current symptoms to suggest pulmonary infection    Recommendations:   Discontinue metronidazole  Continue vancomycin  Recommend vancomycin taper as outlined below  Okay with me to discharge home tomorrow or Friday if ready for release per primary service    Continue oral vancomycin-suggest the following approach:  Vancomycin 125 mg orally 4 times daily for 10 days from initiation then  Vancomycin 125 mg orally twice daily for 7 days then  Vancomycin orally 125 mg for 7 days  Then vancomycin 125 mg orally every other day for 2 weeks      Would like to see him back 2 weeks after  hospital discharge       Jules Wren MD

## 2023-08-04 ENCOUNTER — TRANSCRIBE ORDERS (OUTPATIENT)
Dept: HOME HEALTH SERVICES | Facility: HOME HEALTHCARE | Age: 88
End: 2023-08-04
Payer: MEDICARE

## 2023-08-04 ENCOUNTER — READMISSION MANAGEMENT (OUTPATIENT)
Dept: CALL CENTER | Facility: HOSPITAL | Age: 88
End: 2023-08-04
Payer: MEDICARE

## 2023-08-04 VITALS
BODY MASS INDEX: 24.52 KG/M2 | DIASTOLIC BLOOD PRESSURE: 82 MMHG | RESPIRATION RATE: 18 BRPM | SYSTOLIC BLOOD PRESSURE: 148 MMHG | HEART RATE: 79 BPM | TEMPERATURE: 97.2 F | HEIGHT: 63 IN | WEIGHT: 138.4 LBS | OXYGEN SATURATION: 94 %

## 2023-08-04 DIAGNOSIS — I48.91 ATRIAL FIBRILLATION, UNSPECIFIED TYPE: Primary | ICD-10-CM

## 2023-08-04 DIAGNOSIS — A04.72 C. DIFFICILE ENTERITIS: ICD-10-CM

## 2023-08-04 PROBLEM — I50.22 CHRONIC HFREF (HEART FAILURE WITH REDUCED EJECTION FRACTION): Status: ACTIVE | Noted: 2023-08-04

## 2023-08-04 PROBLEM — I50.23 ACUTE ON CHRONIC SYSTOLIC HEART FAILURE: Status: ACTIVE | Noted: 2023-08-04

## 2023-08-04 LAB
ANION GAP SERPL CALCULATED.3IONS-SCNC: 5 MMOL/L (ref 5–15)
BASOPHILS # BLD AUTO: 0.03 10*3/MM3 (ref 0–0.2)
BASOPHILS NFR BLD AUTO: 0.4 % (ref 0–1.5)
BUN SERPL-MCNC: 12 MG/DL (ref 8–23)
BUN/CREAT SERPL: 21.1 (ref 7–25)
CALCIUM SPEC-SCNC: 8.8 MG/DL (ref 8.2–9.6)
CHLORIDE SERPL-SCNC: 106 MMOL/L (ref 98–107)
CO2 SERPL-SCNC: 27 MMOL/L (ref 22–29)
CREAT SERPL-MCNC: 0.57 MG/DL (ref 0.76–1.27)
DEPRECATED RDW RBC AUTO: 57.6 FL (ref 37–54)
EGFRCR SERPLBLD CKD-EPI 2021: 91.4 ML/MIN/1.73
EOSINOPHIL # BLD AUTO: 0.3 10*3/MM3 (ref 0–0.4)
EOSINOPHIL NFR BLD AUTO: 3.8 % (ref 0.3–6.2)
ERYTHROCYTE [DISTWIDTH] IN BLOOD BY AUTOMATED COUNT: 17.1 % (ref 12.3–15.4)
GLUCOSE SERPL-MCNC: 101 MG/DL (ref 65–99)
HCT VFR BLD AUTO: 32.8 % (ref 37.5–51)
HGB BLD-MCNC: 9.9 G/DL (ref 13–17.7)
IMM GRANULOCYTES # BLD AUTO: 0.06 10*3/MM3 (ref 0–0.05)
IMM GRANULOCYTES NFR BLD AUTO: 0.8 % (ref 0–0.5)
LYMPHOCYTES # BLD AUTO: 1.79 10*3/MM3 (ref 0.7–3.1)
LYMPHOCYTES NFR BLD AUTO: 22.9 % (ref 19.6–45.3)
MAGNESIUM SERPL-MCNC: 1.5 MG/DL (ref 1.7–2.3)
MCH RBC QN AUTO: 28.4 PG (ref 26.6–33)
MCHC RBC AUTO-ENTMCNC: 30.2 G/DL (ref 31.5–35.7)
MCV RBC AUTO: 94 FL (ref 79–97)
MONOCYTES # BLD AUTO: 0.91 10*3/MM3 (ref 0.1–0.9)
MONOCYTES NFR BLD AUTO: 11.7 % (ref 5–12)
NEUTROPHILS NFR BLD AUTO: 4.72 10*3/MM3 (ref 1.7–7)
NEUTROPHILS NFR BLD AUTO: 60.4 % (ref 42.7–76)
NRBC BLD AUTO-RTO: 0 /100 WBC (ref 0–0.2)
PLATELET # BLD AUTO: 239 10*3/MM3 (ref 140–450)
PMV BLD AUTO: 11.1 FL (ref 6–12)
POTASSIUM SERPL-SCNC: 4.5 MMOL/L (ref 3.5–5.2)
RBC # BLD AUTO: 3.49 10*6/MM3 (ref 4.14–5.8)
SODIUM SERPL-SCNC: 138 MMOL/L (ref 136–145)
WBC NRBC COR # BLD: 7.81 10*3/MM3 (ref 3.4–10.8)

## 2023-08-04 PROCEDURE — 80048 BASIC METABOLIC PNL TOTAL CA: CPT | Performed by: INTERNAL MEDICINE

## 2023-08-04 PROCEDURE — 97110 THERAPEUTIC EXERCISES: CPT

## 2023-08-04 PROCEDURE — 85025 COMPLETE CBC W/AUTO DIFF WBC: CPT | Performed by: INTERNAL MEDICINE

## 2023-08-04 PROCEDURE — 25010000002 MAGNESIUM SULFATE IN D5W 1G/100ML (PREMIX) 1-5 GM/100ML-% SOLUTION: Performed by: INTERNAL MEDICINE

## 2023-08-04 PROCEDURE — 99231 SBSQ HOSP IP/OBS SF/LOW 25: CPT | Performed by: INTERNAL MEDICINE

## 2023-08-04 PROCEDURE — 83735 ASSAY OF MAGNESIUM: CPT | Performed by: INTERNAL MEDICINE

## 2023-08-04 RX ORDER — MAGNESIUM OXIDE 400 MG/1
400 TABLET ORAL DAILY
Qty: 30 TABLET | Refills: 0 | Status: SHIPPED | OUTPATIENT
Start: 2023-08-04 | End: 2023-09-03

## 2023-08-04 RX ORDER — VANCOMYCIN HYDROCHLORIDE 125 MG/1
125 CAPSULE ORAL EVERY 6 HOURS SCHEDULED
Qty: 25 CAPSULE | Refills: 0 | Status: SHIPPED | OUTPATIENT
Start: 2023-08-04 | End: 2023-08-11

## 2023-08-04 RX ORDER — VANCOMYCIN HYDROCHLORIDE 125 MG/1
125 CAPSULE ORAL EVERY OTHER DAY
Qty: 7 CAPSULE | Refills: 0 | Status: SHIPPED | OUTPATIENT
Start: 2023-08-24 | End: 2023-09-06

## 2023-08-04 RX ORDER — VANCOMYCIN HYDROCHLORIDE 125 MG/1
125 CAPSULE ORAL EVERY 12 HOURS
Qty: 14 CAPSULE | Refills: 0 | Status: SHIPPED | OUTPATIENT
Start: 2023-08-10 | End: 2023-08-17

## 2023-08-04 RX ORDER — DIGOXIN 125 MCG
125 TABLET ORAL
Qty: 90 TABLET | Refills: 0 | Status: SHIPPED | OUTPATIENT
Start: 2023-08-04 | End: 2023-11-02

## 2023-08-04 RX ORDER — MAGNESIUM SULFATE 1 G/100ML
1 INJECTION INTRAVENOUS ONCE
Status: COMPLETED | OUTPATIENT
Start: 2023-08-04 | End: 2023-08-04

## 2023-08-04 RX ORDER — SACCHAROMYCES BOULARDII 250 MG
250 CAPSULE ORAL 2 TIMES DAILY
Qty: 30 CAPSULE | Refills: 2 | Status: SHIPPED | OUTPATIENT
Start: 2023-08-04

## 2023-08-04 RX ORDER — VANCOMYCIN HYDROCHLORIDE 125 MG/1
125 CAPSULE ORAL EVERY 24 HOURS
Qty: 7 CAPSULE | Refills: 0 | Status: SHIPPED | OUTPATIENT
Start: 2023-08-17 | End: 2023-08-24

## 2023-08-04 RX ADMIN — POTASSIUM CHLORIDE 40 MEQ: 10 CAPSULE, COATED, EXTENDED RELEASE ORAL at 09:25

## 2023-08-04 RX ADMIN — APIXABAN 5 MG: 5 TABLET, FILM COATED ORAL at 09:27

## 2023-08-04 RX ADMIN — MAGNESIUM SULFATE HEPTAHYDRATE 1 G: 1 INJECTION, SOLUTION INTRAVENOUS at 09:25

## 2023-08-04 RX ADMIN — METOPROLOL SUCCINATE 50 MG: 50 TABLET, EXTENDED RELEASE ORAL at 09:27

## 2023-08-04 RX ADMIN — FAMOTIDINE 20 MG: 20 TABLET, FILM COATED ORAL at 09:27

## 2023-08-04 RX ADMIN — Medication 10 ML: at 09:29

## 2023-08-04 RX ADMIN — Medication 250 MG: at 09:27

## 2023-08-04 RX ADMIN — VANCOMYCIN HYDROCHLORIDE 125 MG: 125 CAPSULE ORAL at 04:05

## 2023-08-04 RX ADMIN — EMPAGLIFLOZIN 10 MG: 10 TABLET, FILM COATED ORAL at 09:26

## 2023-08-04 RX ADMIN — VANCOMYCIN HYDROCHLORIDE 125 MG: 125 CAPSULE ORAL at 09:30

## 2023-08-04 RX ADMIN — ATORVASTATIN CALCIUM 20 MG: 10 TABLET, FILM COATED ORAL at 09:28

## 2023-08-04 RX ADMIN — BUSPIRONE HYDROCHLORIDE 10 MG: 10 TABLET ORAL at 09:26

## 2023-08-04 RX ADMIN — SPIRONOLACTONE 25 MG: 25 TABLET ORAL at 09:26

## 2023-08-04 RX ADMIN — CLOPIDOGREL BISULFATE 75 MG: 75 TABLET, FILM COATED ORAL at 09:27

## 2023-08-04 NOTE — PAYOR COMM NOTE
"Ref:    [id number]     [facility]  Fax  [phone]     Maggy Solo (89 y.o. Male)       Date of Birth   05/13/1930    Social Security Number       Address   [address] [facility] [address]    Home Phone   [phone]    MRN   [id number]       [facility]    Marital Status                               Admission Date   7/31/23    Admission Type   Emergency    Admitting Provider   Cherie Mcmanus MD    Attending Provider       Department, Room/Bed   [facility] 4B, 449/1       Discharge Date   8/4/2023    Discharge Disposition   Home-Health Care Svc    Discharge Destination                                 Attending Provider: (none)   Allergies: Pregabalin    Isolation: Spore   Infection: C.difficile (06/18/23)   Code Status: Prior    Ht: 160 cm (63\")   Wt: 62.8 kg (138 lb 6.4 oz)    Admission Cmt: None   Principal Problem: Atrial fibrillation with RVR [I48.91]                   Active Insurance as of 7/31/2023       Primary Coverage       Payor Plan Insurance Group Employer/Plan Group    HUMANA MEDICARE REPLACEMENT HUMANA MEDICARE REPLACEMENT [id number]       Payor Plan Address Payor Plan Phone Number Payor Plan Fax Number Effective Dates    [address] [address] [phone]  1/1/2018 - None Entered    [facility] [address]         Subscriber Name Subscriber Birth Date Member ID       MAGGY SOLO 5/13/1930 [id number]                     Emergency Contacts        (Rel.) Home Phone Work Phone Mobile Phone    Zamzam Marie (Relative) -- -- [phone]    PatricioGlory (Spouse) [phone] -- --    Madai Marie -- -- [phone]                 Discharge Summary        Cherie Mcmanus MD at 08/04/23 [address] [place] [address] Medicine Services  DISCHARGE SUMMARY       Date of Admission: 7/31/2023  Date of Discharge:  8/4/2023  Primary Care Physician: Everardo Jackson MD    Presenting Problem/History " of Present Illness: Diarrhea, weakness      Final Discharge Diagnoses:  Active Hospital Problems    Diagnosis     **Atrial fibrillation with RVR     Acute on chronic systolic heart failure     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 Left ventricular ejection fraction appears to be 41 - 45%.    The following segments are hypokinetic: apical anterior, apical septal, apical inferior, apical lateral and apex.    Left ventricular wall thickness is consistent with mild septal asymmetric hypertrophy.    Left ventricular diastolic function is consistent with (grade I) impaired relaxation.    He left atrial cavity is mildly dilated.    There is mild to moderate thickening of the aortic valve.    . Mild to moderate mitral valve regurgitation is present    Mild pulmonary hypertension is present.      Imaging Results (All)       Procedure Component Value Units Date/Time    CT Abdomen Pelvis Without Contrast [[id number]] Collected: 07/31/23 1746     Updated: 07/31/23 1758    Narrative:      EXAMINATION: CT ABDOMEN PELVIS WO CONTRAST- 7/31/2023 5:46 PM CDT     HISTORY: abd pain, diarrhea, fever, leukocytosis, bandemia;  I48.91-Unspecified atrial fibrillation; E83.42-Hypomagnesemia;  K52.9-Noninfective gastroenteritis and colitis, unspecified     DOSE: 224 mGycm (Automatic exposure control technique was implemented in  an effort to keep the radiation dose as low as possible without  compromising image quality)     REPORT: Spiral CT of the abdomen and pelvis was performed without  contrast from  the lung bases through the pubic symphysis. Reconstructed  coronal and sagittal images are also reviewed.     Comparison: CT abdomen pelvis with contrast 07/12/2023.     Review of lung windows demonstrates mild respiratory motion artifact,  bibasilar atelectasis greater on the right. No pleural effusion or  pneumothorax is identified. There is a small sliding-type hiatal hernia.  Evaluation of the solid abdominal organs is limited without intravenous  contrast. There is a small cyst or hemangioma within the left lobe of  liver anteriorly image 20 series 2. This measures 9 mm. This is stable.  The spleen is homogeneous, normal in size. There is mild distention of  the stomach with gas and fluid, no free air, free fluid or abscess is  identified. The gallbladder appears within normal limits. No  nephrolithiasis or ureterolithiasis or hydronephrosis. The renal  contours are smooth. The pancreas and adrenal glands are unremarkable.  Diffuse colonic wall thickening seen before appears improved, likely due  to improving colitis. There is no evidence of bowel obstruction. There  is mild bladder wall thickening and at least one bladder diverticulum is  present. This may be related to chronic bladder outlet obstruction. The  patient has a history of bladder cancer but no discrete bladder mass is  identified. Mild induration mental fat planes without evidence of a mass  or nodularity. Heavy calcified plaque is noted within the abdominal  aorta and its branches and there is ectasia of the abdominal aorta and  iliac arteries as before. Review of bone windows demonstrates advanced  degenerative changes throughout the spine with associated levoscoliosis  of the lumbar spine. No acute osseous abnormality is identified.       Impression:      1. Significant interval improvement in changes of pancolitis, with only  mild residual mucosal thickening involving the colon relatively  diffusely. No free air, free fluid or abscess is  identified.  2. Mild bilateral thickening and at least one bladder diverticulum is  present. There is a history of bladder neoplasm but no discrete bladder  mass is identified. The findings are more suggestive of some degree of  chronic bladder outlet obstruction.        This report was finalized on 07/31/2023 17:55 by Dr. Emigdio Bhandari MD.          LAB RESULTS:      Lab 08/04/23  0528 08/03/23  0507 08/02/23  1009 08/01/23  0359 07/31/23  1447 07/31/23  1446   WBC 7.81 7.68 10.30 11.68* 12.08*  --    HEMOGLOBIN 9.9* 11.7* 11.0* 10.4* 12.3*  --    HEMATOCRIT 32.8* 39.6 37.3* 34.7* 41.5  --    PLATELETS 239 238 219 185 221  --    NEUTROS ABS 4.72 4.18 7.48* 9.34* 10.03*  --    IMMATURE GRANS (ABS) 0.06* 0.04  --   --   --   --    LYMPHS ABS 1.79 2.33 1.60  --   --   --    MONOS ABS 0.91* 0.83 1.04*  --   --   --    EOS ABS 0.30 0.28 0.09  --   --   --    MCV 94.0 95.2 95.2 91.3 92.0  --    LACTATE  --   --   --   --   --  1.7         Lab 08/04/23  0528 08/03/23  0507 08/02/23  1009 08/01/23  0945 08/01/23  0359 07/31/23  2151 07/31/23  1520   SODIUM 138 141 139  --  138  --  140   POTASSIUM 4.5 3.6 3.3* 3.3* 3.1*  --  3.5   CHLORIDE 106 107 103  --  102  --  101   CO2 27.0 26.0 27.0  --  22.0  --  23.0   ANION GAP 5.0 8.0 9.0  --  14.0  --  16.0*   BUN 12 11 11  --  16  --  12   CREATININE 0.57* 0.58* 0.50*  --  0.74*  --  0.62*   EGFR 91.4 90.9 95.1  --  84.5  --  89.1   GLUCOSE 101* 95 143*  --  179*  --  141*   CALCIUM 8.8 8.9 8.8  --  7.8*  --  8.4   MAGNESIUM 1.5* 1.8 1.8  --  1.9 1.9 0.8*         Lab 07/31/23  1520   TOTAL PROTEIN 7.2   ALBUMIN 3.8   GLOBULIN 3.4   ALT (SGPT) 8   AST (SGOT) 25   BILIRUBIN 0.7   ALK PHOS 56                     Brief Urine Lab Results  (Last result in the past 365 days)        Color   Clarity   Blood   Leuk Est   Nitrite   Protein   CREAT   Urine HCG        08/01/23 0018 Dark Yellow   Cloudy   Trace   Moderate (2+)   Negative   100 mg/dL (2+)                 Microbiology  Results (last 10 days)       Procedure Component Value - Date/Time    Clostridioides difficile Toxin - Stool, Per Rectum [[id number]]  (Abnormal) Collected: 08/01/23 0244    Lab Status: Final result Specimen: Stool from Per Rectum Updated: 08/01/23 0653    Narrative:      The following orders were created for panel order Clostridioides difficile Toxin - Stool, Per Rectum.  Procedure                               Abnormality         Status                     ---------                               -----------         ------                     Clostridioides difficile...[[id number]]  Abnormal            Final result                 Please view results for these tests on the individual orders.    Clostridioides difficile Toxin, PCR - Stool, Per Rectum [[id number]]  (Abnormal) Collected: 08/01/23 0244    Lab Status: Final result Specimen: Stool from Per Rectum Updated: 08/01/23 0653     Toxigenic C. difficile by PCR Positive    Narrative:      DNA from a toxigenic strain of C.difficile has been detected. Antigen testing for the presence of free C.difficile toxin is currently in progress, to help determine the clinical significance of this PCR result.     Clostridioides difficile toxin Ag, Reflex - Stool, Per Rectum [[id number]]  (Abnormal) Collected: 08/01/23 0244    Lab Status: Final result Specimen: Stool from Per Rectum Updated: 08/01/23 0653     C.diff Toxin Ag Positive    Narrative:      DNA from a toxigenic strain of C.difficile was detected, along with the presence of free toxin. These results are suggestive of C.difficile infection.    Gastrointestinal Panel, PCR - Stool, Per Rectum [[id number]]  (Normal) Collected: 08/01/23 0240    Lab Status: Final result Specimen: Stool from Per Rectum Updated: 08/01/23 0438     Campylobacter Not Detected     Plesiomonas shigelloides Not Detected     Salmonella Not Detected     Vibrio Not Detected     Vibrio cholerae Not Detected     Yersinia enterocolitica Not Detected      Enteroaggregative E. coli (EAEC) Not Detected     Enteropathogenic E. coli (EPEC) Not Detected     Enterotoxigenic E. coli (ETEC) lt/st Not Detected     Shiga-like toxin-producing E. coli (STEC) stx1/stx2 Not Detected     Shigella/Enteroinvasive E. coli (EIEC) Not Detected     Cryptosporidium Not Detected     Cyclospora cayetanensis Not Detected     Entamoeba histolytica Not Detected     Giardia lamblia Not Detected     Adenovirus F40/41 Not Detected     Astrovirus Not Detected     Norovirus GI/GII Not Detected     Rotavirus A Not Detected     Sapovirus (I, II, IV or V) Not Detected    Narrative:      If Aeromonas, Staphylococcus aureus or Bacillus cereus are suspected, please order [id number]: Stool Culture, Aeromonas, S aureus, B Cereus.    Urine Culture - Urine, Urine, Clean Catch [[id number]]  (Abnormal) Collected: 08/01/23 0018    Lab Status: Final result Specimen: Urine, Clean Catch Updated: 08/02/23 0929     Urine Culture Yeast isolated     Comment: No further workup.        Narrative:      Colonization of the urinary tract without infection is common. Treatment is discouraged unless the patient is symptomatic, pregnant, or undergoing an invasive urologic procedure.    Blood Culture - Blood, Arm, Right [[id number]]  (Normal) Collected: 07/31/23 1849    Lab Status: Preliminary result Specimen: Blood from Arm, Right Updated: 08/03/23 1915     Blood Culture No growth at 3 days    Blood Culture - Blood, Arm, Left [[id number]]  (Normal) Collected: 07/31/23 1844    Lab Status: Preliminary result Specimen: Blood from Arm, Left Updated: 08/03/23 1915     Blood Culture No growth at 3 days            Hospital Course:   Wyatt Curry is a 89-year-old male with a past medical history of recurrent C. difficile diarrhea infection.  He presented with complaints of worsening diarrhea and lower abdominal pain of 2 days duration.  This was his third visit to the hospital with same complaints.     Niece was at bedside who provided  "all history. He has been taking Imodium so he can visit his wife in the nursing home however he began having abdominal pain, nausea/vomiting and daily watery foul-smelling stool are progressively worsening and she feels he has again got C. difficile. He has had an extensive change in baseline health since March after skin cancer surgery with hospital-acquired pneumonia, antibiotics causing C. difficile toxin, newly diagnosed heart failure. After arrival patient developed A-fib with RVR.  He had some hypotension so he was started on amiodarone drip.  He had hypomagnesemia as well as hypokalemia and these were aggressively repleted.  He was reviewed by cardiology and he was restarted on his metoprolol home medication.  Amiodarone was discontinued and he was started on digoxin with good heart rate control.  He was also started on Eliquis after discussion with his niece and agreement by the patient.  His C. difficile was treated with oral vancomycin.  He was reviewed by infectious disease physician and recommended for a prolonged oral vancomycin taper.  He was instructed to follow-up with infectious disease after 2 weeks for monitoring of his C. difficile.  He was also instructed to follow-up with his regular cardiologist within 1 week for his congestive heart failure.  Patient improved on above treatments and was discharged in stable condition.     Physical Exam on Discharge:  /82 (BP Location: Left arm, Patient Position: Sitting)   Pulse 79   Temp 97.2 øF (36.2 øC) (Oral)   Resp 18   Ht 160 cm (63\")   Wt 62.8 kg (138 lb 6.4 oz)   SpO2 94%   BMI 24.52 kg/mý   Physical Exam  onstitutional:       General: He is not in acute distress.     Appearance: He is well-developed. He is ill-appearing. He is not diaphoretic.      Comments: Chronically ill-appearing elderly man.   HENT:      Head: Normocephalic.   Eyes:      General: No scleral icterus.     Conjunctiva/sclera: Conjunctivae normal.      Pupils: Pupils " are equal, round, and reactive to light.   Neck:      Thyroid: No thyromegaly.      Vascular: No JVD.      Trachea: No tracheal deviation.   Cardiovascular:      Rate and Rhythm: Normal rate. Rhythm irregular.      Heart sounds: Normal heart sounds. No murmur heard.    No friction rub. No gallop.   Pulmonary:      Effort: Pulmonary effort is normal. No respiratory distress.      Breath sounds: Normal breath sounds. No stridor. No wheezing or rales.   Chest:      Chest wall: No tenderness.   Abdominal:      General: Bowel sounds are normal. There is no distension.      Palpations: Abdomen is soft. There is no mass.      Tenderness: There is no abdominal tenderness. There is no guarding or rebound.      Hernia: No hernia is present.      Comments: No tendeness   Musculoskeletal:         General: No tenderness or deformity. Normal range of motion.      Cervical back: Normal range of motion and neck supple.      Right lower leg: No edema.      Left lower leg: No edema.   Lymphadenopathy:      Cervical: No cervical adenopathy.   Skin:     General: Skin is warm and dry.      Coloration: Skin is not pale.      Findings: No erythema or rash.   Neurological:      General: No focal deficit present.      Mental Status: He is alert and oriented to person, place, and time.      Cranial Nerves: No cranial nerve deficit.      Sensory: No sensory deficit.      Motor: No abnormal muscle tone.      Coordination: Coordination normal.   Psychiatric:         Mood and Affect: Mood normal.         Behavior: Behavior normal.         Thought Content: Thought content normal.     Condition on Discharge: Stable    Discharge Disposition:  Home-Health Care [facility]    Discharge Medications:     Discharge Medications        New Medications        Instructions Start Date   apixaban 5 MG tablet tablet  Commonly known as: ELIQUIS   5 mg, Oral, Every 12 Hours Scheduled      digoxin 125 MCG tablet  Commonly known as: LANOXIN   125 mcg, Oral, Daily  Digoxin      magnesium oxide 400 MG tablet  Commonly known as: MAG-OX   400 mg, Oral, Daily      saccharomyces boulardii 250 MG capsule  Commonly known as: FLORASTOR   250 mg, Oral, 2 Times Daily      vancomycin 125 MG capsule  Commonly known as: VANCOCIN   125 mg, Oral, Every 6 Hours Scheduled      vancomycin 125 MG capsule  Commonly known as: VANCOCIN   125 mg, Oral, Every 12 Hours   Start Date: August 10, 2023     vancomycin 125 MG capsule  Commonly known as: VANCOCIN   125 mg, Oral, Every 24 Hours   Start Date: August 17, 2023     vancomycin 125 MG capsule  Commonly known as: VANCOCIN   125 mg, Oral, Every Other Day   Start Date: August 24, 2023            Continue These Medications        Instructions Start Date   acetaminophen 650 MG 8 hr tablet  Commonly known as: TYLENOL   1 tablet, Oral, 2 Times Daily      atorvastatin 20 MG tablet  Commonly known as: LIPITOR   1 tablet, Oral, Every Night at Bedtime      busPIRone 10 MG tablet  Commonly known as: BUSPAR   1 tablet, Oral, 2 Times Daily      clopidogrel 75 MG tablet  Commonly known as: PLAVIX   1 tablet, Oral, Daily      empagliflozin 10 MG tablet tablet  Commonly known as: JARDIANCE   10 mg, Oral, Daily      Entresto 49-51 MG tablet  Generic drug: sacubitril-valsartan   1 tablet, Oral, 2 Times Daily      furosemide 40 MG tablet  Commonly known as: LASIX   40 mg, Oral, Daily PRN, Take 1 additional tablet as needed for weight gain, shortness of breath or swelling      lansoprazole 30 MG capsule  Commonly known as: PREVACID   1 capsule, Oral, Daily      metoprolol succinate XL 50 MG 24 hr tablet  Commonly known as: TOPROL-XL   50 mg, Oral, Nightly      nitroglycerin 0.4 MG SL tablet  Commonly known as: NITROSTAT   1 tablet, Sublingual, Every 5 Minutes PRN      ondansetron ODT 4 MG disintegrating tablet  Commonly known as: ZOFRAN-ODT   4 mg, Translingual, Every 6 Hours PRN      potassium chloride 10 MEQ CR tablet  Commonly known as: K-DUR,KLOR-CON   20 mEq,  Oral, Daily PRN      Probiotic (Lactobacillus) capsule   1 capsule, Oral, Daily      spironolactone 25 MG tablet  Commonly known as: ALDACTONE   25 mg, Oral, Daily      vitamin B-12 1000 MCG tablet  Commonly known as: CYANOCOBALAMIN   1 tablet, Oral, Daily      Vitron-C  MG tablet  Generic drug: Iron-Vitamin C   1 tablet, Oral, Daily             Stop These Medications      Aspirin Adult Low Strength 81 MG EC tablet  Generic drug: aspirin              This patient has current or prior documentation of an left ventricular ejection fraction (LVEF) of less than or equal to 40%.    The patient was prescribed or already taking an ACE/ARB.    The patient was prescribed already taking bisoprolol, carvedilol, or sustained release metoprolol succinate.     Discharge instruction:    1.  Follow-up with your primary care provider within 1 week of discharge.  2.  Follow-up with your cardiologist within 1 week of discharge  3.  Follow-up with infectious disease consultant Dr. Rios within 2 weeks of discharge.     Discharge Diet:   Diet Instructions       Diet: Cardiac Diets; Low Sodium (2g); Regular Texture (IDDSI 7); Thin (IDDSI 0)      Discharge Diet: Cardiac Diets    Cardiac Diet: Low Sodium (2g)    Texture: Regular Texture (IDDSI 7)    Fluid Consistency: Thin (IDDSI 0)            Activity at Discharge:   Activity Instructions       Activity as Tolerated              Follow-up Appointments:   Future Appointments   Date Time Provider Department Center   8/6/2023 To Be Determined Starr Chung RN HH PAD HC PAD   8/9/2023  8:15 AM Bell Lawson APRN [facility] CD PAD PAD       Test Results Pending at Discharge: None    Electronically signed by Cehrie Mcmanus MD, 08/04/23, 11:00 CDT.    Time: 36 minutes of time was spent evaluating patient and planning discharge.          Electronically signed by Cherie Mcmanus MD at 08/04/23 1220       Discharge Order (From admission, onward)       Start     Ordered    08/04/23  1048  Discharge patient  Once        Expected Discharge Date: 08/04/23   Expected Discharge Time: Morning   Discharge Disposition: [facility] [place] [facility]   Physician of Record for Attribution - Please select from Treatment Team: RODERICK XIAO [[id number]]   Review needed by CMO to determine Physician of Record: No      Question Answer Comment   Physician of Record for Attribution - Please select from Treatment Team RODERICK XIAO    Review needed by CMO to determine Physician of Record No        08/04/23 1057

## 2023-08-04 NOTE — THERAPY DISCHARGE NOTE
Acute Care - Physical Therapy Discharge Summary  [facility]       Patient Name: Wyatt Curry  : 1930  MRN: [id number]    Today's Date: 2023                 Admit Date: 2023      PT Recommendation and Plan    Visit Dx:    ICD-10-CM ICD-9-CM   1. Atrial fibrillation with RVR  I48.91 427.31   2. Hypomagnesemia  E83.42 275.2   3. Colitis  K52.9 558.9   4. Decreased independence with activities of daily living [Z78.9 (ICD-10-CM)]  Z78.9 V49.89   5. Impaired mobility [Z74.09 (ICD-10-CM)]  Z74.09 799.89                PT Rehab Goals       Row Name 23 1556             Transfer Goal 1 (PT)    Activity/Assistive Device (Transfer Goal 1, PT) sit-to-stand/stand-to-sit  -MF      [place] Level/Cues Needed (Transfer Goal 1, PT) independent  -MF      Time Frame (Transfer Goal 1, PT) long term goal (LTG);10 days  -MF      Progress/Outcome (Transfer Goal 1, PT) goal not met  -MF         Gait Training Goal 1 (PT)    Activity/Assistive Device (Gait Training Goal 1, PT) gait (walking locomotion);assistive device use;decrease fall risk;improve balance and speed;cane, straight  -MF      [place] Level (Gait Training Goal 1, PT) independent  -MF      Distance (Gait Training Goal 1, PT) 100 feet  -MF      Time Frame (Gait Training Goal 1, PT) long term goal (LTG);10 days  -MF      Progress/Outcome (Gait Training Goal 1, PT) goal not met  -MF                User Key  (r) = Recorded By, (t) = Taken By, (c) = Cosigned By      Initials Name Provider Type Discipline     Etta Velázquez PTA Physical Therapist Assistant PT                        PT Discharge Summary  Anticipated Discharge Disposition (PT): home with home health  Reason for Discharge: Discharge from facility  Outcomes Achieved: Unable to make functional progress toward goals at this time  Discharge Destination: Home with home health      Etta Velázquez PTA   2023

## 2023-08-04 NOTE — PLAN OF CARE
Goal Outcome Evaluation:  Plan of Care Reviewed With: patient        Progress: improving  Outcome Evaluation: OT tx complete. A&Ox4. Pt sitting in bedside chair upon arrival. Pt states he is being discharged home today and his siter-in-law is coming to get him. Today pt completed sit<>stand with SBA. While standing pt stated he wants to complete his daily exercises he does at home to increase his strength for ADL's. Pt completed 10 reps of lateral and backwards leg swings on both legs' while standing with SBA. Pt completed 10 reps of squats while standing with SBA. Pt completed 10 reps of shoulder flexion while standing with SBA. Pt states he normally does UE exercises to improve his ability to put dishes in his high cabinets at home. Pt education provided on placing everyday objects in easy to reach areas to conserve energy and support ADL performance. Pt demonstrated occasional LOB while completing exercises while standing, pt was able to self-correct by stopping the exercise to regain his balance without verbal cues. Stand<>sit with SBA/verbal cues. Pt completed 10 reps of B elbow flexion/extension exercises using 3lb weight while sitting in bedside chair with supervision. Pt was left in the bedside chair with call light in reach. OT to sign off, as pt is being d/c today. Recommended d/c home with assist.      Anticipated Discharge Disposition (OT): home with assist

## 2023-08-04 NOTE — THERAPY DISCHARGE NOTE
Acute Care - Occupational Therapy Discharge  [facility]    Patient Name: Wyatt Curry  : 1930    MRN: [id number]                              Today's Date: 2023       Admit Date: 2023    Visit Dx:     ICD-10-CM ICD-9-CM   1. Atrial fibrillation with RVR  I48.91 427.31   2. Hypomagnesemia  E83.42 275.2   3. Colitis  K52.9 558.9   4. Decreased independence with activities of daily living [Z78.9 (ICD-10-CM)]  Z78.9 V49.89   5. Impaired mobility [Z74.09 (ICD-10-CM)]  Z74.09 799.89     Patient Active Problem List   Diagnosis    Benign localized prostatic hyperplasia without lower urinary tract symptoms (LUTS)    History of bladder cancer    Left rotator cuff tear arthropathy    Coronary artery disease involving native coronary artery of native heart without angina pectoris    Left bundle branch block    Essential hypertension    Mixed hyperlipidemia    Overweight (BMI 25.0-29.9)    Non-rheumatic mitral regurgitation    Other fatigue    History of coronary artery stent placement    Lung nodule    Mediastinal adenopathy    Cough    Abnormal positron emission tomography (PET) scan    Basal cell carcinoma    Neoplasm of uncertain behavior    Elevated troponin    Bleeding    Ischemic cardiomyopathy    Pneumonia of right lower lobe due to infectious organism    PAF (paroxysmal atrial fibrillation)    Proctocolitis (high concern for C. 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    GERD (gastroesophageal reflux disease)      Hyperlipidemia     Hypertension     Irregular heart beat     Lung nodules      Past Surgical History:   Procedure Laterality Date    BLADDER SURGERY      CARDIAC CATHETERIZATION      CARDIAC CATHETERIZATION Left 4/8/2023    Procedure: Cardiac Catheterization/Vascular Study;  Surgeon: Sukhi Hartley MD;  Location:  [facility] CATH INVASIVE [facility];  Service: Cardiology;  Laterality: Left;    COLONOSCOPY      CORONARY ANGIOPLASTY WITH STENT PLACEMENT  2006    STENT X 2    CYSTOSCOPY      ENDOSCOPY      EXCISION LESION N/A 4/7/2023    Procedure: EXCISION LESION OF RIGHT TEMPLE AND LEFT TEMPLE WITH FROZEN SECTION WITH POSSIBLE FLAP OR GRAFT;  Surgeon: Brijesh Nickerson MD;  Location:  [facility] OR;  Service: ENT;  Laterality: N/A;    FLAP HEAD/NECK Left 03/09/2020    Procedure: POSSIBLE FLAP;  Surgeon: Brijesh Nickerson MD;  Location:  [facility] OR;  Service: ENT;  Laterality: Left;    HEAD/NECK LESION/CYST EXCISION Left 03/09/2020    Procedure: 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 with frozen section and possible flap or graft;  Surgeon: Brijesh Nickerson MD;  Location:  [facility] OR;  Service: ENT;  Laterality: Left;    HERNIA REPAIR      SHOULDER SURGERY      SKIN BIOPSY      lip biopsy    SKIN FULL THICKNESS GRAFT Left 03/09/2020    Procedure: OR GRAFT;  Surgeon: Brijesh Nickerson MD;  Location:  [facility] OR;  Service: ENT;  Laterality: Left;    TRANSURETHRAL RESECTION OF BLADDER TUMOR        General Information       Row Name 08/04/23 0920          OT Time and Intention    Document Type discharge treatment  -CS (r) KT (t) CS (c)     Mode of Treatment occupational therapy  -CS (r) KT (t) CS (c)       Row Name 08/04/23 0920          General Information    Patient Profile Reviewed yes  -CS (r) KT (t) CS (c)     Existing Precautions/Restrictions fall  -CS (r) KT (t) CS (c)     Barriers to Rehab medically complex;hearing deficit  -CS (r) KT (t) CS (c)       Row Name 08/04/23 0920           Cognition    Orientation Status (Cognition) oriented x 4  -CS (r) KT (t) CS (c)       Row Name 08/04/23 0920          Safety Issues, Functional Mobility    Safety Issues Affecting Function (Mobility) impulsivity;insight into deficits/self-awareness;awareness of need for assistance  -CS (r) KT (t) CS (c)     Impairments Affecting Function (Mobility) balance;endurance/activity tolerance  -CS (r) KT (t) CS (c)               User Key  (r) = Recorded By, (t) = Taken By, (c) = Cosigned By      Initials Name Provider Type    Cynthia Flower, OTR/L, JEFF Occupational Therapist    Micheline Otero, OT Student OT Student                   Mobility/ADL's       Row Name 08/04/23 0920          Bed Mobility    Comment, (Bed Mobility) Up in chair  -CS (r) KT (t) CS (c)       Row Name 08/04/23 0920          Transfers    Transfers sit-stand transfer;stand-sit transfer  -CS (r) KT (t) CS (c)       Row Name 08/04/23 0920          Sit-Stand Transfer    Sit-Stand [place] (Transfers) supervision  -CS (r) KT (t) CS (c)       Row Name 08/04/23 0920          Stand-Sit Transfer    Stand-Sit [place] (Transfers) supervision  -CS (r) KT (t) CS (c)       Row Name 08/04/23 0920          Activities of Daily Living    BADL Assessment/Intervention lower body dressing  -CS (r) KT (t) CS (c)       Row Name 08/04/23 0920          Lower Body Dressing Assessment/Training    [place] Level (Lower Body Dressing) don;socks;independent  -CS (r) KT (t) CS (c)     Position (Lower Body Dressing) unsupported sitting  -CS (r) KT (t) CS (c)               User Key  (r) = Recorded By, (t) = Taken By, (c) = Cosigned By      Initials Name Provider Type    Cynthia Flower, OTR/L, JEFF Occupational Therapist    Micheline Otero, OT Student OT Student                   Obj/Interventions       Row Name 08/04/23 0920          Shoulder (Therapeutic Exercise)    Shoulder (Therapeutic Exercise) AROM (active range of motion);strengthening exercise  -CS (r)  KT (t) CS (c)     Shoulder AROM (Therapeutic Exercise) bilateral;flexion;extension;standing;10 repetitions  -CS (r) KT (t) CS (c)       Row Name 08/04/23 0920          Elbow/Forearm (Therapeutic Exercise)    Elbow/Forearm (Therapeutic Exercise) AROM (active range of motion)  -CS (r) KT (t) CS (c)     Elbow/Forearm AROM (Therapeutic Exercise) bilateral;flexion;extension;10 repetitions;standing  3lbs  -CS (r) KT (t) CS (c)       Row Name 08/04/23 0920          Motor Skills    Therapeutic Exercise shoulder;elbow/forearm  -CS (r) KT (t) CS (c)       Row Name 08/04/23 0920          Balance    Balance Assessment sitting static balance;sitting dynamic balance;sit to stand dynamic balance;standing static balance;standing dynamic balance  -CS (r) KT (t) CS (c)     Static Sitting Balance independent  -CS (r) KT (t) CS (c)     Dynamic Sitting Balance independent  -CS (r) KT (t) CS (c)     Position, Sitting Balance unsupported;sitting in chair  -CS (r) KT (t) CS (c)     Sit to Stand Dynamic Balance standby assist;1-person assist  -CS (r) KT (t) CS (c)     Static Standing Balance standby assist  -CS (r) KT (t) CS (c)     Dynamic Standing Balance standby assist  -CS (r) KT (t) CS (c)     Position/Device Used, Standing Balance unsupported  -CS (r) KT (t) CS (c)     Balance Interventions sitting;standing;sit to stand;static;dynamic  -CS (r) KT (t) CS (c)               User Key  (r) = Recorded By, (t) = Taken By, (c) = Cosigned By      Initials Name Provider Type    CS Cynthia Foy S, OTR/L, CNT Occupational Therapist    KT Micheline Dietz, OT Student OT Student                   Goals/Plan       Row Name 08/04/23 1150          Bathing Goal 1 (OT)    Activity/Device (Bathing Goal 1, OT) bathing skills, all  -CS (r) KT (t) CS (c)     [place] Level/Cues Needed (Bathing Goal 1, OT) standby assist  -CS (r) KT (t) CS (c)     Time Frame (Bathing Goal 1, OT) long term goal (LTG);10 days  -CS (r) KT (t) CS (c)     Progress/Outcomes  (Bathing Goal 1, OT) goal partially met  -CS (r) KT (t) CS (c)       Row Name 08/04/23 1150          Toileting Goal 1 (OT)    Activity/Device (Toileting Goal 1, OT) toileting skills, all  -CS (r) KT (t) CS (c)     [place] Level/Cues Needed (Toileting Goal 1, OT) independent  -CS (r) KT (t) CS (c)     Time Frame (Toileting Goal 1, OT) long term goal (LTG);10 days  -CS (r) KT (t) CS (c)     Progress/Outcome (Toileting Goal 1, OT) goal met  -CS (r) KT (t) CS (c)       Row Name 08/04/23 1150          Problem Specific Goal 1 (OT)    Problem Specific Goal 1 (OT) Pt will tolerate standing for ADL participation for 15 min with no LOBs to improve fxl activity tolerance and balance.  -CS (r) KT (t) CS (c)     Time Frame (Problem Specific Goal 1, OT) long term goal (LTG);10 days  -CS (r) KT (t) CS (c)     Progress/Outcome (Problem Specific Goal 1, OT) goal met  -CS (r) KT (t) CS (c)       Row Name 08/04/23 1150          Therapy Assessment/Plan (OT)    Planned Therapy Interventions (OT) activity tolerance training;adaptive equipment training;BADL retraining;IADL retraining;functional balance retraining;occupation/activity based interventions;patient/caregiver education/training;ROM/therapeutic exercise;strengthening exercise;transfer/mobility retraining  -CS (r) KT (t) CS (c)               User Key  (r) = Recorded By, (t) = Taken By, (c) = Cosigned By      Initials Name Provider Type    CS Cynthia Foy S, OTR/L, CNT Occupational Therapist    Micheline Otero, OT Student OT Student                   Clinical Impression       Row Name 08/04/23 0920          Pain Assessment    Pretreatment Pain Rating 0/10 - no pain  -CS (r) KT (t) CS (c)     Posttreatment Pain Rating 0/10 - no pain  -CS (r) KT (t) CS (c)     Pain Intervention(s) Repositioned;Ambulation/increased activity  -CS (r) KT (t) CS (c)       Row Name 08/04/23 0920          Plan of Care Review    Plan of Care Reviewed With patient  -CS (r) KT (t) CS (c)     Progress  improving  -CS (r) KT (t) CS (c)     Outcome Evaluation OT tx complete. A&Ox4. Pt sitting in bedside chair upon arrival. Pt states he is being discharged home today and his siter-in-law is coming to get him. Today pt completed sit<>stand with SBA. While standing pt stated he wants to complete his daily exercises he does at home to increase his strength for ADL's. Pt completed 10 reps of lateral and backwards leg swings on both legs' while standing with SBA. Pt completed 10 reps of squats while standing with SBA. Pt completed 10 reps of shoulder flexion while standing with SBA. Pt states he normally does UE exercises to improve his ability to put dishes in his high cabinets at home. Pt education provided on placing everyday objects in easy to reach areas to conserve energy and support ADL performance. Pt demonstrated occasional LOB while completing exercises while standing, pt was able to self-correct by stopping the exercise to regain his balance without verbal cues. Stand<>sit with SBA/verbal cues. Pt completed 10 reps of B elbow flexion/extension exercises using 3lb weight while sitting in bedside chair with supervision. Pt was left in the bedside chair with call light in reach. OT to sign off, as pt is being d/c today. Recommended d/c home with assist.  -CS (r) KT (t) CS (c)       Row Name 08/04/23 0920          Therapy Plan Review/Discharge Plan (OT)    Anticipated Discharge Disposition (OT) home with assist  -CS (r) KT (t) CS (c)       Row Name 08/04/23 0920          Vital Signs    O2 Delivery Pre Treatment room air  -CS (r) KT (t) CS (c)     Pre Patient Position Sitting  -CS (r) KT (t) CS (c)     Intra Patient Position Standing  -CS (r) KT (t) CS (c)     Post Patient Position Sitting  -CS (r) KT (t) CS (c)       Row Name 08/04/23 0920          Positioning and Restraints    Pre-Treatment Position sitting in chair/recliner  -CS (r) KT (t) CS (c)     Post Treatment Position chair  -CS (r) KT (t) CS (c)     In  Chair sitting;call light within reach;encouraged to call for assist  -CS (r) KT (t) CS (c)               User Key  (r) = Recorded By, (t) = Taken By, (c) = Cosigned By      Initials Name Provider Type    Cynthia Flower OTR/L, JEFF Occupational Therapist    Micheline Otero, OT Student OT Student                   Outcome Measures       Row Name 08/04/23 0920          How much help from another is currently needed...    Putting on and taking off regular lower body clothing? 4  -CS (r) KT (t) CS (c)     Bathing (including washing, rinsing, and drying) 3  -CS (r) KT (t) CS (c)     Toileting (which includes using toilet bed pan or urinal) 3  -CS (r) KT (t) CS (c)     Putting on and taking off regular upper body clothing 4  -CS (r) KT (t) CS (c)     Taking care of personal grooming (such as brushing teeth) 4  -CS (r) KT (t) CS (c)     Eating meals 4  -CS (r) KT (t) CS (c)     AM-PAC 6 Clicks Score (OT) 22  -CS (r) KT (t)       Row Name 08/04/23 0800          How much help from another person do you currently need...    Turning from your back to your side while in flat bed without using bedrails? 4  -LISSA     Moving from lying on back to sitting on the side of a flat bed without bedrails? 4  -LISSA     Moving to and from a bed to a chair (including a wheelchair)? 3  -LISSA     Standing up from a chair using your arms (e.g., wheelchair, bedside chair)? 3  -LISSA     Climbing 3-5 steps with a railing? 3  -LISSA     To walk in hospital room? 3  -LISSA     AM-PAC 6 Clicks Score (PT) 20  -LISSA     Highest level of mobility 6 --> Walked 10 steps or more  -LISSA       Row Name 08/04/23 0920          Functional Assessment    Outcome Measure Options AM-PAC 6 Clicks Daily Activity (OT)  -CS (r) KT (t) CS (c)               User Key  (r) = Recorded By, (t) = Taken By, (c) = Cosigned By      Initials Name Provider Type    Cynthia Flower OTR/L, CNT Occupational Therapist    Gwen Albarado, RN Registered Nurse    Micheline Otero, OT Student OT  Student                  Occupational Therapy Education       Title: PT OT SLP Therapies (In Progress)       Topic: Occupational Therapy (In Progress)       Point: ADL training (Done)       Description:   Instruct learner(s) on proper safety adaptation and remediation techniques during self care or transfers.   Instruct in proper use of assistive devices.                  Learning Progress Summary             Patient Acceptance, E, VU by KT at 8/4/2023 1009    Acceptance, E, VU by KT at 8/3/2023 1134    Acceptance, E,D, VU,NR by  at 8/2/2023 1448    Acceptance, E, VU by  at 8/1/2023 0820    Comment: Educated on POC, benefits and purpose of OT services.                         Point: Home exercise program (Done)       Description:   Instruct learner(s) on appropriate technique for monitoring, assisting and/or progressing therapeutic exercises/activities.                  Learning Progress Summary             Patient Acceptance, E, VU by KT at 8/4/2023 1009                         Point: Precautions (Not Started)       Description:   Instruct learner(s) on prescribed precautions during self-care and functional transfers.                  Learner Progress:  Not documented in this visit.              Point: Body mechanics (Done)       Description:   Instruct learner(s) on proper positioning and spine alignment during self-care, functional mobility activities and/or exercises.                  Learning Progress Summary             Patient Acceptance, E, VU by KT at 8/4/2023 1009    Acceptance, E, VU by KT at 8/3/2023 1134                                         User Key       Initials Effective Dates Name Provider Type Discipline     07/11/23 -  Magy Soto, OTR/L Occupational Therapist OT    KT 04/27/23 -  Micheline Dietz, OT Student OT Student OT     05/03/23 -  Adelaida Rodas OT Student OT Student OT                  OT Recommendation and Plan  Planned Therapy Interventions (OT): activity tolerance  training, adaptive equipment training, BADL retraining, IADL retraining, functional balance retraining, occupation/activity based interventions, patient/caregiver education/training, ROM/therapeutic exercise, strengthening exercise, transfer/mobility retraining  Plan of Care Review  Plan of Care Reviewed With: patient  Progress: improving  Outcome Evaluation: OT tx complete. A&Ox4. Pt sitting in bedside chair upon arrival. Pt states he is being discharged home today and his siter-in-law is coming to get him. Today pt completed sit<>stand with SBA. While standing pt stated he wants to complete his daily exercises he does at home to increase his strength for ADL's. Pt completed 10 reps of lateral and backwards leg swings on both legs' while standing with SBA. Pt completed 10 reps of squats while standing with SBA. Pt completed 10 reps of shoulder flexion while standing with SBA. Pt states he normally does UE exercises to improve his ability to put dishes in his high cabinets at home. Pt education provided on placing everyday objects in easy to reach areas to conserve energy and support ADL performance. Pt demonstrated occasional LOB while completing exercises while standing, pt was able to self-correct by stopping the exercise to regain his balance without verbal cues. Stand<>sit with SBA/verbal cues. Pt completed 10 reps of B elbow flexion/extension exercises using 3lb weight while sitting in bedside chair with supervision. Pt was left in the bedside chair with call light in reach. OT to sign off, as pt is being d/c today. Recommended d/c home with assist.  Plan of Care Reviewed With: patient  Outcome Evaluation: OT tx complete. A&Ox4. Pt sitting in bedside chair upon arrival. Pt states he is being discharged home today and his siter-in-law is coming to get him. Today pt completed sit<>stand with SBA. While standing pt stated he wants to complete his daily exercises he does at home to increase his strength for ADL's.  Pt completed 10 reps of lateral and backwards leg swings on both legs' while standing with SBA. Pt completed 10 reps of squats while standing with SBA. Pt completed 10 reps of shoulder flexion while standing with SBA. Pt states he normally does UE exercises to improve his ability to put dishes in his high cabinets at home. Pt education provided on placing everyday objects in easy to reach areas to conserve energy and support ADL performance. Pt demonstrated occasional LOB while completing exercises while standing, pt was able to self-correct by stopping the exercise to regain his balance without verbal cues. Stand<>sit with SBA/verbal cues. Pt completed 10 reps of B elbow flexion/extension exercises using 3lb weight while sitting in bedside chair with supervision. Pt was left in the bedside chair with call light in reach. OT to sign off, as pt is being d/c today. Recommended d/c home with assist.     Time Calculation:         Time Calculation- OT       Row Name 08/04/23 0920             Time Calculation- OT    OT Start Time 0920  -CS (r) KT (t) CS (c)      OT Stop Time 0950  -CS (r) KT (t) CS (c)      OT Time Calculation (min) 30 min  -CS (r) KT (t)      Total Timed Code Minutes- OT 30 minute(s)  -CS (r) KT (t) CS (c)      OT Received On 08/04/23  -CS (r) KT (t) CS (c)         Timed Charges    87672 - OT Therapeutic Exercise Minutes 30  -CS (r) KT (t) CS (c)      80433 - OT Therapeutic Activity Minutes --  -CS (r) KT (t) CS (c)      14559 - OT Self Care/Mgmt Minutes --  -CS (r) KT (t) CS (c)         Total Minutes    Timed Charges Total Minutes 30  -CS (r) KT (t)       Total Minutes 30  -CS (r) KT (t)                User Key  (r) = Recorded By, (t) = Taken By, (c) = Cosigned By      Initials Name Provider Type    Cynthia Flower, OTR/L, CNT Occupational Therapist    Micheline Otero, OT Student OT Student                         OT Discharge Summary  Anticipated Discharge Disposition (OT): home with  assist  Reason for Discharge: Discharge from facility  Outcomes Achieved: Refer to plan of care for updates on goals achieved  Discharge Destination: Home with assist, Home with home health    Micheline Dietz OT Student  8/4/2023

## 2023-08-04 NOTE — PROGRESS NOTES
"Infectious Diseases Progress Note    Patient:  Wyatt Curry  YOB: 1930  MRN: [id number]   Admit date: 7/31/2023   Admitting Physician: Cherie Mcmanus MD  Primary Care Physician: Everardo Jackson MD    Chief Complaint/Interval History: He remains hospitalized.  Came back to reassess as I thought he might discharge yesterday.  He indicates he is feeling fine.  He is not having diarrhea.  His last bowel movement was yesterday afternoon.  He indicates it was soft not watery.  He is having no abdominal discomfort.  He is eating full meals.  No nausea or vomiting.  No orthostatic symptoms.  He indicates he will be going home today.  No urinary symptoms    Intake/Output Summary (Last 24 hours) at 8/4/2023 0705  Last data filed at 8/3/2023 2335  Gross per 24 hour   Intake 120 ml   Output --   Net 120 ml     Allergies:   Allergies   Allergen Reactions    Pregabalin Mental Status Change     Made pt feel \"crazy\"     Current Scheduled Medications:   apixaban, 5 mg, Oral, Q12H  atorvastatin, 20 mg, Oral, Daily  busPIRone, 10 mg, Oral, BID  clopidogrel, 75 mg, Oral, Daily  digoxin, 125 mcg, Oral, Daily  empagliflozin, 10 mg, Oral, Daily  famotidine, 20 mg, Oral, BID AC  metoprolol succinate XL, 50 mg, Oral, Q24H  potassium chloride, 40 mEq, Oral, BID With Meals  saccharomyces boulardii, 250 mg, Oral, BID  sodium chloride, 10 mL, Intravenous, Q12H  spironolactone, 25 mg, Oral, Daily  vancomycin, 125 mg, Oral, Q6H  [START ON 8/10/2023] vancomycin, 125 mg, Oral, Q12H  [START ON 8/17/2023] vancomycin, 125 mg, Oral, Q24H  [START ON 8/24/2023] vancomycin, 125 mg, Oral, Every Other Day      Current PRN Medications:    nitroglycerin    ondansetron **OR** ondansetron    [COMPLETED] Insert Peripheral IV **AND** sodium chloride    sodium chloride    Review of Systems see HPI    Vital Signs:  /60 (BP Location: Left arm, Patient Position: Lying)   Pulse 79   Temp 98.6 øF (37 øC) (Oral)   Resp 18   Ht 160 cm " "(63\")   Wt 62.8 kg (138 lb 6.4 oz)   SpO2 91%   BMI 24.52 kg/mý     Physical Exam  Vital signs - reviewed.  Alert, pleasant, no distress  Abdomen is soft and nontender  No guarding or rebound    Lab Results:  CBC:   Results from last 7 days   Lab Units 08/04/23  0528 08/03/23  0507 08/02/23  1009 08/01/23  0359 07/31/23  1447   WBC 10*3/mm3 7.81 7.68 10.30 11.68* 12.08*   HEMOGLOBIN g/dL 9.9* 11.7* 11.0* 10.4* 12.3*   HEMATOCRIT % 32.8* 39.6 37.3* 34.7* 41.5   PLATELETS 10*3/mm3 239 238 219 185 221     BMP:  Results from last 7 days   Lab Units 08/04/23  0528 08/03/23  0507 08/02/23  1009 08/01/23  0945 08/01/23  0359 07/31/23  1520   SODIUM mmol/L 138 141 139  --  138 140   POTASSIUM mmol/L 4.5 3.6 3.3* 3.3* 3.1* 3.5   CHLORIDE mmol/L 106 107 103  --  102 101   CO2 mmol/L 27.0 26.0 27.0  --  22.0 23.0   BUN mg/dL 12 11 11  --  16 12   CREATININE mg/dL 0.57* 0.58* 0.50*  --  0.74* 0.62*   GLUCOSE mg/dL 101* 95 143*  --  179* 141*   CALCIUM mg/dL 8.8 8.9 8.8  --  7.8* 8.4   ALT (SGPT) U/L  --   --   --   --   --  8     Culture Results:   Blood Culture   Date Value Ref Range Status   07/31/2023 No growth at 3 days  Preliminary   07/31/2023 No growth at 3 days  Preliminary     Urine Culture   Date Value Ref Range Status   08/01/2023 Yeast isolated (A)  Final     Comment:     No further workup.      Radiology: None  Additional Studies Reviewed: None    Impression:   Recurrent C. difficile colitis-continued improvement    Recommendations:   He seems to be continuing to improve  He appears stable for discharge home  Continue oral vancomycin taper  Recommend:  Vancomycin 125 mg orally 4 times daily through August 9, 2023  Then  Vancomycin 125 mg orally twice daily for 7 days  Then   vancomycin 125 mg orally daily for 7 days  Then  Vancomycin 125 mg orally every other day for 2 weeks  Then stop vancomycin    Follow-up appointment with me 2 to 3 weeks after hospital discharge  I would be happy to see earlier if any new or " worsening symptoms in the interim    Jules Wren MD

## 2023-08-04 NOTE — DISCHARGE SUMMARY
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 Left ventricular ejection fraction appears to be 41 - 45%.    The following segments are hypokinetic: apical anterior, apical septal, apical inferior, apical lateral and apex.    Left ventricular wall thickness is consistent with mild septal asymmetric hypertrophy.    Left ventricular diastolic function is consistent with (grade I) impaired relaxation.    He left atrial cavity is mildly dilated.    There is mild to moderate thickening of the aortic valve.    . Mild to moderate mitral valve regurgitation is present    Mild pulmonary hypertension is present.      Imaging Results (All)       Procedure Component Value Units Date/Time    CT Abdomen Pelvis Without Contrast [[id number]] Collected: 07/31/23 1746     Updated: 07/31/23 1758    Narrative:      EXAMINATION: CT ABDOMEN PELVIS WO CONTRAST- 7/31/2023 5:46 PM CDT     HISTORY: abd pain, diarrhea, fever, leukocytosis, bandemia;  I48.91-Unspecified atrial fibrillation; E83.42-Hypomagnesemia;  K52.9-Noninfective gastroenteritis and colitis, unspecified     DOSE: 224 mGycm  (Automatic exposure control technique was implemented in  an effort to keep the radiation dose as low as possible without  compromising image quality)     REPORT: Spiral CT of the abdomen and pelvis was performed without  contrast from the lung bases through the pubic symphysis. Reconstructed  coronal and sagittal images are also reviewed.     Comparison: CT abdomen pelvis with contrast 07/12/2023.     Review of lung windows demonstrates mild respiratory motion artifact,  bibasilar atelectasis greater on the right. No pleural effusion or  pneumothorax is identified. There is a small sliding-type hiatal hernia.  Evaluation of the solid abdominal organs is limited without intravenous  contrast. There is a small cyst or hemangioma within the left lobe of  liver anteriorly image 20 series 2. This measures 9 mm. This is stable.  The spleen is homogeneous, normal in size. There is mild distention of  the stomach with gas and fluid, no free air, free fluid or abscess is  identified. The gallbladder appears within normal limits. No  nephrolithiasis or ureterolithiasis or hydronephrosis. The renal  contours are smooth. The pancreas and adrenal glands are unremarkable.  Diffuse colonic wall thickening seen before appears improved, likely due  to improving colitis. There is no evidence of bowel obstruction. There  is mild bladder wall thickening and at least one bladder diverticulum is  present. This may be related to chronic bladder outlet obstruction. The  patient has a history of bladder cancer but no discrete bladder mass is  identified. Mild induration mental fat planes without evidence of a mass  or nodularity. Heavy calcified plaque is noted within the abdominal  aorta and its branches and there is ectasia of the abdominal aorta and  iliac arteries as before. Review of bone windows demonstrates advanced  degenerative changes throughout the spine with associated levoscoliosis  of the lumbar spine. No acute osseous  abnormality is identified.       Impression:      1. Significant interval improvement in changes of pancolitis, with only  mild residual mucosal thickening involving the colon relatively  diffusely. No free air, free fluid or abscess is identified.  2. Mild bilateral thickening and at least one bladder diverticulum is  present. There is a history of bladder neoplasm but no discrete bladder  mass is identified. The findings are more suggestive of some degree of  chronic bladder outlet obstruction.        This report was finalized on 07/31/2023 17:55 by Dr. Emigdio Bhandari MD.          LAB RESULTS:      Lab 08/04/23  0528 08/03/23  0507 08/02/23  1009 08/01/23  0359 07/31/23  1447 07/31/23  1446   WBC 7.81 7.68 10.30 11.68* 12.08*  --    HEMOGLOBIN 9.9* 11.7* 11.0* 10.4* 12.3*  --    HEMATOCRIT 32.8* 39.6 37.3* 34.7* 41.5  --    PLATELETS 239 238 219 185 221  --    NEUTROS ABS 4.72 4.18 7.48* 9.34* 10.03*  --    IMMATURE GRANS (ABS) 0.06* 0.04  --   --   --   --    LYMPHS ABS 1.79 2.33 1.60  --   --   --    MONOS ABS 0.91* 0.83 1.04*  --   --   --    EOS ABS 0.30 0.28 0.09  --   --   --    MCV 94.0 95.2 95.2 91.3 92.0  --    LACTATE  --   --   --   --   --  1.7         Lab 08/04/23  0528 08/03/23  0507 08/02/23  1009 08/01/23  0945 08/01/23  0359 07/31/23  2151 07/31/23  1520   SODIUM 138 141 139  --  138  --  140   POTASSIUM 4.5 3.6 3.3* 3.3* 3.1*  --  3.5   CHLORIDE 106 107 103  --  102  --  101   CO2 27.0 26.0 27.0  --  22.0  --  23.0   ANION GAP 5.0 8.0 9.0  --  14.0  --  16.0*   BUN 12 11 11  --  16  --  12   CREATININE 0.57* 0.58* 0.50*  --  0.74*  --  0.62*   EGFR 91.4 90.9 95.1  --  84.5  --  89.1   GLUCOSE 101* 95 143*  --  179*  --  141*   CALCIUM 8.8 8.9 8.8  --  7.8*  --  8.4   MAGNESIUM 1.5* 1.8 1.8  --  1.9 1.9 0.8*         Lab 07/31/23  1520   TOTAL PROTEIN 7.2   ALBUMIN 3.8   GLOBULIN 3.4   ALT (SGPT) 8   AST (SGOT) 25   BILIRUBIN 0.7   ALK PHOS 56                     Brief Urine Lab Results  (Last  result in the past 365 days)        Color   Clarity   Blood   Leuk Est   Nitrite   Protein   CREAT   Urine HCG        08/01/23 0018 Dark Yellow   Cloudy   Trace   Moderate (2+)   Negative   100 mg/dL (2+)                 Microbiology Results (last 10 days)       Procedure Component Value - Date/Time    Clostridioides difficile Toxin - Stool, Per Rectum [[id number]]  (Abnormal) Collected: 08/01/23 0244    Lab Status: Final result Specimen: Stool from Per Rectum Updated: 08/01/23 0653    Narrative:      The following orders were created for panel order Clostridioides difficile Toxin - Stool, Per Rectum.  Procedure                               Abnormality         Status                     ---------                               -----------         ------                     Clostridioides difficile...[[id number]]  Abnormal            Final result                 Please view results for these tests on the individual orders.    Clostridioides difficile Toxin, PCR - Stool, Per Rectum [[id number]]  (Abnormal) Collected: 08/01/23 0244    Lab Status: Final result Specimen: Stool from Per Rectum Updated: 08/01/23 0653     Toxigenic C. difficile by PCR Positive    Narrative:      DNA from a toxigenic strain of C.difficile has been detected. Antigen testing for the presence of free C.difficile toxin is currently in progress, to help determine the clinical significance of this PCR result.     Clostridioides difficile toxin Ag, Reflex - Stool, Per Rectum [[id number]]  (Abnormal) Collected: 08/01/23 0244    Lab Status: Final result Specimen: Stool from Per Rectum Updated: 08/01/23 0653     C.diff Toxin Ag Positive    Narrative:      DNA from a toxigenic strain of C.difficile was detected, along with the presence of free toxin. These results are suggestive of C.difficile infection.    Gastrointestinal Panel, PCR - Stool, Per Rectum [[id number]]  (Normal) Collected: 08/01/23 0240    Lab Status: Final result Specimen: Stool from Per  Rectum Updated: 08/01/23 0438     Campylobacter Not Detected     Plesiomonas shigelloides Not Detected     Salmonella Not Detected     Vibrio Not Detected     Vibrio cholerae Not Detected     Yersinia enterocolitica Not Detected     Enteroaggregative E. coli (EAEC) Not Detected     Enteropathogenic E. coli (EPEC) Not Detected     Enterotoxigenic E. coli (ETEC) lt/st Not Detected     Shiga-like toxin-producing E. coli (STEC) stx1/stx2 Not Detected     Shigella/Enteroinvasive E. coli (EIEC) Not Detected     Cryptosporidium Not Detected     Cyclospora cayetanensis Not Detected     Entamoeba histolytica Not Detected     Giardia lamblia Not Detected     Adenovirus F40/41 Not Detected     Astrovirus Not Detected     Norovirus GI/GII Not Detected     Rotavirus A Not Detected     Sapovirus (I, II, IV or V) Not Detected    Narrative:      If Aeromonas, Staphylococcus aureus or Bacillus cereus are suspected, please order [id number]: Stool Culture, Aeromonas, S aureus, B Cereus.    Urine Culture - Urine, Urine, Clean Catch [[id number]]  (Abnormal) Collected: 08/01/23 0018    Lab Status: Final result Specimen: Urine, Clean Catch Updated: 08/02/23 0929     Urine Culture Yeast isolated     Comment: No further workup.        Narrative:      Colonization of the urinary tract without infection is common. Treatment is discouraged unless the patient is symptomatic, pregnant, or undergoing an invasive urologic procedure.    Blood Culture - Blood, Arm, Right [[id number]]  (Normal) Collected: 07/31/23 1849    Lab Status: Preliminary result Specimen: Blood from Arm, Right Updated: 08/03/23 1915     Blood Culture No growth at 3 days    Blood Culture - Blood, Arm, Left [[id number]]  (Normal) Collected: 07/31/23 1844    Lab Status: Preliminary result Specimen: Blood from Arm, Left Updated: 08/03/23 1915     Blood Culture No growth at 3 days            Hospital Course:   Wyatt Curry is a 89-year-old male with a past medical history of recurrent  "C. difficile diarrhea infection.  He presented with complaints of worsening diarrhea and lower abdominal pain of 2 days duration.  This was his third visit to the hospital with same complaints.     Niece was at bedside who provided all history. He has been taking Imodium so he can visit his wife in the nursing home however he began having abdominal pain, nausea/vomiting and daily watery foul-smelling stool are progressively worsening and she feels he has again got C. difficile. He has had an extensive change in baseline health since March after skin cancer surgery with hospital-acquired pneumonia, antibiotics causing C. difficile toxin, newly diagnosed heart failure. After arrival patient developed A-fib with RVR.  He had some hypotension so he was started on amiodarone drip.  He had hypomagnesemia as well as hypokalemia and these were aggressively repleted.  He was reviewed by cardiology and he was restarted on his metoprolol home medication.  Amiodarone was discontinued and he was started on digoxin with good heart rate control.  He was also started on Eliquis after discussion with his niece and agreement by the patient.  His C. difficile was treated with oral vancomycin.  He was reviewed by infectious disease physician and recommended for a prolonged oral vancomycin taper.  He was instructed to follow-up with infectious disease after 2 weeks for monitoring of his C. difficile.  He was also instructed to follow-up with his regular cardiologist within 1 week for his congestive heart failure.  Patient improved on above treatments and was discharged in stable condition.     Physical Exam on Discharge:  /82 (BP Location: Left arm, Patient Position: Sitting)   Pulse 79   Temp 97.2 øF (36.2 øC) (Oral)   Resp 18   Ht 160 cm (63\")   Wt 62.8 kg (138 lb 6.4 oz)   SpO2 94%   BMI 24.52 kg/mý   Physical Exam  onstitutional:       General: He is not in acute distress.     Appearance: He is well-developed. He is " ill-appearing. He is not diaphoretic.      Comments: Chronically ill-appearing elderly man.   HENT:      Head: Normocephalic.   Eyes:      General: No scleral icterus.     Conjunctiva/sclera: Conjunctivae normal.      Pupils: Pupils are equal, round, and reactive to light.   Neck:      Thyroid: No thyromegaly.      Vascular: No JVD.      Trachea: No tracheal deviation.   Cardiovascular:      Rate and Rhythm: Normal rate. Rhythm irregular.      Heart sounds: Normal heart sounds. No murmur heard.    No friction rub. No gallop.   Pulmonary:      Effort: Pulmonary effort is normal. No respiratory distress.      Breath sounds: Normal breath sounds. No stridor. No wheezing or rales.   Chest:      Chest wall: No tenderness.   Abdominal:      General: Bowel sounds are normal. There is no distension.      Palpations: Abdomen is soft. There is no mass.      Tenderness: There is no abdominal tenderness. There is no guarding or rebound.      Hernia: No hernia is present.      Comments: No tendeness   Musculoskeletal:         General: No tenderness or deformity. Normal range of motion.      Cervical back: Normal range of motion and neck supple.      Right lower leg: No edema.      Left lower leg: No edema.   Lymphadenopathy:      Cervical: No cervical adenopathy.   Skin:     General: Skin is warm and dry.      Coloration: Skin is not pale.      Findings: No erythema or rash.   Neurological:      General: No focal deficit present.      Mental Status: He is alert and oriented to person, place, and time.      Cranial Nerves: No cranial nerve deficit.      Sensory: No sensory deficit.      Motor: No abnormal muscle tone.      Coordination: Coordination normal.   Psychiatric:         Mood and Affect: Mood normal.         Behavior: Behavior normal.         Thought Content: Thought content normal.     Condition on Discharge: Stable    Discharge Disposition:  [place]Health [place] [facility] [place]    Discharge Medications:     Discharge Medications         New Medications        Instructions Start Date   apixaban 5 MG tablet tablet  Commonly known as: ELIQUIS   5 mg, Oral, Every 12 Hours Scheduled      digoxin 125 MCG tablet  Commonly known as: LANOXIN   125 mcg, Oral, Daily Digoxin      magnesium oxide 400 MG tablet  Commonly known as: MAG-OX   400 mg, Oral, Daily      saccharomyces boulardii 250 MG capsule  Commonly known as: FLORASTOR   250 mg, Oral, 2 Times Daily      vancomycin 125 MG capsule  Commonly known as: VANCOCIN   125 mg, Oral, Every 6 Hours Scheduled      vancomycin 125 MG capsule  Commonly known as: VANCOCIN   125 mg, Oral, Every 12 Hours   Start Date: August 10, 2023     vancomycin 125 MG capsule  Commonly known as: VANCOCIN   125 mg, Oral, Every 24 Hours   Start Date: August 17, 2023     vancomycin 125 MG capsule  Commonly known as: VANCOCIN   125 mg, Oral, Every Other Day   Start Date: August 24, 2023            Continue These Medications        Instructions Start Date   acetaminophen 650 MG 8 hr tablet  Commonly known as: TYLENOL   1 tablet, Oral, 2 Times Daily      atorvastatin 20 MG tablet  Commonly known as: LIPITOR   1 tablet, Oral, Every Night at Bedtime      busPIRone 10 MG tablet  Commonly known as: BUSPAR   1 tablet, Oral, 2 Times Daily      clopidogrel 75 MG tablet  Commonly known as: PLAVIX   1 tablet, Oral, Daily      empagliflozin 10 MG tablet tablet  Commonly known as: JARDIANCE   10 mg, Oral, Daily      Entresto 49-51 MG tablet  Generic drug: sacubitril-valsartan   1 tablet, Oral, 2 Times Daily      furosemide 40 MG tablet  Commonly known as: LASIX   40 mg, Oral, Daily PRN, Take 1 additional tablet as needed for weight gain, shortness of breath or swelling      lansoprazole 30 MG capsule  Commonly known as: PREVACID   1 capsule, Oral, Daily      metoprolol succinate XL 50 MG 24 hr tablet  Commonly known as: TOPROL-XL   50 mg, Oral, Nightly      nitroglycerin 0.4 MG SL tablet  Commonly known as: NITROSTAT   1 tablet, Sublingual,  Every 5 Minutes PRN      ondansetron ODT 4 MG disintegrating tablet  Commonly known as: ZOFRAN-ODT   4 mg, Translingual, Every 6 Hours PRN      potassium chloride 10 MEQ CR tablet  Commonly known as: K-DUR,KLOR-CON   20 mEq, Oral, Daily PRN      Probiotic (Lactobacillus) capsule   1 capsule, Oral, Daily      spironolactone 25 MG tablet  Commonly known as: ALDACTONE   25 mg, Oral, Daily      vitamin B-12 1000 MCG tablet  Commonly known as: CYANOCOBALAMIN   1 tablet, Oral, Daily      Vitron-C  MG tablet  Generic drug: Iron-Vitamin C   1 tablet, Oral, Daily             Stop These Medications      Aspirin Adult Low Strength 81 MG EC tablet  Generic drug: aspirin              This patient has current or prior documentation of an left ventricular ejection fraction (LVEF) of less than or equal to 40%.    The patient was prescribed or already taking an ACE/ARB.    The patient was prescribed already taking bisoprolol, carvedilol, or sustained release metoprolol succinate.     Discharge instruction:    1.  Follow-up with your primary care provider within 1 week of discharge.  2.  Follow-up with your cardiologist within 1 week of discharge  3.  Follow-up with infectious disease consultant Dr. Rios within 2 weeks of discharge.     Discharge Diet:   Diet Instructions       Diet: Cardiac Diets; Low Sodium (2g); Regular Texture (IDDSI 7); Thin (IDDSI 0)      Discharge Diet: Cardiac Diets    Cardiac Diet: Low Sodium (2g)    Texture: Regular Texture (IDDSI 7)    Fluid Consistency: Thin (IDDSI 0)            Activity at Discharge:   Activity Instructions       Activity as Tolerated              Follow-up Appointments:   Future Appointments   Date Time Provider Department Center   8/6/2023 To Be Determined Starr Chung RN [facility] PAD [facility] PAD   8/9/2023  8:15 AM Bell Lawson APRN [facility] PAD PAD       Test Results Pending at Discharge: None    Electronically signed by Cherie Mcmnaus MD, 08/04/23, 11:00 CDT.    Time: 36  minutes of time was spent evaluating patient and planning discharge.

## 2023-08-04 NOTE — THERAPY TREATMENT NOTE
Patient Name: Wyatt Curry  : 1930    MRN: [id number]                              Today's Date: 2023       Admit Date: 2023    Visit Dx:     ICD-10-CM ICD-9-CM   1. Atrial fibrillation with RVR  I48.91 427.31   2. Hypomagnesemia  E83.42 275.2   3. Colitis  K52.9 558.9   4. Decreased independence with activities of daily living [Z78.9 (ICD-10-CM)]  Z78.9 V49.89   5. Impaired mobility [Z74.09 (ICD-10-CM)]  Z74.09 799.89     Patient Active Problem List   Diagnosis    Benign localized prostatic hyperplasia without lower urinary tract symptoms (LUTS)    History of bladder cancer    Left rotator cuff tear arthropathy    Coronary artery disease involving native coronary artery of native heart without angina pectoris    Left bundle branch block    Essential hypertension    Mixed hyperlipidemia    Overweight (BMI 25.0-29.9)    Non-rheumatic mitral regurgitation    Other fatigue    History of coronary artery stent placement    Lung nodule    Mediastinal adenopathy    Cough    Abnormal positron emission tomography (PET) scan    Basal cell carcinoma    Neoplasm of uncertain behavior    Elevated troponin    Bleeding    Ischemic cardiomyopathy    Pneumonia of right lower lobe due to infectious organism    PAF (paroxysmal atrial fibrillation)    Proctocolitis (high concern for C. 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    GERD (gastroesophageal reflux disease)     Hyperlipidemia     Hypertension     Irregular heart beat     Lung  nodules      Past Surgical History:   Procedure Laterality Date    BLADDER SURGERY      CARDIAC CATHETERIZATION      CARDIAC CATHETERIZATION Left 4/8/2023    Procedure: Cardiac Catheterization/Vascular Study;  Surgeon: Sukhi Hartley MD;  Location:  [facility];  Service: Cardiology;  Laterality: Left;    COLONOSCOPY      CORONARY ANGIOPLASTY WITH STENT PLACEMENT  2006    STENT X 2    CYSTOSCOPY      ENDOSCOPY      EXCISION LESION N/A 4/7/2023    Procedure: EXCISION LESION OF RIGHT TEMPLE AND LEFT TEMPLE WITH FROZEN SECTION WITH POSSIBLE FLAP OR GRAFT;  Surgeon: Brijesh Nickerson MD;  Location:  [facility] OR;  Service: ENT;  Laterality: N/A;    FLAP HEAD/NECK Left 03/09/2020    Procedure: POSSIBLE FLAP;  Surgeon: Brijesh Nickerson MD;  Location:  [facility] OR;  Service: ENT;  Laterality: Left;    HEAD/NECK LESION/CYST EXCISION Left 03/09/2020    Procedure: 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 with frozen section and possible flap or graft;  Surgeon: Brijesh Nickerson MD;  Location:  [facility] OR;  Service: ENT;  Laterality: Left;    HERNIA REPAIR      SHOULDER SURGERY      SKIN BIOPSY      lip biopsy    SKIN FULL THICKNESS GRAFT Left 03/09/2020    Procedure: OR GRAFT;  Surgeon: Brijesh Nickerson MD;  Location:  [facility] OR;  Service: ENT;  Laterality: Left;    TRANSURETHRAL RESECTION OF BLADDER TUMOR        General Information       Row Name 08/04/23 0920          OT Time and Intention    Document Type discharge treatment  -CS (r) KT (t) CS (c)     Mode of Treatment occupational therapy  -CS (r) KT (t) CS (c)       Row Name 08/04/23 0920          General Information    Patient Profile Reviewed yes  -CS (r) KT (t) CS (c)     Existing Precautions/Restrictions fall  -CS (r) KT (t) CS (c)     Barriers to Rehab medically complex;hearing deficit  -CS (r) KT (t) CS (c)       Row Name 08/04/23 0920          Cognition    Orientation Status (Cognition) oriented x 4  -CS (r) KT  (t) CS (c)       Row Name 08/04/23 0920          Safety Issues, Functional Mobility    Safety Issues Affecting Function (Mobility) impulsivity;insight into deficits/self-awareness;awareness of need for assistance  -CS (r) KT (t) CS (c)     Impairments Affecting Function (Mobility) balance;endurance/activity tolerance  -CS (r) KT (t) CS (c)               User Key  (r) = Recorded By, (t) = Taken By, (c) = Cosigned By      Initials Name Provider Type    CS Cynthia Foy OTR/L, JEFF Occupational Therapist    Micheline Otero, OT Student OT Student                     Mobility/ADL's       Row Name 08/04/23 0920          Bed Mobility    Comment, (Bed Mobility) Up in chair  -CS (r) KT (t) CS (c)       Row Name 08/04/23 0920          Transfers    Transfers sit-stand transfer;stand-sit transfer  -CS (r) KT (t) CS (c)       Row Name 08/04/23 0920          Sit-Stand Transfer    Sit-Stand [place] (Transfers) supervision  -CS (r) KT (t) CS (c)       Row Name 08/04/23 0920          Stand-Sit Transfer    Stand-Sit [place] (Transfers) supervision  -CS (r) KT (t) CS (c)       Row Name 08/04/23 0920          Activities of Daily Living    BADL Assessment/Intervention lower body dressing  -CS (r) KT (t) CS (c)       Row Name 08/04/23 0920          Lower Body Dressing Assessment/Training    [place] Level (Lower Body Dressing) don;socks;independent  -CS (r) KT (t) CS (c)     Position (Lower Body Dressing) unsupported sitting  -CS (r) KT (t) CS (c)               User Key  (r) = Recorded By, (t) = Taken By, (c) = Cosigned By      Initials Name Provider Type    Cynthia Flower OTR/L, JEFF Occupational Therapist    Micheline Otero, OT Student OT Student                   Obj/Interventions       Row Name 08/04/23 0920          Shoulder (Therapeutic Exercise)    Shoulder (Therapeutic Exercise) AROM (active range of motion);strengthening exercise  -CS (r) KT (t) CS (c)     Shoulder AROM (Therapeutic Exercise)  bilateral;flexion;extension;standing;10 repetitions  -CS (r) KT (t) CS (c)       Row Name 08/04/23 0920          Elbow/Forearm (Therapeutic Exercise)    Elbow/Forearm (Therapeutic Exercise) AROM (active range of motion)  -CS (r) KT (t) CS (c)     Elbow/Forearm AROM (Therapeutic Exercise) bilateral;flexion;extension;10 repetitions;standing  3lbs  -CS (r) KT (t) CS (c)       Row Name 08/04/23 0920          Motor Skills    Therapeutic Exercise shoulder;elbow/forearm  -CS (r) KT (t) CS (c)       Row Name 08/04/23 0920          Balance    Balance Assessment sitting static balance;sitting dynamic balance;sit to stand dynamic balance;standing static balance;standing dynamic balance  -CS (r) KT (t) CS (c)     Static Sitting Balance independent  -CS (r) KT (t) CS (c)     Dynamic Sitting Balance independent  -CS (r) KT (t) CS (c)     Position, Sitting Balance unsupported;sitting in chair  -CS (r) KT (t) CS (c)     Sit to Stand Dynamic Balance standby assist;1-person assist  -CS (r) KT (t) CS (c)     Static Standing Balance standby assist  -CS (r) KT (t) CS (c)     Dynamic Standing Balance standby assist  -CS (r) KT (t) CS (c)     Position/Device Used, Standing Balance unsupported  -CS (r) KT (t) CS (c)     Balance Interventions sitting;standing;sit to stand;static;dynamic  -CS (r) KT (t) CS (c)               User Key  (r) = Recorded By, (t) = Taken By, (c) = Cosigned By      Initials Name Provider Type    CS Cynthia Foy S, OTR/L, CNT Occupational Therapist    KT Micheline Dietz, OT Student OT Student                   Goals/Plan       Row Name 08/04/23 1150          Bathing Goal 1 (OT)    Activity/Device (Bathing Goal 1, OT) bathing skills, all  -CS (r) KT (t) CS (c)     [place] Level/Cues Needed (Bathing Goal 1, OT) standby assist  -CS (r) KT (t) CS (c)     Time Frame (Bathing Goal 1, OT) long term goal (LTG);10 days  -CS (r) KT (t) CS (c)     Progress/Outcomes (Bathing Goal 1, OT) goal partially met  -CS (r) KT (t) CS  (c)       Row Name 08/04/23 1150          Toileting Goal 1 (OT)    Activity/Device (Toileting Goal 1, OT) toileting skills, all  -CS (r) KT (t) CS (c)     [place] Level/Cues Needed (Toileting Goal 1, OT) independent  -CS (r) KT (t) CS (c)     Time Frame (Toileting Goal 1, OT) long term goal (LTG);10 days  -CS (r) KT (t) CS (c)     Progress/Outcome (Toileting Goal 1, OT) goal met  -CS (r) KT (t) CS (c)       Row Name 08/04/23 1150          Problem Specific Goal 1 (OT)    Problem Specific Goal 1 (OT) Pt will tolerate standing for ADL participation for 15 min with no LOBs to improve fxl activity tolerance and balance.  -CS (r) KT (t) CS (c)     Time Frame (Problem Specific Goal 1, OT) long term goal (LTG);10 days  -CS (r) KT (t) CS (c)     Progress/Outcome (Problem Specific Goal 1, OT) goal met  -CS (r) KT (t) CS (c)       Row Name 08/04/23 1150          Therapy Assessment/Plan (OT)    Planned Therapy Interventions (OT) activity tolerance training;adaptive equipment training;BADL retraining;IADL retraining;functional balance retraining;occupation/activity based interventions;patient/caregiver education/training;ROM/therapeutic exercise;strengthening exercise;transfer/mobility retraining  -CS (r) KT (t) CS (c)               User Key  (r) = Recorded By, (t) = Taken By, (c) = Cosigned By      Initials Name Provider Type    CS Cynthia Foy S, OTR/L, CNT Occupational Therapist    KT Micheline Dietz, OT Student OT Student                   Clinical Impression       Row Name 08/04/23 0920          Pain Assessment    Pretreatment Pain Rating 0/10 - no pain  -CS (r) KT (t) CS (c)     Posttreatment Pain Rating 0/10 - no pain  -CS (r) KT (t) CS (c)     Pain Intervention(s) Repositioned;Ambulation/increased activity  -CS (r) KT (t) CS (c)       Row Name 08/04/23 0920          Plan of Care Review    Plan of Care Reviewed With patient  -CS (r) KT (t) CS (c)     Progress improving  -CS (r) KT (t) CS (c)     Outcome Evaluation OT  tx complete. A&Ox4. Pt sitting in bedside chair upon arrival. Pt states he is being discharged home today and his siter-in-law is coming to get him. Today pt completed sit<>stand with SBA. While standing pt stated he wants to complete his daily exercises he does at home to increase his strength for ADL's. Pt completed 10 reps of lateral and backwards leg swings on both legs' while standing with SBA. Pt completed 10 reps of squats while standing with SBA. Pt completed 10 reps of shoulder flexion while standing with SBA. Pt states he normally does UE exercises to improve his ability to put dishes in his high cabinets at home. Pt education provided on placing everyday objects in easy to reach areas to conserve energy and support ADL performance. Pt demonstrated occasional LOB while completing exercises while standing, pt was able to self-correct by stopping the exercise to regain his balance without verbal cues. Stand<>sit with SBA/verbal cues. Pt completed 10 reps of B elbow flexion/extension exercises using 3lb weight while sitting in bedside chair with supervision. Pt was left in the bedside chair with call light in reach. OT to sign off, as pt is being d/c today. Recommended d/c home with assist.  -CS (r) KT (t) CS (c)       Row Name 08/04/23 0920          Therapy Plan Review/Discharge Plan (OT)    Anticipated Discharge Disposition (OT) home with assist  -CS (r) KT (t) CS (c)       Row Name 08/04/23 0920          Vital Signs    O2 Delivery Pre Treatment room air  -CS (r) KT (t) CS (c)     Pre Patient Position Sitting  -CS (r) KT (t) CS (c)     Intra Patient Position Standing  -CS (r) KT (t) CS (c)     Post Patient Position Sitting  -CS (r) KT (t) CS (c)       Row Name 08/04/23 0920          Positioning and Restraints    Pre-Treatment Position sitting in chair/recliner  -CS (r) KT (t) CS (c)     Post Treatment Position chair  -CS (r) KT (t) CS (c)     In Chair sitting;call light within reach;encouraged to call for  assist  -CS (r) KT (t) CS (c)               User Key  (r) = Recorded By, (t) = Taken By, (c) = Cosigned By      Initials Name Provider Type    Cynthia Flower OTR/L, JEFF Occupational Therapist    Micheline Otero, OT Student OT Student                   Outcome Measures       Row Name 08/04/23 0920          How much help from another is currently needed...    Putting on and taking off regular lower body clothing? 4  -CS (r) KT (t) CS (c)     Bathing (including washing, rinsing, and drying) 3  -CS (r) KT (t) CS (c)     Toileting (which includes using toilet bed pan or urinal) 3  -CS (r) KT (t) CS (c)     Putting on and taking off regular upper body clothing 4  -CS (r) KT (t) CS (c)     Taking care of personal grooming (such as brushing teeth) 4  -CS (r) KT (t) CS (c)     Eating meals 4  -CS (r) KT (t) CS (c)     AM-PAC 6 Clicks Score (OT) 22  -CS (r) KT (t)       Row Name 08/04/23 0800          How much help from another person do you currently need...    Turning from your back to your side while in flat bed without using bedrails? 4  -LISSA     Moving from lying on back to sitting on the side of a flat bed without bedrails? 4  -LISSA     Moving to and from a bed to a chair (including a wheelchair)? 3  -LISSA     Standing up from a chair using your arms (e.g., wheelchair, bedside chair)? 3  -LISSA     Climbing 3-5 steps with a railing? 3  -LISSA     To walk in hospital room? 3  -LISSA     AM-PAC 6 Clicks Score (PT) 20  -LISSA     Highest level of mobility 6 --> Walked 10 steps or more  -LISSA       Row Name 08/04/23 0920          Functional Assessment    Outcome Measure Options AM-PAC 6 Clicks Daily Activity (OT)  -CS (r) KT (t) CS (c)               User Key  (r) = Recorded By, (t) = Taken By, (c) = Cosigned By      Initials Name Provider Type    Cynthia Flower, OTR/L, JEFF Occupational Therapist    Gwen Albarado, RN Registered Nurse    Micheline Otero, OT Student OT Student                    Occupational Therapy Education        Title: PT OT SLP Therapies (In Progress)       Topic: Occupational Therapy (In Progress)       Point: ADL training (Done)       Description:   Instruct learner(s) on proper safety adaptation and remediation techniques during self care or transfers.   Instruct in proper use of assistive devices.                  Learning Progress Summary             Patient Acceptance, E, VU by KT at 8/4/2023 1009    Acceptance, E, VU by KT at 8/3/2023 1134    Acceptance, E,D, VU,NR by  at 8/2/2023 1448    Acceptance, E, VU by  at 8/1/2023 0820    Comment: Educated on POC, benefits and purpose of OT services.                         Point: Home exercise program (Done)       Description:   Instruct learner(s) on appropriate technique for monitoring, assisting and/or progressing therapeutic exercises/activities.                  Learning Progress Summary             Patient Acceptance, E, VU by KT at 8/4/2023 1009                         Point: Precautions (Not Started)       Description:   Instruct learner(s) on prescribed precautions during self-care and functional transfers.                  Learner Progress:  Not documented in this visit.              Point: Body mechanics (Done)       Description:   Instruct learner(s) on proper positioning and spine alignment during self-care, functional mobility activities and/or exercises.                  Learning Progress Summary             Patient Acceptance, E, VU by KT at 8/4/2023 1009    Acceptance, E, VU by KT at 8/3/2023 1134                                         User Key       Initials Effective Dates Name Provider Type Discipline     07/11/23 -  Magy Soto, OTR/L Occupational Therapist OT    KT 04/27/23 -  Micheline Dietz OT Student OT Student OT     05/03/23 -  Adelaida Rodas, OT Student OT Student OT                  OT Recommendation and Plan  Planned Therapy Interventions (OT): activity tolerance training, adaptive equipment training, BADL retraining, IADL  retraining, functional balance retraining, occupation/activity based interventions, patient/caregiver education/training, ROM/therapeutic exercise, strengthening exercise, transfer/mobility retraining  Plan of Care Review  Plan of Care Reviewed With: patient  Progress: improving  Outcome Evaluation: OT tx complete. A&Ox4. Pt sitting in bedside chair upon arrival. Pt states he is being discharged home today and his siter-in-law is coming to get him. Today pt completed sit<>stand with SBA. While standing pt stated he wants to complete his daily exercises he does at home to increase his strength for ADL's. Pt completed 10 reps of lateral and backwards leg swings on both legs' while standing with SBA. Pt completed 10 reps of squats while standing with SBA. Pt completed 10 reps of shoulder flexion while standing with SBA. Pt states he normally does UE exercises to improve his ability to put dishes in his high cabinets at home. Pt education provided on placing everyday objects in easy to reach areas to conserve energy and support ADL performance. Pt demonstrated occasional LOB while completing exercises while standing, pt was able to self-correct by stopping the exercise to regain his balance without verbal cues. Stand<>sit with SBA/verbal cues. Pt completed 10 reps of B elbow flexion/extension exercises using 3lb weight while sitting in bedside chair with supervision. Pt was left in the bedside chair with call light in reach. OT to sign off, as pt is being d/c today. Recommended d/c home with assist.     Time Calculation:         Time Calculation- OT       Row Name 08/04/23 0920             Time Calculation- OT    OT Start Time 0920  -CS (r) KT (t) CS (c)      OT Stop Time 0950  -CS (r) KT (t) CS (c)      OT Time Calculation (min) 30 min  -CS (r) KT (t)      Total Timed Code Minutes- OT 30 minute(s)  -CS (r) KT (t) CS (c)      OT Received On 08/04/23  -CS (r) KT (t) CS (c)         Timed Charges    44783 - OT Therapeutic  Exercise Minutes 30  -CS (r) KT (t) CS (c)      33441 - OT Therapeutic Activity Minutes --  -CS (r) KT (t) CS (c)      31936 - OT Self Care/Mgmt Minutes --  -CS (r) KT (t) CS (c)         Total Minutes    Timed Charges Total Minutes 30  -CS (r) KT (t)       Total Minutes 30  -CS (r) KT (t)                User Key  (r) = Recorded By, (t) = Taken By, (c) = Cosigned By      Initials Name Provider Type    CS Cynthia oFy, OTR/L, CNT Occupational Therapist    Micheline Otero, OT Student OT Student                           Micheline Dietz, OT Student  8/4/2023

## 2023-08-04 NOTE — PLAN OF CARE
Goal Outcome Evaluation:  Plan of Care Reviewed With: patient        Progress: improving  Outcome Evaluation: VSS. No c/o pain. Pt rested well this shift. Continues on PO abx. Continues on RA. Plans to d/c home possibly today. Safety maintained. Tele- NSR 07-69, BBB.

## 2023-08-04 NOTE — CASE MANAGEMENT/SOCIAL WORK
Continued Stay Note  [facility]     Patient Name: Wyatt Curry  MRN: [id number]  Today's Date: 8/4/2023    Admit Date: 7/31/2023        Discharge Plan       Row Name 08/04/23 1006       Plan    Final Discharge Disposition Code 06 - home with home health care    Final Note  has sent resumption orders to [facility].  Pt plans to dc home today.                   Discharge Codes    No documentation.                 Expected Discharge Date and Time       Expected Discharge Date Expected Discharge Time    Aug 4, 2023               IRAM Cohen

## 2023-08-05 LAB
BACTERIA SPEC AEROBE CULT: NORMAL
BACTERIA SPEC AEROBE CULT: NORMAL

## 2023-08-06 ENCOUNTER — HOME CARE VISIT (OUTPATIENT)
Dept: HOME HEALTH SERVICES | Facility: CLINIC | Age: 88
End: 2023-08-06
Payer: MEDICARE

## 2023-08-06 PROCEDURE — G0299 HHS/HOSPICE OF RN EA 15 MIN: HCPCS

## 2023-08-06 NOTE — HOME HEALTH
Resumption of Care Note:Patient  went to ER for diarrhea and weakness and while in ER patient went into AFIB with RVR.Patient was found to have low magnesium .Patient started  on Amiodarone and  magnesium drip. Patient was discharged when stable . Dr Rios was consulted  for CDIFF and he recommended Vanco and to slowly be tapered to reduce chance of recurrence of CDIFF.All medications reviewed with patient .Patient instructed on bleeding precautions and the risks of taking and not taking Eliquis as ordered related to AFIB.Patient verbalized understanding .Patient stated his stool was becoming more formed and his  stomach pain was gone  .Instructed patient when to notify HH.     Reason for hospitalization/new problems: recurrent Clostridioides difficile diarrhea,Atrial fibrillation with RVR, acute on chronic systolic heart failure,hypomagnesemia,symptomatic hypotension    New/changed medications:Added  Vancomycin ,Digoxin, saccharomyces boulardii capsule,Eliquis ,magnesium oxide,Jardiance for heart failure.Aspirin discontinued       New/changed orders:Patient to  follow-up with primary care provider and cardiologist within 1 week of discharge,follow up with  infectious disease consultant Dr. Rios within 2 weeks of discharge date     Plan/Focus of Care and Skilled need: Recurrent Clostridioides difficile diarrhea,medication education and infection control     Plan for next visit: cardiopulmonary assessment,medication education ,review daily weight log, and assess patient's hydration and stool status,

## 2023-08-07 VITALS
DIASTOLIC BLOOD PRESSURE: 64 MMHG | RESPIRATION RATE: 16 BRPM | SYSTOLIC BLOOD PRESSURE: 128 MMHG | WEIGHT: 129 LBS | OXYGEN SATURATION: 95 % | HEART RATE: 64 BPM | BODY MASS INDEX: 22.02 KG/M2 | TEMPERATURE: 97.8 F | HEIGHT: 64 IN

## 2023-08-08 ENCOUNTER — READMISSION MANAGEMENT (OUTPATIENT)
Dept: CALL CENTER | Facility: HOSPITAL | Age: 88
End: 2023-08-08
Payer: MEDICARE

## 2023-08-08 NOTE — CASE COMMUNICATION
Resumption of Care Note:Patient  went to ER for diarrhea and weakness and while in ER patient went into AFIB with RVR.Patient was found to have low magnesium .Patient started  on Amiodarone and  magnesium drip. Patient was discharged when stable . Dr Rios was consulted  for CDIFF and he recommended Vanco and to slowly be tapered to reduce chance of recurrence of CDIFF.All medications reviewed with patient .Patient instructed on bleedi ng precautions and the risks of taking and not taking Eliquis as ordered related to AFIB.Patient verbalized understanding .Patient stated his stool was becoming more formed and his  stomach pain was gone  .Instructed patient when to notify HH.     Reason for hospitalization/new problems: recurrent Clostridioides difficile diarrhea,Atrial fibrillation with RVR, acute on chronic systolic heart failure,hypomagnesemia,symptomatic hypotensio n    New/changed medications:Added  Vancomycin ,Digoxin, saccharomyces boulardii capsule,Eliquis ,magnesium oxide,Jardiance for heart failure.Aspirin discontinued       New/changed orders:Patient to  follow-up with primary care provider and cardiologist within 1 week of discharge,follow up with  infectious disease consultant Dr. Rios within 2 weeks of discharge date     Plan/Focus of Care and Skilled need: Recurrent Clostridioides dif ficile diarrhea,medication education and infection control     Plan for next visit: cardiopulmonary assessment,medication education ,review daily weight log, and assess patient's hydration and stool status,

## 2023-08-08 NOTE — OUTREACH NOTE
Medical Week 1 Survey      Flowsheet Row Responses   [facility] patient discharged from? [place]   Does the patient have one of the following disease processes/diagnoses(primary or secondary)? Other   Week 1 attempt successful? No   Unsuccessful attempts Attempt 1            Rola Tejada Registered Nurse

## 2023-08-09 ENCOUNTER — TRANSCRIBE ORDERS (OUTPATIENT)
Dept: ADMINISTRATIVE | Facility: HOSPITAL | Age: 88
End: 2023-08-09
Payer: MEDICARE

## 2023-08-09 ENCOUNTER — OFFICE VISIT (OUTPATIENT)
Dept: CARDIOLOGY | Facility: CLINIC | Age: 88
End: 2023-08-09
Payer: MEDICARE

## 2023-08-09 VITALS
HEIGHT: 63 IN | SYSTOLIC BLOOD PRESSURE: 124 MMHG | HEART RATE: 64 BPM | OXYGEN SATURATION: 97 % | DIASTOLIC BLOOD PRESSURE: 62 MMHG | BODY MASS INDEX: 22.86 KG/M2 | WEIGHT: 129 LBS

## 2023-08-09 DIAGNOSIS — I48.0 PAF (PAROXYSMAL ATRIAL FIBRILLATION): Primary | ICD-10-CM

## 2023-08-09 DIAGNOSIS — J18.9 PNEUMONIA DUE TO INFECTIOUS ORGANISM, UNSPECIFIED LATERALITY, UNSPECIFIED PART OF LUNG: Primary | ICD-10-CM

## 2023-08-09 DIAGNOSIS — E83.42 HYPOMAGNESEMIA: ICD-10-CM

## 2023-08-09 PROCEDURE — 99214 OFFICE O/P EST MOD 30 MIN: CPT | Performed by: NURSE PRACTITIONER

## 2023-08-09 PROCEDURE — 1159F MED LIST DOCD IN RCRD: CPT | Performed by: NURSE PRACTITIONER

## 2023-08-09 PROCEDURE — 1160F RVW MEDS BY RX/DR IN RCRD: CPT | Performed by: NURSE PRACTITIONER

## 2023-08-09 NOTE — PROGRESS NOTES
"    Subjective:     Encounter Date:08/09/2023      Patient ID: Wyatt Curry is a 89 y.o. male     Chief Complaint: \"no complaints\"  Coronary Artery Disease  Presents for follow-up visit. Pertinent negatives include no chest pain, dizziness, leg swelling, palpitations, shortness of breath or weight gain. Risk factors include hyperlipidemia. His past medical history is significant for CHF. The symptoms have been stable.   Hypertension  This is a chronic problem. The current episode started more than 1 year ago. The problem has been rapidly improving since onset. The problem is controlled. Pertinent negatives include no chest pain, malaise/fatigue, orthopnea, palpitations, PND or shortness of breath.   Hyperlipidemia  This is a chronic problem. The current episode started more than 1 year ago. The problem is controlled. Recent lipid tests were reviewed and are low. Pertinent negatives include no chest pain or shortness of breath.   Congestive Heart Failure  Presents for initial visit. Associated symptoms include edema and orthopnea. Pertinent negatives include no chest pain, near-syncope, palpitations or shortness of breath. The symptoms have been worsening. His past medical history is significant for CAD.     Patient presents today as a CHF follow up. Patient is followed for CAD s/p stenting x2 in approximately 2006. He had a low risk nuclear stress in 9/2018 which was ordered due to concerns for stable angina with complaints of arm numbness and pain between his shoulder blades. He had PCTA to the LAD following a NSTEMI in 4/2023 after holding antiplatelet therapy prior to surgery. LVEF was noted to be 40-45% at the time of his NSTEMI. He also has a chronic LBBB. He was admitted to the hospital in May and treated for PNA. During that hospitalization he was noted to have new onset of afib with RVR. He had a 2D echo completed during that stay which revealed EF remained 41-45%. He also had a CT of the chest which " "revealed a small right and trace left pleural effusion and an enlarged pulmonary artery and also had an elevated BNP and was treated with intermittent IV lasix. He was admitted to the hospital in June and treated for c-diff. He has been seen frequently more recently for initiation and titration of heart failure medications. He was seen in my office on 7/7 and was noted to be in decompensated CHF. At that time he was started on Lasix PO and Aldactone. He was seen again on 7/24 and noted to be improved yet still remained volume overloaded and had his Avapro changed to Entresto, Jardiance was started and Lasix was changed to PRN. He has struggled with c-diff that has been difficult to cure which has lead to 2 separate hospitalizations and an ER visit since June. During his most recent hospitalization 7/31-8/4 he was treated for recurrent c-diff and also had afib with RVR and was started on Eliquis and digoxin during his stay.     He reports he feels well today. His weight at home has trended down from 130lbs to 128lbs since his discharge on 8/4. He reports he and his wife are scheduled to move to [facility] on Aug 21. He denies edema and shortness of breath.      The following portions of the patient's history were reviewed and updated as appropriate: allergies, current medications, past family history, past medical history, past social history, past surgical history and problem list.    Allergies   Allergen Reactions    Pregabalin Mental Status Change     Made pt feel \"crazy\"       Current Outpatient Medications:     acetaminophen (TYLENOL) 650 MG 8 hr tablet, Take 1 tablet by mouth 2 (Two) Times a Day. Indications: Pain, Disp: , Rfl:     apixaban (ELIQUIS) 2.5 MG tablet tablet, Take 1 tablet by mouth Every 12 (Twelve) Hours. Indications: Atrial Fibrillation, Disp: 60 tablet, Rfl: 11    atorvastatin (LIPITOR) 20 MG tablet, Take 1 tablet by mouth every night at bedtime. Indications: Cardiac Failure, High " Amount of Fats in the Blood, Disp: , Rfl:     busPIRone (BUSPAR) 10 MG tablet, Take 1 tablet by mouth 2 (Two) Times a Day. Indications: Anxiety Disorder, Disp: , Rfl:     clopidogrel (PLAVIX) 75 MG tablet, Take 1 tablet by mouth Daily. Indications: Carotid Artery Stenting, Disp: , Rfl:     digoxin (LANOXIN) 125 MCG tablet, Take 1 tablet by mouth Daily for 90 days., Disp: 90 tablet, Rfl: 0    empagliflozin (JARDIANCE) 10 MG tablet tablet, Take 1 tablet by mouth Daily. Indications: Cardiac Failure, Disp: , Rfl:     furosemide (LASIX) 40 MG tablet, Take 1 tablet by mouth Daily As Needed (swelling, weight gain or shortness of breath). Take 1 additional tablet as needed for weight gain, shortness of breath or swelling, Disp: 45 tablet, Rfl: 11    lansoprazole (PREVACID) 30 MG capsule, Take 1 capsule by mouth Daily. Indications: Heartburn, Disp: , Rfl:     magnesium oxide (MAG-OX) 400 MG tablet, Take 1 tablet by mouth Daily for 30 days., Disp: 30 tablet, Rfl: 0    metoprolol succinate XL (TOPROL-XL) 50 MG 24 hr tablet, Take 1 tablet by mouth Every Night. Indications: Atrial Fibrillation, Disp: 30 tablet, Rfl: 11    nitroglycerin (NITROSTAT) 0.4 MG SL tablet, Place 1 tablet under the tongue Every 5 (Five) Minutes As Needed. Indications: Acute Angina Pectoris, Disp: , Rfl:     ondansetron ODT (ZOFRAN-ODT) 4 MG disintegrating tablet, Place 1 tablet on the tongue Every 6 (Six) Hours As Needed for Nausea., Disp: 40 tablet, Rfl: 0    potassium chloride (K-DUR,KLOR-CON) 10 MEQ CR tablet, Take 2 tablets by mouth Daily As Needed (for swelling when taking Lasix). Indications: Low Amount of Potassium in the Blood, Disp: , Rfl:     saccharomyces boulardii (FLORASTOR) 250 MG capsule, Take 1 capsule by mouth 2 (Two) Times a Day., Disp: 30 capsule, Rfl: 2    sacubitril-valsartan (Entresto) 49-51 MG tablet, Take 1 tablet by mouth 2 (Two) Times a Day., Disp: 60 tablet, Rfl: 11    spironolactone (ALDACTONE) 25 MG tablet, Take 1 tablet by  mouth Daily., Disp: 30 tablet, Rfl: 11    vancomycin (VANCOCIN) 125 MG capsule, Take 1 capsule by mouth Every 6 (Six) Hours for 25 doses. Indications: Colon Inflammation due to Clostridium Bacteria Overgrowth, Disp: 25 capsule, Rfl: 0    [START ON 8/10/2023] vancomycin (VANCOCIN) 125 MG capsule, Take 1 capsule by mouth Every 12 (Twelve) Hours for 14 doses. Indications: Colon Inflammation due to Clostridium Bacteria Overgrowth, Disp: 14 capsule, Rfl: 0    [START ON 2023] vancomycin (VANCOCIN) 125 MG capsule, Take 1 capsule by mouth Daily for 7 doses. Indications: Colon Inflammation due to Clostridium Bacteria Overgrowth, Disp: 7 capsule, Rfl: 0    [START ON 2023] vancomycin (VANCOCIN) 125 MG capsule, Take 1 capsule by mouth Every Other Day for 7 doses. Indications: Colon Inflammation due to Clostridium Bacteria Overgrowth, Disp: 7 capsule, Rfl: 0    vitamin B-12 (CYANOCOBALAMIN) 1000 MCG tablet, Take 1 tablet by mouth Daily. Indications: Inadequate Vitamin B12, Disp: , Rfl:     Vitron-C  MG tablet, Take 1 tablet by mouth Daily. Indications: Excess or Deficiency of Vitamin C, Disp: , Rfl:     Probiotic, Lactobacillus, capsule, Take 1 capsule by mouth Daily. 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    GERD (gastroesophageal reflux disease)     Hyperlipidemia     Hypertension     Irregular heart beat     Lung nodules        Social History     Socioeconomic History    Marital status:    Tobacco Use    Smoking status: Former     Packs/day: 1.00     Years: 30.00     Pack years: 30.00     Types: Cigarettes     Start date:      Quit date:      Years since quittin.6     Passive exposure: Past    Smokeless tobacco: Never   Vaping Use    Vaping Use: Never  used   Substance and Sexual Activity    Alcohol use: Yes     Alcohol/week: 1.0 standard drink     Types: 1 Glasses of wine per week     Comment: occ wine    Drug use: Not Currently    Sexual activity: Defer       Review of Systems   Constitutional: Negative for malaise/fatigue, weight gain and weight loss.   Cardiovascular:  Negative for chest pain, dyspnea on exertion, irregular heartbeat, leg swelling, near-syncope, orthopnea, palpitations, paroxysmal nocturnal dyspnea and syncope.   Respiratory:  Negative for cough, shortness of breath, sleep disturbances due to breathing, sputum production and wheezing.    Hematologic/Lymphatic: Does not bruise/bleed easily.   Skin:  Negative for dry skin, flushing, itching and rash.   Gastrointestinal:  Negative for diarrhea, hematemesis and hematochezia.   Neurological:  Negative for dizziness, light-headedness, loss of balance and weakness.   All other systems reviewed and are negative.       Objective:     Vitals reviewed.   Constitutional:       General: Not in acute distress.     Appearance: Well-developed. Not diaphoretic.   Eyes:      General: No scleral icterus.     Conjunctiva/sclera: Conjunctivae normal.      Pupils: Pupils are equal, round, and reactive to light.   HENT:      Head: Normocephalic.    Mouth/Throat:      Pharynx: No oropharyngeal exudate.   Pulmonary:      Effort: Pulmonary effort is normal. No respiratory distress.      Breath sounds: No wheezing. No rales.   Chest:      Chest wall: Not tender to palpatation.   Cardiovascular:      Normal rate. Frequent ectopic beats. Regular rhythm.      Murmurs: There is a grade 1/6 high frequency blowing holosystolic murmur at the apex.   Pulses:     Intact distal pulses.   Edema:     Peripheral edema absent.   Abdominal:      General: Bowel sounds are normal. There is no distension.      Palpations: Abdomen is soft.      Tenderness: There is no abdominal tenderness.   Musculoskeletal: Normal range of motion.       "Cervical back: Normal range of motion and neck supple. Skin:     General: Skin is warm and dry.      Coloration: Skin is not pale.      Findings: No erythema or rash.   Neurological:      Mental Status: Alert and oriented to person, place, and time.      Deep Tendon Reflexes: Reflexes are normal and symmetric.   Psychiatric:         Behavior: Behavior normal.       Procedures  /62 (BP Location: Right arm, Patient Position: Sitting, Cuff Size: Adult)   Pulse 64   Ht 160 cm (63\")   Wt 58.5 kg (129 lb)   SpO2 97%   BMI 22.85 kg/mý     Lab Review:   I have reviewed previous office notes, recent labs and recent cardiac testing.   Lab Results   Component Value Date    GLUCOSE 101 (H) 08/04/2023    CALCIUM 8.8 08/04/2023     08/04/2023    K 4.5 08/04/2023    CO2 27.0 08/04/2023     08/04/2023    BUN 12 08/04/2023    CREATININE 0.57 (L) 08/04/2023    EGFR 91.4 08/04/2023    BCR 21.1 08/04/2023    ANIONGAP 5.0 08/04/2023       Cath 4/8/2023:  Procedure performed  Left heart cath  Coronary angiography  Right femoral arteriography  Insertion of 7Fr Mynx Perclose closure device with effective hemostasis and preserved lower extremity pulses  Left ventriculography  Supervision of the administration of moderate sedation  Ultrasound guidance to assist access of right femoral artery and to confirm placement of catheter [CPT code 52433]  Moderate sedation administered under supervision of  with monitoring [CPT code 70012]  PTCA of mid to distal left anterior descending coronary artery  PTCA of proximal left anterior descending coronary artery  Placement of 2.5 x 16 mm Synergy drug-eluting stent at high pressure and proximal left anterior descending coronary artery  LV Gram in Normal LVEF 40-45% with what appears to be global hypokinesis with moderate to severe hypokinesis noted in the apical lateral region  Moderate mitral regurgitation      Lab Results   Component Value Date    CHOL 67 05/14/2023    " CHLPL 104 (L) 09/09/2016    TRIG 52 05/14/2023    HDL 28 (L) 05/14/2023    LDL 26 05/14/2023       Results for orders placed during the hospital encounter of 05/13/23    Adult Transthoracic Echo Complete W/ Cont if Necessary Per Protocol    Interpretation Summary    Left ventricular systolic function is mildly decreased. Left ventricular ejection fraction appears to be 41 - 45%.    The following segments are hypokinetic: apical anterior, apical septal, apical inferior, apical lateral and apex.    Left ventricular wall thickness is consistent with mild septal asymmetric hypertrophy.    Left ventricular diastolic function is consistent with (grade I) impaired relaxation.    He left atrial cavity is mildly dilated.    There is mild to moderate thickening of the aortic valve.    . Mild to moderate mitral valve regurgitation is present    Mild pulmonary hypertension is present.        Assessment:          Diagnosis Plan   1. PAF (paroxysmal atrial fibrillation)  Digoxin Level      2. Hypomagnesemia  Magnesium           Plan:       1. Chronic combined CHF- class 2 symptoms. Does not appear to be volume overloaded per exam today. Continue Entresto, Jardiance and Aldactone with PRN lasix for weight gain. Educated when to contact our office regarding weight gain as he is moving to an assisted living facility who may use salt when cooking his food.   2. CAD- stable. Recent stenting to LAD in 4/2023 with previously placed stents noted to be patent. No clinical signs of ongoing ischemia. ASA was stopped due to starting Eliquis. Continue Plavix, statin, BB, and ARB. Educated extensively on risks of bleeding with use of Plavix and Eliquis and when to contact our office.   3. ICMO- EF dropped to 41-45% following NSTEMI in 4/2023 and remained 41-45% per echo in 5/2023. See #1 regarding medications.   4. MR- moderate per cath. Grade 1 Murmur present. Will continue to monitor.   5. PAF- stable, normal rhythm per exam today. New onset  during hospitalization in May with reoccurrence during most recent admission on 7/31. Will reduce Eliquis to 2.5mg due to age >79yo and weight <60kg. Continue digoxin and will check a dig level in 4 weeks.   6. HTN- controlled. Continue current therapy.   7. HLD- controlled with LDL of 40 at time of cath. Continue statin.  8. LBBB- chronic.   9. Hypomagnesemia- noted inpatient and started on mag ox. Will follow up with a mag level in 4 weeks.       Follow up in 4 weeks or sooner if symptoms worsen.     I spent 30 minutes caring for Wyatt on this date of service. This time includes time spent by me in the following activities:preparing for the visit, reviewing tests, obtaining and/or reviewing a separately obtained history, performing a medically appropriate examination and/or evaluation , documenting information in the medical record, independently interpreting results and communicating that information with the patient/family/caregiver and care coordination       I spent 2 minutes on the separately reported service of EKG interpretation. This time is not included in the time used to support the E/M service also reported today.

## 2023-08-14 ENCOUNTER — HOME CARE VISIT (OUTPATIENT)
Dept: HOME HEALTH SERVICES | Facility: CLINIC | Age: 88
End: 2023-08-14
Payer: MEDICARE

## 2023-08-14 VITALS
OXYGEN SATURATION: 95 % | TEMPERATURE: 96.5 F | DIASTOLIC BLOOD PRESSURE: 62 MMHG | SYSTOLIC BLOOD PRESSURE: 136 MMHG | HEART RATE: 68 BPM

## 2023-08-14 PROCEDURE — G0300 HHS/HOSPICE OF LPN EA 15 MIN: HCPCS

## 2023-08-15 ENCOUNTER — HOSPITAL ENCOUNTER (OUTPATIENT)
Dept: GENERAL RADIOLOGY | Facility: HOSPITAL | Age: 88
Discharge: HOME | End: 2023-08-15
Admitting: FAMILY MEDICINE
Payer: MEDICARE

## 2023-08-15 DIAGNOSIS — J18.9 PNEUMONIA DUE TO INFECTIOUS ORGANISM, UNSPECIFIED LATERALITY, UNSPECIFIED PART OF LUNG: ICD-10-CM

## 2023-08-15 PROCEDURE — 71046 X-RAY EXAM CHEST 2 VIEWS: CPT

## 2023-08-15 NOTE — HOME HEALTH
Pt reports he is thinking of going to [facility].    Routine Visit Note:    Skill/education provided: Education, Fall prevention, CHF Education    Patient/caregiver response: Ongoing    Plan for next visit: Education,     Other pertinent info: Pt reports non-productive cough. No other s/s of COVID voiced or noted.

## 2023-08-16 ENCOUNTER — TRANSCRIBE ORDERS (OUTPATIENT)
Dept: ADMINISTRATIVE | Facility: HOSPITAL | Age: 88
End: 2023-08-16
Payer: MEDICARE

## 2023-08-16 DIAGNOSIS — R13.12 DYSPHAGIA, OROPHARYNGEAL PHASE: Primary | ICD-10-CM

## 2023-08-18 ENCOUNTER — TELEPHONE (OUTPATIENT)
Age: 88
End: 2023-08-18
Payer: MEDICARE

## 2023-08-18 NOTE — TELEPHONE ENCOUNTER
I received a phone message from Jocelyn yesterday afternoon that Zamzam Marie called [facility] and left a voicemail stating she needs something on a letterhead stating this patient is not infectious.  I called Zamzam back at [phone] and got her voicemail.  I left a message letting her know I received her message from the clinic next door.  I asked her to please return my call and I left our clinic phone # to call.

## 2023-08-22 ENCOUNTER — HOME CARE VISIT (OUTPATIENT)
Dept: HOME HEALTH SERVICES | Facility: CLINIC | Age: 88
End: 2023-08-22
Payer: MEDICARE

## 2023-08-22 ENCOUNTER — READMISSION MANAGEMENT (OUTPATIENT)
Dept: CALL CENTER | Facility: HOSPITAL | Age: 88
End: 2023-08-22
Payer: MEDICARE

## 2023-08-22 VITALS
SYSTOLIC BLOOD PRESSURE: 122 MMHG | OXYGEN SATURATION: 98 % | TEMPERATURE: 97 F | DIASTOLIC BLOOD PRESSURE: 60 MMHG | HEART RATE: 76 BPM

## 2023-08-22 PROCEDURE — G0299 HHS/HOSPICE OF RN EA 15 MIN: HCPCS

## 2023-08-22 NOTE — OUTREACH NOTE
Medical Week 3 Survey      Flowsheet Row Responses   [facility] patient discharged from? [facility]   Does the patient have one of the following disease processes/diagnoses(primary or secondary)? Other   Week 3 attempt successful? Yes   Call start time 1348   Call end time 1349   General alerts for this patient Has moved to AL facility as of 8/21/23   Discharge diagnosis Atrial fibrillation with RVR, c-diff, Hypomagnesemia   Person spoke with today (if not patient) and relationship Zamzam VILLALBA   What is the patient's perception of their health status since discharge? Improving   Week 3 Call Completed? Yes   Graduated Yes   Graduated/Revoked comments DEEJAY Wyman reports Pt is doing ok and is now in an AL facility. Short call as she was at work .   Call end time 1349            MYRIAM PRETTY - Registered Nurse

## 2023-08-23 ENCOUNTER — OFFICE VISIT (OUTPATIENT)
Age: 88
End: 2023-08-23
Payer: MEDICARE

## 2023-08-23 VITALS
HEART RATE: 67 BPM | BODY MASS INDEX: 23.42 KG/M2 | DIASTOLIC BLOOD PRESSURE: 58 MMHG | SYSTOLIC BLOOD PRESSURE: 122 MMHG | HEIGHT: 63 IN | OXYGEN SATURATION: 97 % | TEMPERATURE: 97.7 F | WEIGHT: 132.2 LBS

## 2023-08-23 DIAGNOSIS — A04.71 RECURRENT CLOSTRIDIUM DIFFICILE DIARRHEA: Primary | ICD-10-CM

## 2023-08-23 NOTE — HOME HEALTH
Routine Visit Note:Pt has relocated to a Senior Living Facility. Pleased with environment. Sitting in a chair visiting with family.    Skill/education provided: CHF assessment and teaching. No s/s of chf.     Patient/caregiver response: Cooperative    Plan for next visit: chf assessment and teaching, med assessment and teaching    Other pertinent info: Pt has no s/s of covid19

## 2023-08-23 NOTE — PROGRESS NOTES
[facility] - Infectious Diseases Progress Note    Patient:  Wyatt Curry  YOB: 1930  MRN: [id number]   Primary Care Physician: Everardo Jackson MD    Chief Complaint:   Chief Complaint   Patient presents with    recurrent C. difficle colitis      With no problems at this time.  He reports 1-2 soft bowel movements per day.       Interval History/HPI: He returns today for follow-up.  I am seeing him for follow-up of recurrent C. difficile colitis.  He is doing well.  He is having 1-2 formed bowel movements per day.  He is completing a vancomycin taper.  He takes his last dose of vancomycin 125 mg daily today.  He then changes to every other day dosing for an additional 2-week starting tomorrow.  He has no abdominal pain or discomfort.  He has no fever.  He reports good oral intake.    Allergies:   Allergies   Allergen Reactions    Pregabalin Other (See Comments)     Blurred vision only      Current Scheduled Medications:   Current Outpatient Medications on File Prior to Visit   Medication Sig    acetaminophen (TYLENOL) 650 MG 8 hr tablet Take 1 tablet by mouth 2 (Two) Times a Day. Indications: Pain    apixaban (ELIQUIS) 2.5 MG tablet tablet Take 1 tablet by mouth Every 12 (Twelve) Hours. Indications: Atrial Fibrillation    atorvastatin (LIPITOR) 20 MG tablet Take 1 tablet by mouth every night at bedtime. Indications: Cardiac Failure, High Amount of Fats in the Blood    busPIRone (BUSPAR) 10 MG tablet Take 1 tablet by mouth 2 (Two) Times a Day. Indications: Anxiety Disorder    clopidogrel (PLAVIX) 75 MG tablet Take 1 tablet by mouth Daily. Indications: Carotid Artery Stenting    digoxin (LANOXIN) 125 MCG tablet Take 1 tablet by mouth Daily for 90 days.    empagliflozin (JARDIANCE) 10 MG tablet tablet Take 1 tablet by mouth Daily. Indications: Cardiac Failure    furosemide (LASIX) 40 MG tablet Take 1 tablet by mouth Daily As Needed (swelling, weight gain or shortness of breath). Take 1 additional tablet as  needed for weight gain, shortness of breath or swelling    lansoprazole (PREVACID) 30 MG capsule Take 1 capsule by mouth Daily. Indications: Heartburn    magnesium oxide (MAG-OX) 400 MG tablet Take 1 tablet by mouth Daily for 30 days.    metoprolol succinate XL (TOPROL-XL) 50 MG 24 hr tablet Take 1 tablet by mouth Every Night. Indications: Atrial Fibrillation    nitroglycerin (NITROSTAT) 0.4 MG SL tablet Place 1 tablet under the tongue Every 5 (Five) Minutes As Needed. Indications: Acute Angina Pectoris    ondansetron ODT (ZOFRAN-ODT) 4 MG disintegrating tablet Place 1 tablet on the tongue Every 6 (Six) Hours As Needed for Nausea.    potassium chloride (K-DUR,KLOR-CON) 10 MEQ CR tablet Take 2 tablets by mouth Daily As Needed (for swelling when taking Lasix). Indications: Low Amount of Potassium in the Blood    saccharomyces boulardii (FLORASTOR) 250 MG capsule Take 1 capsule by mouth 2 (Two) Times a Day.    sacubitril-valsartan (Entresto) 49-51 MG tablet Take 1 tablet by mouth 2 (Two) Times a Day.    spironolactone (ALDACTONE) 25 MG tablet Take 1 tablet by mouth Daily.    vancomycin (VANCOCIN) 125 MG capsule Take 1 capsule by mouth Daily for 7 doses. Indications: Colon Inflammation due to Clostridium Bacteria Overgrowth    vitamin B-12 (CYANOCOBALAMIN) 1000 MCG tablet Take 1 tablet by mouth Daily. Indications: Inadequate Vitamin B12    [START ON 8/24/2023] vancomycin (VANCOCIN) 125 MG capsule Take 1 capsule by mouth Every Other Day for 7 doses. Indications: Colon Inflammation due to Clostridium Bacteria Overgrowth (Patient not taking: Reported on 8/23/2023)    Vitron-C  MG tablet Take 1 tablet by mouth Daily. Indications: Excess or Deficiency of Vitamin C    [DISCONTINUED] potassium chloride (MICRO-K) 10 MEQ CR capsule Take 10 mEq by mouth Daily. Take daily when taking lasix  Indications: Low Amount of Potassium in the Blood    [DISCONTINUED] sacubitril-valsartan (Entresto) 49-51 MG tablet Take 1 tablet by  "mouth 2 (Two) Times a Day. Indications: Cardiac Failure     No current facility-administered medications on file prior to visit.      Venous Access Review  Line/IV site: No IV access currently    Antimicrobial Review  Currently on antibiotics/antifungals: YES/NO: YES    Start Date of Therapy: Vancomycin PO 8/10/2023  If therapy completed, date complete: NA    Review of Systems See HPI.    Vital Signs:  /58 (BP Location: Left arm, Patient Position: Sitting, Cuff Size: Adult) Comment: manual  Pulse 67   Temp 97.7 øF (36.5 øC) (Temporal)   Ht 158.8 cm (62.5\")   Wt 60 kg (132 lb 3.2 oz)   SpO2 97%   BMI 23.79 kg/mý     Physical Exam  Vital signs - reviewed.  Abdomen is soft and nontender.  No guarding or rebound.  General alert, pleasant, smiling, interactive, and in no distress    Lab/Imaging/Other Information: None    Impression & Recommendations:   Diagnoses and all orders for this visit:    1. Recurrent Clostridium difficile diarrhea (Primary)    He is improving with vancomycin taper.  Will plan to complete his vancomycin taper in 2 weeks.  He will call for earlier appointment if any new or worsening symptoms in the interim  Otherwise follow-up in 1 month to make sure he remains stable off vancomycin treatment.  Previously he started to have recurrent symptoms 1 to 2 weeks following completion of C. difficile treatment.    Follow Up:   Patient Instructions   Continue Vancomycin taper  Return in about 1 month (around 9/23/2023).  Patient was provided After Visit Summary.     Anabella Bustos MA    CC: Luis Jackson MD  "

## 2023-08-28 ENCOUNTER — HOSPITAL ENCOUNTER (OUTPATIENT)
Dept: GENERAL RADIOLOGY | Facility: HOSPITAL | Age: 88
Discharge: HOME | End: 2023-08-28
Admitting: FAMILY MEDICINE
Payer: MEDICARE

## 2023-08-28 DIAGNOSIS — R13.12 DYSPHAGIA, OROPHARYNGEAL PHASE: ICD-10-CM

## 2023-08-28 PROCEDURE — 74230 X-RAY XM SWLNG FUNCJ C+: CPT

## 2023-08-28 PROCEDURE — 92611 MOTION FLUOROSCOPY/SWALLOW: CPT

## 2023-08-28 RX ADMIN — BARIUM SULFATE 50 ML: 0.81 POWDER, FOR SUSPENSION ORAL at 09:15

## 2023-08-28 RX ADMIN — BARIUM SULFATE 25 ML: 400 SUSPENSION ORAL at 09:15

## 2023-08-28 RX ADMIN — BARIUM SULFATE 50 ML: 400 SUSPENSION ORAL at 09:15

## 2023-08-28 RX ADMIN — BARIUM SULFATE 25 ML: 400 PASTE ORAL at 09:15

## 2023-08-28 NOTE — MBS/VFSS/FEES
Speech Language Pathology   [facility] FEES / Discharge Summary  [facility]       Patient Name: Wyatt Curry  : 1930  MRN: [id number]    Today's Date: 2023      Visit Date: 2023   SPEECH-LANGUAGE PATHOLOGY EVALUTION - VFSS  Subjective: The patient was seen on this date for a VFSS(Videofluoroscopic Swallowing Study).  Patient was alert and cooperative.    Significant history: A-fib w/RVR, acute on chronic systolic heart failure, recurrent C-diff, symptomatic hypotension, hypomagnesemia, GERD.   Objective: Risks/benefits were reviewed with the patient, and consent was obtained. The study was completed with SLP and Radiologist present. The patient was seen in lateral view(s). Textures given included  pudding thick, thin liquid, nectar thick liquid, honey thick liquid, mechanical soft consistency, and regular consistency.  Assessment: Consistencies were presented in the following order with the following results:  Honey thick via spoon: No laryngeal penetration or aspiration. Mild residue remained in the vallecula.     Nectar thick via straw: Premature loss to the vallecula secondary to decreased back of tongue control. No definite laryngeal penetration or aspiration. Mild pharyngeal residue remained.     Thin via straw: Large bolus and multiple sips obtained. Premature loss to the vallecula secondary to decreased back of tongue control with laryngeal penetration during the swallow. No definite aspiration observed. Mild pharyngeal residue remained.     Thin via cup: Large bolus obtained with laryngeal penetration during the swallow. No definite aspiration observed. Mild pharyngeal residue remained.     Pudding thick via spoon: WFL    Soft solid coated in pudding thick barium: WFL    Regular solid coated in pudding thick barium: WFL    Esophageal screen: Radiologist, Dr. Schmidt, noted the presence of a hiatal hernia. See radiology report for further details.     SLP Findings: Patient presents with  functional swallow for patient's age, but appears to have possible esophageal dysphagia.   Recommendations: Diet Textures: thin liquid, regular consistency food. Medications should be taken whole with thin liquids.   Recommended Strategies: Upright for PO, small bites and sips, and alternate liquids and solids. Oral care before breakfast, after all meals and PRN.  Other Recommended Evaluations: Referral to gastroenterology.  Pt may benefit from esophagram and/or endoscopy for esophageal deficits.     Dysphagia therapy is not recommended.   Chaim Navarro CCC-SLP 8/28/2023 13:08 CDT     Visit Dx:     ICD-10-CM ICD-9-CM   1. Dysphagia, oropharyngeal phase  R13.12 787.22       Patient Active Problem List   Diagnosis    Benign localized prostatic hyperplasia without lower urinary tract symptoms (LUTS)    History of bladder cancer    Left rotator cuff tear arthropathy    Coronary artery disease involving native coronary artery of native heart without angina pectoris    Left bundle branch block    Essential hypertension    Mixed hyperlipidemia    Overweight (BMI 25.0-29.9)    Non-rheumatic mitral regurgitation    Other fatigue    History of coronary artery stent placement    Lung nodule    Mediastinal adenopathy    Cough    Abnormal positron emission tomography (PET) scan    Basal cell carcinoma    Neoplasm of uncertain behavior    Elevated troponin    Bleeding    Ischemic cardiomyopathy    Pneumonia of right lower lobe due to infectious organism    PAF (paroxysmal atrial fibrillation)    Proctocolitis (high concern for C. difficile colitis)    Chronic systolic congestive heart failure    Clostridium difficile colitis    Atrial fibrillation with RVR    Recurrent Clostridioides difficile diarrhea    Symptomatic hypotension    Hypomagnesemia    Acute on chronic systolic heart failure    Chronic HFrEF (heart failure with reduced ejection fraction)        Past Medical History:   Diagnosis Date    Arthritis     Bladder  cancer     Bronchitis     CAD (coronary artery disease)     Cancer 1975    bladder cancer    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    GERD (gastroesophageal reflux disease)     Hyperlipidemia     Hypertension     Irregular heart beat     Lung nodules         Past Surgical History:   Procedure Laterality Date    BLADDER SURGERY      CARDIAC CATHETERIZATION      CARDIAC CATHETERIZATION Left 4/8/2023    Procedure: Cardiac Catheterization/Vascular Study;  Surgeon: Sukhi Hartley MD;  Location:  [facility];  Service: Cardiology;  Laterality: Left;    COLONOSCOPY      CORONARY ANGIOPLASTY WITH STENT PLACEMENT  2006    STENT X 2    CYSTOSCOPY      ENDOSCOPY      EXCISION LESION N/A 4/7/2023    Procedure: EXCISION LESION OF RIGHT TEMPLE AND LEFT TEMPLE WITH FROZEN SECTION WITH POSSIBLE FLAP OR GRAFT;  Surgeon: Brijesh Nickerson MD;  Location:  [facility] OR;  Service: ENT;  Laterality: N/A;    FLAP HEAD/NECK Left 03/09/2020    Procedure: POSSIBLE FLAP;  Surgeon: Brijesh Nickerson MD;  Location:  [facility] OR;  Service: ENT;  Laterality: Left;    HEAD/NECK LESION/CYST EXCISION Left 03/09/2020    Procedure: 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 with frozen section and possible flap or graft;  Surgeon: Brijesh Nickerson MD;  Location:  [facility] OR;  Service: ENT;  Laterality: Left;    HERNIA REPAIR      SHOULDER SURGERY      SKIN BIOPSY      lip biopsy    SKIN FULL THICKNESS GRAFT Left 03/09/2020    Procedure: OR GRAFT;  Surgeon: Brijesh Nickerson MD;  Location:  [facility] OR;  Service: ENT;  Laterality: Left;    TRANSURETHRAL RESECTION OF BLADDER TUMOR                        SLP Adult Swallow Evaluation       Row Name 08/28/23 0855       Rehab Evaluation    Document Type evaluation  -MB    Subjective Information no complaints  -MB    Patient Observations alert;cooperative  -MB    Patient/Family/Caregiver Comments/Observations No family present  -MB       General Information     Patient Profile Reviewed yes  -MB    Pertinent History Of Current Problem A-fib w/RVR, acute on chronic systolic heart failure, recurrent C-diff, symptomatic hypotension, hypomagnesemia, GERD.  -MB    Current Method of Nutrition regular textures;thin liquids  -MB    Precautions/Limitations, Vision WFL;for purposes of eval  -MB    Precautions/Limitations, Hearing hearing aid, bilaterally  -MB    Prior Level of Function-Communication WFL  -MB    Prior Level of Function-Swallowing no diet consistency restrictions  -MB    Plans/Goals Discussed with patient  -MB    Barriers to Rehab none identified  -MB    Patient's Goals for Discharge patient did not state  -MB       Pain    Additional Documentation Pain Scale: FACES Pre/Post-Treatment (Group)  -MB       Pain Scale: FACES Pre/Post-Treatment    Pain: FACES Scale, Pretreatment 0-->no hurt  -MB       Oral Motor Structure and Function    Dentition Assessment upper dentures/partial in place;lower dentures/partial in place  -MB    Secretion Management WNL/WFL  -MB    Mucosal Quality moist, healthy  -MB       Oral Musculature and Cranial Nerve Assessment    Oral Motor General Assessment WFL  -MB       General Eating/Swallowing Observations    Respiratory Support Currently in Use room air  -MB       MBS/VFSS    Utensils Used spoon;cup;straw  -MB    Consistencies Trialed regular textures;soft to chew textures;thin liquids;nectar/syrup-thick liquids;honey-thick liquids;pudding thick  -MB       MBS/VFSS Interpretation    Oral Prep Phase WFL  -MB    Oral Transit Phase impaired  -MB    Oral Residue WFL  -MB    VFSS Summary See note  -MB       Oral Transit Phase    Impaired Oral Transit Phase premature spillage of liquids into pharynx  -MB    Premature Spillage of Liquids into Pharynx thin liquids;nectar-thick liquids  -MB       Initiation of Pharyngeal Swallow    Initiation of Pharyngeal Swallow bolus in valleculae  -MB    Pharyngeal Phase impaired pharyngeal phase of swallowing   -MB    Penetration During the Swallow thin liquids  -MB    Response to Penetration No  -MB    Pharyngeal Residue thin liquids;nectar-thick liquids;honey-thick liquids  -MB    Attempted Compensatory Maneuvers bolus presentation style  -MB    Response to Attempted Compensatory Maneuvers did not prevent penetration  -MB       SLP Evaluation Clinical Impression    SLP Swallowing Diagnosis swallow WFL/no suspected pharyngeal impairment  -MB    Functional Impact no impact on function  -MB    Swallow Criteria for Skilled Therapeutic Interventions Met no problems identified which require skilled intervention  -MB       Recommendations    Therapy Frequency (Swallow) evaluation only  -MB    SLP Diet Recommendation regular textures;thin liquids  -MB    Recommended Precautions and Strategies upright posture during/after eating;small bites of food and sips of liquid;alternate between small bites of food and sips of liquid;general aspiration precautions  -MB    Oral Care Recommendations Oral Care BID/PRN  -MB    SLP Rec. for Method of Medication Administration meds whole;with thin liquids;with puree  -MB    Monitor for Signs of Aspiration yes;cough;gurgly voice;throat clearing;pneumonia  -MB    Anticipated Discharge Disposition (SLP) home  -MB              User Key  (r) = Recorded By, (t) = Taken By, (c) = Cosigned By      Initials Name Provider Type    Chaim Garrett CCC-SLP Speech and Language Pathologist                                    OP SLP Education       Row Name 08/28/23 2831       Education    Barriers to Learning No barriers identified  -MB    Education Provided Described results of evaluation;Patient expressed understanding of evaluation  -MB    Assessed Learning readiness;Learning preferences;Learning motivation;Learning needs  -MB    Learning Motivation Strong  -MB    Learning Method Explanation  -MB    Teaching Response Verbalized understanding  -MB              User Key  (r) = Recorded By, (t) = Taken  By, (c) = Cosigned By      Initials Name Effective Dates    Chaim Garrett CCC-SLP 02/03/23 -                                        Time Calculation:   Untimed Charges  58374-NO Motion Fluoro Eval Swallow Minutes: 55  Total Minutes  Untimed Charges Total Minutes: 55   Total Minutes: 55    Therapy Charges for Today       Code Description Service Date Service Provider Modifiers Qty    [id number] HC ST MOTION FLUORO EVAL SWALLOW 4 8/28/2023 Chaim Navarro CCC-SLP GN 1                    SABAS Collier  8/28/2023

## 2023-08-29 ENCOUNTER — HOME CARE VISIT (OUTPATIENT)
Dept: HOME HEALTH SERVICES | Facility: CLINIC | Age: 88
End: 2023-08-29
Payer: MEDICARE

## 2023-08-29 PROCEDURE — G0300 HHS/HOSPICE OF LPN EA 15 MIN: HCPCS

## 2023-08-30 VITALS
DIASTOLIC BLOOD PRESSURE: 60 MMHG | SYSTOLIC BLOOD PRESSURE: 140 MMHG | OXYGEN SATURATION: 94 % | TEMPERATURE: 97.4 F | RESPIRATION RATE: 16 BRPM | HEART RATE: 68 BPM

## 2023-08-30 NOTE — HOME HEALTH
Routine Visit Note:   Pt moved into new assisted living facility on 8/21/2023. Pt in apartment his with wife, sitting in recliners. Pt in pleasant mood, states he is pleased with facility. Pt had appt. with MD due to GERD symptoms increasing, states he did a barrium swallow study test and has a hiatal hernia. An endoscopy was mention, but he states MD has not ordered one at this time. Pt has follow-up with MD regarding CDIFF infection in 3 weeks. Is on tapering ATB dose at this time. Pt voiced no other needs at this time. PT and wife heading to dining room for lunch when nurse was on way out.    SKill/Education Provided: Education, Medications reviewed, Hand-washing technique reviewed r/t CDIFF DX    Patient/Caregiver Response: Ongoing    Falls: No    Med Changes: No    Physician Contact: No    Plan for Next Visit: Education, Medication review    Other/Pertinent info: No s/s of COVID voiced or observed.

## 2023-09-07 ENCOUNTER — HOME CARE VISIT (OUTPATIENT)
Dept: HOME HEALTH SERVICES | Facility: CLINIC | Age: 88
End: 2023-09-07
Payer: MEDICARE

## 2023-09-11 ENCOUNTER — OFFICE VISIT (OUTPATIENT)
Dept: CARDIOLOGY | Facility: CLINIC | Age: 88
End: 2023-09-11
Payer: MEDICARE

## 2023-09-11 ENCOUNTER — LAB (OUTPATIENT)
Dept: LAB | Facility: HOSPITAL | Age: 88
End: 2023-09-11
Payer: MEDICARE

## 2023-09-11 VITALS
BODY MASS INDEX: 24.27 KG/M2 | HEART RATE: 71 BPM | WEIGHT: 137 LBS | OXYGEN SATURATION: 96 % | SYSTOLIC BLOOD PRESSURE: 138 MMHG | DIASTOLIC BLOOD PRESSURE: 62 MMHG | HEIGHT: 63 IN

## 2023-09-11 DIAGNOSIS — I25.10 CORONARY ARTERY DISEASE INVOLVING NATIVE CORONARY ARTERY OF NATIVE HEART WITHOUT ANGINA PECTORIS: ICD-10-CM

## 2023-09-11 DIAGNOSIS — I50.22 CHRONIC SYSTOLIC CONGESTIVE HEART FAILURE: Primary | ICD-10-CM

## 2023-09-11 DIAGNOSIS — I50.43 ACUTE ON CHRONIC COMBINED SYSTOLIC AND DIASTOLIC CONGESTIVE HEART FAILURE: ICD-10-CM

## 2023-09-11 DIAGNOSIS — I50.23 ACUTE ON CHRONIC SYSTOLIC CONGESTIVE HEART FAILURE: ICD-10-CM

## 2023-09-11 DIAGNOSIS — I48.0 PAF (PAROXYSMAL ATRIAL FIBRILLATION): ICD-10-CM

## 2023-09-11 DIAGNOSIS — I25.5 ISCHEMIC CARDIOMYOPATHY: ICD-10-CM

## 2023-09-11 DIAGNOSIS — E83.42 HYPOMAGNESEMIA: ICD-10-CM

## 2023-09-11 DIAGNOSIS — E78.2 MIXED HYPERLIPIDEMIA: ICD-10-CM

## 2023-09-11 DIAGNOSIS — I44.7 LEFT BUNDLE BRANCH BLOCK: ICD-10-CM

## 2023-09-11 DIAGNOSIS — I34.0 NON-RHEUMATIC MITRAL REGURGITATION: ICD-10-CM

## 2023-09-11 DIAGNOSIS — I10 ESSENTIAL HYPERTENSION: ICD-10-CM

## 2023-09-11 LAB
ANION GAP SERPL CALCULATED.3IONS-SCNC: 10 MMOL/L (ref 5–15)
BUN SERPL-MCNC: 16 MG/DL (ref 8–23)
BUN/CREAT SERPL: 27.1 (ref 7–25)
CALCIUM SPEC-SCNC: 9.8 MG/DL (ref 8.2–9.6)
CHLORIDE SERPL-SCNC: 102 MMOL/L (ref 98–107)
CO2 SERPL-SCNC: 30 MMOL/L (ref 22–29)
CREAT SERPL-MCNC: 0.59 MG/DL (ref 0.76–1.27)
DIGOXIN SERPL-MCNC: <0.3 NG/ML (ref 0.6–1.2)
EGFRCR SERPLBLD CKD-EPI 2021: 90.5 ML/MIN/1.73
GLUCOSE SERPL-MCNC: 123 MG/DL (ref 65–99)
MAGNESIUM SERPL-MCNC: 1.4 MG/DL (ref 1.7–2.3)
POTASSIUM SERPL-SCNC: 4.5 MMOL/L (ref 3.5–5.2)
SODIUM SERPL-SCNC: 142 MMOL/L (ref 136–145)

## 2023-09-11 PROCEDURE — 36415 COLL VENOUS BLD VENIPUNCTURE: CPT

## 2023-09-11 PROCEDURE — 83735 ASSAY OF MAGNESIUM: CPT

## 2023-09-11 PROCEDURE — 80162 ASSAY OF DIGOXIN TOTAL: CPT

## 2023-09-11 PROCEDURE — 80048 BASIC METABOLIC PNL TOTAL CA: CPT

## 2023-09-11 RX ORDER — POTASSIUM CHLORIDE 750 MG/1
TABLET, FILM COATED, EXTENDED RELEASE ORAL
COMMUNITY
Start: 2023-08-31 | End: 2023-09-11

## 2023-09-11 NOTE — PROGRESS NOTES
"    Subjective:     Encounter Date:09/11/2023      Patient ID: Wyatt Curry is a 89 y.o. male     Chief Complaint: \"no complaints\"  Coronary Artery Disease  Presents for follow-up visit. Pertinent negatives include no chest pain, dizziness, leg swelling, palpitations, shortness of breath or weight gain. Risk factors include hyperlipidemia. His past medical history is significant for CHF. The symptoms have been stable.   Hypertension  This is a chronic problem. The current episode started more than 1 year ago. The problem has been rapidly improving since onset. The problem is controlled. Pertinent negatives include no chest pain, malaise/fatigue, orthopnea, palpitations, PND or shortness of breath.   Hyperlipidemia  This is a chronic problem. The current episode started more than 1 year ago. The problem is controlled. Recent lipid tests were reviewed and are low. Pertinent negatives include no chest pain or shortness of breath.   Congestive Heart Failure  Presents for initial visit. Associated symptoms include edema and orthopnea. Pertinent negatives include no chest pain, near-syncope, palpitations or shortness of breath. The symptoms have been worsening. His past medical history is significant for CAD.     Patient presents today as a CHF follow up. Patient is followed for CAD s/p stenting x2 in approximately 2006. He had a low risk nuclear stress in 9/2018 which was ordered due to concerns for stable angina with complaints of arm numbness and pain between his shoulder blades. He had PCTA to the LAD following a NSTEMI in 4/2023 after holding antiplatelet therapy prior to surgery. LVEF was noted to be 40-45% at the time of his NSTEMI. He also has a chronic LBBB. He was admitted to the hospital in May and treated for PNA. During that hospitalization he was noted to have new onset of afib with RVR. He had a 2D echo completed during that stay which revealed EF remained 41-45%. He also had a CT of the chest which " revealed a small right and trace left pleural effusion and an enlarged pulmonary artery and also had an elevated BNP and was treated with intermittent IV lasix. He was admitted to the hospital in June and treated for c-diff. He has been seen frequently more recently for initiation and titration of heart failure medications. He was seen in my office on 7/7 and was noted to be in decompensated CHF. At that time he was started on Lasix PO and Aldactone. He was seen again on 7/24 and noted to be improved yet still remained volume overloaded and had his Avapro changed to Entresto, Jardiance was started and Lasix was changed to PRN. He has struggled with c-diff that has been difficult to cure which has lead to 2 separate hospitalizations and an ER visit since June. During his most recent hospitalization 7/31-8/4 he was treated for recurrent c-diff and also had afib with RVR and was started on Eliquis and digoxin during his stay.     He was last seen 4 weeks ago and noted to be doing well. He appeared euvolemic on exam therefore his CHF medications were not adjusted. He has since moved to [facility]. He has gained some weight since his move and is currently weighing 137llbs. He has not used PRN lasix. He denies shortness of breath and edema. His SBP runs in the 120-138 range at home. His only complaint today is frequent nocturnal urination.     The following portions of the patient's history were reviewed and updated as appropriate: allergies, current medications, past family history, past medical history, past social history, past surgical history and problem list.    Allergies   Allergen Reactions    Pregabalin Other (See Comments)     Blurred vision only       Current Outpatient Medications:     acetaminophen (TYLENOL) 650 MG 8 hr tablet, Take 1 tablet by mouth 2 (Two) Times a Day. Indications: Pain, Disp: , Rfl:     apixaban (ELIQUIS) 2.5 MG tablet tablet, Take 1 tablet by mouth Every 12 (Twelve) Hours.  Indications: Atrial Fibrillation, Disp: 60 tablet, Rfl: 11    atorvastatin (LIPITOR) 20 MG tablet, Take 1 tablet by mouth every night at bedtime. Indications: Cardiac Failure, High Amount of Fats in the Blood, Disp: , Rfl:     busPIRone (BUSPAR) 10 MG tablet, Take 1 tablet by mouth 2 (Two) Times a Day. Indications: Anxiety Disorder, Disp: , Rfl:     clopidogrel (PLAVIX) 75 MG tablet, Take 1 tablet by mouth Daily. Indications: Carotid Artery Stenting, Disp: , Rfl:     digoxin (LANOXIN) 125 MCG tablet, Take 1 tablet by mouth Daily for 90 days., Disp: 90 tablet, Rfl: 0    empagliflozin (JARDIANCE) 10 MG tablet tablet, Take 1 tablet by mouth Daily. Indications: Cardiac Failure, Disp: , Rfl:     furosemide (LASIX) 40 MG tablet, Take 1 tablet by mouth Daily As Needed (swelling, weight gain or shortness of breath). Take 1 additional tablet as needed for weight gain, shortness of breath or swelling, Disp: 45 tablet, Rfl: 11    lansoprazole (PREVACID) 30 MG capsule, Take 1 capsule by mouth Daily. Indications: Heartburn, Disp: , Rfl:     metoprolol succinate XL (TOPROL-XL) 50 MG 24 hr tablet, Take 1 tablet by mouth Every Night. Indications: Atrial Fibrillation, Disp: 30 tablet, Rfl: 11    nitroglycerin (NITROSTAT) 0.4 MG SL tablet, Place 1 tablet under the tongue Every 5 (Five) Minutes As Needed. Indications: Acute Angina Pectoris, Disp: , Rfl:     ondansetron ODT (ZOFRAN-ODT) 4 MG disintegrating tablet, Place 1 tablet on the tongue Every 6 (Six) Hours As Needed for Nausea., Disp: 40 tablet, Rfl: 0    potassium chloride (K-DUR,KLOR-CON) 10 MEQ CR tablet, Take 2 tablets by mouth Daily As Needed (for swelling when taking Lasix). Indications: Low Amount of Potassium in the Blood, Disp: , Rfl:     saccharomyces boulardii (FLORASTOR) 250 MG capsule, Take 1 capsule by mouth 2 (Two) Times a Day., Disp: 30 capsule, Rfl: 2    sacubitril-valsartan (Entresto) 49-51 MG tablet, Take 1 tablet by mouth 2 (Two) Times a Day., Disp: 60 tablet,  Rfl: 11    spironolactone (ALDACTONE) 25 MG tablet, Take 1 tablet by mouth Daily., Disp: 30 tablet, Rfl: 11    vitamin B-12 (CYANOCOBALAMIN) 1000 MCG tablet, Take 1 tablet by mouth Daily. Indications: Inadequate Vitamin B12, Disp: , Rfl:     Vitron-C  MG tablet, Take 1 tablet by mouth Daily. 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    GERD (gastroesophageal reflux disease)     Hyperlipidemia     Hypertension     Irregular heart beat     Lung nodules        Social History     Socioeconomic History    Marital status:    Tobacco Use    Smoking status: Former     Packs/day: 1.00     Years: 30.00     Pack years: 30.00     Types: Cigarettes     Start date:      Quit date:      Years since quittin.7     Passive exposure: Past    Smokeless tobacco: Never   Vaping Use    Vaping Use: Never used   Substance and Sexual Activity    Alcohol use: Yes     Alcohol/week: 1.0 standard drink     Types: 1 Glasses of wine per week     Comment: occ wine    Drug use: Not Currently    Sexual activity: Defer       Review of Systems   Constitutional: Negative for malaise/fatigue, weight gain and weight loss.   Cardiovascular:  Negative for chest pain, dyspnea on exertion, irregular heartbeat, leg swelling, near-syncope, orthopnea, palpitations, paroxysmal nocturnal dyspnea and syncope.   Respiratory:  Negative for cough, shortness of breath, sleep disturbances due to breathing, sputum production and wheezing.    Hematologic/Lymphatic: Does not bruise/bleed easily.   Skin:  Negative for dry skin, flushing, itching and rash.   Gastrointestinal:  Negative for diarrhea, hematemesis and hematochezia.   Neurological:  Negative for dizziness, light-headedness, loss of balance and weakness.   All other systems  "reviewed and are negative.       Objective:     Vitals reviewed.   Constitutional:       General: Not in acute distress.     Appearance: Well-developed. Not diaphoretic.   Eyes:      General: No scleral icterus.     Conjunctiva/sclera: Conjunctivae normal.      Pupils: Pupils are equal, round, and reactive to light.   HENT:      Head: Normocephalic.    Mouth/Throat:      Pharynx: No oropharyngeal exudate.   Pulmonary:      Effort: Pulmonary effort is normal. No respiratory distress.      Breath sounds: Examination of the left-lower field reveals rales. No wheezing. Rales present.   Chest:      Chest wall: Not tender to palpatation.   Cardiovascular:      Normal rate. Frequent ectopic beats. Regular rhythm.      Murmurs: There is a grade 1/6 high frequency blowing holosystolic murmur at the apex.   Pulses:     Intact distal pulses.   Edema:     Peripheral edema present.     Pretibial: 1+ pitting edema of the right pretibial area.     Ankle: 1+ pitting edema of the right ankle.     Feet: 1+ pitting edema of the right foot.  Abdominal:      General: Bowel sounds are normal. There is no distension.      Palpations: Abdomen is soft.      Tenderness: There is no abdominal tenderness.   Musculoskeletal: Normal range of motion.      Cervical back: Normal range of motion and neck supple. Skin:     General: Skin is warm and dry.      Coloration: Skin is not pale.      Findings: No erythema or rash.   Neurological:      Mental Status: Alert and oriented to person, place, and time.      Deep Tendon Reflexes: Reflexes are normal and symmetric.   Psychiatric:         Behavior: Behavior normal.       Procedures  /62 (BP Location: Right arm, Patient Position: Sitting, Cuff Size: Adult)   Pulse 71   Ht 160 cm (63\")   Wt 62.1 kg (137 lb)   SpO2 96%   BMI 24.27 kg/m²     Lab Review:   I have reviewed previous office notes, recent labs and recent cardiac testing.   Lab Results   Component Value Date    GLUCOSE 123 (H) " 09/11/2023    CALCIUM 9.8 (H) 09/11/2023     09/11/2023    K 4.5 09/11/2023    CO2 30.0 (H) 09/11/2023     09/11/2023    BUN 16 09/11/2023    CREATININE 0.59 (L) 09/11/2023    EGFR 90.5 09/11/2023    BCR 27.1 (H) 09/11/2023    ANIONGAP 10.0 09/11/2023       Cath 4/8/2023:  Procedure performed  Left heart cath  Coronary angiography  Right femoral arteriography  Insertion of 7Fr Mynx Perclose closure device with effective hemostasis and preserved lower extremity pulses  Left ventriculography  Supervision of the administration of moderate sedation  Ultrasound guidance to assist access of right femoral artery and to confirm placement of catheter [CPT code 91748]  Moderate sedation administered under supervision of  with monitoring [CPT code 18057]  PTCA of mid to distal left anterior descending coronary artery  PTCA of proximal left anterior descending coronary artery  Placement of 2.5 x 16 mm Synergy drug-eluting stent at high pressure and proximal left anterior descending coronary artery  LV Gram in Normal LVEF 40-45% with what appears to be global hypokinesis with moderate to severe hypokinesis noted in the apical lateral region  Moderate mitral regurgitation      Lab Results   Component Value Date    CHOL 67 05/14/2023    CHLPL 104 (L) 09/09/2016    TRIG 52 05/14/2023    HDL 28 (L) 05/14/2023    LDL 26 05/14/2023       Results for orders placed during the hospital encounter of 05/13/23    Adult Transthoracic Echo Complete W/ Cont if Necessary Per Protocol    Interpretation Summary    Left ventricular systolic function is mildly decreased. Left ventricular ejection fraction appears to be 41 - 45%.    The following segments are hypokinetic: apical anterior, apical septal, apical inferior, apical lateral and apex.    Left ventricular wall thickness is consistent with mild septal asymmetric hypertrophy.    Left ventricular diastolic function is consistent with (grade I) impaired relaxation.    He  left atrial cavity is mildly dilated.    There is mild to moderate thickening of the aortic valve.    . Mild to moderate mitral valve regurgitation is present    Mild pulmonary hypertension is present.        Assessment:          Diagnosis Plan   1. Chronic systolic congestive heart failure        2. Coronary artery disease involving native coronary artery of native heart without angina pectoris        3. Ischemic cardiomyopathy        4. Non-rheumatic mitral regurgitation        5. PAF (paroxysmal atrial fibrillation)        6. Essential hypertension        7. Mixed hyperlipidemia        8. Left bundle branch block        9. Hypomagnesemia             Plan:       1. Chronic combined CHF- class 2 symptoms. He does have faint rales in LLL with presence of RLE edema. Recommend he take 1 dose of 20mg or 40mg lasix today and monitor his weight. I expected some weight gain as he was not eating well when he was living at home alone while his wife was in rehab however he does appear to have some evidence of volume overload.  Continue Entresto, Jardiance and Aldactone with PRN lasix for weight gain.   2. CAD- stable. Recent stenting to LAD in 4/2023 with previously placed stents noted to be patent. No clinical signs of ongoing ischemia. ASA was stopped due to starting Eliquis. Continue Plavix, statin, BB, and ARB.   3. ICMO- EF dropped to 41-45% following NSTEMI in 4/2023 and remained 41-45% per echo in 5/2023. Continue Entresto, toprol, jardiance and aldactone.   4. MR- moderate per cath. Grade 1 Murmur present. Will continue to monitor.   5. PAF- stable, normal rhythm per exam today. New onset during hospitalization in May with reoccurrence during most recent admission on 7/31. Will continue Eliquis to 2.5mg BID as I expect some weight loss with lasix use. If he remains >60kg at his follow up, will plan to increase his Eliquis back to 5mg BID. Continue digoxin, he is to have it drawn today after his appt.   6. HTN-  controlled. Continue current therapy.   7. HLD- controlled with LDL of 40 at time of cath. Continue statin.  8. LBBB- chronic.   9. Hypomagnesemia- noted inpatient and started on mag ox. Repeat mag level to be drawn today.       Follow up in 3 months or sooner if symptoms worsen.     I spent 30 minutes caring for Wyatt on this date of service. This time includes time spent by me in the following activities:preparing for the visit, reviewing tests, obtaining and/or reviewing a separately obtained history, performing a medically appropriate examination and/or evaluation , documenting information in the medical record, independently interpreting results and communicating that information with the patient/family/caregiver and care coordination

## 2023-09-12 ENCOUNTER — TELEPHONE (OUTPATIENT)
Dept: CARDIOLOGY | Facility: CLINIC | Age: 88
End: 2023-09-12
Payer: MEDICARE

## 2023-09-12 ENCOUNTER — HOME CARE VISIT (OUTPATIENT)
Dept: HOME HEALTH SERVICES | Facility: CLINIC | Age: 88
End: 2023-09-12
Payer: MEDICARE

## 2023-09-12 VITALS
RESPIRATION RATE: 18 BRPM | SYSTOLIC BLOOD PRESSURE: 128 MMHG | DIASTOLIC BLOOD PRESSURE: 60 MMHG | TEMPERATURE: 97.3 F | OXYGEN SATURATION: 94 % | HEART RATE: 64 BPM

## 2023-09-12 PROCEDURE — G0300 HHS/HOSPICE OF LPN EA 15 MIN: HCPCS

## 2023-09-12 NOTE — TELEPHONE ENCOUNTER
----- Message from LARRY Simon sent at 9/11/2023  3:34 PM CDT -----  Please let him know, his digoxin level is low, which is ok. His magnesium level is also low and he needs to follow up with Dr. Jackson about whether he needs to be on Mag ox or not long term. Kidney function is also normal.

## 2023-09-13 NOTE — HOME HEALTH
Routine Visit Note:    SKill/Education Provided: Assessment, Education    Patient/Caregiver Response: Ongoing    Falls: No    Med Changes: Yes, see eMAR.    Physician Contact: No    Plan for Next Visit: Education    Other/Pertinent info: No s/s of COVID voiced or noted.

## 2023-09-19 ENCOUNTER — HOME CARE VISIT (OUTPATIENT)
Dept: HOME HEALTH SERVICES | Facility: CLINIC | Age: 88
End: 2023-09-19
Payer: MEDICARE

## 2023-09-19 VITALS
RESPIRATION RATE: 20 BRPM | DIASTOLIC BLOOD PRESSURE: 58 MMHG | TEMPERATURE: 98.2 F | HEART RATE: 67 BPM | OXYGEN SATURATION: 97 % | SYSTOLIC BLOOD PRESSURE: 122 MMHG

## 2023-09-19 PROCEDURE — G0493 RN CARE EA 15 MIN HH/HOSPICE: HCPCS

## 2023-09-19 NOTE — HOME HEALTH
THE FOLLOWING INSTRUCTIONS WERE REVIEWED UPON DISCHARGE:    Keep your appointment with Dr. Rios on 9/28/23 @ 1015 and Dr. Triplett as needed.      Call your doctor if you develop a new or worsening symptom, especially if you develop shortness of breath, weakness, falls, chest pain, fever or increase in pain.    Continue to take the medications prescribed by your doctor. A medication list is attached. Be sure to keep them informed of all medications you take including over the counter herbal, vitamins/minerals and the ones you take only when needed.    Follow the Heart Healthy diet as ordered by your doctor.     Continue with home program as instructed by therapist (handouts given).    Continue wound care as ordered. N/A    Community resource referrals (list provided). N/A    Other notes/instructions: N/A    Instructions given to Patient    Instructions provided per Visit

## 2023-09-19 NOTE — CASE COMMUNICATION
THE FOLLOWING INSTRUCTIONS WERE REVIEWED UPON DISCHARGE:    Keep your appointment with Dr. Rios on 9/28/23 @ 1015 and Dr. Triplett as needed.      Call your doctor if you develop a new or worsening symptom, especially if you develop shortness of breath, weakness, falls, chest pain, fever or increase in pain.    Continue to take the medications prescribed by your doctor. A medication list is attached. Be sure to keep them informed of all  medications you take including over the counter herbal, vitamins/minerals and the ones you take only when needed.    Follow the Heart Healthy diet as ordered by your doctor.     Continue with home program as instructed by therapist (handouts given).    Continue wound care as ordered. N/A    Community resource referrals (list provided). N/A    Other notes/instructions: N/A    Instructions given to Patient    Instructions provided per Vis it

## 2023-09-25 ENCOUNTER — TELEPHONE (OUTPATIENT)
Age: 88
End: 2023-09-25
Payer: MEDICARE

## 2023-09-25 NOTE — TELEPHONE ENCOUNTER
I had a voicemail message today from patient asking for me to return his call re: a medical question.  I returned his called [phone] and he didn't answer.  I left him a voicemail message letting him know I will call him in the morning as it's already after 4:30 PM.

## 2023-09-26 NOTE — PROGRESS NOTES
"[facility] [place] - Infectious Diseases Progress Note    Patient:  Wyatt Curry  YOB: 1930  MRN: [id number]   Primary Care Physician: Everardo Jackson MD  Referring Physician: No ref. provider found     Chief Complaint:   Chief Complaint   Patient presents with    Recurrent Clostridium difficile diarrhea     With no complaints today.  He reports 1-2 BM per day.       He completed his oral vancomycin taper around 09-.  He said he starting feeling bad last Friday, worse on Saturday and he had \"a lot of diarrhea.\" He had some vancomycin capsules from before and he started taking them again 3 times a day. He is much better now.     Interval History/HPI: Here for follow-up of recurrent C. difficile colitis.  He completed his taper previously.  All his symptoms had resolved.  He did well for a while.  He then had a number of loose stools the end of last week.  He indicates they were frequent watery.  He had a refill on vancomycin through Dr. Rinney's office.  He resumed vancomycin 125 mg 3 times daily with improvement in symptoms over 24 to 48 hours.  He now reports he is having 1-2 formed bowel movements per day.  He is not having abdominal pain.  No nausea or vomiting.  He reports good oral intake.    Allergies:   Allergies   Allergen Reactions    Pregabalin Other (See Comments)     Blurred vision only     Current Scheduled Medications:   Current Outpatient Medications on File Prior to Visit   Medication Sig    acetaminophen (TYLENOL) 650 MG 8 hr tablet Take 1 tablet by mouth 2 (Two) Times a Day. Indications: Pain    apixaban (ELIQUIS) 2.5 MG tablet tablet Take 1 tablet by mouth Every 12 (Twelve) Hours. Indications: Atrial Fibrillation    atorvastatin (LIPITOR) 20 MG tablet Take 1 tablet by mouth every night at bedtime. Indications: Cardiac Failure, High Amount of Fats in the Blood    busPIRone (BUSPAR) 10 MG tablet Take 1 tablet by mouth 2 (Two) Times a Day. Indications: Anxiety Disorder    clopidogrel " (PLAVIX) 75 MG tablet Take 1 tablet by mouth Daily. Indications: Carotid Artery Stenting    digoxin (LANOXIN) 125 MCG tablet Take 1 tablet by mouth Daily for 90 days.    empagliflozin (JARDIANCE) 10 MG tablet tablet Take 1 tablet by mouth Daily. Indications: Cardiac Failure    furosemide (LASIX) 40 MG tablet Take 1 tablet by mouth Daily As Needed (swelling, weight gain or shortness of breath). Take 1 additional tablet as needed for weight gain, shortness of breath or swelling    lansoprazole (PREVACID) 30 MG capsule Take 1 capsule by mouth Daily. Indications: Heartburn    metoprolol succinate XL (TOPROL-XL) 50 MG 24 hr tablet Take 1 tablet by mouth Every Night. Indications: Atrial Fibrillation    nitroglycerin (NITROSTAT) 0.4 MG SL tablet Place 1 tablet under the tongue Every 5 (Five) Minutes As Needed. Indications: Acute Angina Pectoris    ondansetron ODT (ZOFRAN-ODT) 4 MG disintegrating tablet Place 1 tablet on the tongue Every 6 (Six) Hours As Needed for Nausea.    potassium chloride (K-DUR,KLOR-CON) 10 MEQ CR tablet Take 2 tablets by mouth Daily As Needed (for swelling when taking Lasix). Indications: Low Amount of Potassium in the Blood    saccharomyces boulardii (FLORASTOR) 250 MG capsule Take 1 capsule by mouth 2 (Two) Times a Day.    sacubitril-valsartan (Entresto) 49-51 MG tablet Take 1 tablet by mouth 2 (Two) Times a Day.    spironolactone (ALDACTONE) 25 MG tablet Take 1 tablet by mouth Daily.    vancomycin (VANCOCIN) 125 MG capsule Take 1 capsule by mouth 3 (Three) Times a Day.    vitamin B-12 (CYANOCOBALAMIN) 1000 MCG tablet Take 1 tablet by mouth Daily. Indications: Inadequate Vitamin B12    Vitron-C  MG tablet Take 1 tablet by mouth Daily. Indications: Excess or Deficiency of Vitamin C    [DISCONTINUED] potassium chloride (MICRO-K) 10 MEQ CR capsule Take 10 mEq by mouth Daily. Take daily when taking lasix  Indications: Low Amount of Potassium in the Blood    [DISCONTINUED] sacubitril-valsartan  "(Entresto) 49-51 MG tablet Take 1 tablet by mouth 2 (Two) Times a Day. Indications: Cardiac Failure     No current facility-administered medications on file prior to visit.      Venous Access Review  Line/IV site: No current IV Access  Antimicrobial Review  Currently on antibiotics/antifungals: YES/NO: YES  Start Date of Therapy: Vancomycin 8/10/2023  If therapy completed, date complete: 09- completed oral vancomycin taper.    HE RESUMED VANCOMYCIN 125 MG TID ON 09-    Review of Systems See HPI.    Vital Signs:  /58 (BP Location: Left arm, Patient Position: Sitting, Cuff Size: Adult) Comment: manual  Pulse 72   Temp 98 °F (36.7 °C) (Temporal)   Ht 158.8 cm (62.52\")   Wt 63.4 kg (139 lb 12.8 oz)   SpO2 95%   BMI 25.15 kg/m²     Physical Exam  Vital signs - reviewed.  Alert, pleasant, no distress  Abdomen is soft and nontender  No guarding or rebound    Lab/Imaging/Other Information: None    Impression & Recommendations:   Diagnoses and all orders for this visit:    1. Recurrent Clostridium difficile diarrhea (Primary)    He seems to have had another episode of recurrent C. difficile colitis.  He responds quickly to vancomycin.  He did well following his last taper.  I am hopeful that another vancomycin taper will eradicate his difficulties.  Continue vancomycin 125 mg orally 3 times daily for an additional week  Then  Vancomycin 125 mg orally twice daily for 1 week  Then  Vancomycin 125 mg orally every other day for 2 weeks  Then stop vancomycin treatment  He will call when he needs a refill  Follow-up appointment in 2 months  He will call for earlier appointment if any new or worsening problems in the interim    Follow Up:   There are no Patient Instructions on file for this visit.  Return in about 2 months (around 11/28/2023).  Patient was provided After Visit Summary.     Jules Wren MD    CC: Luis Jackson MD   "

## 2023-09-26 NOTE — TELEPHONE ENCOUNTER
"I called patient again and he answered.  He said he starting feeling bad last Friday, worse on Saturday and he had \"a lot of diarrhea.\"  He had some vancomycin capsules from before and he started taking them again 3 times a day.  He is much better now.  He has follow-up with Dr. Wren on Thursday of this week 9/28/23 at 9:15 AM.  He said he has enough vancomycin capsules to get him to his appt.  I told him OK to continue taking vancomycin capsules and Dr. Wren will discuss with him at his appt on Thursday.  He thanked me for calling.    "

## 2023-09-28 ENCOUNTER — OFFICE VISIT (OUTPATIENT)
Age: 88
End: 2023-09-28
Payer: MEDICARE

## 2023-09-28 ENCOUNTER — TELEPHONE (OUTPATIENT)
Age: 88
End: 2023-09-28
Payer: MEDICARE

## 2023-09-28 VITALS
TEMPERATURE: 98 F | HEART RATE: 72 BPM | HEIGHT: 63 IN | OXYGEN SATURATION: 95 % | DIASTOLIC BLOOD PRESSURE: 58 MMHG | WEIGHT: 139.8 LBS | SYSTOLIC BLOOD PRESSURE: 128 MMHG | BODY MASS INDEX: 24.77 KG/M2

## 2023-09-28 DIAGNOSIS — A04.71 RECURRENT CLOSTRIDIUM DIFFICILE DIARRHEA: Primary | ICD-10-CM

## 2023-09-28 RX ORDER — VANCOMYCIN HYDROCHLORIDE 125 MG/1
125 CAPSULE ORAL 3 TIMES DAILY
COMMUNITY
Start: 2023-09-23

## 2023-09-28 NOTE — TELEPHONE ENCOUNTER
"Mr. Curry called to report he has 28 vancomycin capsules left as of today.  He had 29 and just took one.  He asked me to email him the vancomycin taper instructions that Dr. Wren discussed with him during today's visit.  I confirmed his email address.  I also told him I will send a new vancomycin prescription to his pharmacy.      I called patient and told him I will send #30 of the vancomycin 125 MG capsules to his pharmacy.  He confirmed he received the email I sent him with taper instructions and he doesn't have any questions.  I told him his medication bottle will read \"TAKE AS DIRECTED.\"  He was fine with this plan.  "

## 2023-10-03 RX ORDER — VANCOMYCIN HYDROCHLORIDE 125 MG/1
125 CAPSULE ORAL TAKE AS DIRECTED
Qty: 30 CAPSULE | Refills: 0 | Status: SHIPPED | OUTPATIENT
Start: 2023-10-06

## 2023-11-08 ENCOUNTER — OFFICE VISIT (OUTPATIENT)
Age: 88
End: 2023-11-08
Payer: MEDICARE

## 2023-11-08 VITALS
OXYGEN SATURATION: 96 % | WEIGHT: 141 LBS | HEIGHT: 63 IN | BODY MASS INDEX: 24.98 KG/M2 | HEART RATE: 54 BPM | TEMPERATURE: 97.8 F | DIASTOLIC BLOOD PRESSURE: 64 MMHG | SYSTOLIC BLOOD PRESSURE: 142 MMHG

## 2023-11-08 DIAGNOSIS — A04.71 RECURRENT CLOSTRIDIUM DIFFICILE DIARRHEA: Primary | ICD-10-CM

## 2023-11-08 RX ORDER — DILTIAZEM HYDROCHLORIDE 240 MG/1
1 CAPSULE, COATED, EXTENDED RELEASE ORAL DAILY
COMMUNITY

## 2023-11-08 NOTE — PROGRESS NOTES
[facility] [place] - Infectious Diseases Progress Note    Patient:  Wyatt Curry  YOB: 1930  MRN: [id number]   Primary Care Physician: Everardo Jackson MD  Referring Physician: No ref. provider found     Chief Complaint:   Chief Complaint   Patient presents with    Recurrent Clostridium difficile diarrhea     With no complaints at this time.  He completed vancomycin taper over a week ago and no recurrence of diarrhea     Interval History/HPI: Returns today for follow-up of recurrent C. difficile colitis.  He indicates after a few doses of vancomycin treatment his symptoms resolved.  He has completed the taper.  He completed the taper about 10 days ago.  Throughout the taper after he started vancomycin and even after stopping the taper his bowels have been normal.  He is having 1-2 formed bowel movements per day.  He is not having any nausea.  He reports good appetite.  He has no nausea.  He has no abdominal pain.    Allergies:   Allergies   Allergen Reactions    Pregabalin Other (See Comments)     Blurred vision only     Current Scheduled Medications:   Current Outpatient Medications on File Prior to Visit   Medication Sig    acetaminophen (TYLENOL) 650 MG 8 hr tablet Take 1 tablet by mouth 2 (Two) Times a Day. Indications: Pain    apixaban (ELIQUIS) 2.5 MG tablet tablet Take 1 tablet by mouth Every 12 (Twelve) Hours. Indications: Atrial Fibrillation    atorvastatin (LIPITOR) 20 MG tablet Take 1 tablet by mouth every night at bedtime. Indications: Cardiac Failure, High Amount of Fats in the Blood    busPIRone (BUSPAR) 10 MG tablet Take 1 tablet by mouth 2 (Two) Times a Day. Indications: Anxiety Disorder    clopidogrel (PLAVIX) 75 MG tablet Take 1 tablet by mouth Daily. Indications: Carotid Artery Stenting    dilTIAZem CD (CARDIZEM CD) 240 MG 24 hr capsule Take 1 capsule by mouth Daily.    empagliflozin (JARDIANCE) 10 MG tablet tablet Take 1 tablet by mouth Daily. Indications: Cardiac Failure    furosemide  (LASIX) 40 MG tablet Take 1 tablet by mouth Daily As Needed (swelling, weight gain or shortness of breath). Take 1 additional tablet as needed for weight gain, shortness of breath or swelling    lansoprazole (PREVACID) 30 MG capsule Take 1 capsule by mouth Daily. Indications: Heartburn    metoprolol succinate XL (TOPROL-XL) 50 MG 24 hr tablet Take 1 tablet by mouth Every Night. Indications: Atrial Fibrillation    nitroglycerin (NITROSTAT) 0.4 MG SL tablet Place 1 tablet under the tongue Every 5 (Five) Minutes As Needed. Indications: Acute Angina Pectoris    ondansetron ODT (ZOFRAN-ODT) 4 MG disintegrating tablet Place 1 tablet on the tongue Every 6 (Six) Hours As Needed for Nausea.    potassium chloride (K-DUR,KLOR-CON) 10 MEQ CR tablet Take 2 tablets by mouth Daily As Needed (for swelling when taking Lasix). Indications: Low Amount of Potassium in the Blood    saccharomyces boulardii (FLORASTOR) 250 MG capsule Take 1 capsule by mouth 2 (Two) Times a Day.    sacubitril-valsartan (Entresto) 49-51 MG tablet Take 1 tablet by mouth 2 (Two) Times a Day.    spironolactone (ALDACTONE) 25 MG tablet Take 1 tablet by mouth Daily.    vitamin B-12 (CYANOCOBALAMIN) 1000 MCG tablet Take 1 tablet by mouth Daily. Indications: Inadequate Vitamin B12    Vitron-C  MG tablet Take 1 tablet by mouth Daily. Indications: Excess or Deficiency of Vitamin C    digoxin (LANOXIN) 125 MCG tablet Take 1 tablet by mouth Daily for 90 days.    [DISCONTINUED] potassium chloride (MICRO-K) 10 MEQ CR capsule Take 10 mEq by mouth Daily. Take daily when taking lasix  Indications: Low Amount of Potassium in the Blood    [DISCONTINUED] sacubitril-valsartan (Entresto) 49-51 MG tablet Take 1 tablet by mouth 2 (Two) Times a Day. Indications: Cardiac Failure    [DISCONTINUED] vancomycin (VANCOCIN) 125 MG capsule Take 1 capsule by mouth Take As Directed for 30 doses.     No current facility-administered medications on file prior to visit.      Venous  "Access Review  Line/IV site: No current IV Access    Antimicrobial Review  Currently on antibiotics/antifungals: YES/NO: YES  Start Date of Therapy: vancomycin 8/10/23-9/9/2023  Restarted on 9/23/2023  If therapy completed, date complete: he completed vancomycin taper around 10/26/23    Review of Systems See HPI.    Vital Signs:  /64   Pulse 54   Temp 97.8 °F (36.6 °C) (Temporal)   Ht 158.8 cm (62.52\")   Wt 64 kg (141 lb)   SpO2 96%   BMI 25.36 kg/m²     Physical Exam  Vital signs - reviewed.  Alert, pleasant, no distress.  Abdomen is soft and nontender  There is no guarding or rebound  He is smiling and interactive    Lab/Imaging/Other Information:     Impression & Recommendations:   Diagnoses and all orders for this visit:    1. Recurrent Clostridium difficile diarrhea (Primary)    He seems to be doing very well.  Bowel habits have returned to normal.  He responded very quickly to his redo vancomycin taper.  We talked about options should he experience a recurrent episode of C. difficile.  Options would include another vancomycin taper, trial with fidaxomicin, or fecal transplantation.  I am hopeful that with how quickly he responded to his taper and how well he is done off antimicrobial treatment that he will continue to do well without need for any additional treatment.  I encouraged him to avoid future courses of antibiotic treatment unless they are clearly necessary as it would be preferable not to disrupt colonic senthil.  He is comfortable with that plan.  He will call if any new or recurrent symptoms.  He otherwise plans to see me as needed.  It was a real pleasure seeing him today.  He is a very thoughtful and energetic 89-year-old man and it was fun to be able to participate with his care.    Follow Up:   There are no Patient Instructions on file for this visit.  Return if symptoms worsen or fail to improve.  Patient was provided After Visit Summary.     Jules Wren MD    CC: Luis Jackson, " MD

## 2023-12-12 ENCOUNTER — HOSPITAL ENCOUNTER (OUTPATIENT)
Dept: GENERAL RADIOLOGY | Facility: HOSPITAL | Age: 88
Discharge: HOME | End: 2023-12-12
Payer: MEDICARE

## 2023-12-12 ENCOUNTER — TRANSCRIBE ORDERS (OUTPATIENT)
Dept: ADMINISTRATIVE | Facility: HOSPITAL | Age: 88
End: 2023-12-12
Payer: MEDICARE

## 2023-12-12 DIAGNOSIS — J44.1 OBSTRUCTIVE CHRONIC BRONCHITIS WITH EXACERBATION: Primary | ICD-10-CM

## 2023-12-12 DIAGNOSIS — J44.1 OBSTRUCTIVE CHRONIC BRONCHITIS WITH EXACERBATION: ICD-10-CM

## 2023-12-12 DIAGNOSIS — R06.2 WHEEZING: ICD-10-CM

## 2023-12-12 DIAGNOSIS — R05.9 COUGH, UNSPECIFIED TYPE: ICD-10-CM

## 2023-12-12 PROCEDURE — 71046 X-RAY EXAM CHEST 2 VIEWS: CPT

## 2024-01-04 ENCOUNTER — OFFICE VISIT (OUTPATIENT)
Dept: CARDIOLOGY | Facility: CLINIC | Age: 89
End: 2024-01-04
Payer: MEDICARE

## 2024-01-04 VITALS
HEART RATE: 67 BPM | BODY MASS INDEX: 25.87 KG/M2 | OXYGEN SATURATION: 96 % | DIASTOLIC BLOOD PRESSURE: 60 MMHG | WEIGHT: 146 LBS | HEIGHT: 63 IN | SYSTOLIC BLOOD PRESSURE: 134 MMHG

## 2024-01-04 DIAGNOSIS — I34.0 NON-RHEUMATIC MITRAL REGURGITATION: ICD-10-CM

## 2024-01-04 DIAGNOSIS — I25.10 CORONARY ARTERY DISEASE INVOLVING NATIVE CORONARY ARTERY OF NATIVE HEART WITHOUT ANGINA PECTORIS: ICD-10-CM

## 2024-01-04 DIAGNOSIS — I10 ESSENTIAL HYPERTENSION: ICD-10-CM

## 2024-01-04 DIAGNOSIS — E78.2 MIXED HYPERLIPIDEMIA: ICD-10-CM

## 2024-01-04 DIAGNOSIS — I48.0 PAF (PAROXYSMAL ATRIAL FIBRILLATION): ICD-10-CM

## 2024-01-04 DIAGNOSIS — I44.7 LEFT BUNDLE BRANCH BLOCK: ICD-10-CM

## 2024-01-04 DIAGNOSIS — I50.42 CHRONIC COMBINED SYSTOLIC AND DIASTOLIC CONGESTIVE HEART FAILURE: Primary | ICD-10-CM

## 2024-01-04 DIAGNOSIS — I25.5 ISCHEMIC CARDIOMYOPATHY: ICD-10-CM

## 2024-01-04 PROBLEM — I48.91 ATRIAL FIBRILLATION WITH RVR: Status: RESOLVED | Noted: 2023-07-31 | Resolved: 2024-01-04

## 2024-01-04 PROBLEM — I50.22 CHRONIC HFREF (HEART FAILURE WITH REDUCED EJECTION FRACTION): Status: RESOLVED | Noted: 2023-08-04 | Resolved: 2024-01-04

## 2024-01-04 PROBLEM — I50.23 ACUTE ON CHRONIC SYSTOLIC HEART FAILURE: Status: RESOLVED | Noted: 2023-08-04 | Resolved: 2024-01-04

## 2024-01-04 NOTE — PROGRESS NOTES
"  Subjective:     Encounter Date:01/04/2024      Patient ID: Wyatt Curry is a 89 y.o. male     Chief Complaint: \"no complaints\"  Coronary Artery Disease  Presents for follow-up visit. Pertinent negatives include no chest pain, dizziness, leg swelling, palpitations, shortness of breath or weight gain. Risk factors include hyperlipidemia. His past medical history is significant for CHF. The symptoms have been stable.   Hypertension  This is a chronic problem. The current episode started more than 1 year ago. The problem has been rapidly improving since onset. The problem is controlled. Pertinent negatives include no chest pain, malaise/fatigue, orthopnea, palpitations, PND or shortness of breath.   Hyperlipidemia  This is a chronic problem. The current episode started more than 1 year ago. The problem is controlled. Recent lipid tests were reviewed and are low. Pertinent negatives include no chest pain or shortness of breath.   Congestive Heart Failure  Presents for initial visit. Associated symptoms include edema and orthopnea. Pertinent negatives include no chest pain, near-syncope, palpitations or shortness of breath. The symptoms have been worsening. His past medical history is significant for CAD.     Patient presents today as a routine follow up. Patient is followed for CAD s/p stenting x2 in approximately 2006. He had a low risk nuclear stress in 9/2018 which was ordered due to concerns for stable angina with complaints of arm numbness and pain between his shoulder blades. He had PCTA to the LAD following a NSTEMI in 4/2023 after holding antiplatelet therapy prior to surgery.  LVEF was noted to be 40-45% at the time of his NSTEMI. He has a chronic LBBB. He was diagnosed with afib and had an acute CHF exacerbation in 5/2023 during a hospitalization for PNA. At that time he and his family refused anticoagulation due to issues with hematuria while inpatient. He was seen frequently in July for optimization of " his CHF medications. In August, he was treated in the inpatient setting for recurrent c-diff and also developed afib with RVR and was started on Eliquis and digoxin. He had his digoxin level checked in Sept with his level being low.     From a cardiology standpoint, he has been well. He notes he had RSV during the Christmas holiday and has recovered well. He reports his cough that was present during his acute illness has subsided however he does remain hoarse. He continues to monitor his BP with reported readings of 120-140. His weight remains stable at 140-142lbs. He has not used his PRN lasix since he was last instructed to by myself. He denies chest pain, palpitations, edema, and dyspnea.     The following portions of the patient's history were reviewed and updated as appropriate: allergies, current medications, past family history, past medical history, past social history, past surgical history and problem list.    Allergies   Allergen Reactions    Pregabalin Other (See Comments)     Blurred vision only       Current Outpatient Medications:     acetaminophen (TYLENOL) 650 MG 8 hr tablet, Take 1 tablet by mouth 2 (Two) Times a Day. Indications: Pain, Disp: , Rfl:     apixaban (ELIQUIS) 2.5 MG tablet tablet, Take 1 tablet by mouth Every 12 (Twelve) Hours. Indications: Atrial Fibrillation, Disp: 60 tablet, Rfl: 11    atorvastatin (LIPITOR) 20 MG tablet, Take 1 tablet by mouth every night at bedtime. Indications: Cardiac Failure, High Amount of Fats in the Blood, Disp: , Rfl:     busPIRone (BUSPAR) 10 MG tablet, Take 1 tablet by mouth 2 (Two) Times a Day. Indications: Anxiety Disorder, Disp: , Rfl:     clopidogrel (PLAVIX) 75 MG tablet, Take 1 tablet by mouth Daily. Indications: Carotid Artery Stenting, Disp: , Rfl:     dilTIAZem CD (CARDIZEM CD) 240 MG 24 hr capsule, Take 1 capsule by mouth Daily., Disp: , Rfl:     empagliflozin (JARDIANCE) 10 MG tablet tablet, Take 1 tablet by mouth Daily. Indications: Cardiac  Failure, Disp: , Rfl:     lansoprazole (PREVACID) 30 MG capsule, Take 1 capsule by mouth Daily. Indications: Heartburn, Disp: , Rfl:     metoprolol succinate XL (TOPROL-XL) 50 MG 24 hr tablet, Take 1 tablet by mouth Every Night. Indications: Atrial Fibrillation, Disp: 30 tablet, Rfl: 11    nitroglycerin (NITROSTAT) 0.4 MG SL tablet, Place 1 tablet under the tongue Every 5 (Five) Minutes As Needed. Indications: Acute Angina Pectoris, Disp: , Rfl:     ondansetron ODT (ZOFRAN-ODT) 4 MG disintegrating tablet, Place 1 tablet on the tongue Every 6 (Six) Hours As Needed for Nausea., Disp: 40 tablet, Rfl: 0    saccharomyces boulardii (FLORASTOR) 250 MG capsule, Take 1 capsule by mouth 2 (Two) Times a Day., Disp: 30 capsule, Rfl: 2    sacubitril-valsartan (Entresto) 49-51 MG tablet, Take 1 tablet by mouth 2 (Two) Times a Day., Disp: 60 tablet, Rfl: 11    spironolactone (ALDACTONE) 25 MG tablet, Take 1 tablet by mouth Daily., Disp: 30 tablet, Rfl: 11    vitamin B-12 (CYANOCOBALAMIN) 1000 MCG tablet, Take 1 tablet by mouth Daily. Indications: Inadequate Vitamin B12, Disp: , Rfl:     Vitron-C  MG tablet, Take 1 tablet by mouth Daily. Indications: Excess or Deficiency of Vitamin C, Disp: , Rfl:     digoxin (LANOXIN) 125 MCG tablet, Take 1 tablet by mouth Daily for 90 days., Disp: 90 tablet, Rfl: 0    furosemide (LASIX) 40 MG tablet, Take 1 tablet by mouth Daily As Needed (swelling, weight gain or shortness of breath). Take 1 additional tablet as needed for weight gain, shortness of breath or swelling (Patient not taking: Reported on 1/4/2024), Disp: 45 tablet, Rfl: 11    potassium chloride (K-DUR,KLOR-CON) 10 MEQ CR tablet, Take 2 tablets by mouth Daily As Needed (for swelling when taking Lasix). 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    COPD (chronic obstructive pulmonary disease)     GERD (gastroesophageal reflux disease)     Hyperlipidemia     Hypertension     Irregular heart beat     Lung nodules        Social History     Socioeconomic History    Marital status:    Tobacco Use    Smoking status: Former     Packs/day: 1.00     Years: 30.00     Additional pack years: 0.00     Total pack years: 30.00     Types: Cigarettes     Start date:      Quit date:      Years since quittin.0     Passive exposure: Past    Smokeless tobacco: Never   Vaping Use    Vaping Use: Never used   Substance and Sexual Activity    Alcohol use: Not Currently     Alcohol/week: 1.0 standard drink of alcohol     Types: 1 Glasses of wine per week     Comment: occ wine    Drug use: Never    Sexual activity: Defer       Review of Systems   Constitutional: Negative for malaise/fatigue, weight gain and weight loss.   Cardiovascular:  Negative for chest pain, dyspnea on exertion, irregular heartbeat, leg swelling, near-syncope, orthopnea, palpitations, paroxysmal nocturnal dyspnea and syncope.   Respiratory:  Negative for cough, shortness of breath, sleep disturbances due to breathing, sputum production and wheezing.    Hematologic/Lymphatic: Does not bruise/bleed easily.   Skin:  Negative for dry skin, flushing, itching and rash.   Gastrointestinal:  Negative for diarrhea, hematemesis and hematochezia.   Neurological:  Negative for dizziness, light-headedness, loss of balance and weakness.   All other systems reviewed and are negative.         Objective:     Vitals reviewed.   Constitutional:       General: Not in acute distress.     Appearance: Well-developed. Not diaphoretic.   Eyes:      General: No scleral icterus.     Conjunctiva/sclera: Conjunctivae normal.      Pupils: Pupils are equal, round, and reactive to light.   HENT:      Head: Normocephalic.    Mouth/Throat:       "Pharynx: No oropharyngeal exudate.   Pulmonary:      Effort: Pulmonary effort is normal. No respiratory distress.      Breath sounds: Examination of the left-lower field reveals rales. No wheezing. Rales present.   Chest:      Chest wall: Not tender to palpatation.   Cardiovascular:      Normal rate. Frequent ectopic beats. Regular rhythm.      Murmurs: There is a grade 1/6 high frequency blowing holosystolic murmur at the apex.   Pulses:     Intact distal pulses.   Edema:     Peripheral edema absent.   Abdominal:      General: Bowel sounds are normal. There is no distension.      Palpations: Abdomen is soft.      Tenderness: There is no abdominal tenderness.   Musculoskeletal: Normal range of motion.      Cervical back: Normal range of motion and neck supple. Skin:     General: Skin is warm and dry.      Coloration: Skin is not pale.      Findings: No erythema or rash.   Neurological:      Mental Status: Alert and oriented to person, place, and time.      Deep Tendon Reflexes: Reflexes are normal and symmetric.   Psychiatric:         Behavior: Behavior normal.           ECG 12 Lead    Date/Time: 1/4/2024 11:27 AM  Performed by: Bell Lawson APRN    Authorized by: Bell Lawson APRN  Comparison: compared with previous ECG   Similar to previous ECG  Rhythm: sinus rhythm  Ectopy: atrial premature contractions  Rate: normal  BPM: 67  Conduction: left bundle branch block  ST Segments: ST segments normal  T inversion: II, III, V6, V5 and V4  QRS axis: normal  Other findings: T wave abnormality    Clinical impression: abnormal EKG        /60 (BP Location: Left arm, Patient Position: Sitting, Cuff Size: Adult)   Pulse 67   Ht 160 cm (63\")   Wt 66.2 kg (146 lb)   SpO2 96%   BMI 25.86 kg/m²     Lab Review:   I have reviewed previous office notes, recent labs and recent cardiac testing.   Lab Results   Component Value Date    GLUCOSE 123 (H) 09/11/2023    CALCIUM 9.8 (H) 09/11/2023     09/11/2023    K " 4.5 09/11/2023    CO2 30.0 (H) 09/11/2023     09/11/2023    BUN 16 09/11/2023    CREATININE 0.59 (L) 09/11/2023    EGFR 90.5 09/11/2023    BCR 27.1 (H) 09/11/2023    ANIONGAP 10.0 09/11/2023       Cath 4/8/2023:  Procedure performed  Left heart cath  Coronary angiography  Right femoral arteriography  Insertion of 7Fr Mynx Perclose closure device with effective hemostasis and preserved lower extremity pulses  Left ventriculography  Supervision of the administration of moderate sedation  Ultrasound guidance to assist access of right femoral artery and to confirm placement of catheter [CPT code 45451]  Moderate sedation administered under supervision of  with monitoring [CPT code 48573]  PTCA of mid to distal left anterior descending coronary artery  PTCA of proximal left anterior descending coronary artery  Placement of 2.5 x 16 mm Synergy drug-eluting stent at high pressure and proximal left anterior descending coronary artery  LV Gram in Normal LVEF 40-45% with what appears to be global hypokinesis with moderate to severe hypokinesis noted in the apical lateral region  Moderate mitral regurgitation      Lab Results   Component Value Date    CHOL 67 05/14/2023    CHLPL 104 (L) 09/09/2016    TRIG 52 05/14/2023    HDL 28 (L) 05/14/2023    LDL 26 05/14/2023       Results for orders placed during the hospital encounter of 05/13/23    Adult Transthoracic Echo Complete W/ Cont if Necessary Per Protocol    Interpretation Summary    Left ventricular systolic function is mildly decreased. Left ventricular ejection fraction appears to be 41 - 45%.    The following segments are hypokinetic: apical anterior, apical septal, apical inferior, apical lateral and apex.    Left ventricular wall thickness is consistent with mild septal asymmetric hypertrophy.    Left ventricular diastolic function is consistent with (grade I) impaired relaxation.    He left atrial cavity is mildly dilated.    There is mild to moderate  thickening of the aortic valve.    . Mild to moderate mitral valve regurgitation is present    Mild pulmonary hypertension is present.        Assessment:          Diagnosis Plan   1. Chronic combined systolic and diastolic congestive heart failure        2. Coronary artery disease involving native coronary artery of native heart without angina pectoris        3. Ischemic cardiomyopathy        4. Non-rheumatic mitral regurgitation        5. PAF (paroxysmal atrial fibrillation)        6. Essential hypertension        7. Mixed hyperlipidemia        8. Left bundle branch block               Plan:       1. Chronic combined CHF- compensated with class 2 symptoms. Continue Entresto, Jardiance and Aldactone with PRN lasix for weight gain. Last dose of lasix was in Sept.   2. CAD- stable. stenting to LAD in 4/2023 with previously placed stents noted to be patent. No clinical signs of ongoing ischemia. Continue Plavix until 4/8/24 and then can stop and remain on Eliquis, statin, BB, and ARB.   3. ICMO- EF dropped to 41-45% following NSTEMI in 4/2023 and remained 41-45% per echo in 5/2023. Continue Entresto, toprol, jardiance, aldactone and PRN lasix.   4. MR- moderate per cath. Grade 1 Murmur present. Will continue to monitor.   5. PAF- stable, NSR per EKG today. New onset during hospitalization in May with reoccurrence during most recent admission on 7/31. Will continue Eliquis to 2.5mg BID and see if he has had recent labs with his PCP. will plan to increase his Eliquis back to 5mg BID if his creatinine is normal. Continue digoxin.  6. HTN- controlled. Continue current therapy.   7. HLD- controlled with LDL of 40 at time of cath. Continue statin.  8. LBBB- chronic.         Follow up with LARRY Serna or Dr. Cosby in 3 months or sooner if symptoms worsen.     I spent 30 minutes caring for Wyatt on this date of service. This time includes time spent by me in the following activities:preparing for the visit, reviewing  tests, obtaining and/or reviewing a separately obtained history, performing a medically appropriate examination and/or evaluation , documenting information in the medical record, independently interpreting results and communicating that information with the patient/family/caregiver and care coordination     I spent 2 minutes on the separately reported service of EKG interpretation. This time is not included in the time used to support the E/M service also reported today.

## 2024-01-05 ENCOUNTER — TELEPHONE (OUTPATIENT)
Dept: CARDIOLOGY | Facility: CLINIC | Age: 89
End: 2024-01-05
Payer: MEDICARE

## 2024-01-05 NOTE — TELEPHONE ENCOUNTER
Patient notified, voiced understanding and agreement to proceed.  Please place order.  Isaias Mcfarland MA

## 2024-01-05 NOTE — TELEPHONE ENCOUNTER
----- Message from LARRY Simon sent at 1/4/2024 11:57 AM CST -----  Can you see if he has had a recent bmp (after sept) with his PCP please?

## 2024-01-05 NOTE — TELEPHONE ENCOUNTER
----- Message from LARRY Simon sent at 1/5/2024 12:54 PM CST -----  Can you let him know based on his weight and kidney function, he needs to be on 5mg of Eliquis BID.   ----- Message -----  From: Ann Marie Mcfarland MA  Sent: 1/5/2024   8:10 AM CST  To: LARRY Simon    Per your request.

## 2024-02-13 NOTE — PROGRESS NOTES
Subjective    Mr. Curry is 89 y.o. male    Chief Complaint: Hx of Bladder Cancer     History of Present Illness  Patient history of bladder cancer not seen since 2020.  He was initially diagnosed back in 1978 by Dr. Hunter.  He had a recurrence in 2007 and underwent induction BCG for CIS.  Last surveillance cystoscopy January 2020 was negative.  Upper tract imaging July 2023 with CT without contrast negative other than mild bladder wall thickening and a bladder diverticulum.  Patient reports gross hematuria, 2 days in duration approximately 3 weeks ago. Last creatinine 0.59.       The following portions of the patient's history were reviewed and updated as appropriate: allergies, current medications, past family history, past medical history, past social history, past surgical history and problem list.    Review of Systems      Current Outpatient Medications:     acetaminophen (TYLENOL) 650 MG 8 hr tablet, Take 1 tablet by mouth 2 (Two) Times a Day. Indications: Pain, Disp: , Rfl:     apixaban (ELIQUIS) 5 MG tablet tablet, Take 1 tablet by mouth Every 12 (Twelve) Hours. Indications: Atrial Fibrillation, Disp: 60 tablet, Rfl: 11    atorvastatin (LIPITOR) 20 MG tablet, Take 1 tablet by mouth every night at bedtime. Indications: Cardiac Failure, High Amount of Fats in the Blood, Disp: , Rfl:     busPIRone (BUSPAR) 10 MG tablet, Take 1 tablet by mouth 2 (Two) Times a Day. Indications: Anxiety Disorder, Disp: , Rfl:     clopidogrel (PLAVIX) 75 MG tablet, Take 1 tablet by mouth Daily. Indications: Carotid Artery Stenting, Disp: , Rfl:     dilTIAZem CD (CARDIZEM CD) 240 MG 24 hr capsule, Take 1 capsule by mouth Daily., Disp: , Rfl:     empagliflozin (JARDIANCE) 10 MG tablet tablet, Take 1 tablet by mouth Daily. Indications: Cardiac Failure, Disp: , Rfl:     lansoprazole (PREVACID) 30 MG capsule, Take 1 capsule by mouth Daily. Indications: Heartburn, Disp: , Rfl:     metoprolol succinate XL (TOPROL-XL) 50 MG 24 hr  tablet, Take 1 tablet by mouth Every Night. Indications: Atrial Fibrillation, Disp: 30 tablet, Rfl: 11    nitroglycerin (NITROSTAT) 0.4 MG SL tablet, Place 1 tablet under the tongue Every 5 (Five) Minutes As Needed. Indications: Acute Angina Pectoris, Disp: , Rfl:     ondansetron ODT (ZOFRAN-ODT) 4 MG disintegrating tablet, Place 1 tablet on the tongue Every 6 (Six) Hours As Needed for Nausea., Disp: 40 tablet, Rfl: 0    potassium chloride (K-DUR,KLOR-CON) 10 MEQ CR tablet, Take 2 tablets by mouth Daily As Needed (for swelling when taking Lasix)., Disp: , Rfl:     saccharomyces boulardii (FLORASTOR) 250 MG capsule, Take 1 capsule by mouth 2 (Two) Times a Day., Disp: 30 capsule, Rfl: 2    sacubitril-valsartan (Entresto) 49-51 MG tablet, Take 1 tablet by mouth 2 (Two) Times a Day., Disp: 60 tablet, Rfl: 11    vitamin B-12 (CYANOCOBALAMIN) 1000 MCG tablet, Take 1 tablet by mouth Daily. Indications: Inadequate Vitamin B12, Disp: , Rfl:     Vitron-C  MG tablet, Take 1 tablet by mouth Daily. 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    COPD (chronic obstructive pulmonary disease) 2023    GERD (gastroesophageal reflux disease)     Hyperlipidemia     Hypertension     Irregular heart beat     Lung nodules        Past Surgical History:   Procedure Laterality Date    BLADDER SURGERY      CARDIAC CATHETERIZATION      CARDIAC CATHETERIZATION Left 4/8/2023    Procedure: Cardiac Catheterization/Vascular Study;  Surgeon: Sukhi Hartley MD;  Location:  [facility];  Service: Cardiology;  Laterality: Left;    COLONOSCOPY      CORONARY ANGIOPLASTY WITH STENT PLACEMENT  2006    STENT X 2     CYSTOSCOPY      ENDOSCOPY      EXCISION LESION N/A 2023    Procedure: EXCISION LESION OF RIGHT TEMPLE AND LEFT TEMPLE WITH FROZEN SECTION WITH POSSIBLE FLAP OR GRAFT;  Surgeon: Brijesh Nickerson MD;  Location:  [facility] OR;  Service: ENT;  Laterality: N/A;    FLAP HEAD/NECK Left 2020    Procedure: POSSIBLE FLAP;  Surgeon: Brijesh Nickerson MD;  Location:  [facility] OR;  Service: ENT;  Laterality: Left;    HEAD/NECK LESION/CYST EXCISION Left 2020    Procedure: 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 with frozen section and possible flap or graft;  Surgeon: Brijesh Nickerson MD;  Location:  [facility] OR;  Service: ENT;  Laterality: Left;    HERNIA REPAIR      SHOULDER SURGERY      SKIN BIOPSY      lip biopsy    SKIN FULL THICKNESS GRAFT Left 2020    Procedure: OR GRAFT;  Surgeon: Brijesh Nickerson MD;  Location:  [facility] OR;  Service: ENT;  Laterality: Left;    TRANSURETHRAL RESECTION OF BLADDER TUMOR         Social History     Socioeconomic History    Marital status:    Tobacco Use    Smoking status: Former     Packs/day: 1.00     Years: 30.00     Additional pack years: 0.00     Total pack years: 30.00     Types: Cigarettes     Start date:      Quit date:      Years since quittin.1     Passive exposure: Past    Smokeless tobacco: Never   Vaping Use    Vaping Use: Never used   Substance and Sexual Activity    Alcohol use: Not Currently     Alcohol/week: 1.0 standard drink of alcohol     Types: 1 Glasses of wine per week     Comment: occ wine    Drug use: Never    Sexual activity: Defer       Family History   Problem Relation Age of Onset    Tuberculosis Mother     Heart disease Father     Heart attack Father     Diabetes Sister     Hypertension Sister     Hyperlipidemia Sister     Heart disease Sister     Alcohol abuse Sister     Heart disease Brother     Heart attack Brother     Heart disease Brother        Objective    Temp 97.4 °F (36.3 °C)    "Ht 160 cm (63\")   Wt 69 kg (152 lb 3.2 oz)   BMI 26.96 kg/m²     Physical Exam    CT independent review  The CT scan of the abdomen/pelvis done without contrast is available for me to review.  Treatment recommendations require an independent review.  First I scanned the liver, spleen, and bowel pattern.  The retroperitoneum including the major vessels and lymphatic packages are briefly reviewed.  This film has been reviewed by the radiologist to determine any non-urologic abnormalities that are present.  The kidneys are closely inspected for size, symmetry, contour, parenchymal thickness, perinephric reaction, presence of calcifications, and intrarenal dilation of the collecting system.  The ureters are inspected for their course, caliber, and any calcifications.  The bladder is inspected for its thickness, size, and presence of any calcifications.  This scan shows:    The right kidney appears normal on this non-contrasted CT scan.  The renal parenchymal is normal in thickness.  There are no solid masses or cysts.  There is no hydronephrosis.  There are no stones.      The left kidney appears normal on this non-contrasted CT scan.  The renal parenchymal is normal in thickness.  There are no solid masses or cysts.  There is no hydronephrosis.  There are no stones.      The bladder appears thickened bladder wall with bladder diverticulum.     Results for orders placed or performed in visit on 02/19/24   POC Urinalysis Dipstick, Multipro    Specimen: Urine   Result Value Ref Range    Color Yellow Yellow, Straw, Dark Yellow, Leslie    Clarity, UA Cloudy (A) Clear    Glucose, UA >=1000 mg/dL (3+) (A) Negative mg/dL    Bilirubin Negative Negative    Ketones, UA Negative Negative    Specific Gravity  1.015 1.005 - 1.030    Blood, UA Moderate (A) Negative    pH, Urine 7.0 5.0 - 8.0    Protein, POC Negative Negative mg/dL    Urobilinogen, UA 0.2 E.U./dL Normal, 0.2 E.U./dL    Nitrite, UA Negative Negative    Leukocytes " Small (1+) (A) Negative   Microscopic Urinalysis  I inspected the urine myself based on the clinical situation including the dipstick urine. The urine is spun in a centrifuge for three minutes. The spun urine shows 20-50 rbc/hpf, 3-6 wbc/hpf, none epi/hpf, negative bacteria, negative crystals, and negative casts.     Assessment and Plan    Diagnoses and all orders for this visit:    1. History of bladder cancer (Primary)  -     POC Urinalysis Dipstick, Multipro  -     Non-gynecologic Cytology    2. Hematuria, gross  -     CT Abdomen Pelvis With & Without Contrast; Future  -     Cystoscopy; Future  -     Non-gynecologic Cytology        Reviewed old records summarized in HPI.  Patient last cystoscopy 2020.  He did report recent gross hematuria which resolved on its own.  Will plan for repeat CT scan as well as cystoscopy to fully evaluate we will send today's urine for cytology.      This represents a new diagnosis of uncertain prognosis.

## 2024-02-19 ENCOUNTER — OFFICE VISIT (OUTPATIENT)
Dept: UROLOGY | Facility: CLINIC | Age: 89
End: 2024-02-19
Payer: MEDICARE

## 2024-02-19 VITALS — HEIGHT: 63 IN | TEMPERATURE: 97.4 F | WEIGHT: 152.2 LBS | BODY MASS INDEX: 26.97 KG/M2

## 2024-02-19 DIAGNOSIS — Z85.51 HISTORY OF BLADDER CANCER: Primary | ICD-10-CM

## 2024-02-19 DIAGNOSIS — R31.0 HEMATURIA, GROSS: ICD-10-CM

## 2024-02-19 LAB
BILIRUB BLD-MCNC: NEGATIVE MG/DL
CLARITY, POC: ABNORMAL
COLOR UR: YELLOW
GLUCOSE UR STRIP-MCNC: ABNORMAL MG/DL
KETONES UR QL: NEGATIVE
LEUKOCYTE EST, POC: ABNORMAL
NITRITE UR-MCNC: NEGATIVE MG/ML
PH UR: 7 [PH] (ref 5–8)
PROT UR STRIP-MCNC: NEGATIVE MG/DL
RBC # UR STRIP: ABNORMAL /UL
SP GR UR: 1.01 (ref 1–1.03)
UROBILINOGEN UR QL: ABNORMAL

## 2024-03-05 ENCOUNTER — HOSPITAL ENCOUNTER (OUTPATIENT)
Dept: CT IMAGING | Facility: HOSPITAL | Age: 89
Discharge: HOME | End: 2024-03-05
Admitting: UROLOGY
Payer: MEDICARE

## 2024-03-05 DIAGNOSIS — R31.0 HEMATURIA, GROSS: ICD-10-CM

## 2024-03-05 LAB — CREAT BLDA-MCNC: 0.8 MG/DL (ref 0.6–1.3)

## 2024-03-05 PROCEDURE — 74178 CT ABD&PLV WO CNTR FLWD CNTR: CPT

## 2024-03-05 PROCEDURE — 25510000001 IOPAMIDOL 61 % SOLUTION: Performed by: UROLOGY

## 2024-03-05 PROCEDURE — 82565 ASSAY OF CREATININE: CPT

## 2024-03-05 RX ADMIN — IOPAMIDOL 100 ML: 612 INJECTION, SOLUTION INTRAVENOUS at 13:10

## 2024-03-08 ENCOUNTER — PROCEDURE VISIT (OUTPATIENT)
Dept: UROLOGY | Facility: CLINIC | Age: 89
End: 2024-03-08
Payer: MEDICARE

## 2024-03-08 DIAGNOSIS — Z85.51 HISTORY OF BLADDER CANCER: Primary | ICD-10-CM

## 2024-03-08 LAB
BILIRUB BLD-MCNC: NEGATIVE MG/DL
CLARITY, POC: CLEAR
COLOR UR: YELLOW
GLUCOSE UR STRIP-MCNC: ABNORMAL MG/DL
KETONES UR QL: NEGATIVE
LEUKOCYTE EST, POC: ABNORMAL
NITRITE UR-MCNC: NEGATIVE MG/ML
PH UR: 7 [PH] (ref 5–8)
PROT UR STRIP-MCNC: NEGATIVE MG/DL
RBC # UR STRIP: ABNORMAL /UL
SP GR UR: 1.01 (ref 1–1.03)
UROBILINOGEN UR QL: NORMAL

## 2024-03-08 NOTE — PROGRESS NOTES
Pre- operative diagnosis:  Bladder cancer surveillance and Hematuria    Post operative diagnosis:  No recurrent bladder tumor    Procedure:  The patient was prepped and draped in a normal sterile fashion.  The urethra was anesthetized with 2% lidocaine jelly.  A flexible cystoscope was introduced per urethra.      Urethra:  Normal    Bladder:  heavy trabeculation, cellules, and diverticuli    Ureteral orifices:  Normal position bilaterally    Prostate:  normal    Patient tolerated the procedure well    Complications: none    Blood loss: minimal    Follow up:    Routine follow up

## 2024-04-23 ENCOUNTER — OFFICE VISIT (OUTPATIENT)
Dept: CARDIOLOGY | Facility: CLINIC | Age: 89
End: 2024-04-23
Payer: MEDICARE

## 2024-04-23 VITALS
OXYGEN SATURATION: 97 % | HEART RATE: 67 BPM | HEIGHT: 63 IN | SYSTOLIC BLOOD PRESSURE: 134 MMHG | WEIGHT: 156 LBS | BODY MASS INDEX: 27.64 KG/M2 | DIASTOLIC BLOOD PRESSURE: 78 MMHG

## 2024-04-23 DIAGNOSIS — I50.42 CHRONIC COMBINED SYSTOLIC AND DIASTOLIC CONGESTIVE HEART FAILURE: Primary | ICD-10-CM

## 2024-04-23 DIAGNOSIS — I25.10 CORONARY ARTERY DISEASE INVOLVING NATIVE CORONARY ARTERY OF NATIVE HEART WITHOUT ANGINA PECTORIS: ICD-10-CM

## 2024-04-23 DIAGNOSIS — E78.2 MIXED HYPERLIPIDEMIA: ICD-10-CM

## 2024-04-23 DIAGNOSIS — I10 ESSENTIAL HYPERTENSION: ICD-10-CM

## 2024-04-23 DIAGNOSIS — I34.0 NON-RHEUMATIC MITRAL REGURGITATION: ICD-10-CM

## 2024-04-23 DIAGNOSIS — I44.7 LEFT BUNDLE BRANCH BLOCK: ICD-10-CM

## 2024-04-23 DIAGNOSIS — I48.0 PAF (PAROXYSMAL ATRIAL FIBRILLATION): ICD-10-CM

## 2024-04-23 PROCEDURE — 1159F MED LIST DOCD IN RCRD: CPT | Performed by: NURSE PRACTITIONER

## 2024-04-23 PROCEDURE — 1160F RVW MEDS BY RX/DR IN RCRD: CPT | Performed by: NURSE PRACTITIONER

## 2024-04-23 PROCEDURE — 99214 OFFICE O/P EST MOD 30 MIN: CPT | Performed by: NURSE PRACTITIONER

## 2024-04-23 PROCEDURE — 93000 ELECTROCARDIOGRAM COMPLETE: CPT | Performed by: NURSE PRACTITIONER

## 2024-04-23 RX ORDER — FUROSEMIDE 40 MG/1
40 TABLET ORAL AS NEEDED
COMMUNITY

## 2024-04-23 RX ORDER — SPIRONOLACTONE 25 MG/1
25 TABLET ORAL DAILY
COMMUNITY

## 2024-04-23 NOTE — PROGRESS NOTES
Subjective:     Encounter Date:04/23/2024      Patient ID: Wyatt Curry is a 89 y.o. male.    Chief Complaint: transfer care to Dr. Cosby for CAD, CHF, PAF, MR     History of Present Illness    The patient presents to follow-up regarding his coronary artery disease, mitral regurgitation, chronic systolic CHF and paroxysmal atrial fibrillation.  He previously followed with Dr. Moseley and seeks to transfer his care to Dr. Cosby after Dr. Moseley's jail.    Notes indicate he had 2 stents placed in approximately 2006.  He underwent PCI to the LAD for a non-STEMI in April 2023 after holding antiplatelet therapy prior to surgery on his scalp.  LVEF around that time was noted to be 40 to 45%.  He has a chronic left bundle branch block.  He was diagnosed with atrial fibrillation and had acute CHF exacerbation May 2023 during a hospitalization for pneumonia.  Notes indicate he initially refused anticoagulation due to issues with hematuria.  He was seen frequently later that year for optimization of medical therapy for CHF.  In August he was treated inpatient for recurrent C. difficile and developed A-fib, RVR and was started on Eliquis and digoxin.    He saw LARRY Garcia in clinic in January.  He was doing well.  He was not requiring his as needed Lasix.  He was not having any chest pain or shortness of breath.  Bell indicated he should continue Eliquis 5 mg twice daily after confirming his normal renal function.  She noted Plavix could be discontinued April 2024.    Today the patient presents for follow-up and is doing well.  He states his systolic blood pressures running in the 120s to 130s.  He denies any chest pain, rapid weight gain, orthopnea, PND, edema, shortness of breath, syncope or presyncope.  He admits he urinates frequently.  He is denying any falls or bleeding issues.  He takes Lasix with potassium on an as-needed basis and requires this very rarely.  He states his weight stays around 152  pounds on his home scale.    The following portions of the patient's history were reviewed and updated as appropriate: allergies, current medications, past family history, past medical history, past social history, past surgical history and problem list.    Review of Systems   Constitutional: Negative for malaise/fatigue.   Cardiovascular:  Negative for chest pain, claudication, dyspnea on exertion, leg swelling, near-syncope, orthopnea, palpitations, paroxysmal nocturnal dyspnea and syncope.   Respiratory:  Negative for cough and shortness of breath.    Hematologic/Lymphatic: Does not bruise/bleed easily.   Musculoskeletal:  Negative for falls.   Gastrointestinal:  Negative for bloating.   Neurological:  Negative for dizziness, light-headedness and weakness.       Allergies   Allergen Reactions    Pregabalin Other (See Comments)     Blurred vision only       Current Outpatient Medications:     ACETAMINOPHEN ER PO, Take 500 mg by mouth As Needed. Indications: Pain, Disp: , Rfl:     apixaban (ELIQUIS) 5 MG tablet tablet, Take 1 tablet by mouth Every 12 (Twelve) Hours. Indications: Atrial Fibrillation, Disp: 60 tablet, Rfl: 11    atorvastatin (LIPITOR) 20 MG tablet, Take 1 tablet by mouth every night at bedtime. Indications: Cardiac Failure, High Amount of Fats in the Blood, Disp: , Rfl:     busPIRone (BUSPAR) 10 MG tablet, Take 1 tablet by mouth 2 (Two) Times a Day. Indications: Anxiety Disorder, Disp: , Rfl:     digoxin (LANOXIN) 125 MCG tablet, Take 1 tablet by mouth Daily for 90 days., Disp: 90 tablet, Rfl: 0    empagliflozin (JARDIANCE) 10 MG tablet tablet, Take 1 tablet by mouth Daily. Indications: Cardiac Failure, Disp: , Rfl:     lansoprazole (PREVACID) 30 MG capsule, Take 1 capsule by mouth Daily. Indications: Heartburn, Disp: , Rfl:     metoprolol succinate XL (TOPROL-XL) 50 MG 24 hr tablet, Take 1 tablet by mouth Every Night. Indications: Atrial Fibrillation, Disp: 30 tablet, Rfl: 11    nitroglycerin  "(NITROSTAT) 0.4 MG SL tablet, Place 1 tablet under the tongue Every 5 (Five) Minutes As Needed. Indications: Acute Angina Pectoris, Disp: , Rfl:     potassium chloride (K-DUR,KLOR-CON) 10 MEQ CR tablet, Take 2 tablets by mouth Daily As Needed (for swelling when taking Lasix). Indications: Low Amount of Potassium in the Blood, Disp: , Rfl:     saccharomyces boulardii (FLORASTOR) 250 MG capsule, Take 1 capsule by mouth 2 (Two) Times a Day., Disp: 30 capsule, Rfl: 2    sacubitril-valsartan (Entresto) 49-51 MG tablet, Take 1 tablet by mouth 2 (Two) Times a Day., Disp: 60 tablet, Rfl: 11    spironolactone (ALDACTONE) 25 MG tablet, Take 1 tablet by mouth Daily., Disp: , Rfl:     vitamin B-12 (CYANOCOBALAMIN) 1000 MCG tablet, Take 1 tablet by mouth Daily. Indications: Inadequate Vitamin B12, Disp: , Rfl:     Vitron-C  MG tablet, Take 1 tablet by mouth Daily. Indications: Excess or Deficiency of Vitamin C, Disp: , Rfl:     furosemide (LASIX) 40 MG tablet, Take 1 tablet by mouth As Needed., Disp: , Rfl:          Objective:    /78   Pulse 67   Ht 160 cm (63\")   Wt 70.8 kg (156 lb)   SpO2 97%   BMI 27.63 kg/m²        Vitals and nursing note reviewed.   Constitutional:       General: Not in acute distress.     Appearance: Well-developed and not in distress. Not diaphoretic.   Neck:      Vascular: No JVD.   Pulmonary:      Effort: Pulmonary effort is normal. No respiratory distress.      Breath sounds: Normal breath sounds.   Cardiovascular:      Normal rate. Regular rhythm.      Murmurs: There is no murmur.   Edema:     Peripheral edema absent.   Abdominal:      Tenderness: There is no abdominal tenderness.   Skin:     General: Skin is warm and dry.   Neurological:      Mental Status: Alert and oriented to person, place, and time.         Lab Review:   Lab Results   Component Value Date    GLUCOSE 123 (H) 09/11/2023    BUN 16 09/11/2023    CREATININE 0.80 03/05/2024    EGFR 90.5 09/11/2023    BCR 27.1 (H) " 09/11/2023    K 4.5 09/11/2023    CO2 30.0 (H) 09/11/2023    CALCIUM 9.8 (H) 09/11/2023    ALBUMIN 3.8 07/31/2023    BILITOT 0.7 07/31/2023    AST 25 07/31/2023    ALT 8 07/31/2023        Lab Results   Component Value Date    CHOL 67 05/14/2023    CHLPL 104 (L) 09/09/2016    TRIG 52 05/14/2023    HDL 28 (L) 05/14/2023    LDL 26 05/14/2023        Lab Results   Component Value Date    HGBA1C 5.50 05/14/2023        Lab Results   Component Value Date    WBC 7.81 08/04/2023    HGB 9.9 (L) 08/04/2023    HCT 32.8 (L) 08/04/2023    MCV 94.0 08/04/2023     08/04/2023              ECG 12 Lead    Date/Time: 4/23/2024 3:43 PM  Performed by: Ann Mcneal APRN    Authorized by: Ann Mcneal APRN  Comparison: compared with previous ECG from 1/4/2024  Similar to previous ECG  Rhythm: sinus rhythm  Ectopy: atrial premature contractions  BPM: 78  Conduction: left bundle branch block    Clinical impression: abnormal EKG          Results for orders placed during the hospital encounter of 05/13/23    Adult Transthoracic Echo Complete W/ Cont if Necessary Per Protocol    Interpretation Summary    Left ventricular systolic function is mildly decreased. Left ventricular ejection fraction appears to be 41 - 45%.    The following segments are hypokinetic: apical anterior, apical septal, apical inferior, apical lateral and apex.    Left ventricular wall thickness is consistent with mild septal asymmetric hypertrophy.    Left ventricular diastolic function is consistent with (grade I) impaired relaxation.    He left atrial cavity is mildly dilated.    There is mild to moderate thickening of the aortic valve.    . Mild to moderate mitral valve regurgitation is present    Mild pulmonary hypertension is present.      Assessment:      Problem List Items Addressed This Visit          Cardiac and Vasculature    Coronary artery disease involving native coronary artery of native heart without angina pectoris    Left bundle branch block     Essential hypertension    Relevant Medications    spironolactone (ALDACTONE) 25 MG tablet    furosemide (LASIX) 40 MG tablet    Mixed hyperlipidemia    Non-rheumatic mitral regurgitation    PAF (paroxysmal atrial fibrillation)    Chronic combined systolic and diastolic congestive heart failure - Primary       Plan:     1.  Coronary artery disease: Established problem, stable.  No angina.  As he is now 1 year out from PCI, discontinue Plavix.  No aspirin at this time given his need for anticoagulation.  If anticoagulation is ever held for surgeries or procedures, he will need to take aspirin 81 mg daily in its place.  Continue statin, beta-blocker.    2.  Chronic systolic CHF: Compensated/euvolemic.  Continue current doses of Jardiance, Aldactone, Entresto, Toprol-XL.  He has not been requiring his as needed Lasix.    3.  Paroxysmal atrial fibrillation: Maintaining normal sinus rhythm.  Continue Eliquis for stroke prophylaxis.  Continue beta-blocker.    Follow-up in 3 months, call sooner with new or worsening symptoms    I spent 33 minutes caring for Wyatt on this date of service. This time includes time spent by me in the following activities: preparing for the visit, reviewing tests, obtaining and/or reviewing a separately obtained history, performing a medically appropriate examination and/or evaluation, counseling and educating the patient/family/caregiver, and documenting information in the medical record

## 2024-05-20 ENCOUNTER — APPOINTMENT (OUTPATIENT)
Dept: GENERAL RADIOLOGY | Facility: HOSPITAL | Age: 89
End: 2024-05-20
Payer: MEDICARE

## 2024-05-20 ENCOUNTER — HOSPITAL ENCOUNTER (INPATIENT)
Facility: HOSPITAL | Age: 89
LOS: 2 days | Discharge: HOME | End: 2024-05-22
Attending: EMERGENCY MEDICINE | Admitting: FAMILY MEDICINE
Payer: MEDICARE

## 2024-05-20 DIAGNOSIS — I48.91 ATRIAL FIBRILLATION WITH RVR: ICD-10-CM

## 2024-05-20 DIAGNOSIS — R79.89 TROPONIN I ABOVE REFERENCE RANGE: ICD-10-CM

## 2024-05-20 DIAGNOSIS — R07.9 CHEST PAIN, UNSPECIFIED TYPE: Primary | ICD-10-CM

## 2024-05-20 LAB
ALBUMIN SERPL-MCNC: 4 G/DL (ref 3.5–5.2)
ALBUMIN/GLOB SERPL: 1.2 G/DL
ALP SERPL-CCNC: 61 U/L (ref 39–117)
ALT SERPL W P-5'-P-CCNC: 13 U/L (ref 1–41)
ANION GAP SERPL CALCULATED.3IONS-SCNC: 12 MMOL/L (ref 5–15)
AST SERPL-CCNC: 22 U/L (ref 1–40)
BASOPHILS # BLD AUTO: 0.04 10*3/MM3 (ref 0–0.2)
BASOPHILS NFR BLD AUTO: 0.4 % (ref 0–1.5)
BILIRUB SERPL-MCNC: 0.4 MG/DL (ref 0–1.2)
BUN SERPL-MCNC: 28 MG/DL (ref 8–23)
BUN/CREAT SERPL: 25.5 (ref 7–25)
CALCIUM SPEC-SCNC: 9.5 MG/DL (ref 8.2–9.6)
CHLORIDE SERPL-SCNC: 104 MMOL/L (ref 98–107)
CO2 SERPL-SCNC: 25 MMOL/L (ref 22–29)
CREAT SERPL-MCNC: 1.1 MG/DL (ref 0.76–1.27)
DEPRECATED RDW RBC AUTO: 47.9 FL (ref 37–54)
DIGOXIN SERPL-MCNC: <0.3 NG/ML (ref 0.6–1.2)
EGFRCR SERPLBLD CKD-EPI 2021: 62.2 ML/MIN/1.73
EOSINOPHIL # BLD AUTO: 0.08 10*3/MM3 (ref 0–0.4)
EOSINOPHIL NFR BLD AUTO: 0.8 % (ref 0.3–6.2)
ERYTHROCYTE [DISTWIDTH] IN BLOOD BY AUTOMATED COUNT: 14.1 % (ref 12.3–15.4)
GEN 5 2HR TROPONIN T REFLEX: 77 NG/L
GLOBULIN UR ELPH-MCNC: 3.3 GM/DL
GLUCOSE SERPL-MCNC: 120 MG/DL (ref 65–99)
HCT VFR BLD AUTO: 43.7 % (ref 37.5–51)
HGB BLD-MCNC: 14 G/DL (ref 13–17.7)
HOLD SPECIMEN: NORMAL
IMM GRANULOCYTES # BLD AUTO: 0.02 10*3/MM3 (ref 0–0.05)
IMM GRANULOCYTES NFR BLD AUTO: 0.2 % (ref 0–0.5)
LYMPHOCYTES # BLD AUTO: 2.21 10*3/MM3 (ref 0.7–3.1)
LYMPHOCYTES NFR BLD AUTO: 23.5 % (ref 19.6–45.3)
MCH RBC QN AUTO: 29.7 PG (ref 26.6–33)
MCHC RBC AUTO-ENTMCNC: 32 G/DL (ref 31.5–35.7)
MCV RBC AUTO: 92.6 FL (ref 79–97)
MONOCYTES # BLD AUTO: 1.1 10*3/MM3 (ref 0.1–0.9)
MONOCYTES NFR BLD AUTO: 11.7 % (ref 5–12)
NEUTROPHILS NFR BLD AUTO: 5.97 10*3/MM3 (ref 1.7–7)
NEUTROPHILS NFR BLD AUTO: 63.4 % (ref 42.7–76)
NRBC BLD AUTO-RTO: 0 /100 WBC (ref 0–0.2)
PLATELET # BLD AUTO: 162 10*3/MM3 (ref 140–450)
PMV BLD AUTO: 10.5 FL (ref 6–12)
POTASSIUM SERPL-SCNC: 4.5 MMOL/L (ref 3.5–5.2)
PROT SERPL-MCNC: 7.3 G/DL (ref 6–8.5)
QT INTERVAL: 350 MS
QT INTERVAL: 370 MS
QTC INTERVAL: 503 MS
QTC INTERVAL: 530 MS
RBC # BLD AUTO: 4.72 10*6/MM3 (ref 4.14–5.8)
SODIUM SERPL-SCNC: 141 MMOL/L (ref 136–145)
TROPONIN T DELTA: 16 NG/L
TROPONIN T SERPL HS-MCNC: 61 NG/L
WBC NRBC COR # BLD AUTO: 9.42 10*3/MM3 (ref 3.4–10.8)
WHOLE BLOOD HOLD COAG: NORMAL
WHOLE BLOOD HOLD SPECIMEN: NORMAL

## 2024-05-20 PROCEDURE — 36415 COLL VENOUS BLD VENIPUNCTURE: CPT

## 2024-05-20 PROCEDURE — 93005 ELECTROCARDIOGRAM TRACING: CPT | Performed by: EMERGENCY MEDICINE

## 2024-05-20 PROCEDURE — 99285 EMERGENCY DEPT VISIT HI MDM: CPT

## 2024-05-20 PROCEDURE — 71045 X-RAY EXAM CHEST 1 VIEW: CPT

## 2024-05-20 PROCEDURE — 25010000002 AMIODARONE IN DEXTROSE 5% 150-4.21 MG/100ML-% SOLUTION: Performed by: EMERGENCY MEDICINE

## 2024-05-20 PROCEDURE — 25010000002 MAGNESIUM SULFATE 2 GM/50ML SOLUTION: Performed by: EMERGENCY MEDICINE

## 2024-05-20 PROCEDURE — 84484 ASSAY OF TROPONIN QUANT: CPT | Performed by: EMERGENCY MEDICINE

## 2024-05-20 PROCEDURE — 85025 COMPLETE CBC W/AUTO DIFF WBC: CPT | Performed by: EMERGENCY MEDICINE

## 2024-05-20 PROCEDURE — 80162 ASSAY OF DIGOXIN TOTAL: CPT | Performed by: EMERGENCY MEDICINE

## 2024-05-20 PROCEDURE — 25010000002 ONDANSETRON PER 1 MG: Performed by: EMERGENCY MEDICINE

## 2024-05-20 PROCEDURE — 80053 COMPREHEN METABOLIC PANEL: CPT | Performed by: EMERGENCY MEDICINE

## 2024-05-20 RX ORDER — BISACODYL 10 MG
10 SUPPOSITORY, RECTAL RECTAL DAILY PRN
Status: DISCONTINUED | OUTPATIENT
Start: 2024-05-20 | End: 2024-05-22 | Stop reason: HOSPADM

## 2024-05-20 RX ORDER — BUSPIRONE HYDROCHLORIDE 10 MG/1
10 TABLET ORAL 2 TIMES DAILY
Status: DISCONTINUED | OUTPATIENT
Start: 2024-05-20 | End: 2024-05-22 | Stop reason: HOSPADM

## 2024-05-20 RX ORDER — METOPROLOL SUCCINATE 50 MG/1
50 TABLET, EXTENDED RELEASE ORAL NIGHTLY
Status: DISCONTINUED | OUTPATIENT
Start: 2024-05-20 | End: 2024-05-22 | Stop reason: HOSPADM

## 2024-05-20 RX ORDER — SODIUM CHLORIDE 9 MG/ML
40 INJECTION, SOLUTION INTRAVENOUS AS NEEDED
Status: DISCONTINUED | OUTPATIENT
Start: 2024-05-20 | End: 2024-05-22 | Stop reason: HOSPADM

## 2024-05-20 RX ORDER — MAGNESIUM SULFATE HEPTAHYDRATE 40 MG/ML
2 INJECTION, SOLUTION INTRAVENOUS ONCE
Status: COMPLETED | OUTPATIENT
Start: 2024-05-20 | End: 2024-05-20

## 2024-05-20 RX ORDER — BISACODYL 5 MG/1
5 TABLET, DELAYED RELEASE ORAL DAILY PRN
Status: DISCONTINUED | OUTPATIENT
Start: 2024-05-20 | End: 2024-05-22 | Stop reason: HOSPADM

## 2024-05-20 RX ORDER — DILTIAZEM HCL/D5W 125 MG/125
5-15 PLASTIC BAG, INJECTION (ML) INTRAVENOUS
Status: DISCONTINUED | OUTPATIENT
Start: 2024-05-20 | End: 2024-05-20

## 2024-05-20 RX ORDER — AMOXICILLIN 250 MG
2 CAPSULE ORAL 2 TIMES DAILY PRN
Status: DISCONTINUED | OUTPATIENT
Start: 2024-05-20 | End: 2024-05-22 | Stop reason: HOSPADM

## 2024-05-20 RX ORDER — PANTOPRAZOLE SODIUM 40 MG/1
40 TABLET, DELAYED RELEASE ORAL
Status: DISCONTINUED | OUTPATIENT
Start: 2024-05-21 | End: 2024-05-22 | Stop reason: HOSPADM

## 2024-05-20 RX ORDER — ATORVASTATIN CALCIUM 10 MG/1
20 TABLET, FILM COATED ORAL NIGHTLY
Status: DISCONTINUED | OUTPATIENT
Start: 2024-05-20 | End: 2024-05-22 | Stop reason: HOSPADM

## 2024-05-20 RX ORDER — DIGOXIN 125 MCG
125 TABLET ORAL
Status: DISCONTINUED | OUTPATIENT
Start: 2024-05-21 | End: 2024-05-21

## 2024-05-20 RX ORDER — ONDANSETRON 2 MG/ML
4 INJECTION INTRAMUSCULAR; INTRAVENOUS EVERY 6 HOURS PRN
Status: DISCONTINUED | OUTPATIENT
Start: 2024-05-20 | End: 2024-05-22 | Stop reason: HOSPADM

## 2024-05-20 RX ORDER — ACETAMINOPHEN 325 MG/1
650 TABLET ORAL EVERY 4 HOURS PRN
Status: DISCONTINUED | OUTPATIENT
Start: 2024-05-20 | End: 2024-05-22 | Stop reason: HOSPADM

## 2024-05-20 RX ORDER — SODIUM CHLORIDE 0.9 % (FLUSH) 0.9 %
10 SYRINGE (ML) INJECTION EVERY 12 HOURS SCHEDULED
Status: DISCONTINUED | OUTPATIENT
Start: 2024-05-20 | End: 2024-05-22 | Stop reason: HOSPADM

## 2024-05-20 RX ORDER — NITROGLYCERIN 0.4 MG/1
0.4 TABLET SUBLINGUAL
Status: DISCONTINUED | OUTPATIENT
Start: 2024-05-20 | End: 2024-05-22 | Stop reason: HOSPADM

## 2024-05-20 RX ORDER — SODIUM CHLORIDE 0.9 % (FLUSH) 0.9 %
10 SYRINGE (ML) INJECTION AS NEEDED
Status: DISCONTINUED | OUTPATIENT
Start: 2024-05-20 | End: 2024-05-21

## 2024-05-20 RX ORDER — SODIUM CHLORIDE 0.9 % (FLUSH) 0.9 %
10 SYRINGE (ML) INJECTION AS NEEDED
Status: DISCONTINUED | OUTPATIENT
Start: 2024-05-20 | End: 2024-05-22 | Stop reason: HOSPADM

## 2024-05-20 RX ORDER — POLYETHYLENE GLYCOL 3350 17 G/17G
17 POWDER, FOR SOLUTION ORAL DAILY PRN
Status: DISCONTINUED | OUTPATIENT
Start: 2024-05-20 | End: 2024-05-22 | Stop reason: HOSPADM

## 2024-05-20 RX ORDER — SPIRONOLACTONE 25 MG/1
25 TABLET ORAL DAILY
Status: DISCONTINUED | OUTPATIENT
Start: 2024-05-21 | End: 2024-05-22 | Stop reason: HOSPADM

## 2024-05-20 RX ORDER — ASPIRIN 81 MG/1
324 TABLET, CHEWABLE ORAL ONCE
Status: COMPLETED | OUTPATIENT
Start: 2024-05-20 | End: 2024-05-20

## 2024-05-20 RX ORDER — ONDANSETRON 2 MG/ML
4 INJECTION INTRAMUSCULAR; INTRAVENOUS ONCE
Status: COMPLETED | OUTPATIENT
Start: 2024-05-20 | End: 2024-05-20

## 2024-05-20 RX ADMIN — ATORVASTATIN CALCIUM 20 MG: 10 TABLET, FILM COATED ORAL at 21:57

## 2024-05-20 RX ADMIN — BUSPIRONE HYDROCHLORIDE 10 MG: 10 TABLET ORAL at 21:58

## 2024-05-20 RX ADMIN — ACETAMINOPHEN 650 MG: 325 TABLET, FILM COATED ORAL at 22:01

## 2024-05-20 RX ADMIN — AMIODARONE HYDROCHLORIDE 150 MG: 1.5 INJECTION, SOLUTION INTRAVENOUS at 18:03

## 2024-05-20 RX ADMIN — Medication 10 ML: at 21:58

## 2024-05-20 RX ADMIN — ASPIRIN 324 MG: 81 TABLET, CHEWABLE ORAL at 17:34

## 2024-05-20 RX ADMIN — ONDANSETRON 4 MG: 2 INJECTION INTRAMUSCULAR; INTRAVENOUS at 17:35

## 2024-05-20 RX ADMIN — APIXABAN 5 MG: 5 TABLET, FILM COATED ORAL at 21:57

## 2024-05-20 RX ADMIN — MAGNESIUM SULFATE HEPTAHYDRATE 2 G: 2 INJECTION, SOLUTION INTRAVENOUS at 17:50

## 2024-05-20 NOTE — ED PROVIDER NOTES
Subjective   History of Present Illness  Patient is 89-year-old who is got a history of congestive heart failure and coronary disease came to the ED with chest pain and irregular heartbeat he is got history of left bundle branch block EKG shows A-fib as per the family he may have paroxysmal A-fib.  He is on anticoagulation as far as I know.    Chest Pain  Pain location:  Substernal area  Pain quality: aching and pressure    Pain radiates to:  Does not radiate  Pain severity:  Moderate  Onset quality:  Gradual  Timing:  Intermittent  Progression:  Worsening  Chronicity:  New  Context: at rest    Context: not breathing, not drug use, not eating, not lifting, not movement and not stress    Relieved by:  Nothing  Worsened by:  Nothing  Ineffective treatments:  None tried  Associated symptoms: fatigue, lower extremity edema, palpitations, shortness of breath and weakness    Associated symptoms: no abdominal pain, no altered mental status, no anorexia, no back pain, no claudication, no cough, no dizziness, no fever, no headache, no nausea, no orthopnea, no syncope and no vomiting    Risk factors: coronary artery disease, high cholesterol, hypertension and male sex    Risk factors: no aortic disease, no immobilization, no Marfan's syndrome, not pregnant and no prior DVT/PE    Shortness of Breath  Associated symptoms: chest pain    Associated symptoms: no abdominal pain, no claudication, no cough, no fever, no headaches, no syncope and no vomiting        Review of Systems   Constitutional:  Positive for fatigue. Negative for chills and fever.   HENT: Negative.  Negative for congestion.    Respiratory:  Positive for shortness of breath. Negative for cough, chest tightness and stridor.    Cardiovascular:  Positive for chest pain and palpitations. Negative for orthopnea, claudication and syncope.   Gastrointestinal: Negative.  Negative for abdominal distention, abdominal pain, anorexia, nausea and vomiting.   Endocrine:  Negative.    Genitourinary: Negative.  Negative for difficulty urinating and flank pain.   Musculoskeletal: Negative.  Negative for back pain.   Skin: Negative.  Negative for color change.   Neurological:  Positive for weakness. Negative for dizziness and headaches.   All other systems reviewed and are negative.      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    COPD (chronic obstructive pulmonary disease) 2023    GERD (gastroesophageal reflux disease)     Hyperlipidemia     Hypertension     Irregular heart beat     Lung nodules        Allergies   Allergen Reactions    Pregabalin Other (See Comments)     Blurred vision only       Past Surgical History:   Procedure Laterality Date    BLADDER SURGERY      CARDIAC CATHETERIZATION      CARDIAC CATHETERIZATION Left 4/8/2023    Procedure: Cardiac Catheterization/Vascular Study;  Surgeon: Sukhi Hartley MD;  Location:  [facility];  Service: Cardiology;  Laterality: Left;    COLONOSCOPY      CORONARY ANGIOPLASTY WITH STENT PLACEMENT  2006    STENT X 2    CYSTOSCOPY      ENDOSCOPY      EXCISION LESION N/A 4/7/2023    Procedure: EXCISION LESION OF RIGHT TEMPLE AND LEFT TEMPLE WITH FROZEN SECTION WITH POSSIBLE FLAP OR GRAFT;  Surgeon: Brijesh Nickerson MD;  Location:  [facility] OR;  Service: ENT;  Laterality: N/A;    FLAP HEAD/NECK Left 03/09/2020    Procedure: POSSIBLE FLAP;  Surgeon: Brijesh Nickerson MD;  Location:  [facility] OR;  Service: ENT;  Laterality: Left;    HEAD/NECK LESION/CYST EXCISION Left 03/09/2020    Procedure: 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 with frozen section and possible flap or graft;  Surgeon: Brijesh Nickerson MD;  Location:  [facility] OR;  Service: ENT;  Laterality: Left;    HERNIA REPAIR      SHOULDER SURGERY      SKIN BIOPSY      lip biopsy     SKIN FULL THICKNESS GRAFT Left 2020    Procedure: OR GRAFT;  Surgeon: Brijesh Nickerson MD;  Location: [facility] OR;  Service: ENT;  Laterality: Left;    TRANSURETHRAL RESECTION OF BLADDER TUMOR         Family History   Problem Relation Age of Onset    Tuberculosis Mother     Heart disease Father     Heart attack Father     Diabetes Sister     Hypertension Sister     Hyperlipidemia Sister     Heart disease Sister     Alcohol abuse Sister     Heart disease Brother     Heart attack Brother     Heart disease Brother        Social History     Socioeconomic History    Marital status:    Tobacco Use    Smoking status: Former     Current packs/day: 0.00     Average packs/day: 1 pack/day for 30.0 years (30.0 ttl pk-yrs)     Types: Cigarettes     Start date:      Quit date:      Years since quittin.4     Passive exposure: Past    Smokeless tobacco: Never   Vaping Use    Vaping status: Never Used   Substance and Sexual Activity    Alcohol use: Not Currently     Alcohol/week: 1.0 standard drink of alcohol     Types: 1 Glasses of wine per week     Comment: occ wine    Drug use: Never    Sexual activity: Defer           Objective   Physical Exam  Vitals and nursing note reviewed. Exam conducted with a chaperone present.   Constitutional:       General: He is not in acute distress.     Appearance: Normal appearance. He is well-developed. He is not toxic-appearing.   HENT:      Head: Normocephalic and atraumatic.      Nose: Nose normal.      Mouth/Throat:      Mouth: Mucous membranes are moist.      Pharynx: Uvula midline.   Eyes:      General: Lids are normal. Lids are everted, no foreign bodies appreciated.      Conjunctiva/sclera: Conjunctivae normal.      Pupils: Pupils are equal, round, and reactive to light.   Neck:      Vascular: Normal carotid pulses. No carotid bruit or JVD.      Trachea: Trachea and phonation normal. No tracheal deviation.   Cardiovascular:      Rate and Rhythm: Normal rate.  Tachycardia present. Rhythm irregular.      Chest Wall: PMI is not displaced.      Pulses: Normal pulses.      Heart sounds: Normal heart sounds.      No gallop.   Pulmonary:      Effort: Pulmonary effort is normal. No tachypnea, accessory muscle usage or respiratory distress.      Breath sounds: Normal breath sounds. No stridor. No decreased breath sounds, wheezing, rhonchi or rales.   Abdominal:      General: Bowel sounds are normal. There is no distension.      Palpations: Abdomen is soft.      Tenderness: There is no abdominal tenderness.   Musculoskeletal:         General: No swelling. Normal range of motion.      Cervical back: Full passive range of motion without pain, normal range of motion and neck supple. No rigidity.      Right lower leg: No tenderness. No edema.      Left lower leg: No tenderness. No edema.      Comments: Lower extremity exam bilaterally is unremarkable.  There is no right or left calf tenderness .  There is no palpable venous cord.  No obvious difference in the size of the legs.  No pitting edema.  The dorsalis pedis and posterior tibial femoral and popliteal pulses are palpable and +2 bilaterally.  Homans sign is negative   Skin:     General: Skin is warm and dry.      Capillary Refill: Capillary refill takes less than 2 seconds.      Coloration: Skin is not jaundiced or pale.      Nails: There is no clubbing.   Neurological:      General: No focal deficit present.      Mental Status: He is alert and oriented to person, place, and time.      GCS: GCS eye subscore is 4. GCS verbal subscore is 5. GCS motor subscore is 6.      Cranial Nerves: No cranial nerve deficit.      Motor: Motor function is intact.      Gait: Gait normal.      Deep Tendon Reflexes: Reflexes are normal and symmetric. Reflexes normal.   Psychiatric:         Speech: Speech normal.         Behavior: Behavior normal.         Procedures           ED Course  ED Course as of 05/20/24 1943   Mon May 20, 2024   1939 Patient  came in with chest pain and rapid ventricular sponsor rate A-fib.  Initially Cardizem was ordered but his blood pressure was in the softer side therefore 2 g of mag and amiodarone bolus was given which has controlled his rate 104.  He is pain-free at this time EKG shows a left bundle branch block which is chronic with no evidence of any acute ischemia.  I discussed this case with cardiology the patient will be admitted to the medicine service with cardiology evaluation.  No other concerns or complaints at this time. [TS]   1940 Headache artery catheterization in 2023. [TS]   1941 Heart score is 8  Wells score is 1.5   [TS]      ED Course User Index  [TS] Adolph Daniel MD                HEART Score: 8                              Medical Decision Making  Differential Diagnosis:  I considered chest wall pain, muscle strain, costochondritis, pleurisy, rib fracture, herpes zoster, cardiovascular etiology, myocardial infarction, intermediate coronary syndrome, unstable angina, angina, aortic dissection, pericarditis, pulmonary etiology, pulmonary embolism, pneumonia, pneumothorax, lung cancer, gastroesophageal reflux disease, esophagitis, esophageal spasm and gastrointestinal etiology as a possible cause of chest pain in this patient. This is a partial list of diagnoses considered.        Problems Addressed:  Atrial fibrillation with RVR: complicated acute illness or injury     Details:  rapid response rate treated with amiodarone and magnesium heart rate is controlled this time.  Chest pain, unspecified type: complicated acute illness or injury     Details: No further chest pain has got history of cardiac disease troponin eyes elevated.  Troponin I above reference range: complicated acute illness or injury     Details: Patient could be rate related versus secondary chest pain.    Amount and/or Complexity of Data Reviewed  Labs: ordered.     Details: Labs reviewed  Radiology: ordered.     Details: X-rays  reviewed  ECG/medicine tests: ordered.     Details: EKG shows A-fib with a left bundle branch block  Discussion of management or test interpretation with external provider(s): Mickey this case with Dr. Rodriguez and also with the hospitalist the patient will be admitted to hospital service.    Risk  OTC drugs.  Prescription drug management.  Decision regarding hospitalization.  Risk Details: Heart score 8  Well score 1.5  Will be admitted to for further evaluation assessment        Final diagnoses:   Chest pain, unspecified type   Atrial fibrillation with RVR   Troponin I above reference range       ED Disposition  ED Disposition       ED Disposition   Decision to Admit    Condition   --    Comment   Level of Care: Telemetry [5]   Diagnosis: Chest pain [237979]   Admitting Physician: KEARA GIFFORD [[id number]]   Attending Physician: KEARA GIFFORD [[id number]]   Certification: I Certify That Inpatient Hospital Services Are Medically Necessary For Greater Than 2 Midnights                 No follow-up provider specified.       Medication List      No changes were made to your prescriptions during this visit.            Adolph Daniel MD  05/20/24 1727       Adolph Daniel MD  05/20/24 1942       Adolph Daniel MD  05/20/24 1943

## 2024-05-21 ENCOUNTER — APPOINTMENT (OUTPATIENT)
Dept: CARDIOLOGY | Facility: HOSPITAL | Age: 89
End: 2024-05-21
Payer: MEDICARE

## 2024-05-21 PROBLEM — I48.91 ATRIAL FIBRILLATION WITH RVR: Status: ACTIVE | Noted: 2024-05-21

## 2024-05-21 PROBLEM — I21.4 NSTEMI, INITIAL EPISODE OF CARE: Status: ACTIVE | Noted: 2024-05-21

## 2024-05-21 PROBLEM — R31.0 GROSS HEMATURIA: Status: ACTIVE | Noted: 2024-05-21

## 2024-05-21 LAB
ALBUMIN SERPL-MCNC: 3.8 G/DL (ref 3.5–5.2)
ALBUMIN/GLOB SERPL: 1.4 G/DL
ALP SERPL-CCNC: 55 U/L (ref 39–117)
ALT SERPL W P-5'-P-CCNC: 10 U/L (ref 1–41)
ANION GAP SERPL CALCULATED.3IONS-SCNC: 7 MMOL/L (ref 5–15)
AST SERPL-CCNC: 18 U/L (ref 1–40)
BACTERIA UR QL AUTO: ABNORMAL /HPF
BASOPHILS # BLD AUTO: 0.02 10*3/MM3 (ref 0–0.2)
BASOPHILS NFR BLD AUTO: 0.4 % (ref 0–1.5)
BH CV ECHO MEAS - AO MAX PG: 14.7 MMHG
BH CV ECHO MEAS - AO MEAN PG: 8 MMHG
BH CV ECHO MEAS - AO V2 MAX: 192 CM/SEC
BH CV ECHO MEAS - AO V2 VTI: 40.3 CM
BH CV ECHO MEAS - AVA(I,D): 2.37 CM2
BH CV ECHO MEAS - EDV(CUBED): 85.8 ML
BH CV ECHO MEAS - EDV(MOD-SP2): 73.3 ML
BH CV ECHO MEAS - EDV(MOD-SP4): 94.8 ML
BH CV ECHO MEAS - EF(MOD-BP): 43 %
BH CV ECHO MEAS - EF(MOD-SP2): 45.8 %
BH CV ECHO MEAS - EF(MOD-SP4): 40.4 %
BH CV ECHO MEAS - ESV(CUBED): 47 ML
BH CV ECHO MEAS - ESV(MOD-SP2): 39.7 ML
BH CV ECHO MEAS - ESV(MOD-SP4): 56.5 ML
BH CV ECHO MEAS - FS: 18.1 %
BH CV ECHO MEAS - IVS/LVPW: 0.94 CM
BH CV ECHO MEAS - IVSD: 1.2 CM
BH CV ECHO MEAS - LA DIMENSION: 4 CM
BH CV ECHO MEAS - LAT PEAK E' VEL: 7.4 CM/SEC
BH CV ECHO MEAS - LV DIASTOLIC VOL/BSA (35-75): 54 CM2
BH CV ECHO MEAS - LV MASS(C)D: 201.4 GRAMS
BH CV ECHO MEAS - LV MAX PG: 4.4 MMHG
BH CV ECHO MEAS - LV MEAN PG: 2 MMHG
BH CV ECHO MEAS - LV SYSTOLIC VOL/BSA (12-30): 32.2 CM2
BH CV ECHO MEAS - LV V1 MAX: 105 CM/SEC
BH CV ECHO MEAS - LV V1 VTI: 23 CM
BH CV ECHO MEAS - LVIDD: 4.4 CM
BH CV ECHO MEAS - LVIDS: 3.6 CM
BH CV ECHO MEAS - LVOT AREA: 4.2 CM2
BH CV ECHO MEAS - LVOT DIAM: 2.3 CM
BH CV ECHO MEAS - LVPWD: 1.28 CM
BH CV ECHO MEAS - MED PEAK E' VEL: 5.7 CM/SEC
BH CV ECHO MEAS - MR MAX PG: 108.6 MMHG
BH CV ECHO MEAS - MR MAX VEL: 521 CM/SEC
BH CV ECHO MEAS - MV A MAX VEL: 162 CM/SEC
BH CV ECHO MEAS - MV DEC SLOPE: 437 CM/SEC2
BH CV ECHO MEAS - MV E MAX VEL: 97.4 CM/SEC
BH CV ECHO MEAS - MV E/A: 0.6
BH CV ECHO MEAS - MV P1/2T: 62.9 MSEC
BH CV ECHO MEAS - MVA(P1/2T): 3.5 CM2
BH CV ECHO MEAS - PA V2 MAX: 62.5 CM/SEC
BH CV ECHO MEAS - RAP SYSTOLE: 3 MMHG
BH CV ECHO MEAS - RV MAX PG: 1.5 MMHG
BH CV ECHO MEAS - RV V1 MAX: 61.3 CM/SEC
BH CV ECHO MEAS - RVDD: 2.5 CM
BH CV ECHO MEAS - RVSP: 36.4 MMHG
BH CV ECHO MEAS - SV(LVOT): 95.6 ML
BH CV ECHO MEAS - SV(MOD-SP2): 33.6 ML
BH CV ECHO MEAS - SV(MOD-SP4): 38.3 ML
BH CV ECHO MEAS - SVI(LVOT): 54.5 ML/M2
BH CV ECHO MEAS - SVI(MOD-SP2): 19.2 ML/M2
BH CV ECHO MEAS - SVI(MOD-SP4): 21.8 ML/M2
BH CV ECHO MEAS - TAPSE (>1.6): 2.6 CM
BH CV ECHO MEAS - TR MAX PG: 33.4 MMHG
BH CV ECHO MEAS - TR MAX VEL: 289 CM/SEC
BH CV ECHO MEASUREMENTS AVERAGE E/E' RATIO: 14.87
BH CV XLRA - RV BASE: 2.9 CM
BILIRUB SERPL-MCNC: 0.5 MG/DL (ref 0–1.2)
BILIRUB UR QL STRIP: ABNORMAL
BUN SERPL-MCNC: 24 MG/DL (ref 8–23)
BUN/CREAT SERPL: 30.4 (ref 7–25)
CALCIUM SPEC-SCNC: 9 MG/DL (ref 8.2–9.6)
CHLORIDE SERPL-SCNC: 107 MMOL/L (ref 98–107)
CHOLEST SERPL-MCNC: 103 MG/DL (ref 0–200)
CLARITY UR: ABNORMAL
CO2 SERPL-SCNC: 28 MMOL/L (ref 22–29)
COLOR UR: ABNORMAL
CREAT SERPL-MCNC: 0.79 MG/DL (ref 0.76–1.27)
DEPRECATED RDW RBC AUTO: 49.9 FL (ref 37–54)
EGFRCR SERPLBLD CKD-EPI 2021: 82.3 ML/MIN/1.73
EOSINOPHIL # BLD AUTO: 0.13 10*3/MM3 (ref 0–0.4)
EOSINOPHIL NFR BLD AUTO: 2.4 % (ref 0.3–6.2)
ERYTHROCYTE [DISTWIDTH] IN BLOOD BY AUTOMATED COUNT: 14 % (ref 12.3–15.4)
GLOBULIN UR ELPH-MCNC: 2.8 GM/DL
GLUCOSE SERPL-MCNC: 103 MG/DL (ref 65–99)
GLUCOSE UR STRIP-MCNC: ABNORMAL MG/DL
HBA1C MFR BLD: 5.9 % (ref 4.8–5.6)
HCT VFR BLD AUTO: 42 % (ref 37.5–51)
HDLC SERPL-MCNC: 39 MG/DL (ref 40–60)
HGB BLD-MCNC: 12.7 G/DL (ref 13–17.7)
HGB UR QL STRIP.AUTO: ABNORMAL
HYALINE CASTS UR QL AUTO: ABNORMAL /LPF
IMM GRANULOCYTES # BLD AUTO: 0.02 10*3/MM3 (ref 0–0.05)
IMM GRANULOCYTES NFR BLD AUTO: 0.4 % (ref 0–0.5)
KETONES UR QL STRIP: NEGATIVE
LDLC SERPL CALC-MCNC: 53 MG/DL (ref 0–100)
LDLC/HDLC SERPL: 1.41 {RATIO}
LEFT ATRIUM VOLUME INDEX: 45.7 ML/M2
LEFT ATRIUM VOLUME: 79.9 ML
LEUKOCYTE ESTERASE UR QL STRIP.AUTO: ABNORMAL
LYMPHOCYTES # BLD AUTO: 1.78 10*3/MM3 (ref 0.7–3.1)
LYMPHOCYTES NFR BLD AUTO: 32.5 % (ref 19.6–45.3)
MCH RBC QN AUTO: 29 PG (ref 26.6–33)
MCHC RBC AUTO-ENTMCNC: 30.2 G/DL (ref 31.5–35.7)
MCV RBC AUTO: 95.9 FL (ref 79–97)
MONOCYTES # BLD AUTO: 0.65 10*3/MM3 (ref 0.1–0.9)
MONOCYTES NFR BLD AUTO: 11.9 % (ref 5–12)
NEUTROPHILS NFR BLD AUTO: 2.88 10*3/MM3 (ref 1.7–7)
NEUTROPHILS NFR BLD AUTO: 52.4 % (ref 42.7–76)
NITRITE UR QL STRIP: NEGATIVE
PH UR STRIP.AUTO: 5.5 [PH] (ref 5–8)
PLATELET # BLD AUTO: 131 10*3/MM3 (ref 140–450)
PMV BLD AUTO: 10.6 FL (ref 6–12)
POTASSIUM SERPL-SCNC: 4.2 MMOL/L (ref 3.5–5.2)
PROT SERPL-MCNC: 6.6 G/DL (ref 6–8.5)
PROT UR QL STRIP: ABNORMAL
QT INTERVAL: 450 MS
QTC INTERVAL: 486 MS
RBC # BLD AUTO: 4.38 10*6/MM3 (ref 4.14–5.8)
RBC # UR STRIP: ABNORMAL /HPF
REF LAB TEST METHOD: ABNORMAL
SODIUM SERPL-SCNC: 142 MMOL/L (ref 136–145)
SP GR UR STRIP: 1.02 (ref 1–1.03)
SQUAMOUS #/AREA URNS HPF: ABNORMAL /HPF
TRIGL SERPL-MCNC: 45 MG/DL (ref 0–150)
TROPONIN T SERPL HS-MCNC: 97 NG/L
UROBILINOGEN UR QL STRIP: ABNORMAL
VLDLC SERPL-MCNC: 11 MG/DL (ref 5–40)
WBC # UR STRIP: ABNORMAL /HPF
WBC NRBC COR # BLD AUTO: 5.48 10*3/MM3 (ref 3.4–10.8)
YEAST URNS QL MICRO: ABNORMAL /HPF

## 2024-05-21 PROCEDURE — 99222 1ST HOSP IP/OBS MODERATE 55: CPT | Performed by: NURSE PRACTITIONER

## 2024-05-21 PROCEDURE — 80061 LIPID PANEL: CPT | Performed by: INTERNAL MEDICINE

## 2024-05-21 PROCEDURE — 87086 URINE CULTURE/COLONY COUNT: CPT | Performed by: FAMILY MEDICINE

## 2024-05-21 PROCEDURE — 93005 ELECTROCARDIOGRAM TRACING: CPT | Performed by: INTERNAL MEDICINE

## 2024-05-21 PROCEDURE — 83036 HEMOGLOBIN GLYCOSYLATED A1C: CPT | Performed by: INTERNAL MEDICINE

## 2024-05-21 PROCEDURE — 84484 ASSAY OF TROPONIN QUANT: CPT | Performed by: INTERNAL MEDICINE

## 2024-05-21 PROCEDURE — 81001 URINALYSIS AUTO W/SCOPE: CPT | Performed by: FAMILY MEDICINE

## 2024-05-21 PROCEDURE — 85025 COMPLETE CBC W/AUTO DIFF WBC: CPT | Performed by: INTERNAL MEDICINE

## 2024-05-21 PROCEDURE — 93306 TTE W/DOPPLER COMPLETE: CPT | Performed by: EMERGENCY MEDICINE

## 2024-05-21 PROCEDURE — 99221 1ST HOSP IP/OBS SF/LOW 40: CPT | Performed by: UROLOGY

## 2024-05-21 PROCEDURE — 25510000001 PERFLUTREN 6.52 MG/ML SUSPENSION: Performed by: FAMILY MEDICINE

## 2024-05-21 PROCEDURE — 93306 TTE W/DOPPLER COMPLETE: CPT

## 2024-05-21 PROCEDURE — 80053 COMPREHEN METABOLIC PANEL: CPT | Performed by: INTERNAL MEDICINE

## 2024-05-21 PROCEDURE — 99223 1ST HOSP IP/OBS HIGH 75: CPT

## 2024-05-21 RX ORDER — AMIODARONE HYDROCHLORIDE 200 MG/1
200 TABLET ORAL EVERY 12 HOURS SCHEDULED
Status: DISCONTINUED | OUTPATIENT
Start: 2024-05-21 | End: 2024-05-22 | Stop reason: HOSPADM

## 2024-05-21 RX ORDER — ASCORBIC ACID 500 MG
500 TABLET ORAL DAILY
COMMUNITY

## 2024-05-21 RX ADMIN — Medication 10 ML: at 20:35

## 2024-05-21 RX ADMIN — ACETAMINOPHEN 650 MG: 325 TABLET, FILM COATED ORAL at 11:29

## 2024-05-21 RX ADMIN — BUSPIRONE HYDROCHLORIDE 10 MG: 10 TABLET ORAL at 09:47

## 2024-05-21 RX ADMIN — AMIODARONE HYDROCHLORIDE 200 MG: 200 TABLET ORAL at 09:46

## 2024-05-21 RX ADMIN — SACUBITRIL AND VALSARTAN 1 TABLET: 49; 51 TABLET, FILM COATED ORAL at 09:47

## 2024-05-21 RX ADMIN — ACETAMINOPHEN 650 MG: 325 TABLET, FILM COATED ORAL at 20:35

## 2024-05-21 RX ADMIN — Medication 10 ML: at 09:48

## 2024-05-21 RX ADMIN — ATORVASTATIN CALCIUM 20 MG: 10 TABLET, FILM COATED ORAL at 20:36

## 2024-05-21 RX ADMIN — EMPAGLIFLOZIN 10 MG: 10 TABLET, FILM COATED ORAL at 09:47

## 2024-05-21 RX ADMIN — AMIODARONE HYDROCHLORIDE 200 MG: 200 TABLET ORAL at 20:35

## 2024-05-21 RX ADMIN — APIXABAN 5 MG: 5 TABLET, FILM COATED ORAL at 20:36

## 2024-05-21 RX ADMIN — DIGOXIN 125 MCG: 125 TABLET ORAL at 11:50

## 2024-05-21 RX ADMIN — METOPROLOL SUCCINATE 50 MG: 50 TABLET, EXTENDED RELEASE ORAL at 09:47

## 2024-05-21 RX ADMIN — BUSPIRONE HYDROCHLORIDE 10 MG: 10 TABLET ORAL at 20:36

## 2024-05-21 RX ADMIN — PERFLUTREN 13.04 MG: 6.52 INJECTION, SUSPENSION INTRAVENOUS at 16:00

## 2024-05-21 RX ADMIN — SACUBITRIL AND VALSARTAN 1 TABLET: 49; 51 TABLET, FILM COATED ORAL at 20:36

## 2024-05-21 RX ADMIN — SPIRONOLACTONE 25 MG: 25 TABLET ORAL at 09:46

## 2024-05-21 RX ADMIN — METOPROLOL SUCCINATE 50 MG: 50 TABLET, EXTENDED RELEASE ORAL at 20:36

## 2024-05-21 NOTE — ED NOTES
Nursing report ED to floor  Wyatt Curry  89 y.o.  male    HPI:   Chief Complaint   Patient presents with    Chest Pain    Shortness of Breath    Numbness       Admitting doctor:   Mira Anand DO    Consulting provider(s):  Consults       Date and Time Order Name Status Description    5/20/2024  8:44 PM Inpatient Cardiology Consult               Admitting diagnosis:   The primary encounter diagnosis was Chest pain, unspecified type. Diagnoses of Atrial fibrillation with RVR and Troponin I above reference range were also pertinent to this visit.    Code status:   Current Code Status       Date Active Code Status Order ID Comments User Context       5/20/2024 2001 CPR (Attempt to Resuscitate) [id number]  Mira Anand DO ED        Question Answer    Code Status (Patient has no pulse and is not breathing) CPR (Attempt to Resuscitate)    Medical Interventions (Patient has pulse or is breathing) Full Support    Level Of Support Discussed With Patient                    Allergies:   Pregabalin    Intake and Output    Intake/Output Summary (Last 24 hours) at 5/21/2024 0030  Last data filed at 5/20/2024 1911  Gross per 24 hour   Intake 150 ml   Output --   Net 150 ml       Weight:       05/20/24 1725   Weight: 69.4 kg (153 lb)       Most recent vitals:   Vitals:    05/20/24 2044 05/20/24 2158 05/21/24 0001 05/21/24 0002   BP: 116/56 106/55 103/54    BP Location:       Patient Position:       Pulse: 74 73  65   Resp:       Temp:       TempSrc:       SpO2: 96%   92%   Weight:       Height:         Oxygen Therapy: .    Active LDAs/IV Access:   Lines, Drains & Airways       Active LDAs       Name Placement date Placement time Site Days    Peripheral IV 05/20/24 1729 Left Antecubital 05/20/24 1729  Antecubital  less than 1    Peripheral IV 05/20/24 2045 Anterior;Distal;Right;Upper Arm 05/20/24 2045  Arm  less than 1                    Labs (abnormal labs have a star):   Labs Reviewed   COMPREHENSIVE METABOLIC  PANEL - Abnormal; Notable for the following components:       Result Value    Glucose 120 (*)     BUN 28 (*)     BUN/Creatinine Ratio 25.5 (*)     All other components within normal limits    Narrative:     GFR Normal >60  Chronic Kidney Disease <60  Kidney Failure <15    The GFR formula is only valid for adults with stable renal function between ages 18 and 70.   TROPONIN - Abnormal; Notable for the following components:    HS Troponin T 61 (*)     All other components within normal limits    Narrative:     High Sensitive Troponin T Reference Range:  <14.0 ng/L- Negative Female for AMI  <22.0 ng/L- Negative Male for AMI  >=14 - Abnormal Female indicating possible myocardial injury.  >=22 - Abnormal Male indicating possible myocardial injury.   Clinicians would have to utilize clinical acumen, EKG, Troponin, and serial changes to determine if it is an Acute Myocardial Infarction or myocardial injury due to an underlying chronic condition.        DIGOXIN LEVEL - Abnormal; Notable for the following components:    Digoxin <0.30 (*)     All other components within normal limits   CBC WITH AUTO DIFFERENTIAL - Abnormal; Notable for the following components:    Monocytes, Absolute 1.10 (*)     All other components within normal limits   HIGH SENSITIVITIY TROPONIN T 2HR - Abnormal; Notable for the following components:    HS Troponin T 77 (*)     Troponin T Delta 16 (*)     All other components within normal limits    Narrative:     High Sensitive Troponin T Reference Range:  <14.0 ng/L- Negative Female for AMI  <22.0 ng/L- Negative Male for AMI  >=14 - Abnormal Female indicating possible myocardial injury.  >=22 - Abnormal Male indicating possible myocardial injury.   Clinicians would have to utilize clinical acumen, EKG, Troponin, and serial changes to determine if it is an Acute Myocardial Infarction or myocardial injury due to an underlying chronic condition.        RAINBOW DRAW    Narrative:     The following orders  were created for panel order [place] Draw.  Procedure                               Abnormality         Status                     ---------                               -----------         ------                     Green Top (Gel)[[id number]]                                  Final result               Lavender Top[[id number]]                                     Final result               Red Top[[id number]]                                          Final result               Stearns Top[[id number]]                                         Final result               Light Blue Top[[id number]]                                   Final result                 Please view results for these tests on the individual orders.   TROPONIN   CBC WITH AUTO DIFFERENTIAL   COMPREHENSIVE METABOLIC PANEL   HEMOGLOBIN A1C   LIPID PANEL   GREEN TOP   LAVENDER TOP   RED TOP   GRAY TOP   LIGHT BLUE TOP   CBC AND DIFFERENTIAL    Narrative:     The following orders were created for panel order CBC & Differential.  Procedure                               Abnormality         Status                     ---------                               -----------         ------                     CBC Auto Differential[[id number]]        Abnormal            Final result                 Please view results for these tests on the individual orders.       Meds given in ED:   Medications   sodium chloride 0.9 % flush 10 mL (has no administration in time range)   morphine injection 4 mg (4 mg Intravenous Not Given 5/20/24 1804)   apixaban (ELIQUIS) tablet 5 mg (5 mg Oral Given 5/20/24 2157)   atorvastatin (LIPITOR) tablet 20 mg (20 mg Oral Given 5/20/24 2157)   busPIRone (BUSPAR) tablet 10 mg (10 mg Oral Given 5/20/24 2158)   digoxin (LANOXIN) tablet 125 mcg (has no administration in time range)   empagliflozin (JARDIANCE) tablet 10 mg (has no administration in time range)   pantoprazole (PROTONIX) EC tablet 40 mg (has no administration in time range)   metoprolol  succinate XL (TOPROL-XL) 24 hr tablet 50 mg (50 mg Oral Not Given 5/20/24 2158)   nitroglycerin (NITROSTAT) SL tablet 0.4 mg (has no administration in time range)   sacubitril-valsartan (ENTRESTO) 49-51 MG tablet 1 tablet (1 tablet Oral Not Given 5/20/24 2159)   spironolactone (ALDACTONE) tablet 25 mg (has no administration in time range)   sodium chloride 0.9 % flush 10 mL (10 mL Intravenous Given 5/20/24 2158)   sodium chloride 0.9 % flush 10 mL (has no administration in time range)   sodium chloride 0.9 % infusion 40 mL (has no administration in time range)   acetaminophen (TYLENOL) tablet 650 mg (650 mg Oral Given 5/20/24 2201)   sennosides-docusate (PERICOLACE) 8.6-50 MG per tablet 2 tablet (has no administration in time range)     And   polyethylene glycol (MIRALAX) packet 17 g (has no administration in time range)     And   bisacodyl (DULCOLAX) EC tablet 5 mg (has no administration in time range)     And   bisacodyl (DULCOLAX) suppository 10 mg (has no administration in time range)   ondansetron (ZOFRAN) injection 4 mg (has no administration in time range)   aspirin chewable tablet 324 mg (324 mg Oral Given 5/20/24 1734)   ondansetron (ZOFRAN) injection 4 mg (4 mg Intravenous Given 5/20/24 1735)   magnesium sulfate 2g/50 mL (PREMIX) infusion (0 g Intravenous Stopped 5/20/24 1911)   amiodarone 150 mg in 100 mL D5W (loading dose) (0 mg Intravenous Stopped 5/20/24 1819)           NIH Stroke Scale:       Isolation/Infection(s):  No active isolations   C.difficile     COVID Testing  Collected .  Resulted .    Nursing report ED to floor:  Mental status: .  Ambulatory status: .  Precautions: .    ED nurse phone extentsion- ..

## 2024-05-21 NOTE — H&P
[facility] Medicine Services  HISTORY AND PHYSICAL    Date of Admission: 5/20/2024  Primary Care Physician: Everardo Jackson MD    Subjective   Primary Historian: Patient    Chief Complaint: Chest pain and SOA    Chest Pain   Associated symptoms include shortness of breath.   Shortness of Breath  Associated symptoms include chest pain.     Very pleasant 89-year-old gentleman who presents to the emergency department with an episode of chest pain and shortness of breath.  He states he had chest pain at home.  Lasted about 2 hours.  It was midsternal in nature.  He took a nitroglycerin and it helped.  He then went to the bank, and walking about 20 yards into the bank he got short of breath.  He had no nausea.  When he got home he was diaphoretic.  He has had increased urinary frequency.  He has been coughing a little bit more than normal.  He has history of coronary artery disease, congestive heart failure quadrant atrial fibrillation.  He follows with Dr. Lindo.    4/8/23 cardiac cath:  Normal left main coronary artery  Mid left anterior descending coronary artery has a 90% stenosis  No obstructive disease of diagonal branches  Mid to distal left anterior descending coronary artery overall is a much smaller vessel compared to the second diagonal branch which is approximately 4 times as large  At this location has 90% stenosis that was treated with PTCA  Proximal left anterior descending coronary artery has a 90% stenosis  Had guide induced dissection prior to this stenosis that was intervened upon by placement of drug-eluting stent with complete restoration of flow  Refer to the description in the interventional section  Patent stents noted in the obtuse marginal branch as well as in the mid and mid to distal left circumflex coronary artery  First obtuse marginal branch is a small vessels approximately half millimeter in diameter  Second obtuse marginal branch is approximately  2.5 mm in diameter which has the stent as mentioned  Moderate atherosclerotic changes of the right coronary artery with sequential 3 to 50% stenoses in the mid and distal portion without obstructive disease  Right coronary artery is a large vessel     Cardiac echo 5/17/23:   Interpretation Summary    Left ventricular systolic function is mildly decreased. Left ventricular ejection fraction appears to be 41 - 45%.    The following segments are hypokinetic: apical anterior, apical septal, apical inferior, apical lateral and apex.    Left ventricular wall thickness is consistent with mild septal asymmetric hypertrophy.    Left ventricular diastolic function is consistent with (grade I) impaired relaxation.    He left atrial cavity is mildly dilated.    There is mild to moderate thickening of the aortic valve.    . Mild to moderate mitral valve regurgitation is present    Mild pulmonary hypertension is present.    Review of Systems   Respiratory:  Positive for shortness of breath.    Cardiovascular:  Positive for chest pain.      Otherwise complete ROS reviewed and negative except as mentioned in the HPI.    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    COPD (chronic obstructive pulmonary disease) 2023    GERD (gastroesophageal reflux disease)     Hyperlipidemia     Hypertension     Irregular heart beat     Lung nodules      Past Surgical History:  Past Surgical History:   Procedure Laterality Date    BLADDER SURGERY      CARDIAC CATHETERIZATION      CARDIAC CATHETERIZATION Left 4/8/2023    Procedure: Cardiac Catheterization/Vascular Study;  Surgeon: Sukhi Hartley MD;  Location: [facility] CATH INVASIVE LOCATION;  Service: Cardiology;  Laterality: Left;    COLONOSCOPY      CORONARY ANGIOPLASTY WITH STENT PLACEMENT  2006    STENT X 2    CYSTOSCOPY       ENDOSCOPY      EXCISION LESION N/A 4/7/2023    Procedure: EXCISION LESION OF RIGHT TEMPLE AND LEFT TEMPLE WITH FROZEN SECTION WITH POSSIBLE FLAP OR GRAFT;  Surgeon: Brijesh Nickerson MD;  Location:  [facility] OR;  Service: ENT;  Laterality: N/A;    FLAP HEAD/NECK Left 03/09/2020    Procedure: POSSIBLE FLAP;  Surgeon: Brijesh Nickerson MD;  Location:  [facility] OR;  Service: ENT;  Laterality: Left;    HEAD/NECK LESION/CYST EXCISION Left 03/09/2020    Procedure: 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 His smoking use included cigarettes. He started smoking about 76 years ago. He has a 30 pack-year smoking history. He has been exposed to tobacco smoke. He has never used smokeless tobacco. He reports that he does not currently use alcohol after a past usage of about 1.0 standard drink of alcohol per week. He reports that he does not use drugs.    Family History: family history includes Alcohol abuse in his sister; Diabetes in his sister; Heart attack in his brother and father; Heart disease in his brother, brother, father, and sister; Hyperlipidemia in his sister; Hypertension in his sister; Tuberculosis in his mother.       Allergies:  Allergies   Allergen Reactions    Pregabalin Other (See Comments)     Blurred vision only       Medications:  Prior to Admission medications    Medication Sig Start Date End Date Taking? Authorizing Provider   ACETAMINOPHEN ER PO Take 500 mg by mouth As Needed. Indications: Pain     ProviderManan MD   apixaban (ELIQUIS) 5 MG tablet tablet Take 1 tablet by mouth Every 12 (Twelve) Hours. Indications: Atrial Fibrillation 1/5/24   Bell Lawson APRN   atorvastatin (LIPITOR) 20 MG tablet Take 1 tablet by mouth every night at bedtime. Indications: Cardiac Failure, High Amount of Fats in the Blood    Manan Govea MD   busPIRone (BUSPAR) 10 MG tablet Take 1 tablet by mouth 2 (Two) Times a Day. Indications: Anxiety Disorder    Manan Govea MD   digoxin (LANOXIN) 125 MCG tablet Take 1 tablet by mouth Daily for 90 days. 8/4/23 4/23/24  Cherie Mcmanus MD   empagliflozin (JARDIANCE) 10 MG tablet tablet Take 1 tablet by mouth Daily. Indications: Cardiac Failure    Manan Govea MD   furosemide (LASIX) 40 MG tablet Take 1 tablet by mouth As Needed.    Manan Govea MD   lansoprazole (PREVACID) 30 MG capsule Take 1 capsule by mouth Daily. Indications: Heartburn 5/27/23   Manan Govea MD   metoprolol succinate XL (TOPROL-XL) 50 MG 24 hr tablet Take 1 tablet by mouth Every Night. Indications: Atrial Fibrillation 7/7/23   Bell Lawson APRN   nitroglycerin (NITROSTAT) 0.4 MG SL tablet Place 1 tablet under the tongue Every 5 (Five) Minutes As Needed. Indications: Acute Angina Pectoris 5/27/23   Manan Govea MD   potassium chloride (K-DUR,KLOR-CON) 10 MEQ CR tablet Take 2 tablets by mouth Daily As Needed (for swelling when taking Lasix). Indications: Low Amount of Potassium in the Blood    Manan Govea MD   saccharomyces boulardii (FLORASTOR) 250 MG capsule Take 1 capsule by mouth 2 (Two) Times a Day. 8/4/23   Cherie Mcmanus MD   sacubitril-valsartan (Entresto) 49-51 MG tablet Take 1 tablet by mouth 2 (Two) Times a Day. 7/24/23   Bell Lawson APRN   spironolactone (ALDACTONE) 25 MG tablet Take 1 tablet by mouth Daily.    Manan Govea MD   vitamin B-12 (CYANOCOBALAMIN) 1000 MCG tablet Take 1 tablet by mouth Daily.  "Indications: Inadequate Vitamin B12 5/27/23   Manan Govea MD   Vitron-C  MG tablet Take 1 tablet by mouth Daily. Indications: Excess or Deficiency of Vitamin C 4/13/23   Manan Govea MD   potassium chloride (MICRO-K) 10 MEQ CR capsule Take 10 mEq by mouth Daily. Take daily when taking lasix  Indications: Low Amount of Potassium in the Blood 7/25/23   Manan Govea MD   sacubitril-valsartan (Entresto) 49-51 MG tablet Take 1 tablet by mouth 2 (Two) Times a Day. Indications: Cardiac Failure 7/24/23   Manan Govea MD     I have utilized all available immediate resources to obtain, update, or review the patient's current medications (including all prescriptions, over-the-counter products, herbals, cannabis/cannabidiol products, and vitamin/mineral/dietary (nutritional) supplements).    Objective     Vital Signs: /53   Pulse 104   Temp 98.3 °F (36.8 °C) (Oral)   Resp 25   Ht 160 cm (63\")   Wt 69.4 kg (153 lb)   SpO2 90%   BMI 27.10 kg/m²   Physical Exam  Vitals reviewed.   Constitutional:       Appearance: He is not ill-appearing.   HENT:      Head: Normocephalic and atraumatic.      Comments: Bilateral hearing aides      Right Ear: External ear normal.      Left Ear: External ear normal.      Nose: Nose normal.   Eyes:      General: No scleral icterus.     Conjunctiva/sclera: Conjunctivae normal.   Cardiovascular:      Rate and Rhythm: Normal rate and regular rhythm.      Heart sounds: Normal heart sounds.   Pulmonary:      Effort: Pulmonary effort is normal.      Breath sounds: Rhonchi present.      Comments: Left sided chest congestion.   Abdominal:      General: Bowel sounds are normal.      Palpations: Abdomen is soft.   Musculoskeletal:         General: No swelling or tenderness.      Cervical back: Normal range of motion and neck supple.      Right lower leg: No edema.      Left lower leg: No edema.   Skin:     General: Skin is warm and dry.   Neurological:     "  General: No focal deficit present.      Mental Status: He is alert.      Cranial Nerves: No cranial nerve deficit.   Psychiatric:         Mood and Affect: Mood normal.         Behavior: Behavior normal.          Results Reviewed:  Lab Results (last 24 hours)       Procedure Component Value Units Date/Time    High Sensitivity Troponin T 2Hr [[id number]]  (Abnormal) Collected: 05/20/24 1911    Specimen: Blood Updated: 05/20/24 1947     HS Troponin T 77 ng/L      Troponin T Delta 16 ng/L     Narrative:      High Sensitive Troponin T Reference Range:  <14.0 ng/L- Negative Female for AMI  <22.0 ng/L- Negative Male for AMI  >=14 - Abnormal Female indicating possible myocardial injury.  >=22 - Abnormal Male indicating possible myocardial injury.   Clinicians would have to utilize clinical acumen, EKG, Troponin, and serial changes to determine if it is an Acute Myocardial Infarction or myocardial injury due to an underlying chronic condition.         Digoxin Level [[id number]]  (Abnormal) Collected: 05/20/24 1728    Specimen: Blood Updated: 05/20/24 1808     Digoxin <0.30 ng/mL     Comprehensive Metabolic Panel [[id number]]  (Abnormal) Collected: 05/20/24 1728    Specimen: Blood Updated: 05/20/24 1804     Glucose 120 mg/dL      BUN 28 mg/dL      Creatinine 1.10 mg/dL      Sodium 141 mmol/L      Potassium 4.5 mmol/L      Comment: Specimen hemolyzed.  Result may be falsely elevated.        Chloride 104 mmol/L      CO2 25.0 mmol/L      Calcium 9.5 mg/dL      Total Protein 7.3 g/dL      Albumin 4.0 g/dL      ALT (SGPT) 13 U/L      Comment: Specimen hemolyzed.  Result may  be falsely elevated.        AST (SGOT) 22 U/L      Comment: Specimen hemolyzed.  Result may be falsely elevated.        Alkaline Phosphatase 61 U/L      Total Bilirubin 0.4 mg/dL      Globulin 3.3 gm/dL      A/G Ratio 1.2 g/dL      BUN/Creatinine Ratio 25.5     Anion Gap 12.0 mmol/L      eGFR 62.2 mL/min/1.73     Narrative:      GFR Normal >60  Chronic Kidney  Disease <60  Kidney Failure <15    The GFR formula is only valid for adults with stable renal function between ages 18 and 70.    High Sensitivity Troponin T [[id number]]  (Abnormal) Collected: 05/20/24 1728    Specimen: Blood Updated: 05/20/24 1800     HS Troponin T 61 ng/L     Narrative:      High Sensitive Troponin T Reference Range:  <14.0 ng/L- Negative Female for AMI  <22.0 ng/L- Negative Male for AMI  >=14 - Abnormal Female indicating possible myocardial injury.  >=22 - Abnormal Male indicating possible myocardial injury.   Clinicians would have to utilize clinical acumen, EKG, Troponin, and serial changes to determine if it is an Acute Myocardial Infarction or myocardial injury due to an underlying chronic condition.         [place] Draw [[id number]] Collected: 05/20/24 1728    Specimen: Blood Updated: 05/20/24 1746    Narrative:      The following orders were created for panel order [place] Draw.  Procedure                               Abnormality         Status                     ---------                               -----------         ------                     Green Top (Gel)[[id number]]                                  Final result               Lavender Top[[id number]]                                     Final result               Red Top[[id number]]                                          Final result               Stearns Top[[id number]]                                         Final result               Light Blue Top[[id number]]                                   Final result                 Please view results for these tests on the individual orders.    Green Top (Gel) [[id number]] Collected: 05/20/24 1728    Specimen: Blood Updated: 05/20/24 1746     Extra Tube Hold for add-ons.     Comment: Auto resulted.       Lavender Top [[id number]] Collected: 05/20/24 1728    Specimen: Blood Updated: 05/20/24 1746     Extra Tube hold for add-on     Comment: Auto resulted       Red Top [[id number]] Collected:  05/20/24 1728    Specimen: Blood Updated: 05/20/24 1746     Extra Tube Hold for add-ons.     Comment: Auto resulted.       Stearns Top [[id number]] Collected: 05/20/24 1728    Specimen: Blood Updated: 05/20/24 1746     Extra Tube Hold for add-ons.     Comment: Auto resulted.       Light Blue Top [[id number]] Collected: 05/20/24 1728    Specimen: Blood Updated: 05/20/24 1746     Extra Tube Hold for add-ons.     Comment: Auto resulted       CBC & Differential [[id number]]  (Abnormal) Collected: 05/20/24 1728    Specimen: Blood Updated: 05/20/24 1742    Narrative:      The following orders were created for panel order CBC & Differential.  Procedure                               Abnormality         Status                     ---------                               -----------         ------                     CBC Auto Differential[[id number]]        Abnormal            Final result                 Please view results for these tests on the individual orders.    CBC Auto Differential [[id number]]  (Abnormal) Collected: 05/20/24 1728    Specimen: Blood Updated: 05/20/24 1742     WBC 9.42 10*3/mm3      RBC 4.72 10*6/mm3      Hemoglobin 14.0 g/dL      Hematocrit 43.7 %      MCV 92.6 fL      MCH 29.7 pg      MCHC 32.0 g/dL      RDW 14.1 %      RDW-SD 47.9 fl      MPV 10.5 fL      Platelets 162 10*3/mm3      Neutrophil % 63.4 %      Lymphocyte % 23.5 %      Monocyte % 11.7 %      Eosinophil % 0.8 %      Basophil % 0.4 %      Immature Grans % 0.2 %      Neutrophils, Absolute 5.97 10*3/mm3      Lymphocytes, Absolute 2.21 10*3/mm3      Monocytes, Absolute 1.10 10*3/mm3      Eosinophils, Absolute 0.08 10*3/mm3      Basophils, Absolute 0.04 10*3/mm3      Immature Grans, Absolute 0.02 10*3/mm3      nRBC 0.0 /100 WBC           Imaging Results (Last 24 Hours)       Procedure Component Value Units Date/Time    XR Chest 1 View [[id number]] Collected: 05/20/24 1818     Updated: 05/20/24 1822    Narrative:      EXAMINATION:  XR CHEST 1 VW-   5/20/2024 4:55 PM     HISTORY: Chest pain protocol.     COMPARISON: 12/12/2023.     TECHNIQUE: Single view AP image.     FINDINGS: There is no dense infiltrate or effusion. There is old  granulomatous disease. Mild bronchial wall thickening is stable. Heart  size upper limits of normal. Prior left shoulder arthroplasty.  Degenerative changes of the visualized spine.          Impression:      1. No new infiltrate or effusion.  2. Stable bronchial wall thickening. Old granulomatous disease.           This report was signed and finalized on 5/20/2024 6:19 PM by Dr. Vic Mcneal MD.             I have personally reviewed and interpreted the radiology studies and ECG obtained at time of admission.     Assessment / Plan   Assessment:   Active Hospital Problems    Diagnosis     **Chest pain      Impression:  NSTEMI  CAD  CHF  HTN  HPL    Treatment Plan  Admit to telemetry  Cardiac echo in the am  Repeat troponin and EKG in the am  Cardiology consult    The patient will be admitted to my service here at [facility]. Primary team to take over in the am.    Medical Decision Making  Number and Complexity of problems: >4  Differential Diagnosis: CHF    Conditions and Status        Condition is unchanged.     [facility] Data  External documents reviewed: None  Cardiac tracing (EKG, telemetry) interpretation: None  Radiology interpretation: None  Labs reviewed: Reviewed  Any tests that were considered but not ordered: None     Decision rules/scores evaluated (example [id number], [place], etc): Chronically anticoagulated     Discussed with: Patient and family at bedside     Care Planning  Shared decision making: Patient, family, and ED staff  Code status and discussions: Full    Disposition  Social Determinants of Health that impact treatment or disposition: Advanced age  Estimated length of stay is 1 to 2 days.     I confirmed that the patient's advanced care plan is present, code status is documented, and a surrogate  decision maker is listed in the patient's medical record.     The patient's surrogate decision maker is family.     The patient was seen and examined by me on 5/20/2024 at 730.    Electronically signed by Mira Anand DO, 05/20/24, 20:01 CDT.

## 2024-05-21 NOTE — PROGRESS NOTES
[facility] Medicine Services  INPATIENT PROGRESS NOTE    Patient Name: Wyatt Curry  Date of Admission: 5/20/2024  Today's Date: 05/21/24  Length of Stay: 1  Primary Care Physician: Everardo Jackson MD    Subjective   Chief Complaint: CAD/CHF/advanced age/hematuria  HPI   Blood pressure stable, afebrile.  Decrease in hemoglobin.  Continue Eliquis recommended by cardiology.  Trace bacteria, start Rocephin    Review of Systems   Constitutional:  Positive for activity change, appetite change, fatigue and fever. Negative for chills.   HENT:  Negative for hearing loss, nosebleeds, tinnitus and trouble swallowing.    Eyes:  Negative for visual disturbance.   Respiratory:  Positive for shortness of breath. Negative for cough, chest tightness and wheezing.    Cardiovascular:  Negative for chest pain, palpitations and leg swelling.   Gastrointestinal:  Negative for abdominal distention, abdominal pain, blood in stool, constipation, diarrhea, nausea and vomiting.   Endocrine: Negative for cold intolerance, heat intolerance, polydipsia, polyphagia and polyuria.   Genitourinary:  Negative for decreased urine volume, difficulty urinating, dysuria, flank pain, frequency and hematuria.   Musculoskeletal:  Positive for arthralgias, gait problem and myalgias. Negative for joint swelling.   Skin:  Negative for rash.   Allergic/Immunologic: Negative for immunocompromised state.   Neurological:  Positive for weakness. Negative for dizziness, syncope, light-headedness and headaches.   Hematological:  Negative for adenopathy. Does not bruise/bleed easily.   Psychiatric/Behavioral:  Negative for confusion and sleep disturbance. The patient is not nervous/anxious.         All pertinent negatives and positives are as above. All other systems have been reviewed and are negative unless otherwise stated.     Objective    Temp:  [98 °F (36.7 °C)-98.3 °F (36.8 °C)] 98 °F (36.7 °C)  Heart Rate:  []  "73  Resp:  [20-25] 20  BP: (103-131)/(50-78) 125/78  Physical Exam  Vitals and nursing note reviewed.   Constitutional:       Comments: Advanced age.   HENT:      Head: Normocephalic.   Eyes:      Conjunctiva/sclera: Conjunctivae normal.      Pupils: Pupils are equal, round, and reactive to light.   Cardiovascular:      Rate and Rhythm: Normal rate and regular rhythm.      Heart sounds: Normal heart sounds.   Pulmonary:      Effort: No respiratory distress.      Comments: Diminished breath sound bilateral, clear, on 2 L.  Abdominal:      General: Bowel sounds are normal. There is no distension.      Palpations: Abdomen is soft.      Tenderness: There is no abdominal tenderness.   Musculoskeletal:         General: No swelling.      Cervical back: Neck supple.   Skin:     General: Skin is warm and dry.      Capillary Refill: Capillary refill takes 2 to 3 seconds.      Findings: No rash.   Neurological:      Mental Status: He is alert.      Motor: Weakness present.      Coordination: Coordination abnormal.      Gait: Gait abnormal.   Psychiatric:         Mood and Affect: Mood normal.         Behavior: Behavior normal.             Results Review:  I have reviewed the labs, radiology results, and diagnostic studies.    Laboratory Data:   Results from last 7 days   Lab Units 05/21/24  0508 05/20/24  1728   WBC 10*3/mm3 5.48 9.42   HEMOGLOBIN g/dL 12.7* 14.0   HEMATOCRIT % 42.0 43.7   PLATELETS 10*3/mm3 131* 162        Results from last 7 days   Lab Units 05/21/24  0508 05/20/24  1728   SODIUM mmol/L 142 141   POTASSIUM mmol/L 4.2 4.5   CHLORIDE mmol/L 107 104   CO2 mmol/L 28.0 25.0   BUN mg/dL 24* 28*   CREATININE mg/dL 0.79 1.10   CALCIUM mg/dL 9.0 9.5   BILIRUBIN mg/dL 0.5 0.4   ALK PHOS U/L 55 61   ALT (SGPT) U/L 10 13   AST (SGOT) U/L 18 22   GLUCOSE mg/dL 103* 120*       Culture Data:   No results found for: \"BLOODCX\", \"URINECX\", \"WOUNDCX\", \"MRSACX\", \"RESPCX\", \"STOOLCX\"    Radiology Data:   Imaging Results (Last 24 " Hours)       Procedure Component Value Units Date/Time    XR Chest 1 View [[id number]] Collected: 05/20/24 1818     Updated: 05/20/24 1822    Narrative:      EXAMINATION:  XR CHEST 1 VW-  5/20/2024 4:55 PM     HISTORY: Chest pain protocol.     COMPARISON: 12/12/2023.     TECHNIQUE: Single view AP image.     FINDINGS: There is no dense infiltrate or effusion. There is old  granulomatous disease. Mild bronchial wall thickening is stable. Heart  size upper limits of normal. Prior left shoulder arthroplasty.  Degenerative changes of the visualized spine.          Impression:      1. No new infiltrate or effusion.  2. Stable bronchial wall thickening. Old granulomatous disease.           This report was signed and finalized on 5/20/2024 6:19 PM by Dr. Vic Mcneal MD.               I have reviewed the patient's current medications.     Assessment/Plan   Assessment  Active Hospital Problems    Diagnosis     **Chest pain     NSTEMI, initial episode of care     Atrial fibrillation with RVR     Gross hematuria     Chronic combined systolic and diastolic congestive heart failure     PAF (paroxysmal atrial fibrillation)     Basal cell carcinoma     History of coronary artery stent placement     Overweight (BMI 25.0-29.9)     Non-rheumatic mitral regurgitation     Coronary artery disease involving native coronary artery of native heart     Essential hypertension     Mixed hyperlipidemia     Benign localized prostatic hyperplasia without lower urinary tract symptoms (LUTS)     History of bladder cancer        Treatment Plan  Chest pain/CAD/hypertension/CHF/hyperlipidemia.  Midsternal in nature.  Nitro helped.  Follow with Dr. Graves outpatient.  Cardiology consult.  Amiodarone.  Eliquis.  Patient refusing after 7 in ER.  Lipitor.  Digoxin.  Toprol.  Jardiance.  Entresto . Aldactone.  Nitro as needed.  Hydralazine as needed.  Labetalol as needed.  Heart cath 4/8/23- Normal left main coronary artery, Mid left anterior descending  coronary artery has a 90% stenosis, No obstructive disease of diagonal branches Mid to distal left anterior descending coronary artery overall is a much smaller vessel compared to the second diagonal branch which is approximately 4 times as large, At this location has 90% stenosis that was treated with PTCA Proximal left anterior descending coronary artery has a 90% stenosis Had guide induced dissection prior to this stenosis that was intervened upon by placement of drug-eluting stent with complete restoration of flow- Refer to the description in the interventional section Patent stents noted in the obtuse marginal branch as well as in the mid and mid to distal left circumflex coronary artery- First obtuse marginal branch is a small vessels approximately half millimeter in diameter, Second obtuse marginal branch is approximately 2.5 mm in diameter which has the stent as mentioned, Moderate atherosclerotic changes of the right coronary artery with sequential 3 to 50% stenoses in the mid and distal portion without obstructive disease, Right coronary artery is a large vessel  Cardiac echo 5/17/23-ejection fraction 41 to 45%, hypokinetic: apical anterior- apical septal- apical inferior- apical lateral and apex, Left ventricular wall thickness is consistent with mild septal asymmetric hypertrophy, Left ventricular diastolic function is consistent with (grade I) impaired relaxation, He left atrial cavity is mildly dilated, mild to moderate thickening of the aortic valve, Mild to moderate mitral valve regurgitation is present, Mild pulmonary hypertension is present.  Echocardiogram pending.    Shortness of breath.  Chest x-ray-No new infiltrate or effusion, Stable bronchial wall thickening, Old granulomatous disease.  Patient is on 2 L of oxygen.    Hematuria.  UA-trace bacteria, large blood.  Consult urology.  Stop Plavix.  Continue Eliquis.    Nausea.  Zofran as needed.  Protonix.    Hypo-magnesium.  Status post 2 g  magnesium.    Anxiety/depression.  BuSpar.    Nutrition . Cardiac diet.    Deconditioning.  PT consult.    Advanced age . 89 years old.  Palliative care consult.  DNR.  DNI.    Medical Decision Making  Number and Complexity of problems: Chest pain/CAD/CHF/hyperlipidemia/shortness of breath/hematuria  Differential Diagnosis: None.    Conditions and Status        Condition is unchanged.     [facility] Data  External documents reviewed: ER notes previous notes.  Cardiac tracing (EKG, telemetry) interpretation: Sinus/left bundle branch block  Radiology interpretation: Chest x-ray/echo  Labs reviewed: Laboratory  Any tests that were considered but not ordered: Laboratory in AM     Decision rules/scores evaluated (example [id number], Wells, etc): None     Discussed with: Patient and granddaughter     Care Planning  Shared decision making: Patient and granddaughter  Code status and discussions: DNR . DNI.    Disposition  Social Determinants of Health that impact treatment or disposition: Unknown.  1 to 4 days    Electronically signed by Brody Jackson MD, 05/21/24, 10:20 CDT.

## 2024-05-21 NOTE — CONSULTS
"[facility] CONSULT NOTE    Referring Provider: Adolph Daniel MD    Reason for Consultation: elevated troponin, A-fib RVR    Chief complaint:   Chief Complaint   Patient presents with    Chest Pain    Shortness of Breath    Numbness       Subjective .     History of present illness:  Wyatt Curry is a 89 y.o. male who presented to the ER with acute onset of chest pain, shortness of breath and weakness. Patient notes yesterday at noon he started feeling poorly. He continued on about his activities but symptoms got worse. He took a nitro without improvement. Ultimately around 4 pm he decided to come to the ER. He note he was short of breath and felt like \"he had inhaled chemicals\". He does note some numbness and tingling in his fingers in the ER. He was given IV Amiodarone 150 in the ER at 1803. He ultimately converted to NSR. He feels much better. He is in the ER waiting for bed on the floor so he has not been out of bed. He also noted blood in his urine in the ER. He is on Eliquis and Plavix at home. Of note patient has been advised to hold Plavix in the past but he is continuing to take. Last dose 5/19 in the evening. Urology consult is pending. In review it appears he has had blood in his urine on last few checks. He has LBBB on EKG which is chronic. Troponin has been elevated at 61 to 77 to 97. Patient is followed by Dr. Cosby and Ann JUAREZ for his cardiology care. He was seen last month and was stable. Patient is followed for systolic congestive heart failure, paroxysmal atrial fibrillation, coronary artery disease. He has remote stents in 2006. He also had a NSTEMI in April of 2023 with stent to LAD. Last echo in May 2023 which showed LVEF 41-45%, mild LVH, grade I diastolic dysfunction, mild to moderate MR, thickening of aortic valve and mild pulmonary hypertension. Patient has COPD, history of bladder cancer, arthritis, GERD, hypertension and hyperlipidemia. Patient lives in " assisted living with his wife who has dementia. However he is independent and very active.     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    COPD (chronic obstructive pulmonary disease) 2023    GERD (gastroesophageal reflux disease)     Hyperlipidemia     Hypertension     Irregular heart beat     Lung nodules    ,   Past Surgical History:   Procedure Laterality Date    BLADDER SURGERY      CARDIAC CATHETERIZATION      CARDIAC CATHETERIZATION Left 4/8/2023    Procedure: Cardiac Catheterization/Vascular Study;  Surgeon: Sukhi Hartley MD;  Location:  [facility] CATH INVASIVE LOCATION;  Service: Cardiology;  Laterality: Left;    COLONOSCOPY      CORONARY ANGIOPLASTY WITH STENT PLACEMENT  2006    STENT X 2    CYSTOSCOPY      ENDOSCOPY      EXCISION LESION N/A 4/7/2023    Procedure: EXCISION LESION OF RIGHT TEMPLE AND LEFT TEMPLE WITH FROZEN SECTION WITH POSSIBLE FLAP OR GRAFT;  Surgeon: Brijesh Nickerson MD;  Location:  [facility] OR;  Service: ENT;  Laterality: N/A;    FLAP HEAD/NECK Left 03/09/2020    Procedure: POSSIBLE FLAP;  Surgeon: Brijesh Nickerson MD;  Location:  [facility] OR;  Service: ENT;  Laterality: Left;    HEAD/NECK LESION/CYST EXCISION Left 03/09/2020    Procedure: 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 with frozen section and possible flap or graft;  Surgeon: Brijesh Nickerson MD;  Location:  [facility] OR;  Service: ENT;  Laterality: Left;    HERNIA REPAIR      SHOULDER SURGERY      SKIN BIOPSY      lip biopsy    SKIN FULL THICKNESS GRAFT Left 03/09/2020    Procedure: OR GRAFT;  Surgeon: Brijesh Nickerson MD;  Location:  [facility] OR;  Service: ENT;  Laterality: Left;    TRANSURETHRAL RESECTION OF BLADDER TUMOR     ,   Family History   Problem Relation Age of Onset    Tuberculosis Mother     Heart disease Father     Heart  attack Father     Diabetes Sister     Hypertension Sister     Hyperlipidemia Sister     Heart disease Sister     Alcohol abuse Sister     Heart disease Brother     Heart attack Brother     Heart disease Brother    ,   Social History     Tobacco Use    Smoking status: Former     Current packs/day: 0.00     Average packs/day: 1 pack/day for 30.0 years (30.0 ttl pk-yrs)     Types: Cigarettes     Start date:      Quit date:      Years since quittin.4     Passive exposure: Past    Smokeless tobacco: Never   Vaping Use    Vaping status: Never Used   Substance Use Topics    Alcohol use: Not Currently     Alcohol/week: 1.0 standard drink of alcohol     Types: 1 Glasses of wine per week     Comment: occ wine    Drug use: Never   ,     Medications    Prior to Admission medications    Medication Sig Start Date End Date Taking? Authorizing Provider   ACETAMINOPHEN ER PO Take 500 mg by mouth As Needed. Indications: Pain    ProviderManan MD   apixaban (ELIQUIS) 5 MG tablet tablet Take 1 tablet by mouth Every 12 (Twelve) Hours. Indications: Atrial Fibrillation 24   Bell Lawson APRN   atorvastatin (LIPITOR) 20 MG tablet Take 1 tablet by mouth every night at bedtime. Indications: Cardiac Failure, High Amount of Fats in the Blood    ProviderManan MD   busPIRone (BUSPAR) 10 MG tablet Take 1 tablet by mouth 2 (Two) Times a Day. Indications: Anxiety Disorder    ProviderManan MD   digoxin (LANOXIN) 125 MCG tablet Take 1 tablet by mouth Daily for 90 days. 23  Cherie Mcmanus MD   empagliflozin (JARDIANCE) 10 MG tablet tablet Take 1 tablet by mouth Daily. Indications: Cardiac Failure    ProviderManan MD   furosemide (LASIX) 40 MG tablet Take 1 tablet by mouth As Needed.    ProviderManan MD   lansoprazole (PREVACID) 30 MG capsule Take 1 capsule by mouth Daily. Indications: Heartburn 23   Manan Govea MD   metoprolol succinate XL (TOPROL-XL) 50 MG 24  hr tablet Take 1 tablet by mouth Every Night. Indications: Atrial Fibrillation 7/7/23   Bell Lawson APRN   nitroglycerin (NITROSTAT) 0.4 MG SL tablet Place 1 tablet under the tongue Every 5 (Five) Minutes As Needed. Indications: Acute Angina Pectoris 5/27/23   Manan Govea MD   potassium chloride (K-DUR,KLOR-CON) 10 MEQ CR tablet Take 2 tablets by mouth Daily As Needed (for swelling when taking Lasix). Indications: Low Amount of Potassium in the Blood    Provider, Historical, MD   saccharomyces boulardii (FLORASTOR) 250 MG capsule Take 1 capsule by mouth 2 (Two) Times a Day. 8/4/23   Cherie Mcmanus MD   sacubitril-valsartan (Entresto) 49-51 MG tablet Take 1 tablet by mouth 2 (Two) Times a Day. 7/24/23   Bell Lawson APRN   spironolactone (ALDACTONE) 25 MG tablet Take 1 tablet by mouth Daily.    Manan Govea MD   vitamin B-12 (CYANOCOBALAMIN) 1000 MCG tablet Take 1 tablet by mouth Daily. Indications: Inadequate Vitamin B12 5/27/23   Manan Govea MD   Vitron-C  MG tablet Take 1 tablet by mouth Daily. Indications: Excess or Deficiency of Vitamin C 4/13/23   Manan Govea MD   potassium chloride (MICRO-K) 10 MEQ CR capsule Take 10 mEq by mouth Daily. Take daily when taking lasix  Indications: Low Amount of Potassium in the Blood 7/25/23   Manan Govea MD   sacubitril-valsartan (Entresto) 49-51 MG tablet Take 1 tablet by mouth 2 (Two) Times a Day. Indications: Cardiac Failure 7/24/23   Manan Govea MD       Current Facility-Administered Medications   Medication Dose Route Frequency Provider Last Rate Last Admin    acetaminophen (TYLENOL) tablet 650 mg  650 mg Oral Q4H PRN Mira Anand DO   650 mg at 05/20/24 2201    amiodarone (PACERONE) tablet 200 mg  200 mg Oral Q12H Jovani Lilly APRN   200 mg at 05/21/24 0946    apixaban (ELIQUIS) tablet 5 mg  5 mg Oral Q12H Brody Jackson MD   5 mg at 05/20/24 3965    atorvastatin (LIPITOR) tablet 20  mg  20 mg Oral Nightly Mira Anand DO   20 mg at 05/20/24 2157    sennosides-docusate (PERICOLACE) 8.6-50 MG per tablet 2 tablet  2 tablet Oral BID PRN Mira Anand DO        And    polyethylene glycol (MIRALAX) packet 17 g  17 g Oral Daily PRN Mira Anand DO        And    bisacodyl (DULCOLAX) EC tablet 5 mg  5 mg Oral Daily PRN Mira Anand DO        And    bisacodyl (DULCOLAX) suppository 10 mg  10 mg Rectal Daily PRN Mira Anand DO        busPIRone (BUSPAR) tablet 10 mg  10 mg Oral BID Mira Anand DO   10 mg at 05/21/24 0947    digoxin (LANOXIN) tablet 125 mcg  125 mcg Oral Daily Mira Anand DO        empagliflozin (JARDIANCE) tablet 10 mg  10 mg Oral Daily Mira Anand DO   10 mg at 05/21/24 0947    metoprolol succinate XL (TOPROL-XL) 24 hr tablet 50 mg  50 mg Oral Nightly Mira Anand DO   50 mg at 05/21/24 0947    morphine injection 4 mg  4 mg Intravenous Once Adolph Daniel MD        nitroglycerin (NITROSTAT) SL tablet 0.4 mg  0.4 mg Sublingual Q5 Min PRN Mira Anand DO        ondansetron (ZOFRAN) injection 4 mg  4 mg Intravenous Q6H PRN Mira Anand DO        pantoprazole (PROTONIX) EC tablet 40 mg  40 mg Oral Q AM Mira Anand DO        sacubitril-valsartan (ENTRESTO) 49-51 MG tablet 1 tablet  1 tablet Oral BID Mira Anand DO   1 tablet at 05/21/24 0947    sodium chloride 0.9 % flush 10 mL  10 mL Intravenous PRN Adolph Daniel MD        sodium chloride 0.9 % flush 10 mL  10 mL Intravenous Q12H Mira Anand DO   10 mL at 05/21/24 0948    sodium chloride 0.9 % flush 10 mL  10 mL Intravenous PRN Mira Anand DO        sodium chloride 0.9 % infusion 40 mL  40 mL Intravenous PRN Mira Anand DO        spironolactone (ALDACTONE) tablet 25 mg  25 mg Oral Daily Mira Anand DO   25 mg at 05/21/24 4918     Current Outpatient Medications   Medication Sig Dispense Refill    ACETAMINOPHEN ER PO  Take 500 mg by mouth As Needed. Indications: Pain      apixaban (ELIQUIS) 5 MG tablet tablet Take 1 tablet by mouth Every 12 (Twelve) Hours. Indications: Atrial Fibrillation 60 tablet 11    atorvastatin (LIPITOR) 20 MG tablet Take 1 tablet by mouth every night at bedtime. Indications: Cardiac Failure, High Amount of Fats in the Blood      busPIRone (BUSPAR) 10 MG tablet Take 1 tablet by mouth 2 (Two) Times a Day. Indications: Anxiety Disorder      digoxin (LANOXIN) 125 MCG tablet Take 1 tablet by mouth Daily for 90 days. 90 tablet 0    empagliflozin (JARDIANCE) 10 MG tablet tablet Take 1 tablet by mouth Daily. Indications: Cardiac Failure      furosemide (LASIX) 40 MG tablet Take 1 tablet by mouth As Needed.      lansoprazole (PREVACID) 30 MG capsule Take 1 capsule by mouth Daily. Indications: Heartburn      metoprolol succinate XL (TOPROL-XL) 50 MG 24 hr tablet Take 1 tablet by mouth Every Night. Indications: Atrial Fibrillation 30 tablet 11    nitroglycerin (NITROSTAT) 0.4 MG SL tablet Place 1 tablet under the tongue Every 5 (Five) Minutes As Needed. Indications: Acute Angina Pectoris      potassium chloride (K-DUR,KLOR-CON) 10 MEQ CR tablet Take 2 tablets by mouth Daily As Needed (for swelling when taking Lasix). Indications: Low Amount of Potassium in the Blood      saccharomyces boulardii (FLORASTOR) 250 MG capsule Take 1 capsule by mouth 2 (Two) Times a Day. 30 capsule 2    sacubitril-valsartan (Entresto) 49-51 MG tablet Take 1 tablet by mouth 2 (Two) Times a Day. 60 tablet 11    spironolactone (ALDACTONE) 25 MG tablet Take 1 tablet by mouth Daily.      vitamin B-12 (CYANOCOBALAMIN) 1000 MCG tablet Take 1 tablet by mouth Daily. Indications: Inadequate Vitamin B12      Vitron-C  MG tablet Take 1 tablet by mouth Daily. Indications: Excess or Deficiency of Vitamin C         Allergies:  Pregabalin    Review of Systems  Review of Systems   Constitutional: Positive for malaise/fatigue. Negative for chills,  decreased appetite, fever, weight gain and weight loss.   HENT:  Negative for nosebleeds.    Eyes:  Negative for visual disturbance.   Cardiovascular:  Positive for chest pain. Negative for dyspnea on exertion, leg swelling, near-syncope, orthopnea, palpitations, paroxysmal nocturnal dyspnea and syncope.        Tingling in fingers   Respiratory:  Positive for shortness of breath. Negative for cough, hemoptysis and snoring.    Endocrine: Negative for cold intolerance and heat intolerance.   Hematologic/Lymphatic: Negative for bleeding problem. Does not bruise/bleed easily.   Skin:  Negative for rash.   Musculoskeletal:  Negative for back pain and falls.   Gastrointestinal:  Negative for abdominal pain, constipation, diarrhea, heartburn, melena, nausea and vomiting.   Genitourinary:  Negative for hematuria.   Neurological:  Negative for dizziness, headaches and light-headedness.   Psychiatric/Behavioral:  Negative for altered mental status.    Allergic/Immunologic: Negative for persistent infections.       Objective     Physical Exam:  Patient Vitals for the past 24 hrs:   BP Temp Temp src Pulse Resp SpO2 Height Weight   05/21/24 0732 125/78 98 °F (36.7 °C) -- -- 20 -- -- --   05/21/24 0401 125/52 -- -- 73 -- 97 % -- --   05/21/24 0234 -- -- -- -- -- -- -- 72.2 kg (159 lb 1.6 oz)   05/21/24 0202 -- -- -- -- -- 99 % -- --   05/21/24 0201 106/50 -- -- 62 -- -- -- --   05/21/24 0105 -- -- -- 69 -- 96 % -- --   05/21/24 0101 126/51 -- -- 70 -- (!) 88 % -- --   05/21/24 0002 -- -- -- 65 -- 92 % -- --   05/21/24 0001 103/54 -- -- -- -- -- -- --   05/20/24 2158 106/55 -- -- 73 -- -- -- --   05/20/24 2044 116/56 -- -- 74 -- 96 % -- --   05/20/24 2031 121/58 -- -- 72 -- 94 % -- --   05/20/24 2021 119/58 -- -- 72 -- -- -- --   05/20/24 2020 -- -- -- -- -- 93 % -- --   05/20/24 2002 -- -- -- -- -- 94 % -- --   05/20/24 2001 131/56 -- -- 76 -- -- -- --   05/20/24 1921 117/53 -- -- 104 -- 90 % -- --   05/20/24 1852 -- -- -- 108 --  "92 % -- --   05/20/24 1851 108/75 -- -- (!) 130 -- 96 % -- --   05/20/24 1841 105/50 -- -- 114 -- 90 % -- --   05/20/24 1725 -- 98.3 °F (36.8 °C) Oral -- -- -- 160 cm (63\") 69.4 kg (153 lb)   05/20/24 1720 122/59 -- -- (!) 131 25 95 % -- --   05/20/24 1716 122/59 -- -- (!) 122 -- 94 % -- --     Constitutional:       Appearance: Well-developed and not in distress. Chronically ill-appearing.   Eyes:      Pupils: Pupils are equal, round, and reactive to light.   HENT:      Head: Normocephalic and atraumatic.   Neck:      Vascular: No carotid bruit or JVD.   Pulmonary:      Effort: Pulmonary effort is normal.      Breath sounds: Normal breath sounds.   Cardiovascular:      Normal rate. Regular rhythm.      Murmurs: There is a systolic murmur.   Pulses:     Intact distal pulses.   Edema:     Peripheral edema absent.   Abdominal:      General: Bowel sounds are normal.      Palpations: Abdomen is soft.   Musculoskeletal: Normal range of motion.      Cervical back: Normal range of motion and neck supple. Skin:     General: Skin is warm and dry.   Neurological:      Mental Status: Alert, oriented to person, place, and time and oriented to person, place and time.      Deep Tendon Reflexes: Reflexes are normal and symmetric.   Psychiatric:         Behavior: Behavior normal.         Thought Content: Thought content normal.         Judgment: Judgment normal.         Results Review:   I reviewed the patient's new clinical results.    Lab Results (last 72 hours)       Procedure Component Value Units Date/Time    Urinalysis, Microscopic Only - Urine, Clean Catch [[id number]]  (Abnormal) Collected: 05/21/24 0842    Specimen: Urine, Clean Catch Updated: 05/21/24 0912     RBC, UA Too Numerous to Count /HPF      WBC, UA 21-50 /HPF      Bacteria, UA Trace /HPF      Squamous Epithelial Cells, UA 0-2 /HPF      Yeast, UA Small/1+ Budding Yeast /HPF      Hyaline Casts, UA 0-2 /LPF      Methodology Manual Light Microscopy    Urine Culture - " Urine, Urine, Clean Catch [[id number]] Collected: 05/21/24 0842    Specimen: Urine, Clean Catch Updated: 05/21/24 0912    Urinalysis With Culture If Indicated - Urine, Clean Catch [[id number]]  (Abnormal) Collected: 05/21/24 0842    Specimen: Urine, Clean Catch Updated: 05/21/24 0912     Color, UA Red     Appearance, UA Turbid     pH, UA 5.5     Specific Gravity, UA 1.016     Glucose, UA >=1000 mg/dL (3+)     Ketones, UA Negative     Bilirubin, UA Small (1+)     Blood, UA Large (3+)     Protein,  mg/dL (2+)     Leuk Esterase, UA Large (3+)     Nitrite, UA Negative     Urobilinogen, UA 0.2 E.U./dL    Narrative:      In absence of clinical symptoms, the presence of pyuria, bacteria, and/or nitrites on the urinalysis result does not correlate with infection.    High Sensitivity Troponin T [[id number]]  (Abnormal) Collected: 05/21/24 0508    Specimen: Blood from Arm, Left Updated: 05/21/24 0558     HS Troponin T 97 ng/L     Narrative:      High Sensitive Troponin T Reference Range:  <14.0 ng/L- Negative Female for AMI  <22.0 ng/L- Negative Male for AMI  >=14 - Abnormal Female indicating possible myocardial injury.  >=22 - Abnormal Male indicating possible myocardial injury.   Clinicians would have to utilize clinical acumen, EKG, Troponin, and serial changes to determine if it is an Acute Myocardial Infarction or myocardial injury due to an underlying chronic condition.         Lipid Panel [[id number]]  (Abnormal) Collected: 05/21/24 0508    Specimen: Blood from Arm, Left Updated: 05/21/24 0555     Total Cholesterol 103 mg/dL      Triglycerides 45 mg/dL      HDL Cholesterol 39 mg/dL      LDL Cholesterol  53 mg/dL      VLDL Cholesterol 11 mg/dL      LDL/HDL Ratio 1.41    Narrative:      Cholesterol Reference Ranges  (U.S. Department of Health and Human Services ATP III Classifications)    Desirable          <200 mg/dL  Borderline High    200-239 mg/dL  High Risk          >240 mg/dL      Triglyceride Reference  Ranges  (U.S. Department of Health and Human Services ATP III Classifications)    Normal           <150 mg/dL  Borderline High  150-199 mg/dL  High             200-499 mg/dL  Very High        >500 mg/dL    HDL Reference Ranges  (U.S. Department of Health and Human Services ATP III Classifications)    Low     <40 mg/dl (major risk factor for CHD)  High    >60 mg/dl ('negative' risk factor for CHD)        LDL Reference Ranges  (U.S. Department of Health and Human Services ATP III Classifications)    Optimal          <100 mg/dL  Near Optimal     100-129 mg/dL  Borderline High  130-159 mg/dL  High             160-189 mg/dL  Very High        >189 mg/dL    Comprehensive Metabolic Panel [[id number]]  (Abnormal) Collected: 05/21/24 0508    Specimen: Blood from Arm, Left Updated: 05/21/24 0552     Glucose 103 mg/dL      BUN 24 mg/dL      Creatinine 0.79 mg/dL      Sodium 142 mmol/L      Potassium 4.2 mmol/L      Chloride 107 mmol/L      CO2 28.0 mmol/L      Calcium 9.0 mg/dL      Total Protein 6.6 g/dL      Albumin 3.8 g/dL      ALT (SGPT) 10 U/L      AST (SGOT) 18 U/L      Alkaline Phosphatase 55 U/L      Total Bilirubin 0.5 mg/dL      Globulin 2.8 gm/dL      A/G Ratio 1.4 g/dL      BUN/Creatinine Ratio 30.4     Anion Gap 7.0 mmol/L      eGFR 82.3 mL/min/1.73     Narrative:      GFR Normal >60  Chronic Kidney Disease <60  Kidney Failure <15    The GFR formula is only valid for adults with stable renal function between ages 18 and 70.    Hemoglobin A1c [[id number]]  (Abnormal) Collected: 05/21/24 0508    Specimen: Blood from Arm, Left Updated: 05/21/24 0541     Hemoglobin A1C 5.90 %     Narrative:      Hemoglobin A1C Ranges:    Increased Risk for Diabetes  5.7% to 6.4%  Diabetes                     >= 6.5%  Diabetic Goal                < 7.0%    CBC Auto Differential [[id number]]  (Abnormal) Collected: 05/21/24 0508    Specimen: Blood from Arm, Left Updated: 05/21/24 0532     WBC 5.48 10*3/mm3      RBC 4.38 10*6/mm3       Hemoglobin 12.7 g/dL      Hematocrit 42.0 %      MCV 95.9 fL      MCH 29.0 pg      MCHC 30.2 g/dL      RDW 14.0 %      RDW-SD 49.9 fl      MPV 10.6 fL      Platelets 131 10*3/mm3      Neutrophil % 52.4 %      Lymphocyte % 32.5 %      Monocyte % 11.9 %      Eosinophil % 2.4 %      Basophil % 0.4 %      Immature Grans % 0.4 %      Neutrophils, Absolute 2.88 10*3/mm3      Lymphocytes, Absolute 1.78 10*3/mm3      Monocytes, Absolute 0.65 10*3/mm3      Eosinophils, Absolute 0.13 10*3/mm3      Basophils, Absolute 0.02 10*3/mm3      Immature Grans, Absolute 0.02 10*3/mm3     High Sensitivity Troponin T 2Hr [[id number]]  (Abnormal) Collected: 05/20/24 1911    Specimen: Blood Updated: 05/20/24 1947     HS Troponin T 77 ng/L      Troponin T Delta 16 ng/L     Narrative:      High Sensitive Troponin T Reference Range:  <14.0 ng/L- Negative Female for AMI  <22.0 ng/L- Negative Male for AMI  >=14 - Abnormal Female indicating possible myocardial injury.  >=22 - Abnormal Male indicating possible myocardial injury.   Clinicians would have to utilize clinical acumen, EKG, Troponin, and serial changes to determine if it is an Acute Myocardial Infarction or myocardial injury due to an underlying chronic condition.         Digoxin Level [[id number]]  (Abnormal) Collected: 05/20/24 1728    Specimen: Blood Updated: 05/20/24 1808     Digoxin <0.30 ng/mL     Comprehensive Metabolic Panel [[id number]]  (Abnormal) Collected: 05/20/24 1728    Specimen: Blood Updated: 05/20/24 1804     Glucose 120 mg/dL      BUN 28 mg/dL      Creatinine 1.10 mg/dL      Sodium 141 mmol/L      Potassium 4.5 mmol/L      Comment: Specimen hemolyzed.  Result may be falsely elevated.        Chloride 104 mmol/L      CO2 25.0 mmol/L      Calcium 9.5 mg/dL      Total Protein 7.3 g/dL      Albumin 4.0 g/dL      ALT (SGPT) 13 U/L      Comment: Specimen hemolyzed.  Result may  be falsely elevated.        AST (SGOT) 22 U/L      Comment: Specimen hemolyzed.  Result may be  falsely elevated.        Alkaline Phosphatase 61 U/L      Total Bilirubin 0.4 mg/dL      Globulin 3.3 gm/dL      A/G Ratio 1.2 g/dL      BUN/Creatinine Ratio 25.5     Anion Gap 12.0 mmol/L      eGFR 62.2 mL/min/1.73     Narrative:      GFR Normal >60  Chronic Kidney Disease <60  Kidney Failure <15    The GFR formula is only valid for adults with stable renal function between ages 18 and 70.    High Sensitivity Troponin T [[id number]]  (Abnormal) Collected: 05/20/24 1728    Specimen: Blood Updated: 05/20/24 1800     HS Troponin T 61 ng/L     Narrative:      High Sensitive Troponin T Reference Range:  <14.0 ng/L- Negative Female for AMI  <22.0 ng/L- Negative Male for AMI  >=14 - Abnormal Female indicating possible myocardial injury.  >=22 - Abnormal Male indicating possible myocardial injury.   Clinicians would have to utilize clinical acumen, EKG, Troponin, and serial changes to determine if it is an Acute Myocardial Infarction or myocardial injury due to an underlying chronic condition.         [place] Draw [[id number]] Collected: 05/20/24 1728    Specimen: Blood Updated: 05/20/24 2646    Narrative:      The following orders were created for panel order [place] Draw.  Procedure                               Abnormality         Status                     ---------                               -----------         ------                     Green Top (Gel)[[id number]]                                  Final result               Lavender Top[[id number]]                                     Final result               Red Top[[id number]]                                          Final result               Stearns Top[[id number]]                                         Final result               Light Blue Top[[id number]]                                   Final result                 Please view results for these tests on the individual orders.    Green Top (Gel) [[id number]] Collected: 05/20/24 1728    Specimen: Blood Updated:  05/20/24 1746     Extra Tube Hold for add-ons.     Comment: Auto resulted.       Lavender Top [[id number]] Collected: 05/20/24 1728    Specimen: Blood Updated: 05/20/24 1746     Extra Tube hold for add-on     Comment: Auto resulted       Red Top [[id number]] Collected: 05/20/24 1728    Specimen: Blood Updated: 05/20/24 1746     Extra Tube Hold for add-ons.     Comment: Auto resulted.       Stearns Top [[id number]] Collected: 05/20/24 1728    Specimen: Blood Updated: 05/20/24 1746     Extra Tube Hold for add-ons.     Comment: Auto resulted.       Light Blue Top [[id number]] Collected: 05/20/24 1728    Specimen: Blood Updated: 05/20/24 1746     Extra Tube Hold for add-ons.     Comment: Auto resulted       CBC & Differential [[id number]]  (Abnormal) Collected: 05/20/24 1728    Specimen: Blood Updated: 05/20/24 1742    Narrative:      The following orders were created for panel order CBC & Differential.  Procedure                               Abnormality         Status                     ---------                               -----------         ------                     CBC Auto Differential[[id number]]        Abnormal            Final result                 Please view results for these tests on the individual orders.    CBC Auto Differential [[id number]]  (Abnormal) Collected: 05/20/24 1728    Specimen: Blood Updated: 05/20/24 1742     WBC 9.42 10*3/mm3      RBC 4.72 10*6/mm3      Hemoglobin 14.0 g/dL      Hematocrit 43.7 %      MCV 92.6 fL      MCH 29.7 pg      MCHC 32.0 g/dL      RDW 14.1 %      RDW-SD 47.9 fl      MPV 10.5 fL      Platelets 162 10*3/mm3      Neutrophil % 63.4 %      Lymphocyte % 23.5 %      Monocyte % 11.7 %      Eosinophil % 0.8 %      Basophil % 0.4 %      Immature Grans % 0.2 %      Neutrophils, Absolute 5.97 10*3/mm3      Lymphocytes, Absolute 2.21 10*3/mm3      Monocytes, Absolute 1.10 10*3/mm3      Eosinophils, Absolute 0.08 10*3/mm3      Basophils, Absolute 0.04 10*3/mm3      Immature Grans,  Absolute 0.02 10*3/mm3      nRBC 0.0 /100 WBC             Results for orders placed during the hospital encounter of 05/13/23    Adult Transthoracic Echo Complete W/ Cont if Necessary Per Protocol    Interpretation Summary    Left ventricular systolic function is mildly decreased. Left ventricular ejection fraction appears to be 41 - 45%.    The following segments are hypokinetic: apical anterior, apical septal, apical inferior, apical lateral and apex.    Left ventricular wall thickness is consistent with mild septal asymmetric hypertrophy.    Left ventricular diastolic function is consistent with (grade I) impaired relaxation.    He left atrial cavity is mildly dilated.    There is mild to moderate thickening of the aortic valve.    . Mild to moderate mitral valve regurgitation is present    Mild pulmonary hypertension is present.       Imaging Results (Last 72 Hours)       Procedure Component Value Units Date/Time    XR Chest 1 View [[id number]] Collected: 05/20/24 1818     Updated: 05/20/24 1822    Narrative:      EXAMINATION:  XR CHEST 1 VW-  5/20/2024 4:55 PM     HISTORY: Chest pain protocol.     COMPARISON: 12/12/2023.     TECHNIQUE: Single view AP image.     FINDINGS: There is no dense infiltrate or effusion. There is old  granulomatous disease. Mild bronchial wall thickening is stable. Heart  size upper limits of normal. Prior left shoulder arthroplasty.  Degenerative changes of the visualized spine.          Impression:      1. No new infiltrate or effusion.  2. Stable bronchial wall thickening. Old granulomatous disease.           This report was signed and finalized on 5/20/2024 6:19 PM by Dr. Vic Mcneal MD.               Assessment & Plan       Chest pain    Coronary artery disease involving native coronary artery of native heart    Chronic combined systolic and diastolic congestive heart failure    NSTEMI, initial episode of care    Atrial fibrillation with RVR    Gross hematuria    A-fib RVR-  patient presents today for chest pain and shortness of breath. It appears symptoms are much better back in NSR. Will have patient walk later today and reassess. Check Echo. Patient was given IV amiodarone and converted. Given Chemical cardioversion would not hold Eliquis. Start Po Amiodarone.     Elevated Troponin- overall with flat trend. Symptoms have resolved with rhythm control. Felt to be Type II secondary to A-fib RVR. Will check echo. Will monitor symptoms once up and ambulatory     Coronary Artery Disease- Stent to LAD last April. Remote stents in 2006. On Lipitor 20- could consider increase to high intensity statin. LDL controlled. On Plavix- despite being advised he could stop. Stop Plavix. Was given 324 of Aspirin 5/20/24 at 1744.     Hematuria - stop Plavix. Continue Eliquis. Urology consulted. Hematuria noted on last 2 outpaitent urinalysis    Chronic Systolic Congestive Heart Failure- appears euvolemic. Resume home medicines Jardiance (Farxiga while admitted), Aldactone, Entresto and Toprol. Daily weights. Low Salt Diet. Was given IV fluids -monitor       Further orders per Dr. Shultz     Thank you for asking us to follow this patient with you.       Electronically signed by LARRY Gonzalez, 05/21/24, 9:22 AM CDT.

## 2024-05-21 NOTE — CONSULTS
[facility] Palliative Care Services  Initial Consult    Attending Physician: Brody Jackson MD  Referring Provider: Brody Jackson MD    Patient Name: Wyatt Curry  Date of Admission: 5/20/2024  Today's Date: 05/21/24     Reason for Referral: Goals of Care/Advance Care Planning    Code Status and Medical Interventions:   Ordered at: 05/21/24 1108     Medical Intervention Limits:    NO intubation (DNI)    NO cardioversion     Level Of Support Discussed With:    Patient     Code Status (Patient has no pulse and is not breathing):    No CPR (Do Not Attempt to Resuscitate)     Medical Interventions (Patient has pulse or is breathing):    Limited Support        Subjective     HPI: 89 y.o. male with past medical history including arthritis, bladder cancer, bronchitis, coronary artery disease, COPD, GERD, hyperlipidemia, hypertension and lung nodules.  Additional past medical history listed below.  Patient presented to [facility] on 5/20/2024 related to chest pain and irregular heartbeat.  According to ED note he was found to be in atrial fibrillation with RVR however blood pressure was low and he was not given Cardizem.  Noted he was given magnesium and amiodarone bolus with rate control.  EKG reportedly showed a left bundle branch block which was noted to be chronic.  Labs collected in ED mostly unremarkable.  High-sensitivity troponin T elevated at 61.  Digoxin level noted to be low at less than 0.30.  Chest x-ray negative for new infiltrate or effusion.  Noted stable bronchial wall thickening and old granulomatous disease.  He was admitted to the medical floor for further workup and treatment.  Labs collected this morning reveal troponin remains elevated at 97.  UA obtained which revealed trace bacteria, 21-50 WBC, TNTC RBC, 3+ blood, glucose and leukocytes.  Urine culture pending.  Urology consulted and evaluation pending.  Cardiology consulted and noted symptoms appear to be improved and  he was in NSR and have recommended activity later and reassess for symptoms.  Also recommended stopping Plavix while continuing Eliquis and obtaining echocardiogram.  Palliative care has been consulted to discuss goals of care/advance care planning.  He is sitting up in the chair, alert and in no apparent distress.  Denies any pain or discomfort.  Awaiting echocardiogram.  His niece/POA, Candy, is also at bedside.    Advance Care Planning   Advanced Directives: Patient has an advance directive on file. Patient reports document is valid.    Advance Care Planning Discussion: Spoke with Mr. Curry and his niece/POA, Candy, regarding goals of care.  They reflected on previous discussions regarding wishes.  Mr. Curry confirmed he would not wish to undergo CPR if his heart were to stop.  He also expressed he would not wish to be placed on ventilator support.  We discussed additional interventions including cardioversion.  Shared he is agreeable to undergoing cardioversion if needed.  Shared he is hopeful he does not experience any further episodes like he did yesterday.  He and his niece reflected on previous experience when this occurred as well as previous hospitalizations.  Reports he has otherwise been overall good health and stays active.  He lives at assisted living facility with his spouse who has dementia.  Discussed plans to obtain echocardiogram and determine status of heart and cardiology would likely make additional recommendations depending on findings.  Mr. Curry and his niece demonstrated understanding.  They both appear to have good prognostic awareness.  We also discussed a MOST form to determine his wishes are honored.  Discussed details and uses.  Candy shared she had previously discussed something similar at assisted living facility.  We reviewed MOST form and Mr. Curry expressed interest in completing however asked that his niece complete on his behalf.  After further discussion MOST form was  initiated to reflect wishes of no CPR and DO NOT INTUBATE.  Noted on form he is agreeable to transferring to intensive care unit/critical care unit if indicated and is agreeable to undergoing cardioversion if indicated.  Form signed by attending physician.  Copy faxed to medical records to place in EMR.  Copies also provided to patient/family.  Mr. Curry and his niece were appreciative of discussion.  Again appear to have good prognostic awareness.  Denied any questions and/or concerns.  Encouraged if arise.    The patient receives support from his spouse and extended family. Patient's niece, Candy, is his POA.      Due to the palliative care topics discussed including goals of care, treatment options, and medical priorities we will establish an advance care plan.      Review of Systems   Cardiovascular:  Negative for chest pain.   Respiratory:  Positive for shortness of breath.         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    COPD (chronic obstructive pulmonary disease) 2023    GERD (gastroesophageal reflux disease)     Hyperlipidemia     Hypertension     Irregular heart beat     Lung nodules       Past Surgical History:   Procedure Laterality Date    BLADDER SURGERY      CARDIAC CATHETERIZATION      CARDIAC CATHETERIZATION Left 4/8/2023    Procedure: Cardiac Catheterization/Vascular Study;  Surgeon: Sukhi Hartley MD;  Location:  [facility] CATH [facility]ASIVE [facility];  Service: Cardiology;  Laterality: Left;    COLONOSCOPY      CORONARY ANGIOPLASTY WITH STENT PLACEMENT  2006    STENT X 2    CYSTOSCOPY      ENDOSCOPY      EXCISION LESION N/A 4/7/2023    Procedure: EXCISION LESION OF RIGHT TEMPLE AND LEFT TEMPLE WITH FROZEN SECTION WITH POSSIBLE FLAP OR GRAFT;  Surgeon: Brijesh Nickerson MD;  Location:  [facility] OR;  Service: ENT;  Laterality: N/A;    FLAP HEAD/NECK  Left 2020    Procedure: POSSIBLE FLAP;  Surgeon: Brijesh Nickerson MD;  Location:  [facility] OR;  Service: ENT;  Laterality: Left;    HEAD/NECK LESION/CYST EXCISION Left 2020    Procedure: 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 with frozen section and possible flap or graft;  Surgeon: Brijesh Nickerson MD;  Location:  [facility] OR;  Service: ENT;  Laterality: Left;    HERNIA REPAIR      SHOULDER SURGERY      SKIN BIOPSY      lip biopsy    SKIN FULL THICKNESS GRAFT Left 2020    Procedure: OR GRAFT;  Surgeon: Brijesh Nickerson MD;  Location:  [facility] OR;  Service: ENT;  Laterality: Left;    TRANSURETHRAL RESECTION OF BLADDER TUMOR        Social History     Socioeconomic History    Marital status:    Tobacco Use    Smoking status: Former     Current packs/day: 0.00     Average packs/day: 1 pack/day for 30.0 years (30.0 ttl pk-yrs)     Types: Cigarettes     Start date:      Quit date:      Years since quittin.4     Passive exposure: Past    Smokeless tobacco: Never   Vaping Use    Vaping status: Never Used   Substance and Sexual Activity    Alcohol use: Not Currently     Alcohol/week: 1.0 standard drink of alcohol     Types: 1 Glasses of wine per week     Comment: occ wine    Drug use: Never    Sexual activity: Defer     Family History   Problem Relation Age of Onset    Tuberculosis Mother     Heart disease Father     Heart attack Father     Diabetes Sister     Hypertension Sister     Hyperlipidemia Sister     Heart disease Sister     Alcohol abuse Sister     Heart disease Brother     Heart attack Brother     Heart disease Brother       Allergies   Allergen Reactions    Pregabalin Other (See Comments)     Blurred vision only       Objective   Diagnostics: Reviewed    Intake/Output Summary (Last 24 hours) at 2024 09  Last data filed at 2024 1911  Gross per 24 hour   Intake 150 ml   Output --   Net 150 ml       Current medications patient is  presently taking including all prescriptions, over-the-counter, herbals and vitamin/mineral/dietary (nutritional) supplements with reviewed including route, type, dose and frequency and are current per MAR at time of dictation.  Current Facility-Administered Medications   Medication Dose Route Frequency Provider Last Rate Last Admin    acetaminophen (TYLENOL) tablet 650 mg  650 mg Oral Q4H PRN Mira Anand DO   650 mg at 05/20/24 2201    amiodarone (PACERONE) tablet 200 mg  200 mg Oral Q12H Jovani Lilly APRN        apixaban (ELIQUIS) tablet 5 mg  5 mg Oral Q12H Brody Jackson MD   5 mg at 05/20/24 2157    atorvastatin (LIPITOR) tablet 20 mg  20 mg Oral Nightly Mira Anand DO   20 mg at 05/20/24 2157    sennosides-docusate (PERICOLACE) 8.6-50 MG per tablet 2 tablet  2 tablet Oral BID PRN Mira Anand DO        And    polyethylene glycol (MIRALAX) packet 17 g  17 g Oral Daily PRN Mira Anand DO        And    bisacodyl (DULCOLAX) EC tablet 5 mg  5 mg Oral Daily PRN Mira Anand DO        And    bisacodyl (DULCOLAX) suppository 10 mg  10 mg Rectal Daily PRN Mira Anand DO        busPIRone (BUSPAR) tablet 10 mg  10 mg Oral BID Mira Anand DO   10 mg at 05/20/24 2158    digoxin (LANOXIN) tablet 125 mcg  125 mcg Oral Daily Mira Anand DO        empagliflozin (JARDIANCE) tablet 10 mg  10 mg Oral Daily Mira Anand DO        metoprolol succinate XL (TOPROL-XL) 24 hr tablet 50 mg  50 mg Oral Nightly Mira Anand DO        morphine injection 4 mg  4 mg Intravenous Once Adolph Daniel MD        nitroglycerin (NITROSTAT) SL tablet 0.4 mg  0.4 mg Sublingual Q5 Min PRN Mira Anand DO        ondansetron (ZOFRAN) injection 4 mg  4 mg Intravenous Q6H PRN Cheng, Ali Meg, DO        pantoprazole (PROTONIX) EC tablet 40 mg  40 mg Oral Q AM Mira Anand, DO        sacubitril-valsartan (ENTRESTO) 49-51 MG tablet 1 tablet  1 tablet Oral BID  Mira Anand DO        sodium chloride 0.9 % flush 10 mL  10 mL Intravenous PRN Adolph Daniel MD        sodium chloride 0.9 % flush 10 mL  10 mL Intravenous Q12H Mira Anand DO   10 mL at 05/20/24 2158    sodium chloride 0.9 % flush 10 mL  10 mL Intravenous PRN Mira Anand DO        sodium chloride 0.9 % infusion 40 mL  40 mL Intravenous PRN Mira Anand DO        spironolactone (ALDACTONE) tablet 25 mg  25 mg Oral Daily Mira Anand DO         Current Outpatient Medications   Medication Sig Dispense Refill    ACETAMINOPHEN ER PO Take 500 mg by mouth As Needed. Indications: Pain      apixaban (ELIQUIS) 5 MG tablet tablet Take 1 tablet by mouth Every 12 (Twelve) Hours. Indications: Atrial Fibrillation 60 tablet 11    atorvastatin (LIPITOR) 20 MG tablet Take 1 tablet by mouth every night at bedtime. Indications: Cardiac Failure, High Amount of Fats in the Blood      busPIRone (BUSPAR) 10 MG tablet Take 1 tablet by mouth 2 (Two) Times a Day. Indications: Anxiety Disorder      digoxin (LANOXIN) 125 MCG tablet Take 1 tablet by mouth Daily for 90 days. 90 tablet 0    empagliflozin (JARDIANCE) 10 MG tablet tablet Take 1 tablet by mouth Daily. Indications: Cardiac Failure      furosemide (LASIX) 40 MG tablet Take 1 tablet by mouth As Needed.      lansoprazole (PREVACID) 30 MG capsule Take 1 capsule by mouth Daily. Indications: Heartburn      metoprolol succinate XL (TOPROL-XL) 50 MG 24 hr tablet Take 1 tablet by mouth Every Night. Indications: Atrial Fibrillation 30 tablet 11    nitroglycerin (NITROSTAT) 0.4 MG SL tablet Place 1 tablet under the tongue Every 5 (Five) Minutes As Needed. Indications: Acute Angina Pectoris      potassium chloride (K-DUR,KLOR-CON) 10 MEQ CR tablet Take 2 tablets by mouth Daily As Needed (for swelling when taking Lasix). Indications: Low Amount of Potassium in the Blood      saccharomyces boulardii (FLORASTOR) 250 MG capsule Take 1 capsule by mouth 2 (Two)  "Times a Day. 30 capsule 2    sacubitril-valsartan (Entresto) 49-51 MG tablet Take 1 tablet by mouth 2 (Two) Times a Day. 60 tablet 11    spironolactone (ALDACTONE) 25 MG tablet Take 1 tablet by mouth Daily.      vitamin B-12 (CYANOCOBALAMIN) 1000 MCG tablet Take 1 tablet by mouth Daily. Indications: Inadequate Vitamin B12      Vitron-C  MG tablet Take 1 tablet by mouth Daily. Indications: Excess or Deficiency of Vitamin C          acetaminophen    senna-docusate sodium **AND** polyethylene glycol **AND** bisacodyl **AND** bisacodyl    nitroglycerin    ondansetron    sodium chloride    sodium chloride    sodium chloride  Assessment   /78 (BP Location: Right arm, Patient Position: Lying)   Pulse 73   Temp 98 °F (36.7 °C)   Resp 20   Ht 160 cm (63\")   Wt 72.2 kg (159 lb 1.6 oz)   SpO2 97%   BMI 28.18 kg/m²     Physical Exam  Vitals and nursing note reviewed.   Constitutional:       General: He is not in acute distress.  HENT:      Head: Normocephalic and atraumatic.   Eyes:      General: Lids are normal.      Extraocular Movements: Extraocular movements intact.   Neck:      Vascular: No JVD.      Trachea: Trachea normal.   Cardiovascular:      Rate and Rhythm: Normal rate.   Pulmonary:      Effort: Pulmonary effort is normal.   Musculoskeletal:      Cervical back: Neck supple.   Skin:     General: Skin is warm and dry.   Neurological:      Mental Status: He is alert and oriented to person, place, and time.   Psychiatric:         Behavior: Behavior is cooperative.     Functional status: Palliative Performance Scale Score: Performance 80% based on the following measures: Ambulation: Full ambulation, Activity and Evidence of Disease: Normal activity with effort, some evidence of disease, Self-Care: Fully independent,  Intake: Normal or reduced, LOC: Full.  Nutritional status: Albumin 3.8. Body mass index is 28.18 kg/m².  Patient status: Disease state: Controlled with current treatments.    Active " Hospital Problems    Diagnosis     **Chest pain        Impression/Problem List:  Chronic systolic congestive heart failure  Atrial fibrillation with RVR  Coronary artery disease  Hematuria  Elevated troponin  Advanced age       Plan / Recommendations     Palliative Care Encounter   Goals of care include CODE STATUS NO CPR with limited support interventions.    Spoke with Mr. Curry and his niece/POA, Candy, regarding goals of care.  Details of discussion above.    Discussed medical priorities further.  Mr. Curry confirmed he would not wish to receive CPR and/or intubation.  Reports he is agreeable to receiving cardioversion if indicated.    We also discussed a MOST form including details and uses.  After further discussion expressed wishes to complete.  Have initiated to reflect wishes of no CPR and no intubation.  Notated on form he is agreeable to transfer to ICU/CCU and undergoing cardioversion if indicated.  Signed by attending for completion.  Copies faxed to medical records to place in EMR.  Copies also provided to patient/family.    Mr. Curry and his niece were appreciative of discussion.  Appear to have good prognostic awareness.  Denied any questions and/or concerns.  Encouraged if arise.    Thank you for allowing us to participate in patient's plan of care. Palliative Care Team will continue to follow patient.     Time spent:80 minutes spent reviewing medical and medication records, assessing and examining patient, discussing with family, answering questions, providing some guidance about a plan and documentation of care, and coordinating care with other healthcare members, with > 50% time spent face to face.     Electronically signed by, LARRY Bee, 05/21/24.

## 2024-05-21 NOTE — PLAN OF CARE
Problem: Fall Injury Risk  Goal: Absence of Fall and Fall-Related Injury  Outcome: Ongoing, Progressing  Intervention: Identify and Manage Contributors  Recent Flowsheet Documentation  Taken 5/21/2024 1129 by Saw Rockwell RN  Medication Review/Management: medications reviewed  Intervention: Promote Injury-Free Environment  Recent Flowsheet Documentation  Taken 5/21/2024 1705 by Saw Rockwell RN  Safety Promotion/Fall Prevention: safety round/check completed  Taken 5/21/2024 1604 by Saw Rockwell RN  Safety Promotion/Fall Prevention: safety round/check completed  Taken 5/21/2024 1502 by Saw Rockwell RN  Safety Promotion/Fall Prevention: safety round/check completed  Taken 5/21/2024 1410 by Saw Rockwell RN  Safety Promotion/Fall Prevention: safety round/check completed  Taken 5/21/2024 1302 by Saw Rockwell RN  Safety Promotion/Fall Prevention: safety round/check completed  Taken 5/21/2024 1210 by Saw Rockwell RN  Safety Promotion/Fall Prevention: safety round/check completed  Taken 5/21/2024 1129 by Saw Rockwell RN  Safety Promotion/Fall Prevention: safety round/check completed     Problem: Chest Pain  Goal: Resolution of Chest Pain Symptoms  Outcome: Ongoing, Progressing     Problem: Hypertension Comorbidity  Goal: Blood Pressure in Desired Range  Outcome: Ongoing, Progressing  Intervention: Maintain Blood Pressure Management  Recent Flowsheet Documentation  Taken 5/21/2024 1129 by Saw Rockwell RN  Medication Review/Management: medications reviewed     Problem: Adult Inpatient Plan of Care  Goal: Plan of Care Review  Outcome: Ongoing, Progressing  Goal: Patient-Specific Goal (Individualized)  Outcome: Ongoing, Progressing  Goal: Absence of Hospital-Acquired Illness or Injury  Outcome: Ongoing, Progressing  Intervention: Identify and Manage Fall Risk  Recent Flowsheet Documentation  Taken 5/21/2024 1705 by Saw Rockwell RN  Safety Promotion/Fall Prevention: safety round/check completed  Taken 5/21/2024 1604  by Dresbach, Saw, RN  Safety Promotion/Fall Prevention: safety round/check completed  Taken 5/21/2024 1502 by Saw Rockwell RN  Safety Promotion/Fall Prevention: safety round/check completed  Taken 5/21/2024 1410 by Saw Rockwell RN  Safety Promotion/Fall Prevention: safety round/check completed  Taken 5/21/2024 1302 by Saw Rockwell RN  Safety Promotion/Fall Prevention: safety round/check completed  Taken 5/21/2024 1210 by Saw Rockwell RN  Safety Promotion/Fall Prevention: safety round/check completed  Taken 5/21/2024 1129 by Saw Rockwell RN  Safety Promotion/Fall Prevention: safety round/check completed  Intervention: Prevent Skin Injury  Recent Flowsheet Documentation  Taken 5/21/2024 1705 by Saw Rockwell RN  Body Position: position changed independently  Taken 5/21/2024 1604 by Saw Rockwell RN  Body Position: position changed independently  Taken 5/21/2024 1502 by Saw Rockwell RN  Body Position: position changed independently  Taken 5/21/2024 1410 by Saw Rockwell RN  Body Position: position changed independently  Taken 5/21/2024 1302 by Saw Rockwell RN  Body Position: position changed independently  Taken 5/21/2024 1210 by Saw Rockwell RN  Body Position: position changed independently  Taken 5/21/2024 1129 by Saw Rockwell RN  Body Position: position changed independently  Intervention: Prevent and Manage VTE (Venous Thromboembolism) Risk  Recent Flowsheet Documentation  Taken 5/21/2024 1129 by Saw Rockwell RN  Activity Management: up ad yazan  VTE Prevention/Management: (Eliquis) other (see comments)  Goal: Optimal Comfort and Wellbeing  Outcome: Ongoing, Progressing  Intervention: Monitor Pain and Promote Comfort  Recent Flowsheet Documentation  Taken 5/21/2024 1129 by Saw Rockwell RN  Pain Management Interventions: see MAR  Intervention: Provide Person-Centered Care  Recent Flowsheet Documentation  Taken 5/21/2024 1129 by Saw Rockwell RN  Trust Relationship/Rapport: care explained  Goal: Readiness for  Transition of Care  Outcome: Ongoing, Progressing   Goal Outcome Evaluation:

## 2024-05-21 NOTE — PLAN OF CARE
Problem: Fall Injury Risk  Goal: Absence of Fall and Fall-Related Injury  Outcome: Ongoing, Progressing     Problem: Chest Pain  Goal: Resolution of Chest Pain Symptoms  Outcome: Ongoing, Progressing   Goal Outcome Evaluation:      Patient up standby assist to maintain fall prevention precautions.  IV access maintained and saline locked s/p bolus infusion. Patient provided with a hospital bed for increased comfort due to overnight stay in ED.  Patient continues to deny chest pain, and is resting comfortably at this time.     Education dicussed with patient, and great niece.

## 2024-05-21 NOTE — CONSULTS
[facility]   Consult Note    Patient Name: Wyatt Curry  : 1930  MRN: [id number]  Primary Care Physician:  Everardo Jackson MD  Referring Physician: Adolph Daniel MD  Date of admission: 2024    Consults  Subjective   Subjective     Reason for Consult/ Chief Complaint: Gross Hematuria    History of Present Illness  Wyatt Curry is a 89 y.o. male with gross hematuria.  He has a history of bladder cancer initially diagnosed in  by Dr. Hunter.  He had recurrence in  and underwent induction BCG for CIS.  He was seen in the office in 2024 with a negative upper tract imaging as well as a cystoscopy showing a heavily trabeculated bladder with multiple cellules and diverticuli but no evidence of recurrent bladder cancer.  Patient had chest pain yesterday and developed gross hematuria today.  There is no urine for me to evaluate.  Patient's niece showed a picture which appeared to be tea colored urine.  Patient does denies dysuria, incomplete emptying, passage of clots.    Review of Systems   Constitutional:  Negative for chills and fever.   Gastrointestinal:  Negative for abdominal pain, anal bleeding and blood in stool.   Genitourinary:  Positive for hematuria. Negative for dysuria.        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    COPD (chronic obstructive pulmonary disease)     GERD (gastroesophageal reflux disease)     Hyperlipidemia     Hypertension     Irregular heart beat     Lung nodules        Past Surgical History:   Procedure Laterality Date    BLADDER SURGERY      CARDIAC CATHETERIZATION      CARDIAC CATHETERIZATION Left 2023    Procedure: Cardiac Catheterization/Vascular Study;  Surgeon: Sukhi Hartley MD;  Location: [facility] CATH INVASIVE LOCATION;  Service: Cardiology;  Laterality: Left;     COLONOSCOPY      CORONARY ANGIOPLASTY WITH STENT PLACEMENT  2006    STENT X 2    CYSTOSCOPY      ENDOSCOPY      EXCISION LESION N/A 4/7/2023    Procedure: EXCISION LESION OF RIGHT TEMPLE AND LEFT TEMPLE WITH FROZEN SECTION WITH POSSIBLE FLAP OR GRAFT;  Surgeon: Brijesh Nickerson MD;  Location:  [facility] OR;  Service: ENT;  Laterality: N/A;    FLAP HEAD/NECK Left 03/09/2020    Procedure: POSSIBLE FLAP;  Surgeon: Brijesh Nickerson MD;  Location:  [facility] OR;  Service: ENT;  Laterality: Left;    HEAD/NECK LESION/CYST EXCISION Left 03/09/2020    Procedure: 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 with frozen section and possible flap or graft;  Surgeon: Brijesh Nickerson MD;  Location:  [facility] OR;  Service: ENT;  Laterality: Left;    HERNIA REPAIR      SHOULDER SURGERY      SKIN BIOPSY      lip biopsy    SKIN FULL THICKNESS GRAFT Left 03/09/2020    Procedure: OR GRAFT;  Surgeon: Brijesh Nickerson MD;  Location:  [facility] OR;  Service: ENT;  Laterality: Left;    TRANSURETHRAL RESECTION OF BLADDER TUMOR         Family History: family history includes Alcohol abuse in his sister; Diabetes in his sister; Heart attack in his brother and father; Heart disease in his brother, brother, father, and sister; Hyperlipidemia in his sister; Hypertension in his sister; Tuberculosis in his mother. Otherwise pertinent FHx was reviewed and not pertinent to current issue.    Social History:  reports that he quit smoking about 49 years ago. His smoking use included cigarettes. He started smoking about 76 years ago. He has a 30 pack-year smoking history. He has been exposed to tobacco smoke. He has never used smokeless tobacco. He reports that he does not currently use alcohol after a past usage of about 1.0 standard drink of alcohol per week. He reports that he does not use drugs.    Home Medications:   Acetaminophen, apixaban, ascorbic acid, atorvastatin, busPIRone, empagliflozin, furosemide, lansoprazole,  metoprolol succinate XL, nitroglycerin, potassium chloride, sacubitril-valsartan, spironolactone, and vitamin B-12    Allergies:  Allergies   Allergen Reactions    Pregabalin Other (See Comments)     Blurred vision only       Objective    Objective     Vitals:  Temp:  [97.2 °F (36.2 °C)-98.3 °F (36.8 °C)] 97.2 °F (36.2 °C)  Heart Rate:  [] 70  Resp:  [18-25] 18  BP: (103-143)/(50-78) 143/60  Flow (L/min):  [2] 2    Physical Exam  Vitals reviewed.   Constitutional:       General: He is not in acute distress.     Appearance: He is well-developed. He is not diaphoretic.   Pulmonary:      Effort: Pulmonary effort is normal.   Abdominal:      General: There is no distension.      Palpations: Abdomen is soft. There is no mass.      Tenderness: There is no abdominal tenderness. There is no guarding or rebound.      Hernia: No hernia is present.   Skin:     General: Skin is warm and dry.   Neurological:      Mental Status: He is alert and oriented to person, place, and time.         Result Review    Result Review:  I have personally reviewed the results from the time of this admission to 5/21/2024 15:16 CDT and agree with these findings:  []  Laboratory list / accordion  [x]  Microbiology  []  Radiology  []  EKG/Telemetry   []  Cardiology/Vascular   []  Pathology  [x]  Old records  []  Other:  Most notable findings include: Urinalysis too numerous to count RBCs 21-50 white blood cells trace bacteria  Hemoglobin 12.7 was 14 on admission  Francisca 0.79.  Assessment & Plan   Assessment / Plan     Brief Patient Summary:  Wyatt Curry is a 89 y.o. male who gross hematuria history of bladder cancer    Active Hospital Problems:  Active Hospital Problems    Diagnosis     **Chest pain     NSTEMI, initial episode of care     Atrial fibrillation with RVR     Gross hematuria     Chronic combined systolic and diastolic congestive heart failure     PAF (paroxysmal atrial fibrillation)     Basal cell carcinoma     History of  coronary artery stent placement     Overweight (BMI 25.0-29.9)     Non-rheumatic mitral regurgitation     Coronary artery disease involving native coronary artery of native heart     Essential hypertension     Mixed hyperlipidemia     Benign localized prostatic hyperplasia without lower urinary tract symptoms (LUTS)     History of bladder cancer      Plan:   Patient with recent evaluation with cystoscopy and upper tract imaging which was negative.  He does have trace bacteria on urinalysis.  Culture is pending.  I am unable to view his urine.  It does look tea colored in the picture from his niece.  There is no indication for Osullivan catheter continuous bladder irrigation.  I would not recommend further workup with cystoscopy or upper tract imaging as he recently had this.  Will do a 3 bottle routine to see if his urine clears.  Would recommend antibiotics although the patient is hesitant given history of C. difficile.    Justin Howard MD

## 2024-05-22 ENCOUNTER — READMISSION MANAGEMENT (OUTPATIENT)
Dept: CALL CENTER | Facility: HOSPITAL | Age: 89
End: 2024-05-22
Payer: MEDICARE

## 2024-05-22 ENCOUNTER — APPOINTMENT (OUTPATIENT)
Dept: CARDIOLOGY | Facility: HOSPITAL | Age: 89
End: 2024-05-22
Payer: MEDICARE

## 2024-05-22 VITALS
HEIGHT: 63 IN | DIASTOLIC BLOOD PRESSURE: 48 MMHG | HEART RATE: 66 BPM | SYSTOLIC BLOOD PRESSURE: 140 MMHG | WEIGHT: 159.1 LBS | TEMPERATURE: 98.1 F | BODY MASS INDEX: 28.19 KG/M2 | OXYGEN SATURATION: 95 % | RESPIRATION RATE: 16 BRPM

## 2024-05-22 LAB — BACTERIA SPEC AEROBE CULT: NORMAL

## 2024-05-22 PROCEDURE — 99232 SBSQ HOSP IP/OBS MODERATE 35: CPT | Performed by: NURSE PRACTITIONER

## 2024-05-22 PROCEDURE — 99232 SBSQ HOSP IP/OBS MODERATE 35: CPT | Performed by: UROLOGY

## 2024-05-22 PROCEDURE — 93246 EXT ECG>7D<15D RECORDING: CPT

## 2024-05-22 RX ORDER — CEFDINIR 300 MG/1
300 CAPSULE ORAL 2 TIMES DAILY
Qty: 10 CAPSULE | Refills: 0 | Status: SHIPPED | OUTPATIENT
Start: 2024-05-22 | End: 2024-05-27

## 2024-05-22 RX ORDER — AMIODARONE HYDROCHLORIDE 200 MG/1
TABLET ORAL
Qty: 58 TABLET | Refills: 11 | Status: SHIPPED | OUTPATIENT
Start: 2024-05-22 | End: 2024-07-05

## 2024-05-22 RX ADMIN — APIXABAN 5 MG: 5 TABLET, FILM COATED ORAL at 08:36

## 2024-05-22 RX ADMIN — SPIRONOLACTONE 25 MG: 25 TABLET ORAL at 08:36

## 2024-05-22 RX ADMIN — EMPAGLIFLOZIN 10 MG: 10 TABLET, FILM COATED ORAL at 08:36

## 2024-05-22 RX ADMIN — AMIODARONE HYDROCHLORIDE 200 MG: 200 TABLET ORAL at 08:36

## 2024-05-22 RX ADMIN — Medication 10 ML: at 08:36

## 2024-05-22 RX ADMIN — SACUBITRIL AND VALSARTAN 1 TABLET: 49; 51 TABLET, FILM COATED ORAL at 08:36

## 2024-05-22 RX ADMIN — BUSPIRONE HYDROCHLORIDE 10 MG: 10 TABLET ORAL at 08:36

## 2024-05-22 RX ADMIN — PANTOPRAZOLE SODIUM 40 MG: 40 TABLET, DELAYED RELEASE ORAL at 06:54

## 2024-05-22 NOTE — DISCHARGE SUMMARY
[facility] Medicine Services  DISCHARGE SUMMARY       Date of Admission: 5/20/2024  Date of Discharge:  5/22/2024  Primary Care Physician: Everardo Jackson MD    Presenting Problem/History of Present Illness:      Final Discharge Diagnoses:  Active Hospital Problems    Diagnosis     **Chest pain     NSTEMI, initial episode of care     Atrial fibrillation with RVR     Gross hematuria     Chronic combined systolic and diastolic congestive heart failure     PAF (paroxysmal atrial fibrillation)     Basal cell carcinoma     History of coronary artery stent placement     Overweight (BMI 25.0-29.9)     Non-rheumatic mitral regurgitation     Coronary artery disease involving native coronary artery of native heart     Essential hypertension     Mixed hyperlipidemia     Benign localized prostatic hyperplasia without lower urinary tract symptoms (LUTS)     History of bladder cancer        Consults: Urology . palliative care . cardiology.    Pertinent Test Results:   Results for orders placed during the hospital encounter of 05/20/24    Adult Transthoracic Echo Complete W/ Cont if Necessary Per Protocol    Interpretation Summary    Left ventricular systolic function is mildly decreased. Left ventricular ejection fraction appears to be 41 - 45%.    Left ventricular wall thickness is consistent with mild concentric hypertrophy.    Left ventricular diastolic function is consistent with (grade II w/high LAP) pseudonormalization.    Normal right ventricular cavity size and systolic function noted.    The left atrial cavity is moderately dilated.    There is mild calcification of the aortic valve. There is mild to moderate thickening of the aortic valve. Mild aortic valve regurgitation is present. Mild aortic valve stenosis is present.    Mild mitral valve regurgitation is present.    Mild pulmonary hypertension is present.      Imaging Results (All)       Procedure Component Value Units  Date/Time    XR Chest 1 View [[id number]] Collected: 05/20/24 1818     Updated: 05/20/24 1822    Narrative:      EXAMINATION:  XR CHEST 1 VW-  5/20/2024 4:55 PM     HISTORY: Chest pain protocol.     COMPARISON: 12/12/2023.     TECHNIQUE: Single view AP image.     FINDINGS: There is no dense infiltrate or effusion. There is old  granulomatous disease. Mild bronchial wall thickening is stable. Heart  size upper limits of normal. Prior left shoulder arthroplasty.  Degenerative changes of the visualized spine.          Impression:      1. No new infiltrate or effusion.  2. Stable bronchial wall thickening. Old granulomatous disease.           This report was signed and finalized on 5/20/2024 6:19 PM by Dr. Vic Mcneal MD.             LAB RESULTS:      Lab 05/21/24  0508 05/20/24  1728   WBC 5.48 9.42   HEMOGLOBIN 12.7* 14.0   HEMATOCRIT 42.0 43.7   PLATELETS 131* 162   NEUTROS ABS 2.88 5.97   IMMATURE GRANS (ABS) 0.02 0.02   LYMPHS ABS 1.78 2.21   MONOS ABS 0.65 1.10*   EOS ABS 0.13 0.08   MCV 95.9 92.6         Lab 05/21/24  0508 05/20/24  1728   SODIUM 142 141   POTASSIUM 4.2 4.5   CHLORIDE 107 104   CO2 28.0 25.0   ANION GAP 7.0 12.0   BUN 24* 28*   CREATININE 0.79 1.10   EGFR 82.3 62.2   GLUCOSE 103* 120*   CALCIUM 9.0 9.5   HEMOGLOBIN A1C 5.90*  --          Lab 05/21/24  0508 05/20/24  1728   TOTAL PROTEIN 6.6 7.3   ALBUMIN 3.8 4.0   GLOBULIN 2.8 3.3   ALT (SGPT) 10 13   AST (SGOT) 18 22   BILIRUBIN 0.5 0.4   ALK PHOS 55 61         Lab 05/21/24  0508 05/20/24  1911 05/20/24  1728   HSTROP T 97* 77* 61*         Lab 05/21/24  0508   CHOLESTEROL 103   LDL CHOL 53   HDL CHOL 39*   TRIGLYCERIDES 45             Brief Urine Lab Results  (Last result in the past 365 days)        Color   Clarity   Blood   Leuk Est   Nitrite   Protein   CREAT   Urine HCG        05/21/24 0842 Red   Turbid   Large (3+)   Large (3+)   Negative   100 mg/dL (2+)                 Microbiology Results (last 10 days)       Procedure Component Value  - Date/Time    Urine Culture - Urine, Urine, Clean Catch [[id number]] Collected: 05/21/24 0842    Lab Status: Final result Specimen: Urine, Clean Catch Updated: 05/22/24 1044     Urine Culture 25,000 CFU/mL Normal Urogenital [place]    Narrative:      Colonization of the urinary tract without infection is common. Treatment is discouraged unless the patient is symptomatic, pregnant, or undergoing an invasive urologic procedure.        HPI .  Very pleasant 89-year-old gentleman who presents to the emergency department with an episode of chest pain and shortness of breath. He states he had chest pain at home. Lasted about 2 hours. It was midsternal in nature. He took a nitroglycerin and it helped. He then went to the bank, and walking about 20 yards into the bank he got short of breath. He had no nausea. When he got home he was diaphoretic. He has had increased urinary frequency. He has been coughing a little bit more than normal. He has history of coronary artery disease, congestive heart failure quadrant atrial fibrillation. He follows with Dr. Lindo.     Hospital Course:   Chest pain/CAD/hypertension/CHF/hyperlipidemia.  Midsternal in nature.  Nitro helped.  Follow with Dr. Graves outpatient.  Cardiology consult.  Amiodarone.  Eliquis.  Patient refusing after 7 in ER.  Lipitor.  Digoxin.  Toprol.  Jardiance.  Entresto . Aldactone.  Nitro as needed.  Hydralazine as needed.  Labetalol as needed.  Heart cath 4/8/23- Normal left main coronary artery, Mid left anterior descending coronary artery has a 90% stenosis, No obstructive disease of diagonal branches Mid to distal left anterior descending coronary artery overall is a much smaller vessel compared to the second diagonal branch which is approximately 4 times as large, At this location has 90% stenosis that was treated with PTCA Proximal left anterior descending coronary artery has a 90% stenosis Had guide induced dissection prior to this stenosis that was intervened  upon by placement of drug-eluting stent with complete restoration of flow- Refer to the description in the interventional section Patent stents noted in the obtuse marginal branch as well as in the mid and mid to distal left circumflex coronary artery- First obtuse marginal branch is a small vessels approximately half millimeter in diameter, Second obtuse marginal branch is approximately 2.5 mm in diameter which has the stent as mentioned, Moderate atherosclerotic changes of the right coronary artery with sequential 3 to 50% stenoses in the mid and distal portion without obstructive disease, Right coronary artery is a large vessel  Echocardiogram-ejection fraction 41 to 45%, mild concentric hypertrophy, diastolic dysfunction grade 2, right ventricle cavity size and systolic function normal, left atrial cavity moderate dilated, mild calcification of aortic valve- mild to moderate thickening of aortic valve- mild aortic valve regurgitation- mild aortic valve stenosis, mild mitral valve regurgitation, mild pulmonary hypertension.     Shortness of breath.  Resolved.  Chest x-ray-No new infiltrate or effusion, Stable bronchial wall thickening, Old granulomatous disease.  Patient is on room air.     Hematuria.  Hematuria is improving.  UA-trace bacteria, large blood.  Consult urology.  Stop Plavix.  Continue Eliquis.  Niece refused antibiotics due to history of C. difficile.  Urology recommendation-DC, follow-up in 2 weeks in urology office, recommended antibiotics.     Anemia . Decrease in hemoglobin.     Nausea.  Zofran as needed.  Protonix.     Anxiety/depression.  BuSpar.     Nutrition . Cardiac diet.     Deconditioning.  PT consult.     Advanced age . 89 years old.  Palliative care consult.  DNR.  DNI.     Urine cultures pending.     Blood pressure stable, afebrile.  Plan to discharge patient home today.  Discussed patient recommended from urology, will sent home 5 days of Omnicef.  Follow-up with PCP 1 week.   "Follow-up with cardiology outpatient.  Follow-up with urology outpatient.    Physical Exam on Discharge:  /48 (BP Location: Left arm, Patient Position: Sitting)   Pulse 66   Temp 98.1 °F (36.7 °C) (Oral)   Resp 16   Ht 160 cm (63\")   Wt 72.2 kg (159 lb 1.6 oz)   SpO2 95%   BMI 28.18 kg/m²   Physical Exam  Vitals and nursing note reviewed.   Constitutional:       Comments: Advanced age.   HENT:      Head: Normocephalic.   Eyes:      Conjunctiva/sclera: Conjunctivae normal.      Pupils: Pupils are equal, round, and reactive to light.   Cardiovascular:      Rate and Rhythm: Normal rate and regular rhythm.      Heart sounds: Normal heart sounds.   Pulmonary:      Effort: No respiratory distress.      Comments: Diminished breath sound bilateral, clear, on room air.  Abdominal:      General: Bowel sounds are normal. There is no distension.      Palpations: Abdomen is soft.      Tenderness: There is no abdominal tenderness.   Musculoskeletal:         General: No swelling.      Cervical back: Neck supple.   Skin:     General: Skin is warm and dry.      Capillary Refill: Capillary refill takes 2 to 3 seconds.      Findings: No rash.   Neurological:      Mental Status: He is alert.      Motor: Weakness present.      Coordination: Coordination abnormal.      Gait: Gait abnormal.   Psychiatric:         Mood and Affect: Mood normal.         Behavior: Behavior normal.           Condition on Discharge: Stable.    Discharge Disposition: Discharge home with family.  Home or Self Care    Discharge Medications:     Discharge Medications        New Medications        Instructions Start Date   amiodarone 200 MG tablet  Commonly known as: PACERONE   Take 1 tablet by mouth Every 12 (Twelve) Hours for 14 days, THEN 1 tablet Daily for 30 days.   Start Date: May 22, 2024     cefdinir 300 MG capsule  Commonly known as: OMNICEF   300 mg, Oral, 2 Times Daily             Continue These Medications        Instructions Start Date "   ACETAMINOPHEN ER PO   500 mg, Oral, Every 6 Hours PRN      apixaban 5 MG tablet tablet  Commonly known as: ELIQUIS   5 mg, Oral, Every 12 Hours Scheduled      ascorbic acid 500 MG tablet  Commonly known as: VITAMIN C   500 mg, Oral, Daily      atorvastatin 20 MG tablet  Commonly known as: LIPITOR   20 mg, Oral, Every Night at Bedtime      busPIRone 10 MG tablet  Commonly known as: BUSPAR   10 mg, Oral, 2 Times Daily      empagliflozin 10 MG tablet tablet  Commonly known as: JARDIANCE   10 mg, Oral, Daily      Entresto 49-51 MG tablet  Generic drug: sacubitril-valsartan   1 tablet, Oral, 2 Times Daily      lansoprazole 30 MG capsule  Commonly known as: PREVACID   30 mg, Oral, Daily      metoprolol succinate XL 50 MG 24 hr tablet  Commonly known as: TOPROL-XL   50 mg, Oral, Nightly      nitroglycerin 0.4 MG SL tablet  Commonly known as: NITROSTAT   0.4 mg, Sublingual, Every 5 Minutes PRN      spironolactone 25 MG tablet  Commonly known as: ALDACTONE   25 mg, Oral, Daily      vitamin B-12 1000 MCG tablet  Commonly known as: CYANOCOBALAMIN   1,000 mcg, Oral, Daily             Stop These Medications      furosemide 40 MG tablet  Commonly known as: LASIX     potassium chloride 10 MEQ CR tablet  Commonly known as: KLOR-CON M10                Discharge Diet:   Diet Instructions       Advance Diet As Tolerated -Target Diet: Cardiac      Target Diet: Cardiac            Activity at Discharge:   Activity Instructions       Activity as Tolerated              Follow-up Appointments:   Future Appointments   Date Time Provider Department Center   7/25/2024  2:30 PM Ann Mcneal APRN [facility]       Follow-up with PCP 1 week . Follow-up with urology in 2 weeks outpatient.  Follow with cardiology outpatient.  Electronically signed by Brody Jackson MD, 05/22/24, 15:23 CDT.    Time: Greater than 30 minutes.

## 2024-05-22 NOTE — PROGRESS NOTES
[facility] Medicine Services  INPATIENT PROGRESS NOTE    Patient Name: Wyatt Curry  Date of Admission: 5/20/2024  Today's Date: 05/22/24  Length of Stay: 2  Primary Care Physician: Everardo Jackson MD    Subjective   Chief Complaint: CAD/CHF/advanced age/hematuria   HPI   Patient is on room air.  Blood pressure stable.  Heart rates in the 60s.  Hematuria is improving.  Afebrile.    Review of Systems   Constitutional:  Positive for activity change, appetite change and fatigue. Negative for chills and fever.   HENT:  Negative for hearing loss, nosebleeds, tinnitus and trouble swallowing.    Eyes:  Negative for visual disturbance.   Respiratory:  Negative for cough, chest tightness, shortness of breath and wheezing.    Cardiovascular:  Negative for chest pain, palpitations and leg swelling.   Gastrointestinal:  Negative for abdominal distention, abdominal pain, blood in stool, constipation, diarrhea, nausea and vomiting.   Endocrine: Negative for cold intolerance, heat intolerance, polydipsia, polyphagia and polyuria.   Genitourinary:  Negative for decreased urine volume, difficulty urinating, dysuria, flank pain, frequency and hematuria.   Musculoskeletal:  Positive for arthralgias, gait problem and myalgias. Negative for joint swelling.   Skin:  Negative for rash.   Allergic/Immunologic: Negative for immunocompromised state.   Neurological:  Positive for weakness. Negative for dizziness, syncope, light-headedness and headaches.   Hematological:  Negative for adenopathy. Does not bruise/bleed easily.   Psychiatric/Behavioral:  Negative for confusion and sleep disturbance. The patient is not nervous/anxious.         All pertinent negatives and positives are as above. All other systems have been reviewed and are negative unless otherwise stated.     Objective    Temp:  [97.2 °F (36.2 °C)-98.4 °F (36.9 °C)] 98 °F (36.7 °C)  Heart Rate:  [60-70] 60  Resp:  [16-18] 16  BP:  "(114-145)/(45-64) 122/45  Physical Exam  Vitals and nursing note reviewed.   Constitutional:       Comments: Advanced age.   HENT:      Head: Normocephalic.   Eyes:      Conjunctiva/sclera: Conjunctivae normal.      Pupils: Pupils are equal, round, and reactive to light.   Cardiovascular:      Rate and Rhythm: Normal rate and regular rhythm.      Heart sounds: Normal heart sounds.   Pulmonary:      Effort: No respiratory distress.      Comments: Diminished breath sound bilateral, clear, on room air.  Abdominal:      General: Bowel sounds are normal. There is no distension.      Palpations: Abdomen is soft.      Tenderness: There is no abdominal tenderness.   Musculoskeletal:         General: No swelling.      Cervical back: Neck supple.   Skin:     General: Skin is warm and dry.      Capillary Refill: Capillary refill takes 2 to 3 seconds.      Findings: No rash.   Neurological:      Mental Status: He is alert.      Motor: Weakness present.      Coordination: Coordination abnormal.      Gait: Gait abnormal.   Psychiatric:         Mood and Affect: Mood normal.         Behavior: Behavior normal.       Results Review:  I have reviewed the labs, radiology results, and diagnostic studies.    Laboratory Data:   Results from last 7 days   Lab Units 05/21/24  0508 05/20/24  1728   WBC 10*3/mm3 5.48 9.42   HEMOGLOBIN g/dL 12.7* 14.0   HEMATOCRIT % 42.0 43.7   PLATELETS 10*3/mm3 131* 162        Results from last 7 days   Lab Units 05/21/24  0508 05/20/24  1728   SODIUM mmol/L 142 141   POTASSIUM mmol/L 4.2 4.5   CHLORIDE mmol/L 107 104   CO2 mmol/L 28.0 25.0   BUN mg/dL 24* 28*   CREATININE mg/dL 0.79 1.10   CALCIUM mg/dL 9.0 9.5   BILIRUBIN mg/dL 0.5 0.4   ALK PHOS U/L 55 61   ALT (SGPT) U/L 10 13   AST (SGOT) U/L 18 22   GLUCOSE mg/dL 103* 120*       Culture Data:   No results found for: \"BLOODCX\", \"URINECX\", \"WOUNDCX\", \"MRSACX\", \"RESPCX\", \"STOOLCX\"    Radiology Data:   Imaging Results (Last 24 Hours)       ** No results " found for the last 24 hours. **            I have reviewed the patient's current medications.     Assessment/Plan   Assessment  Active Hospital Problems    Diagnosis     **Chest pain     NSTEMI, initial episode of care     Atrial fibrillation with RVR     Gross hematuria     Chronic combined systolic and diastolic congestive heart failure     PAF (paroxysmal atrial fibrillation)     Basal cell carcinoma     History of coronary artery stent placement     Overweight (BMI 25.0-29.9)     Non-rheumatic mitral regurgitation     Coronary artery disease involving native coronary artery of native heart     Essential hypertension     Mixed hyperlipidemia     Benign localized prostatic hyperplasia without lower urinary tract symptoms (LUTS)     History of bladder cancer        Treatment Plan  Chest pain/CAD/hypertension/CHF/hyperlipidemia.  Midsternal in nature.  Nitro helped.  Follow with Dr. Graves outpatient.  Cardiology consult.  Amiodarone.  Eliquis.  Patient refusing after 7 in ER.  Lipitor.  Digoxin.  Toprol.  Jardiance.  Entresto . Aldactone.  Nitro as needed.  Hydralazine as needed.  Labetalol as needed.  Heart cath 4/8/23- Normal left main coronary artery, Mid left anterior descending coronary artery has a 90% stenosis, No obstructive disease of diagonal branches Mid to distal left anterior descending coronary artery overall is a much smaller vessel compared to the second diagonal branch which is approximately 4 times as large, At this location has 90% stenosis that was treated with PTCA Proximal left anterior descending coronary artery has a 90% stenosis Had guide induced dissection prior to this stenosis that was intervened upon by placement of drug-eluting stent with complete restoration of flow- Refer to the description in the interventional section Patent stents noted in the obtuse marginal branch as well as in the mid and mid to distal left circumflex coronary artery- First obtuse marginal branch is a small  vessels approximately half millimeter in diameter, Second obtuse marginal branch is approximately 2.5 mm in diameter which has the stent as mentioned, Moderate atherosclerotic changes of the right coronary artery with sequential 3 to 50% stenoses in the mid and distal portion without obstructive disease, Right coronary artery is a large vessel  Echocardiogram-ejection fraction 41 to 45%, mild concentric hypertrophy, diastolic dysfunction grade 2, right ventricle cavity size and systolic function normal, left atrial cavity moderate dilated, mild calcification of aortic valve- mild to moderate thickening of aortic valve- mild aortic valve regurgitation- mild aortic valve stenosis, mild mitral valve regurgitation, mild pulmonary hypertension.     Shortness of breath.  Resolved.  Chest x-ray-No new infiltrate or effusion, Stable bronchial wall thickening, Old granulomatous disease.  Patient is on room air.     Hematuria.  Hematuria is improving.  UA-trace bacteria, large blood.  Consult urology.  Stop Plavix.  Continue Eliquis.  Niece refused antibiotics due to history of C. difficile.  Urology recommendation-DC, follow-up in 2 weeks in urology office, recommended antibiotics.    Anemia . Decrease in hemoglobin.     Nausea.  Zofran as needed.  Protonix.     Anxiety/depression.  BuSpar.     Nutrition . Cardiac diet.     Deconditioning.  PT consult.     Advanced age . 89 years old.  Palliative care consult.  DNR.  DNI.    Urine cultures pending.     Medical Decision Making  Number and Complexity of problems: Chest pain/CAD/CHF/hyperlipidemia/shortness of breath/hematuria  Differential Diagnosis: None.     Conditions and Status        Condition is unchanged.     [facility] Data  External documents reviewed: ER notes previous notes.  Cardiac tracing (EKG, telemetry) interpretation: Sinus/left bundle branch block  Radiology interpretation: Chest x-ray/echo  Labs reviewed: Laboratory  Any tests that were considered but not ordered:  Laboratory in AM     Decision rules/scores evaluated (example [id number], [place], etc): None     Discussed with: Patient and granddaughter     Care Planning  Shared decision making: Patient and granddaughter  Code status and discussions: DNR . DNI.     Disposition  Social Determinants of Health that impact treatment or disposition: Unknown.  1 to 2 days       Electronically signed by Brody Jackson MD, 05/22/24, 09:00 CDT.

## 2024-05-22 NOTE — PROGRESS NOTES
[facility] -  Progress Note     LOS: 2 days   Patient Care Team:  Everardo Jackson MD as PCP - General (Family Medicine)  Justin Howard MD as Consulting Physician (Urology)  Emigdio Moseley MD (Inactive) as Cardiologist (Cardiology)  Everardo Noriega MD as Consulting Physician (Pulmonary Disease)  Brijesh Nickerson MD as Consulting Physician (Otolaryngology)  Kaitlin Del Angel PA as Referring Physician (Physician Assistant)    Chief Complaint:A-fib RVR    Subjective     Interval History:   Patient remains stable with no complaints. Has been up and ambulatory without issues. Hematuria has improved. He is sitting up in chair and hopeful for discharge. Breath is at baseline. Denies chest pain. Maintaining NSR.     Review of Systems:     Review of Systems   All other systems reviewed and are negative.    Objective     Vital Sign Min/Max for last 24 hours  Temp  Min: 97.9 °F (36.6 °C)  Max: 98.4 °F (36.9 °C)   BP  Min: 114/59  Max: 145/64   Pulse  Min: 60  Max: 68   Resp  Min: 16  Max: 18   SpO2  Min: 93 %  Max: 96 %   No data recorded   No data recorded         05/21/24  0234   Weight: 72.2 kg (159 lb 1.6 oz)       Telemetry: NSR 57-70, PVCs LBBB      Physical Exam:    Constitutional:       Appearance: Well-developed and not in distress. Frail. Chronically ill-appearing.   Eyes:      Pupils: Pupils are equal, round, and reactive to light.   HENT:      Head: Normocephalic and atraumatic.      Nose: Nose normal.    Mouth/Throat:      Pharynx: Oropharynx is clear.   Neck:      Vascular: No carotid bruit or JVD.   Pulmonary:      Effort: Pulmonary effort is normal.      Breath sounds: Normal breath sounds.   Cardiovascular:      Normal rate. Regular rhythm.      Murmurs: There is no murmur.   Pulses:     Intact distal pulses.   Edema:     Peripheral edema absent.   Abdominal:      General: Bowel sounds are normal.      Palpations: Abdomen is soft.   Musculoskeletal: Normal range of  motion.      Cervical back: Normal range of motion and neck supple. Skin:     General: Skin is warm and dry.   Neurological:      Mental Status: Alert and oriented to person, place, and time.      Deep Tendon Reflexes: Reflexes are normal and symmetric.   Psychiatric:         Behavior: Behavior normal.         Thought Content: Thought content normal.         Judgment: Judgment normal.       Results Review:   Lab Results (last 72 hours)       Procedure Component Value Units Date/Time    Urine Culture - Urine, Urine, Clean Catch [[id number]] Collected: 05/21/24 0842    Specimen: Urine, Clean Catch Updated: 05/22/24 1044     Urine Culture 25,000 CFU/mL Normal Urogenital [place]    Narrative:      Colonization of the urinary tract without infection is common. Treatment is discouraged unless the patient is symptomatic, pregnant, or undergoing an invasive urologic procedure.    Urinalysis, Microscopic Only - Urine, Clean Catch [[id number]]  (Abnormal) Collected: 05/21/24 0842    Specimen: Urine, Clean Catch Updated: 05/21/24 0912     RBC, UA Too Numerous to Count /HPF      WBC, UA 21-50 /HPF      Bacteria, UA Trace /HPF      Squamous Epithelial Cells, UA 0-2 /HPF      Yeast, UA Small/1+ Budding Yeast /HPF      Hyaline Casts, UA 0-2 /LPF      Methodology Manual Light Microscopy    Urinalysis With Culture If Indicated - Urine, Clean Catch [[id number]]  (Abnormal) Collected: 05/21/24 0842    Specimen: Urine, Clean Catch Updated: 05/21/24 0912     Color, UA Red     Appearance, UA Turbid     pH, UA 5.5     Specific Gravity, UA 1.016     Glucose, UA >=1000 mg/dL (3+)     Ketones, UA Negative     Bilirubin, UA Small (1+)     Blood, UA Large (3+)     Protein,  mg/dL (2+)     Leuk Esterase, UA Large (3+)     Nitrite, UA Negative     Urobilinogen, UA 0.2 E.U./dL    Narrative:      In absence of clinical symptoms, the presence of pyuria, bacteria, and/or nitrites on the urinalysis result does not correlate with infection.    High  Sensitivity Troponin T [[id number]]  (Abnormal) Collected: 05/21/24 0508    Specimen: Blood from Arm, Left Updated: 05/21/24 0558     HS Troponin T 97 ng/L     Narrative:      High Sensitive Troponin T Reference Range:  <14.0 ng/L- Negative Female for AMI  <22.0 ng/L- Negative Male for AMI  >=14 - Abnormal Female indicating possible myocardial injury.  >=22 - Abnormal Male indicating possible myocardial injury.   Clinicians would have to utilize clinical acumen, EKG, Troponin, and serial changes to determine if it is an Acute Myocardial Infarction or myocardial injury due to an underlying chronic condition.         Lipid Panel [[id number]]  (Abnormal) Collected: 05/21/24 0508    Specimen: Blood from Arm, Left Updated: 05/21/24 0555     Total Cholesterol 103 mg/dL      Triglycerides 45 mg/dL      HDL Cholesterol 39 mg/dL      LDL Cholesterol  53 mg/dL      VLDL Cholesterol 11 mg/dL      LDL/HDL Ratio 1.41    Narrative:      Cholesterol Reference Ranges  (U.S. Department of Health and Human Services ATP III Classifications)    Desirable          <200 mg/dL  Borderline High    200-239 mg/dL  High Risk          >240 mg/dL      Triglyceride Reference Ranges  (U.S. Department of Health and Human Services ATP III Classifications)    Normal           <150 mg/dL  Borderline High  150-199 mg/dL  High             200-499 mg/dL  Very High        >500 mg/dL    HDL Reference Ranges  (U.S. Department of Health and Human Services ATP III Classifications)    Low     <40 mg/dl (major risk factor for CHD)  High    >60 mg/dl ('negative' risk factor for CHD)        LDL Reference Ranges  (U.S. Department of Health and Human Services ATP III Classifications)    Optimal          <100 mg/dL  Near Optimal     100-129 mg/dL  Borderline High  130-159 mg/dL  High             160-189 mg/dL  Very High        >189 mg/dL    Comprehensive Metabolic Panel [[id number]]  (Abnormal) Collected: 05/21/24 0508    Specimen: Blood from Arm, Left Updated:  05/21/24 0552     Glucose 103 mg/dL      BUN 24 mg/dL      Creatinine 0.79 mg/dL      Sodium 142 mmol/L      Potassium 4.2 mmol/L      Chloride 107 mmol/L      CO2 28.0 mmol/L      Calcium 9.0 mg/dL      Total Protein 6.6 g/dL      Albumin 3.8 g/dL      ALT (SGPT) 10 U/L      AST (SGOT) 18 U/L      Alkaline Phosphatase 55 U/L      Total Bilirubin 0.5 mg/dL      Globulin 2.8 gm/dL      A/G Ratio 1.4 g/dL      BUN/Creatinine Ratio 30.4     Anion Gap 7.0 mmol/L      eGFR 82.3 mL/min/1.73     Narrative:      GFR Normal >60  Chronic Kidney Disease <60  Kidney Failure <15    The GFR formula is only valid for adults with stable renal function between ages 18 and 70.    Hemoglobin A1c [[id number]]  (Abnormal) Collected: 05/21/24 0508    Specimen: Blood from Arm, Left Updated: 05/21/24 0541     Hemoglobin A1C 5.90 %     Narrative:      Hemoglobin A1C Ranges:    Increased Risk for Diabetes  5.7% to 6.4%  Diabetes                     >= 6.5%  Diabetic Goal                < 7.0%    CBC Auto Differential [[id number]]  (Abnormal) Collected: 05/21/24 0508    Specimen: Blood from Arm, Left Updated: 05/21/24 0532     WBC 5.48 10*3/mm3      RBC 4.38 10*6/mm3      Hemoglobin 12.7 g/dL      Hematocrit 42.0 %      MCV 95.9 fL      MCH 29.0 pg      MCHC 30.2 g/dL      RDW 14.0 %      RDW-SD 49.9 fl      MPV 10.6 fL      Platelets 131 10*3/mm3      Neutrophil % 52.4 %      Lymphocyte % 32.5 %      Monocyte % 11.9 %      Eosinophil % 2.4 %      Basophil % 0.4 %      Immature Grans % 0.4 %      Neutrophils, Absolute 2.88 10*3/mm3      Lymphocytes, Absolute 1.78 10*3/mm3      Monocytes, Absolute 0.65 10*3/mm3      Eosinophils, Absolute 0.13 10*3/mm3      Basophils, Absolute 0.02 10*3/mm3      Immature Grans, Absolute 0.02 10*3/mm3     High Sensitivity Troponin T 2Hr [[id number]]  (Abnormal) Collected: 05/20/24 1911    Specimen: Blood Updated: 05/20/24 1947     HS Troponin T 77 ng/L      Troponin T Delta 16 ng/L     Narrative:      High  Sensitive Troponin T Reference Range:  <14.0 ng/L- Negative Female for AMI  <22.0 ng/L- Negative Male for AMI  >=14 - Abnormal Female indicating possible myocardial injury.  >=22 - Abnormal Male indicating possible myocardial injury.   Clinicians would have to utilize clinical acumen, EKG, Troponin, and serial changes to determine if it is an Acute Myocardial Infarction or myocardial injury due to an underlying chronic condition.         Digoxin Level [[id number]]  (Abnormal) Collected: 05/20/24 1728    Specimen: Blood Updated: 05/20/24 1808     Digoxin <0.30 ng/mL     Comprehensive Metabolic Panel [[id number]]  (Abnormal) Collected: 05/20/24 1728    Specimen: Blood Updated: 05/20/24 1804     Glucose 120 mg/dL      BUN 28 mg/dL      Creatinine 1.10 mg/dL      Sodium 141 mmol/L      Potassium 4.5 mmol/L      Comment: Specimen hemolyzed.  Result may be falsely elevated.        Chloride 104 mmol/L      CO2 25.0 mmol/L      Calcium 9.5 mg/dL      Total Protein 7.3 g/dL      Albumin 4.0 g/dL      ALT (SGPT) 13 U/L      Comment: Specimen hemolyzed.  Result may  be falsely elevated.        AST (SGOT) 22 U/L      Comment: Specimen hemolyzed.  Result may be falsely elevated.        Alkaline Phosphatase 61 U/L      Total Bilirubin 0.4 mg/dL      Globulin 3.3 gm/dL      A/G Ratio 1.2 g/dL      BUN/Creatinine Ratio 25.5     Anion Gap 12.0 mmol/L      eGFR 62.2 mL/min/1.73     Narrative:      GFR Normal >60  Chronic Kidney Disease <60  Kidney Failure <15    The GFR formula is only valid for adults with stable renal function between ages 18 and 70.    High Sensitivity Troponin T [[id number]]  (Abnormal) Collected: 05/20/24 1728    Specimen: Blood Updated: 05/20/24 1800     HS Troponin T 61 ng/L     Narrative:      High Sensitive Troponin T Reference Range:  <14.0 ng/L- Negative Female for AMI  <22.0 ng/L- Negative Male for AMI  >=14 - Abnormal Female indicating possible myocardial injury.  >=22 - Abnormal Male indicating possible  myocardial injury.   Clinicians would have to utilize clinical acumen, EKG, Troponin, and serial changes to determine if it is an Acute Myocardial Infarction or myocardial injury due to an underlying chronic condition.         [place] Draw [[id number]] Collected: 05/20/24 1728    Specimen: Blood Updated: 05/20/24 1746    Narrative:      The following orders were created for panel order [place] Draw.  Procedure                               Abnormality         Status                     ---------                               -----------         ------                     Green Top (Gel)[[id number]]                                  Final result               Lavender Top[[id number]]                                     Final result               Red Top[[id number]]                                          Final result               Stearns Top[[id number]]                                         Final result               Light Blue Top[[id number]]                                   Final result                 Please view results for these tests on the individual orders.    Green Top (Gel) [[id number]] Collected: 05/20/24 1728    Specimen: Blood Updated: 05/20/24 1746     Extra Tube Hold for add-ons.     Comment: Auto resulted.       Lavender Top [[id number]] Collected: 05/20/24 1728    Specimen: Blood Updated: 05/20/24 1746     Extra Tube hold for add-on     Comment: Auto resulted       Red Top [[id number]] Collected: 05/20/24 1728    Specimen: Blood Updated: 05/20/24 1746     Extra Tube Hold for add-ons.     Comment: Auto resulted.       Stearns Top [[id number]] Collected: 05/20/24 1728    Specimen: Blood Updated: 05/20/24 1746     Extra Tube Hold for add-ons.     Comment: Auto resulted.       Light Blue Top [[id number]] Collected: 05/20/24 1728    Specimen: Blood Updated: 05/20/24 1746     Extra Tube Hold for add-ons.     Comment: Auto resulted       CBC & Differential [[id number]]  (Abnormal) Collected: 05/20/24 1728    Specimen:  Blood Updated: 05/20/24 1742    Narrative:      The following orders were created for panel order CBC & Differential.  Procedure                               Abnormality         Status                     ---------                               -----------         ------                     CBC Auto Differential[[id number]]        Abnormal            Final result                 Please view results for these tests on the individual orders.    CBC Auto Differential [[id number]]  (Abnormal) Collected: 05/20/24 1728    Specimen: Blood Updated: 05/20/24 1742     WBC 9.42 10*3/mm3      RBC 4.72 10*6/mm3      Hemoglobin 14.0 g/dL      Hematocrit 43.7 %      MCV 92.6 fL      MCH 29.7 pg      MCHC 32.0 g/dL      RDW 14.1 %      RDW-SD 47.9 fl      MPV 10.5 fL      Platelets 162 10*3/mm3      Neutrophil % 63.4 %      Lymphocyte % 23.5 %      Monocyte % 11.7 %      Eosinophil % 0.8 %      Basophil % 0.4 %      Immature Grans % 0.2 %      Neutrophils, Absolute 5.97 10*3/mm3      Lymphocytes, Absolute 2.21 10*3/mm3      Monocytes, Absolute 1.10 10*3/mm3      Eosinophils, Absolute 0.08 10*3/mm3      Basophils, Absolute 0.04 10*3/mm3      Immature Grans, Absolute 0.02 10*3/mm3      nRBC 0.0 /100 WBC                 Echo EF Estimated  Results for orders placed during the hospital encounter of 05/20/24    Adult Transthoracic Echo Complete W/ Cont if Necessary Per Protocol    Interpretation Summary    Left ventricular systolic function is mildly decreased. Left ventricular ejection fraction appears to be 41 - 45%.    Left ventricular wall thickness is consistent with mild concentric hypertrophy.    Left ventricular diastolic function is consistent with (grade II w/high LAP) pseudonormalization.    Normal right ventricular cavity size and systolic function noted.    The left atrial cavity is moderately dilated.    There is mild calcification of the aortic valve. There is mild to moderate thickening of the aortic valve. Mild aortic  valve regurgitation is present. Mild aortic valve stenosis is present.    Mild mitral valve regurgitation is present.    Mild pulmonary hypertension is present.       Medication Review: yes  Current Facility-Administered Medications   Medication Dose Route Frequency Provider Last Rate Last Admin    acetaminophen (TYLENOL) tablet 650 mg  650 mg Oral Q4H PRN Mira Anand DO   650 mg at 05/21/24 2035    amiodarone (PACERONE) tablet 200 mg  200 mg Oral Q12H Jovani Lilly APRN   200 mg at 05/22/24 0836    apixaban (ELIQUIS) tablet 5 mg  5 mg Oral Q12H Brody Jackson MD   5 mg at 05/22/24 0836    atorvastatin (LIPITOR) tablet 20 mg  20 mg Oral Nightly Mira Anand DO   20 mg at 05/21/24 2036    sennosides-docusate (PERICOLACE) 8.6-50 MG per tablet 2 tablet  2 tablet Oral BID PRN Mira Anand DO        And    polyethylene glycol (MIRALAX) packet 17 g  17 g Oral Daily PRN Mira Anand DO        And    bisacodyl (DULCOLAX) EC tablet 5 mg  5 mg Oral Daily PRN Mira Anand DO        And    bisacodyl (DULCOLAX) suppository 10 mg  10 mg Rectal Daily PRN Mira Anand DO        busPIRone (BUSPAR) tablet 10 mg  10 mg Oral BID Mira Anand DO   10 mg at 05/22/24 0836    empagliflozin (JARDIANCE) tablet 10 mg  10 mg Oral Daily Mira Anand DO   10 mg at 05/22/24 0836    metoprolol succinate XL (TOPROL-XL) 24 hr tablet 50 mg  50 mg Oral Nightly Mira Anand DO   50 mg at 05/21/24 2036    nitroglycerin (NITROSTAT) SL tablet 0.4 mg  0.4 mg Sublingual Q5 Min PRN Mira Anand DO        ondansetron (ZOFRAN) injection 4 mg  4 mg Intravenous Q6H PRN Mira Anand DO        pantoprazole (PROTONIX) EC tablet 40 mg  40 mg Oral Q AM Mira Anand DO   40 mg at 05/22/24 0654    sacubitril-valsartan (ENTRESTO) 49-51 MG tablet 1 tablet  1 tablet Oral BID Mira Anand DO   1 tablet at 05/22/24 0836    sodium chloride 0.9 % flush 10 mL  10 mL Intravenous Q12H  Mira Anand, DO   10 mL at 05/22/24 0836    sodium chloride 0.9 % flush 10 mL  10 mL Intravenous PRN Mira Anand, DO        sodium chloride 0.9 % infusion 40 mL  40 mL Intravenous PRN Mira Anand, DO        spironolactone (ALDACTONE) tablet 25 mg  25 mg Oral Daily Mira Anand, DO   25 mg at 05/22/24 0836         Assessment & Plan       Chest pain    Benign localized prostatic hyperplasia without lower urinary tract symptoms (LUTS)    History of bladder cancer    Coronary artery disease involving native coronary artery of native heart    Essential hypertension    Mixed hyperlipidemia    Overweight (BMI 25.0-29.9)    Non-rheumatic mitral regurgitation    History of coronary artery stent placement    Basal cell carcinoma    PAF (paroxysmal atrial fibrillation)    Chronic combined systolic and diastolic congestive heart failure    NSTEMI, initial episode of care    Atrial fibrillation with RVR    Gross hematuria    A-fib RVR- maintaining NSR. Echo stable. On Eliquis. He notes hematuria is improved. PO Amiodarone. 14 day holter monitor. Symptoms have resolved with NSR.     Elevated Troponin - flat trend. Felt to be type II secondary to A-fib RVR. No further symptoms in NSR. Has been up and ambulatory.     Coronary Artery Disease- Stent to LAD last April. Remote stents in 2006. Stopping Plavix given >12 months from last intervention, need for anticoagulation, advanced age, hematuria and anemia.     Hematuria- urology evaluated. Hematuria has improved. Stop Plavix. Continue Eliquis.     Chronic Systolic Congestive Heart Failure- remains euvolemic. Jardiance, Aldactone, Entresto and Toprol. Daily weights. Low Salt diet.     Follow up with Dr. Cosby or Ann Mcneal in 1 month     Electronically signed by LARRY Gonzalez, 05/22/24, 1:42 PM CDT.

## 2024-05-22 NOTE — PROGRESS NOTES
[facility]     Progress Note    Patient Name: Wyatt Curry  : 1930  MRN: [id number]  Primary Care Physician:  Everardo Jackson MD  Date of admission: 2024    Subjective   Subjective     Chief Complaint: Gross Hematuria    History of Present Illness  Patient Reports mild gross hematuria.  Denies dysuria.  Reports frequency.     Review of Systems    Objective   Objective     Vitals:   Temp:  [97.2 °F (36.2 °C)-98.4 °F (36.9 °C)] 97.9 °F (36.6 °C)  Heart Rate:  [62-70] 62  Resp:  [16-18] 16  BP: (114-145)/(46-64) 116/54    Physical Exam  Vitals reviewed.   Constitutional:       General: He is not in acute distress.     Appearance: He is well-developed. He is not diaphoretic.   Pulmonary:      Effort: Pulmonary effort is normal.   Abdominal:      General: There is no distension.      Palpations: Abdomen is soft. There is no mass.      Tenderness: There is no abdominal tenderness. There is no guarding or rebound.      Hernia: No hernia is present.   Skin:     General: Skin is warm and dry.   Neurological:      Mental Status: He is alert and oriented to person, place, and time.          Result Review    Result Review:  I have personally reviewed the results from the time of this admission to 2024 07:52 CDT and agree with these findings:  []  Laboratory list / accordion  []  Microbiology  []  Radiology  []  EKG/Telemetry   []  Cardiology/Vascular   []  Pathology  []  Old records  []  Other:  Most notable findings include: Urine Culture Pending      Assessment & Plan   Assessment / Plan     Brief Patient Summary:  Wyatt Curry is a 89 y.o. male who gross hematuria    Active Hospital Problems:  Active Hospital Problems    Diagnosis     **Chest pain     NSTEMI, initial episode of care     Atrial fibrillation with RVR     Gross hematuria     Chronic combined systolic and diastolic congestive heart failure     PAF (paroxysmal atrial fibrillation)     Basal cell carcinoma     History of coronary  artery stent placement     Overweight (BMI 25.0-29.9)     Non-rheumatic mitral regurgitation     Coronary artery disease involving native coronary artery of native heart     Essential hypertension     Mixed hyperlipidemia     Benign localized prostatic hyperplasia without lower urinary tract symptoms (LUTS)     History of bladder cancer      Plan:     Okay to DC home from  standpoint.  I would recommend antibiotics as the patient is having gross hematuria and increasing frequency with bacteria in his urine.  I will have him follow-up in 2 weeks with nurse practitioner in the urology office.  DVT prophylaxis:  Medical DVT prophylaxis orders are present.        CODE STATUS:    Medical Intervention Limits: NO intubation (DNI)  Level Of Support Discussed With: Patient; Health Care Surrogate  Code Status (Patient has no pulse and is not breathing): No CPR (Do Not Attempt to Resuscitate)  Medical Interventions (Patient has pulse or is breathing): Limited Support    Disposition:  I expect patient to be discharged per primary team.    Justin Howard MD

## 2024-05-22 NOTE — PLAN OF CARE
Goal Outcome Evaluation:  Plan of Care Reviewed With: patient        Progress: improving  Outcome Evaluation: VSS. S/SB 57-70 BBB PVC on tele. No c/o pain. Pt has rested well this shift. Pt is very pleasant and friendly. Pt is hopeful to discharge today. Safety maintained.

## 2024-05-22 NOTE — CASE MANAGEMENT/SOCIAL WORK
Continued Stay Note  [facility]     Patient Name: Wyatt Curry  MRN: [id number]  Today's Date: 5/22/2024    Admit Date: 5/20/2024    Plan: [facility]   Discharge Plan       Row Name 05/22/24 1159       Plan    Plan [facility]    Plan Comments Pt resides at [facility].  Pt plans to return upon dc.  No dc needs identified.    Final Discharge Disposition Code 01 - home or self-care                   Discharge Codes    No documentation.                 Expected Discharge Date and Time       Expected Discharge Date Expected Discharge Time    May 24, 2024               JEANNINE CohenW

## 2024-05-23 NOTE — OUTREACH NOTE
Prep Survey      Flowsheet Row Responses   [facility] facility patient discharged from? [facility]   Is LACE score < 7 ? No   Eligibility Readm Mgmt   Discharge diagnosis chest pain,   Does the patient have one of the following disease processes/diagnoses(primary or secondary)? Other   Does the patient have Home health ordered? No   Is there a DME ordered? No   Prep survey completed? Yes            Venus HERNADEZ - Registered Nurse

## 2024-05-29 NOTE — PROGRESS NOTES
Subjective    Mr. Curry is 89 y.o. male    Chief Complaint: Hospital follow-up gross hematuria    History of Present Illness    89-year-old male established patient in for hospital follow-up gross hematuria was found to have dark tea colored urine.  History of bladder CA diagnosed in 1978 by Dr. Hunter.  He had recurrence in 2007 and underwent induction BCG for CIS.  Last surveillance cystoscopy March 2024 by Dr. Howard with negative upper tract imaging, cystoscopy revealing heavily trabeculated bladder with multiple cellules and diverticuli but no evidence of recurrent bladder cancer.  Microscopic urine evaluation during hospitalization revealed trace bacteria.  Was placed on a 5-day course of cefdinir as patient is hesitant to use antibiotics due to history of C. difficile.  Urine culture revealed contaminated specimen with mixed senthil.  Since discharge patient reports no further blood noted in urine.  Has completed full 5-day course antibiotic.  Patient with main complaint remaining urine frequency every 30 minutes to 1 hour day and night.    The following portions of the patient's history were reviewed and updated as appropriate: allergies, current medications, past family history, past medical history, past social history, past surgical history and problem list.    Review of Systems   Constitutional:  Negative for chills and fever.   Gastrointestinal:  Negative for abdominal pain, anal bleeding, blood in stool, nausea and vomiting.   Genitourinary:  Positive for frequency and urgency. Negative for dysuria and hematuria.         Current Outpatient Medications:     ACETAMINOPHEN ER PO, Take 500 mg by mouth Every 6 (Six) Hours As Needed (pain). Indications: Pain, Disp: , Rfl:     albuterol sulfate  (90 Base) MCG/ACT inhaler, INHALE 2 PUFF USING INHALER EVERY FOUR HOURS AS NEEDED FOR SHORTNESS OF BREATH AND/OR WHEEZING, Disp: , Rfl:     amiodarone (PACERONE) 200 MG tablet, Take 1 tablet by mouth Every 12  (Twelve) Hours for 14 days, THEN 1 tablet Daily for 30 days., Disp: 58 tablet, Rfl: 11    apixaban (ELIQUIS) 5 MG tablet tablet, Take 1 tablet by mouth Every 12 (Twelve) Hours. Indications: Atrial Fibrillation, Disp: 60 tablet, Rfl: 11    ascorbic acid (VITAMIN C) 500 MG tablet, Take 1 tablet by mouth Daily., Disp: , Rfl:     atorvastatin (LIPITOR) 20 MG tablet, Take 1 tablet by mouth every night at bedtime. Indications: Cardiac Failure, High Amount of Fats in the Blood, Disp: , Rfl:     busPIRone (BUSPAR) 10 MG tablet, Take 1 tablet by mouth 2 (Two) Times a Day. Indications: Anxiety Disorder, Disp: , Rfl:     ciprofloxacin (CILOXAN) 0.3 % ophthalmic solution, INSTILL 1 DROP INTO LEFT EYE 4 TIMES A DAY, Disp: , Rfl:     empagliflozin (JARDIANCE) 10 MG tablet tablet, Take 1 tablet by mouth Daily. Indications: Cardiac Failure, Disp: , Rfl:     ketoconazole (NIZORAL) 2 % cream, APPLY A THIN LAYER DAILY TO SCALP, Disp: , Rfl:     lansoprazole (PREVACID) 30 MG capsule, Take 1 capsule by mouth Daily. Indications: Heartburn, Disp: , Rfl:     metoprolol succinate XL (TOPROL-XL) 50 MG 24 hr tablet, Take 1 tablet by mouth Every Night. Indications: Atrial Fibrillation, Disp: 30 tablet, Rfl: 11    nitroglycerin (NITROSTAT) 0.4 MG SL tablet, Place 1 tablet under the tongue Every 5 (Five) Minutes As Needed for Chest Pain. Indications: Acute Angina Pectoris, Disp: , Rfl:     sacubitril-valsartan (Entresto) 49-51 MG tablet, Take 1 tablet by mouth 2 (Two) Times a Day., Disp: 60 tablet, Rfl: 11    spironolactone (ALDACTONE) 25 MG tablet, Take 1 tablet by mouth Daily., Disp: , Rfl:     vitamin B-12 (CYANOCOBALAMIN) 1000 MCG tablet, Take 1 tablet by mouth Daily. Indications: Inadequate Vitamin B12, Disp: , Rfl:     furosemide (LASIX) 20 MG tablet, Take 1 tablet every day by oral route as needed. 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    COPD (chronic obstructive pulmonary disease) 2023    GERD (gastroesophageal reflux disease)     Hyperlipidemia     Hypertension     Irregular heart beat     Lung nodules        Past Surgical History:   Procedure Laterality Date    BLADDER SURGERY      CARDIAC CATHETERIZATION      CARDIAC CATHETERIZATION Left 4/8/2023    Procedure: Cardiac Catheterization/Vascular Study;  Surgeon: Sukhi Hartley MD;  Location:  [facility];  Service: Cardiology;  Laterality: Left;    COLONOSCOPY      CORONARY ANGIOPLASTY WITH STENT PLACEMENT  2006    STENT X 2    CYSTOSCOPY      ENDOSCOPY      EXCISION LESION N/A 4/7/2023    Procedure: EXCISION LESION OF RIGHT TEMPLE AND LEFT TEMPLE WITH FROZEN SECTION WITH POSSIBLE FLAP OR GRAFT;  Surgeon: Brijesh Nickerson MD;  Location:  [facility] OR;  Service: ENT;  Laterality: N/A;    FLAP HEAD/NECK Left 03/09/2020    Procedure: POSSIBLE FLAP;  Surgeon: Brijesh Nickerson MD;  Location:  [facility] OR;  Service: ENT;  Laterality: Left;    HEAD/NECK LESION/CYST EXCISION Left 03/09/2020    Procedure: 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 with frozen section and possible flap or graft;  Surgeon: Brijesh Nickerson MD;  Location:  [facility] OR;  Service: ENT;  Laterality: Left;    HERNIA REPAIR      SHOULDER SURGERY      SKIN BIOPSY      lip biopsy    SKIN FULL THICKNESS GRAFT Left 03/09/2020    Procedure: OR GRAFT;  Surgeon: Brijesh Nickerson MD;  Location:  [facility] OR;  Service: ENT;  Laterality: Left;    TRANSURETHRAL RESECTION OF BLADDER TUMOR         Social History     Socioeconomic History    Marital status:    Tobacco Use    Smoking status: Former     Current packs/day: 0.00     Average packs/day: 1 pack/day for 30.0 years (30.0 ttl pk-yrs)     Types: Cigarettes     Start date: 1948     Quit date: 1975     Years since  "quittin.4     Passive exposure: Past    Smokeless tobacco: Never   Vaping Use    Vaping status: Never Used   Substance and Sexual Activity    Alcohol use: Not Currently     Alcohol/week: 1.0 standard drink of alcohol     Types: 1 Glasses of wine per week     Comment: occ wine    Drug use: Never    Sexual activity: Defer       Family History   Problem Relation Age of Onset    Tuberculosis Mother     Heart disease Father     Heart attack Father     Diabetes Sister     Hypertension Sister     Hyperlipidemia Sister     Heart disease Sister     Alcohol abuse Sister     Heart disease Brother     Heart attack Brother     Heart disease Brother        Objective    Temp 97.6 °F (36.4 °C)   Ht 160 cm (62.99\")   Wt 70.3 kg (154 lb 14.4 oz)   BMI 27.45 kg/m²     Physical Exam  Constitutional:       Appearance: Normal appearance.   Abdominal:      Tenderness: There is no right CVA tenderness or left CVA tenderness.   Skin:     General: Skin is warm and dry.   Neurological:      Mental Status: He is alert and oriented to person, place, and time.   Psychiatric:         Mood and Affect: Mood normal.         Behavior: Behavior normal.             Results for orders placed or performed in visit on 24   POC Urinalysis Dipstick, Multipro    Specimen: Urine   Result Value Ref Range    Color Yellow Yellow, Straw, Dark Yellow, Leslie    Clarity, UA Cloudy (A) Clear    Glucose, UA >=1000 mg/dL (3+) (A) Negative mg/dL    Bilirubin Negative Negative    Ketones, UA Negative Negative    Specific Gravity  1.010 1.005 - 1.030    Blood, UA Moderate (A) Negative    pH, Urine 6.0 5.0 - 8.0    Protein, POC 30 mg/dL (A) Negative mg/dL    Urobilinogen, UA 0.2 E.U./dL Normal, 0.2 E.U./dL    Nitrite, UA Negative Negative    Leukocytes Moderate (2+) (A) Negative   Estimation of residual urine via abdominal ultrasound  Residual Urine: 103 ml  Indication: frequency  Position: Supine  Examination: Incremental scanning of the suprapubic area " using 3 MHz transducer using copious amounts of acoustic gel.   Findings: An anechoic area was demonstrated which represented the bladder, with measurement of residual urine as noted. I inspected this myself. In that the residual urine was stable or insignificant, no treatment will be necessary at this time.      Assessment and Plan    Diagnoses and all orders for this visit:    1. History of bladder cancer (Primary)  -     POC Urinalysis Dipstick, Multipro    2. Hematuria, gross  -     POC Urinalysis Dipstick, Multipro  -     Urine Culture - Urine, Urine, Random Void      Ongoing new complaint of urine frequency and urgency that had just started prior to hospitalization.  Microscopic evaluation today of urine revealing multiple red and white blood cells.  No actual bacteria visualized however given patient's ongoing complaint of symptom will resend urine for culture today.  If no bacteria found recommend trying patient on tamsulosin 0.4 mg daily

## 2024-06-02 NOTE — PROGRESS NOTES
Enter Query Response Below      Query Response: NSTEMI type II secondary to Atrial fib with RVR              If applicable, please update the problem list.

## 2024-06-05 ENCOUNTER — OFFICE VISIT (OUTPATIENT)
Dept: UROLOGY | Facility: CLINIC | Age: 89
End: 2024-06-05
Payer: MEDICARE

## 2024-06-05 VITALS — BODY MASS INDEX: 27.45 KG/M2 | HEIGHT: 63 IN | WEIGHT: 154.9 LBS | TEMPERATURE: 97.6 F

## 2024-06-05 DIAGNOSIS — R31.0 HEMATURIA, GROSS: ICD-10-CM

## 2024-06-05 DIAGNOSIS — Z85.51 HISTORY OF BLADDER CANCER: Primary | ICD-10-CM

## 2024-06-05 LAB
BILIRUB BLD-MCNC: NEGATIVE MG/DL
CLARITY, POC: ABNORMAL
COLOR UR: YELLOW
GLUCOSE UR STRIP-MCNC: ABNORMAL MG/DL
KETONES UR QL: NEGATIVE
LEUKOCYTE EST, POC: ABNORMAL
NITRITE UR-MCNC: NEGATIVE MG/ML
PH UR: 6 [PH] (ref 5–8)
PROT UR STRIP-MCNC: ABNORMAL MG/DL
RBC # UR STRIP: ABNORMAL /UL
SP GR UR: 1.01 (ref 1–1.03)
UROBILINOGEN UR QL: ABNORMAL

## 2024-06-05 PROCEDURE — 87086 URINE CULTURE/COLONY COUNT: CPT

## 2024-06-05 RX ORDER — ALBUTEROL SULFATE 90 UG/1
AEROSOL, METERED RESPIRATORY (INHALATION)
COMMUNITY

## 2024-06-05 RX ORDER — KETOCONAZOLE 20 MG/G
CREAM TOPICAL
COMMUNITY

## 2024-06-05 RX ORDER — FUROSEMIDE 20 MG/1
TABLET ORAL
COMMUNITY
Start: 2024-04-10

## 2024-06-05 RX ORDER — CIPROFLOXACIN HYDROCHLORIDE 3.5 MG/ML
SOLUTION/ DROPS TOPICAL
COMMUNITY

## 2024-06-06 LAB — BACTERIA SPEC AEROBE CULT: NORMAL

## 2024-06-06 NOTE — PROGRESS NOTES
Specimen showing contaminant.  If symptoms continue recommend bringing back in for in and out catheterized specimen to be sent for culture.  For now recommend continuing tamsulosin

## 2024-06-07 ENCOUNTER — TELEPHONE (OUTPATIENT)
Dept: UROLOGY | Facility: CLINIC | Age: 89
End: 2024-06-07
Payer: MEDICARE

## 2024-06-07 DIAGNOSIS — R31.0 HEMATURIA, GROSS: Primary | ICD-10-CM

## 2024-06-07 RX ORDER — TAMSULOSIN HYDROCHLORIDE 0.4 MG/1
1 CAPSULE ORAL DAILY
Qty: 30 CAPSULE | Refills: 2 | Status: SHIPPED | OUTPATIENT
Start: 2024-06-07

## 2024-06-07 NOTE — TELEPHONE ENCOUNTER
Spoke with patient to let him know   Specimen showing contaminant.  If symptoms continue recommend bringing back in for in and out catheterized specimen to be sent for culture.  For now recommend continuing tamsulosin     Patient was never given Tamsulosin. Patient is still needing to urinate every 30 minutes to hour. I got patient scheduled for 6/10/24 for in and out cath specimen.     Will check to see if we need to get him Tamsulosin sent in.

## 2024-06-07 NOTE — PROGRESS NOTES
Okay that sounds good.  We can send urine off through [facility]. And I am sorry I thought that the Flomax had already been sent in.  Please send in 0.4 mg daily

## 2024-06-07 NOTE — TELEPHONE ENCOUNTER
----- Message from Judit Valentino sent at 6/6/2024  2:45 PM CDT -----  Specimen showing contaminant.  If symptoms continue recommend bringing back in for in and out catheterized specimen to be sent for culture.  For now recommend continuing tamsulosin

## 2024-06-10 ENCOUNTER — PROCEDURE VISIT (OUTPATIENT)
Dept: UROLOGY | Facility: CLINIC | Age: 89
End: 2024-06-10
Payer: MEDICARE

## 2024-06-10 VITALS — HEART RATE: 73 BPM | DIASTOLIC BLOOD PRESSURE: 63 MMHG | OXYGEN SATURATION: 94 % | SYSTOLIC BLOOD PRESSURE: 125 MMHG

## 2024-06-10 DIAGNOSIS — Z85.51 HX OF BLADDER CANCER: Primary | ICD-10-CM

## 2024-06-10 PROCEDURE — 51701 INSERT BLADDER CATHETER: CPT

## 2024-06-10 NOTE — PROGRESS NOTES
Patient of Judit JUAREZ is here today for recollection of urine by in and out cath and sending to [facility].    Judit JUAREZ was in the office at the time of procedure. Patient was advised that we will call with results. Patient verbalized understanding. MARC Dick RN     I have reviewed and agree with medical assistance documentation above

## 2024-06-11 ENCOUNTER — READMISSION MANAGEMENT (OUTPATIENT)
Dept: CALL CENTER | Facility: HOSPITAL | Age: 89
End: 2024-06-11
Payer: MEDICARE

## 2024-06-11 NOTE — OUTREACH NOTE
"Medical Week 3 Survey      Flowsheet Row Responses   [facility] patient discharged from? [place]   Does the patient have one of the following disease processes/diagnoses(primary or secondary)? Other   Week 3 attempt successful? Yes   Call start time 1933   Call end time 1935   Discharge diagnosis chest pain,   Is the patient taking all medications as directed (includes completed medication regime)? Yes   Does the patient have a primary care provider?  Yes   Has the patient kept scheduled appointments due by today? Yes   Psychosocial issues? No   What is the patient's perception of their health status since discharge? Improving   Is the patient/caregiver able to teach back signs and symptoms related to disease process for when to call PCP? Yes   Is the patient/caregiver able to teach back signs and symptoms related to disease process for when to call 911? Yes   Is the patient/caregiver able to teach back the hierarchy of who to call/visit for symptoms/problems? PCP, Specialist, Home health nurse, Urgent Care, ED, 911 Yes   Additional teach back comments States he is doing \"ok\".  Taking meds and states he is gaining weight.   Week 3 Call Completed? Yes   Graduated Yes   Call end time 1935            Shannon TANNER - Licensed Nurse  "

## 2024-07-03 ENCOUNTER — OFFICE VISIT (OUTPATIENT)
Dept: CARDIOLOGY | Facility: CLINIC | Age: 89
End: 2024-07-03
Payer: MEDICARE

## 2024-07-03 VITALS
HEIGHT: 63 IN | BODY MASS INDEX: 27.64 KG/M2 | DIASTOLIC BLOOD PRESSURE: 56 MMHG | HEART RATE: 62 BPM | SYSTOLIC BLOOD PRESSURE: 112 MMHG | OXYGEN SATURATION: 96 % | WEIGHT: 156 LBS

## 2024-07-03 DIAGNOSIS — I44.7 LEFT BUNDLE BRANCH BLOCK: ICD-10-CM

## 2024-07-03 DIAGNOSIS — I25.10 CORONARY ARTERY DISEASE INVOLVING NATIVE CORONARY ARTERY OF NATIVE HEART WITHOUT ANGINA PECTORIS: Primary | ICD-10-CM

## 2024-07-03 DIAGNOSIS — I10 ESSENTIAL HYPERTENSION: ICD-10-CM

## 2024-07-03 DIAGNOSIS — I48.0 PAF (PAROXYSMAL ATRIAL FIBRILLATION): ICD-10-CM

## 2024-07-03 DIAGNOSIS — I50.42 CHRONIC COMBINED SYSTOLIC AND DIASTOLIC CONGESTIVE HEART FAILURE: ICD-10-CM

## 2024-07-03 DIAGNOSIS — E78.2 MIXED HYPERLIPIDEMIA: ICD-10-CM

## 2024-07-03 NOTE — PROGRESS NOTES
Subjective:     Encounter Date: 7/3/2024      Patient ID: Wyatt Curry is a 89 y.o. male.    Chief Complaint: transfer care to Dr. Cosby for CAD, CHF, PAF, MR ; follow up recent afib admission     History of Present Illness    The patient presents to follow-up regarding his coronary artery disease, mitral regurgitation, chronic systolic CHF and paroxysmal atrial fibrillation.  He previously followed with Dr. Moseley and seeks to transfer his care to Dr. Cosby after Dr. Moseley's group home.    Notes indicate he had 2 stents placed in approximately 2006.  He underwent PCI to the LAD for a non-STEMI in April 2023 after holding antiplatelet therapy prior to surgery on his scalp.  LVEF around that time was noted to be 40 to 45%.  He has a chronic left bundle branch block.  He was diagnosed with atrial fibrillation and had acute CHF exacerbation May 2023 during a hospitalization for pneumonia.  Notes indicate he initially refused anticoagulation due to issues with hematuria.  He was seen frequently later that year for optimization of medical therapy for CHF.  In August he was treated inpatient for recurrent C. difficile and developed A-fib, RVR and was started on Eliquis and digoxin.    He saw LARRY Garcia in clinic in January.  He was doing well.  He was not requiring his as needed Lasix.  He was not having any chest pain or shortness of breath.  Bell indicated he should continue Eliquis 5 mg twice daily after confirming his normal renal function.  She noted Plavix could be discontinued April 2024.    I saw him in April 2024 and he was doing well.  Blood pressure was well-controlled and he was not having any chest discomfort, shortness of breath or palpitations.  He rarely used his as needed Lasix.  He reported his weight was staying around 152 pounds.  At that point, Plavix was discontinued as he was 1 year out from PCI.  Anticoagulation with Eliquis was continued.    The patient presented to the hospital  late May 2024 with chest pain and shortness of breath and was found to be in rapid atrial fibrillation with heart rates in the 110s to 140s.  Ultimately, he converted to normal sinus rhythm with amiodarone and symptoms subsequently resolved.  Echocardiogram revealed his LVEF remained 41 to 45%.  He was discharged home on his same medical therapy with the addition of amiodarone 200 mg twice daily for 2 weeks with instructions to reduce his to daily use thereafter.    The patient presents today for follow-up and states he is doing well.  He did wear a Holter monitor after discharge.  He states a few days after the Holter monitor came off he had palpitations for about 10 minutes 1 day but otherwise denies any palpitations.  He denies any chest pain or shortness of breath.  He denies orthopnea, PND, rapid weight gain.  He admits he is more fatigued than he used to be but he relates this to now living in assisted living facility and not being at home having to take care of things around the house (was previously more active).  Has not required any Lasix since discharge.  He denies any bleeding issues or falls.  Weight is stable.      The following portions of the patient's history were reviewed and updated as appropriate: allergies, current medications, past family history, past medical history, past social history, past surgical history and problem list.    Review of Systems   Constitutional: Positive for malaise/fatigue.   Cardiovascular:  Negative for chest pain, claudication, dyspnea on exertion, leg swelling, near-syncope, orthopnea, palpitations, paroxysmal nocturnal dyspnea and syncope.   Respiratory:  Negative for cough and shortness of breath.    Hematologic/Lymphatic: Does not bruise/bleed easily.   Musculoskeletal:  Negative for falls.   Gastrointestinal:  Negative for bloating.   Neurological:  Negative for dizziness, light-headedness and weakness.       Allergies   Allergen Reactions    Pregabalin Other (See  Comments)     Blurred vision only       Current Outpatient Medications:     ACETAMINOPHEN ER PO, Take 500 mg by mouth Every 6 (Six) Hours As Needed (pain). Indications: Pain, Disp: , Rfl:     albuterol sulfate  (90 Base) MCG/ACT inhaler, INHALE 2 PUFF USING INHALER EVERY FOUR HOURS AS NEEDED FOR SHORTNESS OF BREATH AND/OR WHEEZING, Disp: , Rfl:     amiodarone (PACERONE) 200 MG tablet, Take 1 tablet by mouth Every 12 (Twelve) Hours for 14 days, THEN 1 tablet Daily for 30 days., Disp: 58 tablet, Rfl: 11    apixaban (ELIQUIS) 5 MG tablet tablet, Take 1 tablet by mouth Every 12 (Twelve) Hours. Indications: Atrial Fibrillation, Disp: 60 tablet, Rfl: 11    ascorbic acid (VITAMIN C) 500 MG tablet, Take 1 tablet by mouth Daily., Disp: , Rfl:     atorvastatin (LIPITOR) 20 MG tablet, Take 1 tablet by mouth every night at bedtime. Indications: Cardiac Failure, High Amount of Fats in the Blood, Disp: , Rfl:     busPIRone (BUSPAR) 10 MG tablet, Take 1 tablet by mouth 2 (Two) Times a Day. Indications: Anxiety Disorder, Disp: , Rfl:     ciprofloxacin (CILOXAN) 0.3 % ophthalmic solution, INSTILL 1 DROP INTO LEFT EYE 4 TIMES A DAY, Disp: , Rfl:     empagliflozin (JARDIANCE) 10 MG tablet tablet, Take 1 tablet by mouth Daily. Indications: Cardiac Failure, Disp: , Rfl:     ketoconazole (NIZORAL) 2 % cream, APPLY A THIN LAYER DAILY TO SCALP, Disp: , Rfl:     lansoprazole (PREVACID) 30 MG capsule, Take 1 capsule by mouth Daily. Indications: Heartburn, Disp: , Rfl:     metoprolol succinate XL (TOPROL-XL) 50 MG 24 hr tablet, Take 1 tablet by mouth Every Night. Indications: Atrial Fibrillation, Disp: 30 tablet, Rfl: 11    nitroglycerin (NITROSTAT) 0.4 MG SL tablet, Place 1 tablet under the tongue Every 5 (Five) Minutes As Needed for Chest Pain. Indications: Acute Angina Pectoris, Disp: , Rfl:     sacubitril-valsartan (Entresto) 49-51 MG tablet, Take 1 tablet by mouth 2 (Two) Times a Day., Disp: 60 tablet, Rfl: 11    spironolactone  "(ALDACTONE) 25 MG tablet, Take 1 tablet by mouth Daily., Disp: , Rfl:     tamsulosin (FLOMAX) 0.4 MG capsule 24 hr capsule, Take 1 capsule by mouth Daily., Disp: 30 capsule, Rfl: 2    vitamin B-12 (CYANOCOBALAMIN) 1000 MCG tablet, Take 1 tablet by mouth Daily. Indications: Inadequate Vitamin B12, Disp: , Rfl:          Objective:    /56   Pulse 62   Ht 160 cm (62.99\")   Wt 70.8 kg (156 lb)   SpO2 96%   BMI 27.64 kg/m²        Vitals and nursing note reviewed.   Constitutional:       General: Not in acute distress.     Appearance: Well-developed and not in distress. Not diaphoretic.   Neck:      Vascular: No JVD.   Pulmonary:      Effort: Pulmonary effort is normal. No respiratory distress.   Cardiovascular:      Normal rate. Regular rhythm.      Murmurs: There is no murmur.   Edema:     Peripheral edema absent.   Abdominal:      Tenderness: There is no abdominal tenderness.   Skin:     General: Skin is warm and dry.   Neurological:      Mental Status: Alert and oriented to person, place, and time.         Lab Review:   Lab Results   Component Value Date    GLUCOSE 103 (H) 05/21/2024    BUN 24 (H) 05/21/2024    CREATININE 0.79 05/21/2024    EGFR 82.3 05/21/2024    BCR 30.4 (H) 05/21/2024    K 4.2 05/21/2024    CO2 28.0 05/21/2024    CALCIUM 9.0 05/21/2024    ALBUMIN 3.8 05/21/2024    BILITOT 0.5 05/21/2024    AST 18 05/21/2024    ALT 10 05/21/2024        Lab Results   Component Value Date    CHOL 103 05/21/2024    CHLPL 104 (L) 09/09/2016    TRIG 45 05/21/2024    HDL 39 (L) 05/21/2024    LDL 53 05/21/2024        Lab Results   Component Value Date    HGBA1C 5.90 (H) 05/21/2024        Lab Results   Component Value Date    WBC 5.48 05/21/2024    HGB 12.7 (L) 05/21/2024    HCT 42.0 05/21/2024    MCV 95.9 05/21/2024     (L) 05/21/2024              ECG 12 Lead    Date/Time: 7/3/2024 3:13 PM  Performed by: Ann Mcneal APRN    Authorized by: Ann Mcneal APRN  Comparison: compared with previous ECG from " 5/21/2024  Similar to previous ECG  Rhythm: sinus rhythm  BPM: 60  Conduction: left bundle branch block    Clinical impression: abnormal EKG          Results for orders placed during the hospital encounter of 05/20/24    Adult Transthoracic Echo Complete W/ Cont if Necessary Per Protocol    Interpretation Summary    Left ventricular systolic function is mildly decreased. Left ventricular ejection fraction appears to be 41 - 45%.    Left ventricular wall thickness is consistent with mild concentric hypertrophy.    Left ventricular diastolic function is consistent with (grade II w/high LAP) pseudonormalization.    Normal right ventricular cavity size and systolic function noted.    The left atrial cavity is moderately dilated.    There is mild calcification of the aortic valve. There is mild to moderate thickening of the aortic valve. Mild aortic valve regurgitation is present. Mild aortic valve stenosis is present.    Mild mitral valve regurgitation is present.    Mild pulmonary hypertension is present.    5/2024 holter:    An abnormal monitor study.    Predominant rhythm is normal sinus rhythm.    Only sustained arrhythmia noted is atrial fibrillation, with a very low overall burden (less than 1%).  The longest episode was just shy of 3 minutes.  Average ventricular rate when in atrial fibrillation was 103 bpm (range ).    Patient never triggered the device or reported any symptoms.       Assessment:      Problem List Items Addressed This Visit          Cardiac and Vasculature    Coronary artery disease involving native coronary artery of native heart - Primary    Left bundle branch block    Essential hypertension    Mixed hyperlipidemia    PAF (paroxysmal atrial fibrillation)    Chronic combined systolic and diastolic congestive heart failure       Plan:     1.  Coronary artery disease: Established problem, stable.  No angina.  No aspirin at this time given his need for anticoagulation.  If anticoagulation is  ever held for surgeries or procedures, he will need to take aspirin 81 mg daily in its place.  Continue statin, beta-blocker.    2.  Chronic systolic CHF: Compensated/euvolemic.  Continue current doses of Jardiance, Aldactone, Entresto, Toprol-XL.  He has not been requiring his as needed Lasix.    3.  Paroxysmal atrial fibrillation: See notes above regarding hospitalization for recent symptomatic atrial fibrillation, with rapid ventricular response.  He is now maintaining normal sinus rhythm on amiodarone.  Continue amiodarone 200 mg daily for rhythm control.  Continue Eliquis 5 mg twice daily for stroke prophylaxis.  Continue beta-blocker for rate control with recurrences.  I will discontinue digoxin as his level was below the lower limit of normal and he is now on amiodarone and given his advanced age.    Follow-up in 3 months, call sooner with new or worsening symptoms    I spent 35 minutes caring for Wyatt on this date of service. This time includes time spent by me in the following activities: preparing for the visit, reviewing tests, obtaining and/or reviewing a separately obtained history, performing a medically appropriate examination and/or evaluation, counseling and educating the patient/family/caregiver, and documenting information in the medical record

## 2024-08-12 RX ORDER — SACUBITRIL AND VALSARTAN 49; 51 MG/1; MG/1
1 TABLET, FILM COATED ORAL 2 TIMES DAILY
Qty: 180 TABLET | Refills: 3 | Status: SHIPPED | OUTPATIENT
Start: 2024-08-12

## 2024-08-15 RX ORDER — AMIODARONE HYDROCHLORIDE 200 MG/1
200 TABLET ORAL DAILY
Qty: 30 TABLET | Refills: 11 | Status: SHIPPED | OUTPATIENT
Start: 2024-08-15

## 2024-08-27 ENCOUNTER — TRANSCRIBE ORDERS (OUTPATIENT)
Dept: ADMINISTRATIVE | Facility: HOSPITAL | Age: 89
End: 2024-08-27
Payer: MEDICARE

## 2024-08-27 DIAGNOSIS — R91.8 OTHER NONSPECIFIC ABNORMAL FINDING OF LUNG FIELD: Primary | ICD-10-CM

## 2024-08-30 RX ORDER — METOPROLOL SUCCINATE 50 MG/1
TABLET, EXTENDED RELEASE ORAL
Qty: 90 TABLET | Refills: 3 | Status: SHIPPED | OUTPATIENT
Start: 2024-08-30

## 2024-09-02 DIAGNOSIS — R31.0 HEMATURIA, GROSS: ICD-10-CM

## 2024-09-03 RX ORDER — TAMSULOSIN HYDROCHLORIDE 0.4 MG/1
1 CAPSULE ORAL DAILY
Qty: 90 CAPSULE | Refills: 3 | Status: SHIPPED | OUTPATIENT
Start: 2024-09-03

## 2024-09-11 ENCOUNTER — HOSPITAL ENCOUNTER (OUTPATIENT)
Dept: CT IMAGING | Facility: HOSPITAL | Age: 89
Discharge: HOME | End: 2024-09-11
Admitting: FAMILY MEDICINE
Payer: MEDICARE

## 2024-09-11 DIAGNOSIS — R91.8 OTHER NONSPECIFIC ABNORMAL FINDING OF LUNG FIELD: ICD-10-CM

## 2024-09-11 PROCEDURE — 71260 CT THORAX DX C+: CPT

## 2024-09-11 PROCEDURE — 25510000001 IOPAMIDOL 61 % SOLUTION: Performed by: FAMILY MEDICINE

## 2024-09-11 RX ORDER — IOPAMIDOL 612 MG/ML
100 INJECTION, SOLUTION INTRAVASCULAR
Status: DISCONTINUED | OUTPATIENT
Start: 2024-09-11 | End: 2024-09-12 | Stop reason: HOSPADM

## 2024-09-11 RX ORDER — IOPAMIDOL 612 MG/ML
100 INJECTION, SOLUTION INTRAVASCULAR
Status: COMPLETED | OUTPATIENT
Start: 2024-09-11 | End: 2024-09-11

## 2024-09-11 RX ADMIN — IOPAMIDOL 100 ML: 612 INJECTION, SOLUTION INTRAVENOUS at 14:11

## 2024-09-16 RX ORDER — SPIRONOLACTONE 25 MG/1
25 TABLET ORAL DAILY
Qty: 90 TABLET | Refills: 3 | Status: SHIPPED | OUTPATIENT
Start: 2024-09-16

## 2024-10-07 ENCOUNTER — OFFICE VISIT (OUTPATIENT)
Dept: CARDIOLOGY | Facility: CLINIC | Age: 89
End: 2024-10-07
Payer: MEDICARE

## 2024-10-07 VITALS
SYSTOLIC BLOOD PRESSURE: 106 MMHG | WEIGHT: 166.4 LBS | HEIGHT: 63 IN | OXYGEN SATURATION: 96 % | DIASTOLIC BLOOD PRESSURE: 58 MMHG | HEART RATE: 58 BPM | BODY MASS INDEX: 29.48 KG/M2

## 2024-10-07 DIAGNOSIS — I48.0 PAF (PAROXYSMAL ATRIAL FIBRILLATION): ICD-10-CM

## 2024-10-07 DIAGNOSIS — I10 ESSENTIAL HYPERTENSION: ICD-10-CM

## 2024-10-07 DIAGNOSIS — I25.10 CORONARY ARTERY DISEASE INVOLVING NATIVE CORONARY ARTERY OF NATIVE HEART WITHOUT ANGINA PECTORIS: ICD-10-CM

## 2024-10-07 DIAGNOSIS — I50.42 CHRONIC COMBINED SYSTOLIC AND DIASTOLIC CONGESTIVE HEART FAILURE: Primary | ICD-10-CM

## 2024-10-07 DIAGNOSIS — I44.7 LEFT BUNDLE BRANCH BLOCK: ICD-10-CM

## 2024-10-07 DIAGNOSIS — E78.2 MIXED HYPERLIPIDEMIA: ICD-10-CM

## 2024-10-07 PROCEDURE — 1160F RVW MEDS BY RX/DR IN RCRD: CPT | Performed by: NURSE PRACTITIONER

## 2024-10-07 PROCEDURE — 93000 ELECTROCARDIOGRAM COMPLETE: CPT | Performed by: NURSE PRACTITIONER

## 2024-10-07 PROCEDURE — 1159F MED LIST DOCD IN RCRD: CPT | Performed by: NURSE PRACTITIONER

## 2024-10-07 PROCEDURE — 99214 OFFICE O/P EST MOD 30 MIN: CPT | Performed by: NURSE PRACTITIONER

## 2024-10-07 RX ORDER — METOPROLOL SUCCINATE 50 MG/1
25 TABLET, EXTENDED RELEASE ORAL NIGHTLY
Start: 2024-10-07

## 2024-10-07 RX ORDER — FUROSEMIDE 40 MG
40 TABLET ORAL AS NEEDED
COMMUNITY

## 2024-10-07 NOTE — PROGRESS NOTES
Subjective:     Encounter Date: 10/7/2024      Patient ID: Wyatt Curry is a 89 y.o. male.    Chief Complaint: follow up CAD, CHF, PAF, MR     History of Present Illness    The patient presents to follow-up regarding his coronary artery disease, mitral regurgitation, chronic systolic CHF and paroxysmal atrial fibrillation.  He previously followed with Dr. Moseley and seeks to transfer his care to Dr. Cosby after Dr. Moseley's [facility].    Notes indicate he had 2 stents placed in approximately 2006.  He underwent PCI to the LAD for a non-STEMI in April 2023 after holding antiplatelet therapy prior to surgery on his scalp.  LVEF around that time was noted to be 40 to 45%.  He has a chronic left bundle branch block.  He was diagnosed with atrial fibrillation and had acute CHF exacerbation May 2023 during a hospitalization for pneumonia.  Notes indicate he initially refused anticoagulation due to issues with hematuria.  He was seen frequently later that year for optimization of medical therapy for CHF.  In August he was treated inpatient for recurrent C. difficile and developed A-fib, RVR and was started on Eliquis and digoxin.    He saw LARRY Garcia in clinic in January.  He was doing well.  He was not requiring his as needed Lasix.  He was not having any chest pain or shortness of breath.  Bell indicated he should continue Eliquis 5 mg twice daily after confirming his normal renal function.  She noted Plavix could be discontinued April 2024.    I saw him in April 2024 and he was doing well.  Blood pressure was well-controlled and he was not having any chest discomfort, shortness of breath or palpitations.  He rarely used his as needed Lasix.  He reported his weight was staying around 152 pounds.  At that point, Plavix was discontinued as he was 1 year out from PCI.  Anticoagulation with Eliquis was continued.    The patient presented to the hospital late May 2024 with chest pain and shortness of breath and  was found to be in rapid atrial fibrillation with heart rates in the 110s to 140s.  Ultimately, he converted to normal sinus rhythm with amiodarone and symptoms subsequently resolved.  Echocardiogram revealed his LVEF remained 41 to 45%.  He was discharged home on his same medical therapy with the addition of amiodarone 200 mg twice daily for 2 weeks with instructions to reduce his to daily use thereafter.    I saw the patient in July and he was doing well.  He reported a few days after the Holter monitor came off he had palpitations for about 10 minutes 1 day but otherwise no palpitations.  He was not having chest pain, shortness of breath, orthopnea.  He admitted fatigue.  He was living in an assisted living facility and reported he was less active.  He had not required any Lasix following discharge.  Weight was stable.  He was not having any bleeding issues or falls.  No changes were made, other than discontinuation of digoxin.     The patient states he has not had any recurrent palpitations since his last visit.  He also denies chest pain, orthopnea, PND, syncope or presyncope.  He states his systolic blood pressures typically in the 130s.  He does have fatigue and shortness of breath with exertion.  He states he has had significant fatigue since his hospitalization in May.  He states he takes Lasix approximately every other day and does have some improvement in exertional dyspnea with this.  He has gained approximately 10 pounds since his last visit.  When questioned about this, he states he is less active at the assisted living facility.        The following portions of the patient's history were reviewed and updated as appropriate: allergies, current medications, past family history, past medical history, past social history, past surgical history and problem list.    Review of Systems   Constitutional: Positive for malaise/fatigue.   Cardiovascular:  Positive for dyspnea on exertion. Negative for chest pain,  claudication, leg swelling, near-syncope, orthopnea, palpitations, paroxysmal nocturnal dyspnea and syncope.   Respiratory:  Positive for shortness of breath. Negative for cough.    Hematologic/Lymphatic: Does not bruise/bleed easily.   Musculoskeletal:  Negative for falls.   Gastrointestinal:  Negative for bloating.   Neurological:  Negative for dizziness, light-headedness and weakness.       Allergies   Allergen Reactions    Pregabalin Other (See Comments)     Blurred vision only       Current Outpatient Medications:     ACETAMINOPHEN ER PO, Take 500 mg by mouth Every 6 (Six) Hours As Needed (pain). Indications: Pain, Disp: , Rfl:     albuterol sulfate  (90 Base) MCG/ACT inhaler, INHALE 2 PUFF USING INHALER EVERY FOUR HOURS AS NEEDED FOR SHORTNESS OF BREATH AND/OR WHEEZING, Disp: , Rfl:     amiodarone (PACERONE) 200 MG tablet, Take 1 tablet by mouth Daily., Disp: 30 tablet, Rfl: 11    apixaban (ELIQUIS) 5 MG tablet tablet, Take 1 tablet by mouth Every 12 (Twelve) Hours. Indications: Atrial Fibrillation, Disp: 60 tablet, Rfl: 11    ascorbic acid (VITAMIN C) 500 MG tablet, Take 1 tablet by mouth Daily., Disp: , Rfl:     atorvastatin (LIPITOR) 20 MG tablet, Take 1 tablet by mouth every night at bedtime. Indications: Cardiac Failure, High Amount of Fats in the Blood, Disp: , Rfl:     busPIRone (BUSPAR) 10 MG tablet, Take 1 tablet by mouth 2 (Two) Times a Day. Indications: Anxiety Disorder, Disp: , Rfl:     empagliflozin (JARDIANCE) 10 MG tablet tablet, Take 1 tablet by mouth Daily. Indications: Cardiac Failure, Disp: 90 tablet, Rfl: 3    ketoconazole (NIZORAL) 2 % cream, APPLY A THIN LAYER DAILY TO SCALP, Disp: , Rfl:     lansoprazole (PREVACID) 30 MG capsule, Take 1 capsule by mouth Daily. Indications: Heartburn, Disp: , Rfl:     metoprolol succinate XL (TOPROL-XL) 50 MG 24 hr tablet, Take 0.5 tablets by mouth Every Night., Disp: , Rfl:     nitroglycerin (NITROSTAT) 0.4 MG SL tablet, Place 1 tablet under the  "tongue Every 5 (Five) Minutes As Needed for Chest Pain. Indications: Acute Angina Pectoris, Disp: , Rfl:     sacubitril-valsartan (Entresto) 49-51 MG tablet, Take 1 tablet by mouth 2 (Two) Times a Day. Indications: Cardiac Failure, Disp: 180 tablet, Rfl: 3    spironolactone (ALDACTONE) 25 MG tablet, TAKE 1 TABLET BY MOUTH EVERY DAY, Disp: 90 tablet, Rfl: 3    tamsulosin (FLOMAX) 0.4 MG capsule 24 hr capsule, TAKE 1 CAPSULE BY MOUTH EVERY DAY, Disp: 90 capsule, Rfl: 3    vitamin B-12 (CYANOCOBALAMIN) 1000 MCG tablet, Take 1 tablet by mouth Daily. Indications: Inadequate Vitamin B12, Disp: , Rfl:     furosemide (LASIX) 40 MG tablet, Take 1 tablet by mouth As Needed (rapid weight gain, shortness of breath)., Disp: , Rfl:          Objective:    /58   Pulse 58   Ht 160 cm (62.99\")   Wt 75.5 kg (166 lb 6.4 oz)   SpO2 96%   BMI 29.49 kg/m²        Vitals and nursing note reviewed.   Constitutional:       General: Not in acute distress.     Appearance: Well-developed and not in distress. Not diaphoretic.   Neck:      Vascular: No JVD.   Pulmonary:      Effort: Pulmonary effort is normal. No respiratory distress.   Cardiovascular:      Normal rate. Regular rhythm.      Murmurs: There is no murmur.   Edema:     Peripheral edema absent.   Abdominal:      Tenderness: There is no abdominal tenderness.   Skin:     General: Skin is warm and dry.   Neurological:      Mental Status: Alert and oriented to person, place, and time.         Lab Review:   Lab Results   Component Value Date    GLUCOSE 103 (H) 05/21/2024    BUN 24 (H) 05/21/2024    CREATININE 0.79 05/21/2024    EGFR 82.3 05/21/2024    BCR 30.4 (H) 05/21/2024    K 4.2 05/21/2024    CO2 28.0 05/21/2024    CALCIUM 9.0 05/21/2024    ALBUMIN 3.8 05/21/2024    BILITOT 0.5 05/21/2024    AST 18 05/21/2024    ALT 10 05/21/2024        Lab Results   Component Value Date    CHOL 103 05/21/2024    CHLPL 104 (L) 09/09/2016    TRIG 45 05/21/2024    HDL 39 (L) 05/21/2024    LDL " 53 05/21/2024        Lab Results   Component Value Date    HGBA1C 5.90 (H) 05/21/2024        Lab Results   Component Value Date    WBC 5.48 05/21/2024    HGB 12.7 (L) 05/21/2024    HCT 42.0 05/21/2024    MCV 95.9 05/21/2024     (L) 05/21/2024              ECG 12 Lead    Date/Time: 10/7/2024 1:45 PM  Performed by: Ann Mcneal APRN    Authorized by: Ann Mcneal APRN  Comparison: compared with previous ECG from 7/3/2024  Similar to previous ECG  Rhythm: sinus bradycardia  BPM: 58  Conduction: non-specific intraventricular conduction delay    Clinical impression: abnormal EKG          Results for orders placed during the hospital encounter of 05/20/24    Adult Transthoracic Echo Complete W/ Cont if Necessary Per Protocol    Interpretation Summary    Left ventricular systolic function is mildly decreased. Left ventricular ejection fraction appears to be 41 - 45%.    Left ventricular wall thickness is consistent with mild concentric hypertrophy.    Left ventricular diastolic function is consistent with (grade II w/high LAP) pseudonormalization.    Normal right ventricular cavity size and systolic function noted.    The left atrial cavity is moderately dilated.    There is mild calcification of the aortic valve. There is mild to moderate thickening of the aortic valve. Mild aortic valve regurgitation is present. Mild aortic valve stenosis is present.    Mild mitral valve regurgitation is present.    Mild pulmonary hypertension is present.    5/2024 holter:    An abnormal monitor study.    Predominant rhythm is normal sinus rhythm.    Only sustained arrhythmia noted is atrial fibrillation, with a very low overall burden (less than 1%).  The longest episode was just shy of 3 minutes.  Average ventricular rate when in atrial fibrillation was 103 bpm (range ).    Patient never triggered the device or reported any symptoms.       Assessment:      Problem List Items Addressed This Visit          Cardiac and  Vasculature    Coronary artery disease involving native coronary artery of native heart    Relevant Medications    metoprolol succinate XL (TOPROL-XL) 50 MG 24 hr tablet    Left bundle branch block    Relevant Medications    metoprolol succinate XL (TOPROL-XL) 50 MG 24 hr tablet    Essential hypertension    Relevant Medications    furosemide (LASIX) 40 MG tablet    metoprolol succinate XL (TOPROL-XL) 50 MG 24 hr tablet    Mixed hyperlipidemia    PAF (paroxysmal atrial fibrillation)    Relevant Medications    metoprolol succinate XL (TOPROL-XL) 50 MG 24 hr tablet    Chronic combined systolic and diastolic congestive heart failure - Primary    Relevant Medications    metoprolol succinate XL (TOPROL-XL) 50 MG 24 hr tablet       Plan:     1.  Coronary artery disease: Established problem, stable.  No angina.  No aspirin at this time given his need for anticoagulation.  If anticoagulation is ever held for surgeries or procedures, he will need to take aspirin 81 mg daily in its place.  Continue statin, beta-blocker.    2.  Chronic systolic CHF: Compensated/euvolemic overall, though he has had a gradual 10 pound weight gain over the course of 3 months.  Continue current doses of Jardiance, Aldactone, Entresto.  Continue Lasix 40 mg on an as needed basis.  Given significant fatigue I will reduce his Toprol-XL from 50 mg nightly to 25 mg nightly.    3.  Paroxysmal atrial fibrillation: See notes above regarding hospitalization for symptomatic atrial fibrillation, with rapid ventricular response.  He is now maintaining normal sinus rhythm on amiodarone.  Continue amiodarone 200 mg daily for rhythm control.  Continue Eliquis 5 mg twice daily for stroke prophylaxis.  Continue beta-blocker for rate control with recurrences (though reducing dose today due to fatigue).  Remain off of digoxin.    Follow-up in 3 months, call sooner with new or worsening symptoms    I spent 33 minutes caring for Wyatt on this date of service. This  time includes time spent by me in the following activities: preparing for the visit, reviewing tests, obtaining and/or reviewing a separately obtained history, performing a medically appropriate examination and/or evaluation, counseling and educating the patient/family/caregiver, and documenting information in the medical record

## 2025-01-08 ENCOUNTER — OFFICE VISIT (OUTPATIENT)
Dept: CARDIOLOGY | Facility: CLINIC | Age: OVER 89
End: 2025-01-08
Payer: MEDICARE

## 2025-01-08 VITALS
HEIGHT: 63 IN | WEIGHT: 166.2 LBS | DIASTOLIC BLOOD PRESSURE: 64 MMHG | HEART RATE: 65 BPM | BODY MASS INDEX: 29.45 KG/M2 | OXYGEN SATURATION: 96 % | SYSTOLIC BLOOD PRESSURE: 106 MMHG

## 2025-01-08 DIAGNOSIS — I25.10 CORONARY ARTERY DISEASE INVOLVING NATIVE CORONARY ARTERY OF NATIVE HEART WITHOUT ANGINA PECTORIS: ICD-10-CM

## 2025-01-08 DIAGNOSIS — I44.7 LEFT BUNDLE BRANCH BLOCK: ICD-10-CM

## 2025-01-08 DIAGNOSIS — I50.42 CHRONIC COMBINED SYSTOLIC AND DIASTOLIC CONGESTIVE HEART FAILURE: Primary | ICD-10-CM

## 2025-01-08 DIAGNOSIS — I10 ESSENTIAL HYPERTENSION: ICD-10-CM

## 2025-01-08 DIAGNOSIS — E78.2 MIXED HYPERLIPIDEMIA: ICD-10-CM

## 2025-01-08 DIAGNOSIS — I48.0 PAF (PAROXYSMAL ATRIAL FIBRILLATION): ICD-10-CM

## 2025-01-08 NOTE — PROGRESS NOTES
Subjective:     Encounter Date: 1/8/2025      Patient ID: Wyatt Curry is a 89 y.o. male.    Chief Complaint: follow up CAD, CHF, PAF, MR     History of Present Illness    The patient presents to follow-up regarding his coronary artery disease, mitral regurgitation, chronic systolic CHF and paroxysmal atrial fibrillation.  He previously followed with Dr. Moseley and seeks to transfer his care to Dr. Cosby after Dr. Moseley's custodial.    Notes indicate he had 2 stents placed in approximately 2006.  He underwent PCI to the LAD for a non-STEMI in April 2023 after holding antiplatelet therapy prior to surgery on his scalp.  LVEF around that time was noted to be 40 to 45%.  He has a chronic left bundle branch block.  He was diagnosed with atrial fibrillation and had acute CHF exacerbation May 2023 during a hospitalization for pneumonia.  Notes indicate he initially refused anticoagulation due to issues with hematuria.  He was seen frequently later that year for optimization of medical therapy for CHF.  In August he was treated inpatient for recurrent C. difficile and developed A-fib, RVR and was started on Eliquis and digoxin.    He saw LARRY Garcia in clinic in January.  He was doing well.  He was not requiring his as needed Lasix.  He was not having any chest pain or shortness of breath.  Bell indicated he should continue Eliquis 5 mg twice daily after confirming his normal renal function.  She noted Plavix could be discontinued April 2024.    I saw him in April 2024 and he was doing well.  Blood pressure was well-controlled and he was not having any chest discomfort, shortness of breath or palpitations.  He rarely used his as needed Lasix.  He reported his weight was staying around 152 pounds.  At that point, Plavix was discontinued as he was 1 year out from PCI.  Anticoagulation with Eliquis was continued.    The patient presented to the hospital late May 2024 with chest pain and shortness of breath and  was found to be in rapid atrial fibrillation with heart rates in the 110s to 140s.  Ultimately, he converted to normal sinus rhythm with amiodarone and symptoms subsequently resolved.  Echocardiogram revealed his LVEF remained 41 to 45%.  He was discharged home on his same medical therapy with the addition of amiodarone 200 mg twice daily for 2 weeks with instructions to reduce his to daily use thereafter.    I saw the patient in July and he was doing well.  He reported a few days after the Holter monitor came off he had palpitations for about 10 minutes 1 day but otherwise no palpitations.  He was not having chest pain, shortness of breath, orthopnea.  He admitted fatigue.  He was living in an assisted living facility and reported he was less active.  He had not required any Lasix following discharge.  Weight was stable.  He was not having any bleeding issues or falls.  No changes were made, other than discontinuation of digoxin.     I saw him October 2024  and he was doing well without any recurrent palpitations since the prior visit.  He also denied chest pain, orthopnea.  He had gradually gained 10 pounds since the prior visit.  He related this to being less active after moving to an assisted living facility.  He admitted fatigue since hospitalization in May.  He was taking Lasix every other day with some degree of improvement in exertional dyspnea with this.  At that visit I did reduce his metoprolol back to 25 mg nightly due to fatigue.    Today the patient reports he does not feel much different compared to last visit but he feels pretty good overall.  He has some chronic fatigue and chronic exertional dyspnea.  However, he states his lower back pain stops him in terms of his activity before his breathing does.  He denies any weight changes.  He has been taking Lasix approximately once a week.  He denies chest pain, swelling, orthopnea, PND, palpitations, syncope or presyncope.  Systolic blood pressure ranges  from low 100s to 130s.  He states he feels better in terms of his energy level when it is up in the 130s.    He is scheduled to meet Dr. Mc next week due to carotid disease.  He states carotid testing was performed due to some recent visual issues.        The following portions of the patient's history were reviewed and updated as appropriate: allergies, current medications, past family history, past medical history, past social history, past surgical history and problem list.    Review of Systems   Constitutional: Positive for malaise/fatigue.   Cardiovascular:  Positive for dyspnea on exertion. Negative for chest pain, claudication, leg swelling, near-syncope, orthopnea, palpitations, paroxysmal nocturnal dyspnea and syncope.   Respiratory:  Negative for cough.    Hematologic/Lymphatic: Does not bruise/bleed easily.   Musculoskeletal:  Negative for falls.   Gastrointestinal:  Negative for bloating.   Neurological:  Negative for dizziness, light-headedness and weakness.       Allergies   Allergen Reactions    Pregabalin Other (See Comments)     Blurred vision only       Current Outpatient Medications:     ACETAMINOPHEN ER PO, Take 500 mg by mouth Every 6 (Six) Hours As Needed (pain). Indications: Pain, Disp: , Rfl:     albuterol sulfate  (90 Base) MCG/ACT inhaler, INHALE 2 PUFF USING INHALER EVERY FOUR HOURS AS NEEDED FOR SHORTNESS OF BREATH AND/OR WHEEZING, Disp: , Rfl:     amiodarone (PACERONE) 200 MG tablet, Take 1 tablet by mouth Daily., Disp: 30 tablet, Rfl: 11    apixaban (ELIQUIS) 5 MG tablet tablet, Take 1 tablet by mouth Every 12 (Twelve) Hours. Indications: Atrial Fibrillation, Disp: 60 tablet, Rfl: 11    ascorbic acid (VITAMIN C) 500 MG tablet, Take 1 tablet by mouth Daily., Disp: , Rfl:     atorvastatin (LIPITOR) 20 MG tablet, Take 1 tablet by mouth every night at bedtime. Indications: Cardiac Failure, High Amount of Fats in the Blood, Disp: , Rfl:     busPIRone (BUSPAR) 10 MG tablet, Take 1  "tablet by mouth 2 (Two) Times a Day. Indications: Anxiety Disorder, Disp: , Rfl:     empagliflozin (JARDIANCE) 10 MG tablet tablet, Take 1 tablet by mouth Daily. Indications: Cardiac Failure, Disp: 90 tablet, Rfl: 3    furosemide (LASIX) 40 MG tablet, Take 1 tablet by mouth As Needed (rapid weight gain, shortness of breath)., Disp: , Rfl:     ketoconazole (NIZORAL) 2 % cream, APPLY A THIN LAYER DAILY TO SCALP, Disp: , Rfl:     lansoprazole (PREVACID) 30 MG capsule, Take 1 capsule by mouth Daily. Indications: Heartburn, Disp: , Rfl:     metoprolol succinate XL (TOPROL-XL) 50 MG 24 hr tablet, Take 0.5 tablets by mouth Every Night., Disp: , Rfl:     nitroglycerin (NITROSTAT) 0.4 MG SL tablet, Place 1 tablet under the tongue Every 5 (Five) Minutes As Needed for Chest Pain. Indications: Acute Angina Pectoris, Disp: , Rfl:     sacubitril-valsartan (Entresto) 49-51 MG tablet, Take 1 tablet by mouth 2 (Two) Times a Day. Indications: Cardiac Failure, Disp: 180 tablet, Rfl: 3    spironolactone (ALDACTONE) 25 MG tablet, TAKE 1 TABLET BY MOUTH EVERY DAY, Disp: 90 tablet, Rfl: 3    tamsulosin (FLOMAX) 0.4 MG capsule 24 hr capsule, TAKE 1 CAPSULE BY MOUTH EVERY DAY, Disp: 90 capsule, Rfl: 3    vitamin B-12 (CYANOCOBALAMIN) 1000 MCG tablet, Take 1 tablet by mouth Daily. Indications: Inadequate Vitamin B12, Disp: , Rfl:          Objective:    /64   Pulse 65   Ht 160 cm (62.99\")   Wt 75.4 kg (166 lb 3.2 oz)   SpO2 96%   BMI 29.45 kg/m²        Vitals and nursing note reviewed.   Constitutional:       General: Not in acute distress.     Appearance: Well-developed and not in distress. Not diaphoretic.   Neck:      Vascular: No JVD.   Pulmonary:      Effort: Pulmonary effort is normal. No respiratory distress.   Cardiovascular:      Normal rate. Regular rhythm.      Murmurs: There is no murmur.   Edema:     Peripheral edema absent.   Abdominal:      Tenderness: There is no abdominal tenderness.   Skin:     General: Skin is " warm and dry.   Neurological:      Mental Status: Alert and oriented to person, place, and time.         Lab Review:   Lab Results   Component Value Date    GLUCOSE 103 (H) 05/21/2024    BUN 24 (H) 05/21/2024    CREATININE 0.79 05/21/2024    EGFR 82.3 05/21/2024    BCR 30.4 (H) 05/21/2024    K 4.2 05/21/2024    CO2 28.0 05/21/2024    CALCIUM 9.0 05/21/2024    ALBUMIN 3.8 05/21/2024    BILITOT 0.5 05/21/2024    AST 18 05/21/2024    ALT 10 05/21/2024        Lab Results   Component Value Date    CHOL 103 05/21/2024    CHLPL 104 (L) 09/09/2016    TRIG 45 05/21/2024    HDL 39 (L) 05/21/2024    LDL 53 05/21/2024        Lab Results   Component Value Date    HGBA1C 5.90 (H) 05/21/2024        Lab Results   Component Value Date    WBC 5.48 05/21/2024    HGB 12.7 (L) 05/21/2024    HCT 42.0 05/21/2024    MCV 95.9 05/21/2024     (L) 05/21/2024              ECG 12 Lead    Date/Time: 1/8/2025 1:43 PM  Performed by: Ann Mcneal APRN    Authorized by: Ann Mcneal APRN  Comparison: compared with previous ECG from 10/7/2024  Similar to previous ECG  Rhythm: sinus rhythm  BPM: 65  Conduction: left bundle branch block    Clinical impression: abnormal EKG          Results for orders placed during the hospital encounter of 05/20/24    Adult Transthoracic Echo Complete W/ Cont if Necessary Per Protocol    Interpretation Summary    Left ventricular systolic function is mildly decreased. Left ventricular ejection fraction appears to be 41 - 45%.    Left ventricular wall thickness is consistent with mild concentric hypertrophy.    Left ventricular diastolic function is consistent with (grade II w/high LAP) pseudonormalization.    Normal right ventricular cavity size and systolic function noted.    The left atrial cavity is moderately dilated.    There is mild calcification of the aortic valve. There is mild to moderate thickening of the aortic valve. Mild aortic valve regurgitation is present. Mild aortic valve stenosis is  present.    Mild mitral valve regurgitation is present.    Mild pulmonary hypertension is present.    5/2024 holter:    An abnormal monitor study.    Predominant rhythm is normal sinus rhythm.    Only sustained arrhythmia noted is atrial fibrillation, with a very low overall burden (less than 1%).  The longest episode was just shy of 3 minutes.  Average ventricular rate when in atrial fibrillation was 103 bpm (range ).    Patient never triggered the device or reported any symptoms.       Assessment:      Problem List Items Addressed This Visit          Cardiac and Vasculature    Coronary artery disease involving native coronary artery of native heart    Left bundle branch block    Essential hypertension    Mixed hyperlipidemia    PAF (paroxysmal atrial fibrillation)    Chronic combined systolic and diastolic congestive heart failure - Primary       Plan:     1.  Coronary artery disease: Established problem, stable.  No angina.  No aspirin at this time given his need for anticoagulation.  If anticoagulation is ever held for surgeries or procedures, he will need to take aspirin 81 mg daily in its place.  Continue statin, beta-blocker.    2.  Chronic systolic CHF: Compensated/euvolemic.  Continue current doses of Jardiance, Aldactone, Entresto, Toprol-XL.  Continue Lasix 40 mg on an as needed basis.     3.  Paroxysmal atrial fibrillation: See notes above regarding hospitalization for symptomatic atrial fibrillation, with rapid ventricular response.  He is now maintaining normal sinus rhythm on amiodarone.  Continue amiodarone 200 mg daily for rhythm control.  Continue Eliquis 5 mg twice daily for stroke prophylaxis.  Continue beta-blocker for rate control with recurrences. Remain off of digoxin.    Follow-up in 3-6 months with Dr. Cosby, call sooner with new or worsening symptoms    I spent 34 minutes caring for Wyatt on this date of service. This time includes time spent by me in the following activities:  preparing for the visit, reviewing tests, obtaining and/or reviewing a separately obtained history, performing a medically appropriate examination and/or evaluation, counseling and educating the patient/family/caregiver, and documenting information in the medical record

## 2025-01-14 ENCOUNTER — PATIENT ROUNDING (BHMG ONLY) (OUTPATIENT)
Dept: VASCULAR SURGERY | Facility: CLINIC | Age: OVER 89
End: 2025-01-14
Payer: MEDICARE

## 2025-01-14 ENCOUNTER — OFFICE VISIT (OUTPATIENT)
Dept: VASCULAR SURGERY | Facility: CLINIC | Age: OVER 89
End: 2025-01-14
Payer: MEDICARE

## 2025-01-14 VITALS
SYSTOLIC BLOOD PRESSURE: 114 MMHG | HEART RATE: 68 BPM | BODY MASS INDEX: 29.34 KG/M2 | HEIGHT: 63 IN | DIASTOLIC BLOOD PRESSURE: 62 MMHG | WEIGHT: 165.6 LBS | OXYGEN SATURATION: 95 %

## 2025-01-14 DIAGNOSIS — G45.3 AMAUROSIS FUGAX OF LEFT EYE: ICD-10-CM

## 2025-01-14 DIAGNOSIS — E78.2 MIXED HYPERLIPIDEMIA: ICD-10-CM

## 2025-01-14 DIAGNOSIS — I10 ESSENTIAL HYPERTENSION: ICD-10-CM

## 2025-01-14 DIAGNOSIS — I65.23 BILATERAL CAROTID ARTERY STENOSIS: Primary | ICD-10-CM

## 2025-01-14 PROCEDURE — 1159F MED LIST DOCD IN RCRD: CPT | Performed by: SURGERY

## 2025-01-14 PROCEDURE — 99204 OFFICE O/P NEW MOD 45 MIN: CPT | Performed by: SURGERY

## 2025-01-14 PROCEDURE — 1160F RVW MEDS BY RX/DR IN RCRD: CPT | Performed by: SURGERY

## 2025-01-14 NOTE — LETTER
January 14, 2025     Everardo Jackson MD  [address] [facility] [address]    Patient: Wyatt Curry   YOB: 1930   Date of Visit: 1/14/2025     Dear Everardo Jackson MD:       Thank you for referring Wyatt Curry to me for evaluation. Below are the relevant portions of my assessment and plan of care.    If you have questions, please do not hesitate to call me. I look forward to following Wyatt along with you.         Sincerely,        Sonu Mc DO        CC: No Recipients    Sonu Mc DO  01/14/25 1437  Sign when Signing Visit  01/14/2025      Everardo Jackson MD  [facility] [place] [address]    Wyatt Curry  5/13/1930    Chief Complaint   Patient presents with   • bilateral carotid artery stenosis     Has had testing at [facility], has Blurred vision at times and last about 30 seconds. Left eye goes completely black       Dear Everardo Jackson MD    HPI  I had the pleasure of seeing your patient Wyatt Curry in the office today.  Thank you kindly for this consultation.  As you recall, Wyatt Curry is a 89 y.o.  male who you are currently following for routine health maintenance.  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  He also has complaints of blurred vision. He is maintained on Eliquis and Lipitor. He did have noninvasive testing performed at [facility] which I did review in office.       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   • COPD (chronic obstructive pulmonary disease) 2023   • GERD (gastroesophageal reflux disease)    • Hyperlipidemia    • Hypertension    • Irregular heart beat    • Lung nodules        Past Surgical History:   Procedure Laterality Date   • BLADDER SURGERY     • CARDIAC CATHETERIZATION     • CARDIAC CATHETERIZATION Left 4/8/2023    Procedure:  Cardiac Catheterization/Vascular Study;  Surgeon: Sukhi Hartley MD;  Location:  [facility] CATH INVASIVE LOCATION;  Service: Cardiology;  Laterality: Left;   • COLONOSCOPY     • CORONARY ANGIOPLASTY WITH STENT PLACEMENT      STENT X 2   • CYSTOSCOPY     • ENDOSCOPY     • EXCISION LESION N/A 2023    Procedure: EXCISION LESION OF RIGHT TEMPLE AND LEFT TEMPLE WITH FROZEN SECTION WITH POSSIBLE FLAP OR GRAFT;  Surgeon: Brijesh Nickerson MD;  Location:  [facility] OR;  Service: ENT;  Laterality: N/A;   • FLAP HEAD/NECK Left 2020    Procedure: POSSIBLE FLAP;  Surgeon: Brijesh Nickerson MD;  Location:  [facility] OR;  Service: ENT;  Laterality: Left;   • HEAD/NECK LESION/CYST EXCISION Left 2020    Procedure: 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 with frozen section and possible flap or graft;  Surgeon: Brijesh Nickerson MD;  Location:  [facility] OR;  Service: ENT;  Laterality: Left;   • HERNIA REPAIR     • SHOULDER SURGERY     • SKIN BIOPSY      lip biopsy   • SKIN FULL THICKNESS GRAFT Left 2020    Procedure: OR GRAFT;  Surgeon: Brijesh Nickerson MD;  Location:  [facility] OR;  Service: ENT;  Laterality: Left;   • TRANSURETHRAL RESECTION OF BLADDER TUMOR         Family History   Problem Relation Age of Onset   • Tuberculosis Mother    • Heart disease Father    • Heart attack Father    • Diabetes Sister    • Hypertension Sister    • Hyperlipidemia Sister    • Heart disease Sister    • Alcohol abuse Sister    • Heart disease Brother    • Heart attack Brother    • Heart disease Brother        Social History     Socioeconomic History   • Marital status:    Tobacco Use   • Smoking status: Former     Current packs/day: 0.00     Average packs/day: 1 pack/day for 30.0 years (30.0 ttl pk-yrs)     Types: Cigarettes     Start date:      Quit date:      Years since quittin.0     Passive exposure: Past   • Smokeless tobacco: Never   Vaping Use   • Vaping status: Never  Used   Substance and Sexual Activity   • Alcohol use: Not Currently     Alcohol/week: 1.0 standard drink of alcohol     Types: 1 Glasses of wine per week     Comment: occ wine   • Drug use: Never   • Sexual activity: Defer       Allergies   Allergen Reactions   • Pregabalin Other (See Comments)     Blurred vision only         Current Outpatient Medications:   •  ACETAMINOPHEN ER PO, Take 500 mg by mouth Every 6 (Six) Hours As Needed (pain). Indications: Pain, Disp: , Rfl:   •  albuterol sulfate  (90 Base) MCG/ACT inhaler, INHALE 2 PUFF USING INHALER EVERY FOUR HOURS AS NEEDED FOR SHORTNESS OF BREATH AND/OR WHEEZING, Disp: , Rfl:   •  amiodarone (PACERONE) 200 MG tablet, Take 1 tablet by mouth Daily., Disp: 30 tablet, Rfl: 11  •  apixaban (ELIQUIS) 5 MG tablet tablet, Take 1 tablet by mouth Every 12 (Twelve) Hours. Indications: Atrial Fibrillation, Disp: 60 tablet, Rfl: 11  •  ascorbic acid (VITAMIN C) 500 MG tablet, Take 1 tablet by mouth Daily., Disp: , Rfl:   •  atorvastatin (LIPITOR) 20 MG tablet, Take 1 tablet by mouth every night at bedtime. Indications: Cardiac Failure, High Amount of Fats in the Blood, Disp: , Rfl:   •  busPIRone (BUSPAR) 10 MG tablet, Take 1 tablet by mouth 2 (Two) Times a Day. Indications: Anxiety Disorder, Disp: , Rfl:   •  empagliflozin (JARDIANCE) 10 MG tablet tablet, Take 1 tablet by mouth Daily. Indications: Cardiac Failure, Disp: 90 tablet, Rfl: 3  •  furosemide (LASIX) 40 MG tablet, Take 1 tablet by mouth As Needed (rapid weight gain, shortness of breath)., Disp: , Rfl:   •  ketoconazole (NIZORAL) 2 % cream, APPLY A THIN LAYER DAILY TO SCALP, Disp: , Rfl:   •  lansoprazole (PREVACID) 30 MG capsule, Take 1 capsule by mouth Daily. Indications: Heartburn, Disp: , Rfl:   •  metoprolol succinate XL (TOPROL-XL) 50 MG 24 hr tablet, Take 0.5 tablets by mouth Every Night., Disp: , Rfl:   •  nitroglycerin (NITROSTAT) 0.4 MG SL tablet, Place 1 tablet under the tongue Every 5 (Five)  "Minutes As Needed for Chest Pain. Indications: Acute Angina Pectoris, Disp: , Rfl:   •  sacubitril-valsartan (Entresto) 49-51 MG tablet, Take 1 tablet by mouth 2 (Two) Times a Day. Indications: Cardiac Failure, Disp: 180 tablet, Rfl: 3  •  spironolactone (ALDACTONE) 25 MG tablet, TAKE 1 TABLET BY MOUTH EVERY DAY, Disp: 90 tablet, Rfl: 3  •  tamsulosin (FLOMAX) 0.4 MG capsule 24 hr capsule, TAKE 1 CAPSULE BY MOUTH EVERY DAY, Disp: 90 capsule, Rfl: 3  •  vitamin B-12 (CYANOCOBALAMIN) 1000 MCG tablet, Take 1 tablet by mouth Daily. Indications: Inadequate Vitamin B12, Disp: , Rfl:     Review of Systems   Constitutional: Negative.    HENT: Negative.     Eyes:  Positive for visual disturbance.   Respiratory: Negative.     Cardiovascular: Negative.    Gastrointestinal: Negative.    Endocrine: Negative.    Genitourinary: Negative.    Musculoskeletal: Negative.    Skin: Negative.    Allergic/Immunologic: Negative.    Neurological: Negative.    Hematological: Negative.    Psychiatric/Behavioral: Negative.     All other systems reviewed and are negative.    /62   Pulse 68   Ht 160 cm (63\")   Wt 75.1 kg (165 lb 9.6 oz)   SpO2 95%   BMI 29.33 kg/m²     Physical Exam  Vitals and nursing note reviewed.   Constitutional:       Appearance: Normal appearance. He is well-developed.   HENT:      Head: Normocephalic and atraumatic.   Eyes:      General: No scleral icterus.     Pupils: Pupils are equal, round, and reactive to light.   Neck:      Thyroid: No thyromegaly.      Vascular: No carotid bruit or JVD.   Cardiovascular:      Rate and Rhythm: Normal rate and regular rhythm.      Pulses:           Carotid pulses are 2+ on the right side and 2+ on the left side.       Femoral pulses are 2+ on the right side and 2+ on the left side.       Popliteal pulses are 2+ on the right side and 2+ on the left side.        Dorsalis pedis pulses are 2+ on the right side and 2+ on the left side.        Posterior tibial pulses are 2+ on " the right side and 2+ on the left side.      Heart sounds: Normal heart sounds.   Pulmonary:      Effort: Pulmonary effort is normal.      Breath sounds: Normal breath sounds.   Abdominal:      General: Bowel sounds are normal. There is no distension or abdominal bruit.      Palpations: Abdomen is soft. There is no mass.      Tenderness: There is no abdominal tenderness.   Musculoskeletal:         General: Normal range of motion.      Cervical back: Neck supple.   Lymphadenopathy:      Cervical: No cervical adenopathy.   Skin:     General: Skin is warm and dry.   Neurological:      Mental Status: He is alert and oriented to person, place, and time.      Cranial Nerves: No cranial nerve deficit.      Sensory: No sensory deficit.       Diagnostic Data:      Patient Active Problem List   Diagnosis   • Benign localized prostatic hyperplasia without lower urinary tract symptoms (LUTS)   • History of bladder cancer   • Left rotator cuff tear arthropathy   • Coronary artery disease involving native coronary artery of native heart   • Left bundle branch block   • Essential hypertension   • Mixed hyperlipidemia   • Overweight (BMI 25.0-29.9)   • Non-rheumatic mitral regurgitation   • Other fatigue   • History of coronary artery stent placement   • Lung nodule   • Mediastinal adenopathy   • Cough   • Abnormal positron emission tomography (PET) scan   • Basal cell carcinoma   • Neoplasm of uncertain behavior   • Elevated troponin   • Bleeding   • Ischemic cardiomyopathy   • Pneumonia of right lower lobe due to infectious organism   • PAF (paroxysmal atrial fibrillation)   • Proctocolitis (high concern for C. difficile colitis)   • Chronic combined systolic and diastolic congestive heart failure   • Clostridium difficile colitis   • Recurrent Clostridioides difficile diarrhea   • Symptomatic hypotension   • Hypomagnesemia   • Chest pain   • NSTEMI, initial episode of care   • Atrial fibrillation with RVR   • Gross hematuria         Diagnosis Plan   1. Bilateral carotid artery stenosis  CT Angiogram Neck      2. Amaurosis fugax of left eye  CT Angiogram Neck      3. Essential hypertension        4. Mixed hyperlipidemia            Plan: After thoroughly evaluating Wyatt Curry, I believe the best course of action is to proceed with a CTA of the neck for further evaluation of his carotid disease.  He is having amaurosis fugax to left eye.  He denies any further stroke symptoms.  I reviewed his carotid duplex noting 50-69% right carotid stenosis and 70-99% left carotid stenosis. 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  I will see him back in 1 week to review his testing.  I did discuss vascular risk factors as they pertain to the progression of vascular disease including controlling his hypertension and hyperlipidemia.  His blood pressure is stable on his current medications.  He should continue his Eliquis  5 mg twice daily and Lipitor  20 mg daily in addition to his other medications.  The patient is to continue taking their medications as previously discussed.   This was all discussed in full with complete understanding.  Thank you for allowing me to participate in the care of your patient.  Please do not hesitate to call with any questions or concerns.  We will keep you aware of any further encounters with Wyatt Curry.        Sincerely yours,         DO Renetta Andrews Jeffrey L, MD

## 2025-01-14 NOTE — PROGRESS NOTES
January 14, 2025    Hello, may I speak with Wyatt Curry?    My name is dario       I am  with [facility]  [address], [address]  [facility] [place] [address] [phone].    Before we get started may I verify your date of birth? 5/13/1930    I am calling to officially welcome you to our practice and ask about your recent visit. Is this a good time to talk? yes    Tell me about your visit with us. What things went well?  Everything was great and the staff was friendly and helpful.       We're always looking for ways to make our patients' experiences even better. Do you have recommendations on ways we may improve?  no    Overall were you satisfied with your first visit to our practice? yes       I appreciate you taking the time to speak with me today. Is there anything else I can do for you? no      Thank you, and have a great day.

## 2025-01-14 NOTE — PROGRESS NOTES
01/14/2025      Everardo Jackson MD  [address] [place] [address]    Wyatt Curry  5/13/1930    Chief Complaint   Patient presents with    bilateral carotid artery stenosis     Has had testing at [facility], has Blurred vision at times and last about 30 seconds. Left eye goes completely black       Dear Everardo Jackson MD    HPI  I had the pleasure of seeing your patient Wyatt Curry in the office today.  Thank you kindly for this consultation.  As you recall, Wyatt Curry is a 89 y.o.  male who you are currently following for routine health maintenance.  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  He also has complaints of blurred vision. He is maintained on Eliquis and Lipitor. He did have noninvasive testing performed at [facility] which I did review in office.       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    COPD (chronic obstructive pulmonary disease) 2023    GERD (gastroesophageal reflux disease)     Hyperlipidemia     Hypertension     Irregular heart beat     Lung nodules        Past Surgical History:   Procedure Laterality Date    BLADDER SURGERY      CARDIAC CATHETERIZATION      CARDIAC CATHETERIZATION Left 4/8/2023    Procedure: Cardiac Catheterization/Vascular Study;  Surgeon: Sukhi Hartley MD;  Location: [facility] CATH INVASIVE LOCATION;  Service: Cardiology;  Laterality: Left;    COLONOSCOPY      CORONARY ANGIOPLASTY WITH STENT PLACEMENT  2006    STENT X 2    CYSTOSCOPY      ENDOSCOPY      EXCISION LESION N/A 4/7/2023    Procedure: EXCISION LESION OF RIGHT TEMPLE AND LEFT TEMPLE WITH FROZEN SECTION WITH POSSIBLE FLAP OR GRAFT;  Surgeon: Brijesh Nickerson MD;  Location: [facility] OR;  Service: ENT;  Laterality: N/A;    FLAP HEAD/NECK Left 03/09/2020    Procedure: POSSIBLE FLAP;  Surgeon: Brijesh Nickerson MD;   Location:  [facility] OR;  Service: ENT;  Laterality: Left;    HEAD/NECK LESION/CYST EXCISION Left 2020    Procedure: 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 with frozen section and possible flap or graft;  Surgeon: Brijesh Nickerson MD;  Location:  [facility] OR;  Service: ENT;  Laterality: Left;    HERNIA REPAIR      SHOULDER SURGERY      SKIN BIOPSY      lip biopsy    SKIN FULL THICKNESS GRAFT Left 2020    Procedure: OR GRAFT;  Surgeon: Brijesh Nickerson MD;  Location:  [facility] OR;  Service: ENT;  Laterality: Left;    TRANSURETHRAL RESECTION OF BLADDER TUMOR         Family History   Problem Relation Age of Onset    Tuberculosis Mother     Heart disease Father     Heart attack Father     Diabetes Sister     Hypertension Sister     Hyperlipidemia Sister     Heart disease Sister     Alcohol abuse Sister     Heart disease Brother     Heart attack Brother     Heart disease Brother        Social History     Socioeconomic History    Marital status:    Tobacco Use    Smoking status: Former     Current packs/day: 0.00     Average packs/day: 1 pack/day for 30.0 years (30.0 ttl pk-yrs)     Types: Cigarettes     Start date:      Quit date:      Years since quittin.0     Passive exposure: Past    Smokeless tobacco: Never   Vaping Use    Vaping status: Never Used   Substance and Sexual Activity    Alcohol use: Not Currently     Alcohol/week: 1.0 standard drink of alcohol     Types: 1 Glasses of wine per week     Comment: occ wine    Drug use: Never    Sexual activity: Defer       Allergies   Allergen Reactions    Pregabalin Other (See Comments)     Blurred vision only         Current Outpatient Medications:     ACETAMINOPHEN ER PO, Take 500 mg by mouth Every 6 (Six) Hours As Needed (pain). Indications: Pain, Disp: , Rfl:     albuterol sulfate  (90 Base) MCG/ACT inhaler, INHALE 2 PUFF USING INHALER EVERY FOUR HOURS AS NEEDED FOR SHORTNESS OF BREATH AND/OR WHEEZING,  Disp: , Rfl:     amiodarone (PACERONE) 200 MG tablet, Take 1 tablet by mouth Daily., Disp: 30 tablet, Rfl: 11    apixaban (ELIQUIS) 5 MG tablet tablet, Take 1 tablet by mouth Every 12 (Twelve) Hours. Indications: Atrial Fibrillation, Disp: 60 tablet, Rfl: 11    ascorbic acid (VITAMIN C) 500 MG tablet, Take 1 tablet by mouth Daily., Disp: , Rfl:     atorvastatin (LIPITOR) 20 MG tablet, Take 1 tablet by mouth every night at bedtime. Indications: Cardiac Failure, High Amount of Fats in the Blood, Disp: , Rfl:     busPIRone (BUSPAR) 10 MG tablet, Take 1 tablet by mouth 2 (Two) Times a Day. Indications: Anxiety Disorder, Disp: , Rfl:     empagliflozin (JARDIANCE) 10 MG tablet tablet, Take 1 tablet by mouth Daily. Indications: Cardiac Failure, Disp: 90 tablet, Rfl: 3    furosemide (LASIX) 40 MG tablet, Take 1 tablet by mouth As Needed (rapid weight gain, shortness of breath)., Disp: , Rfl:     ketoconazole (NIZORAL) 2 % cream, APPLY A THIN LAYER DAILY TO SCALP, Disp: , Rfl:     lansoprazole (PREVACID) 30 MG capsule, Take 1 capsule by mouth Daily. Indications: Heartburn, Disp: , Rfl:     metoprolol succinate XL (TOPROL-XL) 50 MG 24 hr tablet, Take 0.5 tablets by mouth Every Night., Disp: , Rfl:     nitroglycerin (NITROSTAT) 0.4 MG SL tablet, Place 1 tablet under the tongue Every 5 (Five) Minutes As Needed for Chest Pain. Indications: Acute Angina Pectoris, Disp: , Rfl:     sacubitril-valsartan (Entresto) 49-51 MG tablet, Take 1 tablet by mouth 2 (Two) Times a Day. Indications: Cardiac Failure, Disp: 180 tablet, Rfl: 3    spironolactone (ALDACTONE) 25 MG tablet, TAKE 1 TABLET BY MOUTH EVERY DAY, Disp: 90 tablet, Rfl: 3    tamsulosin (FLOMAX) 0.4 MG capsule 24 hr capsule, TAKE 1 CAPSULE BY MOUTH EVERY DAY, Disp: 90 capsule, Rfl: 3    vitamin B-12 (CYANOCOBALAMIN) 1000 MCG tablet, Take 1 tablet by mouth Daily. Indications: Inadequate Vitamin B12, Disp: , Rfl:     Review of Systems   Constitutional: Negative.    HENT:  "Negative.     Eyes:  Positive for visual disturbance.   Respiratory: Negative.     Cardiovascular: Negative.    Gastrointestinal: Negative.    Endocrine: Negative.    Genitourinary: Negative.    Musculoskeletal: Negative.    Skin: Negative.    Allergic/Immunologic: Negative.    Neurological: Negative.    Hematological: Negative.    Psychiatric/Behavioral: Negative.     All other systems reviewed and are negative.    /62   Pulse 68   Ht 160 cm (63\")   Wt 75.1 kg (165 lb 9.6 oz)   SpO2 95%   BMI 29.33 kg/m²     Physical Exam  Vitals and nursing note reviewed.   Constitutional:       Appearance: Normal appearance. He is well-developed.   HENT:      Head: Normocephalic and atraumatic.   Eyes:      General: No scleral icterus.     Pupils: Pupils are equal, round, and reactive to light.   Neck:      Thyroid: No thyromegaly.      Vascular: No carotid bruit or JVD.   Cardiovascular:      Rate and Rhythm: Normal rate and regular rhythm.      Pulses:           Carotid pulses are 2+ on the right side and 2+ on the left side.       Femoral pulses are 2+ on the right side and 2+ on the left side.       Popliteal pulses are 2+ on the right side and 2+ on the left side.        Dorsalis pedis pulses are 2+ on the right side and 2+ on the left side.        Posterior tibial pulses are 2+ on the right side and 2+ on the left side.      Heart sounds: Normal heart sounds.   Pulmonary:      Effort: Pulmonary effort is normal.      Breath sounds: Normal breath sounds.   Abdominal:      General: Bowel sounds are normal. There is no distension or abdominal bruit.      Palpations: Abdomen is soft. There is no mass.      Tenderness: There is no abdominal tenderness.   Musculoskeletal:         General: Normal range of motion.      Cervical back: Neck supple.   Lymphadenopathy:      Cervical: No cervical adenopathy.   Skin:     General: Skin is warm and dry.   Neurological:      Mental Status: He is alert and oriented to person, " place, and time.      Cranial Nerves: No cranial nerve deficit.      Sensory: No sensory deficit.       Diagnostic Data:      Patient Active Problem List   Diagnosis    Benign localized prostatic hyperplasia without lower urinary tract symptoms (LUTS)    History of bladder cancer    Left rotator cuff tear arthropathy    Coronary artery disease involving native coronary artery of native heart    Left bundle branch block    Essential hypertension    Mixed hyperlipidemia    Overweight (BMI 25.0-29.9)    Non-rheumatic mitral regurgitation    Other fatigue    History of coronary artery stent placement    Lung nodule    Mediastinal adenopathy    Cough    Abnormal positron emission tomography (PET) scan    Basal cell carcinoma    Neoplasm of uncertain behavior    Elevated troponin    Bleeding    Ischemic cardiomyopathy    Pneumonia of right lower lobe due to infectious organism    PAF (paroxysmal atrial fibrillation)    Proctocolitis (high concern for C. difficile colitis)    Chronic combined systolic and diastolic congestive heart failure    Clostridium difficile colitis    Recurrent Clostridioides difficile diarrhea    Symptomatic hypotension    Hypomagnesemia    Chest pain    NSTEMI, initial episode of care    Atrial fibrillation with RVR    Gross hematuria        Diagnosis Plan   1. Bilateral carotid artery stenosis  CT Angiogram Neck      2. Amaurosis fugax of left eye  CT Angiogram Neck      3. Essential hypertension        4. Mixed hyperlipidemia            Plan: After thoroughly evaluating Wyatt Curry, I believe the best course of action is to proceed with a CTA of the neck for further evaluation of his carotid disease.  He is having amaurosis fugax to left eye.  He denies any further stroke symptoms.  I reviewed his carotid duplex noting 50-69% right carotid stenosis and 70-99% left carotid stenosis. 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  I will see him back in 1 week to review his testing.  I did discuss vascular risk factors as they pertain to the progression of vascular disease including controlling his hypertension and hyperlipidemia.  His blood pressure is stable on his current medications.  He should continue his Eliquis  5 mg twice daily and Lipitor  20 mg daily in addition to his other medications.  The patient is to continue taking their medications as previously discussed.   This was all discussed in full with complete understanding.  Thank you for allowing me to participate in the care of your patient.  Please do not hesitate to call with any questions or concerns.  We will keep you aware of any further encounters with Wyatt Curry.        Sincerely yours,         DO Renetta Andrews Jeffrey L, MD

## 2025-01-22 ENCOUNTER — TELEPHONE (OUTPATIENT)
Dept: VASCULAR SURGERY | Facility: CLINIC | Age: OVER 89
End: 2025-01-22
Payer: MEDICARE

## 2025-01-23 ENCOUNTER — HOSPITAL ENCOUNTER (OUTPATIENT)
Dept: CT IMAGING | Facility: HOSPITAL | Age: OVER 89
Discharge: HOME | End: 2025-01-23
Admitting: SURGERY
Payer: MEDICARE

## 2025-01-23 ENCOUNTER — OFFICE VISIT (OUTPATIENT)
Dept: VASCULAR SURGERY | Facility: CLINIC | Age: OVER 89
End: 2025-01-23
Payer: MEDICARE

## 2025-01-23 VITALS
RESPIRATION RATE: 16 BRPM | OXYGEN SATURATION: 96 % | BODY MASS INDEX: 28.88 KG/M2 | DIASTOLIC BLOOD PRESSURE: 62 MMHG | SYSTOLIC BLOOD PRESSURE: 122 MMHG | HEART RATE: 67 BPM | WEIGHT: 163 LBS | HEIGHT: 63 IN

## 2025-01-23 DIAGNOSIS — I71.02 DISSECTION OF ABDOMINAL AORTA: ICD-10-CM

## 2025-01-23 DIAGNOSIS — Q27.8: Primary | ICD-10-CM

## 2025-01-23 DIAGNOSIS — I65.23 BILATERAL CAROTID ARTERY STENOSIS: ICD-10-CM

## 2025-01-23 DIAGNOSIS — I10 ESSENTIAL HYPERTENSION: ICD-10-CM

## 2025-01-23 DIAGNOSIS — I48.0 PAF (PAROXYSMAL ATRIAL FIBRILLATION): ICD-10-CM

## 2025-01-23 DIAGNOSIS — E78.2 MIXED HYPERLIPIDEMIA: ICD-10-CM

## 2025-01-23 DIAGNOSIS — I73.9 PAD (PERIPHERAL ARTERY DISEASE): ICD-10-CM

## 2025-01-23 DIAGNOSIS — G45.3 AMAUROSIS FUGAX OF LEFT EYE: ICD-10-CM

## 2025-01-23 PROCEDURE — 25510000001 IOPAMIDOL PER 1 ML: Performed by: SURGERY

## 2025-01-23 PROCEDURE — 70498 CT ANGIOGRAPHY NECK: CPT

## 2025-01-23 PROCEDURE — 1160F RVW MEDS BY RX/DR IN RCRD: CPT | Performed by: NURSE PRACTITIONER

## 2025-01-23 PROCEDURE — 1159F MED LIST DOCD IN RCRD: CPT | Performed by: NURSE PRACTITIONER

## 2025-01-23 PROCEDURE — 99214 OFFICE O/P EST MOD 30 MIN: CPT | Performed by: NURSE PRACTITIONER

## 2025-01-23 RX ORDER — IOPAMIDOL 755 MG/ML
100 INJECTION, SOLUTION INTRAVASCULAR
Status: COMPLETED | OUTPATIENT
Start: 2025-01-23 | End: 2025-01-23

## 2025-01-23 RX ORDER — BENZONATATE 100 MG/1
100 CAPSULE ORAL 3 TIMES DAILY PRN
COMMUNITY

## 2025-01-23 RX ADMIN — IOPAMIDOL 100 ML: 755 INJECTION, SOLUTION INTRAVENOUS at 13:17

## 2025-01-23 NOTE — LETTER
"January 27, 2025     Everardo Jackson MD  [address] [facility] [place] [address]    Patient: Wyatt Curry   YOB: 1930   Date of Visit: 1/23/2025     Dear Everardo Jackson MD:       Thank you for referring Wyatt Curry to me for evaluation. Below are the relevant portions of my assessment and plan of care.    If you have questions, please do not hesitate to call me. I look forward to following Wyatt along with you.         Sincerely,        Chralotte LARRY Stanton        CC: No Recipients    Charlotte Stanton APRN  01/27/25 1647  Sign when Signing Visit  1/27/2025       Everardo Jackson MD  [address] [facility] [address]      Wyatt Curry  5/13/1930    Chief Complaint   Patient presents with   • Follow-up     1 week follow up for Bilateral carotid artery stenosis  Testing done here         Dear Everardo Jackson MD     HPI  I had the pleasure of seeing your patient Wyatt Curry in the office today.    As you recall, Wyatt Curry is a 89 y.o.  male who you are currently following for routine health maintenance.  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  He also has complaints of blurred vision. He is maintained on Eliquis and Lipitor.  He did have noninvasive testing performed today, which I did review in office.      Review of Systems   Constitutional: Negative.    HENT: Negative.     Eyes:  Positive for visual disturbance.   Respiratory: Negative.     Cardiovascular: Negative.    Gastrointestinal: Negative.    Endocrine: Negative.    Genitourinary: Negative.    Musculoskeletal: Negative.    Skin: Negative.    Allergic/Immunologic: Negative.    Neurological: Negative.    Hematological: Negative.    Psychiatric/Behavioral: Negative.     All other systems reviewed and are negative.    /62   Pulse 67   Resp 16   Ht 160 cm (63\")   Wt 73.9 kg (163 lb)   SpO2 96%   BMI 28.87 kg/m²     Physical Exam  Vitals and nursing note reviewed.   Constitutional:       " Appearance: Normal appearance. He is well-developed.   HENT:      Head: Normocephalic and atraumatic.   Eyes:      General: No scleral icterus.     Pupils: Pupils are equal, round, and reactive to light.   Neck:      Thyroid: No thyromegaly.      Vascular: No carotid bruit or JVD.   Cardiovascular:      Rate and Rhythm: Normal rate and regular rhythm.      Pulses:           Carotid pulses are 2+ on the right side and 2+ on the left side.       Femoral pulses are 2+ on the right side and 2+ on the left side.       Popliteal pulses are 2+ on the right side and 2+ on the left side.        Dorsalis pedis pulses are 2+ on the right side and 2+ on the left side.        Posterior tibial pulses are 2+ on the right side and 2+ on the left side.      Heart sounds: Normal heart sounds.   Pulmonary:      Effort: Pulmonary effort is normal.      Breath sounds: Normal breath sounds.   Abdominal:      General: Bowel sounds are normal. There is no distension or abdominal bruit.      Palpations: Abdomen is soft. There is no mass.      Tenderness: There is no abdominal tenderness.   Musculoskeletal:         General: Normal range of motion.      Cervical back: Neck supple.   Lymphadenopathy:      Cervical: No cervical adenopathy.   Skin:     General: Skin is warm and dry.   Neurological:      Mental Status: He is alert and oriented to person, place, and time.      Cranial Nerves: No cranial nerve deficit.      Sensory: No sensory deficit.       Diagnostic Data:  CT Angiogram Neck    Result Date: 1/23/2025  Narrative: EXAMINATION: CT ANGIOGRAM NECK- 1/23/2025 1:36 PM  HISTORY: Carotid artery stenosis  TOTAL DOSE: 325.02 mGy.cm (Automatic exposure control technique was implemented in an effort to keep the radiation dose as low as possible without compromising image quality)  REPORT: Spiral CT of the neck was performed after administration of intravenous contrast using CTA protocol, to include reconstructed coronal, sagittal and 3D  images.  COMPARISON: There are no correlative imaging studies for comparison.  Review of lung windows demonstrates emphysematous changes in the upper lung zones, with mild biapical pleural scarring and calcification which appears chronic. No pleural effusion is identified. There is streak artifact arising from the left shoulder prosthesis.  There is dilation of the central pulmonary arteries compatible with pulmonary arterial hypertension. Moderate volume of calcified plaque is noted within the aortic arch which is normal caliber. No aortic dissection is seen. The left vertebral artery arises directly from the aortic arch, a normal variant. Calcified plaque is noted within the proximal left vertebral artery, with approximately 50 to 60% stenosis just beyond its origin.  The right common carotid artery is tortuous proximally, moderate calcified plaque is noted within the carotid bulb and proximal right ICA, without flow-limiting stenosis. No right carotid dissection is identified. The right ECA is patent. There is heavy calcified plaque at the cavernous segments of both distal ICAs. A portion of the cervical bolus is included in the field-of-view, the middle and anterior cerebral arteries are patent.  The left common carotid artery is patent, somewhat small in caliber diffusely. Small volume of calcified plaque is noted at the left carotid bulb, with less than 50% stenosis. The left ICA is not visualized and may be congenitally absent as there is a crossing vessel between the cavernous segment of the right ICA that courses through the inferior aspect of the sella turcica fossa, supplying blood to the left cavernous ICA. Heavy calcified plaque is noted within this anomalous intracavernous vessel. Also, the left SAV appears to arise from the terminus of the right ICA. The left MCA arises from the distal left ICA. The distal right ICA is also mildly dilated and extends partly into the right side of the clivus. The ACOM  appears patent.  Both vertebral arteries are patent, the right vertebral artery appears dominant compared to the left. No vertebral dissection is identified. The basilar artery is patent as are the distal branch vessels of the basilar artery. Both ophthalmic arteries appear patent.      Impression: 1. 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 SAV appears to arise from the terminus of the right ICA and the ACOM is patent. The left MCA is supplied from the terminus of the left ICA. 2. The left common carotid artery is patent and supplies the left external carotid artery. There is approximately 50% stenosis of the terminal left common carotid artery. 3. Moderately heavy calcified plaque within the right carotid bifurcation with no flow limiting stenosis. 4. Both vertebral arteries are patent, the left vertebral artery arises directly from the aortic arch and there is about 50 to 60% stenosis of the proximal left vertebral artery just beyond its origin.    This report was signed and finalized on 1/23/2025 2:16 PM by Dr. Emigdio Bhandari MD.            Patient Active Problem List   Diagnosis   • Benign localized prostatic hyperplasia without lower urinary tract symptoms (LUTS)   • History of bladder cancer   • Left rotator cuff tear arthropathy   • Coronary artery disease involving native coronary artery of native heart   • Left bundle branch block   • Essential hypertension   • Mixed hyperlipidemia   • Overweight (BMI 25.0-29.9)   • Non-rheumatic mitral regurgitation   • Other fatigue   • History of coronary artery stent placement   • Lung nodule   • Mediastinal adenopathy   • Cough   • Abnormal positron emission tomography (PET) scan   • Basal cell carcinoma   • Neoplasm of uncertain behavior   • Elevated troponin   • Bleeding   • Ischemic  cardiomyopathy   • Pneumonia of right lower lobe due to infectious organism   • PAF (paroxysmal atrial fibrillation)   • Proctocolitis (high concern for C. difficile colitis)   • Chronic combined systolic and diastolic congestive heart failure   • Clostridium difficile colitis   • Recurrent Clostridioides difficile diarrhea   • Symptomatic hypotension   • Hypomagnesemia   • Chest pain   • NSTEMI, initial episode of care   • Atrial fibrillation with RVR   • Gross hematuria        Diagnosis Plan   1. Congenital absence of carotid artery        2. Essential hypertension        3. Mixed hyperlipidemia        4. PAF (paroxysmal atrial fibrillation)              Plan: After thoroughly evaluating Wyatt Curry, I believe the best course of action is to remain conservative from vascular surgery standpoint.  I did review his testing as well as reviewed with Dr. Mc.  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  I did call and discussed with the patient as well as Candy , who was with him during his visit. 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  We will see him back in 1 year with carotid duplex for continued surveillance as well as ABIs.   I did discuss vascular risk factors as they pertain to the progression of vascular disease including controlling his hypertension and hyperlipidemia.  His blood pressure is stable on his current medications.  He should continue his Eliquis  5 mg twice daily and Lipitor  20 mg daily in addition to his other medications.  The patient is to continue taking their medications as previously discussed.   This was all discussed in full with complete understanding.  Thank you for allowing me to participate in the care of your patient.  Please do not hesitate to call with any questions or concerns.  We will  keep you aware of any further encounters with Wyatt Curry.        Sincerely yours,         LARRY Clay Jeffrey L, MD

## 2025-01-23 NOTE — PROGRESS NOTES
"1/27/2025       Everardo Jackson MD  [address] [facility] [place] [address]      Wyatt Curry  5/13/1930    Chief Complaint   Patient presents with    Follow-up     1 week follow up for Bilateral carotid artery stenosis  Testing done here         Dear Everardo Jackson MD     HPI  I had the pleasure of seeing your patient Wyatt Curry in the office today.    As you recall, Wyatt Curry is a 89 y.o.  male who you are currently following for routine health maintenance.  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  He also has complaints of blurred vision. He is maintained on Eliquis and Lipitor.  He did have noninvasive testing performed today, which I did review in office.      Review of Systems   Constitutional: Negative.    HENT: Negative.     Eyes:  Positive for visual disturbance.   Respiratory: Negative.     Cardiovascular: Negative.    Gastrointestinal: Negative.    Endocrine: Negative.    Genitourinary: Negative.    Musculoskeletal: Negative.    Skin: Negative.    Allergic/Immunologic: Negative.    Neurological: Negative.    Hematological: Negative.    Psychiatric/Behavioral: Negative.     All other systems reviewed and are negative.    /62   Pulse 67   Resp 16   Ht 160 cm (63\")   Wt 73.9 kg (163 lb)   SpO2 96%   BMI 28.87 kg/m²     Physical Exam  Vitals and nursing note reviewed.   Constitutional:       Appearance: Normal appearance. He is well-developed.   HENT:      Head: Normocephalic and atraumatic.   Eyes:      General: No scleral icterus.     Pupils: Pupils are equal, round, and reactive to light.   Neck:      Thyroid: No thyromegaly.      Vascular: No carotid bruit or JVD.   Cardiovascular:      Rate and Rhythm: Normal rate and regular rhythm.      Pulses:           Carotid pulses are 2+ on the right side and 2+ on the left side.       Femoral pulses are 2+ on the right side and 2+ on the left side.       Popliteal pulses are 2+ on the right side and 2+ on the left " side.        Dorsalis pedis pulses are 2+ on the right side and 2+ on the left side.        Posterior tibial pulses are 2+ on the right side and 2+ on the left side.      Heart sounds: Normal heart sounds.   Pulmonary:      Effort: Pulmonary effort is normal.      Breath sounds: Normal breath sounds.   Abdominal:      General: Bowel sounds are normal. There is no distension or abdominal bruit.      Palpations: Abdomen is soft. There is no mass.      Tenderness: There is no abdominal tenderness.   Musculoskeletal:         General: Normal range of motion.      Cervical back: Neck supple.   Lymphadenopathy:      Cervical: No cervical adenopathy.   Skin:     General: Skin is warm and dry.   Neurological:      Mental Status: He is alert and oriented to person, place, and time.      Cranial Nerves: No cranial nerve deficit.      Sensory: No sensory deficit.       Diagnostic Data:  CT Angiogram Neck    Result Date: 1/23/2025  Narrative: EXAMINATION: CT ANGIOGRAM NECK- 1/23/2025 1:36 PM  HISTORY: Carotid artery stenosis  TOTAL DOSE: 325.02 mGy.cm (Automatic exposure control technique was implemented in an effort to keep the radiation dose as low as possible without compromising image quality)  REPORT: Spiral CT of the neck was performed after administration of intravenous contrast using CTA protocol, to include reconstructed coronal, sagittal and 3D images.  COMPARISON: There are no correlative imaging studies for comparison.  Review of lung windows demonstrates emphysematous changes in the upper lung zones, with mild biapical pleural scarring and calcification which appears chronic. No pleural effusion is identified. There is streak artifact arising from the left shoulder prosthesis.  There is dilation of the central pulmonary arteries compatible with pulmonary arterial hypertension. Moderate volume of calcified plaque is noted within the aortic arch which is normal caliber. No aortic dissection is seen. The left vertebral  artery arises directly from the aortic arch, a normal variant. Calcified plaque is noted within the proximal left vertebral artery, with approximately 50 to 60% stenosis just beyond its origin.  The right common carotid artery is tortuous proximally, moderate calcified plaque is noted within the carotid bulb and proximal right ICA, without flow-limiting stenosis. No right carotid dissection is identified. The right ECA is patent. There is heavy calcified plaque at the cavernous segments of both distal ICAs. A portion of the cervical bolus is included in the field-of-view, the middle and anterior cerebral arteries are patent.  The left common carotid artery is patent, somewhat small in caliber diffusely. Small volume of calcified plaque is noted at the left carotid bulb, with less than 50% stenosis. The left ICA is not visualized and may be congenitally absent as there is a crossing vessel between the cavernous segment of the right ICA that courses through the inferior aspect of the sella turcica fossa, supplying blood to the left cavernous ICA. Heavy calcified plaque is noted within this anomalous intracavernous vessel. Also, the left SAV appears to arise from the terminus of the right ICA. The left MCA arises from the distal left ICA. The distal right ICA is also mildly dilated and extends partly into the right side of the clivus. The ACOM appears patent.  Both vertebral arteries are patent, the right vertebral artery appears dominant compared to the left. No vertebral dissection is identified. The basilar artery is patent as are the distal branch vessels of the basilar artery. Both ophthalmic arteries appear patent.      Impression: 1. 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 SAV appears to arise from the terminus of the right ICA and the  ACOM is patent. The left MCA is supplied from the terminus of the left ICA. 2. The left common carotid artery is patent and supplies the left external carotid artery. There is approximately 50% stenosis of the terminal left common carotid artery. 3. Moderately heavy calcified plaque within the right carotid bifurcation with no flow limiting stenosis. 4. Both vertebral arteries are patent, the left vertebral artery arises directly from the aortic arch and there is about 50 to 60% stenosis of the proximal left vertebral artery just beyond its origin.    This report was signed and finalized on 1/23/2025 2:16 PM by Dr. Emigdio Bhandari MD.            Patient Active Problem List   Diagnosis    Benign localized prostatic hyperplasia without lower urinary tract symptoms (LUTS)    History of bladder cancer    Left rotator cuff tear arthropathy    Coronary artery disease involving native coronary artery of native heart    Left bundle branch block    Essential hypertension    Mixed hyperlipidemia    Overweight (BMI 25.0-29.9)    Non-rheumatic mitral regurgitation    Other fatigue    History of coronary artery stent placement    Lung nodule    Mediastinal adenopathy    Cough    Abnormal positron emission tomography (PET) scan    Basal cell carcinoma    Neoplasm of uncertain behavior    Elevated troponin    Bleeding    Ischemic cardiomyopathy    Pneumonia of right lower lobe due to infectious organism    PAF (paroxysmal atrial fibrillation)    Proctocolitis (high concern for C. difficile colitis)    Chronic combined systolic and diastolic congestive heart failure    Clostridium difficile colitis    Recurrent Clostridioides difficile diarrhea    Symptomatic hypotension    Hypomagnesemia    Chest pain    NSTEMI, initial episode of care    Atrial fibrillation with RVR    Gross hematuria        Diagnosis Plan   1. Congenital absence of carotid artery        2. Essential hypertension        3. Mixed hyperlipidemia        4. PAF  (paroxysmal atrial fibrillation)              Plan: After thoroughly evaluating Wyatt Curry, I believe the best course of action is to remain conservative from vascular surgery standpoint.  I did review his testing as well as reviewed with Dr. Mc.  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  I did call and discussed with the patient as well as Candy , who was with him during his visit. 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  We will see him back in 1 year with carotid duplex for continued surveillance as well as ABIs.   I did discuss vascular risk factors as they pertain to the progression of vascular disease including controlling his hypertension and hyperlipidemia.  His blood pressure is stable on his current medications.  He should continue his Eliquis  5 mg twice daily and Lipitor  20 mg daily in addition to his other medications.  The patient is to continue taking their medications as previously discussed.   This was all discussed in full with complete understanding.  Thank you for allowing me to participate in the care of your patient.  Please do not hesitate to call with any questions or concerns.  We will keep you aware of any further encounters with Wyatt Curry.        Sincerely yours,         LARRY Clay Jeffrey L, MD

## 2025-01-27 ENCOUNTER — TELEPHONE (OUTPATIENT)
Dept: UROLOGY | Facility: CLINIC | Age: OVER 89
End: 2025-01-27
Payer: MEDICARE

## 2025-01-27 NOTE — TELEPHONE ENCOUNTER
Patient called in stating he was passing a lot of blood and sometimes when he urinates its nothing but blood. He is still urinating well on his own. I let him know I would get a message over to Judit.

## 2025-01-28 NOTE — TELEPHONE ENCOUNTER
Spoke with patient to let him know Judit would like him to be seen. Patient was not seeing blood in his urine today. But he had also received a letter that he was due for an appointment so I went ahead and made him an appointment.

## 2025-01-30 NOTE — PROGRESS NOTES
Subjective    Mr. Curry is 89 y.o. male    Chief Complaint: blood in urine    History of Present Illness    89-year-old male established patient in with return of blood in urine over the last week.  Denies large clots.  Denies difficulty emptying.  Last seen in clinic 6/2024 in follow-up for gross hematuria that resulted in hospitalization.  Patient with a history of bladder cancer diagnosed in 1978 by Dr. Hunter.  With recurrence 2007 underwent induction BCG.  Last cystoscopy surveillance 3/2024 Dr. Howard with negative upper tract imaging and cystoscopy revealing heavily trabeculated bladder with multiple cellules and diverticula but no evidence of recurrent bladder cancer.  Ongoing report of urinary frequency every hour despite starting tamsulosin usage.    The following portions of the patient's history were reviewed and updated as appropriate: allergies, current medications, past family history, past medical history, past social history, past surgical history and problem list.    Review of Systems   Constitutional:  Negative for chills and fever.   Gastrointestinal:  Negative for abdominal pain, anal bleeding and blood in stool.   Genitourinary:  Positive for hematuria. Negative for dysuria.         Current Outpatient Medications:     ACETAMINOPHEN ER PO, Take 500 mg by mouth Every 6 (Six) Hours As Needed (pain). Indications: Pain, Disp: , Rfl:     albuterol sulfate  (90 Base) MCG/ACT inhaler, INHALE 2 PUFF USING INHALER EVERY FOUR HOURS AS NEEDED FOR SHORTNESS OF BREATH AND/OR WHEEZING, Disp: , Rfl:     amiodarone (PACERONE) 200 MG tablet, Take 1 tablet by mouth Daily., Disp: 30 tablet, Rfl: 11    apixaban (ELIQUIS) 5 MG tablet tablet, Take 1 tablet by mouth Every 12 (Twelve) Hours. Indications: Atrial Fibrillation, Disp: 60 tablet, Rfl: 11    ascorbic acid (VITAMIN C) 500 MG tablet, Take 1 tablet by mouth Daily., Disp: , Rfl:     atorvastatin (LIPITOR) 20 MG tablet, Take 1 tablet by mouth every night  at bedtime. Indications: Cardiac Failure, High Amount of Fats in the Blood, Disp: , Rfl:     benzonatate (TESSALON) 100 MG capsule, Take 1 capsule by mouth 3 (Three) Times a Day As Needed for Cough., Disp: , Rfl:     busPIRone (BUSPAR) 10 MG tablet, Take 1 tablet by mouth 2 (Two) Times a Day. Indications: Anxiety Disorder, Disp: , Rfl:     empagliflozin (JARDIANCE) 10 MG tablet tablet, Take 1 tablet by mouth Daily. Indications: Cardiac Failure, Disp: 90 tablet, Rfl: 3    furosemide (LASIX) 40 MG tablet, Take 1 tablet by mouth As Needed (rapid weight gain, shortness of breath)., Disp: , Rfl:     ketoconazole (NIZORAL) 2 % cream, APPLY A THIN LAYER DAILY TO SCALP, Disp: , Rfl:     lansoprazole (PREVACID) 30 MG capsule, Take 1 capsule by mouth Daily. Indications: Heartburn, Disp: , Rfl:     metoprolol succinate XL (TOPROL-XL) 50 MG 24 hr tablet, Take 0.5 tablets by mouth Every Night., Disp: , Rfl:     nitroglycerin (NITROSTAT) 0.4 MG SL tablet, Place 1 tablet under the tongue Every 5 (Five) Minutes As Needed for Chest Pain. Indications: Acute Angina Pectoris, Disp: , Rfl:     sacubitril-valsartan (Entresto) 49-51 MG tablet, Take 1 tablet by mouth 2 (Two) Times a Day. Indications: Cardiac Failure, Disp: 180 tablet, Rfl: 3    spironolactone (ALDACTONE) 25 MG tablet, TAKE 1 TABLET BY MOUTH EVERY DAY, Disp: 90 tablet, Rfl: 3    tamsulosin (FLOMAX) 0.4 MG capsule 24 hr capsule, TAKE 1 CAPSULE BY MOUTH EVERY DAY, Disp: 90 capsule, Rfl: 3    vitamin B-12 (CYANOCOBALAMIN) 1000 MCG tablet, Take 1 tablet by mouth Daily. 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    COPD (chronic obstructive pulmonary disease) 2023    GERD (gastroesophageal reflux disease)     Hyperlipidemia     Hypertension     Irregular heart beat     Lung  nodules        Past Surgical History:   Procedure Laterality Date    BLADDER SURGERY      CARDIAC CATHETERIZATION      CARDIAC CATHETERIZATION Left 2023    Procedure: Cardiac Catheterization/Vascular Study;  Surgeon: Sukhi Hartley MD;  Location:  [facility];  Service: Cardiology;  Laterality: Left;    COLONOSCOPY      CORONARY ANGIOPLASTY WITH STENT PLACEMENT      STENT X 2    CYSTOSCOPY      ENDOSCOPY      EXCISION LESION N/A 2023    Procedure: EXCISION LESION OF RIGHT TEMPLE AND LEFT TEMPLE WITH FROZEN SECTION WITH POSSIBLE FLAP OR GRAFT;  Surgeon: Brijesh Nickerson MD;  Location:  [facility] OR;  Service: ENT;  Laterality: N/A;    FLAP HEAD/NECK Left 2020    Procedure: POSSIBLE FLAP;  Surgeon: Brijesh Nickerson MD;  Location:  [facility];  Service: ENT;  Laterality: Left;    HEAD/NECK LESION/CYST EXCISION Left 2020    Procedure: 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0 ttl pk-yrs)     Types: Cigarettes     Start date:      Quit date: 1975     Years since quittin.1     Passive exposure: Past    Smokeless tobacco: Never   Vaping Use    Vaping status: Never Used   Substance and Sexual Activity    Alcohol use: Not Currently     Alcohol/week: 1.0 standard drink of alcohol     Types: 1 Glasses of wine per week     Comment: occ wine     "Drug use: Never    Sexual activity: Defer       Family History   Problem Relation Age of Onset    Tuberculosis Mother     Heart disease Father     Heart attack Father     Diabetes Sister     Hypertension Sister     Hyperlipidemia Sister     Heart disease Sister     Alcohol abuse Sister     Heart disease Brother     Heart attack Brother     Heart disease Brother        Objective    Temp 97.5 °F (36.4 °C)   Ht 160 cm (62.99\")   Wt 73.9 kg (163 lb)   BMI 28.88 kg/m²     Physical Exam  Constitutional:       Appearance: Normal appearance.   Abdominal:      Tenderness: There is no right CVA tenderness or left CVA tenderness.   Skin:     General: Skin is warm and dry.   Neurological:      Mental Status: He is alert and oriented to person, place, and time.   Psychiatric:         Mood and Affect: Mood normal.         Behavior: Behavior normal.             Results for orders placed or performed in visit on 02/03/25   POC Urinalysis Dipstick, Multipro    Collection Time: 02/03/25  1:25 PM    Specimen: Urine   Result Value Ref Range    Color Red (A) Yellow, Straw, Dark Yellow, Leslie    Clarity, UA Slightly Cloudy (A) Clear    Glucose, UA >=1000 mg/dL (3+) (A) Negative mg/dL    Bilirubin Moderate (2+) (A) Negative    Ketones, UA 15 mg/dL (A) Negative    Specific Gravity  1.020 1.005 - 1.030    Blood, UA Large (A) Negative    pH, Urine 5.5 5.0 - 8.0    Protein,  mg/dL (A) Negative mg/dL    Urobilinogen, UA 2.0 E.U./dL (A) Normal, 0.2 E.U./dL    Nitrite, UA Negative Negative    Leukocytes Large (3+) (A) Negative     Assessment and Plan    Diagnoses and all orders for this visit:    1. Hematuria, gross (Primary)  -     POC Urinalysis Dipstick, Multipro  -     Urine Culture - Urine, Urine, Random Void  -     CT Abdomen Pelvis With & Without Contrast; Future      Return of blood in urine.  Will send urine for culture today to rule out possible infection.  Patient ultimately needing to get back in with Dr. Howard for repeat " cystoscopy as we are 1 month shy of 1 year since last surveillance scope.  Will have obtain repeat imaging prior.

## 2025-02-03 ENCOUNTER — OFFICE VISIT (OUTPATIENT)
Dept: UROLOGY | Facility: CLINIC | Age: OVER 89
End: 2025-02-03
Payer: MEDICARE

## 2025-02-03 VITALS — BODY MASS INDEX: 28.88 KG/M2 | WEIGHT: 163 LBS | HEIGHT: 63 IN | TEMPERATURE: 97.5 F

## 2025-02-03 DIAGNOSIS — R31.0 HEMATURIA, GROSS: Primary | ICD-10-CM

## 2025-02-03 LAB
BILIRUB BLD-MCNC: ABNORMAL MG/DL
CLARITY, POC: ABNORMAL
COLOR UR: ABNORMAL
GLUCOSE UR STRIP-MCNC: ABNORMAL MG/DL
KETONES UR QL: ABNORMAL
LEUKOCYTE EST, POC: ABNORMAL
NITRITE UR-MCNC: NEGATIVE MG/ML
PH UR: 5.5 [PH] (ref 5–8)
PROT UR STRIP-MCNC: ABNORMAL MG/DL
RBC # UR STRIP: ABNORMAL /UL
SP GR UR: 1.02 (ref 1–1.03)
UROBILINOGEN UR QL: ABNORMAL

## 2025-02-03 PROCEDURE — 87077 CULTURE AEROBIC IDENTIFY: CPT

## 2025-02-03 PROCEDURE — 87086 URINE CULTURE/COLONY COUNT: CPT

## 2025-02-03 PROCEDURE — 87186 SC STD MICRODIL/AGAR DIL: CPT

## 2025-02-05 ENCOUNTER — TELEPHONE (OUTPATIENT)
Dept: UROLOGY | Facility: CLINIC | Age: OVER 89
End: 2025-02-05
Payer: MEDICARE

## 2025-02-05 DIAGNOSIS — R31.0 GROSS HEMATURIA: Primary | ICD-10-CM

## 2025-02-05 LAB — BACTERIA SPEC AEROBE CULT: ABNORMAL

## 2025-02-05 RX ORDER — CEFDINIR 300 MG/1
300 CAPSULE ORAL 2 TIMES DAILY
Qty: 20 CAPSULE | Refills: 0 | Status: SHIPPED | OUTPATIENT
Start: 2025-02-05

## 2025-02-05 NOTE — TELEPHONE ENCOUNTER
Spoke with patient to let him know   Urine culture positive for E. coli.  Have sent in cefdinir to patient's pharmacy     Patient verbalized understanding.

## 2025-02-05 NOTE — TELEPHONE ENCOUNTER
----- Message from Judit Valentino sent at 2/5/2025 10:22 AM CST -----  Urine culture positive for E. coli.  Have sent in cefdinir to patient's pharmacy

## 2025-02-24 ENCOUNTER — HOSPITAL ENCOUNTER (OUTPATIENT)
Dept: CT IMAGING | Facility: HOSPITAL | Age: OVER 89
Discharge: HOME | End: 2025-02-24
Payer: MEDICARE

## 2025-02-24 DIAGNOSIS — R31.0 HEMATURIA, GROSS: ICD-10-CM

## 2025-02-24 LAB — CREAT BLDA-MCNC: 1 MG/DL (ref 0.6–1.3)

## 2025-02-24 PROCEDURE — 74178 CT ABD&PLV WO CNTR FLWD CNTR: CPT

## 2025-02-24 PROCEDURE — 25510000001 IOPAMIDOL PER 1 ML

## 2025-02-24 PROCEDURE — 82565 ASSAY OF CREATININE: CPT

## 2025-02-24 RX ORDER — IOPAMIDOL 755 MG/ML
100 INJECTION, SOLUTION INTRAVASCULAR
Status: COMPLETED | OUTPATIENT
Start: 2025-02-24 | End: 2025-02-24

## 2025-02-24 RX ADMIN — IOPAMIDOL 100 ML: 755 INJECTION, SOLUTION INTRAVENOUS at 09:04

## 2025-02-26 ENCOUNTER — PROCEDURE VISIT (OUTPATIENT)
Dept: UROLOGY | Facility: CLINIC | Age: OVER 89
End: 2025-02-26
Payer: MEDICARE

## 2025-02-26 DIAGNOSIS — Z85.51 HISTORY OF BLADDER CANCER: ICD-10-CM

## 2025-02-26 DIAGNOSIS — R31.0 GROSS HEMATURIA: Primary | ICD-10-CM

## 2025-02-26 LAB
BILIRUB BLD-MCNC: NEGATIVE MG/DL
CLARITY, POC: CLEAR
COLOR UR: YELLOW
GLUCOSE UR STRIP-MCNC: ABNORMAL MG/DL
KETONES UR QL: NEGATIVE
LEUKOCYTE EST, POC: ABNORMAL
NITRITE UR-MCNC: NEGATIVE MG/ML
PH UR: 5.5 [PH] (ref 5–8)
PROT UR STRIP-MCNC: ABNORMAL MG/DL
RBC # UR STRIP: ABNORMAL /UL
SP GR UR: 1.01 (ref 1–1.03)
UROBILINOGEN UR QL: ABNORMAL

## 2025-02-26 NOTE — PROGRESS NOTES
Pre- operative diagnosis:  Bladder cancer surveillance and Hematuria    Post operative diagnosis:  No recurrent bladder tumor    Procedure:  The patient was prepped and draped in a normal sterile fashion.  The urethra was anesthetized with 2% lidocaine jelly.  A flexible cystoscope was introduced per urethra.      Urethra:  Normal    Bladder:  heavy trabeculation, cellules, diverticuli, and areas of inflammation    Ureteral orifices:  Normal position bilaterally    Prostate:  normal    Patient tolerated the procedure well    Complications: none    Blood loss: minimal    Follow up:    Routine follow up

## 2025-05-21 LAB
QT INTERVAL: 350 MS
QT INTERVAL: 370 MS
QT INTERVAL: 450 MS
QTC INTERVAL: 486 MS
QTC INTERVAL: 503 MS
QTC INTERVAL: 530 MS

## 2025-07-22 RX ORDER — AMIODARONE HYDROCHLORIDE 200 MG/1
200 TABLET ORAL DAILY
Qty: 90 TABLET | Refills: 3 | Status: SHIPPED | OUTPATIENT
Start: 2025-07-22

## 2025-08-11 ENCOUNTER — OFFICE VISIT (OUTPATIENT)
Dept: CARDIOLOGY | Facility: CLINIC | Age: OVER 89
End: 2025-08-11
Payer: MEDICARE

## 2025-08-11 VITALS
HEART RATE: 50 BPM | BODY MASS INDEX: 29.38 KG/M2 | WEIGHT: 165.8 LBS | DIASTOLIC BLOOD PRESSURE: 56 MMHG | HEIGHT: 63 IN | SYSTOLIC BLOOD PRESSURE: 98 MMHG | OXYGEN SATURATION: 97 %

## 2025-08-11 DIAGNOSIS — I48.0 PAF (PAROXYSMAL ATRIAL FIBRILLATION): ICD-10-CM

## 2025-08-11 DIAGNOSIS — I50.42 CHRONIC COMBINED SYSTOLIC AND DIASTOLIC CONGESTIVE HEART FAILURE: Primary | ICD-10-CM

## 2025-08-11 DIAGNOSIS — I25.10 CORONARY ARTERY DISEASE INVOLVING NATIVE CORONARY ARTERY OF NATIVE HEART WITHOUT ANGINA PECTORIS: ICD-10-CM

## 2025-08-11 DIAGNOSIS — I44.7 LEFT BUNDLE BRANCH BLOCK: ICD-10-CM

## 2025-08-11 PROCEDURE — 1160F RVW MEDS BY RX/DR IN RCRD: CPT | Performed by: NURSE PRACTITIONER

## 2025-08-11 PROCEDURE — 1159F MED LIST DOCD IN RCRD: CPT | Performed by: NURSE PRACTITIONER

## 2025-08-11 PROCEDURE — 99214 OFFICE O/P EST MOD 30 MIN: CPT | Performed by: NURSE PRACTITIONER

## 2025-08-11 PROCEDURE — 93000 ELECTROCARDIOGRAM COMPLETE: CPT | Performed by: NURSE PRACTITIONER

## 2025-08-20 RX ORDER — SACUBITRIL AND VALSARTAN 49; 51 MG/1; MG/1
1 TABLET, FILM COATED ORAL 2 TIMES DAILY
Qty: 180 TABLET | Refills: 3 | Status: SHIPPED | OUTPATIENT
Start: 2025-08-20

## (undated) DEVICE — INFLATION DEVICE: Brand: ENCORE™ 26

## (undated) DEVICE — HDRST POSITIONING FM RND 2X9IN

## (undated) DEVICE — PK TURNOVER RM ADV

## (undated) DEVICE — SUT ETHLN 6/0 P3 18IN 1698G

## (undated) DEVICE — PREP SOL POVIDONE/IODINE BT 4OZ

## (undated) DEVICE — HI-TORQUE POWERTURN FLEX GUIDE WIRE W/HYDROPHILIC COATING .014" STRAIGHT TIP 190 CM: Brand: HI-TORQUE POWERTURN

## (undated) DEVICE — GLV SURG BIOGEL M LTX PF 7 1/2

## (undated) DEVICE — A2000 MULTI-USE SYRINGE KIT, P/N 701277-003KIT CONTENTS: 100ML CONTRAST RESERVOIR AND TUBING WITH CONTRAST SPIKE AND CLAMP: Brand: A2000 MULTI-USE SYRINGE KIT

## (undated) DEVICE — PK CATH CARD 30 CA/4

## (undated) DEVICE — NDL ART SECUR LK 18G 2 3/4IN

## (undated) DEVICE — PINNACLE INTRODUCER SHEATH: Brand: PINNACLE

## (undated) DEVICE — SUT ETHLN 5/0 P3 18IN 698H

## (undated) DEVICE — PK ENT HD AND NK 30

## (undated) DEVICE — SUT MNCRYL 4/0 P3 18IN UD MCP494G

## (undated) DEVICE — TRAP FLD MINIVAC MEGADYNE 100ML

## (undated) DEVICE — MINI TREK CORONARY DILATATION CATHETER 2.0 MM X 12 MM / RAPID-EXCHANGE: Brand: MINI TREK

## (undated) DEVICE — SPNG GZ WOVN 4X4IN 12PLY 10/BX STRL

## (undated) DEVICE — BAPTIST TURNOVER KIT: Brand: MEDLINE INDUSTRIES, INC.

## (undated) DEVICE — PERCLOSE™ PROSTYLE™ SUTURE-MEDIATED CLOSURE AND REPAIR SYSTEM: Brand: PERCLOSE™ PROSTYLE™

## (undated) DEVICE — CANN NASL ETCO2 LO/FLO A/

## (undated) DEVICE — PAD, DEFIB, ADULT, RADIOTRANS, PHYSIO: Brand: MEDLINE

## (undated) DEVICE — FR5 INFINITI MULTIPAC: Brand: INFINITI

## (undated) DEVICE — MODEL BT2000 P/N 700287-012KIT CONTENTS: MANIFOLD WITH SALINE AND CONTRAST PORTS, SALINE TUBING WITH SPIKE AND HAND SYRINGE, TRANSDUCER: Brand: BT2000 AUTOMATED MANIFOLD KIT

## (undated) DEVICE — GUIDE EXTENSION CATHETER: Brand: GUIDEZILLA™ II

## (undated) DEVICE — GW STARTER FXD CORE J .035 3X150CM 3MM

## (undated) DEVICE — MODEL AT P65, P/N 701554-001KIT CONTENTS: HAND CONTROLLER, 3-WAY HIGH-PRESSURE STOPCOCK WITH ROTATING END AND PREMIUM HIGH-PRESSURE TUBING: Brand: ANGIOTOUCH® KIT

## (undated) DEVICE — GC 7F 078 JL 4: Brand: VISTA BRITE TIP

## (undated) DEVICE — DEV TORQ GW HOT/PINK

## (undated) DEVICE — CONTAINER,SPECIMEN,OR STERILE,4OZ: Brand: MEDLINE

## (undated) DEVICE — ELECTRD BLD EZ CLN MOD XLNG 2.75IN

## (undated) DEVICE — TOOL INSRT GW MTL OR PLSTC

## (undated) DEVICE — COPILOT BLEEDBACK CONTROL VALVE: Brand: COPILOT

## (undated) DEVICE — 4-PORT MANIFOLD: Brand: NEPTUNE 2

## (undated) DEVICE — SOLIDIFIER LIQUI LOC PLUS 2000CC

## (undated) DEVICE — SOL IRR NACL 0.9PCT BT 1000ML

## (undated) DEVICE — MINI TREK CORONARY DILATATION CATHETER 1.50 MM X 12 MM / RAPID-EXCHANGE: Brand: MINI TREK